# Patient Record
Sex: FEMALE | Race: WHITE | Employment: OTHER | URBAN - NONMETROPOLITAN AREA
[De-identification: names, ages, dates, MRNs, and addresses within clinical notes are randomized per-mention and may not be internally consistent; named-entity substitution may affect disease eponyms.]

---

## 2017-06-20 ENCOUNTER — TELEPHONE (OUTPATIENT)
Dept: INTERNAL MEDICINE | Age: 67
End: 2017-06-20

## 2017-06-20 RX ORDER — HYDROCHLOROTHIAZIDE 25 MG/1
TABLET ORAL
Qty: 30 TABLET | Refills: 3 | Status: SHIPPED | OUTPATIENT
Start: 2017-06-20 | End: 2017-06-22 | Stop reason: SDUPTHER

## 2017-06-22 RX ORDER — HYDROCHLOROTHIAZIDE 25 MG/1
TABLET ORAL
Qty: 30 TABLET | Refills: 3 | Status: SHIPPED | OUTPATIENT
Start: 2017-06-22 | End: 2017-06-22 | Stop reason: SDUPTHER

## 2017-06-22 RX ORDER — HYDROCHLOROTHIAZIDE 25 MG/1
TABLET ORAL
Qty: 30 TABLET | Refills: 3 | Status: SHIPPED | OUTPATIENT
Start: 2017-06-22 | End: 2017-06-26 | Stop reason: SDUPTHER

## 2017-06-26 RX ORDER — ALCOHOL ANTISEPTIC PADS
1 PADS, MEDICATED (EA) TOPICAL 3 TIMES DAILY
Qty: 100 EACH | Refills: 3 | Status: SHIPPED | OUTPATIENT
Start: 2017-06-26 | End: 2017-07-11 | Stop reason: SDUPTHER

## 2017-06-26 RX ORDER — HYDROCHLOROTHIAZIDE 25 MG/1
TABLET ORAL
Qty: 30 TABLET | Refills: 3 | Status: SHIPPED | OUTPATIENT
Start: 2017-06-26 | End: 2017-06-29 | Stop reason: SDUPTHER

## 2017-06-29 RX ORDER — LANCETS
100 EACH MISCELLANEOUS 4 TIMES DAILY
Qty: 100 EACH | Refills: 3 | Status: SHIPPED | OUTPATIENT
Start: 2017-06-29 | End: 2018-06-14 | Stop reason: SDUPTHER

## 2017-06-29 RX ORDER — HYDROCHLOROTHIAZIDE 25 MG/1
TABLET ORAL
Qty: 30 TABLET | Refills: 3 | Status: SHIPPED | OUTPATIENT
Start: 2017-06-29 | End: 2017-07-11 | Stop reason: SDUPTHER

## 2017-06-29 RX ORDER — LISINOPRIL 40 MG/1
TABLET ORAL
Qty: 90 TABLET | Refills: 3 | Status: SHIPPED | OUTPATIENT
Start: 2017-06-29 | End: 2017-07-06 | Stop reason: SDUPTHER

## 2017-06-29 RX ORDER — SIMVASTATIN 40 MG
40 TABLET ORAL EVERY EVENING
Qty: 90 TABLET | Refills: 3 | Status: SHIPPED | OUTPATIENT
Start: 2017-06-29 | End: 2018-05-17 | Stop reason: SDUPTHER

## 2017-06-29 RX ORDER — GLIPIZIDE 2.5 MG/1
2.5 TABLET, EXTENDED RELEASE ORAL DAILY
Qty: 180 TABLET | Refills: 3 | Status: SHIPPED | OUTPATIENT
Start: 2017-06-29 | End: 2017-07-11 | Stop reason: SDUPTHER

## 2017-07-06 RX ORDER — LISINOPRIL 40 MG/1
TABLET ORAL
Qty: 90 TABLET | Refills: 3 | Status: SHIPPED | OUTPATIENT
Start: 2017-07-06 | End: 2017-07-28 | Stop reason: SDUPTHER

## 2017-07-11 RX ORDER — GLIPIZIDE 2.5 MG/1
2.5 TABLET, EXTENDED RELEASE ORAL DAILY
Qty: 180 TABLET | Refills: 3 | Status: SHIPPED | OUTPATIENT
Start: 2017-07-11 | End: 2019-02-22 | Stop reason: SDUPTHER

## 2017-07-11 RX ORDER — ALCOHOL ANTISEPTIC PADS
1 PADS, MEDICATED (EA) TOPICAL 3 TIMES DAILY
Qty: 100 EACH | Refills: 3 | Status: SHIPPED | OUTPATIENT
Start: 2017-07-11 | End: 2018-06-27 | Stop reason: SDUPTHER

## 2017-07-11 RX ORDER — HYDROCHLOROTHIAZIDE 25 MG/1
TABLET ORAL
Qty: 90 TABLET | Refills: 3 | Status: SHIPPED | OUTPATIENT
Start: 2017-07-11 | End: 2017-07-28 | Stop reason: SDUPTHER

## 2017-07-28 RX ORDER — HYDROCHLOROTHIAZIDE 25 MG/1
TABLET ORAL
Qty: 90 TABLET | Refills: 3 | Status: ON HOLD | OUTPATIENT
Start: 2017-07-28 | End: 2018-05-20 | Stop reason: HOSPADM

## 2017-07-28 RX ORDER — LISINOPRIL 40 MG/1
TABLET ORAL
Qty: 90 TABLET | Refills: 3 | Status: SHIPPED | OUTPATIENT
Start: 2017-07-28 | End: 2018-08-15 | Stop reason: SDUPTHER

## 2017-08-28 ENCOUNTER — TELEPHONE (OUTPATIENT)
Dept: INTERNAL MEDICINE | Age: 67
End: 2017-08-28

## 2017-08-28 RX ORDER — CEFDINIR 300 MG/1
300 CAPSULE ORAL 2 TIMES DAILY
Qty: 14 CAPSULE | Refills: 0 | Status: SHIPPED | OUTPATIENT
Start: 2017-08-28 | End: 2017-09-18

## 2017-08-28 RX ORDER — CEFDINIR 300 MG/1
300 CAPSULE ORAL 2 TIMES DAILY
COMMUNITY
End: 2017-09-18

## 2017-09-08 DIAGNOSIS — Z79.4 TYPE 2 DIABETES MELLITUS WITHOUT COMPLICATION, WITH LONG-TERM CURRENT USE OF INSULIN (HCC): ICD-10-CM

## 2017-09-08 DIAGNOSIS — E11.9 TYPE 2 DIABETES MELLITUS WITHOUT COMPLICATION, WITH LONG-TERM CURRENT USE OF INSULIN (HCC): ICD-10-CM

## 2017-09-08 DIAGNOSIS — Z00.00 HEALTHCARE MAINTENANCE: ICD-10-CM

## 2017-09-08 DIAGNOSIS — E55.9 UNSPECIFIED VITAMIN D DEFICIENCY: Primary | ICD-10-CM

## 2017-09-11 DIAGNOSIS — E55.9 UNSPECIFIED VITAMIN D DEFICIENCY: ICD-10-CM

## 2017-09-11 DIAGNOSIS — Z00.00 HEALTHCARE MAINTENANCE: ICD-10-CM

## 2017-09-11 DIAGNOSIS — E11.9 TYPE 2 DIABETES MELLITUS WITHOUT COMPLICATION, WITH LONG-TERM CURRENT USE OF INSULIN (HCC): ICD-10-CM

## 2017-09-11 DIAGNOSIS — Z79.4 TYPE 2 DIABETES MELLITUS WITHOUT COMPLICATION, WITH LONG-TERM CURRENT USE OF INSULIN (HCC): ICD-10-CM

## 2017-09-11 LAB
ALBUMIN SERPL-MCNC: 3.9 G/DL (ref 3.5–5.2)
ALP BLD-CCNC: 43 U/L (ref 35–104)
ALT SERPL-CCNC: 20 U/L (ref 5–33)
ANION GAP SERPL CALCULATED.3IONS-SCNC: 14 MMOL/L (ref 7–19)
AST SERPL-CCNC: 20 U/L (ref 5–32)
BILIRUB SERPL-MCNC: 0.3 MG/DL (ref 0.2–1.2)
BUN BLDV-MCNC: 23 MG/DL (ref 8–23)
CALCIUM SERPL-MCNC: 9.1 MG/DL (ref 8.8–10.2)
CHLORIDE BLD-SCNC: 101 MMOL/L (ref 98–111)
CHOLESTEROL, TOTAL: 117 MG/DL (ref 160–199)
CO2: 24 MMOL/L (ref 22–29)
CREAT SERPL-MCNC: 1.1 MG/DL (ref 0.5–0.9)
CREATININE URINE: 44.7 MG/DL (ref 4.2–622)
GFR NON-AFRICAN AMERICAN: 49
GLUCOSE BLD-MCNC: 89 MG/DL (ref 74–109)
HBA1C MFR BLD: 6.3 %
HCT VFR BLD CALC: 34.9 % (ref 37–47)
HDLC SERPL-MCNC: 37 MG/DL (ref 65–121)
HEMOGLOBIN: 11.4 G/DL (ref 12–16)
LDL CHOLESTEROL CALCULATED: 49 MG/DL
MCH RBC QN AUTO: 32.9 PG (ref 27–31)
MCHC RBC AUTO-ENTMCNC: 32.7 G/DL (ref 33–37)
MCV RBC AUTO: 100.9 FL (ref 81–99)
MICROALBUMIN UR-MCNC: 6.6 MG/DL (ref 0–19)
MICROALBUMIN/CREAT UR-RTO: 147.7 MG/G
PDW BLD-RTO: 12.6 % (ref 11.5–14.5)
PLATELET # BLD: 244 K/UL (ref 130–400)
PMV BLD AUTO: 10.9 FL (ref 9.4–12.3)
POTASSIUM SERPL-SCNC: 4.9 MMOL/L (ref 3.5–5)
RBC # BLD: 3.46 M/UL (ref 4.2–5.4)
SODIUM BLD-SCNC: 139 MMOL/L (ref 136–145)
TOTAL PROTEIN: 7.2 G/DL (ref 6.6–8.7)
TRIGL SERPL-MCNC: 155 MG/DL (ref 150–199)
TSH SERPL DL<=0.05 MIU/L-ACNC: 1.66 UIU/ML (ref 0.27–4.2)
VITAMIN D 25-HYDROXY: 30.7 NG/ML
WBC # BLD: 8.1 K/UL (ref 4.8–10.8)

## 2017-09-12 RX ORDER — BLOOD-GLUCOSE METER
EACH MISCELLANEOUS
Qty: 1 KIT | Refills: 0 | Status: SHIPPED | OUTPATIENT
Start: 2017-09-12 | End: 2019-04-09

## 2017-09-12 RX ORDER — BLOOD SUGAR DIAGNOSTIC
STRIP MISCELLANEOUS
Qty: 400 STRIP | Refills: 3 | Status: SHIPPED | OUTPATIENT
Start: 2017-09-12 | End: 2018-06-27 | Stop reason: SDUPTHER

## 2017-09-18 ENCOUNTER — OFFICE VISIT (OUTPATIENT)
Dept: INTERNAL MEDICINE | Age: 67
End: 2017-09-18
Payer: MEDICARE

## 2017-09-18 VITALS
DIASTOLIC BLOOD PRESSURE: 62 MMHG | WEIGHT: 224 LBS | SYSTOLIC BLOOD PRESSURE: 118 MMHG | HEART RATE: 70 BPM | HEIGHT: 65 IN | OXYGEN SATURATION: 97 % | RESPIRATION RATE: 18 BRPM | TEMPERATURE: 97.4 F | BODY MASS INDEX: 37.32 KG/M2

## 2017-09-18 DIAGNOSIS — E55.9 VITAMIN D DEFICIENCY: ICD-10-CM

## 2017-09-18 DIAGNOSIS — E11.9 TYPE 2 DIABETES MELLITUS WITHOUT COMPLICATION, WITHOUT LONG-TERM CURRENT USE OF INSULIN (HCC): ICD-10-CM

## 2017-09-18 DIAGNOSIS — E78.2 MIXED HYPERLIPIDEMIA: ICD-10-CM

## 2017-09-18 DIAGNOSIS — E03.9 HYPOTHYROIDISM, UNSPECIFIED TYPE: ICD-10-CM

## 2017-09-18 DIAGNOSIS — J01.11 ACUTE RECURRENT FRONTAL SINUSITIS: ICD-10-CM

## 2017-09-18 DIAGNOSIS — J01.01 ACUTE RECURRENT MAXILLARY SINUSITIS: Primary | ICD-10-CM

## 2017-09-18 DIAGNOSIS — I10 HYPERTENSION, ESSENTIAL: ICD-10-CM

## 2017-09-18 PROCEDURE — 99214 OFFICE O/P EST MOD 30 MIN: CPT | Performed by: NURSE PRACTITIONER

## 2017-09-18 RX ORDER — TIMOLOL MALEATE 5 MG/ML
1 SOLUTION/ DROPS OPHTHALMIC DAILY
COMMUNITY
Start: 2017-09-08 | End: 2022-02-17 | Stop reason: DRUGHIGH

## 2017-09-18 ASSESSMENT — ENCOUNTER SYMPTOMS
VOMITING: 0
CHOKING: 0
COLOR CHANGE: 0
STRIDOR: 0
WHEEZING: 0
SHORTNESS OF BREATH: 0
EYE DISCHARGE: 0
ABDOMINAL DISTENTION: 0
BLOOD IN STOOL: 0
DIARRHEA: 0
ABDOMINAL PAIN: 0
NAUSEA: 0
TROUBLE SWALLOWING: 0
COUGH: 0
EYE ITCHING: 0
SINUS PRESSURE: 1
SORE THROAT: 0
CONSTIPATION: 0

## 2017-09-18 ASSESSMENT — PATIENT HEALTH QUESTIONNAIRE - PHQ9
1. LITTLE INTEREST OR PLEASURE IN DOING THINGS: 0
2. FEELING DOWN, DEPRESSED OR HOPELESS: 0
SUM OF ALL RESPONSES TO PHQ9 QUESTIONS 1 & 2: 0
SUM OF ALL RESPONSES TO PHQ QUESTIONS 1-9: 0

## 2017-11-08 ENCOUNTER — TELEPHONE (OUTPATIENT)
Dept: INTERNAL MEDICINE | Age: 67
End: 2017-11-08

## 2017-11-09 ENCOUNTER — TELEPHONE (OUTPATIENT)
Dept: INTERNAL MEDICINE | Age: 67
End: 2017-11-09

## 2017-11-09 RX ORDER — AZITHROMYCIN 250 MG/1
TABLET, FILM COATED ORAL
Qty: 1 PACKET | Refills: 0 | Status: CANCELLED | OUTPATIENT
Start: 2017-11-09 | End: 2017-11-19

## 2017-11-09 RX ORDER — CEFDINIR 300 MG/1
300 CAPSULE ORAL 2 TIMES DAILY
Qty: 14 CAPSULE | Refills: 0 | Status: SHIPPED | OUTPATIENT
Start: 2017-11-09 | End: 2018-05-14 | Stop reason: SDUPTHER

## 2017-11-09 RX ORDER — BENZONATATE 200 MG/1
200 CAPSULE ORAL 3 TIMES DAILY PRN
Qty: 30 CAPSULE | Refills: 0 | Status: SHIPPED | OUTPATIENT
Start: 2017-11-09 | End: 2018-05-18

## 2017-11-14 ENCOUNTER — TELEPHONE (OUTPATIENT)
Dept: INTERNAL MEDICINE | Age: 67
End: 2017-11-14

## 2017-12-18 ENCOUNTER — TELEPHONE (OUTPATIENT)
Dept: INTERNAL MEDICINE | Age: 67
End: 2017-12-18

## 2018-01-11 DIAGNOSIS — E03.9 HYPOTHYROIDISM, UNSPECIFIED TYPE: ICD-10-CM

## 2018-01-11 DIAGNOSIS — E55.9 VITAMIN D DEFICIENCY: ICD-10-CM

## 2018-01-11 DIAGNOSIS — E11.9 TYPE 2 DIABETES MELLITUS WITHOUT COMPLICATION, WITHOUT LONG-TERM CURRENT USE OF INSULIN (HCC): ICD-10-CM

## 2018-01-11 LAB
ALBUMIN SERPL-MCNC: 4.1 G/DL (ref 3.5–5.2)
ALP BLD-CCNC: 39 U/L (ref 35–104)
ALT SERPL-CCNC: 29 U/L (ref 5–33)
ANION GAP SERPL CALCULATED.3IONS-SCNC: 13 MMOL/L (ref 7–19)
AST SERPL-CCNC: 29 U/L (ref 5–32)
BASOPHILS ABSOLUTE: 0.1 K/UL (ref 0–0.2)
BASOPHILS RELATIVE PERCENT: 0.9 % (ref 0–1)
BILIRUB SERPL-MCNC: 0.3 MG/DL (ref 0.2–1.2)
BUN BLDV-MCNC: 18 MG/DL (ref 8–23)
CALCIUM SERPL-MCNC: 8.5 MG/DL (ref 8.8–10.2)
CHLORIDE BLD-SCNC: 101 MMOL/L (ref 98–111)
CO2: 25 MMOL/L (ref 22–29)
CREAT SERPL-MCNC: 1.3 MG/DL (ref 0.5–0.9)
EOSINOPHILS ABSOLUTE: 0.3 K/UL (ref 0–0.6)
EOSINOPHILS RELATIVE PERCENT: 3.7 % (ref 0–5)
GFR NON-AFRICAN AMERICAN: 41
GLUCOSE BLD-MCNC: 114 MG/DL (ref 74–109)
HBA1C MFR BLD: 6.4 %
HCT VFR BLD CALC: 34.9 % (ref 37–47)
HEMOGLOBIN: 11.6 G/DL (ref 12–16)
LYMPHOCYTES ABSOLUTE: 3.2 K/UL (ref 1.1–4.5)
LYMPHOCYTES RELATIVE PERCENT: 40.4 % (ref 20–40)
MCH RBC QN AUTO: 32.3 PG (ref 27–31)
MCHC RBC AUTO-ENTMCNC: 33.2 G/DL (ref 33–37)
MCV RBC AUTO: 97.2 FL (ref 81–99)
MONOCYTES ABSOLUTE: 0.5 K/UL (ref 0–0.9)
MONOCYTES RELATIVE PERCENT: 6.2 % (ref 0–10)
NEUTROPHILS ABSOLUTE: 3.8 K/UL (ref 1.5–7.5)
NEUTROPHILS RELATIVE PERCENT: 48.7 % (ref 50–65)
PDW BLD-RTO: 12.4 % (ref 11.5–14.5)
PLATELET # BLD: 292 K/UL (ref 130–400)
PMV BLD AUTO: 10.7 FL (ref 9.4–12.3)
POTASSIUM SERPL-SCNC: 4.7 MMOL/L (ref 3.5–5)
RBC # BLD: 3.59 M/UL (ref 4.2–5.4)
SODIUM BLD-SCNC: 139 MMOL/L (ref 136–145)
TOTAL PROTEIN: 7.2 G/DL (ref 6.6–8.7)
TSH SERPL DL<=0.05 MIU/L-ACNC: 1.39 UIU/ML (ref 0.27–4.2)
VITAMIN D 25-HYDROXY: 36.4 NG/ML
WBC # BLD: 7.9 K/UL (ref 4.8–10.8)

## 2018-01-23 ENCOUNTER — OFFICE VISIT (OUTPATIENT)
Dept: INTERNAL MEDICINE | Age: 68
End: 2018-01-23
Payer: MEDICARE

## 2018-01-23 VITALS
HEART RATE: 79 BPM | SYSTOLIC BLOOD PRESSURE: 130 MMHG | DIASTOLIC BLOOD PRESSURE: 62 MMHG | BODY MASS INDEX: 36.82 KG/M2 | WEIGHT: 221 LBS | OXYGEN SATURATION: 94 % | HEIGHT: 65 IN

## 2018-01-23 DIAGNOSIS — E78.5 HYPERLIPIDEMIA, UNSPECIFIED HYPERLIPIDEMIA TYPE: ICD-10-CM

## 2018-01-23 DIAGNOSIS — E11.21 TYPE II DIABETES MELLITUS WITH NEPHROPATHY (HCC): Primary | ICD-10-CM

## 2018-01-23 DIAGNOSIS — I10 ESSENTIAL HYPERTENSION: ICD-10-CM

## 2018-01-23 PROCEDURE — 3017F COLORECTAL CA SCREEN DOC REV: CPT | Performed by: INTERNAL MEDICINE

## 2018-01-23 PROCEDURE — G8400 PT W/DXA NO RESULTS DOC: HCPCS | Performed by: INTERNAL MEDICINE

## 2018-01-23 PROCEDURE — 3014F SCREEN MAMMO DOC REV: CPT | Performed by: INTERNAL MEDICINE

## 2018-01-23 PROCEDURE — 1090F PRES/ABSN URINE INCON ASSESS: CPT | Performed by: INTERNAL MEDICINE

## 2018-01-23 PROCEDURE — G8484 FLU IMMUNIZE NO ADMIN: HCPCS | Performed by: INTERNAL MEDICINE

## 2018-01-23 PROCEDURE — 1036F TOBACCO NON-USER: CPT | Performed by: INTERNAL MEDICINE

## 2018-01-23 PROCEDURE — 1123F ACP DISCUSS/DSCN MKR DOCD: CPT | Performed by: INTERNAL MEDICINE

## 2018-01-23 PROCEDURE — G8427 DOCREV CUR MEDS BY ELIG CLIN: HCPCS | Performed by: INTERNAL MEDICINE

## 2018-01-23 PROCEDURE — 4040F PNEUMOC VAC/ADMIN/RCVD: CPT | Performed by: INTERNAL MEDICINE

## 2018-01-23 PROCEDURE — G8417 CALC BMI ABV UP PARAM F/U: HCPCS | Performed by: INTERNAL MEDICINE

## 2018-01-23 PROCEDURE — 3044F HG A1C LEVEL LT 7.0%: CPT | Performed by: INTERNAL MEDICINE

## 2018-01-23 PROCEDURE — 99214 OFFICE O/P EST MOD 30 MIN: CPT | Performed by: INTERNAL MEDICINE

## 2018-01-23 ASSESSMENT — ENCOUNTER SYMPTOMS
COUGH: 0
WHEEZING: 0
TROUBLE SWALLOWING: 0
EYE ITCHING: 0
VOMITING: 0
COLOR CHANGE: 0
EYE DISCHARGE: 0
ABDOMINAL DISTENTION: 0
ABDOMINAL PAIN: 0
DIARRHEA: 0
SHORTNESS OF BREATH: 0
CONSTIPATION: 0
BLOOD IN STOOL: 0
CHOKING: 0
STRIDOR: 0
SINUS PRESSURE: 0
SORE THROAT: 0
NAUSEA: 0

## 2018-01-23 ASSESSMENT — PATIENT HEALTH QUESTIONNAIRE - PHQ9
1. LITTLE INTEREST OR PLEASURE IN DOING THINGS: 0
SUM OF ALL RESPONSES TO PHQ9 QUESTIONS 1 & 2: 0
SUM OF ALL RESPONSES TO PHQ QUESTIONS 1-9: 0
2. FEELING DOWN, DEPRESSED OR HOPELESS: 0

## 2018-01-23 NOTE — PROGRESS NOTES
and time. She appears well-developed and well-nourished. No distress. HENT:   Head: Normocephalic and atraumatic. Eyes: Pupils are equal, round, and reactive to light. Right eye exhibits no discharge. Left eye exhibits no discharge. No scleral icterus. Neck: Normal range of motion. Neck supple. No JVD present. No thyromegaly present. Cardiovascular: Normal rate, regular rhythm and normal heart sounds. No murmur heard. Pulmonary/Chest: Effort normal and breath sounds normal. No respiratory distress. She has no wheezes. She has no rales. Abdominal: Soft. Bowel sounds are normal. She exhibits no distension and no mass. There is no tenderness. There is no rebound and no guarding. Musculoskeletal: Normal range of motion. She exhibits no edema or tenderness. Neurological: She is alert and oriented to person, place, and time. She has normal reflexes. No cranial nerve deficit. Coordination normal.   Skin: Skin is warm and dry. No rash noted. No erythema. Psychiatric: Her behavior is normal. Judgment and thought content normal. She does not exhibit a depressed mood. Nursing note and vitals reviewed. 1. Type II diabetes mellitus with nephropathy [facility])        ASSESSMENT/PLAN:    77-year-old woman here for follow-up    1. Type II diabetes: Stable    2. Hyperlipidemia: Continue current medication regimen    3. Hypertension: Blood pressure well controlled    Street Rony was seen today for 3 month follow-up, diabetes and hypertension. Diagnoses and all orders for this visit:    Type II diabetes mellitus with nephropathy ([facility] [address]  -     CBC Auto Differential; Future  -     Comprehensive Metabolic Panel; Future  -     Hemoglobin A1C; Future  -     Lipid Panel; Future  -     TSH without Reflex; Future  -     Microalbumin / Creatinine Urine Ratio;  Future          Return in about 4 months (around 5/23/2018) for physical.     Orders Placed This Encounter   Procedures    CBC Auto Differential     Fast 12 hours

## 2018-03-21 ENCOUNTER — TELEPHONE (OUTPATIENT)
Dept: PRIMARY CARE CLINIC | Age: 68
End: 2018-03-21

## 2018-04-26 RX ORDER — METHYLPREDNISOLONE 4 MG/1
TABLET ORAL
Qty: 1 KIT | Refills: 0 | Status: SHIPPED | OUTPATIENT
Start: 2018-04-26 | End: 2018-05-02

## 2018-04-26 RX ORDER — AMOXICILLIN AND CLAVULANATE POTASSIUM 875; 125 MG/1; MG/1
1 TABLET, FILM COATED ORAL 2 TIMES DAILY
Qty: 20 TABLET | Refills: 0 | Status: SHIPPED | OUTPATIENT
Start: 2018-04-26 | End: 2018-05-06

## 2018-05-10 ENCOUNTER — TELEPHONE (OUTPATIENT)
Dept: INTERNAL MEDICINE | Age: 68
End: 2018-05-10

## 2018-05-11 DIAGNOSIS — R30.9 URINATION PAIN: Primary | ICD-10-CM

## 2018-05-11 DIAGNOSIS — R30.9 URINATION PAIN: ICD-10-CM

## 2018-05-11 LAB
BACTERIA: NEGATIVE /HPF
BILIRUBIN URINE: NEGATIVE
BLOOD, URINE: ABNORMAL
CLARITY: ABNORMAL
COLOR: YELLOW
EPITHELIAL CELLS, UA: 3 /HPF (ref 0–5)
GLUCOSE URINE: NEGATIVE MG/DL
HYALINE CASTS: 2 /HPF (ref 0–8)
KETONES, URINE: NEGATIVE MG/DL
LEUKOCYTE ESTERASE, URINE: ABNORMAL
NITRITE, URINE: NEGATIVE
PH UA: 5.5
PROTEIN UA: 30 MG/DL
RBC UA: 14 /HPF (ref 0–4)
SPECIFIC GRAVITY UA: 1.01
URINE REFLEX TO CULTURE: YES
UROBILINOGEN, URINE: 0.2 E.U./DL
WBC UA: 28 /HPF (ref 0–5)

## 2018-05-13 LAB
ORGANISM: ABNORMAL
URINE CULTURE, ROUTINE: ABNORMAL
URINE CULTURE, ROUTINE: ABNORMAL

## 2018-05-14 ENCOUNTER — TELEPHONE (OUTPATIENT)
Dept: INTERNAL MEDICINE | Age: 68
End: 2018-05-14

## 2018-05-14 RX ORDER — CEFDINIR 300 MG/1
300 CAPSULE ORAL 2 TIMES DAILY
Qty: 14 CAPSULE | Refills: 0 | Status: SHIPPED | OUTPATIENT
Start: 2018-05-14 | End: 2018-05-18

## 2018-05-17 RX ORDER — SIMVASTATIN 40 MG
TABLET ORAL
Qty: 90 TABLET | Refills: 3 | Status: SHIPPED | OUTPATIENT
Start: 2018-05-17 | End: 2019-03-04 | Stop reason: SDUPTHER

## 2018-05-18 ENCOUNTER — APPOINTMENT (OUTPATIENT)
Dept: CT IMAGING | Age: 68
DRG: 683 | End: 2018-05-18
Payer: MEDICARE

## 2018-05-18 ENCOUNTER — HOSPITAL ENCOUNTER (INPATIENT)
Age: 68
LOS: 2 days | Discharge: HOME | DRG: 683 | End: 2018-05-20
Attending: EMERGENCY MEDICINE | Admitting: HOSPITALIST
Payer: MEDICARE

## 2018-05-18 DIAGNOSIS — N17.9 AKI (ACUTE KIDNEY INJURY) (HCC): Primary | ICD-10-CM

## 2018-05-18 PROBLEM — E11.9 DIABETES MELLITUS (HCC): Status: ACTIVE | Noted: 2018-05-18

## 2018-05-18 PROBLEM — I10 HYPERTENSION: Status: ACTIVE | Noted: 2018-05-18

## 2018-05-18 PROBLEM — N30.00 ACUTE CYSTITIS WITHOUT HEMATURIA: Status: ACTIVE | Noted: 2018-05-18

## 2018-05-18 PROBLEM — E78.5 HYPERLIPIDEMIA: Status: ACTIVE | Noted: 2018-05-18

## 2018-05-18 PROBLEM — E11.9 TYPE 2 DIABETES MELLITUS (HCC): Status: ACTIVE | Noted: 2018-05-18

## 2018-05-18 PROBLEM — H40.9 GLAUCOMA: Status: ACTIVE | Noted: 2018-05-18

## 2018-05-18 PROBLEM — I25.10 CAD (CORONARY ARTERY DISEASE): Status: ACTIVE | Noted: 2018-05-18

## 2018-05-18 LAB
ALBUMIN SERPL-MCNC: 3.6 G/DL (ref 3.5–5.2)
ALP BLD-CCNC: 56 U/L (ref 35–104)
ALT SERPL-CCNC: 33 U/L (ref 5–33)
ANION GAP SERPL CALCULATED.3IONS-SCNC: 16 MMOL/L (ref 7–19)
AST SERPL-CCNC: 23 U/L (ref 5–32)
BASOPHILS ABSOLUTE: 0 K/UL (ref 0–0.2)
BASOPHILS RELATIVE PERCENT: 0.6 % (ref 0–1)
BILIRUB SERPL-MCNC: <0.2 MG/DL (ref 0.2–1.2)
BILIRUBIN URINE: ABNORMAL
BLOOD, URINE: NEGATIVE
BUN BLDV-MCNC: 56 MG/DL (ref 8–23)
CALCIUM SERPL-MCNC: 9.3 MG/DL (ref 8.8–10.2)
CHLORIDE BLD-SCNC: 97 MMOL/L (ref 98–111)
CLARITY: CLEAR
CO2: 23 MMOL/L (ref 22–29)
COLOR: YELLOW
CREAT SERPL-MCNC: 2.2 MG/DL (ref 0.5–0.9)
EOSINOPHILS ABSOLUTE: 0.1 K/UL (ref 0–0.6)
EOSINOPHILS RELATIVE PERCENT: 1.5 % (ref 0–5)
GFR NON-AFRICAN AMERICAN: 22
GLUCOSE BLD-MCNC: 142 MG/DL (ref 74–109)
GLUCOSE BLD-MCNC: 160 MG/DL (ref 70–99)
GLUCOSE BLD-MCNC: 186 MG/DL (ref 70–99)
GLUCOSE URINE: NEGATIVE MG/DL
HCT VFR BLD CALC: 37.3 % (ref 37–47)
HEMOGLOBIN: 12.3 G/DL (ref 12–16)
KETONES, URINE: ABNORMAL MG/DL
LEUKOCYTE ESTERASE, URINE: ABNORMAL
LYMPHOCYTES ABSOLUTE: 2.3 K/UL (ref 1.1–4.5)
LYMPHOCYTES RELATIVE PERCENT: 33.9 % (ref 20–40)
MCH RBC QN AUTO: 32.2 PG (ref 27–31)
MCHC RBC AUTO-ENTMCNC: 33 G/DL (ref 33–37)
MCV RBC AUTO: 97.6 FL (ref 81–99)
MONOCYTES ABSOLUTE: 0.8 K/UL (ref 0–0.9)
MONOCYTES RELATIVE PERCENT: 11.5 % (ref 0–10)
NEUTROPHILS ABSOLUTE: 3.5 K/UL (ref 1.5–7.5)
NEUTROPHILS RELATIVE PERCENT: 52.2 % (ref 50–65)
NITRITE, URINE: NEGATIVE
PDW BLD-RTO: 12.4 % (ref 11.5–14.5)
PERFORMED ON: ABNORMAL
PERFORMED ON: ABNORMAL
PERFORMED ON: NORMAL
PH UA: 5
PLATELET # BLD: 337 K/UL (ref 130–400)
PMV BLD AUTO: 10.1 FL (ref 9.4–12.3)
POC TROPONIN I: 0.01 NG/ML (ref 0–0.08)
POTASSIUM SERPL-SCNC: 4.5 MMOL/L (ref 3.5–5)
PROTEIN UA: 30 MG/DL
RBC # BLD: 3.82 M/UL (ref 4.2–5.4)
SODIUM BLD-SCNC: 136 MMOL/L (ref 136–145)
SPECIFIC GRAVITY UA: 1.02
TOTAL PROTEIN: 7.6 G/DL (ref 6.6–8.7)
URINE REFLEX TO CULTURE: YES
UROBILINOGEN, URINE: 0.2 E.U./DL
WBC # BLD: 6.7 K/UL (ref 4.8–10.8)

## 2018-05-18 PROCEDURE — 96374 THER/PROPH/DIAG INJ IV PUSH: CPT

## 2018-05-18 PROCEDURE — 84484 ASSAY OF TROPONIN QUANT: CPT

## 2018-05-18 PROCEDURE — 99285 EMERGENCY DEPT VISIT HI MDM: CPT | Performed by: EMERGENCY MEDICINE

## 2018-05-18 PROCEDURE — 6370000000 HC RX 637 (ALT 250 FOR IP): Performed by: HOSPITALIST

## 2018-05-18 PROCEDURE — 82948 REAGENT STRIP/BLOOD GLUCOSE: CPT

## 2018-05-18 PROCEDURE — 2580000003 HC RX 258: Performed by: NURSE PRACTITIONER

## 2018-05-18 PROCEDURE — 93005 ELECTROCARDIOGRAM TRACING: CPT

## 2018-05-18 PROCEDURE — 2580000003 HC RX 258: Performed by: EMERGENCY MEDICINE

## 2018-05-18 PROCEDURE — 1210000000 HC MED SURG R&B

## 2018-05-18 PROCEDURE — 99223 1ST HOSP IP/OBS HIGH 75: CPT | Performed by: HOSPITALIST

## 2018-05-18 PROCEDURE — 87086 URINE CULTURE/COLONY COUNT: CPT

## 2018-05-18 PROCEDURE — 70450 CT HEAD/BRAIN W/O DYE: CPT

## 2018-05-18 PROCEDURE — 81003 URINALYSIS AUTO W/O SCOPE: CPT

## 2018-05-18 PROCEDURE — 36415 COLL VENOUS BLD VENIPUNCTURE: CPT

## 2018-05-18 PROCEDURE — 6360000002 HC RX W HCPCS: Performed by: EMERGENCY MEDICINE

## 2018-05-18 PROCEDURE — 99285 EMERGENCY DEPT VISIT HI MDM: CPT

## 2018-05-18 PROCEDURE — 2580000003 HC RX 258: Performed by: HOSPITALIST

## 2018-05-18 PROCEDURE — 6370000000 HC RX 637 (ALT 250 FOR IP): Performed by: NURSE PRACTITIONER

## 2018-05-18 PROCEDURE — 80053 COMPREHEN METABOLIC PANEL: CPT

## 2018-05-18 PROCEDURE — 85025 COMPLETE CBC W/AUTO DIFF WBC: CPT

## 2018-05-18 RX ORDER — MECLIZINE HCL 12.5 MG/1
25 TABLET ORAL ONCE
Status: COMPLETED | OUTPATIENT
Start: 2018-05-18 | End: 2018-05-18

## 2018-05-18 RX ORDER — LANOLIN ALCOHOL/MO/W.PET/CERES
1000 CREAM (GRAM) TOPICAL DAILY
COMMUNITY

## 2018-05-18 RX ORDER — NICOTINE POLACRILEX 4 MG
15 LOZENGE BUCCAL PRN
Status: DISCONTINUED | OUTPATIENT
Start: 2018-05-18 | End: 2018-05-20 | Stop reason: HOSPADM

## 2018-05-18 RX ORDER — SODIUM CHLORIDE 9 MG/ML
INJECTION, SOLUTION INTRAVENOUS CONTINUOUS
Status: DISCONTINUED | OUTPATIENT
Start: 2018-05-18 | End: 2018-05-20 | Stop reason: HOSPADM

## 2018-05-18 RX ORDER — DEXTROSE MONOHYDRATE 50 MG/ML
100 INJECTION, SOLUTION INTRAVENOUS PRN
Status: DISCONTINUED | OUTPATIENT
Start: 2018-05-18 | End: 2018-05-20 | Stop reason: HOSPADM

## 2018-05-18 RX ORDER — DEXTROSE MONOHYDRATE 25 G/50ML
12.5 INJECTION, SOLUTION INTRAVENOUS PRN
Status: DISCONTINUED | OUTPATIENT
Start: 2018-05-18 | End: 2018-05-20 | Stop reason: HOSPADM

## 2018-05-18 RX ORDER — CHLORAL HYDRATE 500 MG
1000 CAPSULE ORAL 2 TIMES DAILY
COMMUNITY

## 2018-05-18 RX ORDER — SIMVASTATIN 40 MG
40 TABLET ORAL NIGHTLY
Status: DISCONTINUED | OUTPATIENT
Start: 2018-05-18 | End: 2018-05-20 | Stop reason: HOSPADM

## 2018-05-18 RX ORDER — 0.9 % SODIUM CHLORIDE 0.9 %
500 INTRAVENOUS SOLUTION INTRAVENOUS ONCE
Status: COMPLETED | OUTPATIENT
Start: 2018-05-18 | End: 2018-05-18

## 2018-05-18 RX ORDER — ASPIRIN 81 MG/1
81 TABLET, CHEWABLE ORAL DAILY
Status: DISCONTINUED | OUTPATIENT
Start: 2018-05-18 | End: 2018-05-20 | Stop reason: HOSPADM

## 2018-05-18 RX ORDER — CHOLECALCIFEROL (VITAMIN D3) 125 MCG
1000 CAPSULE ORAL DAILY
Status: DISCONTINUED | OUTPATIENT
Start: 2018-05-18 | End: 2018-05-20 | Stop reason: HOSPADM

## 2018-05-18 RX ORDER — 0.9 % SODIUM CHLORIDE 0.9 %
1000 INTRAVENOUS SOLUTION INTRAVENOUS ONCE
Status: COMPLETED | OUTPATIENT
Start: 2018-05-18 | End: 2018-05-18

## 2018-05-18 RX ORDER — TIMOLOL MALEATE 5 MG/ML
1 SOLUTION/ DROPS OPHTHALMIC NIGHTLY
Status: DISCONTINUED | OUTPATIENT
Start: 2018-05-18 | End: 2018-05-20 | Stop reason: HOSPADM

## 2018-05-18 RX ADMIN — INSULIN LISPRO 3 UNITS: 100 INJECTION, SOLUTION INTRAVENOUS; SUBCUTANEOUS at 21:59

## 2018-05-18 RX ADMIN — SODIUM CHLORIDE 500 ML: 9 INJECTION, SOLUTION INTRAVENOUS at 17:36

## 2018-05-18 RX ADMIN — TIMOLOL MALEATE 1 DROP: 5 SOLUTION OPHTHALMIC at 22:01

## 2018-05-18 RX ADMIN — MECLIZINE 25 MG: 12.5 TABLET ORAL at 08:41

## 2018-05-18 RX ADMIN — SIMVASTATIN 40 MG: 40 TABLET, FILM COATED ORAL at 22:01

## 2018-05-18 RX ADMIN — SODIUM CHLORIDE 1000 ML: 9 INJECTION, SOLUTION INTRAVENOUS at 08:41

## 2018-05-18 RX ADMIN — CYANOCOBALAMIN TAB 500 MCG 1000 MCG: 500 TAB at 17:36

## 2018-05-18 RX ADMIN — WATER 1 G: 1 INJECTION INTRAMUSCULAR; INTRAVENOUS; SUBCUTANEOUS at 14:06

## 2018-05-18 RX ADMIN — INSULIN LISPRO 4 UNITS: 100 INJECTION, SOLUTION INTRAVENOUS; SUBCUTANEOUS at 18:37

## 2018-05-18 RX ADMIN — ASPIRIN 81 MG 81 MG: 81 TABLET ORAL at 17:36

## 2018-05-18 RX ADMIN — SODIUM CHLORIDE: 9 INJECTION, SOLUTION INTRAVENOUS at 18:38

## 2018-05-18 RX ADMIN — SODIUM CHLORIDE: 9 INJECTION, SOLUTION INTRAVENOUS at 22:07

## 2018-05-18 ASSESSMENT — PAIN SCALES - GENERAL: PAINLEVEL_OUTOF10: 0

## 2018-05-18 ASSESSMENT — ENCOUNTER SYMPTOMS
NAUSEA: 1
RESPIRATORY NEGATIVE: 1

## 2018-05-19 LAB
ANION GAP SERPL CALCULATED.3IONS-SCNC: 11 MMOL/L (ref 7–19)
BUN BLDV-MCNC: 44 MG/DL (ref 8–23)
CALCIUM SERPL-MCNC: 8.2 MG/DL (ref 8.8–10.2)
CHLORIDE BLD-SCNC: 108 MMOL/L (ref 98–111)
CO2: 24 MMOL/L (ref 22–29)
CREAT SERPL-MCNC: 1.6 MG/DL (ref 0.5–0.9)
GFR NON-AFRICAN AMERICAN: 32
GLUCOSE BLD-MCNC: 107 MG/DL (ref 70–99)
GLUCOSE BLD-MCNC: 130 MG/DL (ref 70–99)
GLUCOSE BLD-MCNC: 147 MG/DL (ref 74–109)
GLUCOSE BLD-MCNC: 177 MG/DL (ref 70–99)
GLUCOSE BLD-MCNC: 187 MG/DL (ref 70–99)
PERFORMED ON: ABNORMAL
POTASSIUM SERPL-SCNC: 5.1 MMOL/L (ref 3.5–5)
SODIUM BLD-SCNC: 143 MMOL/L (ref 136–145)

## 2018-05-19 PROCEDURE — 2580000003 HC RX 258: Performed by: HOSPITALIST

## 2018-05-19 PROCEDURE — 96376 TX/PRO/DX INJ SAME DRUG ADON: CPT

## 2018-05-19 PROCEDURE — 6370000000 HC RX 637 (ALT 250 FOR IP): Performed by: HOSPITALIST

## 2018-05-19 PROCEDURE — 1210000000 HC MED SURG R&B

## 2018-05-19 PROCEDURE — 99232 SBSQ HOSP IP/OBS MODERATE 35: CPT | Performed by: HOSPITALIST

## 2018-05-19 PROCEDURE — 96372 THER/PROPH/DIAG INJ SC/IM: CPT

## 2018-05-19 PROCEDURE — 82948 REAGENT STRIP/BLOOD GLUCOSE: CPT

## 2018-05-19 PROCEDURE — 36415 COLL VENOUS BLD VENIPUNCTURE: CPT

## 2018-05-19 PROCEDURE — 6360000002 HC RX W HCPCS: Performed by: HOSPITALIST

## 2018-05-19 PROCEDURE — 80048 BASIC METABOLIC PNL TOTAL CA: CPT

## 2018-05-19 RX ADMIN — ASPIRIN 81 MG 81 MG: 81 TABLET ORAL at 09:44

## 2018-05-19 RX ADMIN — TIMOLOL MALEATE 1 DROP: 5 SOLUTION OPHTHALMIC at 21:10

## 2018-05-19 RX ADMIN — SIMVASTATIN 40 MG: 40 TABLET, FILM COATED ORAL at 21:10

## 2018-05-19 RX ADMIN — SODIUM CHLORIDE: 9 INJECTION, SOLUTION INTRAVENOUS at 21:10

## 2018-05-19 RX ADMIN — ENOXAPARIN SODIUM 40 MG: 40 INJECTION SUBCUTANEOUS at 09:43

## 2018-05-19 RX ADMIN — INSULIN LISPRO 4 UNITS: 100 INJECTION, SOLUTION INTRAVENOUS; SUBCUTANEOUS at 21:05

## 2018-05-19 RX ADMIN — CYANOCOBALAMIN TAB 500 MCG 1000 MCG: 500 TAB at 09:44

## 2018-05-19 RX ADMIN — SODIUM CHLORIDE 1 G: 9 INJECTION INTRAMUSCULAR; INTRAVENOUS; SUBCUTANEOUS at 14:12

## 2018-05-19 RX ADMIN — INSULIN LISPRO 3 UNITS: 100 INJECTION, SOLUTION INTRAVENOUS; SUBCUTANEOUS at 13:06

## 2018-05-19 ASSESSMENT — PAIN SCALES - GENERAL
PAINLEVEL_OUTOF10: 0

## 2018-05-20 VITALS
WEIGHT: 221 LBS | SYSTOLIC BLOOD PRESSURE: 135 MMHG | RESPIRATION RATE: 18 BRPM | HEIGHT: 66 IN | HEART RATE: 50 BPM | OXYGEN SATURATION: 97 % | TEMPERATURE: 96.7 F | DIASTOLIC BLOOD PRESSURE: 54 MMHG | BODY MASS INDEX: 35.52 KG/M2

## 2018-05-20 LAB
ANION GAP SERPL CALCULATED.3IONS-SCNC: 10 MMOL/L (ref 7–19)
BUN BLDV-MCNC: 31 MG/DL (ref 8–23)
CALCIUM SERPL-MCNC: 7.9 MG/DL (ref 8.8–10.2)
CHLORIDE BLD-SCNC: 113 MMOL/L (ref 98–111)
CO2: 23 MMOL/L (ref 22–29)
CREAT SERPL-MCNC: 1.5 MG/DL (ref 0.5–0.9)
GFR NON-AFRICAN AMERICAN: 34
GLUCOSE BLD-MCNC: 115 MG/DL (ref 70–99)
GLUCOSE BLD-MCNC: 115 MG/DL (ref 74–109)
PERFORMED ON: ABNORMAL
POTASSIUM SERPL-SCNC: 5.2 MMOL/L (ref 3.5–5)
SODIUM BLD-SCNC: 146 MMOL/L (ref 136–145)
URINE CULTURE, ROUTINE: NORMAL

## 2018-05-20 PROCEDURE — 36415 COLL VENOUS BLD VENIPUNCTURE: CPT

## 2018-05-20 PROCEDURE — 80048 BASIC METABOLIC PNL TOTAL CA: CPT

## 2018-05-20 PROCEDURE — 6370000000 HC RX 637 (ALT 250 FOR IP): Performed by: HOSPITALIST

## 2018-05-20 PROCEDURE — 99239 HOSP IP/OBS DSCHRG MGMT >30: CPT | Performed by: HOSPITALIST

## 2018-05-20 PROCEDURE — 82948 REAGENT STRIP/BLOOD GLUCOSE: CPT

## 2018-05-20 RX ORDER — CEFDINIR 300 MG/1
300 CAPSULE ORAL 2 TIMES DAILY
Qty: 14 CAPSULE | Refills: 0
Start: 2018-05-20 | End: 2018-05-27

## 2018-05-20 RX ADMIN — CYANOCOBALAMIN TAB 500 MCG 1000 MCG: 500 TAB at 09:28

## 2018-05-20 RX ADMIN — ASPIRIN 81 MG 81 MG: 81 TABLET ORAL at 09:28

## 2018-05-20 ASSESSMENT — PAIN SCALES - GENERAL
PAINLEVEL_OUTOF10: 0
PAINLEVEL_OUTOF10: 0

## 2018-05-21 LAB
EKG P AXIS: 48 DEGREES
EKG P-R INTERVAL: 190 MS
EKG Q-T INTERVAL: 380 MS
EKG QRS DURATION: 96 MS
EKG QTC CALCULATION (BAZETT): 387 MS
EKG T AXIS: 37 DEGREES

## 2018-05-22 ENCOUNTER — TELEPHONE (OUTPATIENT)
Dept: INTERNAL MEDICINE | Age: 68
End: 2018-05-22

## 2018-05-22 DIAGNOSIS — N17.9 AKI (ACUTE KIDNEY INJURY) (HCC): Primary | ICD-10-CM

## 2018-05-22 DIAGNOSIS — E11.21 TYPE II DIABETES MELLITUS WITH NEPHROPATHY (HCC): ICD-10-CM

## 2018-05-22 LAB
ALBUMIN SERPL-MCNC: 3.4 G/DL (ref 3.5–5.2)
ALP BLD-CCNC: 48 U/L (ref 35–104)
ALT SERPL-CCNC: 20 U/L (ref 5–33)
ANION GAP SERPL CALCULATED.3IONS-SCNC: 15 MMOL/L (ref 7–19)
AST SERPL-CCNC: 18 U/L (ref 5–32)
BASOPHILS ABSOLUTE: 0.1 K/UL (ref 0–0.2)
BASOPHILS RELATIVE PERCENT: 0.6 % (ref 0–1)
BILIRUB SERPL-MCNC: 0.3 MG/DL (ref 0.2–1.2)
BUN BLDV-MCNC: 19 MG/DL (ref 8–23)
CALCIUM SERPL-MCNC: 9.1 MG/DL (ref 8.8–10.2)
CHLORIDE BLD-SCNC: 104 MMOL/L (ref 98–111)
CHOLESTEROL, TOTAL: 132 MG/DL (ref 160–199)
CO2: 21 MMOL/L (ref 22–29)
CREAT SERPL-MCNC: 1.2 MG/DL (ref 0.5–0.9)
CREATININE URINE: 107.1 MG/DL (ref 4.2–622)
EOSINOPHILS ABSOLUTE: 0.2 K/UL (ref 0–0.6)
EOSINOPHILS RELATIVE PERCENT: 2.9 % (ref 0–5)
GFR NON-AFRICAN AMERICAN: 44
GLUCOSE BLD-MCNC: 103 MG/DL (ref 74–109)
HBA1C MFR BLD: 6.9 %
HCT VFR BLD CALC: 30.9 % (ref 37–47)
HDLC SERPL-MCNC: 31 MG/DL (ref 65–121)
HEMOGLOBIN: 10.1 G/DL (ref 12–16)
LDL CHOLESTEROL CALCULATED: 72 MG/DL
LYMPHOCYTES ABSOLUTE: 2.8 K/UL (ref 1.1–4.5)
LYMPHOCYTES RELATIVE PERCENT: 35.8 % (ref 20–40)
MCH RBC QN AUTO: 32.7 PG (ref 27–31)
MCHC RBC AUTO-ENTMCNC: 32.7 G/DL (ref 33–37)
MCV RBC AUTO: 100 FL (ref 81–99)
MICROALBUMIN UR-MCNC: 69.8 MG/DL (ref 0–19)
MICROALBUMIN/CREAT UR-RTO: 651.7 MG/G
MONOCYTES ABSOLUTE: 0.4 K/UL (ref 0–0.9)
MONOCYTES RELATIVE PERCENT: 5.5 % (ref 0–10)
NEUTROPHILS ABSOLUTE: 4.3 K/UL (ref 1.5–7.5)
NEUTROPHILS RELATIVE PERCENT: 54.6 % (ref 50–65)
PDW BLD-RTO: 13 % (ref 11.5–14.5)
PLATELET # BLD: 364 K/UL (ref 130–400)
PMV BLD AUTO: 11.1 FL (ref 9.4–12.3)
POTASSIUM SERPL-SCNC: 4.6 MMOL/L (ref 3.5–5)
RBC # BLD: 3.09 M/UL (ref 4.2–5.4)
SODIUM BLD-SCNC: 140 MMOL/L (ref 136–145)
TOTAL PROTEIN: 6.9 G/DL (ref 6.6–8.7)
TRIGL SERPL-MCNC: 145 MG/DL (ref 0–149)
TSH SERPL DL<=0.05 MIU/L-ACNC: 1.7 UIU/ML (ref 0.27–4.2)
WBC # BLD: 7.8 K/UL (ref 4.8–10.8)

## 2018-05-23 ENCOUNTER — OFFICE VISIT (OUTPATIENT)
Dept: INTERNAL MEDICINE | Age: 68
End: 2018-05-23
Payer: MEDICARE

## 2018-05-23 VITALS
BODY MASS INDEX: 35.84 KG/M2 | HEIGHT: 66 IN | WEIGHT: 223 LBS | DIASTOLIC BLOOD PRESSURE: 68 MMHG | HEART RATE: 62 BPM | OXYGEN SATURATION: 99 % | SYSTOLIC BLOOD PRESSURE: 130 MMHG

## 2018-05-23 DIAGNOSIS — N39.0 URINARY TRACT INFECTION WITHOUT HEMATURIA, SITE UNSPECIFIED: ICD-10-CM

## 2018-05-23 DIAGNOSIS — N39.0 URINARY TRACT INFECTION WITHOUT HEMATURIA, SITE UNSPECIFIED: Primary | ICD-10-CM

## 2018-05-23 DIAGNOSIS — N17.9 ACUTE KIDNEY INJURY (HCC): ICD-10-CM

## 2018-05-23 LAB
BACTERIA: NEGATIVE /HPF
BILIRUBIN URINE: NEGATIVE
BLOOD, URINE: NEGATIVE
CLARITY: CLEAR
COLOR: YELLOW
EPITHELIAL CELLS, UA: 3 /HPF (ref 0–5)
GLUCOSE URINE: NEGATIVE MG/DL
HYALINE CASTS: 6 /HPF (ref 0–8)
KETONES, URINE: NEGATIVE MG/DL
LEUKOCYTE ESTERASE, URINE: NEGATIVE
NITRITE, URINE: NEGATIVE
PH UA: 5.5
PROTEIN UA: 100 MG/DL
RBC UA: 1 /HPF (ref 0–4)
SPECIFIC GRAVITY UA: 1.01
UROBILINOGEN, URINE: 0.2 E.U./DL
WBC UA: 3 /HPF (ref 0–5)

## 2018-05-23 PROCEDURE — 1111F DSCHRG MED/CURRENT MED MERGE: CPT | Performed by: INTERNAL MEDICINE

## 2018-05-23 PROCEDURE — 99213 OFFICE O/P EST LOW 20 MIN: CPT | Performed by: INTERNAL MEDICINE

## 2018-05-26 ASSESSMENT — ENCOUNTER SYMPTOMS
WHEEZING: 0
ABDOMINAL PAIN: 0
SINUS PRESSURE: 0
ABDOMINAL DISTENTION: 0
SHORTNESS OF BREATH: 0
COLOR CHANGE: 0
EYE DISCHARGE: 0
CHOKING: 0
VOMITING: 0
BLOOD IN STOOL: 0
SORE THROAT: 0
EYE ITCHING: 0
DIARRHEA: 0
TROUBLE SWALLOWING: 0
COUGH: 0
STRIDOR: 0
NAUSEA: 0
CONSTIPATION: 0

## 2018-05-29 ENCOUNTER — OFFICE VISIT (OUTPATIENT)
Dept: INTERNAL MEDICINE | Age: 68
End: 2018-05-29
Payer: MEDICARE

## 2018-05-29 VITALS
HEIGHT: 66 IN | OXYGEN SATURATION: 96 % | HEART RATE: 74 BPM | DIASTOLIC BLOOD PRESSURE: 80 MMHG | WEIGHT: 221.5 LBS | BODY MASS INDEX: 35.6 KG/M2 | SYSTOLIC BLOOD PRESSURE: 118 MMHG

## 2018-05-29 DIAGNOSIS — E78.5 HYPERLIPIDEMIA, UNSPECIFIED HYPERLIPIDEMIA TYPE: ICD-10-CM

## 2018-05-29 DIAGNOSIS — R39.9 UTI SYMPTOMS: ICD-10-CM

## 2018-05-29 DIAGNOSIS — Z53.20 COLONOSCOPY REFUSED: ICD-10-CM

## 2018-05-29 DIAGNOSIS — R79.89 LOW VITAMIN D LEVEL: ICD-10-CM

## 2018-05-29 DIAGNOSIS — E11.21 TYPE II DIABETES MELLITUS WITH NEPHROPATHY (HCC): ICD-10-CM

## 2018-05-29 DIAGNOSIS — I10 ESSENTIAL HYPERTENSION: ICD-10-CM

## 2018-05-29 DIAGNOSIS — Z12.11 SCREENING FOR COLON CANCER: ICD-10-CM

## 2018-05-29 DIAGNOSIS — Z28.21 REFUSED STREPTOCOCCUS PNEUMONIAE VACCINATION: ICD-10-CM

## 2018-05-29 DIAGNOSIS — Z53.20 MAMMOGRAM DECLINED: ICD-10-CM

## 2018-05-29 DIAGNOSIS — Z00.00 ROUTINE ADULT HEALTH MAINTENANCE: Primary | ICD-10-CM

## 2018-05-29 PROCEDURE — G8427 DOCREV CUR MEDS BY ELIG CLIN: HCPCS | Performed by: INTERNAL MEDICINE

## 2018-05-29 PROCEDURE — 3017F COLORECTAL CA SCREEN DOC REV: CPT | Performed by: INTERNAL MEDICINE

## 2018-05-29 PROCEDURE — 4040F PNEUMOC VAC/ADMIN/RCVD: CPT | Performed by: INTERNAL MEDICINE

## 2018-05-29 PROCEDURE — 99214 OFFICE O/P EST MOD 30 MIN: CPT | Performed by: INTERNAL MEDICINE

## 2018-05-29 PROCEDURE — G8598 ASA/ANTIPLAT THER USED: HCPCS | Performed by: INTERNAL MEDICINE

## 2018-05-29 PROCEDURE — 1036F TOBACCO NON-USER: CPT | Performed by: INTERNAL MEDICINE

## 2018-05-29 PROCEDURE — 1123F ACP DISCUSS/DSCN MKR DOCD: CPT | Performed by: INTERNAL MEDICINE

## 2018-05-29 PROCEDURE — 3044F HG A1C LEVEL LT 7.0%: CPT | Performed by: INTERNAL MEDICINE

## 2018-05-29 PROCEDURE — 1090F PRES/ABSN URINE INCON ASSESS: CPT | Performed by: INTERNAL MEDICINE

## 2018-05-29 PROCEDURE — G8417 CALC BMI ABV UP PARAM F/U: HCPCS | Performed by: INTERNAL MEDICINE

## 2018-05-29 PROCEDURE — 2022F DILAT RTA XM EVC RTNOPTHY: CPT | Performed by: INTERNAL MEDICINE

## 2018-05-29 PROCEDURE — 1111F DSCHRG MED/CURRENT MED MERGE: CPT | Performed by: INTERNAL MEDICINE

## 2018-05-29 PROCEDURE — G0439 PPPS, SUBSEQ VISIT: HCPCS | Performed by: INTERNAL MEDICINE

## 2018-05-29 PROCEDURE — G8400 PT W/DXA NO RESULTS DOC: HCPCS | Performed by: INTERNAL MEDICINE

## 2018-05-29 RX ORDER — PHENAZOPYRIDINE HYDROCHLORIDE 200 MG/1
200 TABLET, FILM COATED ORAL 3 TIMES DAILY PRN
Qty: 15 TABLET | Refills: 0 | Status: SHIPPED | OUTPATIENT
Start: 2018-05-29 | End: 2018-06-01

## 2018-05-29 RX ORDER — CEFDINIR 300 MG/1
300 CAPSULE ORAL 2 TIMES DAILY
Qty: 20 CAPSULE | Refills: 0 | Status: SHIPPED | OUTPATIENT
Start: 2018-05-29 | End: 2019-04-09

## 2018-05-29 RX ORDER — TETANUS AND DIPHTHERIA TOXOIDS ADSORBED 2; 2 [LF]/.5ML; [LF]/.5ML
0.5 INJECTION INTRAMUSCULAR ONCE
Qty: 0.5 ML | Refills: 0 | Status: CANCELLED | OUTPATIENT
Start: 2018-05-29 | End: 2018-05-29

## 2018-05-30 ASSESSMENT — ENCOUNTER SYMPTOMS
NAUSEA: 0
TROUBLE SWALLOWING: 0
COUGH: 0
WHEEZING: 0
CHOKING: 0
SORE THROAT: 0
BLOOD IN STOOL: 0
SHORTNESS OF BREATH: 0
ABDOMINAL PAIN: 0
EYE DISCHARGE: 0
DIARRHEA: 0
STRIDOR: 0
CONSTIPATION: 0
VOMITING: 0
ABDOMINAL DISTENTION: 0
EYE ITCHING: 0
COLOR CHANGE: 0
SINUS PRESSURE: 0

## 2018-06-14 RX ORDER — LANCETS
EACH MISCELLANEOUS
Qty: 400 EACH | Refills: 3 | Status: SHIPPED | OUTPATIENT
Start: 2018-06-14 | End: 2021-07-26 | Stop reason: SDUPTHER

## 2018-06-27 RX ORDER — BLOOD SUGAR DIAGNOSTIC
STRIP MISCELLANEOUS
Qty: 400 STRIP | Refills: 3 | Status: SHIPPED | OUTPATIENT
Start: 2018-06-27 | End: 2021-07-26 | Stop reason: SDUPTHER

## 2018-06-27 RX ORDER — ISOPROPYL ALCOHOL 0.75 G/1
SWAB TOPICAL
Qty: 100 EACH | Refills: 11 | Status: SHIPPED | OUTPATIENT
Start: 2018-06-27

## 2018-06-29 ENCOUNTER — TELEPHONE (OUTPATIENT)
Dept: INTERNAL MEDICINE | Age: 68
End: 2018-06-29

## 2018-06-29 RX ORDER — AMOXICILLIN AND CLAVULANATE POTASSIUM 875; 125 MG/1; MG/1
1 TABLET, FILM COATED ORAL 2 TIMES DAILY
Qty: 20 TABLET | Refills: 0 | Status: SHIPPED | OUTPATIENT
Start: 2018-06-29 | End: 2018-07-09

## 2018-08-03 ENCOUNTER — TELEPHONE (OUTPATIENT)
Dept: INTERNAL MEDICINE | Age: 68
End: 2018-08-03

## 2018-08-03 RX ORDER — AMOXICILLIN AND CLAVULANATE POTASSIUM 875; 125 MG/1; MG/1
1 TABLET, FILM COATED ORAL 2 TIMES DAILY
Qty: 20 TABLET | Refills: 0 | Status: SHIPPED | OUTPATIENT
Start: 2018-08-03 | End: 2018-08-13

## 2018-08-03 NOTE — TELEPHONE ENCOUNTER
Called to patient to let her know we are sending in something, but her sister answered and said she was sleeping. I let her know we are sending this in and if she wants to go ahead and be seen to gives us a call back.

## 2018-08-03 NOTE — TELEPHONE ENCOUNTER
Patient called stated she has a sinus infection and a sore throat I have tried to call her back to see if she had any fever and discharge but her phone keeps giving me a busy signal.

## 2018-08-15 RX ORDER — HYDROCHLOROTHIAZIDE 25 MG/1
TABLET ORAL
Qty: 45 TABLET | Refills: 3 | Status: SHIPPED | OUTPATIENT
Start: 2018-08-15 | End: 2019-04-09

## 2018-08-15 RX ORDER — LISINOPRIL 40 MG/1
TABLET ORAL
Qty: 45 TABLET | Refills: 3 | Status: SHIPPED | OUTPATIENT
Start: 2018-08-15 | End: 2019-04-09 | Stop reason: SINTOL

## 2018-09-05 DIAGNOSIS — E11.21 TYPE II DIABETES MELLITUS WITH NEPHROPATHY (HCC): ICD-10-CM

## 2018-09-05 DIAGNOSIS — R79.89 LOW VITAMIN D LEVEL: ICD-10-CM

## 2018-09-05 LAB
ALBUMIN SERPL-MCNC: 3.9 G/DL (ref 3.5–5.2)
ALP BLD-CCNC: 43 U/L (ref 35–104)
ALT SERPL-CCNC: 23 U/L (ref 5–33)
ANION GAP SERPL CALCULATED.3IONS-SCNC: 16 MMOL/L (ref 7–19)
AST SERPL-CCNC: 24 U/L (ref 5–32)
BILIRUB SERPL-MCNC: <0.2 MG/DL (ref 0.2–1.2)
BUN BLDV-MCNC: 21 MG/DL (ref 8–23)
CALCIUM SERPL-MCNC: 9.6 MG/DL (ref 8.8–10.2)
CHLORIDE BLD-SCNC: 99 MMOL/L (ref 98–111)
CHOLESTEROL, TOTAL: 154 MG/DL (ref 160–199)
CO2: 23 MMOL/L (ref 22–29)
CREAT SERPL-MCNC: 1.2 MG/DL (ref 0.5–0.9)
CREATININE URINE: 142.7 MG/DL (ref 4.2–622)
GFR NON-AFRICAN AMERICAN: 44
GLUCOSE BLD-MCNC: 126 MG/DL (ref 74–109)
HBA1C MFR BLD: 6.8 % (ref 4–6)
HDLC SERPL-MCNC: 46 MG/DL (ref 65–121)
LDL CHOLESTEROL CALCULATED: 70 MG/DL
MICROALBUMIN UR-MCNC: 85.1 MG/DL (ref 0–19)
MICROALBUMIN/CREAT UR-RTO: 596.4 MG/G
POTASSIUM SERPL-SCNC: 5.3 MMOL/L (ref 3.5–5)
SODIUM BLD-SCNC: 138 MMOL/L (ref 136–145)
TOTAL PROTEIN: 7.2 G/DL (ref 6.6–8.7)
TRIGL SERPL-MCNC: 190 MG/DL (ref 0–149)
TSH SERPL DL<=0.05 MIU/L-ACNC: 1.63 UIU/ML (ref 0.27–4.2)

## 2018-09-07 ENCOUNTER — OFFICE VISIT (OUTPATIENT)
Dept: INTERNAL MEDICINE | Age: 68
End: 2018-09-07
Payer: MEDICARE

## 2018-09-07 VITALS
BODY MASS INDEX: 35.52 KG/M2 | DIASTOLIC BLOOD PRESSURE: 78 MMHG | OXYGEN SATURATION: 97 % | SYSTOLIC BLOOD PRESSURE: 120 MMHG | WEIGHT: 221 LBS | HEART RATE: 77 BPM | HEIGHT: 66 IN

## 2018-09-07 DIAGNOSIS — E11.9 TYPE 2 DIABETES MELLITUS WITHOUT COMPLICATION, UNSPECIFIED WHETHER LONG TERM INSULIN USE (HCC): Primary | ICD-10-CM

## 2018-09-07 DIAGNOSIS — E78.5 HYPERLIPIDEMIA, UNSPECIFIED HYPERLIPIDEMIA TYPE: ICD-10-CM

## 2018-09-07 DIAGNOSIS — I10 ESSENTIAL HYPERTENSION: ICD-10-CM

## 2018-09-07 PROCEDURE — 1101F PT FALLS ASSESS-DOCD LE1/YR: CPT | Performed by: INTERNAL MEDICINE

## 2018-09-07 PROCEDURE — G8400 PT W/DXA NO RESULTS DOC: HCPCS | Performed by: INTERNAL MEDICINE

## 2018-09-07 PROCEDURE — 4040F PNEUMOC VAC/ADMIN/RCVD: CPT | Performed by: INTERNAL MEDICINE

## 2018-09-07 PROCEDURE — 3017F COLORECTAL CA SCREEN DOC REV: CPT | Performed by: INTERNAL MEDICINE

## 2018-09-07 PROCEDURE — 99214 OFFICE O/P EST MOD 30 MIN: CPT | Performed by: INTERNAL MEDICINE

## 2018-09-07 PROCEDURE — G8417 CALC BMI ABV UP PARAM F/U: HCPCS | Performed by: INTERNAL MEDICINE

## 2018-09-07 PROCEDURE — G8427 DOCREV CUR MEDS BY ELIG CLIN: HCPCS | Performed by: INTERNAL MEDICINE

## 2018-09-07 PROCEDURE — 3044F HG A1C LEVEL LT 7.0%: CPT | Performed by: INTERNAL MEDICINE

## 2018-09-07 PROCEDURE — 2022F DILAT RTA XM EVC RTNOPTHY: CPT | Performed by: INTERNAL MEDICINE

## 2018-09-07 PROCEDURE — 1090F PRES/ABSN URINE INCON ASSESS: CPT | Performed by: INTERNAL MEDICINE

## 2018-09-07 PROCEDURE — 1036F TOBACCO NON-USER: CPT | Performed by: INTERNAL MEDICINE

## 2018-09-07 PROCEDURE — G8598 ASA/ANTIPLAT THER USED: HCPCS | Performed by: INTERNAL MEDICINE

## 2018-09-07 PROCEDURE — 1123F ACP DISCUSS/DSCN MKR DOCD: CPT | Performed by: INTERNAL MEDICINE

## 2018-09-07 RX ORDER — TETANUS AND DIPHTHERIA TOXOIDS ADSORBED 2; 2 [LF]/.5ML; [LF]/.5ML
0.5 INJECTION INTRAMUSCULAR ONCE
Qty: 0.5 ML | Refills: 0 | Status: CANCELLED | OUTPATIENT
Start: 2018-09-07 | End: 2018-09-07

## 2018-09-07 NOTE — PROGRESS NOTES
Patient refused to do this today she stated that she watches her feet and takes very good care of them

## 2018-09-07 NOTE — PATIENT INSTRUCTIONS
instruction, always ask your healthcare professional. [place] [address] any warranty or liability for your use of this information.

## 2018-09-09 LAB
VITAMIN D2 AND D3, TOTAL: 44.1 NG/ML (ref 30–80)
VITAMIN D2, 25 HYDROXY: <1 NG/ML
VITAMIN D3,25 HYDROXY: 44.1 NG/ML

## 2018-09-09 ASSESSMENT — ENCOUNTER SYMPTOMS
TROUBLE SWALLOWING: 0
EYE DISCHARGE: 0
SINUS PRESSURE: 0
BLOOD IN STOOL: 0
DIARRHEA: 0
STRIDOR: 0
SORE THROAT: 0
ABDOMINAL DISTENTION: 0
NAUSEA: 0
CHOKING: 0
SHORTNESS OF BREATH: 0
COLOR CHANGE: 0
CONSTIPATION: 0
COUGH: 0
WHEEZING: 0
ABDOMINAL PAIN: 0
EYE ITCHING: 0
VOMITING: 0

## 2018-09-09 NOTE — PROGRESS NOTES
Active Problem List   Diagnosis    CHAPARRO (acute kidney injury) ([facility]    Hypertension    Hyperlipidemia    Glaucoma    CAD (coronary artery disease)    Type 2 diabetes mellitus ([facility]    Diabetes mellitus ([facility]    Acute cystitis without hematuria        Review of Systems   Constitutional: Negative for fatigue, fever and unexpected weight change. HENT: Negative for ear discharge, ear pain, mouth sores, sinus pressure, sore throat and trouble swallowing. Eyes: Negative for discharge, itching and visual disturbance. Respiratory: Negative for cough, choking, shortness of breath, wheezing and stridor. Cardiovascular: Negative for chest pain, palpitations and leg swelling. Gastrointestinal: Negative for abdominal distention, abdominal pain, blood in stool, constipation, diarrhea, nausea and vomiting. Endocrine: Negative for cold intolerance, polydipsia and polyuria. Genitourinary: Negative for difficulty urinating, dysuria, frequency and urgency. Musculoskeletal: Positive for joint swelling. Negative for arthralgias and gait problem. Occasional right knee pain   Skin: Negative for color change and rash. Allergic/Immunologic: Negative for food allergies and immunocompromised state. Neurological: Negative for dizziness, tremors, syncope, speech difficulty, weakness and headaches. Hematological: Negative for adenopathy. Does not bruise/bleed easily. Psychiatric/Behavioral: Negative for confusion and hallucinations. /78 (Site: Right Upper Arm, Position: Sitting, Cuff Size: Large Adult)   Pulse 77   Ht 5' 6\" (1.676 m)   Wt 221 lb (100.2 kg)   SpO2 97%   BMI 35.67 kg/m²   Physical Exam   Constitutional: She is oriented to person, place, and time. She appears well-developed and well-nourished. No distress. HENT:   Head: Normocephalic and atraumatic.    Right Ear: External ear normal.   Left Ear: External ear normal.   Nose: Nose normal.   Mouth/Throat: Oropharynx is clear and moist. No oropharyngeal exudate. Eyes: Pupils are equal, round, and reactive to light. Conjunctivae and EOM are normal. Right eye exhibits no discharge. Left eye exhibits no discharge. No scleral icterus. Neck: Normal range of motion. Neck supple. No JVD present. No tracheal deviation present. No thyromegaly present. Cardiovascular: Normal rate, regular rhythm and normal heart sounds. No murmur heard. Pulmonary/Chest: Effort normal and breath sounds normal. No stridor. No respiratory distress. She has no wheezes. She has no rales. Abdominal: Soft. Bowel sounds are normal. She exhibits no distension and no mass. There is no tenderness. There is no rebound and no guarding. Musculoskeletal: Normal range of motion. She exhibits no edema or tenderness. Lymphadenopathy:     She has no cervical adenopathy. Neurological: She is alert and oriented to person, place, and time. She has normal reflexes. No cranial nerve deficit. Coordination normal.   Skin: Skin is warm and dry. No rash noted. She is not diaphoretic. No erythema. Psychiatric: Her behavior is normal. Judgment and thought content normal. She does not exhibit a depressed mood. Nursing note and vitals reviewed. 1. Type II diabetes mellitus with nephropathy ([address]    2. Post-menopausal    3. Encounter for screening mammogram for breast cancer    4. Screening for colon cancer    5. Need for prophylactic vaccination against Streptococcus pneumoniae (pneumococcus)    6. Need for prophylactic vaccination with tetanus-diphtheria (Td)    7. Need for prophylactic vaccination and inoculation against varicella        ASSESSMENT/PLAN:    64-year-old woman here for follow-up    1. Type II diabetes: Well controlled on current medication regimen    2. Hypertension: Blood pressure well controlled on current medication    3. Hyperlipidemia: Lipids at goal. Continue current treatment    Betty Scott was seen today for diabetes.     Diagnoses and all orders for

## 2018-12-04 ENCOUNTER — HOSPITAL ENCOUNTER (OUTPATIENT)
Dept: GENERAL RADIOLOGY | Age: 68
Discharge: HOME | End: 2018-12-04
Payer: MEDICARE

## 2018-12-04 ENCOUNTER — TELEPHONE (OUTPATIENT)
Dept: INTERNAL MEDICINE | Age: 68
End: 2018-12-04

## 2018-12-04 ENCOUNTER — OFFICE VISIT (OUTPATIENT)
Dept: INTERNAL MEDICINE | Age: 68
End: 2018-12-04
Payer: MEDICARE

## 2018-12-04 VITALS
OXYGEN SATURATION: 97 % | WEIGHT: 225 LBS | HEART RATE: 70 BPM | SYSTOLIC BLOOD PRESSURE: 118 MMHG | DIASTOLIC BLOOD PRESSURE: 78 MMHG | HEIGHT: 66 IN | BODY MASS INDEX: 36.16 KG/M2

## 2018-12-04 DIAGNOSIS — R22.41 MASS OF ANKLE, RIGHT: Primary | ICD-10-CM

## 2018-12-04 DIAGNOSIS — M79.671 RIGHT FOOT PAIN: ICD-10-CM

## 2018-12-04 DIAGNOSIS — M79.671 RIGHT FOOT PAIN: Primary | ICD-10-CM

## 2018-12-04 PROCEDURE — 1123F ACP DISCUSS/DSCN MKR DOCD: CPT | Performed by: INTERNAL MEDICINE

## 2018-12-04 PROCEDURE — G8400 PT W/DXA NO RESULTS DOC: HCPCS | Performed by: INTERNAL MEDICINE

## 2018-12-04 PROCEDURE — 1101F PT FALLS ASSESS-DOCD LE1/YR: CPT | Performed by: INTERNAL MEDICINE

## 2018-12-04 PROCEDURE — G8598 ASA/ANTIPLAT THER USED: HCPCS | Performed by: INTERNAL MEDICINE

## 2018-12-04 PROCEDURE — 1090F PRES/ABSN URINE INCON ASSESS: CPT | Performed by: INTERNAL MEDICINE

## 2018-12-04 PROCEDURE — 73630 X-RAY EXAM OF FOOT: CPT

## 2018-12-04 PROCEDURE — 1036F TOBACCO NON-USER: CPT | Performed by: INTERNAL MEDICINE

## 2018-12-04 PROCEDURE — G8417 CALC BMI ABV UP PARAM F/U: HCPCS | Performed by: INTERNAL MEDICINE

## 2018-12-04 PROCEDURE — G8484 FLU IMMUNIZE NO ADMIN: HCPCS | Performed by: INTERNAL MEDICINE

## 2018-12-04 PROCEDURE — 3017F COLORECTAL CA SCREEN DOC REV: CPT | Performed by: INTERNAL MEDICINE

## 2018-12-04 PROCEDURE — 99213 OFFICE O/P EST LOW 20 MIN: CPT | Performed by: INTERNAL MEDICINE

## 2018-12-04 PROCEDURE — G8427 DOCREV CUR MEDS BY ELIG CLIN: HCPCS | Performed by: INTERNAL MEDICINE

## 2018-12-04 PROCEDURE — 4040F PNEUMOC VAC/ADMIN/RCVD: CPT | Performed by: INTERNAL MEDICINE

## 2018-12-04 RX ORDER — TETANUS AND DIPHTHERIA TOXOIDS ADSORBED 2; 2 [LF]/.5ML; [LF]/.5ML
0.5 INJECTION INTRAMUSCULAR ONCE
Qty: 0.5 ML | Refills: 0 | Status: CANCELLED | OUTPATIENT
Start: 2018-12-04 | End: 2018-12-04

## 2018-12-04 ASSESSMENT — ENCOUNTER SYMPTOMS
VOMITING: 0
STRIDOR: 0
NAUSEA: 0
SHORTNESS OF BREATH: 0
CHOKING: 0
BLOOD IN STOOL: 0
COUGH: 0
CONSTIPATION: 0
COLOR CHANGE: 0
EYE ITCHING: 0
SORE THROAT: 0
ABDOMINAL PAIN: 0
ABDOMINAL DISTENTION: 0
SINUS PRESSURE: 0
DIARRHEA: 0
WHEEZING: 0
EYE DISCHARGE: 0
TROUBLE SWALLOWING: 0

## 2018-12-04 NOTE — PROGRESS NOTES
Chief Complaint   Patient presents with    Other     lump on right foot that is painful, no flu shot       HPI: Darren Nagy is a 77 y.o. female is here for Evaluation of a 2-4 week history of a nodule to the right foot. It is located in the back of her heel, near the area where the Achilles tendon inserts into the bone. She denies a history of injury. It does hurt to walk on somewhat, but is not excessively painful. She is a diabetic and her caregiver wants to know what is wrong with her foot.     Past Medical History:   Diagnosis Date    Acute sinusitis     CHAPARRO (acute kidney injury) ([facility] [address] 5/18/2018    Anemia     CAD (coronary artery disease)     Callous ulcer ([facility] [address]     Cataract     Dermatitis     Diabetes mellitus ([facility] [facility]     Diabetic nephropathy ([facility] [address] [facility]     Diabetic retinopathy ([facility] [address] [facility]     Glaucoma     Gout     Hemorrhoids     Hx of TB skin testing     positive    Hyperlipidemia     Hypertension     Type 2 diabetes mellitus ([facility] [address] 5/18/2018    Vitamin D deficiency       Past Surgical History:   Procedure Laterality Date    CARDIAC SURGERY      CATARACT REMOVAL      CHOLECYSTECTOMY      CORONARY ARTERY BYPASS GRAFT      ENDOSCOPY, COLON, DIAGNOSTIC      MOUTH SURGERY      OTHER SURGICAL HISTORY      sternal resection    SKIN BIOPSY      VASCULAR SURGERY        Social History     Social History    Marital status: Single     Spouse name: N/A    Number of children: N/A    Years of education: N/A     Social History Main Topics    Smoking status: Never Smoker    Smokeless tobacco: Never Used    Alcohol use No    Drug use: No    Sexual activity: No     Other Topics Concern    None     Social History Narrative    None      Family History   Problem Relation Age of Onset    Cancer Mother         lung    Diabetes Father     COPD Father     Diabetes Sister     Heart Disease Sister     Coronary Art Dis Other         grandfather    Diabetes Other         aunt        Current Outpatient

## 2018-12-04 NOTE — TELEPHONE ENCOUNTER
----- Message from Raul Vick MD sent at 12/4/2018 11:17 AM CST -----  Xray of foot negative.  Obtain ultrasound of the extremity

## 2019-01-02 DIAGNOSIS — E11.9 TYPE 2 DIABETES MELLITUS WITHOUT COMPLICATION, UNSPECIFIED WHETHER LONG TERM INSULIN USE (HCC): ICD-10-CM

## 2019-01-02 LAB
ALBUMIN SERPL-MCNC: 4.1 G/DL (ref 3.5–5.2)
ALP BLD-CCNC: 52 U/L (ref 35–104)
ALT SERPL-CCNC: 39 U/L (ref 5–33)
ANION GAP SERPL CALCULATED.3IONS-SCNC: 16 MMOL/L (ref 7–19)
AST SERPL-CCNC: 35 U/L (ref 5–32)
BILIRUB SERPL-MCNC: <0.2 MG/DL (ref 0.2–1.2)
BUN BLDV-MCNC: 27 MG/DL (ref 8–23)
CALCIUM SERPL-MCNC: 9.4 MG/DL (ref 8.8–10.2)
CHLORIDE BLD-SCNC: 97 MMOL/L (ref 98–111)
CHOLESTEROL, TOTAL: 137 MG/DL (ref 160–199)
CO2: 23 MMOL/L (ref 22–29)
CREAT SERPL-MCNC: 1.6 MG/DL (ref 0.5–0.9)
CREATININE URINE: 153.6 MG/DL (ref 4.2–622)
GFR NON-AFRICAN AMERICAN: 32
GLUCOSE BLD-MCNC: 113 MG/DL (ref 74–109)
HBA1C MFR BLD: 7.1 % (ref 4–6)
HDLC SERPL-MCNC: 45 MG/DL (ref 65–121)
LDL CHOLESTEROL CALCULATED: 65 MG/DL
MICROALBUMIN UR-MCNC: 59.7 MG/DL (ref 0–19)
MICROALBUMIN/CREAT UR-RTO: 388.7 MG/G
POTASSIUM SERPL-SCNC: 5.9 MMOL/L (ref 3.5–5)
SODIUM BLD-SCNC: 136 MMOL/L (ref 136–145)
TOTAL PROTEIN: 7.6 G/DL (ref 6.6–8.7)
TRIGL SERPL-MCNC: 133 MG/DL (ref 0–149)

## 2019-01-07 ENCOUNTER — TELEPHONE (OUTPATIENT)
Dept: INTERNAL MEDICINE | Age: 69
End: 2019-01-07

## 2019-01-07 ENCOUNTER — OFFICE VISIT (OUTPATIENT)
Dept: INTERNAL MEDICINE | Age: 69
End: 2019-01-07
Payer: MEDICARE

## 2019-01-07 VITALS
SYSTOLIC BLOOD PRESSURE: 146 MMHG | DIASTOLIC BLOOD PRESSURE: 72 MMHG | BODY MASS INDEX: 35.68 KG/M2 | HEIGHT: 66 IN | OXYGEN SATURATION: 98 % | HEART RATE: 66 BPM | WEIGHT: 222 LBS

## 2019-01-07 DIAGNOSIS — E87.5 HYPERKALEMIA: ICD-10-CM

## 2019-01-07 DIAGNOSIS — Z91.89 AT RISK FOR FOOT PROBLEM: ICD-10-CM

## 2019-01-07 DIAGNOSIS — I10 ESSENTIAL HYPERTENSION: Primary | ICD-10-CM

## 2019-01-07 DIAGNOSIS — Z78.0 POST-MENOPAUSAL: ICD-10-CM

## 2019-01-07 DIAGNOSIS — Z12.11 SCREENING FOR COLON CANCER: ICD-10-CM

## 2019-01-07 DIAGNOSIS — E11.21 TYPE II DIABETES MELLITUS WITH NEPHROPATHY (HCC): ICD-10-CM

## 2019-01-07 DIAGNOSIS — E78.5 HYPERLIPIDEMIA, UNSPECIFIED HYPERLIPIDEMIA TYPE: ICD-10-CM

## 2019-01-07 LAB
ANION GAP SERPL CALCULATED.3IONS-SCNC: 17 MMOL/L (ref 7–19)
BUN BLDV-MCNC: 19 MG/DL (ref 8–23)
CALCIUM SERPL-MCNC: 9.5 MG/DL (ref 8.8–10.2)
CHLORIDE BLD-SCNC: 96 MMOL/L (ref 98–111)
CO2: 24 MMOL/L (ref 22–29)
CREAT SERPL-MCNC: 1.3 MG/DL (ref 0.5–0.9)
GFR NON-AFRICAN AMERICAN: 40
GLUCOSE BLD-MCNC: 270 MG/DL (ref 74–109)
POTASSIUM SERPL-SCNC: 6.1 MMOL/L (ref 3.5–5)
SODIUM BLD-SCNC: 137 MMOL/L (ref 136–145)

## 2019-01-07 PROCEDURE — 1036F TOBACCO NON-USER: CPT | Performed by: INTERNAL MEDICINE

## 2019-01-07 PROCEDURE — G8417 CALC BMI ABV UP PARAM F/U: HCPCS | Performed by: INTERNAL MEDICINE

## 2019-01-07 PROCEDURE — 2022F DILAT RTA XM EVC RTNOPTHY: CPT | Performed by: INTERNAL MEDICINE

## 2019-01-07 PROCEDURE — 99214 OFFICE O/P EST MOD 30 MIN: CPT | Performed by: INTERNAL MEDICINE

## 2019-01-07 PROCEDURE — 3017F COLORECTAL CA SCREEN DOC REV: CPT | Performed by: INTERNAL MEDICINE

## 2019-01-07 PROCEDURE — G8484 FLU IMMUNIZE NO ADMIN: HCPCS | Performed by: INTERNAL MEDICINE

## 2019-01-07 PROCEDURE — 1090F PRES/ABSN URINE INCON ASSESS: CPT | Performed by: INTERNAL MEDICINE

## 2019-01-07 PROCEDURE — G8400 PT W/DXA NO RESULTS DOC: HCPCS | Performed by: INTERNAL MEDICINE

## 2019-01-07 PROCEDURE — G8598 ASA/ANTIPLAT THER USED: HCPCS | Performed by: INTERNAL MEDICINE

## 2019-01-07 PROCEDURE — 1123F ACP DISCUSS/DSCN MKR DOCD: CPT | Performed by: INTERNAL MEDICINE

## 2019-01-07 PROCEDURE — 1101F PT FALLS ASSESS-DOCD LE1/YR: CPT | Performed by: INTERNAL MEDICINE

## 2019-01-07 PROCEDURE — G8427 DOCREV CUR MEDS BY ELIG CLIN: HCPCS | Performed by: INTERNAL MEDICINE

## 2019-01-07 PROCEDURE — 4040F PNEUMOC VAC/ADMIN/RCVD: CPT | Performed by: INTERNAL MEDICINE

## 2019-01-07 PROCEDURE — 3045F PR MOST RECENT HEMOGLOBIN A1C LEVEL 7.0-9.0%: CPT | Performed by: INTERNAL MEDICINE

## 2019-01-07 RX ORDER — TETANUS AND DIPHTHERIA TOXOIDS ADSORBED 2; 2 [LF]/.5ML; [LF]/.5ML
0.5 INJECTION INTRAMUSCULAR ONCE
Qty: 0.5 ML | Refills: 0 | Status: CANCELLED | OUTPATIENT
Start: 2019-01-07 | End: 2019-01-07

## 2019-01-07 RX ORDER — AMLODIPINE BESYLATE 5 MG/1
5 TABLET ORAL DAILY
Qty: 30 TABLET | Refills: 3 | Status: SHIPPED | OUTPATIENT
Start: 2019-01-07 | End: 2019-02-22 | Stop reason: SDUPTHER

## 2019-01-07 RX ORDER — SODIUM POLYSTYRENE SULFONATE 15 G/60ML
30 SUSPENSION ORAL; RECTAL ONCE
Qty: 1 BOTTLE | Refills: 0 | Status: SHIPPED | OUTPATIENT
Start: 2019-01-07 | End: 2019-04-09

## 2019-01-07 ASSESSMENT — ENCOUNTER SYMPTOMS
VOMITING: 0
NAUSEA: 0
COUGH: 0
EYE ITCHING: 0
DIARRHEA: 0
CHOKING: 0
CONSTIPATION: 0
ABDOMINAL PAIN: 0
COLOR CHANGE: 0
BLOOD IN STOOL: 0
TROUBLE SWALLOWING: 0
WHEEZING: 0
STRIDOR: 0
SHORTNESS OF BREATH: 0
SORE THROAT: 0
SINUS PRESSURE: 0
ABDOMINAL DISTENTION: 0
EYE DISCHARGE: 0

## 2019-01-08 ENCOUNTER — TELEPHONE (OUTPATIENT)
Dept: INTERNAL MEDICINE | Age: 69
End: 2019-01-08

## 2019-01-08 DIAGNOSIS — E87.5 SERUM POTASSIUM ELEVATED: Primary | ICD-10-CM

## 2019-01-10 ENCOUNTER — TELEPHONE (OUTPATIENT)
Dept: INTERNAL MEDICINE | Age: 69
End: 2019-01-10

## 2019-01-10 ENCOUNTER — HOSPITAL ENCOUNTER (OUTPATIENT)
Dept: ULTRASOUND IMAGING | Age: 69
Discharge: HOME | End: 2019-01-10
Payer: MEDICARE

## 2019-01-10 DIAGNOSIS — R22.41 MASS OF ANKLE, RIGHT: ICD-10-CM

## 2019-01-10 DIAGNOSIS — E87.5 SERUM POTASSIUM ELEVATED: ICD-10-CM

## 2019-01-10 LAB
ANION GAP SERPL CALCULATED.3IONS-SCNC: 13 MMOL/L (ref 7–19)
BUN BLDV-MCNC: 15 MG/DL (ref 8–23)
CALCIUM SERPL-MCNC: 9.6 MG/DL (ref 8.8–10.2)
CHLORIDE BLD-SCNC: 98 MMOL/L (ref 98–111)
CO2: 27 MMOL/L (ref 22–29)
CREAT SERPL-MCNC: 1.5 MG/DL (ref 0.5–0.9)
GFR NON-AFRICAN AMERICAN: 34
GLUCOSE BLD-MCNC: 154 MG/DL (ref 74–109)
POTASSIUM SERPL-SCNC: 5.2 MMOL/L (ref 3.5–5)
SODIUM BLD-SCNC: 138 MMOL/L (ref 136–145)

## 2019-01-10 PROCEDURE — 76882 US LMTD JT/FCL EVL NVASC XTR: CPT

## 2019-01-11 ENCOUNTER — TELEPHONE (OUTPATIENT)
Dept: INTERNAL MEDICINE | Age: 69
End: 2019-01-11

## 2019-01-11 DIAGNOSIS — M25.571 RIGHT ANKLE PAIN, UNSPECIFIED CHRONICITY: Primary | ICD-10-CM

## 2019-01-22 DIAGNOSIS — M25.571 RIGHT ANKLE PAIN, UNSPECIFIED CHRONICITY: Primary | ICD-10-CM

## 2019-01-24 ENCOUNTER — HOSPITAL ENCOUNTER (OUTPATIENT)
Dept: MRI IMAGING | Age: 69
Discharge: HOME | End: 2019-01-24
Payer: MEDICARE

## 2019-01-24 DIAGNOSIS — M25.571 RIGHT ANKLE PAIN, UNSPECIFIED CHRONICITY: ICD-10-CM

## 2019-01-24 DIAGNOSIS — M76.61 ACHILLES BURSITIS OF RIGHT LOWER EXTREMITY: Primary | ICD-10-CM

## 2019-01-24 PROCEDURE — 73721 MRI JNT OF LWR EXTRE W/O DYE: CPT

## 2019-01-31 ENCOUNTER — TELEPHONE (OUTPATIENT)
Dept: INTERNAL MEDICINE | Age: 69
End: 2019-01-31

## 2019-02-08 ENCOUNTER — TELEPHONE (OUTPATIENT)
Dept: INTERNAL MEDICINE | Age: 69
End: 2019-02-08

## 2019-02-08 RX ORDER — METHYLPREDNISOLONE 4 MG/1
TABLET ORAL
Qty: 1 KIT | Refills: 0 | Status: SHIPPED | OUTPATIENT
Start: 2019-02-08 | End: 2019-02-14

## 2019-02-08 RX ORDER — AMOXICILLIN AND CLAVULANATE POTASSIUM 875; 125 MG/1; MG/1
1 TABLET, FILM COATED ORAL 2 TIMES DAILY
Qty: 14 TABLET | Refills: 0 | Status: SHIPPED | OUTPATIENT
Start: 2019-02-08 | End: 2019-02-15

## 2019-02-22 ENCOUNTER — TELEPHONE (OUTPATIENT)
Dept: INTERNAL MEDICINE | Age: 69
End: 2019-02-22

## 2019-02-22 RX ORDER — GLIPIZIDE 2.5 MG/1
2.5 TABLET, EXTENDED RELEASE ORAL DAILY
Qty: 90 TABLET | Refills: 3 | Status: SHIPPED | OUTPATIENT
Start: 2019-02-22 | End: 2019-12-12 | Stop reason: SDUPTHER

## 2019-02-22 RX ORDER — AMLODIPINE BESYLATE 5 MG/1
5 TABLET ORAL DAILY
Qty: 30 TABLET | Refills: 3 | Status: SHIPPED | OUTPATIENT
Start: 2019-02-22 | End: 2019-06-05 | Stop reason: SDUPTHER

## 2019-02-22 NOTE — TELEPHONE ENCOUNTER
Pt called stating she needs a refill on the following medications which have been pended for you:    Requested Prescriptions     Pending Prescriptions Disp Refills    glipiZIDE (GLUCOTROL XL) 2.5 MG extended release tablet 90 tablet 3     Sig: Take 1 tablet by mouth daily    amLODIPine (NORVASC) 5 MG tablet 30 tablet 3     Sig: Take 1 tablet by mouth daily     Next appt: 4/9/19  Last appt: 1/7/19

## 2019-03-04 RX ORDER — SIMVASTATIN 40 MG
TABLET ORAL
Qty: 90 TABLET | Refills: 3 | Status: SHIPPED | OUTPATIENT
Start: 2019-03-04 | End: 2019-12-12 | Stop reason: SDUPTHER

## 2019-04-04 DIAGNOSIS — E11.21 TYPE II DIABETES MELLITUS WITH NEPHROPATHY (HCC): ICD-10-CM

## 2019-04-04 LAB
ALBUMIN SERPL-MCNC: 4.1 G/DL (ref 3.5–5.2)
ALP BLD-CCNC: 56 U/L (ref 35–104)
ALT SERPL-CCNC: 40 U/L (ref 5–33)
ANION GAP SERPL CALCULATED.3IONS-SCNC: 14 MMOL/L (ref 7–19)
AST SERPL-CCNC: 35 U/L (ref 5–32)
BASOPHILS ABSOLUTE: 0.1 K/UL (ref 0–0.2)
BASOPHILS RELATIVE PERCENT: 0.9 % (ref 0–1)
BILIRUB SERPL-MCNC: <0.2 MG/DL (ref 0.2–1.2)
BUN BLDV-MCNC: 25 MG/DL (ref 8–23)
CALCIUM SERPL-MCNC: 9.7 MG/DL (ref 8.8–10.2)
CHLORIDE BLD-SCNC: 101 MMOL/L (ref 98–111)
CHOLESTEROL, TOTAL: 119 MG/DL (ref 160–199)
CO2: 26 MMOL/L (ref 22–29)
CREAT SERPL-MCNC: 1.5 MG/DL (ref 0.5–0.9)
CREATININE URINE: 267.6 MG/DL (ref 4.2–622)
EOSINOPHILS ABSOLUTE: 0.2 K/UL (ref 0–0.6)
EOSINOPHILS RELATIVE PERCENT: 1.8 % (ref 0–5)
GFR NON-AFRICAN AMERICAN: 34
GLUCOSE BLD-MCNC: 143 MG/DL (ref 74–109)
HBA1C MFR BLD: 8.3 % (ref 4–6)
HCT VFR BLD CALC: 36.1 % (ref 37–47)
HDLC SERPL-MCNC: 41 MG/DL (ref 65–121)
HEMOGLOBIN: 11.6 G/DL (ref 12–16)
LDL CHOLESTEROL CALCULATED: 48 MG/DL
LYMPHOCYTES ABSOLUTE: 3.3 K/UL (ref 1.1–4.5)
LYMPHOCYTES RELATIVE PERCENT: 37.6 % (ref 20–40)
MCH RBC QN AUTO: 32 PG (ref 27–31)
MCHC RBC AUTO-ENTMCNC: 32.1 G/DL (ref 33–37)
MCV RBC AUTO: 99.4 FL (ref 81–99)
MICROALBUMIN UR-MCNC: 237.8 MG/DL (ref 0–19)
MICROALBUMIN/CREAT UR-RTO: 888.6 MG/G
MONOCYTES ABSOLUTE: 0.7 K/UL (ref 0–0.9)
MONOCYTES RELATIVE PERCENT: 7.7 % (ref 0–10)
NEUTROPHILS ABSOLUTE: 4.5 K/UL (ref 1.5–7.5)
NEUTROPHILS RELATIVE PERCENT: 51.8 % (ref 50–65)
PDW BLD-RTO: 12.3 % (ref 11.5–14.5)
PLATELET # BLD: 333 K/UL (ref 130–400)
PMV BLD AUTO: 10.4 FL (ref 9.4–12.3)
POTASSIUM SERPL-SCNC: 5 MMOL/L (ref 3.5–5)
RBC # BLD: 3.63 M/UL (ref 4.2–5.4)
SODIUM BLD-SCNC: 141 MMOL/L (ref 136–145)
TOTAL PROTEIN: 7.6 G/DL (ref 6.6–8.7)
TRIGL SERPL-MCNC: 150 MG/DL (ref 0–149)
TSH SERPL DL<=0.05 MIU/L-ACNC: 1.26 UIU/ML (ref 0.27–4.2)
WBC # BLD: 8.8 K/UL (ref 4.8–10.8)

## 2019-04-09 ENCOUNTER — OFFICE VISIT (OUTPATIENT)
Dept: INTERNAL MEDICINE | Age: 69
End: 2019-04-09
Payer: MEDICARE

## 2019-04-09 ENCOUNTER — TELEPHONE (OUTPATIENT)
Dept: INTERNAL MEDICINE | Age: 69
End: 2019-04-09

## 2019-04-09 VITALS
HEART RATE: 71 BPM | BODY MASS INDEX: 36.08 KG/M2 | HEIGHT: 66 IN | SYSTOLIC BLOOD PRESSURE: 130 MMHG | WEIGHT: 224.5 LBS | DIASTOLIC BLOOD PRESSURE: 70 MMHG | OXYGEN SATURATION: 98 %

## 2019-04-09 DIAGNOSIS — I10 ESSENTIAL HYPERTENSION: Primary | ICD-10-CM

## 2019-04-09 DIAGNOSIS — E55.9 VITAMIN D DEFICIENCY: ICD-10-CM

## 2019-04-09 DIAGNOSIS — M76.61 ACHILLES TENDINITIS OF RIGHT LOWER EXTREMITY: ICD-10-CM

## 2019-04-09 DIAGNOSIS — E11.65 UNCONTROLLED TYPE 2 DIABETES MELLITUS WITH HYPERGLYCEMIA (HCC): ICD-10-CM

## 2019-04-09 DIAGNOSIS — E78.5 HYPERLIPIDEMIA, UNSPECIFIED HYPERLIPIDEMIA TYPE: ICD-10-CM

## 2019-04-09 DIAGNOSIS — H10.9 CONJUNCTIVITIS OF RIGHT EYE, UNSPECIFIED CONJUNCTIVITIS TYPE: ICD-10-CM

## 2019-04-09 PROCEDURE — 99214 OFFICE O/P EST MOD 30 MIN: CPT | Performed by: INTERNAL MEDICINE

## 2019-04-09 PROCEDURE — G8400 PT W/DXA NO RESULTS DOC: HCPCS | Performed by: INTERNAL MEDICINE

## 2019-04-09 PROCEDURE — G8598 ASA/ANTIPLAT THER USED: HCPCS | Performed by: INTERNAL MEDICINE

## 2019-04-09 PROCEDURE — 2022F DILAT RTA XM EVC RTNOPTHY: CPT | Performed by: INTERNAL MEDICINE

## 2019-04-09 PROCEDURE — 3017F COLORECTAL CA SCREEN DOC REV: CPT | Performed by: INTERNAL MEDICINE

## 2019-04-09 PROCEDURE — 1090F PRES/ABSN URINE INCON ASSESS: CPT | Performed by: INTERNAL MEDICINE

## 2019-04-09 PROCEDURE — 1036F TOBACCO NON-USER: CPT | Performed by: INTERNAL MEDICINE

## 2019-04-09 PROCEDURE — 1123F ACP DISCUSS/DSCN MKR DOCD: CPT | Performed by: INTERNAL MEDICINE

## 2019-04-09 PROCEDURE — G8427 DOCREV CUR MEDS BY ELIG CLIN: HCPCS | Performed by: INTERNAL MEDICINE

## 2019-04-09 PROCEDURE — 4040F PNEUMOC VAC/ADMIN/RCVD: CPT | Performed by: INTERNAL MEDICINE

## 2019-04-09 PROCEDURE — G8417 CALC BMI ABV UP PARAM F/U: HCPCS | Performed by: INTERNAL MEDICINE

## 2019-04-09 PROCEDURE — 3045F PR MOST RECENT HEMOGLOBIN A1C LEVEL 7.0-9.0%: CPT | Performed by: INTERNAL MEDICINE

## 2019-04-09 ASSESSMENT — ENCOUNTER SYMPTOMS
SINUS PRESSURE: 0
SORE THROAT: 0
VOMITING: 0
ABDOMINAL PAIN: 0
TROUBLE SWALLOWING: 0
COUGH: 0
ABDOMINAL DISTENTION: 0
WHEEZING: 0
BLOOD IN STOOL: 0
CONSTIPATION: 0
EYE DISCHARGE: 1
EYE ITCHING: 0
STRIDOR: 0
SHORTNESS OF BREATH: 0
NAUSEA: 0
DIARRHEA: 0
CHOKING: 0
COLOR CHANGE: 0

## 2019-04-09 ASSESSMENT — PATIENT HEALTH QUESTIONNAIRE - PHQ9
SUM OF ALL RESPONSES TO PHQ9 QUESTIONS 1 & 2: 0
SUM OF ALL RESPONSES TO PHQ QUESTIONS 1-9: 0
2. FEELING DOWN, DEPRESSED OR HOPELESS: 0
1. LITTLE INTEREST OR PLEASURE IN DOING THINGS: 0
SUM OF ALL RESPONSES TO PHQ QUESTIONS 1-9: 0

## 2019-04-09 NOTE — PATIENT INSTRUCTIONS
Patient Education        Learning About Diabetes Food Guidelines  Your Care Instructions    Meal planning is important to manage diabetes. It helps keep your blood sugar at a target level (which you set with your doctor). You don't have to eat special foods. You can eat what your family eats, including sweets once in a while. But you do have to pay attention to how often you eat and how much you eat of certain foods. You may want to work with a dietitian or a certified diabetes educator (CDE) to help you plan meals and snacks. A dietitian or CDE can also help you lose weight if that is one of your goals. What should you know about eating carbs? Managing the amount of carbohydrate (carbs) you eat is an important part of healthy meals when you have diabetes. Carbohydrate is found in many foods. · Learn which foods have carbs. And learn the amounts of carbs in different foods. ? Bread, cereal, pasta, and rice have about 15 grams of carbs in a serving. A serving is 1 slice of bread (1 ounce), ½ cup of cooked cereal, or 1/3 cup of cooked pasta or rice. ? Fruits have 15 grams of carbs in a serving. A serving is 1 small fresh fruit, such as an apple or orange; ½ of a banana; ½ cup of cooked or canned fruit; ½ cup of fruit juice; 1 cup of melon or raspberries; or 2 tablespoons of dried fruit. ? Milk and no-sugar-added yogurt have 15 grams of carbs in a serving. A serving is 1 cup of milk or 2/3 cup of no-sugar-added yogurt. ? Starchy vegetables have 15 grams of carbs in a serving. A serving is ½ cup of mashed potatoes or sweet potato; 1 cup winter squash; ½ of a small baked potato; ½ cup of cooked beans; or ½ cup cooked corn or green peas. · Learn how much carbs to eat each day and at each meal. A dietitian or CDE can teach you how to keep track of the amount of carbs you eat. This is called carbohydrate counting. · If you are not sure how to count carbohydrate grams, use the Plate Method to plan meals.  It is a good, quick way to make sure that you have a balanced meal. It also helps you spread carbs throughout the day. ? Divide your plate by types of foods. Put non-starchy vegetables on half the plate, meat or other protein food on one-quarter of the plate, and a grain or starchy vegetable in the final quarter of the plate. To this you can add a small piece of fruit and 1 cup of milk or yogurt, depending on how many carbs you are supposed to eat at a meal.  · Try to eat about the same amount of carbs at each meal. Do not \"save up\" your daily allowance of carbs to eat at one meal.  · Proteins have very little or no carbs per serving. Examples of proteins are beef, chicken, turkey, fish, eggs, tofu, cheese, cottage cheese, and peanut butter. A serving size of meat is 3 ounces, which is about the size of a deck of cards. Examples of meat substitute serving sizes (equal to 1 ounce of meat) are 1/4 cup of cottage cheese, 1 egg, 1 tablespoon of peanut butter, and ½ cup of tofu. How can you eat out and still eat healthy? · Learn to estimate the serving sizes of foods that have carbohydrate. If you measure food at home, it will be easier to estimate the amount in a serving of restaurant food. · If the meal you order has too much carbohydrate (such as potatoes, corn, or baked beans), ask to have a low-carbohydrate food instead. Ask for a salad or green vegetables. · If you use insulin, check your blood sugar before and after eating out to help you plan how much to eat in the future. · If you eat more carbohydrate at a meal than you had planned, take a walk or do other exercise. This will help lower your blood sugar. What else should you know? · Limit saturated fat, such as the fat from meat and dairy products. This is a healthy choice because people who have diabetes are at higher risk of heart disease. So choose lean cuts of meat and nonfat or low-fat dairy products.  Use olive or canola oil instead of butter or shortening when cooking. · Don't skip meals. Your blood sugar may drop too low if you skip meals and take insulin or certain medicines for diabetes. · Check with your doctor before you drink alcohol. Alcohol can cause your blood sugar to drop too low. Alcohol can also cause a bad reaction if you take certain diabetes medicines. Follow-up care is a key part of your treatment and safety. Be sure to make and go to all appointments, and call your doctor if you are having problems. It's also a good idea to know your test results and keep a list of the medicines you take. Where can you learn more? Go to https://HowGoodpepiceweb.Omegawave. org and sign in to your [employer] account. Enter U573 in the imedo box to learn more about \"Learning About Diabetes Food Guidelines. \"     If you do not have an account, please click on the \"Sign Up Now\" link. Current as of: July 25, 2018  Content Version: 11.9  © 5131-5555 [employer]. Care instructions adapted under license by [facility] [place] ([facility]). If you have questions about a medical condition or this instruction, always ask your healthcare professional. [place] [address] any warranty or liability for your use of this information.

## 2019-04-09 NOTE — PROGRESS NOTES
Chief Complaint   Patient presents with    3 Month Follow-Up    Diabetes    Foot Injury     right heel swollen.  Health Maintenance     Pt refuses a mammogram, bone density, or colonoscopy. HPI: Becky Sim is a 77 y.o. female is here for follow-up of hypertension, type II diabetes, hyperlipidemia. She is still having right heel pain. X-ray recently showed Achilles tendinitis. We had referred her to orthopedics, but for some reason she has not heard back from the referral. The swelling and pain in leg is getting worse. Her blood sugars run between 160 and 170. She was off her glipizide for a little while. Now she's back on her blood sugars did decrease to 110-120. Her A1c did increase from 7.2-8.3. She states that she is off her glipizide for approximately 1-1/2 months. However, she is back on it. She complains of some right eye drainage. She also complains of burning in the right eye. Her cholesterol is 119.     Past Medical History:   Diagnosis Date    Acute sinusitis     CHAPARRO (acute kidney injury) ([address] 5/18/2018    Anemia     CAD (coronary artery disease)     Callous ulcer ([address]     Cataract     Dermatitis     Diabetes mellitus ([address]     Diabetic nephropathy ([address]     Diabetic retinopathy ([address]     Glaucoma     Gout     Hemorrhoids     Hx of TB skin testing     positive    Hyperlipidemia     Hypertension     Type 2 diabetes mellitus ([address] 5/18/2018    Vitamin D deficiency       Past Surgical History:   Procedure Laterality Date    CARDIAC SURGERY      CATARACT REMOVAL      CHOLECYSTECTOMY      CORONARY ARTERY BYPASS GRAFT      ENDOSCOPY, COLON, DIAGNOSTIC      MOUTH SURGERY      OTHER SURGICAL HISTORY      sternal resection    SKIN BIOPSY      VASCULAR SURGERY        Social History     Socioeconomic History    Marital status: Single     Spouse name: Not on file    Number of children: Not on file    Years of education: Not on file    Highest education level: Not on file   Occupational History    Not on file   Social Needs    Financial resource strain: Not on file    Food insecurity:     Worry: Not on file     Inability: Not on file    Transportation needs:     Medical: Not on file     Non-medical: Not on file   Tobacco Use    Smoking status: Never Smoker    Smokeless tobacco: Never Used   Substance and Sexual Activity    Alcohol use: No    Drug use: No    Sexual activity: Never   Lifestyle    Physical activity:     Days per week: Not on file     Minutes per session: Not on file    Stress: Not on file   Relationships    Social connections:     Talks on phone: Not on file     Gets together: Not on file     Attends Jewish service: Not on file     Active member of club or organization: Not on file     Attends meetings of clubs or organizations: Not on file     Relationship status: Not on file    Intimate partner violence:     Fear of current or ex partner: Not on file     Emotionally abused: Not on file     Physically abused: Not on file     Forced sexual activity: Not on file   Other Topics Concern    Not on file   Social History Narrative    Not on file      Family History   Problem Relation Age of Onset    Cancer Mother         lung    Diabetes Father     COPD Father     Diabetes Sister     Heart Disease Sister     Coronary Art Dis Other         grandfather    Diabetes Other         aunt        Current Outpatient Medications   Medication Sig Dispense Refill    tobramycin (TOBREX) 0.3 % ophthalmic ointment Place into the right eye 2 times daily for 5 days 1 Tube 1    simvastatin (ZOCOR) 40 MG tablet TAKE 1 TABLET EVERY EVENING 90 tablet 3    glipiZIDE (GLUCOTROL XL) 2.5 MG extended release tablet Take 1 tablet by mouth daily 90 tablet 3    amLODIPine (NORVASC) 5 MG tablet Take 1 tablet by mouth daily 30 tablet 3    metFORMIN (GLUCOPHAGE) 500 MG tablet TAKE 2 TABLETS TWICE DAILY 360 tablet 3    ACCU-CHEK ROSA PLUS strip TEST BLOOD SUGAR FOUR Negative for color change and rash. Allergic/Immunologic: Negative for food allergies and immunocompromised state. Neurological: Negative for dizziness, tremors, syncope, speech difficulty, weakness and headaches. Hematological: Negative for adenopathy. Does not bruise/bleed easily. Psychiatric/Behavioral: Negative for confusion and hallucinations. /70   Pulse 71   Ht 5' 6\" (1.676 m)   Wt 224 lb 8 oz (101.8 kg)   SpO2 98%   BMI 36.24 kg/m²   Physical Exam   Constitutional: She is oriented to person, place, and time. She appears well-developed and well-nourished. No distress. HENT:   Head: Normocephalic and atraumatic. Right Ear: External ear normal.   Left Ear: External ear normal.   Nose: Nose normal.   Mouth/Throat: Oropharynx is clear and moist. No oropharyngeal exudate. she has right eye drainage. Right sclera is erythematous   Eyes: Pupils are equal, round, and reactive to light. Conjunctivae and EOM are normal. Right eye exhibits no discharge. Left eye exhibits no discharge. No scleral icterus. Neck: Normal range of motion. Neck supple. No JVD present. No tracheal deviation present. No thyromegaly present. Cardiovascular: Normal rate, regular rhythm and normal heart sounds. No murmur heard. Pulmonary/Chest: Effort normal and breath sounds normal. No stridor. No respiratory distress. She has no wheezes. She has no rales. Abdominal: Soft. Bowel sounds are normal. She exhibits no distension and no mass. There is no tenderness. There is no rebound and no guarding. Musculoskeletal: Normal range of motion. She exhibits tenderness. She exhibits no edema. She has tenderness and swelling on palpation of the right heel   Lymphadenopathy:     She has no cervical adenopathy. Neurological: She is alert and oriented to person, place, and time. She has normal reflexes. No cranial nerve deficit. Coordination normal.   Skin: Skin is warm and dry. No rash noted.  She is not diaphoretic. No erythema. Psychiatric: Her behavior is normal. Judgment and thought content normal. She does not exhibit a depressed mood. Nursing note and vitals reviewed. 1. Uncontrolled type 2 diabetes mellitus with hyperglycemia ([address]    2. Vitamin D deficiency         ASSESSMENT/PLAN:    70-year-old woman here for follow-up    1. Hypertension: Blood pressure well controlled on current medication regimen    2. Hyperglycemia/diabetes: Patient was without her glipizide for several weeks. She is back on the medication now. Reevaluate her labs in about 3 months. 3. Hyperlipidemia: Continue simvastatin at current dose    4. Achilles tendinitis: Refer to podiatry. Patient should've been referred several months ago, I don't know why we did not get the referral done. 5. Conjunctivitis right eye: Tobrex drops prescribed    6. Vitamin D deficiency: Check vitamin D level with next lab draw        Eric Mclaughlin was seen today for 3 month follow-up, diabetes, foot injury and health maintenance. Diagnoses and all orders for this visit:    Uncontrolled type 2 diabetes mellitus with hyperglycemia ([address]  -     CBC Auto Differential; Future  -     Comprehensive Metabolic Panel; Future  -     Hemoglobin A1C; Future  -     Lipid Panel; Future  -     TSH without Reflex; Future  -     Microalbumin / Creatinine Urine Ratio; Future  -     Vitamin D 25 Hydroxy; Future    Vitamin D deficiency   -     Vitamin D 25 Hydroxy;  Future    Other orders  -     tobramycin (TOBREX) 0.3 % ophthalmic ointment; Place into the right eye 2 times daily for 5 days          Return in about 3 months (around 7/9/2019), or medicare wellness, for physical.     Orders Placed This Encounter   Procedures    CBC Auto Differential     Fast 12 hours     Standing Status:   Future     Standing Expiration Date:   12/9/2019    Comprehensive Metabolic Panel     Fasting 12 hours     Standing Status:   Future     Standing Expiration Date:   12/9/2019   [facility] Hemoglobin A1C     Fast 12 hours     Standing Status:   Future     Standing Expiration Date:   12/9/2019    Lipid Panel     Standing Status:   Future     Standing Expiration Date:   12/9/2019     Order Specific Question:   Is Patient Fasting?/# of Hours     Answer:   12    TSH without Reflex     Fast 12 hours     Standing Status:   Future     Standing Expiration Date:   12/9/2019    Microalbumin / Creatinine Urine Ratio     Standing Status:   Future     Standing Expiration Date:   12/9/2019    Vitamin D 25 Hydroxy     Standing Status:   Future     Standing Expiration Date:   12/9/2019       Shital Quesada MD

## 2019-04-09 NOTE — TELEPHONE ENCOUNTER
She has two referrals to podiatry, she has not heard from either one of them.  Where are we at on the referral?

## 2019-06-06 RX ORDER — AMLODIPINE BESYLATE 5 MG/1
5 TABLET ORAL DAILY
Qty: 90 TABLET | Refills: 3 | Status: SHIPPED | OUTPATIENT
Start: 2019-06-06 | End: 2019-10-30 | Stop reason: SDUPTHER

## 2019-06-10 ENCOUNTER — OFFICE VISIT (OUTPATIENT)
Dept: INTERNAL MEDICINE | Age: 69
End: 2019-06-10
Payer: MEDICARE

## 2019-06-10 VITALS
HEART RATE: 104 BPM | BODY MASS INDEX: 36.49 KG/M2 | DIASTOLIC BLOOD PRESSURE: 76 MMHG | SYSTOLIC BLOOD PRESSURE: 122 MMHG | WEIGHT: 219 LBS | OXYGEN SATURATION: 97 % | HEIGHT: 65 IN

## 2019-06-10 DIAGNOSIS — T14.8XXA BLISTER: Primary | ICD-10-CM

## 2019-06-10 PROCEDURE — 1090F PRES/ABSN URINE INCON ASSESS: CPT | Performed by: INTERNAL MEDICINE

## 2019-06-10 PROCEDURE — 99213 OFFICE O/P EST LOW 20 MIN: CPT | Performed by: INTERNAL MEDICINE

## 2019-06-10 PROCEDURE — G8427 DOCREV CUR MEDS BY ELIG CLIN: HCPCS | Performed by: INTERNAL MEDICINE

## 2019-06-10 PROCEDURE — G8400 PT W/DXA NO RESULTS DOC: HCPCS | Performed by: INTERNAL MEDICINE

## 2019-06-10 PROCEDURE — 1036F TOBACCO NON-USER: CPT | Performed by: INTERNAL MEDICINE

## 2019-06-10 PROCEDURE — 4040F PNEUMOC VAC/ADMIN/RCVD: CPT | Performed by: INTERNAL MEDICINE

## 2019-06-10 PROCEDURE — G8417 CALC BMI ABV UP PARAM F/U: HCPCS | Performed by: INTERNAL MEDICINE

## 2019-06-10 PROCEDURE — 3017F COLORECTAL CA SCREEN DOC REV: CPT | Performed by: INTERNAL MEDICINE

## 2019-06-10 PROCEDURE — G8598 ASA/ANTIPLAT THER USED: HCPCS | Performed by: INTERNAL MEDICINE

## 2019-06-10 PROCEDURE — 1123F ACP DISCUSS/DSCN MKR DOCD: CPT | Performed by: INTERNAL MEDICINE

## 2019-06-10 NOTE — PATIENT INSTRUCTIONS

## 2019-06-11 ASSESSMENT — ENCOUNTER SYMPTOMS
ABDOMINAL PAIN: 0
STRIDOR: 0
VOMITING: 0
EYE DISCHARGE: 0
ABDOMINAL DISTENTION: 0
SHORTNESS OF BREATH: 0
CONSTIPATION: 0
EYE ITCHING: 0
BLOOD IN STOOL: 0
DIARRHEA: 0
COLOR CHANGE: 0
CHOKING: 0
NAUSEA: 0
SINUS PRESSURE: 0
WHEEZING: 0
TROUBLE SWALLOWING: 0
SORE THROAT: 0
COUGH: 0

## 2019-06-11 NOTE — PROGRESS NOTES
Chief Complaint   Patient presents with    Blister     blister on top of right foot, popped over week ago       HPI: Subha Chan is a 77 y.o. female is here for evaluation of a blister on the right foot. She had an achilles tendon rupture and was put in a boot. The boot caused a blister to the top of her right foot, near the ankle. The blister has since ruptured and dried up. She denies any fevers or chills. The blister is not painful. She first noted the blister two weeks ago.      Past Medical History:   Diagnosis Date    Acute sinusitis     CHAPARRO (acute kidney injury) ([facility] 5/18/2018    Anemia     CAD (coronary artery disease)     Callous ulcer ([facility]     Cataract     Dermatitis     Diabetes mellitus ([facility]     Diabetic nephropathy ([facility]     Diabetic retinopathy ([facility]     Glaucoma     Gout     Hemorrhoids     Hx of TB skin testing     positive    Hyperlipidemia     Hypertension     Type 2 diabetes mellitus ([facility] 5/18/2018    Vitamin D deficiency       Past Surgical History:   Procedure Laterality Date    CARDIAC SURGERY      CATARACT REMOVAL      CHOLECYSTECTOMY      CORONARY ARTERY BYPASS GRAFT      ENDOSCOPY, COLON, DIAGNOSTIC      MOUTH SURGERY      OTHER SURGICAL HISTORY      sternal resection    SKIN BIOPSY      VASCULAR SURGERY        Social History     Socioeconomic History    Marital status: Single     Spouse name: None    Number of children: None    Years of education: None    Highest education level: None   Occupational History    None   Social Needs    Financial resource strain: None    Food insecurity:     Worry: None     Inability: None    Transportation needs:     Medical: None     Non-medical: None   Tobacco Use    Smoking status: Never Smoker    Smokeless tobacco: Never Used   Substance and Sexual Activity    Alcohol use: No    Drug use: No    Sexual activity: Never   Lifestyle    Physical activity:     Days per week: None     Minutes per session: None  Stress: None   Relationships    Social connections:     Talks on phone: None     Gets together: None     Attends Mosque service: None     Active member of club or organization: None     Attends meetings of clubs or organizations: None     Relationship status: None    Intimate partner violence:     Fear of current or ex partner: None     Emotionally abused: None     Physically abused: None     Forced sexual activity: None   Other Topics Concern    None   Social History Narrative    None      Family History   Problem Relation Age of Onset    Cancer Mother         lung    Diabetes Father     COPD Father     Diabetes Sister     Heart Disease Sister     Coronary Art Dis Other         grandfather    Diabetes Other         aunt        Current Outpatient Medications   Medication Sig Dispense Refill    amLODIPine (NORVASC) 5 MG tablet Take 1 tablet by mouth daily 90 tablet 3    simvastatin (ZOCOR) 40 MG tablet TAKE 1 TABLET EVERY EVENING 90 tablet 3    glipiZIDE (GLUCOTROL XL) 2.5 MG extended release tablet Take 1 tablet by mouth daily 90 tablet 3    metFORMIN (GLUCOPHAGE) 500 MG tablet TAKE 2 TABLETS TWICE DAILY 360 tablet 3    ACCU-CHEK ROSA PLUS strip TEST BLOOD SUGAR FOUR TIMES DAILY 400 strip 3    Alcohol Swabs (B-D SINGLE USE SWABS REGULAR) PADS USE THREE TIMES DAILY 100 each 11    ACCU-CHEK SOFTCLIX LANCETS MISC TEST FOUR TIMES DAILY 400 each 3    aspirin 81 MG tablet Take 81 mg by mouth daily      Omega-3 Fatty Acids (FISH OIL) 1000 MG CAPS Take 1,000 mg by mouth 2 times daily      MULTIPLE VITAMIN PO Take 1 tablet by mouth daily      Calcium Carbonate-Vitamin D (CALCIUM 500/D PO) Take 1 tablet by mouth 2 times daily      vitamin B-12 (CYANOCOBALAMIN) 1000 MCG tablet Take 1,000 mcg by mouth daily      timolol (TIMOPTIC) 0.5 % ophthalmic solution Place 1 drop into the right eye nightly        No current facility-administered medications for this visit.          Patient Active Problem List   Diagnosis    [facility] ([facility]    Hypertension    Hyperlipidemia    Glaucoma    CAD (coronary artery disease)    Type 2 diabetes mellitus ([facility]    Diabetes mellitus ([facility]    Acute cystitis without hematuria        Review of Systems   Constitutional: Negative for fatigue, fever and unexpected weight change. HENT: Negative for congestion, ear discharge, ear pain, mouth sores, sinus pressure, sore throat and trouble swallowing. Eyes: Negative for discharge, itching and visual disturbance. Itching and burning to right eye. Respiratory: Negative for cough, choking, shortness of breath, wheezing and stridor. Cardiovascular: Negative for chest pain, palpitations and leg swelling. Gastrointestinal: Negative for abdominal distention, abdominal pain, blood in stool, constipation, diarrhea, nausea and vomiting. Endocrine: Negative for cold intolerance, polydipsia and polyuria. Genitourinary: Negative for difficulty urinating, dysuria, frequency and urgency. Musculoskeletal: Positive for joint swelling. Negative for arthralgias and gait problem. Occasional right knee pain and also has pain in palpation of the right heel   Skin: Negative for color change and rash. Blister to top of the right foot   Allergic/Immunologic: Negative for food allergies and immunocompromised state. Neurological: Negative for dizziness, tremors, syncope, speech difficulty, weakness and headaches. Hematological: Negative for adenopathy. Does not bruise/bleed easily. Psychiatric/Behavioral: Negative for confusion and hallucinations. /76   Pulse 104   Ht 5' 5\" (1.651 m)   Wt 219 lb (99.3 kg)   SpO2 97%   BMI 36.44 kg/m²   Physical Exam   Constitutional: She is oriented to person, place, and time. She appears well-developed and well-nourished. No distress. HENT:   Head: Normocephalic and atraumatic.    Right Ear: External ear normal.   Left Ear: External ear normal. Nose: Nose normal.   Mouth/Throat: Oropharynx is clear and moist. No oropharyngeal exudate. she has right eye drainage. Right sclera is erythematous   Eyes: Pupils are equal, round, and reactive to light. Conjunctivae and EOM are normal. Right eye exhibits no discharge. Left eye exhibits no discharge. No scleral icterus. Neck: Normal range of motion. Neck supple. No JVD present. No tracheal deviation present. No thyromegaly present. Cardiovascular: Normal rate, regular rhythm and normal heart sounds. No murmur heard. Pulmonary/Chest: Effort normal and breath sounds normal. No stridor. No respiratory distress. She has no wheezes. She has no rales. Abdominal: Soft. Bowel sounds are normal. She exhibits no distension and no mass. There is no tenderness. There is no rebound and no guarding. Musculoskeletal: Normal range of motion. She exhibits tenderness. She exhibits no edema. She has tenderness and swelling on palpation of the right heel   Lymphadenopathy:     She has no cervical adenopathy. Neurological: She is alert and oriented to person, place, and time. She has normal reflexes. No cranial nerve deficit. Coordination normal.   Skin: Skin is warm and dry. No rash noted. She is not diaphoretic. No erythema. Healing scabbed blister with granulation tissue, about the size of a half dollar noted to the right foot. No erythema noted   Psychiatric: Her behavior is normal. Judgment and thought content normal. She does not exhibit a depressed mood. Nursing note and vitals reviewed. No diagnosis found. ASSESSMENT/PLAN:    77year old woman here for followup    1. Blister to right foot: Healing well. No intervention needed    There are no diagnoses linked to this encounter. Return as scheduled. No orders of the defined types were placed in this encounter.       Dom Hartley MD

## 2019-07-26 DIAGNOSIS — E11.65 UNCONTROLLED TYPE 2 DIABETES MELLITUS WITH HYPERGLYCEMIA (HCC): ICD-10-CM

## 2019-07-26 DIAGNOSIS — E55.9 VITAMIN D DEFICIENCY: ICD-10-CM

## 2019-07-26 LAB
ALBUMIN SERPL-MCNC: 4.2 G/DL (ref 3.5–5.2)
ALP BLD-CCNC: 49 U/L (ref 35–104)
ALT SERPL-CCNC: 45 U/L (ref 5–33)
ANION GAP SERPL CALCULATED.3IONS-SCNC: 13 MMOL/L (ref 7–19)
AST SERPL-CCNC: 37 U/L (ref 5–32)
BASOPHILS ABSOLUTE: 0.1 K/UL (ref 0–0.2)
BASOPHILS RELATIVE PERCENT: 1 % (ref 0–1)
BILIRUB SERPL-MCNC: 0.3 MG/DL (ref 0.2–1.2)
BUN BLDV-MCNC: 22 MG/DL (ref 8–23)
CALCIUM SERPL-MCNC: 9.4 MG/DL (ref 8.8–10.2)
CHLORIDE BLD-SCNC: 101 MMOL/L (ref 98–111)
CHOLESTEROL, TOTAL: 132 MG/DL (ref 160–199)
CO2: 27 MMOL/L (ref 22–29)
CREAT SERPL-MCNC: 1.4 MG/DL (ref 0.5–0.9)
CREATININE URINE: 165.3 MG/DL (ref 4.2–622)
EOSINOPHILS ABSOLUTE: 0.2 K/UL (ref 0–0.6)
EOSINOPHILS RELATIVE PERCENT: 2.7 % (ref 0–5)
GFR NON-AFRICAN AMERICAN: 37
GLUCOSE BLD-MCNC: 126 MG/DL (ref 74–109)
HBA1C MFR BLD: 7.2 % (ref 4–6)
HCT VFR BLD CALC: 36.1 % (ref 37–47)
HDLC SERPL-MCNC: 49 MG/DL (ref 65–121)
HEMOGLOBIN: 11.9 G/DL (ref 12–16)
LDL CHOLESTEROL CALCULATED: 61 MG/DL
LYMPHOCYTES ABSOLUTE: 3.5 K/UL (ref 1.1–4.5)
LYMPHOCYTES RELATIVE PERCENT: 41.7 % (ref 20–40)
MCH RBC QN AUTO: 33 PG (ref 27–31)
MCHC RBC AUTO-ENTMCNC: 33 G/DL (ref 33–37)
MCV RBC AUTO: 100 FL (ref 81–99)
MICROALBUMIN UR-MCNC: 113.2 MG/DL (ref 0–19)
MICROALBUMIN/CREAT UR-RTO: 684.8 MG/G
MONOCYTES ABSOLUTE: 0.6 K/UL (ref 0–0.9)
MONOCYTES RELATIVE PERCENT: 6.8 % (ref 0–10)
NEUTROPHILS ABSOLUTE: 4 K/UL (ref 1.5–7.5)
NEUTROPHILS RELATIVE PERCENT: 47.7 % (ref 50–65)
PDW BLD-RTO: 12.3 % (ref 11.5–14.5)
PLATELET # BLD: 297 K/UL (ref 130–400)
PMV BLD AUTO: 10.7 FL (ref 9.4–12.3)
POTASSIUM SERPL-SCNC: 5.4 MMOL/L (ref 3.5–5)
RBC # BLD: 3.61 M/UL (ref 4.2–5.4)
SODIUM BLD-SCNC: 141 MMOL/L (ref 136–145)
TOTAL PROTEIN: 7.5 G/DL (ref 6.6–8.7)
TRIGL SERPL-MCNC: 110 MG/DL (ref 0–149)
TSH SERPL DL<=0.05 MIU/L-ACNC: 1.43 UIU/ML (ref 0.27–4.2)
VITAMIN D 25-HYDROXY: 47.7 NG/ML
WBC # BLD: 8.3 K/UL (ref 4.8–10.8)

## 2019-08-02 ENCOUNTER — OFFICE VISIT (OUTPATIENT)
Dept: INTERNAL MEDICINE | Age: 69
End: 2019-08-02
Payer: MEDICARE

## 2019-08-02 VITALS
OXYGEN SATURATION: 97 % | DIASTOLIC BLOOD PRESSURE: 86 MMHG | BODY MASS INDEX: 36.65 KG/M2 | HEART RATE: 89 BPM | WEIGHT: 220 LBS | HEIGHT: 65 IN | SYSTOLIC BLOOD PRESSURE: 158 MMHG

## 2019-08-02 DIAGNOSIS — E78.5 HYPERLIPIDEMIA, UNSPECIFIED HYPERLIPIDEMIA TYPE: ICD-10-CM

## 2019-08-02 DIAGNOSIS — Z23 NEED FOR PROPHYLACTIC VACCINATION AND INOCULATION AGAINST VARICELLA: ICD-10-CM

## 2019-08-02 DIAGNOSIS — I10 ESSENTIAL HYPERTENSION: ICD-10-CM

## 2019-08-02 DIAGNOSIS — Z12.11 COLON CANCER SCREENING: ICD-10-CM

## 2019-08-02 DIAGNOSIS — Z00.00 ROUTINE ADULT HEALTH MAINTENANCE: Primary | ICD-10-CM

## 2019-08-02 DIAGNOSIS — E11.21 TYPE II DIABETES MELLITUS WITH NEPHROPATHY (HCC): ICD-10-CM

## 2019-08-02 DIAGNOSIS — Z23 NEED FOR PROPHYLACTIC VACCINATION AGAINST DIPHTHERIA-TETANUS-PERTUSSIS (DTP): ICD-10-CM

## 2019-08-02 DIAGNOSIS — E55.9 VITAMIN D DEFICIENCY: ICD-10-CM

## 2019-08-02 PROCEDURE — G8598 ASA/ANTIPLAT THER USED: HCPCS | Performed by: INTERNAL MEDICINE

## 2019-08-02 PROCEDURE — 4040F PNEUMOC VAC/ADMIN/RCVD: CPT | Performed by: INTERNAL MEDICINE

## 2019-08-02 PROCEDURE — 3045F PR MOST RECENT HEMOGLOBIN A1C LEVEL 7.0-9.0%: CPT | Performed by: INTERNAL MEDICINE

## 2019-08-02 PROCEDURE — 1123F ACP DISCUSS/DSCN MKR DOCD: CPT | Performed by: INTERNAL MEDICINE

## 2019-08-02 PROCEDURE — G0439 PPPS, SUBSEQ VISIT: HCPCS | Performed by: INTERNAL MEDICINE

## 2019-08-02 PROCEDURE — 3017F COLORECTAL CA SCREEN DOC REV: CPT | Performed by: INTERNAL MEDICINE

## 2019-08-02 ASSESSMENT — ENCOUNTER SYMPTOMS
SINUS PRESSURE: 0
ABDOMINAL PAIN: 0
STRIDOR: 0
COLOR CHANGE: 0
SHORTNESS OF BREATH: 0
BLOOD IN STOOL: 0
EYE DISCHARGE: 0
NAUSEA: 0
TROUBLE SWALLOWING: 0
WHEEZING: 0
COUGH: 0
SORE THROAT: 0
EYE ITCHING: 0
CONSTIPATION: 0
DIARRHEA: 0
ABDOMINAL DISTENTION: 0
VOMITING: 0
CHOKING: 0

## 2019-08-02 ASSESSMENT — PATIENT HEALTH QUESTIONNAIRE - PHQ9
SUM OF ALL RESPONSES TO PHQ QUESTIONS 1-9: 0
SUM OF ALL RESPONSES TO PHQ QUESTIONS 1-9: 0

## 2019-08-02 ASSESSMENT — LIFESTYLE VARIABLES: HOW OFTEN DO YOU HAVE A DRINK CONTAINING ALCOHOL: 0

## 2019-08-02 NOTE — PROGRESS NOTES
Encounter. No Known Allergies    Prior to Visit Medications    Medication Sig Taking? Authorizing Provider   Tdap (ADACEL) 5-2-15.5 LF-MCG/0.5 injection Inject 0.5 mLs into the muscle once for 1 dose Yes Edward Kumar MD   zoster recombinant adjuvanted vaccine [facility]) 50 MCG/0.5ML SUSR injection Inject 0.5 mLs into the muscle once for 1 dose 50 MCG IM then repeat 2-6 months.  Yes Edward Kumar MD   metFORMIN (GLUCOPHAGE) 500 MG tablet TAKE 2 TABLETS TWICE DAILY Yes Edward Kumar MD   amLODIPine (NORVASC) 5 MG tablet Take 1 tablet by mouth daily Yes Edward Kumar MD   simvastatin (ZOCOR) 40 MG tablet TAKE 1 TABLET EVERY EVENING Yes Edward Kumar MD   glipiZIDE (GLUCOTROL XL) 2.5 MG extended release tablet Take 1 tablet by mouth daily Yes Edward Kumar MD   ACCU-CHEALICE ROSA PLUS strip TEST BLOOD SUGAR FOUR TIMES DAILY Yes Edward Kumar MD   Alcohol Swabs (B-D SINGLE USE SWABS REGULAR) PADS USE THREE TIMES DAILY Yes Edward Kumar MD   ACCU-CHEK SOFTCLIX LANCETS MISC TEST FOUR TIMES DAILY Yes Edward Kumar MD   aspirin 81 MG tablet Take 81 mg by mouth daily Yes Historical Provider, MD   Omega-3 Fatty Acids (FISH OIL) 1000 MG CAPS Take 1,000 mg by mouth 2 times daily Yes Historical Provider, MD   MULTIPLE VITAMIN PO Take 1 tablet by mouth daily Yes Historical Provider, MD   Calcium Carbonate-Vitamin D (CALCIUM 500/D PO) Take 1 tablet by mouth 2 times daily Yes Historical Provider, MD   vitamin B-12 (CYANOCOBALAMIN) 1000 MCG tablet Take 1,000 mcg by mouth daily Yes Historical Provider, MD   timolol (TIMOPTIC) 0.5 % ophthalmic solution Place 1 drop into the right eye nightly  Yes Historical Provider, MD       Past Medical History:   Diagnosis Date    Acute sinusitis     [facility]ARRO (acute kidney injury) ([facility] [address] 5/18/2018    Anemia     CAD (coronary artery disease)     Callous ulcer ([facility]     Cataract     Dermatitis     Diabetes mellitus ([facility]     Diabetic nephropathy ([facility]     of 2) 06/25/1998    Colon cancer screen colonoscopy  06/25/1998    Annual Wellness Visit (AWV)  05/29/2019    Breast cancer screen  04/09/2020 (Originally 6/25/1998)    Pneumococcal 65+ years Vaccine (1 of 2 - PCV13) 01/01/2030 (Originally 6/25/2013)    DEXA (modify frequency per FRAX score)  08/02/2030 (Originally 6/25/2013)    Flu vaccine (1) 09/01/2019    Diabetic foot exam  09/07/2019    Diabetic retinal exam  06/17/2020    A1C test (Diabetic or Prediabetic)  07/26/2020    Diabetic microalbuminuria test  07/26/2020    Lipid screen  07/26/2020       Recommendations for Preventive Services Due: see orders.   Recommended screening schedule for the next 5-10 years is provided to the patient in written form: see Patient Instructions/AVS.

## 2019-08-22 ENCOUNTER — TELEPHONE (OUTPATIENT)
Dept: INTERNAL MEDICINE | Age: 69
End: 2019-08-22

## 2019-08-22 DIAGNOSIS — Z12.11 COLON CANCER SCREENING: Primary | ICD-10-CM

## 2019-09-25 ENCOUNTER — OFFICE VISIT (OUTPATIENT)
Dept: GASTROENTEROLOGY | Age: 69
End: 2019-09-25
Payer: MEDICARE

## 2019-09-25 VITALS
HEART RATE: 78 BPM | BODY MASS INDEX: 35.36 KG/M2 | HEIGHT: 66 IN | WEIGHT: 220 LBS | DIASTOLIC BLOOD PRESSURE: 60 MMHG | SYSTOLIC BLOOD PRESSURE: 137 MMHG | OXYGEN SATURATION: 97 %

## 2019-09-25 DIAGNOSIS — R10.13 EPIGASTRIC PAIN: Primary | ICD-10-CM

## 2019-09-25 DIAGNOSIS — R19.5 POSITIVE COLORECTAL CANCER SCREENING USING COLOGUARD TEST: ICD-10-CM

## 2019-09-25 DIAGNOSIS — Z87.11 HISTORY OF BLEEDING PEPTIC ULCER: ICD-10-CM

## 2019-09-25 PROCEDURE — 99205 OFFICE O/P NEW HI 60 MIN: CPT | Performed by: NURSE PRACTITIONER

## 2019-09-25 ASSESSMENT — ENCOUNTER SYMPTOMS
ANAL BLEEDING: 0
NAUSEA: 0
TROUBLE SWALLOWING: 0
CHOKING: 0
RECTAL PAIN: 0
ABDOMINAL PAIN: 1
ABDOMINAL DISTENTION: 0
DIARRHEA: 0
VOMITING: 0
SHORTNESS OF BREATH: 0
COUGH: 0
BLOOD IN STOOL: 0
CONSTIPATION: 0

## 2019-09-25 NOTE — PROGRESS NOTES
Subjective:     Patient ID: Charly Easton is a 68 y.o. female  PCP: Sandor Alonso MD  Referring Provider: Ariel Benitez MD    HPI  Patient presents to the office today with the following complaints: New Patient and Colonoscopy    Pt here for + cologuard needing colonoscopy     Patient denies any lower GI symptoms such as constipation, diarrhea, change in bowel habits, rectal bleeding, and rectal pain. Pt denies upper GI symptoms such as nausea, vomiting, dysphagia, reflux and melena. C/o abd pain epigastic pain. This began a couple of months ago. Eating makes it worse. This only occurs occasionally and it's \"when I eat something that I shouldn't. \"  Denies dysphagia and melena. Denies nausea. Takes ibuprofen maybe once a month. Pt reports hx of bleeding ulcer. Pt has never had a colonoscopy. Positive family history of colon polyps - sister    This was my first time assessing Ms. Go    Assessment:     1. Epigastric pain    2. Positive colorectal cancer screening using Cologuard test    3. History of bleeding peptic ulcer      Plan:   PPI recommended, pt refuses at this time. Will consider post Egd if necessary. Schedule colonoscopy and endoscopy  Instruct on bowel prep. Nothing to eat or drink after midnight the day of the exam.  Unable to drive for 24 hours after the procedure. No aspirin or nonsteroidal anti-inflammatories for 5 days before procedure. I have discussed the benefits, alternatives, and risks (including bleeding, perforation and death)  for pursuing Endoscopy (EGD/Colonscopy/EUS/ERCP) with the patient and they are willing to continue. We also discussed the need for anesthesia, IV access, proper dietary changes, medication changes if necessary, and need for bowel prep (if ordered) prior to their Endoscopic procedure.   They are aware they must have someone accompany them to their scheduled procedure to drive them home - they agree to the above and are willing to

## 2019-10-11 ENCOUNTER — ANESTHESIA (OUTPATIENT)
Dept: ENDOSCOPY | Age: 69
End: 2019-10-11
Payer: MEDICARE

## 2019-10-11 ENCOUNTER — ANESTHESIA EVENT (OUTPATIENT)
Dept: ENDOSCOPY | Age: 69
End: 2019-10-11
Payer: MEDICARE

## 2019-10-11 ENCOUNTER — HOSPITAL ENCOUNTER (OUTPATIENT)
Age: 69
Setting detail: OUTPATIENT SURGERY
Discharge: HOME | End: 2019-10-11
Attending: INTERNAL MEDICINE | Admitting: INTERNAL MEDICINE
Payer: MEDICARE

## 2019-10-11 VITALS
OXYGEN SATURATION: 100 % | DIASTOLIC BLOOD PRESSURE: 89 MMHG | WEIGHT: 222 LBS | RESPIRATION RATE: 16 BRPM | HEIGHT: 65 IN | SYSTOLIC BLOOD PRESSURE: 142 MMHG | TEMPERATURE: 97.8 F | HEART RATE: 60 BPM | BODY MASS INDEX: 36.99 KG/M2

## 2019-10-11 VITALS
DIASTOLIC BLOOD PRESSURE: 65 MMHG | RESPIRATION RATE: 24 BRPM | SYSTOLIC BLOOD PRESSURE: 139 MMHG | OXYGEN SATURATION: 94 %

## 2019-10-11 LAB
GLUCOSE BLD-MCNC: 121 MG/DL (ref 70–99)
PERFORMED ON: ABNORMAL

## 2019-10-11 PROCEDURE — 2580000003 HC RX 258: Performed by: INTERNAL MEDICINE

## 2019-10-11 PROCEDURE — 7100000010 HC PHASE II RECOVERY - FIRST 15 MIN: Performed by: INTERNAL MEDICINE

## 2019-10-11 PROCEDURE — 3700000001 HC ADD 15 MINUTES (ANESTHESIA): Performed by: INTERNAL MEDICINE

## 2019-10-11 PROCEDURE — 2780000010 HC IMPLANT OTHER: Performed by: INTERNAL MEDICINE

## 2019-10-11 PROCEDURE — 3609010600 HC COLONOSCOPY POLYPECTOMY SNARE/COLD BIOPSY: Performed by: INTERNAL MEDICINE

## 2019-10-11 PROCEDURE — 3609012400 HC EGD TRANSORAL BIOPSY SINGLE/MULTIPLE: Performed by: INTERNAL MEDICINE

## 2019-10-11 PROCEDURE — 88305 TISSUE EXAM BY PATHOLOGIST: CPT

## 2019-10-11 PROCEDURE — 3700000000 HC ANESTHESIA ATTENDED CARE: Performed by: INTERNAL MEDICINE

## 2019-10-11 PROCEDURE — 43239 EGD BIOPSY SINGLE/MULTIPLE: CPT | Performed by: INTERNAL MEDICINE

## 2019-10-11 PROCEDURE — 6360000002 HC RX W HCPCS

## 2019-10-11 PROCEDURE — 2500000003 HC RX 250 WO HCPCS

## 2019-10-11 PROCEDURE — 88342 IMHCHEM/IMCYTCHM 1ST ANTB: CPT

## 2019-10-11 PROCEDURE — 82948 REAGENT STRIP/BLOOD GLUCOSE: CPT

## 2019-10-11 PROCEDURE — 2709999900 HC NON-CHARGEABLE SUPPLY: Performed by: INTERNAL MEDICINE

## 2019-10-11 PROCEDURE — 45385 COLONOSCOPY W/LESION REMOVAL: CPT | Performed by: INTERNAL MEDICINE

## 2019-10-11 PROCEDURE — 7100000011 HC PHASE II RECOVERY - ADDTL 15 MIN: Performed by: INTERNAL MEDICINE

## 2019-10-11 DEVICE — CLIPPING DEVICE
Type: IMPLANTABLE DEVICE | Site: TRANSVERSE COLON | Status: FUNCTIONAL
Brand: RESOLUTION CLIP

## 2019-10-11 RX ORDER — LIDOCAINE HYDROCHLORIDE 10 MG/ML
INJECTION, SOLUTION EPIDURAL; INFILTRATION; INTRACAUDAL; PERINEURAL PRN
Status: DISCONTINUED | OUTPATIENT
Start: 2019-10-11 | End: 2019-10-11 | Stop reason: SDUPTHER

## 2019-10-11 RX ORDER — SODIUM CHLORIDE, SODIUM LACTATE, POTASSIUM CHLORIDE, CALCIUM CHLORIDE 600; 310; 30; 20 MG/100ML; MG/100ML; MG/100ML; MG/100ML
INJECTION, SOLUTION INTRAVENOUS CONTINUOUS
Status: DISCONTINUED | OUTPATIENT
Start: 2019-10-11 | End: 2019-10-11 | Stop reason: HOSPADM

## 2019-10-11 RX ORDER — PROPOFOL 10 MG/ML
INJECTION, EMULSION INTRAVENOUS PRN
Status: DISCONTINUED | OUTPATIENT
Start: 2019-10-11 | End: 2019-10-11 | Stop reason: SDUPTHER

## 2019-10-11 RX ADMIN — PROPOFOL 50 MG: 10 INJECTION, EMULSION INTRAVENOUS at 13:09

## 2019-10-11 RX ADMIN — PROPOFOL 50 MG: 10 INJECTION, EMULSION INTRAVENOUS at 13:07

## 2019-10-11 RX ADMIN — SODIUM CHLORIDE, POTASSIUM CHLORIDE, SODIUM LACTATE AND CALCIUM CHLORIDE: 600; 310; 30; 20 INJECTION, SOLUTION INTRAVENOUS at 11:58

## 2019-10-11 RX ADMIN — LIDOCAINE HYDROCHLORIDE 50 MG: 10 INJECTION, SOLUTION EPIDURAL; INFILTRATION; INTRACAUDAL; PERINEURAL at 13:04

## 2019-10-11 RX ADMIN — PROPOFOL 50 MG: 10 INJECTION, EMULSION INTRAVENOUS at 13:05

## 2019-10-11 RX ADMIN — PROPOFOL 50 MG: 10 INJECTION, EMULSION INTRAVENOUS at 13:26

## 2019-10-11 RX ADMIN — PROPOFOL 50 MG: 10 INJECTION, EMULSION INTRAVENOUS at 13:17

## 2019-10-11 RX ADMIN — PROPOFOL 50 MG: 10 INJECTION, EMULSION INTRAVENOUS at 13:22

## 2019-10-11 ASSESSMENT — PAIN SCALES - GENERAL
PAINLEVEL_OUTOF10: 0
PAINLEVEL_OUTOF10: 0

## 2019-10-11 ASSESSMENT — PAIN - FUNCTIONAL ASSESSMENT: PAIN_FUNCTIONAL_ASSESSMENT: 0-10

## 2019-10-15 DIAGNOSIS — A04.8 H. PYLORI INFECTION: Primary | ICD-10-CM

## 2019-10-15 RX ORDER — CLARITHROMYCIN 500 MG/1
500 TABLET, COATED ORAL 2 TIMES DAILY
Qty: 28 TABLET | Refills: 0 | Status: SHIPPED | OUTPATIENT
Start: 2019-10-15 | End: 2019-10-29

## 2019-10-15 RX ORDER — AMOXICILLIN 500 MG/1
1000 CAPSULE ORAL 2 TIMES DAILY
Qty: 56 CAPSULE | Refills: 0 | Status: SHIPPED | OUTPATIENT
Start: 2019-10-15 | End: 2019-10-29

## 2019-10-15 RX ORDER — OMEPRAZOLE 40 MG/1
40 CAPSULE, DELAYED RELEASE ORAL 2 TIMES DAILY
Qty: 28 CAPSULE | Refills: 0 | Status: SHIPPED | OUTPATIENT
Start: 2019-10-15 | End: 2019-12-31

## 2019-10-16 ENCOUNTER — TELEPHONE (OUTPATIENT)
Dept: GASTROENTEROLOGY | Age: 69
End: 2019-10-16

## 2019-10-30 ENCOUNTER — HOSPITAL ENCOUNTER (OUTPATIENT)
Dept: NON INVASIVE DIAGNOSTICS | Age: 69
Discharge: HOME | End: 2019-10-30
Payer: MEDICARE

## 2019-10-30 ENCOUNTER — OFFICE VISIT (OUTPATIENT)
Dept: INTERNAL MEDICINE | Age: 69
End: 2019-10-30
Payer: MEDICARE

## 2019-10-30 VITALS
HEART RATE: 74 BPM | BODY MASS INDEX: 36.65 KG/M2 | SYSTOLIC BLOOD PRESSURE: 134 MMHG | DIASTOLIC BLOOD PRESSURE: 80 MMHG | OXYGEN SATURATION: 98 % | RESPIRATION RATE: 20 BRPM | HEIGHT: 65 IN | WEIGHT: 220 LBS

## 2019-10-30 DIAGNOSIS — R60.9 EDEMA, UNSPECIFIED TYPE: Primary | ICD-10-CM

## 2019-10-30 DIAGNOSIS — R23.8 BLISTERS OF MULTIPLE SITES: ICD-10-CM

## 2019-10-30 DIAGNOSIS — E55.9 VITAMIN D DEFICIENCY: ICD-10-CM

## 2019-10-30 DIAGNOSIS — E11.21 TYPE II DIABETES MELLITUS WITH NEPHROPATHY (HCC): ICD-10-CM

## 2019-10-30 LAB
ALBUMIN SERPL-MCNC: 3.8 G/DL (ref 3.5–5.2)
ALP BLD-CCNC: 58 U/L (ref 35–104)
ALT SERPL-CCNC: 16 U/L (ref 5–33)
ANION GAP SERPL CALCULATED.3IONS-SCNC: 14 MMOL/L (ref 7–19)
AST SERPL-CCNC: 17 U/L (ref 5–32)
BASOPHILS ABSOLUTE: 0.1 K/UL (ref 0–0.2)
BASOPHILS RELATIVE PERCENT: 0.7 % (ref 0–1)
BILIRUB SERPL-MCNC: 0.3 MG/DL (ref 0.2–1.2)
BUN BLDV-MCNC: 20 MG/DL (ref 8–23)
CALCIUM SERPL-MCNC: 9.3 MG/DL (ref 8.8–10.2)
CHLORIDE BLD-SCNC: 107 MMOL/L (ref 98–111)
CHOLESTEROL, TOTAL: 113 MG/DL (ref 160–199)
CO2: 24 MMOL/L (ref 22–29)
CREAT SERPL-MCNC: 1.4 MG/DL (ref 0.5–0.9)
CREATININE URINE: 70.9 MG/DL (ref 4.2–622)
EOSINOPHILS ABSOLUTE: 0.2 K/UL (ref 0–0.6)
EOSINOPHILS RELATIVE PERCENT: 1.8 % (ref 0–5)
GFR NON-AFRICAN AMERICAN: 37
GLUCOSE BLD-MCNC: 210 MG/DL (ref 74–109)
HBA1C MFR BLD: 6.9 % (ref 4–6)
HCT VFR BLD CALC: 34 % (ref 37–47)
HDLC SERPL-MCNC: 50 MG/DL (ref 65–121)
HEMOGLOBIN: 11.1 G/DL (ref 12–16)
IMMATURE GRANULOCYTES #: 0 K/UL
LDL CHOLESTEROL CALCULATED: 46 MG/DL
LYMPHOCYTES ABSOLUTE: 2.8 K/UL (ref 1.1–4.5)
LYMPHOCYTES RELATIVE PERCENT: 33.5 % (ref 20–40)
MCH RBC QN AUTO: 32.6 PG (ref 27–31)
MCHC RBC AUTO-ENTMCNC: 32.6 G/DL (ref 33–37)
MCV RBC AUTO: 100 FL (ref 81–99)
MICROALBUMIN UR-MCNC: 117.3 MG/DL (ref 0–19)
MICROALBUMIN/CREAT UR-RTO: 1654.4 MG/G
MONOCYTES ABSOLUTE: 0.5 K/UL (ref 0–0.9)
MONOCYTES RELATIVE PERCENT: 6.6 % (ref 0–10)
NEUTROPHILS ABSOLUTE: 4.7 K/UL (ref 1.5–7.5)
NEUTROPHILS RELATIVE PERCENT: 57.2 % (ref 50–65)
PDW BLD-RTO: 12.7 % (ref 11.5–14.5)
PLATELET # BLD: 309 K/UL (ref 130–400)
PMV BLD AUTO: 11 FL (ref 9.4–12.3)
POTASSIUM SERPL-SCNC: 5.5 MMOL/L (ref 3.5–5)
RBC # BLD: 3.4 M/UL (ref 4.2–5.4)
SODIUM BLD-SCNC: 145 MMOL/L (ref 136–145)
TOTAL PROTEIN: 7.1 G/DL (ref 6.6–8.7)
TRIGL SERPL-MCNC: 83 MG/DL (ref 0–149)
TSH SERPL DL<=0.05 MIU/L-ACNC: 1.35 UIU/ML (ref 0.27–4.2)
VITAMIN D 25-HYDROXY: 40.4 NG/ML
WBC # BLD: 8.2 K/UL (ref 4.8–10.8)

## 2019-10-30 PROCEDURE — 3017F COLORECTAL CA SCREEN DOC REV: CPT | Performed by: INTERNAL MEDICINE

## 2019-10-30 PROCEDURE — G8417 CALC BMI ABV UP PARAM F/U: HCPCS | Performed by: INTERNAL MEDICINE

## 2019-10-30 PROCEDURE — 99213 OFFICE O/P EST LOW 20 MIN: CPT | Performed by: INTERNAL MEDICINE

## 2019-10-30 PROCEDURE — 4040F PNEUMOC VAC/ADMIN/RCVD: CPT | Performed by: INTERNAL MEDICINE

## 2019-10-30 PROCEDURE — G8427 DOCREV CUR MEDS BY ELIG CLIN: HCPCS | Performed by: INTERNAL MEDICINE

## 2019-10-30 PROCEDURE — G8484 FLU IMMUNIZE NO ADMIN: HCPCS | Performed by: INTERNAL MEDICINE

## 2019-10-30 PROCEDURE — G8400 PT W/DXA NO RESULTS DOC: HCPCS | Performed by: INTERNAL MEDICINE

## 2019-10-30 PROCEDURE — 1123F ACP DISCUSS/DSCN MKR DOCD: CPT | Performed by: INTERNAL MEDICINE

## 2019-10-30 PROCEDURE — G8598 ASA/ANTIPLAT THER USED: HCPCS | Performed by: INTERNAL MEDICINE

## 2019-10-30 PROCEDURE — 1090F PRES/ABSN URINE INCON ASSESS: CPT | Performed by: INTERNAL MEDICINE

## 2019-10-30 PROCEDURE — 1036F TOBACCO NON-USER: CPT | Performed by: INTERNAL MEDICINE

## 2019-10-30 PROCEDURE — 93971 EXTREMITY STUDY: CPT

## 2019-10-30 RX ORDER — AMOXICILLIN 500 MG/1
500 CAPSULE ORAL 3 TIMES DAILY
COMMUNITY
End: 2019-11-14 | Stop reason: ALTCHOICE

## 2019-10-30 RX ORDER — AMLODIPINE BESYLATE 5 MG/1
5 TABLET ORAL DAILY
Qty: 90 TABLET | Refills: 3 | Status: SHIPPED | OUTPATIENT
Start: 2019-10-30 | End: 2020-08-17

## 2019-10-30 RX ORDER — DOXYCYCLINE HYCLATE 100 MG
100 TABLET ORAL 2 TIMES DAILY
Qty: 20 TABLET | Refills: 0 | Status: SHIPPED | OUTPATIENT
Start: 2019-10-30 | End: 2019-11-09

## 2019-10-30 ASSESSMENT — ENCOUNTER SYMPTOMS
ABDOMINAL PAIN: 0
CHOKING: 0
VOMITING: 0
SHORTNESS OF BREATH: 0
DIARRHEA: 0
BLOOD IN STOOL: 0
WHEEZING: 0
EYE DISCHARGE: 0
SINUS PRESSURE: 0
SORE THROAT: 0
NAUSEA: 0
TROUBLE SWALLOWING: 0
COUGH: 0
ABDOMINAL DISTENTION: 0
CONSTIPATION: 0
COLOR CHANGE: 0
EYE ITCHING: 0
STRIDOR: 0

## 2019-11-04 ENCOUNTER — TELEPHONE (OUTPATIENT)
Dept: INTERNAL MEDICINE | Age: 69
End: 2019-11-04

## 2019-11-08 ENCOUNTER — OFFICE VISIT (OUTPATIENT)
Dept: INTERNAL MEDICINE | Age: 69
End: 2019-11-08
Payer: MEDICARE

## 2019-11-08 VITALS
HEART RATE: 74 BPM | SYSTOLIC BLOOD PRESSURE: 138 MMHG | HEIGHT: 65 IN | DIASTOLIC BLOOD PRESSURE: 70 MMHG | WEIGHT: 214 LBS | OXYGEN SATURATION: 98 % | BODY MASS INDEX: 35.65 KG/M2

## 2019-11-08 DIAGNOSIS — Z28.21 REFUSED INFLUENZA VACCINE: ICD-10-CM

## 2019-11-08 DIAGNOSIS — E78.5 HYPERLIPIDEMIA, UNSPECIFIED HYPERLIPIDEMIA TYPE: ICD-10-CM

## 2019-11-08 DIAGNOSIS — T14.8XXA BLISTER: ICD-10-CM

## 2019-11-08 DIAGNOSIS — Z79.4 TYPE 2 DIABETES MELLITUS WITHOUT COMPLICATION, WITH LONG-TERM CURRENT USE OF INSULIN (HCC): Primary | ICD-10-CM

## 2019-11-08 DIAGNOSIS — I10 ESSENTIAL HYPERTENSION: ICD-10-CM

## 2019-11-08 DIAGNOSIS — Z28.21 REFUSED STREPTOCOCCUS PNEUMONIAE VACCINATION: ICD-10-CM

## 2019-11-08 DIAGNOSIS — E11.9 TYPE 2 DIABETES MELLITUS WITHOUT COMPLICATION, WITH LONG-TERM CURRENT USE OF INSULIN (HCC): Primary | ICD-10-CM

## 2019-11-08 DIAGNOSIS — Z53.20 MAMMOGRAM DECLINED: ICD-10-CM

## 2019-11-08 PROCEDURE — 4040F PNEUMOC VAC/ADMIN/RCVD: CPT | Performed by: INTERNAL MEDICINE

## 2019-11-08 PROCEDURE — 1090F PRES/ABSN URINE INCON ASSESS: CPT | Performed by: INTERNAL MEDICINE

## 2019-11-08 PROCEDURE — G8598 ASA/ANTIPLAT THER USED: HCPCS | Performed by: INTERNAL MEDICINE

## 2019-11-08 PROCEDURE — 2022F DILAT RTA XM EVC RTNOPTHY: CPT | Performed by: INTERNAL MEDICINE

## 2019-11-08 PROCEDURE — 1123F ACP DISCUSS/DSCN MKR DOCD: CPT | Performed by: INTERNAL MEDICINE

## 2019-11-08 PROCEDURE — 3017F COLORECTAL CA SCREEN DOC REV: CPT | Performed by: INTERNAL MEDICINE

## 2019-11-08 PROCEDURE — G8484 FLU IMMUNIZE NO ADMIN: HCPCS | Performed by: INTERNAL MEDICINE

## 2019-11-08 PROCEDURE — 99214 OFFICE O/P EST MOD 30 MIN: CPT | Performed by: INTERNAL MEDICINE

## 2019-11-08 PROCEDURE — 3044F HG A1C LEVEL LT 7.0%: CPT | Performed by: INTERNAL MEDICINE

## 2019-11-08 PROCEDURE — 1036F TOBACCO NON-USER: CPT | Performed by: INTERNAL MEDICINE

## 2019-11-08 PROCEDURE — G8400 PT W/DXA NO RESULTS DOC: HCPCS | Performed by: INTERNAL MEDICINE

## 2019-11-08 PROCEDURE — G8427 DOCREV CUR MEDS BY ELIG CLIN: HCPCS | Performed by: INTERNAL MEDICINE

## 2019-11-08 PROCEDURE — G8417 CALC BMI ABV UP PARAM F/U: HCPCS | Performed by: INTERNAL MEDICINE

## 2019-11-08 RX ORDER — DOXYCYCLINE HYCLATE 100 MG
100 TABLET ORAL 2 TIMES DAILY
Qty: 10 TABLET | Refills: 0 | Status: SHIPPED | OUTPATIENT
Start: 2019-11-08 | End: 2019-11-13

## 2019-11-10 ASSESSMENT — ENCOUNTER SYMPTOMS
NAUSEA: 0
DIARRHEA: 0
BLOOD IN STOOL: 0
EYE ITCHING: 0
SINUS PRESSURE: 0
EYE DISCHARGE: 0
STRIDOR: 0
SHORTNESS OF BREATH: 0
CONSTIPATION: 0
ABDOMINAL PAIN: 0
ABDOMINAL DISTENTION: 0
VOMITING: 0
WHEEZING: 0
SORE THROAT: 0
TROUBLE SWALLOWING: 0
COUGH: 0
CHOKING: 0
COLOR CHANGE: 0

## 2019-11-11 ENCOUNTER — TELEPHONE (OUTPATIENT)
Dept: INTERNAL MEDICINE | Age: 69
End: 2019-11-11

## 2019-11-14 ENCOUNTER — OFFICE VISIT (OUTPATIENT)
Dept: INTERNAL MEDICINE | Age: 69
End: 2019-11-14
Payer: MEDICARE

## 2019-11-14 VITALS
WEIGHT: 214 LBS | DIASTOLIC BLOOD PRESSURE: 70 MMHG | BODY MASS INDEX: 35.65 KG/M2 | HEART RATE: 61 BPM | SYSTOLIC BLOOD PRESSURE: 155 MMHG | HEIGHT: 65 IN

## 2019-11-14 DIAGNOSIS — S91.109D OPEN WOUND OF TOE, SUBSEQUENT ENCOUNTER: ICD-10-CM

## 2019-11-14 DIAGNOSIS — N18.3 TYPE 2 DIABETES MELLITUS WITH STAGE 3 CHRONIC KIDNEY DISEASE, UNSPECIFIED WHETHER LONG TERM INSULIN USE: Primary | ICD-10-CM

## 2019-11-14 DIAGNOSIS — E11.22 TYPE 2 DIABETES MELLITUS WITH STAGE 3 CHRONIC KIDNEY DISEASE, UNSPECIFIED WHETHER LONG TERM INSULIN USE: Primary | ICD-10-CM

## 2019-11-14 PROCEDURE — 1090F PRES/ABSN URINE INCON ASSESS: CPT | Performed by: NURSE PRACTITIONER

## 2019-11-14 PROCEDURE — 1036F TOBACCO NON-USER: CPT | Performed by: NURSE PRACTITIONER

## 2019-11-14 PROCEDURE — G8417 CALC BMI ABV UP PARAM F/U: HCPCS | Performed by: NURSE PRACTITIONER

## 2019-11-14 PROCEDURE — G8598 ASA/ANTIPLAT THER USED: HCPCS | Performed by: NURSE PRACTITIONER

## 2019-11-14 PROCEDURE — G8400 PT W/DXA NO RESULTS DOC: HCPCS | Performed by: NURSE PRACTITIONER

## 2019-11-14 PROCEDURE — 4040F PNEUMOC VAC/ADMIN/RCVD: CPT | Performed by: NURSE PRACTITIONER

## 2019-11-14 PROCEDURE — 3044F HG A1C LEVEL LT 7.0%: CPT | Performed by: NURSE PRACTITIONER

## 2019-11-14 PROCEDURE — G8427 DOCREV CUR MEDS BY ELIG CLIN: HCPCS | Performed by: NURSE PRACTITIONER

## 2019-11-14 PROCEDURE — 99213 OFFICE O/P EST LOW 20 MIN: CPT | Performed by: NURSE PRACTITIONER

## 2019-11-14 PROCEDURE — G8484 FLU IMMUNIZE NO ADMIN: HCPCS | Performed by: NURSE PRACTITIONER

## 2019-11-14 PROCEDURE — 2022F DILAT RTA XM EVC RTNOPTHY: CPT | Performed by: NURSE PRACTITIONER

## 2019-11-14 PROCEDURE — 3017F COLORECTAL CA SCREEN DOC REV: CPT | Performed by: NURSE PRACTITIONER

## 2019-11-14 PROCEDURE — 1123F ACP DISCUSS/DSCN MKR DOCD: CPT | Performed by: NURSE PRACTITIONER

## 2019-11-14 ASSESSMENT — ENCOUNTER SYMPTOMS
EYE ITCHING: 0
EYE DISCHARGE: 0
DIARRHEA: 0
STRIDOR: 0
WHEEZING: 0
NAUSEA: 0
BLOOD IN STOOL: 0
ABDOMINAL PAIN: 0
CONSTIPATION: 0
TROUBLE SWALLOWING: 0
SORE THROAT: 0
COLOR CHANGE: 0
SHORTNESS OF BREATH: 0
ABDOMINAL DISTENTION: 0
COUGH: 0
CHOKING: 0
VOMITING: 0

## 2019-11-21 ENCOUNTER — OFFICE VISIT (OUTPATIENT)
Dept: INTERNAL MEDICINE | Age: 69
End: 2019-11-21
Payer: MEDICARE

## 2019-11-21 VITALS — OXYGEN SATURATION: 97 % | DIASTOLIC BLOOD PRESSURE: 67 MMHG | HEART RATE: 72 BPM | SYSTOLIC BLOOD PRESSURE: 158 MMHG

## 2019-11-21 DIAGNOSIS — E11.621: Primary | ICD-10-CM

## 2019-11-21 DIAGNOSIS — L89.899: Primary | ICD-10-CM

## 2019-11-21 PROCEDURE — 99212 OFFICE O/P EST SF 10 MIN: CPT | Performed by: NURSE PRACTITIONER

## 2019-11-21 PROCEDURE — 3017F COLORECTAL CA SCREEN DOC REV: CPT | Performed by: NURSE PRACTITIONER

## 2019-11-21 PROCEDURE — G8484 FLU IMMUNIZE NO ADMIN: HCPCS | Performed by: NURSE PRACTITIONER

## 2019-11-21 PROCEDURE — G8400 PT W/DXA NO RESULTS DOC: HCPCS | Performed by: NURSE PRACTITIONER

## 2019-11-21 PROCEDURE — 3044F HG A1C LEVEL LT 7.0%: CPT | Performed by: NURSE PRACTITIONER

## 2019-11-21 PROCEDURE — G8598 ASA/ANTIPLAT THER USED: HCPCS | Performed by: NURSE PRACTITIONER

## 2019-11-21 PROCEDURE — 4040F PNEUMOC VAC/ADMIN/RCVD: CPT | Performed by: NURSE PRACTITIONER

## 2019-11-21 PROCEDURE — 1090F PRES/ABSN URINE INCON ASSESS: CPT | Performed by: NURSE PRACTITIONER

## 2019-11-21 PROCEDURE — 1036F TOBACCO NON-USER: CPT | Performed by: NURSE PRACTITIONER

## 2019-11-21 PROCEDURE — G8427 DOCREV CUR MEDS BY ELIG CLIN: HCPCS | Performed by: NURSE PRACTITIONER

## 2019-11-21 PROCEDURE — 1123F ACP DISCUSS/DSCN MKR DOCD: CPT | Performed by: NURSE PRACTITIONER

## 2019-11-21 PROCEDURE — 2022F DILAT RTA XM EVC RTNOPTHY: CPT | Performed by: NURSE PRACTITIONER

## 2019-11-21 PROCEDURE — G8417 CALC BMI ABV UP PARAM F/U: HCPCS | Performed by: NURSE PRACTITIONER

## 2019-11-21 ASSESSMENT — ENCOUNTER SYMPTOMS
STRIDOR: 0
SORE THROAT: 0
WHEEZING: 0
DIARRHEA: 0
COUGH: 0
CHOKING: 0
BLOOD IN STOOL: 0
EYE DISCHARGE: 0
TROUBLE SWALLOWING: 0
ABDOMINAL DISTENTION: 0
COLOR CHANGE: 0
CONSTIPATION: 0
ABDOMINAL PAIN: 0
EYE ITCHING: 0
VOMITING: 0
SHORTNESS OF BREATH: 0
NAUSEA: 0

## 2019-11-27 ENCOUNTER — OFFICE VISIT (OUTPATIENT)
Dept: INTERNAL MEDICINE | Age: 69
End: 2019-11-27
Payer: MEDICARE

## 2019-11-27 VITALS
HEART RATE: 78 BPM | DIASTOLIC BLOOD PRESSURE: 72 MMHG | OXYGEN SATURATION: 98 % | RESPIRATION RATE: 18 BRPM | SYSTOLIC BLOOD PRESSURE: 167 MMHG

## 2019-11-27 DIAGNOSIS — L89.891 PRESSURE INJURY OF TOE OF LEFT FOOT, STAGE 1: Primary | ICD-10-CM

## 2019-11-27 DIAGNOSIS — N18.3 TYPE 2 DIABETES MELLITUS WITH STAGE 3 CHRONIC KIDNEY DISEASE, UNSPECIFIED WHETHER LONG TERM INSULIN USE: ICD-10-CM

## 2019-11-27 DIAGNOSIS — E11.22 TYPE 2 DIABETES MELLITUS WITH STAGE 3 CHRONIC KIDNEY DISEASE, UNSPECIFIED WHETHER LONG TERM INSULIN USE: ICD-10-CM

## 2019-11-27 PROCEDURE — 4040F PNEUMOC VAC/ADMIN/RCVD: CPT | Performed by: NURSE PRACTITIONER

## 2019-11-27 PROCEDURE — G8484 FLU IMMUNIZE NO ADMIN: HCPCS | Performed by: NURSE PRACTITIONER

## 2019-11-27 PROCEDURE — G8400 PT W/DXA NO RESULTS DOC: HCPCS | Performed by: NURSE PRACTITIONER

## 2019-11-27 PROCEDURE — G8598 ASA/ANTIPLAT THER USED: HCPCS | Performed by: NURSE PRACTITIONER

## 2019-11-27 PROCEDURE — 1036F TOBACCO NON-USER: CPT | Performed by: NURSE PRACTITIONER

## 2019-11-27 PROCEDURE — G8427 DOCREV CUR MEDS BY ELIG CLIN: HCPCS | Performed by: NURSE PRACTITIONER

## 2019-11-27 PROCEDURE — 1123F ACP DISCUSS/DSCN MKR DOCD: CPT | Performed by: NURSE PRACTITIONER

## 2019-11-27 PROCEDURE — 3044F HG A1C LEVEL LT 7.0%: CPT | Performed by: NURSE PRACTITIONER

## 2019-11-27 PROCEDURE — 2022F DILAT RTA XM EVC RTNOPTHY: CPT | Performed by: NURSE PRACTITIONER

## 2019-11-27 PROCEDURE — 99212 OFFICE O/P EST SF 10 MIN: CPT | Performed by: NURSE PRACTITIONER

## 2019-11-27 PROCEDURE — G8417 CALC BMI ABV UP PARAM F/U: HCPCS | Performed by: NURSE PRACTITIONER

## 2019-11-27 PROCEDURE — 3017F COLORECTAL CA SCREEN DOC REV: CPT | Performed by: NURSE PRACTITIONER

## 2019-11-27 PROCEDURE — 1090F PRES/ABSN URINE INCON ASSESS: CPT | Performed by: NURSE PRACTITIONER

## 2019-12-04 ENCOUNTER — OFFICE VISIT (OUTPATIENT)
Dept: INTERNAL MEDICINE | Age: 69
End: 2019-12-04
Payer: MEDICARE

## 2019-12-04 VITALS — SYSTOLIC BLOOD PRESSURE: 136 MMHG | OXYGEN SATURATION: 97 % | DIASTOLIC BLOOD PRESSURE: 84 MMHG | HEART RATE: 68 BPM

## 2019-12-04 DIAGNOSIS — N18.3 TYPE 2 DIABETES MELLITUS WITH STAGE 3 CHRONIC KIDNEY DISEASE, UNSPECIFIED WHETHER LONG TERM INSULIN USE: Primary | ICD-10-CM
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
## 2019-12-04 DIAGNOSIS — E11.22 TYPE 2 DIABETES MELLITUS WITH STAGE 3 CHRONIC KIDNEY DISEASE, UNSPECIFIED WHETHER LONG TERM INSULIN USE: Primary | ICD-10-CM

## 2019-12-04 DIAGNOSIS — I10 ESSENTIAL HYPERTENSION: ICD-10-CM

## 2019-12-04 DIAGNOSIS — L60.2 THICKENED NAILS: ICD-10-CM

## 2019-12-04 PROCEDURE — 1090F PRES/ABSN URINE INCON ASSESS: CPT | Performed by: NURSE PRACTITIONER

## 2019-12-04 PROCEDURE — G8484 FLU IMMUNIZE NO ADMIN: HCPCS | Performed by: NURSE PRACTITIONER

## 2019-12-04 PROCEDURE — 4040F PNEUMOC VAC/ADMIN/RCVD: CPT | Performed by: NURSE PRACTITIONER

## 2019-12-04 PROCEDURE — G8427 DOCREV CUR MEDS BY ELIG CLIN: HCPCS | Performed by: NURSE PRACTITIONER

## 2019-12-04 PROCEDURE — 99214 OFFICE O/P EST MOD 30 MIN: CPT | Performed by: NURSE PRACTITIONER

## 2019-12-04 PROCEDURE — 1123F ACP DISCUSS/DSCN MKR DOCD: CPT | Performed by: NURSE PRACTITIONER

## 2019-12-04 PROCEDURE — 3017F COLORECTAL CA SCREEN DOC REV: CPT | Performed by: NURSE PRACTITIONER

## 2019-12-04 PROCEDURE — G8598 ASA/ANTIPLAT THER USED: HCPCS | Performed by: NURSE PRACTITIONER

## 2019-12-04 PROCEDURE — 3044F HG A1C LEVEL LT 7.0%: CPT | Performed by: NURSE PRACTITIONER

## 2019-12-04 PROCEDURE — G8400 PT W/DXA NO RESULTS DOC: HCPCS | Performed by: NURSE PRACTITIONER

## 2019-12-04 PROCEDURE — G8417 CALC BMI ABV UP PARAM F/U: HCPCS | Performed by: NURSE PRACTITIONER

## 2019-12-04 PROCEDURE — 2022F DILAT RTA XM EVC RTNOPTHY: CPT | Performed by: NURSE PRACTITIONER

## 2019-12-04 PROCEDURE — 1036F TOBACCO NON-USER: CPT | Performed by: NURSE PRACTITIONER

## 2019-12-04 ASSESSMENT — ENCOUNTER SYMPTOMS
ABDOMINAL DISTENTION: 0
VOMITING: 0
STRIDOR: 0
NAUSEA: 0
WHEEZING: 0
CONSTIPATION: 0
EYE ITCHING: 0
DIARRHEA: 0
ABDOMINAL PAIN: 0
EYE DISCHARGE: 0
COLOR CHANGE: 0
SORE THROAT: 0
TROUBLE SWALLOWING: 0
SHORTNESS OF BREATH: 0
CHOKING: 0
BLOOD IN STOOL: 0
COUGH: 0

## 2019-12-12 RX ORDER — GLIPIZIDE 2.5 MG/1
TABLET, EXTENDED RELEASE ORAL
Qty: 90 TABLET | Refills: 0 | Status: SHIPPED | OUTPATIENT
Start: 2019-12-12 | End: 2020-06-09

## 2019-12-12 RX ORDER — SIMVASTATIN 40 MG
TABLET ORAL
Qty: 90 TABLET | Refills: 0 | Status: SHIPPED | OUTPATIENT
Start: 2019-12-12 | End: 2020-05-18

## 2019-12-13 ENCOUNTER — OFFICE VISIT (OUTPATIENT)
Dept: INTERNAL MEDICINE | Age: 69
End: 2019-12-13
Payer: MEDICARE

## 2019-12-13 VITALS
RESPIRATION RATE: 20 BRPM | SYSTOLIC BLOOD PRESSURE: 136 MMHG | HEIGHT: 66 IN | BODY MASS INDEX: 35.2 KG/M2 | WEIGHT: 219 LBS | OXYGEN SATURATION: 96 % | HEART RATE: 73 BPM | DIASTOLIC BLOOD PRESSURE: 72 MMHG

## 2019-12-13 DIAGNOSIS — S90.822S BLISTER OF LEFT FOOT, SEQUELA: ICD-10-CM

## 2019-12-13 DIAGNOSIS — I10 ESSENTIAL HYPERTENSION: ICD-10-CM

## 2019-12-13 DIAGNOSIS — K21.9 GASTROESOPHAGEAL REFLUX DISEASE WITHOUT ESOPHAGITIS: ICD-10-CM

## 2019-12-13 DIAGNOSIS — E78.5 HYPERLIPIDEMIA, UNSPECIFIED HYPERLIPIDEMIA TYPE: ICD-10-CM

## 2019-12-13 DIAGNOSIS — E11.9 TYPE 2 DIABETES MELLITUS WITHOUT COMPLICATION, UNSPECIFIED WHETHER LONG TERM INSULIN USE (HCC): Primary | ICD-10-CM

## 2019-12-13 DIAGNOSIS — E55.9 VITAMIN D DEFICIENCY: ICD-10-CM

## 2019-12-13 PROCEDURE — 99214 OFFICE O/P EST MOD 30 MIN: CPT | Performed by: INTERNAL MEDICINE

## 2019-12-13 PROCEDURE — 1090F PRES/ABSN URINE INCON ASSESS: CPT | Performed by: INTERNAL MEDICINE

## 2019-12-13 PROCEDURE — 1036F TOBACCO NON-USER: CPT | Performed by: INTERNAL MEDICINE

## 2019-12-13 PROCEDURE — G8598 ASA/ANTIPLAT THER USED: HCPCS | Performed by: INTERNAL MEDICINE

## 2019-12-13 PROCEDURE — G8417 CALC BMI ABV UP PARAM F/U: HCPCS | Performed by: INTERNAL MEDICINE

## 2019-12-13 PROCEDURE — 3044F HG A1C LEVEL LT 7.0%: CPT | Performed by: INTERNAL MEDICINE

## 2019-12-13 PROCEDURE — 4040F PNEUMOC VAC/ADMIN/RCVD: CPT | Performed by: INTERNAL MEDICINE

## 2019-12-13 PROCEDURE — 2022F DILAT RTA XM EVC RTNOPTHY: CPT | Performed by: INTERNAL MEDICINE

## 2019-12-13 PROCEDURE — 1123F ACP DISCUSS/DSCN MKR DOCD: CPT | Performed by: INTERNAL MEDICINE

## 2019-12-13 PROCEDURE — G8400 PT W/DXA NO RESULTS DOC: HCPCS | Performed by: INTERNAL MEDICINE

## 2019-12-13 PROCEDURE — G8484 FLU IMMUNIZE NO ADMIN: HCPCS | Performed by: INTERNAL MEDICINE

## 2019-12-13 PROCEDURE — G8427 DOCREV CUR MEDS BY ELIG CLIN: HCPCS | Performed by: INTERNAL MEDICINE

## 2019-12-13 PROCEDURE — 3017F COLORECTAL CA SCREEN DOC REV: CPT | Performed by: INTERNAL MEDICINE

## 2019-12-15 ASSESSMENT — ENCOUNTER SYMPTOMS
SORE THROAT: 0
WHEEZING: 0
STRIDOR: 0
CHEST TIGHTNESS: 0
SHORTNESS OF BREATH: 0
RHINORRHEA: 0
CONSTIPATION: 0
ABDOMINAL DISTENTION: 0
SINUS PRESSURE: 0
SINUS PAIN: 0
EYE DISCHARGE: 0
BLOOD IN STOOL: 0
DIARRHEA: 0
NAUSEA: 0
COUGH: 0
EYE ITCHING: 0
ABDOMINAL PAIN: 0
VOICE CHANGE: 0
COLOR CHANGE: 0
TROUBLE SWALLOWING: 0
CHOKING: 0
VOMITING: 0

## 2019-12-30 ENCOUNTER — TELEPHONE (OUTPATIENT)
Dept: INTERNAL MEDICINE | Age: 69
End: 2019-12-30

## 2019-12-31 ENCOUNTER — OFFICE VISIT (OUTPATIENT)
Dept: INTERNAL MEDICINE | Age: 69
End: 2019-12-31
Payer: MEDICARE

## 2019-12-31 ENCOUNTER — TELEPHONE (OUTPATIENT)
Dept: INTERNAL MEDICINE | Age: 69
End: 2019-12-31

## 2019-12-31 ENCOUNTER — HOSPITAL ENCOUNTER (OUTPATIENT)
Dept: GENERAL RADIOLOGY | Age: 69
Discharge: HOME | End: 2019-12-31
Payer: MEDICARE

## 2019-12-31 VITALS
RESPIRATION RATE: 22 BRPM | HEART RATE: 81 BPM | HEIGHT: 66 IN | DIASTOLIC BLOOD PRESSURE: 64 MMHG | OXYGEN SATURATION: 94 % | BODY MASS INDEX: 33.83 KG/M2 | SYSTOLIC BLOOD PRESSURE: 122 MMHG | WEIGHT: 210.5 LBS

## 2019-12-31 DIAGNOSIS — R09.89 CHEST CONGESTION: ICD-10-CM

## 2019-12-31 DIAGNOSIS — R50.9 FEVER, UNSPECIFIED FEVER CAUSE: Primary | ICD-10-CM

## 2019-12-31 DIAGNOSIS — R50.9 FEVER, UNSPECIFIED FEVER CAUSE: ICD-10-CM

## 2019-12-31 DIAGNOSIS — R05.9 COUGH: ICD-10-CM

## 2019-12-31 LAB
ALBUMIN SERPL-MCNC: 3.1 G/DL (ref 3.5–5.2)
ALP BLD-CCNC: 65 U/L (ref 35–104)
ALT SERPL-CCNC: 22 U/L (ref 5–33)
ANION GAP SERPL CALCULATED.3IONS-SCNC: 13 MMOL/L (ref 7–19)
AST SERPL-CCNC: 21 U/L (ref 5–32)
BASOPHILS ABSOLUTE: 0.1 K/UL (ref 0–0.2)
BASOPHILS RELATIVE PERCENT: 0.6 % (ref 0–1)
BILIRUB SERPL-MCNC: 0.4 MG/DL (ref 0.2–1.2)
BUN BLDV-MCNC: 23 MG/DL (ref 8–23)
CALCIUM SERPL-MCNC: 9.2 MG/DL (ref 8.8–10.2)
CHLORIDE BLD-SCNC: 102 MMOL/L (ref 98–111)
CO2: 27 MMOL/L (ref 22–29)
CREAT SERPL-MCNC: 1.4 MG/DL (ref 0.5–0.9)
EOSINOPHILS ABSOLUTE: 0.6 K/UL (ref 0–0.6)
EOSINOPHILS RELATIVE PERCENT: 5.4 % (ref 0–5)
GFR NON-AFRICAN AMERICAN: 37
GLUCOSE BLD-MCNC: 189 MG/DL (ref 74–109)
HCT VFR BLD CALC: 35 % (ref 37–47)
HEMOGLOBIN: 11.2 G/DL (ref 12–16)
IMMATURE GRANULOCYTES #: 0.3 K/UL
INFLUENZA A ANTIBODY: NORMAL
INFLUENZA B ANTIBODY: NORMAL
LYMPHOCYTES ABSOLUTE: 2.8 K/UL (ref 1.1–4.5)
LYMPHOCYTES RELATIVE PERCENT: 27.2 % (ref 20–40)
MCH RBC QN AUTO: 31.7 PG (ref 27–31)
MCHC RBC AUTO-ENTMCNC: 32 G/DL (ref 33–37)
MCV RBC AUTO: 99.2 FL (ref 81–99)
MONOCYTES ABSOLUTE: 0.5 K/UL (ref 0–0.9)
MONOCYTES RELATIVE PERCENT: 5 % (ref 0–10)
NEUTROPHILS ABSOLUTE: 6.1 K/UL (ref 1.5–7.5)
NEUTROPHILS RELATIVE PERCENT: 59.4 % (ref 50–65)
PDW BLD-RTO: 13.5 % (ref 11.5–14.5)
PLATELET # BLD: 443 K/UL (ref 130–400)
PMV BLD AUTO: 10.3 FL (ref 9.4–12.3)
POTASSIUM SERPL-SCNC: 4.5 MMOL/L (ref 3.5–5)
RBC # BLD: 3.53 M/UL (ref 4.2–5.4)
SODIUM BLD-SCNC: 142 MMOL/L (ref 136–145)
TOTAL PROTEIN: 7.3 G/DL (ref 6.6–8.7)
WBC # BLD: 10.2 K/UL (ref 4.8–10.8)

## 2019-12-31 PROCEDURE — G8400 PT W/DXA NO RESULTS DOC: HCPCS | Performed by: INTERNAL MEDICINE

## 2019-12-31 PROCEDURE — 1036F TOBACCO NON-USER: CPT | Performed by: INTERNAL MEDICINE

## 2019-12-31 PROCEDURE — 4040F PNEUMOC VAC/ADMIN/RCVD: CPT | Performed by: INTERNAL MEDICINE

## 2019-12-31 PROCEDURE — 3017F COLORECTAL CA SCREEN DOC REV: CPT | Performed by: INTERNAL MEDICINE

## 2019-12-31 PROCEDURE — G8417 CALC BMI ABV UP PARAM F/U: HCPCS | Performed by: INTERNAL MEDICINE

## 2019-12-31 PROCEDURE — G8427 DOCREV CUR MEDS BY ELIG CLIN: HCPCS | Performed by: INTERNAL MEDICINE

## 2019-12-31 PROCEDURE — 99213 OFFICE O/P EST LOW 20 MIN: CPT | Performed by: INTERNAL MEDICINE

## 2019-12-31 PROCEDURE — 87804 INFLUENZA ASSAY W/OPTIC: CPT | Performed by: INTERNAL MEDICINE

## 2019-12-31 PROCEDURE — 71046 X-RAY EXAM CHEST 2 VIEWS: CPT

## 2019-12-31 PROCEDURE — G8598 ASA/ANTIPLAT THER USED: HCPCS | Performed by: INTERNAL MEDICINE

## 2019-12-31 PROCEDURE — G8484 FLU IMMUNIZE NO ADMIN: HCPCS | Performed by: INTERNAL MEDICINE

## 2019-12-31 PROCEDURE — 1123F ACP DISCUSS/DSCN MKR DOCD: CPT | Performed by: INTERNAL MEDICINE

## 2019-12-31 PROCEDURE — 1090F PRES/ABSN URINE INCON ASSESS: CPT | Performed by: INTERNAL MEDICINE

## 2019-12-31 RX ORDER — LEVOFLOXACIN 250 MG/1
250 TABLET ORAL DAILY
Qty: 7 TABLET | Refills: 0 | Status: SHIPPED | OUTPATIENT
Start: 2019-12-31 | End: 2020-01-07

## 2019-12-31 RX ORDER — PROMETHAZINE HYDROCHLORIDE AND CODEINE PHOSPHATE 6.25; 1 MG/5ML; MG/5ML
5 SYRUP ORAL EVERY 4 HOURS PRN
Qty: 120 ML | Refills: 0 | Status: SHIPPED | OUTPATIENT
Start: 2019-12-31 | End: 2020-01-03

## 2020-03-11 DIAGNOSIS — E11.9 TYPE 2 DIABETES MELLITUS WITHOUT COMPLICATION, UNSPECIFIED WHETHER LONG TERM INSULIN USE (HCC): ICD-10-CM

## 2020-03-11 DIAGNOSIS — E55.9 VITAMIN D DEFICIENCY: ICD-10-CM

## 2020-03-11 LAB
ALBUMIN SERPL-MCNC: 4.2 G/DL (ref 3.5–5.2)
ALP BLD-CCNC: 44 U/L (ref 35–104)
ALT SERPL-CCNC: 36 U/L (ref 5–33)
ANION GAP SERPL CALCULATED.3IONS-SCNC: 17 MMOL/L (ref 7–19)
AST SERPL-CCNC: 34 U/L (ref 5–32)
BASOPHILS ABSOLUTE: 0.1 K/UL (ref 0–0.2)
BASOPHILS RELATIVE PERCENT: 0.9 % (ref 0–1)
BILIRUB SERPL-MCNC: 0.3 MG/DL (ref 0.2–1.2)
BUN BLDV-MCNC: 19 MG/DL (ref 8–23)
CALCIUM SERPL-MCNC: 9.4 MG/DL (ref 8.8–10.2)
CHLORIDE BLD-SCNC: 102 MMOL/L (ref 98–111)
CHOLESTEROL, TOTAL: 117 MG/DL (ref 160–199)
CO2: 24 MMOL/L (ref 22–29)
CREAT SERPL-MCNC: 1.5 MG/DL (ref 0.5–0.9)
CREATININE URINE: 207.4 MG/DL (ref 4.2–622)
EOSINOPHILS ABSOLUTE: 0.2 K/UL (ref 0–0.6)
EOSINOPHILS RELATIVE PERCENT: 2.3 % (ref 0–5)
GFR NON-AFRICAN AMERICAN: 34
GLUCOSE BLD-MCNC: 117 MG/DL (ref 74–109)
HBA1C MFR BLD: 7.2 % (ref 4–6)
HCT VFR BLD CALC: 33.8 % (ref 37–47)
HDLC SERPL-MCNC: 46 MG/DL (ref 65–121)
HEMOGLOBIN: 10.9 G/DL (ref 12–16)
IMMATURE GRANULOCYTES #: 0 K/UL
LDL CHOLESTEROL CALCULATED: 47 MG/DL
LYMPHOCYTES ABSOLUTE: 3.5 K/UL (ref 1.1–4.5)
LYMPHOCYTES RELATIVE PERCENT: 45.2 % (ref 20–40)
MCH RBC QN AUTO: 31.8 PG (ref 27–31)
MCHC RBC AUTO-ENTMCNC: 32.2 G/DL (ref 33–37)
MCV RBC AUTO: 98.5 FL (ref 81–99)
MICROALBUMIN UR-MCNC: 323 MG/DL (ref 0–19)
MICROALBUMIN/CREAT UR-RTO: 1557.4 MG/G
MONOCYTES ABSOLUTE: 0.5 K/UL (ref 0–0.9)
MONOCYTES RELATIVE PERCENT: 6 % (ref 0–10)
NEUTROPHILS ABSOLUTE: 3.5 K/UL (ref 1.5–7.5)
NEUTROPHILS RELATIVE PERCENT: 45.3 % (ref 50–65)
PDW BLD-RTO: 13.1 % (ref 11.5–14.5)
PLATELET # BLD: 312 K/UL (ref 130–400)
PMV BLD AUTO: 10.8 FL (ref 9.4–12.3)
POTASSIUM SERPL-SCNC: 5.1 MMOL/L (ref 3.5–5)
RBC # BLD: 3.43 M/UL (ref 4.2–5.4)
SODIUM BLD-SCNC: 143 MMOL/L (ref 136–145)
TOTAL PROTEIN: 7.3 G/DL (ref 6.6–8.7)
TRIGL SERPL-MCNC: 121 MG/DL (ref 0–149)
TSH SERPL DL<=0.05 MIU/L-ACNC: 1.84 UIU/ML (ref 0.27–4.2)
VITAMIN D 25-HYDROXY: 43 NG/ML
WBC # BLD: 7.8 K/UL (ref 4.8–10.8)

## 2020-03-18 ENCOUNTER — OFFICE VISIT (OUTPATIENT)
Dept: INTERNAL MEDICINE | Age: 70
End: 2020-03-18
Payer: MEDICARE

## 2020-03-18 VITALS
DIASTOLIC BLOOD PRESSURE: 80 MMHG | WEIGHT: 218 LBS | SYSTOLIC BLOOD PRESSURE: 138 MMHG | BODY MASS INDEX: 35.03 KG/M2 | HEIGHT: 66 IN | HEART RATE: 110 BPM | OXYGEN SATURATION: 94 %

## 2020-03-18 PROCEDURE — 1090F PRES/ABSN URINE INCON ASSESS: CPT | Performed by: INTERNAL MEDICINE

## 2020-03-18 PROCEDURE — 99214 OFFICE O/P EST MOD 30 MIN: CPT | Performed by: INTERNAL MEDICINE

## 2020-03-18 PROCEDURE — G8484 FLU IMMUNIZE NO ADMIN: HCPCS | Performed by: INTERNAL MEDICINE

## 2020-03-18 PROCEDURE — 3017F COLORECTAL CA SCREEN DOC REV: CPT | Performed by: INTERNAL MEDICINE

## 2020-03-18 PROCEDURE — 1123F ACP DISCUSS/DSCN MKR DOCD: CPT | Performed by: INTERNAL MEDICINE

## 2020-03-18 PROCEDURE — G8400 PT W/DXA NO RESULTS DOC: HCPCS | Performed by: INTERNAL MEDICINE

## 2020-03-18 PROCEDURE — G8417 CALC BMI ABV UP PARAM F/U: HCPCS | Performed by: INTERNAL MEDICINE

## 2020-03-18 PROCEDURE — 3051F HG A1C>EQUAL 7.0%<8.0%: CPT | Performed by: INTERNAL MEDICINE

## 2020-03-18 PROCEDURE — 1036F TOBACCO NON-USER: CPT | Performed by: INTERNAL MEDICINE

## 2020-03-18 PROCEDURE — G8427 DOCREV CUR MEDS BY ELIG CLIN: HCPCS | Performed by: INTERNAL MEDICINE

## 2020-03-18 PROCEDURE — 2022F DILAT RTA XM EVC RTNOPTHY: CPT | Performed by: INTERNAL MEDICINE

## 2020-03-18 PROCEDURE — 4040F PNEUMOC VAC/ADMIN/RCVD: CPT | Performed by: INTERNAL MEDICINE

## 2020-03-18 ASSESSMENT — ENCOUNTER SYMPTOMS
DIARRHEA: 0
BLOOD IN STOOL: 0
RHINORRHEA: 0
EYE ITCHING: 0
COLOR CHANGE: 0
TROUBLE SWALLOWING: 0
STRIDOR: 0
VOMITING: 0
CHOKING: 0
SHORTNESS OF BREATH: 0
EYE DISCHARGE: 0
CONSTIPATION: 0
COUGH: 0
VOICE CHANGE: 0
SORE THROAT: 0
CHEST TIGHTNESS: 0
ABDOMINAL DISTENTION: 0
NAUSEA: 0
SINUS PAIN: 0
ABDOMINAL PAIN: 0
WHEEZING: 0
SINUS PRESSURE: 0

## 2020-03-18 ASSESSMENT — PATIENT HEALTH QUESTIONNAIRE - PHQ9
SUM OF ALL RESPONSES TO PHQ QUESTIONS 1-9: 0
1. LITTLE INTEREST OR PLEASURE IN DOING THINGS: 0
SUM OF ALL RESPONSES TO PHQ9 QUESTIONS 1 & 2: 0
2. FEELING DOWN, DEPRESSED OR HOPELESS: 0
SUM OF ALL RESPONSES TO PHQ QUESTIONS 1-9: 0

## 2020-03-18 NOTE — PATIENT INSTRUCTIONS
Patient Education      Preventing the Spread of Coronavirus Disease 2019 in Homes and Residential Communities   For the most recent information go to [email or web address]    Prevention steps for People with confirmed or suspected COVID-19 (including persons under investigation) who do not need to be hospitalized  and   People with confirmed COVID-19 who were hospitalized and determined to be medically stable to go home    Your healthcare provider and public health staff will evaluate whether you can be cared for at home. If it is determined that you do not need to be hospitalized and can be isolated at home, you will be monitored by staff from your local or state health department. You should follow the prevention steps below until a healthcare provider or local or state health department says you can return to your normal activities. Stay home except to get medical care  People who are mildly ill with COVID-19 are able to isolate at home during their illness. You should restrict activities outside your home, except for getting medical care. Do not go to work, school, or public areas. Avoid using public transportation, ride-sharing, or taxis. Separate yourself from other people and animals in your home  People: As much as possible, you should stay in a specific room and away from other people in your home. Also, you should use a separate bathroom, if available. Animals: You should restrict contact with pets and other animals while you are sick with COVID-19, just like you would around other people. Although there have not been reports of pets or other animals becoming sick with COVID-19, it is still recommended that people sick with COVID-19 limit contact with animals until more information is known about the virus. When possible, have another member of your household care for your animals while you are sick.  If you are sick with COVID-19, avoid contact with your pet, including petting, snuggling, being kissed or licked, and sharing food. If you must care for your pet or be around animals while you are sick, wash your hands before and after you interact with pets and wear a facemask. Call ahead before visiting your doctor  If you have a medical appointment, call the healthcare provider and tell them that you have or may have COVID-19. This will help the healthcare providers office take steps to keep other people from getting infected or exposed. Wear a facemask  You should wear a facemask when you are around other people (e.g., sharing a room or vehicle) or pets and before you enter a healthcare providers office. If you are not able to wear a facemask (for example, because it causes trouble breathing), then people who live with you should not stay in the same room with you, or they should wear a facemask if they enter your room. Cover your coughs and sneezes  Cover your mouth and nose with a tissue when you cough or sneeze. Throw used tissues in a lined trash can. Immediately wash your hands with soap and water for at least 20 seconds or, if soap and water are not available, clean your hands with an alcohol-based hand  that contains at least 60% alcohol. Clean your hands often  Wash your hands often with soap and water for at least 20 seconds, especially after blowing your nose, coughing, or sneezing; going to the bathroom; and before eating or preparing food. If soap and water are not readily available, use an alcohol-based hand  with at least 60% alcohol, covering all surfaces of your hands and rubbing them together until they feel dry. Soap and water are the best option if hands are visibly dirty. Avoid touching your eyes, nose, and mouth with unwashed hands. Avoid sharing personal household items  You should not share dishes, drinking glasses, cups, eating utensils, towels, or bedding with other people or pets in your home.  After using these items, they should be washed thoroughly with soap and water. Clean all high-touch surfaces everyday  High touch surfaces include counters, tabletops, doorknobs, bathroom fixtures, toilets, phones, keyboards, tablets, and bedside tables. Also, clean any surfaces that may have blood, stool, or body fluids on them. Use a household cleaning spray or wipe, according to the label instructions. Labels contain instructions for safe and effective use of the cleaning product including precautions you should take when applying the product, such as wearing gloves and making sure you have good ventilation during use of the product. Monitor your symptoms  Seek prompt medical attention if your illness is worsening (e.g., difficulty breathing). Before seeking care, call your healthcare provider and tell them that you have, or are being evaluated for, COVID-19. Put on a facemask before you enter the facility. These steps will help the healthcare providers office to keep other people in the office or waiting room from getting infected or exposed. Ask your healthcare provider to call the local or state health department. Persons who are placed under active monitoring or facilitated self-monitoring should follow instructions provided by their local health department or occupational health professionals, as appropriate. When working with your local health department check their available hours. If you have a medical emergency and need to call 911, notify the dispatch personnel that you have, or are being evaluated for COVID-19. If possible, put on a facemask before emergency medical services arrive. Discontinuing home isolation  Patients with confirmed COVID-19 should remain under home isolation precautions until the risk of secondary transmission to others is thought to be low.  The decision to discontinue home isolation precautions should be made on a case-by-case basis, in consultation with healthcare providers and state meals. It is a good, quick way to make sure that you have a balanced meal. It also helps you spread carbs throughout the day. ? Divide your plate by types of foods. Put non-starchy vegetables on half the plate, meat or other protein food on one-quarter of the plate, and a grain or starchy vegetable in the final quarter of the plate. To this you can add a small piece of fruit and 1 cup of milk or yogurt, depending on how many carbs you are supposed to eat at a meal.  · Try to eat about the same amount of carbs at each meal. Do not \"save up\" your daily allowance of carbs to eat at one meal.  · Proteins have very little or no carbs per serving. Examples of proteins are beef, chicken, turkey, fish, eggs, tofu, cheese, cottage cheese, and peanut butter. A serving size of meat is 3 ounces, which is about the size of a deck of cards. Examples of meat substitute serving sizes (equal to 1 ounce of meat) are 1/4 cup of cottage cheese, 1 egg, 1 tablespoon of peanut butter, and ½ cup of tofu. How can you eat out and still eat healthy? · Learn to estimate the serving sizes of foods that have carbohydrate. If you measure food at home, it will be easier to estimate the amount in a serving of restaurant food. · If the meal you order has too much carbohydrate (such as potatoes, corn, or baked beans), ask to have a low-carbohydrate food instead. Ask for a salad or green vegetables. · If you use insulin, check your blood sugar before and after eating out to help you plan how much to eat in the future. · If you eat more carbohydrate at a meal than you had planned, take a walk or do other exercise. This will help lower your blood sugar. What else should you know? · Limit saturated fat, such as the fat from meat and dairy products. This is a healthy choice because people who have diabetes are at higher risk of heart disease. So choose lean cuts of meat and nonfat or low-fat dairy products.  Use olive or canola oil instead of butter or shortening when cooking. · Don't skip meals. Your blood sugar may drop too low if you skip meals and take insulin or certain medicines for diabetes. · Check with your doctor before you drink alcohol. Alcohol can cause your blood sugar to drop too low. Alcohol can also cause a bad reaction if you take certain diabetes medicines. Follow-up care is a key part of your treatment and safety. Be sure to make and go to all appointments, and call your doctor if you are having problems. It's also a good idea to know your test results and keep a list of the medicines you take. Where can you learn more? Go to https://[employer]web[employer] org and sign in to your [employer] account. Enter W865 in the [employer] box to learn more about \"Learning About Diabetes Food Guidelines. \"     If you do not have an account, please click on the \"Sign Up Now\" link. Current as of: December 19, 2019Content Version: 12.4  © 1339-4622 [employer]wise, Incorporated. Care instructions adapted under license by [facility] ([facility]). If you have questions about a medical condition or this instruction, always ask your healthcare professional. [place] [address] any warranty or liability for your use of this information.

## 2020-03-18 NOTE — PROGRESS NOTES
follow-ups on file.      Orders Placed This Encounter   Procedures    CBC Auto Differential     Fast 12 hours     Standing Status:   Future     Standing Expiration Date:   3/18/2021    Comprehensive Metabolic Panel     Fasting 12 hours     Standing Status:   Future     Standing Expiration Date:   3/18/2021    Hemoglobin A1C     Fast 12 hours     Standing Status:   Future     Standing Expiration Date:   3/18/2021    Lipid Panel     Standing Status:   Future     Standing Expiration Date:   3/18/2021     Order Specific Question:   Is Patient Fasting?/# of Hours     Answer:   12    TSH without Reflex     Fast 12 hours     Standing Status:   Future     Standing Expiration Date:   3/18/2021    Microalbumin / Creatinine Urine Ratio     Standing Status:   Future     Standing Expiration Date:   3/18/2021    Vitamin B12     Standing Status:   Future     Standing Expiration Date:   3/18/2021       Eve Rivera MD

## 2020-05-18 RX ORDER — SIMVASTATIN 40 MG
TABLET ORAL
Qty: 90 TABLET | Refills: 0 | Status: SHIPPED | OUTPATIENT
Start: 2020-05-18 | End: 2020-07-13

## 2020-06-09 RX ORDER — GLIPIZIDE 2.5 MG/1
TABLET, EXTENDED RELEASE ORAL
Qty: 90 TABLET | Refills: 0 | Status: SHIPPED | OUTPATIENT
Start: 2020-06-09 | End: 2020-08-03

## 2020-06-17 DIAGNOSIS — E11.9 TYPE 2 DIABETES MELLITUS WITHOUT COMPLICATION, UNSPECIFIED WHETHER LONG TERM INSULIN USE (HCC): ICD-10-CM

## 2020-06-17 LAB
ALBUMIN SERPL-MCNC: 4.2 G/DL (ref 3.5–5.2)
ALP BLD-CCNC: 46 U/L (ref 35–104)
ALT SERPL-CCNC: 47 U/L (ref 5–33)
ANION GAP SERPL CALCULATED.3IONS-SCNC: 14 MMOL/L (ref 7–19)
AST SERPL-CCNC: 41 U/L (ref 5–32)
BASOPHILS ABSOLUTE: 0.1 K/UL (ref 0–0.2)
BASOPHILS RELATIVE PERCENT: 1.1 % (ref 0–1)
BILIRUB SERPL-MCNC: <0.2 MG/DL (ref 0.2–1.2)
BUN BLDV-MCNC: 26 MG/DL (ref 8–23)
CALCIUM SERPL-MCNC: 9.3 MG/DL (ref 8.8–10.2)
CHLORIDE BLD-SCNC: 102 MMOL/L (ref 98–111)
CHOLESTEROL, TOTAL: 110 MG/DL (ref 160–199)
CO2: 25 MMOL/L (ref 22–29)
CREAT SERPL-MCNC: 1.5 MG/DL (ref 0.5–0.9)
CREATININE URINE: 121.2 MG/DL (ref 4.2–622)
EOSINOPHILS ABSOLUTE: 0.2 K/UL (ref 0–0.6)
EOSINOPHILS RELATIVE PERCENT: 2.7 % (ref 0–5)
GFR NON-AFRICAN AMERICAN: 34
GLUCOSE BLD-MCNC: 132 MG/DL (ref 74–109)
HBA1C MFR BLD: 7.1 % (ref 4–6)
HCT VFR BLD CALC: 33.5 % (ref 37–47)
HDLC SERPL-MCNC: 48 MG/DL (ref 65–121)
HEMOGLOBIN: 11.3 G/DL (ref 12–16)
IMMATURE GRANULOCYTES #: 0 K/UL
LDL CHOLESTEROL CALCULATED: 38 MG/DL
LYMPHOCYTES ABSOLUTE: 3.1 K/UL (ref 1.1–4.5)
LYMPHOCYTES RELATIVE PERCENT: 42 % (ref 20–40)
MCH RBC QN AUTO: 32.9 PG (ref 27–31)
MCHC RBC AUTO-ENTMCNC: 33.7 G/DL (ref 33–37)
MCV RBC AUTO: 97.7 FL (ref 81–99)
MICROALBUMIN UR-MCNC: 204.2 MG/DL (ref 0–19)
MICROALBUMIN/CREAT UR-RTO: 1684.8 MG/G
MONOCYTES ABSOLUTE: 0.4 K/UL (ref 0–0.9)
MONOCYTES RELATIVE PERCENT: 5.6 % (ref 0–10)
NEUTROPHILS ABSOLUTE: 3.6 K/UL (ref 1.5–7.5)
NEUTROPHILS RELATIVE PERCENT: 48.3 % (ref 50–65)
PDW BLD-RTO: 12.8 % (ref 11.5–14.5)
PLATELET # BLD: 270 K/UL (ref 130–400)
PMV BLD AUTO: 10.8 FL (ref 9.4–12.3)
POTASSIUM SERPL-SCNC: 5 MMOL/L (ref 3.5–5)
RBC # BLD: 3.43 M/UL (ref 4.2–5.4)
SODIUM BLD-SCNC: 141 MMOL/L (ref 136–145)
TOTAL PROTEIN: 6.9 G/DL (ref 6.6–8.7)
TRIGL SERPL-MCNC: 118 MG/DL (ref 0–149)
TSH SERPL DL<=0.05 MIU/L-ACNC: 1.75 UIU/ML (ref 0.27–4.2)
VITAMIN B-12: 1122 PG/ML (ref 211–946)
WBC # BLD: 7.5 K/UL (ref 4.8–10.8)

## 2020-06-25 ENCOUNTER — OFFICE VISIT (OUTPATIENT)
Dept: INTERNAL MEDICINE | Age: 70
End: 2020-06-25
Payer: MEDICARE

## 2020-06-25 VITALS
DIASTOLIC BLOOD PRESSURE: 86 MMHG | BODY MASS INDEX: 35.84 KG/M2 | OXYGEN SATURATION: 95 % | HEIGHT: 66 IN | HEART RATE: 74 BPM | WEIGHT: 223 LBS | SYSTOLIC BLOOD PRESSURE: 134 MMHG

## 2020-06-25 PROCEDURE — G8400 PT W/DXA NO RESULTS DOC: HCPCS | Performed by: INTERNAL MEDICINE

## 2020-06-25 PROCEDURE — 4040F PNEUMOC VAC/ADMIN/RCVD: CPT | Performed by: INTERNAL MEDICINE

## 2020-06-25 PROCEDURE — 99214 OFFICE O/P EST MOD 30 MIN: CPT | Performed by: INTERNAL MEDICINE

## 2020-06-25 PROCEDURE — G8427 DOCREV CUR MEDS BY ELIG CLIN: HCPCS | Performed by: INTERNAL MEDICINE

## 2020-06-25 PROCEDURE — G8417 CALC BMI ABV UP PARAM F/U: HCPCS | Performed by: INTERNAL MEDICINE

## 2020-06-25 PROCEDURE — 2022F DILAT RTA XM EVC RTNOPTHY: CPT | Performed by: INTERNAL MEDICINE

## 2020-06-25 PROCEDURE — 1123F ACP DISCUSS/DSCN MKR DOCD: CPT | Performed by: INTERNAL MEDICINE

## 2020-06-25 PROCEDURE — 3051F HG A1C>EQUAL 7.0%<8.0%: CPT | Performed by: INTERNAL MEDICINE

## 2020-06-25 PROCEDURE — 3017F COLORECTAL CA SCREEN DOC REV: CPT | Performed by: INTERNAL MEDICINE

## 2020-06-25 PROCEDURE — 1036F TOBACCO NON-USER: CPT | Performed by: INTERNAL MEDICINE

## 2020-06-25 PROCEDURE — 1090F PRES/ABSN URINE INCON ASSESS: CPT | Performed by: INTERNAL MEDICINE

## 2020-06-25 NOTE — PATIENT INSTRUCTIONS
tests done? You lie on a table or bed or sit in a reclining chair so your muscles are relaxed. For an EMG:   · Your doctor will insert a needle electrode into a muscle. This will record the electrical activity while the muscle is at rest.  · Your doctor will ask you to tighten the same muscle slowly and steadily while the electrical activity is recorded. · Your doctor may move the electrode to a different area of the muscle or a different muscle. For nerve conduction studies:   · Your doctor will attach two types of electrodes to your skin. ? One type of electrode is placed over a nerve and will give the nerve an electrical pulse. ? The other type of electrode is placed over the muscle that the nerve controls. It will record how long it takes the muscle to react to the electrical pulse. How does having electromyogram (EMG) and nerve conduction studies feel? During an EMG test, you may feel a quick, sharp pain when the needle electrode is put into a muscle. With nerve conduction studies, you will be able to feel the electrical pulses. The tests make some people anxious. Keep in mind that only a very low-voltage electrical current is used. And each electrical pulse is very quick. It lasts less than a second. How long do they take? · An EMG may take 30 to 60 minutes. · Nerve conduction tests may take from 15 minutes to 1 hour or more. It depends on how many nerves and muscles your doctor tests. What happens after these tests? · After the test, you may be sore and feel a tingling in your muscles. This may last for up to 2 days. · If any of the test areas are sore:  ? Put ice or a cold pack on the area for 10 to 20 minutes at a time. Put a thin cloth between the ice and your skin. ? Take an over-the-counter pain medicine, such as acetaminophen (Tylenol), ibuprofen (Advil, Motrin), or naproxen (Aleve). Be safe with medicines. Read and follow all instructions on the label.   · You will probably be able to go home right away. It depends on the reason for the test.  · You can go back to your usual activities right away. When should you call for help? Watch closely for changes in your health, and be sure to contact your doctor if:  · Muscle pain from an EMG test gets worse or you have swelling, tenderness, or pus at any of the needle sites. · You have any problems that you think may be from the test.  · You have any questions about the test or have not received your results. Follow-up care is a key part of your treatment and safety. Be sure to make and go to all appointments, and call your doctor if you are having problems. It's also a good idea to keep a list of the medicines you take. Ask your doctor when you can expect to have your test results. Where can you learn more? Go to https://[email or web address]. org and sign in to your [employer] account. Enter N902 in the Hark box to learn more about \"Electromyogram (EMG) and Nerve Conduction Studies: About These Tests. \"     If you do not have an account, please click on the \"Sign Up Now\" link. Current as of: November 20, 2019               Content Version: 12.5  © 3757-1342 [employer]wise, Incorporated. Care instructions adapted under license by [facility] ([facility]). If you have questions about a medical condition or this instruction, always ask your healthcare professional. [address] any warranty or liability for your use of this information.

## 2020-06-25 NOTE — PROGRESS NOTES
Chief Complaint   Patient presents with    Follow-up    Diabetes    Other     discuss carpal tunal in right hand- pt says feels numb        HPI: Dejuan Gonzalez is a 65 y.o. female is here for follow-up of type 2 diabetes, hypertension, hyperlipidemia and to discuss possible right hand numbness and tingling. She thinks that she may have carpal tunnel syndrome. Her 4 fingers on her hand become numb at times. It is worse with typing and other activities. She agrees to get her wrist splint, but she also is interested in getting EMG and nerve conduction studies. Her blood pressures well controlled. She denies any complaints of chest pain, chest pressure or shortness of breath. Her blood sugars are running between 95 and 125. Her A1c did drop from 7.2-7.1. Her cholesterol is 110.   She feels well and has no other complaints or concerns    Past Medical History:   Diagnosis Date    Acute sinusitis     CHAPARRO (acute kidney injury) ([address] 5/18/2018    Anemia     CAD (coronary artery disease)     Callous ulcer (HCC)     Cataract     Dermatitis     Diabetes mellitus ([address]     Diabetic nephropathy ([address]     Diabetic retinopathy ([address]     Glaucoma     Gout     Hemorrhoids     Hx of TB skin testing     positive    Hyperlipidemia     Hypertension     Type 2 diabetes mellitus ([address] 5/18/2018    Vitamin D deficiency       Past Surgical History:   Procedure Laterality Date    CARDIAC SURGERY      CATARACT REMOVAL      CHOLECYSTECTOMY      COLONOSCOPY N/A 10/11/2019    Dr Kandace Dominguez of a hemostatic clip-Suboptimal prep, diverticulosis, internal hemorrhoids-Grade 2, AP x 3, 1 yr recall     CORONARY ARTERY BYPASS GRAFT      ENDOSCOPY, COLON, DIAGNOSTIC      MOUTH SURGERY      OTHER SURGICAL HISTORY      sternal resection    SKIN BIOPSY      UPPER GASTROINTESTINAL ENDOSCOPY N/A 10/11/2019    Dr Anamaria Garcia (+) H Pylori    VASCULAR SURGERY        Social History     Socioeconomic History    Marital status: Single     Spouse name: Not on file    Number of children: Not on file    Years of education: Not on file    Highest education level: Not on file   Occupational History     Employer: [employer]   Social Needs    Financial resource strain: Not on file    Food insecurity     Worry: Not on file     Inability: Not on file    Transportation needs     Medical: Not on file     Non-medical: Not on file   Tobacco Use    Smoking status: Never Smoker    Smokeless tobacco: Never Used   Substance and Sexual Activity    Alcohol use: No    Drug use: No    Sexual activity: Never   Lifestyle    Physical activity     Days per week: Not on file     Minutes per session: Not on file    Stress: Not on file   Relationships    Social connections     Talks on phone: Not on file     Gets together: Not on file     Attends Restorationism service: Not on file     Active member of club or organization: Not on file     Attends meetings of clubs or organizations: Not on file     Relationship status: Not on file    Intimate partner violence     Fear of current or ex partner: Not on file     Emotionally abused: Not on file     Physically abused: Not on file     Forced sexual activity: Not on file   Other Topics Concern    Not on file   Social History Narrative    Not on file      Family History   Problem Relation Age of Onset    Cancer Mother     Diabetes Father     COPD Father     Diabetes Sister     Heart Disease Sister     Colon Polyps Sister     Coronary Art Dis Other         grandfather    Diabetes Other         aunt    Colon Cancer Neg Hx     Liver Cancer Neg Hx     Rectal Cancer Neg Hx         Current Outpatient Medications   Medication Sig Dispense Refill    glipiZIDE (GLUCOTROL XL) 2.5 MG extended release tablet TAKE 1 TABLET EVERY DAY 90 tablet 0    simvastatin (ZOCOR) 40 MG tablet TAKE 1 TABLET EVERY EVENING 90 tablet 0    metFORMIN (GLUCOPHAGE) 500 MG tablet TAKE 2 TABLETS TWICE and urgency. Musculoskeletal: Negative for arthralgias, gait problem, joint swelling and myalgias. Numbness and tingling in right hand. Sometimes, the 4 fingers of her right hand will get numb. Skin: Negative for color change and rash. Allergic/Immunologic: Negative for food allergies and immunocompromised state. Neurological: Positive for numbness. Negative for dizziness, tremors, syncope, speech difficulty, weakness and headaches. Hematological: Negative for adenopathy. Does not bruise/bleed easily. Psychiatric/Behavioral: Negative for confusion and hallucinations. The patient is not nervous/anxious. /86   Pulse 74   Ht 5' 6\" (1.676 m)   Wt 223 lb (101.2 kg)   SpO2 95%   BMI 35.99 kg/m²   Physical Exam  Vitals signs and nursing note reviewed. Constitutional:       General: She is not in acute distress. Appearance: Normal appearance. She is well-developed and normal weight. She is not ill-appearing, toxic-appearing or diaphoretic. HENT:      Head: Normocephalic and atraumatic. Right Ear: Tympanic membrane, ear canal and external ear normal. There is no impacted cerumen. Left Ear: Tympanic membrane, ear canal and external ear normal. There is no impacted cerumen. Nose: Nose normal. No congestion or rhinorrhea. Mouth/Throat:      Mouth: Mucous membranes are moist.      Pharynx: Oropharynx is clear. No oropharyngeal exudate or posterior oropharyngeal erythema. Eyes:      General: No scleral icterus. Right eye: No discharge. Left eye: No discharge. Extraocular Movements: Extraocular movements intact. Conjunctiva/sclera: Conjunctivae normal.      Pupils: Pupils are equal, round, and reactive to light. Neck:      Musculoskeletal: Normal range of motion and neck supple. No neck rigidity or muscular tenderness. Thyroid: No thyromegaly. Vascular: No carotid bruit or JVD. Trachea: No tracheal deviation. Cardiovascular:      Rate and Rhythm: Normal rate and regular rhythm. Pulses: Normal pulses. Heart sounds: Normal heart sounds. No murmur. No friction rub. No gallop. Pulmonary:      Effort: Pulmonary effort is normal. No respiratory distress. Breath sounds: Normal breath sounds. No stridor. No wheezing, rhonchi or rales. Chest:      Chest wall: No tenderness. Abdominal:      General: Bowel sounds are normal. There is no distension. Palpations: Abdomen is soft. There is no mass. Tenderness: There is no abdominal tenderness. There is no right CVA tenderness, left CVA tenderness, guarding or rebound. Hernia: No hernia is present. Musculoskeletal: Normal range of motion. General: No swelling, tenderness, deformity or signs of injury. Right lower leg: No edema. Left lower leg: No edema. Lymphadenopathy:      Cervical: No cervical adenopathy. Skin:     General: Skin is warm and dry. Capillary Refill: Capillary refill takes less than 2 seconds. Coloration: Skin is not jaundiced or pale. Findings: No bruising, erythema, lesion or rash. Neurological:      General: No focal deficit present. Mental Status: She is alert and oriented to person, place, and time. Mental status is at baseline. Cranial Nerves: No cranial nerve deficit. Sensory: No sensory deficit. Motor: No weakness or abnormal muscle tone. Coordination: Coordination normal.      Gait: Gait normal.      Deep Tendon Reflexes: Reflexes are normal and symmetric. Reflexes normal.   Psychiatric:         Mood and Affect: Mood normal.         Behavior: Behavior normal.         Thought Content: Thought content normal.         Judgment: Judgment normal.         No diagnosis found. ASSESSMENT/PLAN:    66-year-old woman here for follow-up    1. Type 2 diabetes: A1c at goal.    2.  Numbness and tingling to right hand: Most likely carpal tunnel syndrome.   EMG and nerve

## 2020-06-27 ASSESSMENT — ENCOUNTER SYMPTOMS
TROUBLE SWALLOWING: 0
BLOOD IN STOOL: 0
RHINORRHEA: 0
SHORTNESS OF BREATH: 0
CHEST TIGHTNESS: 0
SINUS PRESSURE: 0
DIARRHEA: 0
SINUS PAIN: 0
WHEEZING: 0
SORE THROAT: 0
VOMITING: 0
NAUSEA: 0
CHOKING: 0
EYE DISCHARGE: 0
STRIDOR: 0
VOICE CHANGE: 0
EYE ITCHING: 0
COUGH: 0
COLOR CHANGE: 0
CONSTIPATION: 0
ABDOMINAL PAIN: 0
ABDOMINAL DISTENTION: 0

## 2020-07-13 RX ORDER — SIMVASTATIN 40 MG
TABLET ORAL
Qty: 90 TABLET | Refills: 1 | Status: SHIPPED | OUTPATIENT
Start: 2020-07-13 | End: 2020-12-03

## 2020-07-13 NOTE — TELEPHONE ENCOUNTER
Darren Goodman called requesting a refill of the below medication which has been pended for you:     Requested Prescriptions     Pending Prescriptions Disp Refills    simvastatin (ZOCOR) 40 MG tablet [Pharmacy Med Name: SIMVASTATIN 40 MG Tablet] 90 tablet 0     Sig: TAKE 1 TABLET EVERY EVENING       Last Appointment Date: 6/25/2020  Next Appointment Date: 10/2/2020    No Known Allergies

## 2020-07-22 ENCOUNTER — HOSPITAL ENCOUNTER (OUTPATIENT)
Dept: NEUROLOGY | Age: 70
Discharge: HOME | End: 2020-07-22
Payer: MEDICARE

## 2020-07-22 PROCEDURE — 95886 MUSC TEST DONE W/N TEST COMP: CPT | Performed by: PSYCHIATRY & NEUROLOGY

## 2020-07-22 PROCEDURE — 95909 NRV CNDJ TST 5-6 STUDIES: CPT

## 2020-07-22 PROCEDURE — 95910 NRV CNDJ TEST 7-8 STUDIES: CPT | Performed by: PSYCHIATRY & NEUROLOGY

## 2020-07-22 PROCEDURE — 95886 MUSC TEST DONE W/N TEST COMP: CPT

## 2020-08-03 RX ORDER — GLIPIZIDE 2.5 MG/1
TABLET, EXTENDED RELEASE ORAL
Qty: 90 TABLET | Refills: 0 | Status: SHIPPED | OUTPATIENT
Start: 2020-08-03 | End: 2020-10-15

## 2020-08-17 RX ORDER — AMLODIPINE BESYLATE 5 MG/1
TABLET ORAL
Qty: 90 TABLET | Refills: 3 | Status: SHIPPED | OUTPATIENT
Start: 2020-08-17 | End: 2021-05-27

## 2020-08-19 ENCOUNTER — OFFICE VISIT (OUTPATIENT)
Dept: GASTROENTEROLOGY | Age: 70
End: 2020-08-19
Payer: MEDICARE

## 2020-08-19 VITALS
WEIGHT: 225 LBS | SYSTOLIC BLOOD PRESSURE: 120 MMHG | DIASTOLIC BLOOD PRESSURE: 80 MMHG | BODY MASS INDEX: 36.16 KG/M2 | OXYGEN SATURATION: 97 % | HEIGHT: 66 IN | HEART RATE: 72 BPM

## 2020-08-19 PROCEDURE — 1036F TOBACCO NON-USER: CPT | Performed by: NURSE PRACTITIONER

## 2020-08-19 PROCEDURE — 3017F COLORECTAL CA SCREEN DOC REV: CPT | Performed by: NURSE PRACTITIONER

## 2020-08-19 PROCEDURE — 1090F PRES/ABSN URINE INCON ASSESS: CPT | Performed by: NURSE PRACTITIONER

## 2020-08-19 PROCEDURE — G8417 CALC BMI ABV UP PARAM F/U: HCPCS | Performed by: NURSE PRACTITIONER

## 2020-08-19 PROCEDURE — 1123F ACP DISCUSS/DSCN MKR DOCD: CPT | Performed by: NURSE PRACTITIONER

## 2020-08-19 PROCEDURE — G8427 DOCREV CUR MEDS BY ELIG CLIN: HCPCS | Performed by: NURSE PRACTITIONER

## 2020-08-19 PROCEDURE — G8400 PT W/DXA NO RESULTS DOC: HCPCS | Performed by: NURSE PRACTITIONER

## 2020-08-19 PROCEDURE — 99213 OFFICE O/P EST LOW 20 MIN: CPT | Performed by: NURSE PRACTITIONER

## 2020-08-19 PROCEDURE — 4040F PNEUMOC VAC/ADMIN/RCVD: CPT | Performed by: NURSE PRACTITIONER

## 2020-08-19 RX ORDER — MELOXICAM 15 MG/1
15 TABLET ORAL DAILY
COMMUNITY
Start: 2020-07-27 | End: 2021-01-20 | Stop reason: ALTCHOICE

## 2020-08-19 ASSESSMENT — ENCOUNTER SYMPTOMS
CONSTIPATION: 0
SHORTNESS OF BREATH: 0
ANAL BLEEDING: 0
DIARRHEA: 0
RECTAL PAIN: 0
BLOOD IN STOOL: 0
ABDOMINAL DISTENTION: 0
NAUSEA: 0
COUGH: 0
TROUBLE SWALLOWING: 0
ABDOMINAL PAIN: 0
CHOKING: 0
VOMITING: 0

## 2020-08-19 NOTE — PATIENT INSTRUCTIONS
Schedule colonoscopy. No aspirin, ibuprofen, naproxen, fish oil or vitamin E for 5 days before procedure. Do not eat or drink after midnight the day of the procedure. Allowed medications can be taken with a small sip of water. Please review your prep instructions for allowed medications. You will not be able to drive for 24 hours after the procedure due to sedation. You must have a responsible adult, 25 year or older, to accompany you and drive you home the day of your procedure. If you are on blood thinners, clearance from the prescribing physician will be obtained before your procedure is scheduled. If it is determined it is not safe to hold these medications for a short time an alternative procedure for evaluation may be recommended. Risks of colonoscopy include, but are not limited to, perforation, bleeding, and infection, Risk of perforation and bleeding are increased if there is a polyp removed. Anesthesia risks will be reviewed with you before the procedure by a member of the anesthesia department. Your physician may also schedule a follow up appointment with the nurse practitioner to discuss pathology, symptoms or to check if you have had any problems related to your procedure. If you prefer not to return to the office after your procedure please discuss this with your physician on the day of your colonoscopy. The physician will talk with you and/or your family after the procedure is completed. Final recommendations are based on the pathologist report if biopsies or specimens are taken. If polyps are removed during the procedure they will be sent to a pathologist for analysis. Unless you have a follow up appointment scheduled, you will be notified by mail of the pathology results within 4 weeks. If you have not received results after 4 weeks you may call the office to obtain this information. For Colonoscopy:   You will be given specific directions regarding restrictions to diet and bowel prep instructions including laxatives. Please read these instructions one week prior to your scheduled procedure to ensure that you are prepared. If you have any questions regarding these instructions please call our office Mon through Fri from 8:00 am to 4:00 pm.     Follow prep instructions provided for bowel prep. Take all of the bowel prep as directed. If you are having problems with nausea, stop your prep for 30-45 min to allow the nausea to subside before resuming your prep. It is important to drink plenty of fluids throughout the day before taking your laxatives. This will help to protect your kidneys, prevent dehydration and maximize the effect of the bowel prep. Your diet before a colonoscopy bowel preparation is very important to ensure a successful colon exam. It is recommended to consider certain changes to your diet three to four days prior to the procedure. Remember that your bowels need to be completely empty for the exam.    What foods are good to eat? Cut down on heavy solid foods three to four days before the procedure and start introducing lighter meals to your diet. The following food suggestions are a good part of your diet before a colonoscopy bowel preparation.  Light meat that is easily digestible such as chicken (without the skin)    Potatoes without skin    Cheese    Eggs    A light meal of steamed white fish    Light clear soups    Foods and drinks to avoid  Avoid foods that contain too much fiber. Stay clear of dark colored beverages. They can stick to the walls of the digestive tract and make it difficult to differentiate from blood.  Some of these foods are:   Red meat, rice, nuts and vegetables    Milk, other milk based fluids and cream    Most fruit and puddings    Whole grain pasta    Cereals, bran and seeds    Colored beverages, especially those that are red or purple in color    Red colored Jell-O     On the day before the colonoscopy, continue to drink plenty of clear fluids. It is important   to keep yourself hydrated before the exam.     Please follow all instructions as provided for cleansing the bowel. Failure to have an adequately prepped colon may cause you to have incomplete exam with further testing required.      [email or web address]

## 2020-08-19 NOTE — PROGRESS NOTES
Subjective:     Patient ID: Mike Larsen is a 65 y.o. female  PCP: Lyndsay Hicks MD  Referring Provider: No ref. provider found    HPI  Patient presents to the office today with the following complaints: Colonoscopy    Pt here for one year colonoscopy recall  Patient denies any lower GI symptoms such as constipation, diarrhea, change in bowel habits, rectal bleeding, and rectal pain. Pt denies any upper GI symptoms such as abdominal pain, nausea, vomiting, dysphagia, reflux and melena. Last colonoscopy 10/2019 - multiple polyps, AP  Last EGD 10/2019 - (+) H pylori. Positive family history of colon polyps - sister    Pt was treated for H pylori , however, eradication was not confirmed after treatment    Assessment:     1. Hx of adenomatous colonic polyps    2. Family history of colonic polyps    3. History of Helicobacter pylori infection       Plan:   - Diatherix for H pylori to confirm eradication  - Schedule colonoscopy  Instruct on bowel prep. Nothing to eat or drink after midnight the day of the exam.  Unable to drive for 24 hours after the procedure. No aspirin or nonsteroidal anti-inflammatories for 5 days before procedure. I have discussed the benefits, alternatives, and risks (including bleeding, perforation and death)  for pursuing Endoscopy (EGD/Colonscopy/EUS/ERCP) with the patient and they are willing to continue. We also discussed the need for anesthesia, IV access, proper dietary changes, medication changes if necessary, and need for bowel prep (if ordered) prior to their Endoscopic procedure. They are aware they must have someone accompany them to their scheduled procedure to drive them home - they agree to the above and are willing to continue. Orders  No orders of the defined types were placed in this encounter. Medications  No orders of the defined types were placed in this encounter.         Patient History:     Past Medical History:   Diagnosis Date    Acute sinusitis     [facility] 5/18/2018    Anemia     CAD (coronary artery disease)     [facility]     Cataract     Dermatitis     Diabetes mellitus [facility]     Diabetic nephropathy [facility]     Diabetic retinopathy [facility]     Glaucoma     Gout     Hemorrhoids     Hx of TB skin testing     positive    Hyperlipidemia     Hypertension     Type 2 diabetes mellitus ([facility] 5/18/2018    Vitamin D deficiency        Past Surgical History:   Procedure Laterality Date    CARDIAC SURGERY      CATARACT REMOVAL      CHOLECYSTECTOMY      COLONOSCOPY N/A 10/11/2019    Dr Rui Thornton of a hemostatic clip-Suboptimal prep, diverticulosis, internal hemorrhoids-Grade 2, AP x 3, 1 yr recall     CORONARY ARTERY BYPASS GRAFT      ENDOSCOPY, COLON, DIAGNOSTIC      MOUTH SURGERY      OTHER SURGICAL HISTORY      sternal resection    SKIN BIOPSY      UPPER GASTROINTESTINAL ENDOSCOPY N/A 10/11/2019    Dr Santiago Hendricks (+) H Pylori    VASCULAR SURGERY         Family History   Problem Relation Age of Onset    Cancer Mother     Diabetes Father    Lenetta Ni COPD Father     Diabetes Sister     Heart Disease Sister     Colon Polyps Sister     Coronary Art Dis Other         grandfather    Diabetes Other         aunt    Colon Cancer Neg Hx     Liver Cancer Neg Hx     Rectal Cancer Neg Hx        Social History     Socioeconomic History    Marital status: Single     Spouse name: None    Number of children: None    Years of education: None    Highest education level: None   Occupational History     Employer: [employer]   Social Needs    Financial resource strain: None    Food insecurity     Worry: None     Inability: None    Transportation needs     Medical: None     Non-medical: None   Tobacco Use    Smoking status: Never Smoker    Smokeless tobacco: Never Used   Substance and Sexual Activity    Alcohol use: No    Drug use: No    Sexual activity: Never   Lifestyle    Physical activity     Days per week: None     Minutes per session: None    Stress: None   Relationships    Social connections     Talks on phone: None     Gets together: None     Attends Moravian service: None     Active member of club or organization: None     Attends meetings of clubs or organizations: None     Relationship status: None    Intimate partner violence     Fear of current or ex partner: None     Emotionally abused: None     Physically abused: None     Forced sexual activity: None   Other Topics Concern    None   Social History Narrative    None       Current Outpatient Medications   Medication Sig Dispense Refill    meloxicam (MOBIC) 15 MG tablet TAKE 1 TABLET BY MOUTH ONCE DAILY      amLODIPine (NORVASC) 5 MG tablet TAKE 1 TABLET EVERY DAY 90 tablet 3    glipiZIDE (GLUCOTROL XL) 2.5 MG extended release tablet TAKE 1 TABLET EVERY DAY 90 tablet 0    simvastatin (ZOCOR) 40 MG tablet TAKE 1 TABLET EVERY EVENING 90 tablet 1    metFORMIN (GLUCOPHAGE) 500 MG tablet TAKE 2 TABLETS TWICE DAILY 360 tablet 3    ACCU-CHEK ROSA PLUS strip TEST BLOOD SUGAR FOUR TIMES DAILY 400 strip 3    Alcohol Swabs (B-D SINGLE USE SWABS REGULAR) PADS USE THREE TIMES DAILY 100 each 11    ACCU-CHEK SOFTCLIX LANCETS MISC TEST FOUR TIMES DAILY 400 each 3    aspirin 81 MG tablet Take 81 mg by mouth daily      Omega-3 Fatty Acids (FISH OIL) 1000 MG CAPS Take 1,000 mg by mouth 2 times daily      MULTIPLE VITAMIN PO Take 1 tablet by mouth daily      Calcium Carbonate-Vitamin D (CALCIUM 500/D PO) Take 1 tablet by mouth 2 times daily      vitamin B-12 (CYANOCOBALAMIN) 1000 MCG tablet Take 1,000 mcg by mouth daily      timolol (TIMOPTIC) 0.5 % ophthalmic solution Place 1 drop into the right eye nightly        No current facility-administered medications for this visit. No Known Allergies    Review of Systems   Constitutional: Negative for activity change, appetite change, fatigue, fever and unexpected weight change. HENT: Negative for trouble swallowing. Respiratory: Negative for cough, choking and shortness of breath. Cardiovascular: Negative for chest pain. Gastrointestinal: Negative for abdominal distention, abdominal pain, anal bleeding, blood in stool, constipation, diarrhea, nausea, rectal pain and vomiting. Allergic/Immunologic: Negative for food allergies. All other systems reviewed and are negative. Objective:     /80   Pulse 72   Ht 5' 6\" (1.676 m)   Wt 225 lb (102.1 kg)   SpO2 97%   BMI 36.32 kg/m²     Physical Exam  Vitals signs reviewed. Constitutional:       General: She is not in acute distress. Appearance: She is well-developed. HENT:      Nose:      Comments: Mask on     Mouth/Throat:      Comments: Mask on  Eyes:      General:         Right eye: No discharge. Left eye: No discharge. Neck:      Musculoskeletal: Normal range of motion. Cardiovascular:      Rate and Rhythm: Normal rate and regular rhythm. Heart sounds: No murmur. Pulmonary:      Effort: Pulmonary effort is normal. No respiratory distress. Breath sounds: Normal breath sounds. Abdominal:      General: Bowel sounds are normal. There is no distension. Palpations: Abdomen is soft. There is no mass. Tenderness: There is no abdominal tenderness. There is no guarding or rebound. Musculoskeletal: Normal range of motion. Skin:     General: Skin is warm and dry. Neurological:      Mental Status: She is alert and oriented to person, place, and time.    Psychiatric:         Behavior: Behavior normal.

## 2020-08-31 ENCOUNTER — HOSPITAL ENCOUNTER (OUTPATIENT)
Age: 70
Setting detail: SPECIMEN
Discharge: HOME | End: 2020-08-31
Payer: MEDICARE

## 2020-08-31 ENCOUNTER — HOSPITAL ENCOUNTER (OUTPATIENT)
Dept: PREADMISSION TESTING | Age: 70
Discharge: HOME | End: 2020-09-04
Payer: MEDICARE

## 2020-08-31 VITALS — WEIGHT: 230 LBS | BODY MASS INDEX: 36.96 KG/M2 | HEIGHT: 66 IN

## 2020-08-31 PROCEDURE — 93005 ELECTROCARDIOGRAM TRACING: CPT | Performed by: ORTHOPAEDIC SURGERY

## 2020-08-31 PROCEDURE — U0003 INFECTIOUS AGENT DETECTION BY NUCLEIC ACID (DNA OR RNA); SEVERE ACUTE RESPIRATORY SYNDROME CORONAVIRUS 2 (SARS-COV-2) (CORONAVIRUS DISEASE [COVID-19]), AMPLIFIED PROBE TECHNIQUE, MAKING USE OF HIGH THROUGHPUT TECHNOLOGIES AS DESCRIBED BY CMS-2020-01-R: HCPCS

## 2020-09-01 LAB
EKG P AXIS: 45 DEGREES
EKG P-R INTERVAL: 192 MS
EKG Q-T INTERVAL: 396 MS
EKG QRS DURATION: 98 MS
EKG QTC CALCULATION (BAZETT): 399 MS
EKG T AXIS: 154 DEGREES
REPORT: NORMAL
SARS-COV-2: NOT DETECTED
THIS TEST SENT TO: NORMAL

## 2020-09-01 PROCEDURE — 93010 ELECTROCARDIOGRAM REPORT: CPT | Performed by: INTERNAL MEDICINE

## 2020-09-01 NOTE — PROGRESS NOTES
Dr. Jolynn Duarte reviewed patient's EKG done in [facility] and stated patient is ok to proceed with anesthesia for surgery.

## 2020-09-02 PROBLEM — G56.01 RIGHT CARPAL TUNNEL SYNDROME: Status: ACTIVE | Noted: 2020-09-02

## 2020-09-02 NOTE — OP NOTE
Patient Name: Stephenie Found: 1950  MRN: [id number]      [address] of SURGERY: 9/3/2020    SURGEON: Soraya Hylton. MD Vasquez    ASSISTANT: NONE    PREOPERATIVE DIAGNOSIS    Right carpal tunnel syndrome. POSTOPERATIVE DIAGNOSIS    Right carpal tunnel syndrome. PROCEDURE PERFORMED    Right carpal tunnel release. IMPLANTS  None. ANESTHESIA    General endotracheal.       OPERATIVE INDICATIONS    The patient is a 65 y.o. female who presented to my clinic with complaints of numbness and tingling in the hand with clinical exam and neurodiagnostic evidence of carpal tunnel syndrome. The patient wished to proceed with surgery, understanding the risks, benefits, and alternatives. Conservative treatment failed to improve symptoms. The risks include, but are not limited to, that of anesthesia, bleeding, infection, pain, damage to the motor and sensory branch of the median nerve, chronic pillar pain, incomplete resolution of symptoms. ESTIMATED BLOOD LOSS    Less than 5 mL. SPECIMENS    None. DRAINS  None. COMPLICATIONS    None. PROCEDURE IN DETAIL    The patient was seen in the preoperative holding room; once again, the informed consent form was reviewed with the patient and signed. The site of surgery was marked with the patients agreement. After being transferred to the operating room, a timeout was performed identifying the correct patient as well as the operative site. Perioperative antibiotics were administered. The tourniquet was then placed on the brachium of the operative extremity. A sterile prep and drape was performed. A longitudinal incision was made at the base of the palm in line with the radial border of the ring finger intersecting [place] cardinal line. Soft tissue was dissected in line with the incision through the palmar fascia.  The transverse carpal ligament was identified and beginning distally, while protecting the superficial radial arch of vessels, it was incised. A [place] elevator was inserted into the carpal tunnel protecting the underlying median nerve and transection of the ligament was performed proximally. The motor branch of the median nerve was identified and protected. Finally, a pair of blunt-tipped scissors was inserted with the points directed toward the ulnar aspect of the wrist to protect the palmar cutaneous branch of the median nerve and the distal forearm fascia was incised as well. A complete release of the transverse carpal ligament was performed, verified visually as well as with palpation. The floor of the carpal tunnel was swept with a Lissett Fuentes identifying no masses. The incision was irrigated and the skin closed with simple interrupted 3-0 nylon sutures. Total tourniquet time was less than 10 minutes. A soft dressing was placed; the patient was awakened from anesthesia and transported to the recovery room in stable condition.        POSTOPERATIVE PLAN  Discharge home, return to clinic in 2 weeks for suture removal and activity as tolerated.     Electronically signed by Jose Eduardo De La Cruz MD on 9/3/2020 at 7:42 AM

## 2020-09-03 ENCOUNTER — HOSPITAL ENCOUNTER (OUTPATIENT)
Age: 70
Setting detail: OUTPATIENT SURGERY
Discharge: HOME | End: 2020-09-03
Attending: ORTHOPAEDIC SURGERY | Admitting: ORTHOPAEDIC SURGERY
Payer: MEDICARE

## 2020-09-03 ENCOUNTER — ANESTHESIA (OUTPATIENT)
Dept: OPERATING ROOM | Age: 70
End: 2020-09-03
Payer: MEDICARE

## 2020-09-03 ENCOUNTER — ANESTHESIA EVENT (OUTPATIENT)
Dept: OPERATING ROOM | Age: 70
End: 2020-09-03
Payer: MEDICARE

## 2020-09-03 VITALS
SYSTOLIC BLOOD PRESSURE: 90 MMHG | OXYGEN SATURATION: 99 % | RESPIRATION RATE: 32 BRPM | DIASTOLIC BLOOD PRESSURE: 49 MMHG | TEMPERATURE: 97 F

## 2020-09-03 VITALS
WEIGHT: 230 LBS | OXYGEN SATURATION: 95 % | RESPIRATION RATE: 16 BRPM | SYSTOLIC BLOOD PRESSURE: 183 MMHG | TEMPERATURE: 96.8 F | BODY MASS INDEX: 36.96 KG/M2 | DIASTOLIC BLOOD PRESSURE: 82 MMHG | HEIGHT: 66 IN | HEART RATE: 60 BPM

## 2020-09-03 LAB
GLUCOSE BLD-MCNC: 157 MG/DL (ref 70–99)
PERFORMED ON: ABNORMAL

## 2020-09-03 PROCEDURE — 2580000003 HC RX 258: Performed by: ORTHOPAEDIC SURGERY

## 2020-09-03 PROCEDURE — 6370000000 HC RX 637 (ALT 250 FOR IP): Performed by: ORTHOPAEDIC SURGERY

## 2020-09-03 PROCEDURE — 3600000013 HC SURGERY LEVEL 3 ADDTL 15MIN: Performed by: ORTHOPAEDIC SURGERY

## 2020-09-03 PROCEDURE — 3700000000 HC ANESTHESIA ATTENDED CARE: Performed by: ORTHOPAEDIC SURGERY

## 2020-09-03 PROCEDURE — 6360000002 HC RX W HCPCS: Performed by: ORTHOPAEDIC SURGERY

## 2020-09-03 PROCEDURE — 7100000000 HC PACU RECOVERY - FIRST 15 MIN: Performed by: ORTHOPAEDIC SURGERY

## 2020-09-03 PROCEDURE — 3600000003 HC SURGERY LEVEL 3 BASE: Performed by: ORTHOPAEDIC SURGERY

## 2020-09-03 PROCEDURE — 2500000003 HC RX 250 WO HCPCS

## 2020-09-03 PROCEDURE — 7100000011 HC PHASE II RECOVERY - ADDTL 15 MIN: Performed by: ORTHOPAEDIC SURGERY

## 2020-09-03 PROCEDURE — 7100000010 HC PHASE II RECOVERY - FIRST 15 MIN: Performed by: ORTHOPAEDIC SURGERY

## 2020-09-03 PROCEDURE — 2709999900 HC NON-CHARGEABLE SUPPLY: Performed by: ORTHOPAEDIC SURGERY

## 2020-09-03 PROCEDURE — 7100000001 HC PACU RECOVERY - ADDTL 15 MIN: Performed by: ORTHOPAEDIC SURGERY

## 2020-09-03 PROCEDURE — 3700000001 HC ADD 15 MINUTES (ANESTHESIA): Performed by: ORTHOPAEDIC SURGERY

## 2020-09-03 PROCEDURE — 82947 ASSAY GLUCOSE BLOOD QUANT: CPT

## 2020-09-03 PROCEDURE — 6360000002 HC RX W HCPCS

## 2020-09-03 RX ORDER — HYDROMORPHONE HYDROCHLORIDE 1 MG/ML
0.5 INJECTION, SOLUTION INTRAMUSCULAR; INTRAVENOUS; SUBCUTANEOUS EVERY 5 MIN PRN
Status: DISCONTINUED | OUTPATIENT
Start: 2020-09-03 | End: 2020-09-03 | Stop reason: HOSPADM

## 2020-09-03 RX ORDER — MIDAZOLAM HYDROCHLORIDE 1 MG/ML
2 INJECTION INTRAMUSCULAR; INTRAVENOUS
Status: DISCONTINUED | OUTPATIENT
Start: 2020-09-03 | End: 2020-09-03 | Stop reason: HOSPADM

## 2020-09-03 RX ORDER — FENTANYL CITRATE 50 UG/ML
25 INJECTION, SOLUTION INTRAMUSCULAR; INTRAVENOUS
Status: DISCONTINUED | OUTPATIENT
Start: 2020-09-03 | End: 2020-09-03 | Stop reason: HOSPADM

## 2020-09-03 RX ORDER — LIDOCAINE HYDROCHLORIDE 10 MG/ML
INJECTION, SOLUTION EPIDURAL; INFILTRATION; INTRACAUDAL; PERINEURAL PRN
Status: DISCONTINUED | OUTPATIENT
Start: 2020-09-03 | End: 2020-09-03 | Stop reason: SDUPTHER

## 2020-09-03 RX ORDER — MORPHINE SULFATE 4 MG/ML
4 INJECTION, SOLUTION INTRAMUSCULAR; INTRAVENOUS EVERY 5 MIN PRN
Status: DISCONTINUED | OUTPATIENT
Start: 2020-09-03 | End: 2020-09-03 | Stop reason: HOSPADM

## 2020-09-03 RX ORDER — FENTANYL CITRATE 50 UG/ML
INJECTION, SOLUTION INTRAMUSCULAR; INTRAVENOUS PRN
Status: DISCONTINUED | OUTPATIENT
Start: 2020-09-03 | End: 2020-09-03 | Stop reason: SDUPTHER

## 2020-09-03 RX ORDER — METOCLOPRAMIDE HYDROCHLORIDE 5 MG/ML
10 INJECTION INTRAMUSCULAR; INTRAVENOUS
Status: DISCONTINUED | OUTPATIENT
Start: 2020-09-03 | End: 2020-09-03 | Stop reason: HOSPADM

## 2020-09-03 RX ORDER — ONDANSETRON 2 MG/ML
INJECTION INTRAMUSCULAR; INTRAVENOUS PRN
Status: DISCONTINUED | OUTPATIENT
Start: 2020-09-03 | End: 2020-09-03 | Stop reason: SDUPTHER

## 2020-09-03 RX ORDER — SODIUM CHLORIDE 0.9 % (FLUSH) 0.9 %
10 SYRINGE (ML) INJECTION EVERY 12 HOURS SCHEDULED
Status: DISCONTINUED | OUTPATIENT
Start: 2020-09-03 | End: 2020-09-03 | Stop reason: HOSPADM

## 2020-09-03 RX ORDER — PROMETHAZINE HYDROCHLORIDE 25 MG/ML
6.25 INJECTION, SOLUTION INTRAMUSCULAR; INTRAVENOUS
Status: DISCONTINUED | OUTPATIENT
Start: 2020-09-03 | End: 2020-09-03 | Stop reason: HOSPADM

## 2020-09-03 RX ORDER — SODIUM CHLORIDE 0.9 % (FLUSH) 0.9 %
10 SYRINGE (ML) INJECTION PRN
Status: DISCONTINUED | OUTPATIENT
Start: 2020-09-03 | End: 2020-09-03 | Stop reason: HOSPADM

## 2020-09-03 RX ORDER — SODIUM CHLORIDE, SODIUM LACTATE, POTASSIUM CHLORIDE, CALCIUM CHLORIDE 600; 310; 30; 20 MG/100ML; MG/100ML; MG/100ML; MG/100ML
INJECTION, SOLUTION INTRAVENOUS CONTINUOUS
Status: DISCONTINUED | OUTPATIENT
Start: 2020-09-03 | End: 2020-09-03 | Stop reason: HOSPADM

## 2020-09-03 RX ORDER — OXYCODONE HYDROCHLORIDE AND ACETAMINOPHEN 5; 325 MG/1; MG/1
1 TABLET ORAL PRN
Status: COMPLETED | OUTPATIENT
Start: 2020-09-03 | End: 2020-09-03

## 2020-09-03 RX ORDER — DIPHENHYDRAMINE HYDROCHLORIDE 50 MG/ML
12.5 INJECTION INTRAMUSCULAR; INTRAVENOUS
Status: DISCONTINUED | OUTPATIENT
Start: 2020-09-03 | End: 2020-09-03 | Stop reason: HOSPADM

## 2020-09-03 RX ORDER — FENTANYL CITRATE 50 UG/ML
50 INJECTION, SOLUTION INTRAMUSCULAR; INTRAVENOUS
Status: DISCONTINUED | OUTPATIENT
Start: 2020-09-03 | End: 2020-09-03 | Stop reason: HOSPADM

## 2020-09-03 RX ORDER — OXYCODONE HYDROCHLORIDE AND ACETAMINOPHEN 5; 325 MG/1; MG/1
2 TABLET ORAL PRN
Status: COMPLETED | OUTPATIENT
Start: 2020-09-03 | End: 2020-09-03

## 2020-09-03 RX ORDER — DEXAMETHASONE SODIUM PHOSPHATE 10 MG/ML
INJECTION, SOLUTION INTRAMUSCULAR; INTRAVENOUS PRN
Status: DISCONTINUED | OUTPATIENT
Start: 2020-09-03 | End: 2020-09-03 | Stop reason: SDUPTHER

## 2020-09-03 RX ORDER — HYDROCODONE BITARTRATE AND ACETAMINOPHEN 5; 325 MG/1; MG/1
1 TABLET ORAL EVERY 4 HOURS PRN
Qty: 15 TABLET | Refills: 0 | Status: SHIPPED | OUTPATIENT
Start: 2020-09-03 | End: 2020-09-10

## 2020-09-03 RX ORDER — HYDROMORPHONE HYDROCHLORIDE 1 MG/ML
0.25 INJECTION, SOLUTION INTRAMUSCULAR; INTRAVENOUS; SUBCUTANEOUS EVERY 5 MIN PRN
Status: DISCONTINUED | OUTPATIENT
Start: 2020-09-03 | End: 2020-09-03 | Stop reason: HOSPADM

## 2020-09-03 RX ORDER — LABETALOL HYDROCHLORIDE 5 MG/ML
5 INJECTION, SOLUTION INTRAVENOUS EVERY 10 MIN PRN
Status: DISCONTINUED | OUTPATIENT
Start: 2020-09-03 | End: 2020-09-03 | Stop reason: HOSPADM

## 2020-09-03 RX ORDER — ENALAPRILAT 2.5 MG/2ML
1.25 INJECTION INTRAVENOUS
Status: DISCONTINUED | OUTPATIENT
Start: 2020-09-03 | End: 2020-09-03 | Stop reason: HOSPADM

## 2020-09-03 RX ORDER — SODIUM CHLORIDE 9 MG/ML
INJECTION, SOLUTION INTRAVENOUS CONTINUOUS
Status: DISCONTINUED | OUTPATIENT
Start: 2020-09-03 | End: 2020-09-03 | Stop reason: HOSPADM

## 2020-09-03 RX ORDER — HYDRALAZINE HYDROCHLORIDE 20 MG/ML
5 INJECTION INTRAMUSCULAR; INTRAVENOUS EVERY 10 MIN PRN
Status: DISCONTINUED | OUTPATIENT
Start: 2020-09-03 | End: 2020-09-03 | Stop reason: HOSPADM

## 2020-09-03 RX ORDER — MEPERIDINE HYDROCHLORIDE 50 MG/ML
12.5 INJECTION INTRAMUSCULAR; INTRAVENOUS; SUBCUTANEOUS EVERY 5 MIN PRN
Status: DISCONTINUED | OUTPATIENT
Start: 2020-09-03 | End: 2020-09-03 | Stop reason: HOSPADM

## 2020-09-03 RX ORDER — LIDOCAINE HYDROCHLORIDE 10 MG/ML
1 INJECTION, SOLUTION EPIDURAL; INFILTRATION; INTRACAUDAL; PERINEURAL
Status: DISCONTINUED | OUTPATIENT
Start: 2020-09-03 | End: 2020-09-03 | Stop reason: HOSPADM

## 2020-09-03 RX ORDER — PROPOFOL 10 MG/ML
INJECTION, EMULSION INTRAVENOUS PRN
Status: DISCONTINUED | OUTPATIENT
Start: 2020-09-03 | End: 2020-09-03 | Stop reason: SDUPTHER

## 2020-09-03 RX ORDER — MORPHINE SULFATE 4 MG/ML
2 INJECTION, SOLUTION INTRAMUSCULAR; INTRAVENOUS EVERY 5 MIN PRN
Status: DISCONTINUED | OUTPATIENT
Start: 2020-09-03 | End: 2020-09-03 | Stop reason: HOSPADM

## 2020-09-03 RX ORDER — HYDROCODONE BITARTRATE AND ACETAMINOPHEN 5; 325 MG/1; MG/1
1 TABLET ORAL ONCE
Status: CANCELLED | OUTPATIENT
Start: 2020-09-03

## 2020-09-03 RX ADMIN — PROPOFOL 150 MG: 10 INJECTION, EMULSION INTRAVENOUS at 07:11

## 2020-09-03 RX ADMIN — Medication 2 G: at 07:14

## 2020-09-03 RX ADMIN — LIDOCAINE HYDROCHLORIDE 50 MG: 10 INJECTION, SOLUTION EPIDURAL; INFILTRATION; INTRACAUDAL; PERINEURAL at 07:11

## 2020-09-03 RX ADMIN — ONDANSETRON HYDROCHLORIDE 4 MG: 2 INJECTION, SOLUTION INTRAMUSCULAR; INTRAVENOUS at 07:16

## 2020-09-03 RX ADMIN — FENTANYL CITRATE 50 MCG: 50 INJECTION INTRAMUSCULAR; INTRAVENOUS at 07:21

## 2020-09-03 RX ADMIN — SODIUM CHLORIDE, POTASSIUM CHLORIDE, SODIUM LACTATE AND CALCIUM CHLORIDE: 600; 310; 30; 20 INJECTION, SOLUTION INTRAVENOUS at 06:16

## 2020-09-03 RX ADMIN — OXYCODONE HYDROCHLORIDE AND ACETAMINOPHEN 1 TABLET: 5; 325 TABLET ORAL at 08:25

## 2020-09-03 RX ADMIN — FENTANYL CITRATE 25 MCG: 50 INJECTION INTRAMUSCULAR; INTRAVENOUS at 07:31

## 2020-09-03 RX ADMIN — DEXAMETHASONE SODIUM PHOSPHATE 10 MG: 10 INJECTION, SOLUTION INTRAMUSCULAR; INTRAVENOUS at 07:16

## 2020-09-03 RX ADMIN — PROPOFOL 50 MG: 10 INJECTION, EMULSION INTRAVENOUS at 07:27

## 2020-09-03 ASSESSMENT — PAIN SCALES - GENERAL
PAINLEVEL_OUTOF10: 6
PAINLEVEL_OUTOF10: 0
PAINLEVEL_OUTOF10: 6

## 2020-09-03 ASSESSMENT — ENCOUNTER SYMPTOMS: SHORTNESS OF BREATH: 0

## 2020-09-03 ASSESSMENT — LIFESTYLE VARIABLES: SMOKING_STATUS: 0

## 2020-09-03 NOTE — ANESTHESIA POSTPROCEDURE EVALUATION
Department of Anesthesiology  Postprocedure Note    Patient: Marcela Rosa  MRN: [id number]  YOB: 1950  Date of evaluation: 9/3/2020  Time:  7:45 AM     Procedure Summary     Date:  09/03/20 Room / Location:  [address]    Anesthesia Start:  2738 Anesthesia Stop:  5097    Procedure:  RIGHT CARPAL TUNNEL RELEASE (Right Hand) Diagnosis:  (G56.01)    Surgeon:  Joel Flores MD Responsible Provider:  SARA Mitchell CRNA    Anesthesia Type:  general ASA Status:  3          Anesthesia Type: general    Palmer Phase I: Palmer Score: 10    Palmer Phase II:      Last vitals: Reviewed and per EMR flowsheets.        Anesthesia Post Evaluation    Patient location during evaluation: PACU  Patient participation: waiting for patient participation  Level of consciousness: sleepy but conscious  Pain score: 0  Airway patency: patent  Nausea & Vomiting: no nausea and no vomiting  Complications: no  Cardiovascular status: hemodynamically stable and blood pressure returned to baseline  Respiratory status: acceptable  Hydration status: stable

## 2020-09-03 NOTE — H&P
Pt Name: Mar Nagy  MRN: [id number]  YOB: 1950  Date of evaluation: 9/3/2020    H&P including current review of systems was updated in the paper chart and/or the document previously scanned into the record. There have been no significant changes or new problems since the original evaluation. The patient's problems continue and indications for contemplated procedure have not changed.     Electronically signed by Feliciano De La Rosa MD on 9/3/2020 at 6:49 AM

## 2020-09-03 NOTE — DISCHARGE INSTR - ACTIVITY
Learning About COVID-19 and Social Distancing  What is it? Social distancing means putting space between yourself and other people. The recommended distance is 6 feet, or about 2 meters. This also means staying away from any place where people may gather, such as jarrett or other public gathering places. Why is it important? Social distancing is the best way to reduce the spread of COVID-19. This virus seems to spread from person to person through droplets from coughing and sneezing. So if you keep your distance from others, you're less likely to get it or spread it. And social distancing is important for everyone, not just those who are at high risk of infection, like older people. You might have the virus but not have symptoms. You could then give the infection to someone you come into contact with. How is it done? Putting 6 feet, or about 2 meters, between you and other people is the recommended distance. Also stay away from any place where people may gather, such as jarrett or other public gathering places. So if possible:  · Work from home, and keep your kids at home. · Don't travel if you don't have to. And avoid public transportation, ride-shares, and taxis unless you have no choice. · Limit shopping to essentials, like food and medicines. · Wear a cloth face cover if you have to go to a public place like the grocery store or pharmacy. · Don't eat in restaurants. (You can still get takeout or food deliveries.)  · Avoid crowds and busy places. Follow stay-at-home orders or other directions for your area. Current as of: May 8, 2020               Content Version: 12.5  © 2006-2020 [employer]. Care instructions adapted under license by [address] If you have questions about a medical condition or this instruction, always ask your healthcare professional. [address] any warranty or liability for your use of this information.

## 2020-09-03 NOTE — ANESTHESIA PRE PROCEDURE
Department of Anesthesiology  Preprocedure Note       Name:  Katie Hinojosa   Age:  65 y.o.  :  1950                                          MRN:  [id number]         Date:  9/3/2020      Surgeon: Emily Awad):  Nissa Silveira MD    Procedure: Procedure(s):  RIGHT CARPAL TUNNEL RELEASE    Medications prior to admission:   Prior to Admission medications    Medication Sig Start Date End Date Taking?  Authorizing Provider   amLODIPine (NORVASC) 5 MG tablet TAKE 1 TABLET EVERY DAY  Patient taking differently: Take 5 mg by mouth daily  20  Yes Deja Bueno MD   glipiZIDE (GLUCOTROL XL) 2.5 MG extended release tablet TAKE 1 TABLET EVERY DAY  Patient taking differently: Take 2.5 mg by mouth daily  8/3/20  Yes Deja Bueno MD   simvastatin (ZOCOR) 40 MG tablet TAKE 1 TABLET EVERY EVENING  Patient taking differently: Take 40 mg by mouth daily  20  Yes Deja uBeno MD   metFORMIN (GLUCOPHAGE) 500 MG tablet TAKE 2 TABLETS TWICE DAILY  Patient taking differently: Take 1,000 mg by mouth 2 times daily (with meals) TAKE 2 TABLETS TWICE DAILY 19  Yes Deja Bueno MD   ACCU-CHEALICE ROSA PLUS strip TEST BLOOD SUGAR FOUR TIMES DAILY 18  Yes Deja Bueno MD   Alcohol Swabs (B-D SINGLE USE SWABS REGULAR) PADS USE THREE TIMES DAILY 18  Yes Deja Bueno MD   ACCU-CHEK SOFTCLIX LANCETS MISC TEST FOUR TIMES DAILY 18  Yes Deja Bueno MD   aspirin 81 MG tablet Take 81 mg by mouth daily   Yes Historical Provider, MD   Omega-3 Fatty Acids (FISH OIL) 1000 MG CAPS Take 1,000 mg by mouth 2 times daily   Yes Historical Provider, MD   MULTIPLE VITAMIN PO Take 1 tablet by mouth daily   Yes Historical Provider, MD   Calcium Carbonate-Vitamin D (CALCIUM 500/D PO) Take 1 tablet by mouth 2 times daily   Yes Historical Provider, MD   vitamin B-12 (CYANOCOBALAMIN) 1000 MCG tablet Take 1,000 mcg by mouth daily   Yes Historical Provider, MD   timolol (TIMOPTIC) 0.5 % ophthalmic solution Place 1 drop into the right eye daily  9/8/17  Yes Historical Provider, MD   meloxicam (MOBIC) 15 MG tablet Take 15 mg by mouth daily  7/27/20   Historical Provider, MD       Current medications:    Current Facility-Administered Medications   Medication Dose Route Frequency Provider Last Rate Last Dose    ceFAZolin (ANCEF) 2 g in 0.9% sodium chloride 50 mL IVPB  2 g Intravenous Once Ava Toribio MD        lactated ringers infusion   Intravenous Continuous Ava MD Catarino 100 mL/hr at 09/03/20 1459         Allergies:  No Known Allergies    Problem List:    Patient Active Problem List   Diagnosis Code    CHAPARRO (acute kidney injury) ([facility] N17.9    Hypertension I10    Hyperlipidemia E78.5    Glaucoma H40.9    CAD (coronary artery disease) I25.10    Type 2 diabetes mellitus ([address] E11.9    Diabetes mellitus ([facility] E11.9    Acute cystitis without hematuria N30.00    Right carpal tunnel syndrome G56.01       Past Medical History:        Diagnosis Date    Acute sinusitis     CHAPARRO (acute kidney injury) ([facility] 5/18/2018    Anemia     Callous ulcer ([address]     Cataract     Dermatitis     Diabetes mellitus ([address]     Diabetic nephropathy ([address]     Diabetic retinopathy ([address]     Glaucoma     Gout     Hemorrhoids     Hx of TB skin testing     positive    Hyperlipidemia     Hypertension     Type 2 diabetes mellitus ([facility] 5/18/2018    Vitamin D deficiency        Past Surgical History:        Procedure Laterality Date    CARDIAC SURGERY      CATARACT REMOVAL      CHOLECYSTECTOMY      COLONOSCOPY N/A 10/11/2019    Dr Misty King of a hemostatic clip-Suboptimal prep, diverticulosis, internal hemorrhoids-Grade 2, AP x 3, 1 yr recall     CORONARY ARTERY BYPASS GRAFT      ENDOSCOPY, COLON, DIAGNOSTIC      MOUTH SURGERY      OTHER SURGICAL HISTORY      sternal resection    SKIN BIOPSY      UPPER GASTROINTESTINAL ENDOSCOPY N/A 10/11/2019    Dr Starr Galeas (+) H Pylori    VASCULAR LABABO, [address]    Drug/Infectious Status (If Applicable):  No results found for: HIV, HEPCAB    COVID-19 Screening (If Applicable):   Lab Results   Component Value Date    COVID19 NOT DETECTED 2020         Anesthesia Evaluation  Patient summary reviewed and Nursing notes reviewed no history of anesthetic complications:   Airway: Mallampati: II  TM distance: >3 FB   Neck ROM: full  Mouth opening: > = 3 FB Dental:    (+) upper dentures      Pulmonary:       (-) shortness of breath, sleep apnea and not a current smoker                           Cardiovascular:  Exercise tolerance: good (>4 METS),   (+) hypertension:, CAD:, CABG/stent (CABG X 3 ~ 20 years ago, no sternum 2/2 to infection):, hyperlipidemia    (-) pacemaker, valvular problems/murmurs, dysrhythmias and  angina    ECG reviewed               Beta Blocker:  Not on Beta Blocker      ROS comment: EK BPM  Sinus rhythm  Possible anterior infarct - age undetermined  Inferior/lateral ST-T abnormality  Sequential changes compared to a previous ECG  Comparison Summary: Significant changes  Summary: Abnormal ECG     Neuro/Psych:   (+) neuromuscular disease:,    (-) seizures and CVA           GI/Hepatic/Renal:   (+) renal disease: CRI, morbid obesity     (-) GERD and liver disease       Endo/Other:    (+) DiabetesType II DM, , blood dyscrasia (anemia)::., no malignancy/cancer. (-) no malignancy/cancer               Abdominal:           Vascular:     - DVT and PE. Anesthesia Plan      general     ASA 3     (Dental consent 2/2 loose tooth.)  Induction: intravenous. MIPS: Postoperative opioids intended and Prophylactic antiemetics administered. Anesthetic plan and risks discussed with patient.                       José Luis Sharp DO   9/3/2020

## 2020-10-02 ENCOUNTER — OFFICE VISIT (OUTPATIENT)
Dept: INTERNAL MEDICINE | Age: 70
End: 2020-10-02
Payer: MEDICARE

## 2020-10-02 VITALS
HEART RATE: 75 BPM | DIASTOLIC BLOOD PRESSURE: 70 MMHG | BODY MASS INDEX: 38.15 KG/M2 | SYSTOLIC BLOOD PRESSURE: 134 MMHG | OXYGEN SATURATION: 98 % | WEIGHT: 229 LBS | HEIGHT: 65 IN

## 2020-10-02 DIAGNOSIS — E11.9 TYPE 2 DIABETES MELLITUS WITHOUT COMPLICATION, UNSPECIFIED WHETHER LONG TERM INSULIN USE (HCC): ICD-10-CM

## 2020-10-02 DIAGNOSIS — E55.9 VITAMIN D DEFICIENCY: ICD-10-CM

## 2020-10-02 LAB
ALBUMIN SERPL-MCNC: 3.6 G/DL (ref 3.5–5.2)
ALP BLD-CCNC: 50 U/L (ref 35–104)
ALT SERPL-CCNC: 27 U/L (ref 5–33)
ANION GAP SERPL CALCULATED.3IONS-SCNC: 10 MMOL/L (ref 7–19)
AST SERPL-CCNC: 29 U/L (ref 5–32)
BASOPHILS ABSOLUTE: 0.1 K/UL (ref 0–0.2)
BASOPHILS RELATIVE PERCENT: 0.8 % (ref 0–1)
BILIRUB SERPL-MCNC: 0.3 MG/DL (ref 0.2–1.2)
BUN BLDV-MCNC: 13 MG/DL (ref 8–23)
CALCIUM SERPL-MCNC: 8.9 MG/DL (ref 8.8–10.2)
CHLORIDE BLD-SCNC: 103 MMOL/L (ref 98–111)
CHOLESTEROL, TOTAL: 144 MG/DL (ref 160–199)
CO2: 25 MMOL/L (ref 22–29)
CREAT SERPL-MCNC: 1.2 MG/DL (ref 0.5–0.9)
CREATININE URINE: 41.7 MG/DL (ref 4.2–622)
EOSINOPHILS ABSOLUTE: 0.3 K/UL (ref 0–0.6)
EOSINOPHILS RELATIVE PERCENT: 4.7 % (ref 0–5)
GFR AFRICAN AMERICAN: 53
GFR NON-AFRICAN AMERICAN: 44
GLUCOSE BLD-MCNC: 154 MG/DL (ref 74–109)
HBA1C MFR BLD: 7.6 % (ref 4–6)
HCT VFR BLD CALC: 35.1 % (ref 37–47)
HDLC SERPL-MCNC: 50 MG/DL (ref 65–121)
HEMOGLOBIN: 11.7 G/DL (ref 12–16)
IMMATURE GRANULOCYTES #: 0 K/UL
LDL CHOLESTEROL CALCULATED: 61 MG/DL
LYMPHOCYTES ABSOLUTE: 2.5 K/UL (ref 1.1–4.5)
LYMPHOCYTES RELATIVE PERCENT: 38.6 % (ref 20–40)
MCH RBC QN AUTO: 32.7 PG (ref 27–31)
MCHC RBC AUTO-ENTMCNC: 33.3 G/DL (ref 33–37)
MCV RBC AUTO: 98 FL (ref 81–99)
MICROALBUMIN UR-MCNC: 329.5 MG/DL (ref 0–19)
MICROALBUMIN/CREAT UR-RTO: 7901.7 MG/G
MONOCYTES ABSOLUTE: 0.4 K/UL (ref 0–0.9)
MONOCYTES RELATIVE PERCENT: 6.9 % (ref 0–10)
NEUTROPHILS ABSOLUTE: 3.1 K/UL (ref 1.5–7.5)
NEUTROPHILS RELATIVE PERCENT: 48.8 % (ref 50–65)
PDW BLD-RTO: 12.8 % (ref 11.5–14.5)
PLATELET # BLD: 319 K/UL (ref 130–400)
PMV BLD AUTO: 11 FL (ref 9.4–12.3)
POTASSIUM SERPL-SCNC: 4.5 MMOL/L (ref 3.5–5)
RBC # BLD: 3.58 M/UL (ref 4.2–5.4)
SODIUM BLD-SCNC: 138 MMOL/L (ref 136–145)
TOTAL PROTEIN: 7 G/DL (ref 6.6–8.7)
TRIGL SERPL-MCNC: 163 MG/DL (ref 0–149)
TSH SERPL DL<=0.05 MIU/L-ACNC: 2.12 UIU/ML (ref 0.27–4.2)
VITAMIN D 25-HYDROXY: 35.9 NG/ML
WBC # BLD: 6.4 K/UL (ref 4.8–10.8)

## 2020-10-02 PROCEDURE — 3051F HG A1C>EQUAL 7.0%<8.0%: CPT | Performed by: INTERNAL MEDICINE

## 2020-10-02 PROCEDURE — G8484 FLU IMMUNIZE NO ADMIN: HCPCS | Performed by: INTERNAL MEDICINE

## 2020-10-02 PROCEDURE — 4040F PNEUMOC VAC/ADMIN/RCVD: CPT | Performed by: INTERNAL MEDICINE

## 2020-10-02 PROCEDURE — G0439 PPPS, SUBSEQ VISIT: HCPCS | Performed by: INTERNAL MEDICINE

## 2020-10-02 PROCEDURE — 1123F ACP DISCUSS/DSCN MKR DOCD: CPT | Performed by: INTERNAL MEDICINE

## 2020-10-02 PROCEDURE — 3017F COLORECTAL CA SCREEN DOC REV: CPT | Performed by: INTERNAL MEDICINE

## 2020-10-02 RX ORDER — AMOXICILLIN 500 MG/1
500 CAPSULE ORAL 3 TIMES DAILY
Qty: 30 CAPSULE | Refills: 0 | Status: SHIPPED | OUTPATIENT
Start: 2020-10-02 | End: 2020-10-12

## 2020-10-02 ASSESSMENT — LIFESTYLE VARIABLES: HOW OFTEN DO YOU HAVE A DRINK CONTAINING ALCOHOL: 0

## 2020-10-02 ASSESSMENT — PATIENT HEALTH QUESTIONNAIRE - PHQ9
1. LITTLE INTEREST OR PLEASURE IN DOING THINGS: 0
SUM OF ALL RESPONSES TO PHQ QUESTIONS 1-9: 0
2. FEELING DOWN, DEPRESSED OR HOPELESS: 0
SUM OF ALL RESPONSES TO PHQ9 QUESTIONS 1 & 2: 0
SUM OF ALL RESPONSES TO PHQ QUESTIONS 1-9: 0

## 2020-10-02 NOTE — PATIENT INSTRUCTIONS
Patient Education        Learning About Diabetes Food Guidelines  Your Care Instructions     Meal planning is important to manage diabetes. It helps keep your blood sugar at a target level (which you set with your doctor). You don't have to eat special foods. You can eat what your family eats, including sweets once in a while. But you do have to pay attention to how often you eat and how much you eat of certain foods. You may want to work with a dietitian or a certified diabetes educator (CDE) to help you plan meals and snacks. A dietitian or CDE can also help you lose weight if that is one of your goals. What should you know about eating carbs? Managing the amount of carbohydrate (carbs) you eat is an important part of healthy meals when you have diabetes. Carbohydrate is found in many foods. · Learn which foods have carbs. And learn the amounts of carbs in different foods. ? Bread, cereal, pasta, and rice have about 15 grams of carbs in a serving. A serving is 1 slice of bread (1 ounce), ½ cup of cooked cereal, or 1/3 cup of cooked pasta or rice. ? Fruits have 15 grams of carbs in a serving. A serving is 1 small fresh fruit, such as an apple or orange; ½ of a banana; ½ cup of cooked or canned fruit; ½ cup of fruit juice; 1 cup of melon or raspberries; or 2 tablespoons of dried fruit. ? Milk and no-sugar-added yogurt have 15 grams of carbs in a serving. A serving is 1 cup of milk or 2/3 cup of no-sugar-added yogurt. ? Starchy vegetables have 15 grams of carbs in a serving. A serving is ½ cup of mashed potatoes or sweet potato; 1 cup winter squash; ½ of a small baked potato; ½ cup of cooked beans; or ½ cup cooked corn or green peas. · Learn how much carbs to eat each day and at each meal. A dietitian or CDE can teach you how to keep track of the amount of carbs you eat. This is called carbohydrate counting. · If you are not sure how to count carbohydrate grams, use the Plate Method to plan meals.  It is a good, quick way to make sure that you have a balanced meal. It also helps you spread carbs throughout the day. ? Divide your plate by types of foods. Put non-starchy vegetables on half the plate, meat or other protein food on one-quarter of the plate, and a grain or starchy vegetable in the final quarter of the plate. To this you can add a small piece of fruit and 1 cup of milk or yogurt, depending on how many carbs you are supposed to eat at a meal.  · Try to eat about the same amount of carbs at each meal. Do not \"save up\" your daily allowance of carbs to eat at one meal.  · Proteins have very little or no carbs per serving. Examples of proteins are beef, chicken, turkey, fish, eggs, tofu, cheese, cottage cheese, and peanut butter. A serving size of meat is 3 ounces, which is about the size of a deck of cards. Examples of meat substitute serving sizes (equal to 1 ounce of meat) are 1/4 cup of cottage cheese, 1 egg, 1 tablespoon of peanut butter, and ½ cup of tofu. How can you eat out and still eat healthy? · Learn to estimate the serving sizes of foods that have carbohydrate. If you measure food at home, it will be easier to estimate the amount in a serving of restaurant food. · If the meal you order has too much carbohydrate (such as potatoes, corn, or baked beans), ask to have a low-carbohydrate food instead. Ask for a salad or green vegetables. · If you use insulin, check your blood sugar before and after eating out to help you plan how much to eat in the future. · If you eat more carbohydrate at a meal than you had planned, take a walk or do other exercise. This will help lower your blood sugar. What else should you know? · Limit saturated fat, such as the fat from meat and dairy products. This is a healthy choice because people who have diabetes are at higher risk of heart disease. So choose lean cuts of meat and nonfat or low-fat dairy products.  Use olive or canola oil instead of butter or shortening when cooking. · Don't skip meals. Your blood sugar may drop too low if you skip meals and take insulin or certain medicines for diabetes. · Check with your doctor before you drink alcohol. Alcohol can cause your blood sugar to drop too low. Alcohol can also cause a bad reaction if you take certain diabetes medicines. Follow-up care is a key part of your treatment and safety. Be sure to make and go to all appointments, and call your doctor if you are having problems. It's also a good idea to know your test results and keep a list of the medicines you take. Where can you learn more? Go to https://[email or web address].[email or web address]. org and sign in to your [employer] account. Enter D965 in the [employer] box to learn more about \"Learning About Diabetes Food Guidelines. \"     If you do not have an account, please click on the \"Sign Up Now\" link. Current as of: December 20, 2019               Content Version: 12.5  © 2006-2020 [employer]. Care instructions adapted under license by [facility] ([facility]). If you have questions about a medical condition or this instruction, always ask your healthcare professional. [address] any warranty or liability for your use of this information. Patient Education        Impetigo: Care Instructions  Your Care Instructions  Impetigo (say \"lb-hsr-QK-go\") is a skin infection caused by bacteria. It causes blisters that break and become oozing, yellow, crusty sores. Impetigo can be anywhere on the body. Scratching the sores may spread the infection to other parts of the body. You can also spread it to others through close contact or when you share towels, clothing, and other items. Prescription antibiotic ointment or pills can usually cure impetigo. (After a day of antibiotics, the infection should not spread.)  Follow-up care is a key part of your treatment and safety.  Be sure to make and go to all appointments, and call your doctor if you are

## 2020-10-02 NOTE — PROGRESS NOTES
Chief Complaint   Patient presents with    Medicare AWV       HPI: Dat Massey is a 65 y.o. female is here for annual physical exam.  Her functional status is good. She denies any history of falls. She has no concerns in regards with safety, hearing, or cognition. She does have a spot on the back of her head. She thinks that she was bitten by mosquito. I did look at the area. She does have impetigo to the back of her scalp. We will treat her for this. She sees Dr. Heidy Owens for right carpal tunnel syndrome. She recently had surgery for this, and is healing well. She sees Dr. Gavin Quiroz for podiatry care issues. Her blood sugars are averaging between 110 and 130. Her A1c did go up from 7.1-7.6. Her cholesterol is 144. She admits to not always eating right. She plans to start working on diet and exercise. She could not take the full dose of glipizide that we had prescribed recently. She is only taken the 2.5 mg tablet. Her blood pressure is well controlled. She denies any complaints of chest pain, chest pressure or shortness of breath. Mobic does help with the joint pain. She is tolerating her statin side effects.   She really has no other major concerns or complaints today    Routine screening is as follows:  Eye exam yearly per Dr. Crawford Foot  Flu vaccine declined  Mammogram declined  Colonoscopy 2019  DEXA declined  Pneumovax declined    Past Medical History:   Diagnosis Date    Acute sinusitis     CHAPARRO (acute kidney injury) ([address] 5/18/2018    Anemia     Callous ulcer ([address]     Cataract     Dermatitis     Diabetes mellitus ([address]     Diabetic nephropathy ([address]     Diabetic retinopathy ([address]     Glaucoma     Gout     Hemorrhoids     Hx of TB skin testing     positive    Hyperlipidemia     Hypertension     Type 2 diabetes mellitus ([address] 5/18/2018    Vitamin D deficiency       Past Surgical History:   Procedure Laterality Date    CARDIAC SURGERY      CARPAL TUNNEL RELEASE Right 9/3/2020    RIGHT CARPAL TUNNEL [facility]LEASE [facility]      COLONOSCOPY N/A 10/11/2019    Dr Rhoades Hiss of a hemostatic clip-Suboptimal prep, diverticulosis, internal hemorrhoids-Grade 2, AP x 3, 1 yr recall     CORONARY ARTERY BYPASS GRAFT      ENDOSCOPY, COLON, DIAGNOSTIC      MOUTH SURGERY      OTHER SURGICAL HISTORY      sternal resection    SKIN BIOPSY      UPPER GASTROINTESTINAL ENDOSCOPY N/A 10/11/2019    Dr Aurelio Meza (+) H Pylori    VASCULAR SURGERY        Social History     Socioeconomic History    Marital status: Single     Spouse name: None    Number of children: None    Years of education: None    Highest education level: None   Occupational History     Employer: [employer]    Financial resource strain: None    Food insecurity     Worry: None     Inability: None    Transportation needs     Medical: None     Non-medical: None   Tobacco Use    Smoking status: Never Smoker    Smokeless tobacco: Never Used   Substance and Sexual Activity    Alcohol use: No    Drug use: No    Sexual activity: Never   Lifestyle    Physical activity     Days per week: None     Minutes per session: None    Stress: None   Relationships    Social connections     Talks on phone: None     Gets together: None     Attends Congregational service: None     Active member of club or organization: None     Attends meetings of clubs or organizations: None     Relationship status: None    Intimate partner violence     Fear of current or ex partner: None     Emotionally abused: None     Physically abused: None     Forced sexual activity: None   Other Topics Concern    None   Social History Narrative    None      Family History   Problem Relation Age of Onset    Cancer Mother     Diabetes Father     COPD Father     Diabetes Sister     Heart Disease Sister     Colon Polyps Sister     Coronary Art Dis Other mellitus ([facility]    Acute cystitis without hematuria    Right carpal tunnel syndrome        Review of Systems   Constitutional: Negative for fatigue, fever and unexpected weight change. HENT: Negative for congestion, ear discharge, ear pain, mouth sores, nosebleeds, postnasal drip, rhinorrhea, sinus pressure, sinus pain, sneezing, sore throat, tinnitus, trouble swallowing and voice change. Eyes: Negative for discharge, itching and visual disturbance. Respiratory: Negative for cough, choking, chest tightness, shortness of breath, wheezing and stridor. Cardiovascular: Negative for chest pain, palpitations and leg swelling. Gastrointestinal: Negative for abdominal distention, abdominal pain, blood in stool, constipation, diarrhea, nausea and vomiting. Endocrine: Negative for cold intolerance, polydipsia and polyuria. Genitourinary: Negative for difficulty urinating, dysuria, frequency and urgency. Musculoskeletal: Negative for arthralgias, gait problem, joint swelling and myalgias. Recent carpal tunnel surgery. Numbness improved   Skin: Positive for rash. Negative for color change. Rash to back of scalp. Allergic/Immunologic: Negative for food allergies and immunocompromised state. Neurological: Negative for dizziness, tremors, syncope, speech difficulty, weakness, numbness and headaches. Hematological: Negative for adenopathy. Does not bruise/bleed easily. Psychiatric/Behavioral: Negative for confusion and hallucinations. The patient is not nervous/anxious. /70   Pulse 75   Ht 5' 5\" (1.651 m)   Wt 229 lb (103.9 kg)   SpO2 98%   BMI 38.11 kg/m²   Physical Exam  Vitals signs and nursing note reviewed. Constitutional:       General: She is not in acute distress. Appearance: Normal appearance. She is well-developed and normal weight. She is not ill-appearing, toxic-appearing or diaphoretic. HENT:      Head: Normocephalic and atraumatic.       Right Ear: Tympanic membrane, ear canal and external ear normal. There is no impacted cerumen. Left Ear: Tympanic membrane, ear canal and external ear normal. There is no impacted cerumen. Nose: Nose normal. No congestion or rhinorrhea. Mouth/Throat:      Mouth: Mucous membranes are moist.      Pharynx: Oropharynx is clear. No oropharyngeal exudate or posterior oropharyngeal erythema. Eyes:      General: No scleral icterus. Right eye: No discharge. Left eye: No discharge. Extraocular Movements: Extraocular movements intact. Conjunctiva/sclera: Conjunctivae normal.      Pupils: Pupils are equal, round, and reactive to light. Neck:      Musculoskeletal: Normal range of motion and neck supple. No neck rigidity or muscular tenderness. Thyroid: No thyromegaly. Vascular: No carotid bruit or JVD. Trachea: No tracheal deviation. Cardiovascular:      Rate and Rhythm: Normal rate and regular rhythm. Pulses: Normal pulses. Heart sounds: Normal heart sounds. No murmur. No friction rub. No gallop. Pulmonary:      Effort: Pulmonary effort is normal. No respiratory distress. Breath sounds: Normal breath sounds. No stridor. No wheezing, rhonchi or rales. Chest:      Chest wall: No tenderness. Abdominal:      General: Bowel sounds are normal. There is no distension. Palpations: Abdomen is soft. There is no mass. Tenderness: There is no abdominal tenderness. There is no right CVA tenderness, left CVA tenderness, guarding or rebound. Hernia: No hernia is present. Musculoskeletal: Normal range of motion. General: No swelling, tenderness, deformity or signs of injury. Right lower leg: No edema. Left lower leg: No edema. Lymphadenopathy:      Cervical: No cervical adenopathy. Skin:     General: Skin is warm and dry. Capillary Refill: Capillary refill takes less than 2 seconds. Coloration: Skin is not jaundiced or pale. Findings: No bruising, erythema, lesion or rash. Comments: Rash with yellow crust to back of scalp. Neurological:      General: No focal deficit present. Mental Status: She is alert and oriented to person, place, and time. Mental status is at baseline. Cranial Nerves: No cranial nerve deficit. Sensory: No sensory deficit. Motor: No weakness or abnormal muscle tone. Coordination: Coordination normal.      Gait: Gait normal.      Deep Tendon Reflexes: Reflexes are normal and symmetric. Reflexes normal.   Psychiatric:         Mood and Affect: Mood normal.         Behavior: Behavior normal.         Thought Content: Thought content normal.         Judgment: Judgment normal.         1. Routine adult health maintenance    2. Type II diabetes mellitus with nephropathy ([facility]    3. Vitamin D deficiency     4. Impetigo    5. Arthralgia, unspecified joint    6. Essential hypertension    7. Hyperlipidemia, unspecified hyperlipidemia type        ASSESSMENT/PLAN:    66-year-old woman here for her annual physical exam    1. Health maintenance: Routine screenings as per HPI. Labs discussed with patient today    2. Impetigo: Amoxicillin mupirocin prescribed    3. Type 2 diabetes with nephropathy: Continue metformin and glipizide. Work on diet and exercise. Renal parameters stable    4. Arthralgias: Mobic as needed    5. Hypertension: Blood pressure well controlled on Norvasc. She cannot take ACE inhibitor secondary to hyper kalemia. 6.  Hyperlipidemia: Continue simvastatin as prescribed continue omega-3.    7.  Vitamin D deficiency: Continue vitamin D supplementation. Grady Cross was seen today for medicare aw. Diagnoses and all orders for this visit:    Routine adult health maintenance    Type II diabetes mellitus with nephropathy ([facility]  -     CBC Auto Differential; Future  -     Comprehensive Metabolic Panel; Future  -     Hemoglobin A1C; Future  -     Lipid Panel;  Future  -     TSH Health Risk Assessment form, are documented in Flowsheets linked to this Encounter. No Known Allergies    Prior to Visit Medications    Medication Sig Taking? Authorizing Provider   mupirocin (BACTROBAN) 2 % ointment Apply 3 times daily.  Yes Juanita Finney MD   amoxicillin (AMOXIL) 500 MG capsule Take 1 capsule by mouth 3 times daily for 10 days Yes Juanita Finney MD   metFORMIN (GLUCOPHAGE) 500 MG tablet Take 2 tablets by mouth 2 times daily (with meals) TAKE 2 TABLETS TWICE DAILY Yes Juanita Finney MD   meloxicam (MOBIC) 15 MG tablet Take 15 mg by mouth daily  Yes Historical Provider, MD   amLODIPine (NORVASC) 5 MG tablet TAKE 1 TABLET EVERY DAY  Patient taking differently: Take 5 mg by mouth daily  Yes Juanita Finney MD   glipiZIDE (GLUCOTROL XL) 2.5 MG extended release tablet TAKE 1 TABLET EVERY DAY  Patient taking differently: Take 2.5 mg by mouth daily  Yes Juanita Finney MD   simvastatin (ZOCOR) 40 MG tablet TAKE 1 TABLET EVERY EVENING  Patient taking differently: Take 40 mg by mouth daily  Yes MD YORDAN Mendoza ROSA PLUS strip TEST BLOOD SUGAR FOUR TIMES DAILY Yes Juanita Finney MD   Alcohol Swabs (B-D SINGLE USE SWABS REGULAR) PADS USE THREE TIMES DAILY Yes MD YORDAN Mendoza SOFTCLIX LANCETS MISC TEST FOUR TIMES DAILY Yes Juanita Finney MD   aspirin 81 MG tablet Take 81 mg by mouth daily Yes Historical Provider, MD   Omega-3 Fatty Acids (FISH OIL) 1000 MG CAPS Take 1,000 mg by mouth 2 times daily Yes Historical Provider, MD   MULTIPLE VITAMIN PO Take 1 tablet by mouth daily Yes Historical Provider, MD   Calcium Carbonate-Vitamin D (CALCIUM 500/D PO) Take 1 tablet by mouth 2 times daily Yes Historical Provider, MD   vitamin B-12 (CYANOCOBALAMIN) 1000 MCG tablet Take 1,000 mcg by mouth daily Yes Historical Provider, MD   timolol (TIMOPTIC) 0.5 % ophthalmic solution Place 1 drop into the right eye daily  Yes Historical Provider, MD       Past Medical History:   Diagnosis Date    Acute sinusitis     CHAPARRO (acute kidney injury) ([address] 5/18/2018    Anemia     Callous ulcer ([address]     Cataract     Dermatitis     Diabetes mellitus ([address]     Diabetic nephropathy ([address]     Diabetic retinopathy ([address]     Glaucoma     Gout     Hemorrhoids     Hx of TB skin testing     positive    Hyperlipidemia     Hypertension     Type 2 diabetes mellitus ([address] 5/18/2018    Vitamin D deficiency        Past Surgical History:   Procedure Laterality Date    CARDIAC SURGERY      CARPAL TUNNEL RELEASE Right 9/3/2020    RIGHT CARPAL TUNNEL RELEASE performed by [facility]rak[facility] at [facility]      COLONOSCOPY N/A 10/11/2019    Dr Matilde Holloway of a hemostatic clip-Suboptimal prep, diverticulosis, internal hemorrhoids-Grade 2, AP x 3, 1 yr recall     CORONARY ARTERY BYPASS GRAFT      ENDOSCOPY, COLON, DIAGNOSTIC      MOUTH SURGERY      OTHER SURGICAL HISTORY      sternal resection    SKIN BIOPSY      UPPER GASTROINTESTINAL ENDOSCOPY N/A 10/11/2019    Dr Fred Olmstead (+) H Pylori    VASCULAR SURGERY         Family History   Problem Relation Age of Onset    Cancer Mother     Diabetes Father    Zannie Cowden COPD Father     Diabetes Sister     Heart Disease Sister     Colon Polyps Sister     Coronary Art Dis Other         grandfather    Diabetes Other         aunt    Colon Cancer Neg Hx     Liver Cancer Neg Hx     Rectal Cancer Neg Hx        CareTeam (Including outside providers/suppliers regularly involved in providing care):   Patient Care Team:  Nicholas Perry MD as PCP - General (Family Medicine)  Nicholas Perry MD as PCP - [facility] Empaneled Provider  SARA Parker NP as Advanced Practice Nurse (Gastroenterology)    Wt Readings from Last 3 Encounters:   10/02/20 229 lb (103.9 kg)   08/31/20 230 lb (104.3 kg)   09/03/20 230 lb (104.3 kg)     Vitals:    10/02/20 1007 10/02/20 1030   BP: (!) 150/80 134/70 Pulse: 75    SpO2: 98%    Weight: 229 lb (103.9 kg)    Height: 5' 5\" (1.651 m)            The following problems were reviewed today and where indicated follow up appointments were made and/or referrals ordered.     Risk Factor Screenings with Interventions     Fall Risk:  2 or more falls in past year?: no  Fall with injury in past year?: no  Fall Risk Interventions:    · Home safety tips provided    Depression:  PHQ-2 Score: 0  Depression Interventions:  · Relaxation techniques discussed    Anxiety:     Anxiety Interventions:  · Relaxation techniques discussed    Cognitive:  Clock Drawing Test (CDT) Score: Normal  Cognitive Impairment Interventions:  · Patient declines any further evaluation/treatment for cognitive impairment    Substance Abuse:  Social History     Socioeconomic History    Marital status: Single     Spouse name: Not on file    Number of children: Not on file    Years of education: Not on file    Highest education level: Not on file   Occupational History     Employer: [employer]   Social Needs    Financial resource strain: Not on file    Food insecurity     Worry: Not on file     Inability: Not on file    Transportation needs     Medical: Not on file     Non-medical: Not on file   Tobacco Use    Smoking status: Never Smoker    Smokeless tobacco: Never Used   Substance and Sexual Activity    Alcohol use: No    Drug use: No    Sexual activity: Never   Lifestyle    Physical activity     Days per week: Not on file     Minutes per session: Not on file    Stress: Not on file   Relationships    Social connections     Talks on phone: Not on file     Gets together: Not on file     Attends Alevism service: Not on file     Active member of club or organization: Not on file     Attends meetings of clubs or organizations: Not on file     Relationship status: Not on file    Intimate partner violence     Fear of current or ex partner: Not on file     Emotionally abused: Not on file Physically abused: Not on file     Forced sexual activity: Not on file   Other Topics Concern    Not on file   Social History Narrative    Not on file     Audit Questionnaire: Screen for Alcohol Misuse  How often do you have a drink containing alcohol?: Never  Substance Abuse Interventions:  · none needed    Health Risk Assessment:     General  In general, how would you say your health is?: Very Good  In the past 7 days, have you experienced any of the following? New or Increased Pain, New or Increased Fatigue, Loneliness, Social Isolation, Stress or Anger?: None of These  Do you get the social and emotional support that you need?: Yes  Do you have a Living Will?: (!) No  General Health Risk Interventions:  · none needed    Health Habits/Nutrition  Do you exercise for at least 20 minutes 2-3 times per week?: Yes  Have you lost any weight without trying in the past 3 months?: No  Do you eat fewer than 2 meals per day?: No  Have you seen a dentist within the past year?: (!) No  Body mass index is 38.11 kg/m². Health Habits/Nutrition Interventions:  · none needed    Hearing/Vision  Do you or your family notice any trouble with your hearing?: No  Do you have difficulty driving, watching TV, or doing any of your daily activities because of your eyesight?: No  Have you had an eye exam within the past year?: Yes  Hearing/Vision Interventions:  · none needed    Safety  Do you have working smoke detectors?: Yes  Have all throw rugs been removed or fastened?: Yes  Do you have non-slip mats or surfaces in all bathtubs/showers?: Yes  Do all of your stairways have a railing or banister?: (!) No  Are your doorways, halls and stairs free of clutter?: Yes  Do you always fasten your seatbelt when you are in a car?: Yes  Safety Interventions:  · Patient declines any further evaluation/treatment for this issue    ADLs  In the past 7 days, did you need help from others to perform any of the following everyday activities?  Eating,

## 2020-10-03 ASSESSMENT — ENCOUNTER SYMPTOMS
SINUS PRESSURE: 0
ABDOMINAL DISTENTION: 0
DIARRHEA: 0
SHORTNESS OF BREATH: 0
CONSTIPATION: 0
VOICE CHANGE: 0
COUGH: 0
SORE THROAT: 0
EYE DISCHARGE: 0
CHOKING: 0
TROUBLE SWALLOWING: 0
WHEEZING: 0
SINUS PAIN: 0
VOMITING: 0
ABDOMINAL PAIN: 0
COLOR CHANGE: 0
EYE ITCHING: 0
CHEST TIGHTNESS: 0
STRIDOR: 0
BLOOD IN STOOL: 0
RHINORRHEA: 0
NAUSEA: 0

## 2020-10-08 ENCOUNTER — OFFICE VISIT (OUTPATIENT)
Age: 70
End: 2020-10-08

## 2020-10-08 VITALS — OXYGEN SATURATION: 97 % | TEMPERATURE: 99 F | HEART RATE: 64 BPM

## 2020-10-08 PROCEDURE — 99999 PR OFFICE/OUTPT VISIT,PROCEDURE ONLY: CPT | Performed by: NURSE PRACTITIONER

## 2020-10-09 LAB — SARS-COV-2, NAA: NOT DETECTED

## 2020-10-12 ENCOUNTER — HOSPITAL ENCOUNTER (OUTPATIENT)
Age: 70
Setting detail: OUTPATIENT SURGERY
Discharge: HOME | End: 2020-10-12
Attending: INTERNAL MEDICINE | Admitting: INTERNAL MEDICINE
Payer: MEDICARE

## 2020-10-12 ENCOUNTER — ANESTHESIA (OUTPATIENT)
Dept: ENDOSCOPY | Age: 70
End: 2020-10-12
Payer: MEDICARE

## 2020-10-12 ENCOUNTER — ANESTHESIA EVENT (OUTPATIENT)
Dept: ENDOSCOPY | Age: 70
End: 2020-10-12
Payer: MEDICARE

## 2020-10-12 VITALS
HEART RATE: 58 BPM | HEIGHT: 66 IN | WEIGHT: 230 LBS | RESPIRATION RATE: 20 BRPM | TEMPERATURE: 97.3 F | OXYGEN SATURATION: 98 % | BODY MASS INDEX: 36.96 KG/M2 | DIASTOLIC BLOOD PRESSURE: 63 MMHG | SYSTOLIC BLOOD PRESSURE: 161 MMHG

## 2020-10-12 VITALS — OXYGEN SATURATION: 97 % | DIASTOLIC BLOOD PRESSURE: 58 MMHG | SYSTOLIC BLOOD PRESSURE: 115 MMHG

## 2020-10-12 LAB
GLUCOSE BLD-MCNC: 140 MG/DL (ref 70–99)
PERFORMED ON: ABNORMAL

## 2020-10-12 PROCEDURE — 7100000010 HC PHASE II RECOVERY - FIRST 15 MIN: Performed by: INTERNAL MEDICINE

## 2020-10-12 PROCEDURE — 6360000002 HC RX W HCPCS

## 2020-10-12 PROCEDURE — 3700000000 HC ANESTHESIA ATTENDED CARE: Performed by: INTERNAL MEDICINE

## 2020-10-12 PROCEDURE — 3609027000 HC COLONOSCOPY: Performed by: INTERNAL MEDICINE

## 2020-10-12 PROCEDURE — 2709999900 HC NON-CHARGEABLE SUPPLY: Performed by: INTERNAL MEDICINE

## 2020-10-12 PROCEDURE — 2580000003 HC RX 258: Performed by: INTERNAL MEDICINE

## 2020-10-12 PROCEDURE — 3700000001 HC ADD 15 MINUTES (ANESTHESIA): Performed by: INTERNAL MEDICINE

## 2020-10-12 PROCEDURE — 7100000011 HC PHASE II RECOVERY - ADDTL 15 MIN: Performed by: INTERNAL MEDICINE

## 2020-10-12 PROCEDURE — 45385 COLONOSCOPY W/LESION REMOVAL: CPT | Performed by: INTERNAL MEDICINE

## 2020-10-12 PROCEDURE — 2500000003 HC RX 250 WO HCPCS

## 2020-10-12 PROCEDURE — 82947 ASSAY GLUCOSE BLOOD QUANT: CPT

## 2020-10-12 RX ORDER — LIDOCAINE HYDROCHLORIDE 10 MG/ML
INJECTION, SOLUTION EPIDURAL; INFILTRATION; INTRACAUDAL; PERINEURAL PRN
Status: DISCONTINUED | OUTPATIENT
Start: 2020-10-12 | End: 2020-10-12 | Stop reason: SDUPTHER

## 2020-10-12 RX ORDER — SODIUM CHLORIDE, SODIUM LACTATE, POTASSIUM CHLORIDE, CALCIUM CHLORIDE 600; 310; 30; 20 MG/100ML; MG/100ML; MG/100ML; MG/100ML
INJECTION, SOLUTION INTRAVENOUS CONTINUOUS
Status: DISCONTINUED | OUTPATIENT
Start: 2020-10-12 | End: 2020-10-12 | Stop reason: HOSPADM

## 2020-10-12 RX ORDER — PROPOFOL 10 MG/ML
INJECTION, EMULSION INTRAVENOUS PRN
Status: DISCONTINUED | OUTPATIENT
Start: 2020-10-12 | End: 2020-10-12 | Stop reason: SDUPTHER

## 2020-10-12 RX ADMIN — LIDOCAINE HYDROCHLORIDE 5 ML: 10 INJECTION, SOLUTION EPIDURAL; INFILTRATION; INTRACAUDAL; PERINEURAL at 08:23

## 2020-10-12 RX ADMIN — SODIUM CHLORIDE, POTASSIUM CHLORIDE, SODIUM LACTATE AND CALCIUM CHLORIDE: 600; 310; 30; 20 INJECTION, SOLUTION INTRAVENOUS at 07:38

## 2020-10-12 RX ADMIN — PROPOFOL 340 MG: 10 INJECTION, EMULSION INTRAVENOUS at 08:23

## 2020-10-12 ASSESSMENT — PAIN - FUNCTIONAL ASSESSMENT: PAIN_FUNCTIONAL_ASSESSMENT: 0-10

## 2020-10-12 ASSESSMENT — LIFESTYLE VARIABLES: SMOKING_STATUS: 0

## 2020-10-12 NOTE — OP NOTE
Patient: Pollo Iyer : 1950  Med Rec#: [id number] Acc#: [id number]   Primary Care Provider Tiff Paniagua MD    Date of Procedure:  10/12/2020    Endoscopist: Landry Proctor MD    Referring Provider: Tiff Paniagua MD,     Operation Performed: Colonoscopy up to the Cecum with hot snare resection of polyps    Indications:   1. Hx of adenomatous colonic polyps-multiple colon polyps on last exam last year    2. Family history of colonic polyps    3. History of Helicobacter pylori infection      Anesthesia:  Sedation was administered by anesthesia who monitored the patient during the procedure. I met with Pollo Iyer prior to procedure. We discussed the procedure itself, and I have discussed the risks of endoscopy (including-- but not limited to-- pain, discomfort, bleeding potentially requiring second endoscopic procedure and/or blood transfusion, organ perforation requiring operative repair, damage to organs near the colon, infection, aspiration, cardiopulmonary/allergic reaction), benefits, indications to endoscopy. Additionally, we discussed options other than colonoscopy. The patient expressed understanding. All questions answered. The patient decided to proceed with the procedure. Signed informed consent was placed on the chart. Blood Loss: minimal    Withdrawal time: >6 mins  Bowel Prep: adequate     Complications: no immediate complications    DESCRIPTION OF PROCEDURE:     A time out was performed. After written informed consent was obtained, the patient was placed in the left lateral position. The perianal area was inspected, and a digital rectal exam was performed. A rectal exam was performed: normal tone, no palpable lesions. At this point, a forward viewing Olympus colonoscope was inserted into the anus and carefully advanced to the Cecum with some difficulty requiring external abdominal pressure during parts of this procedure.   The cecum was identified by the ileocecal valve and the appendiceal orifice. The colonoscope was then slowly withdrawn with careful inspection of the mucosa in a linear and circumferential fashion. The scope was retroflexed in the rectum. Suction was utilized during the procedure to remove as much air as possible from the bowel. The colonoscope was removed from the patient, and the procedure was terminated. Findings are listed below. Findings:   Two sessile polyps-6-7 mm in size were removed from the transverse colon. NO larger polyps or masses or strictures or colitis. Internal hemorrhoids-Grade 1-2 without bleeding stigmata. Where it was clearly visible, the mucosa appeared normal throughout the entire examined colon  Retroflexion in the rectum was otherwise normal and revealed no further abnormalities. Recommendations:  1. Repeat colonoscopy: pending pathology - for CRC surveillance due to her personal hx of multiple polyps and family hx-in 3 years. 2. Await biopsy results-you will receive a letter with your results within 7-10 days    - Resume previous meds and diet  - GI clinic f/u PRN   - Keep scheduled f/u appts with other MDs     - NO ASA/NSAIDs x 2 weeks    Findings and recommendations were discussed w/ the patient. A copy of the images was provided.     Minerva Maguire MD  10/12/2020  8:26 AM

## 2020-10-12 NOTE — ANESTHESIA PRE PROCEDURE
Department of Anesthesiology  Preprocedure Note       Name:  Sidney House   Age:  65 y.o.  :  1950                                          MRN:  [id number]         Date:  10/12/2020      Surgeon: Alexia Moss):  Joseluis Houser MD    Procedure: Procedure(s):  COLORECTAL CANCER SCREENING, NOT HIGH RISK    Medications prior to admission:   Prior to Admission medications    Medication Sig Start Date End Date Taking?  Authorizing Provider   amoxicillin (AMOXIL) 500 MG capsule Take 1 capsule by mouth 3 times daily for 10 days 10/2/20 10/12/20 Yes Melly Robins MD   metFORMIN (GLUCOPHAGE) 500 MG tablet Take 2 tablets by mouth 2 times daily (with meals) TAKE 2 TABLETS TWICE DAILY 10/1/20  Yes Melly Robins MD   amLODIPine (NORVASC) 5 MG tablet TAKE 1 TABLET EVERY DAY  Patient taking differently: Take 5 mg by mouth daily  20  Yes Melly Robins MD   simvastatin (ZOCOR) 40 MG tablet TAKE 1 TABLET EVERY EVENING  Patient taking differently: Take 40 mg by mouth daily  20  Yes Melly Robins MD   MULTIPLE VITAMIN PO Take 1 tablet by mouth daily   Yes Historical Provider, MD   Calcium Carbonate-Vitamin D (CALCIUM 500/D PO) Take 1 tablet by mouth 2 times daily   Yes Historical Provider, MD   vitamin B-12 (CYANOCOBALAMIN) 1000 MCG tablet Take 1,000 mcg by mouth daily   Yes Historical Provider, MD   timolol (TIMOPTIC) 0.5 % ophthalmic solution Place 1 drop into the right eye daily  17  Yes Historical Provider, MD   meloxicam (MOBIC) 15 MG tablet Take 15 mg by mouth daily  20   Historical Provider, MD   glipiZIDE (GLUCOTROL XL) 2.5 MG extended release tablet TAKE 1 TABLET EVERY DAY  Patient taking differently: Take 2.5 mg by mouth daily  8/3/20   Melly Robins MD   ACCU-CHEK ROSA PLUS strip TEST BLOOD SUGAR FOUR TIMES DAILY 18   Melly Robins MD   Alcohol Swabs (B-D SINGLE USE SWABS REGULAR) PADS USE THREE TIMES DAILY 18   Melly Robins MD   [address] [facility] LANCETS MISC TEST FOUR TIMES DAILY 6/14/18   Leandro Wolfe MD   aspirin 81 MG tablet Take 81 mg by mouth daily    Historical Provider, MD   Omega-3 Fatty Acids (FISH OIL) 1000 MG CAPS Take 1,000 mg by mouth 2 times daily    Historical Provider, MD       Current medications:    Current Facility-Administered Medications   Medication Dose Route Frequency Provider Last Rate Last Dose    lactated ringers infusion   Intravenous Continuous Neeta Cody  mL/hr at 10/12/20 0738         Allergies:  No Known Allergies    Problem List:    Patient Active Problem List   Diagnosis Code    CHAPARRO (acute kidney injury) ([address] N17.9    Hypertension I10    Hyperlipidemia E78.5    Glaucoma H40.9    CAD (coronary artery disease) I25.10    Type 2 diabetes mellitus ([address] E11.9    Diabetes mellitus ([address] E11.9    Acute cystitis without hematuria N30.00    Right carpal tunnel syndrome G56.01       Past Medical History:        Diagnosis Date    Acute sinusitis     CHAPARRO (acute kidney injury) ([address] 5/18/2018    Anemia     Callous ulcer ([address]     Cataract     Dermatitis     Diabetes mellitus ([address]     Diabetic nephropathy ([address]     Diabetic retinopathy ([address]     Glaucoma     Gout     Hemorrhoids     Hx of TB skin testing     positive    Hyperlipidemia     Hypertension     Type 2 diabetes mellitus ([address] 5/18/2018    Vitamin D deficiency        Past Surgical History:        Procedure Laterality Date    CARDIAC SURGERY      CARPAL TUNNEL RELEASE Right 9/3/2020    RIGHT CARPAL TUNNEL RELEASE performed by [facility]      COLONOSCOPY N/A 10/11/2019    Dr Farooq Brito of a hemostatic clip-Suboptimal prep, diverticulosis, internal hemorrhoids-Grade 2, AP x 3, 1 yr recall     CORONARY ARTERY BYPASS GRAFT      ENDOSCOPY, COLON, DIAGNOSTIC      MOUTH SURGERY      OTHER SURGICAL HISTORY      sternal resection    SKIN BIOPSY      UPPER GASTROINTESTINAL ENDOSCOPY N/A 10/11/2019    Dr Sebastien Chand (+) H Pylori    VASCULAR SURGERY         Social History:    Social History     Tobacco Use    Smoking status: Never Smoker    Smokeless tobacco: Never Used   Substance Use Topics    Alcohol use: No                                Counseling given: Not Answered      Vital Signs (Current):   Vitals:    10/12/20 0722   BP: (!) 176/61   Pulse: 70   Resp: 22   Temp: 96.5 °F (35.8 °C)   TempSrc: Temporal   SpO2: 99%   Weight: 230 lb (104.3 kg)   Height: 5' 6\" (1.676 m)                                              BP Readings from Last 3 Encounters:   10/12/20 (!) 176/61   10/02/20 134/70   09/03/20 (!) 183/82       NPO Status: Time of last liquid consumption: 0357                        Time of last solid consumption: 0300                        Date of last liquid consumption: 10/12/20                        Date of last solid food consumption: 10/10/20    BMI:   Wt Readings from Last 3 Encounters:   10/12/20 230 lb (104.3 kg)   10/02/20 229 lb (103.9 kg)   08/31/20 230 lb (104.3 kg)     Body mass index is 37.12 kg/m². CBC:   Lab Results   Component Value Date    WBC 6.4 10/02/2020    RBC 3.58 10/02/2020    HGB 11.7 10/02/2020    HCT 35.1 10/02/2020    MCV 98.0 10/02/2020    RDW 12.8 10/02/2020     10/02/2020       CMP:   Lab Results   Component Value Date     10/02/2020    K 4.5 10/02/2020     10/02/2020    CO2 25 10/02/2020    BUN 13 10/02/2020    CREATININE 1.2 10/02/2020    GFRAA 53 10/02/2020    LABGLOM 44 10/02/2020    GLUCOSE 154 10/02/2020    PROT 7.0 10/02/2020    PROT 6.7 02/04/2013    CALCIUM 8.9 10/02/2020    BILITOT 0.3 10/02/2020    ALKPHOS 50 10/02/2020    AST 29 10/02/2020    ALT 27 10/02/2020       POC Tests: No results for input(s): POCGLU, POCNA, POCK, POCCL, POCBUN, POCHEMO, POCHCT in the last 72 hours.     Coags: No results found for: PROTIME, INR, APTT    HCG (If Applicable): No results found for: PREGTESTUR, PREGSERUM, HCG, HCGQUANT     ABGs: No results found for: PHART, PO2ART, DLL8GYQ, BJU4JOJ, BEART, B4ANTLYD     Type & Screen (If Applicable):  No results found for: LABABO, LABRH    Drug/Infectious Status (If Applicable):  No results found for: HIV, HEPCAB    COVID-19 Screening (If Applicable):   Lab Results   Component Value Date    COVID19 NOT DETECTED 10/08/2020         Anesthesia Evaluation  Patient summary reviewed and Nursing notes reviewed no history of anesthetic complications:   Airway: Mallampati: II       Mouth opening: > = 3 FB Dental:      Comment: Pt signed dental consent    Pulmonary:       (-) not a current smoker                           Cardiovascular:    (+) hypertension: moderate, CAD:, hyperlipidemia        Rhythm: regular  Rate: normal           Beta Blocker:  Not on Beta Blocker         Neuro/Psych:   (+) neuromuscular disease:,             GI/Hepatic/Renal:   (+) renal disease: ARF, morbid obesity          Endo/Other:    (+) DiabetesType II DM, , blood dyscrasia::., .                 Abdominal:           Vascular:                                      Anesthesia Plan      general and TIVA     ASA 3       Induction: intravenous. Anesthetic plan and risks discussed with patient. Plan discussed with CRNA.                   SARA Bustamante Do - MORGAN   10/12/2020

## 2020-10-12 NOTE — ANESTHESIA POSTPROCEDURE EVALUATION
Department of Anesthesiology  Postprocedure Note    Patient: Chris Latif  MRN: [id number]  YOB: 1950  Date of evaluation: 10/12/2020  Time:  8:48 AM     Procedure Summary     Date:  10/12/20 Room / Location:  [address]    Anesthesia Start:  0815 Anesthesia Stop:  0848    Procedure:  COLORECTAL CANCER SCREENING, NOT HIGH RISK (N/A ) Diagnosis:  (HX AP)    Surgeon:  Shabbir Almanza MD Responsible Provider:  SARA Rojas CRNA    Anesthesia Type:  general, TIVA ASA Status:  3          Anesthesia Type: general, TIVA    Palmer Phase I: Palmer Score: 10    Palmer Phase II:      Last vitals: Reviewed and per EMR flowsheets.        Anesthesia Post Evaluation    Patient location during evaluation: bedside  Patient participation: waiting for patient participation  Level of consciousness: sleepy but conscious  Pain score: 0  Airway patency: patent  Nausea & Vomiting: no nausea and no vomiting  Complications: no  Cardiovascular status: hemodynamically stable and blood pressure returned to baseline  Respiratory status: acceptable and room air  Hydration status: stable

## 2020-10-12 NOTE — H&P
Patient Name: Rebecca Sanford  : 1950  MRN: [id number]  DATE: 10/12/20    Allergies: No Known Allergies     ENDOSCOPY  History and Physical    Procedure:    [] Diagnostic Colonoscopy       [x] Screening Colonoscopy  [] EGD      [] ERCP      [] EUS       [] Other    [x] Previous office notes/History and Physical reviewed from the patients chart. Please see EMR for further details of HPI. I have examined the patient's status immediately prior to the procedure and:      Indications/HPI:    1. Hx of adenomatous colonic polyps-multiple colon polyps on last exam last year    2. Family history of colonic polyps    3. History of Helicobacter pylori infection        []Abdominal Pain   []Cancer- GI/Lung     []Fhx of colon CA/polyps  []History of Polyps  []Barretts            []Melena  []Abnormal Imaging              []Dysphagia              []Persistent Pneumonia   []Anemia                            []Food Impaction        []History of Polyps  [] GI Bleed             []Pulmonary nodule/Mass   []Change in bowel habits []Heartburn/Reflux  []Rectal Bleed (BRBPR)  []Chest Pain - Non Cardiac []Heme (+) Stool []Ulcers  []Constipation  []Hemoptysis  []Varices  []Diarrhea  []Hypoxemia    []Nausea/Vomiting   []Screening   []Crohns/Colitis  []Other:     Anesthesia:   [x] MAC [] Moderate Sedation   [] General   [] None     ROS: 12 pt Review of Symptoms was negative unless mentioned above    Medications:   Prior to Admission medications    Medication Sig Start Date End Date Taking?  Authorizing Provider   amoxicillin (AMOXIL) 500 MG capsule Take 1 capsule by mouth 3 times daily for 10 days 10/2/20 10/12/20 Yes Marbin Worthy MD   metFORMIN (GLUCOPHAGE) 500 MG tablet Take 2 tablets by mouth 2 times daily (with meals) TAKE 2 TABLETS TWICE DAILY 10/1/20  Yes Marbin Worthy MD   amLODIPine (NORVASC) 5 MG tablet TAKE 1 TABLET EVERY DAY  Patient taking differently: Take 5 mg by mouth daily  20  Yes Marbin Worthy MD simvastatin (ZOCOR) 40 MG tablet TAKE 1 TABLET EVERY EVENING  Patient taking differently: Take 40 mg by mouth daily  7/13/20  Yes Leona Juares MD   MULTIPLE VITAMIN PO Take 1 tablet by mouth daily   Yes Historical Provider, MD   Calcium Carbonate-Vitamin D (CALCIUM 500/D PO) Take 1 tablet by mouth 2 times daily   Yes Historical Provider, MD   vitamin B-12 (CYANOCOBALAMIN) 1000 MCG tablet Take 1,000 mcg by mouth daily   Yes Historical Provider, MD   timolol (TIMOPTIC) 0.5 % ophthalmic solution Place 1 drop into the right eye daily  9/8/17  Yes Historical Provider, MD   meloxicam (MOBIC) 15 MG tablet Take 15 mg by mouth daily  7/27/20   Historical Provider, MD   glipiZIDE (GLUCOTROL XL) 2.5 MG extended release tablet TAKE 1 TABLET EVERY DAY  Patient taking differently: Take 2.5 mg by mouth daily  8/3/20   Leona Juares MD   ACCU-CHEALICE ROSA PLUS strip TEST BLOOD SUGAR FOUR TIMES DAILY 6/27/18   Leona Juares MD   Alcohol Swabs (B-D SINGLE USE SWABS REGULAR) PADS USE THREE TIMES DAILY 6/27/18   Leona Juares MD   ACCU-CHEK SOFTCLIX LANCETS MISC TEST FOUR TIMES DAILY 6/14/18   Leona Juares MD   aspirin 81 MG tablet Take 81 mg by mouth daily    Historical Provider, MD   Omega-3 Fatty Acids (FISH OIL) 1000 MG CAPS Take 1,000 mg by mouth 2 times daily    Historical Provider, MD       Past Medical History:  Past Medical History:   Diagnosis Date    Acute sinusitis     CHAPARRO (acute kidney injury) ([address] 5/18/2018    Anemia     Callous ulcer ([address]     Cataract     Dermatitis     Diabetes mellitus ([address]     Diabetic nephropathy ([address]     Diabetic retinopathy ([address]     Glaucoma     Gout     Hemorrhoids     Hx of TB skin testing     positive    Hyperlipidemia     Hypertension     Type 2 diabetes mellitus ([address] 5/18/2018    Vitamin D deficiency        Past Surgical History:  Past Surgical History:   Procedure Laterality Date    CARDIAC SURGERY      CARPAL TUNNEL RELEASE Right 9/3/2020 RIGHT CARPAL TUNNEL RELEASE performed by Kaur [facility]iz [facility]      COLONOSCOPY N/A 10/11/2019    Dr Mario Apley of a hemostatic clip-Suboptimal prep, diverticulosis, internal hemorrhoids-Grade 2, AP x 3, 1 yr recall     CORONARY ARTERY BYPASS GRAFT      ENDOSCOPY, COLON, DIAGNOSTIC      MOUTH SURGERY      OTHER SURGICAL HISTORY      sternal resection    SKIN BIOPSY      UPPER GASTROINTESTINAL ENDOSCOPY N/A 10/11/2019    Dr Sneha Horn (+) H Pylori    VASCULAR SURGERY         Social History:  Social History     Tobacco Use    Smoking status: Never Smoker    Smokeless tobacco: Never Used   Substance Use Topics    Alcohol use: No    Drug use: No       Vital Signs:   Vitals:    10/12/20 0722   BP: (!) 176/61   Pulse: 70   Resp: 22   Temp: 96.5 °F (35.8 °C)   SpO2: 99%        Physical Exam:  Cardiac:  [x]WNL  []Comments:  Pulmonary:  [x]WNL   []Comments:  Neuro/Mental Status:  [x]WNL  []Comments:  Abdominal:  [x]WNL    []Comments:  Other:   []WNL  []Comments:    Informed Consent:  The risks and benefits of the procedure have been discussed with either the patient or if they cannot consent, their representative. Assessment:  Patient examined and appropriate for planned sedation and procedure. Plan:  Proceed with planned sedation and procedure as above.          Valencia Fong MD

## 2020-10-15 RX ORDER — GLIPIZIDE 2.5 MG/1
2.5 TABLET, EXTENDED RELEASE ORAL DAILY
Qty: 30 TABLET | Refills: 3 | Status: SHIPPED | OUTPATIENT
Start: 2020-10-15 | End: 2020-12-18

## 2020-12-03 RX ORDER — SIMVASTATIN 40 MG
TABLET ORAL
Qty: 90 TABLET | Refills: 3 | Status: SHIPPED | OUTPATIENT
Start: 2020-12-03 | End: 2021-09-08

## 2020-12-18 RX ORDER — GLIPIZIDE 2.5 MG/1
TABLET, EXTENDED RELEASE ORAL
Qty: 90 TABLET | Refills: 3 | Status: SHIPPED | OUTPATIENT
Start: 2020-12-18 | End: 2021-10-15

## 2020-12-31 ENCOUNTER — TELEPHONE (OUTPATIENT)
Dept: INTERNAL MEDICINE | Age: 70
End: 2020-12-31

## 2020-12-31 ENCOUNTER — OFFICE VISIT (OUTPATIENT)
Age: 70
End: 2020-12-31

## 2020-12-31 VITALS — HEART RATE: 92 BPM | TEMPERATURE: 99.2 F | OXYGEN SATURATION: 92 %

## 2020-12-31 RX ORDER — DOXYCYCLINE HYCLATE 100 MG
100 TABLET ORAL 2 TIMES DAILY
Qty: 20 TABLET | Refills: 0 | Status: SHIPPED | OUTPATIENT
Start: 2020-12-31 | End: 2021-01-10

## 2020-12-31 NOTE — TELEPHONE ENCOUNTER
Pt was advised to be tested. She said she can't take Medrol Dose pack \"because of her eyes\". Rx pending for Doxycycline.

## 2020-12-31 NOTE — TELEPHONE ENCOUNTER
Pt c/o of head congestion with clear mucous from her nose. She said she was cold yesterday and stayed in bed all day. She started with head congestion this morning.  WM/KO

## 2021-01-01 LAB — SARS-COV-2, PCR: DETECTED

## 2021-01-19 DIAGNOSIS — E11.21 TYPE II DIABETES MELLITUS WITH NEPHROPATHY (HCC): ICD-10-CM

## 2021-01-19 DIAGNOSIS — E55.9 VITAMIN D DEFICIENCY: ICD-10-CM

## 2021-01-19 LAB
ALBUMIN SERPL-MCNC: 3.7 G/DL (ref 3.5–5.2)
ALP BLD-CCNC: 41 U/L (ref 35–104)
ALT SERPL-CCNC: 41 U/L (ref 5–33)
ANION GAP SERPL CALCULATED.3IONS-SCNC: 12 MMOL/L (ref 7–19)
AST SERPL-CCNC: 47 U/L (ref 5–32)
BASOPHILS ABSOLUTE: 0.1 K/UL (ref 0–0.2)
BASOPHILS RELATIVE PERCENT: 0.8 % (ref 0–1)
BILIRUB SERPL-MCNC: <0.2 MG/DL (ref 0.2–1.2)
BUN BLDV-MCNC: 22 MG/DL (ref 8–23)
CALCIUM SERPL-MCNC: 9.7 MG/DL (ref 8.8–10.2)
CHLORIDE BLD-SCNC: 100 MMOL/L (ref 98–111)
CHOLESTEROL, TOTAL: 104 MG/DL (ref 160–199)
CO2: 23 MMOL/L (ref 22–29)
CREAT SERPL-MCNC: 1.7 MG/DL (ref 0.5–0.9)
CREATININE URINE: 129.7 MG/DL (ref 4.2–622)
EOSINOPHILS ABSOLUTE: 0.2 K/UL (ref 0–0.6)
EOSINOPHILS RELATIVE PERCENT: 2.1 % (ref 0–5)
GFR AFRICAN AMERICAN: 36
GFR NON-AFRICAN AMERICAN: 29
GLUCOSE BLD-MCNC: 116 MG/DL (ref 74–109)
HBA1C MFR BLD: 7.6 % (ref 4–6)
HCT VFR BLD CALC: 33.7 % (ref 37–47)
HDLC SERPL-MCNC: 49 MG/DL (ref 65–121)
HEMOGLOBIN: 11.1 G/DL (ref 12–16)
IMMATURE GRANULOCYTES #: 0 K/UL
LDL CHOLESTEROL CALCULATED: 28 MG/DL
LYMPHOCYTES ABSOLUTE: 3.8 K/UL (ref 1.1–4.5)
LYMPHOCYTES RELATIVE PERCENT: 42.6 % (ref 20–40)
MCH RBC QN AUTO: 31.7 PG (ref 27–31)
MCHC RBC AUTO-ENTMCNC: 32.9 G/DL (ref 33–37)
MCV RBC AUTO: 96.3 FL (ref 81–99)
MICROALBUMIN UR-MCNC: 189.7 MG/DL (ref 0–19)
MICROALBUMIN/CREAT UR-RTO: 1462.6 MG/G
MONOCYTES ABSOLUTE: 0.6 K/UL (ref 0–0.9)
MONOCYTES RELATIVE PERCENT: 6.2 % (ref 0–10)
NEUTROPHILS ABSOLUTE: 4.3 K/UL (ref 1.5–7.5)
NEUTROPHILS RELATIVE PERCENT: 48.1 % (ref 50–65)
PDW BLD-RTO: 12.7 % (ref 11.5–14.5)
PLATELET # BLD: 336 K/UL (ref 130–400)
PMV BLD AUTO: 10.9 FL (ref 9.4–12.3)
POTASSIUM SERPL-SCNC: 4.9 MMOL/L (ref 3.5–5)
RBC # BLD: 3.5 M/UL (ref 4.2–5.4)
SODIUM BLD-SCNC: 135 MMOL/L (ref 136–145)
TOTAL PROTEIN: 7 G/DL (ref 6.6–8.7)
TRIGL SERPL-MCNC: 135 MG/DL (ref 0–149)
TSH SERPL DL<=0.05 MIU/L-ACNC: 1.98 UIU/ML (ref 0.27–4.2)
VITAMIN D 25-HYDROXY: 39.3 NG/ML
WBC # BLD: 8.9 K/UL (ref 4.8–10.8)

## 2021-01-20 ENCOUNTER — OFFICE VISIT (OUTPATIENT)
Dept: INTERNAL MEDICINE | Age: 71
End: 2021-01-20
Payer: MEDICARE

## 2021-01-20 VITALS
DIASTOLIC BLOOD PRESSURE: 72 MMHG | HEART RATE: 120 BPM | BODY MASS INDEX: 38.1 KG/M2 | WEIGHT: 223.2 LBS | SYSTOLIC BLOOD PRESSURE: 122 MMHG | OXYGEN SATURATION: 97 % | HEIGHT: 64 IN

## 2021-01-20 DIAGNOSIS — E11.21 TYPE II DIABETES MELLITUS WITH NEPHROPATHY (HCC): Primary | ICD-10-CM

## 2021-01-20 DIAGNOSIS — E78.5 HYPERLIPIDEMIA, UNSPECIFIED HYPERLIPIDEMIA TYPE: ICD-10-CM

## 2021-01-20 DIAGNOSIS — E55.9 VITAMIN D DEFICIENCY: ICD-10-CM

## 2021-01-20 DIAGNOSIS — Z86.16 HISTORY OF 2019 NOVEL CORONAVIRUS DISEASE (COVID-19): ICD-10-CM

## 2021-01-20 DIAGNOSIS — I10 ESSENTIAL HYPERTENSION: ICD-10-CM

## 2021-01-20 DIAGNOSIS — Z23 NEED FOR PROPHYLACTIC VACCINATION AND INOCULATION AGAINST VARICELLA: ICD-10-CM

## 2021-01-20 DIAGNOSIS — Z23 NEED FOR PROPHYLACTIC VACCINATION AGAINST DIPHTHERIA-TETANUS-PERTUSSIS (DTP): ICD-10-CM

## 2021-01-20 PROCEDURE — 3017F COLORECTAL CA SCREEN DOC REV: CPT | Performed by: INTERNAL MEDICINE

## 2021-01-20 PROCEDURE — G8427 DOCREV CUR MEDS BY ELIG CLIN: HCPCS | Performed by: INTERNAL MEDICINE

## 2021-01-20 PROCEDURE — 3051F HG A1C>EQUAL 7.0%<8.0%: CPT | Performed by: INTERNAL MEDICINE

## 2021-01-20 PROCEDURE — 1090F PRES/ABSN URINE INCON ASSESS: CPT | Performed by: INTERNAL MEDICINE

## 2021-01-20 PROCEDURE — G8484 FLU IMMUNIZE NO ADMIN: HCPCS | Performed by: INTERNAL MEDICINE

## 2021-01-20 PROCEDURE — G8400 PT W/DXA NO RESULTS DOC: HCPCS | Performed by: INTERNAL MEDICINE

## 2021-01-20 PROCEDURE — 1123F ACP DISCUSS/DSCN MKR DOCD: CPT | Performed by: INTERNAL MEDICINE

## 2021-01-20 PROCEDURE — 1036F TOBACCO NON-USER: CPT | Performed by: INTERNAL MEDICINE

## 2021-01-20 PROCEDURE — 99214 OFFICE O/P EST MOD 30 MIN: CPT | Performed by: INTERNAL MEDICINE

## 2021-01-20 PROCEDURE — G8417 CALC BMI ABV UP PARAM F/U: HCPCS | Performed by: INTERNAL MEDICINE

## 2021-01-20 PROCEDURE — 4040F PNEUMOC VAC/ADMIN/RCVD: CPT | Performed by: INTERNAL MEDICINE

## 2021-01-20 PROCEDURE — 2022F DILAT RTA XM EVC RTNOPTHY: CPT | Performed by: INTERNAL MEDICINE

## 2021-01-20 SDOH — ECONOMIC STABILITY: FOOD INSECURITY: WITHIN THE PAST 12 MONTHS, THE FOOD YOU BOUGHT JUST DIDN'T LAST AND YOU DIDN'T HAVE MONEY TO GET MORE.: NEVER TRUE

## 2021-01-20 SDOH — ECONOMIC STABILITY: TRANSPORTATION INSECURITY
IN THE PAST 12 MONTHS, HAS THE LACK OF TRANSPORTATION KEPT YOU FROM MEDICAL APPOINTMENTS OR FROM GETTING MEDICATIONS?: NO

## 2021-01-20 SDOH — ECONOMIC STABILITY: INCOME INSECURITY: HOW HARD IS IT FOR YOU TO PAY FOR THE VERY BASICS LIKE FOOD, HOUSING, MEDICAL CARE, AND HEATING?: NOT HARD AT ALL

## 2021-01-20 SDOH — ECONOMIC STABILITY: TRANSPORTATION INSECURITY
IN THE PAST 12 MONTHS, HAS LACK OF TRANSPORTATION KEPT YOU FROM MEETINGS, WORK, OR FROM GETTING THINGS NEEDED FOR DAILY LIVING?: NO

## 2021-01-20 ASSESSMENT — ENCOUNTER SYMPTOMS
SINUS PAIN: 0
CONSTIPATION: 0
EYE DISCHARGE: 0
CHOKING: 0
SINUS PRESSURE: 0
STRIDOR: 0
BLOOD IN STOOL: 0
EYE ITCHING: 0
ABDOMINAL PAIN: 0
RHINORRHEA: 0
WHEEZING: 0
VOICE CHANGE: 0
ABDOMINAL DISTENTION: 0
NAUSEA: 0
TROUBLE SWALLOWING: 0
VOMITING: 0
CHEST TIGHTNESS: 0
SORE THROAT: 0
COUGH: 0
SHORTNESS OF BREATH: 0
COLOR CHANGE: 0
DIARRHEA: 0

## 2021-01-20 ASSESSMENT — PATIENT HEALTH QUESTIONNAIRE - PHQ9
2. FEELING DOWN, DEPRESSED OR HOPELESS: 0
SUM OF ALL RESPONSES TO PHQ9 QUESTIONS 1 & 2: 0
1. LITTLE INTEREST OR PLEASURE IN DOING THINGS: 0
SUM OF ALL RESPONSES TO PHQ QUESTIONS 1-9: 0

## 2021-01-20 NOTE — PROGRESS NOTES
Chief Complaint   Patient presents with    3 Month Follow-Up    Diabetes       HPI: Princess Garcia is a 65 y.o. female is here for follow-up of type 2 diabetes. Her A1c remains at 7.6. She states that her blood sugar runs about 120. She did have COVID-19 about 2 ago. She feels that she is fully recovered. She was given prescriptions for shingles vaccine and Tdap today. She states that she probably will not get the vaccines. She declines the COVID-19 vaccine, or mammogram today. Her cholesterol is written for. Her renal function slightly impaired. Her creatinine went from 1.2-1.7. She will continue to force fluids. We will evaluate her renal function closely. We may need to make adjustments to her medications in the near future. Her her blood pressure is well controlled. She declines to start any medication for renal protection.         Past Medical History:   Diagnosis Date    Acute sinusitis     CHAPARRO (acute kidney injury) ([address] 5/18/2018    Anemia     Callous ulcer ([address]     Cataract     Dermatitis     Diabetes mellitus ([address]     Diabetic nephropathy ([address]     Diabetic retinopathy ([address]     Glaucoma     Gout     Hemorrhoids     Hx of TB skin testing     positive    Hyperlipidemia     Hypertension     Type 2 diabetes mellitus ([address] 5/18/2018    Vitamin D deficiency       Past Surgical History:   Procedure Laterality Date    CARDIAC SURGERY      CARPAL TUNNEL RELEASE Right 9/3/2020    RIGHT CARPAL TUNNEL RELEASE performed by Maria T Coelho MD at [facility]      COLONOSCOPY N/A 10/11/2019    Dr Light Seek of a hemostatic clip-Suboptimal prep, diverticulosis, internal hemorrhoids-Grade 2, AP x 3, 1 yr recall     COLONOSCOPY N/A 10/12/2020    Dr Ruby Chu hemorrhoids-Grade 1-2, AP, 3 yr recall    CORONARY ARTERY BYPASS GRAFT      ENDOSCOPY, COLON, DIAGNOSTIC      MOUTH SURGERY      OTHER SURGICAL HISTORY sternal resection    SKIN BIOPSY      UPPER GASTROINTESTINAL ENDOSCOPY N/A 10/11/2019    Dr Kenton Molina (+) H Pylori    VASCULAR SURGERY        Social History     Socioeconomic History    Marital status: Single     Spouse name: None    Number of children: None    Years of education: None    Highest education level: None   Occupational History     Employer: [employer]   Social Needs    Financial resource strain: Not hard at all   [place] insecurity     Worry: Never true     Inability: Never true    Transportation needs     Medical: No     Non-medical: No   Tobacco Use    Smoking status: Never Smoker    Smokeless tobacco: Never Used   Substance and Sexual Activity    Alcohol use: No    Drug use: No    Sexual activity: Never   Lifestyle    Physical activity     Days per week: None     Minutes per session: None    Stress: None   Relationships    Social connections     Talks on phone: None     Gets together: None     Attends Restorationism service: None     Active member of club or organization: None     Attends meetings of clubs or organizations: None     Relationship status: None    Intimate partner violence     Fear of current or ex partner: None     Emotionally abused: None     Physically abused: None     Forced sexual activity: None   Other Topics Concern    None   Social History Narrative    None      Family History   Problem Relation Age of Onset    Cancer Mother     Diabetes Father     COPD Father     Diabetes Sister     Heart Disease Sister     Colon Polyps Sister     Coronary Art Dis Other         grandfather    Diabetes Other         aunt    Colon Cancer Neg Hx     Liver Cancer Neg Hx     Rectal Cancer Neg Hx         Current Outpatient Medications   Medication Sig Dispense Refill    zoster recombinant adjuvanted vaccine (SHINGRIX) 50 MCG/0.5ML SUSR injection 50 MCG IM then repeat 2-6 months.  0.5 mL 1    Tdap (ADACEL) 5-2-15.5 LF-MCG/0.5 injection Inject 0.5 mLs into the muscle once for 1 dose 0.5 mL 0    glipiZIDE (GLUCOTROL XL) 2.5 MG extended release tablet TAKE 1 TABLET EVERY DAY 90 tablet 3    simvastatin (ZOCOR) 40 MG tablet TAKE 1 TABLET EVERY EVENING 90 tablet 3    metFORMIN (GLUCOPHAGE) 500 MG tablet Take 2 tablets by mouth 2 times daily (with meals) TAKE 2 TABLETS TWICE DAILY 360 tablet 3    amLODIPine (NORVASC) 5 MG tablet TAKE 1 TABLET EVERY DAY (Patient taking differently: Take 5 mg by mouth daily ) 90 tablet 3    ACCU-CHEK ROSA PLUS strip TEST BLOOD SUGAR FOUR TIMES DAILY 400 strip 3    Alcohol Swabs (B-D SINGLE USE SWABS REGULAR) PADS USE THREE TIMES DAILY 100 each 11    ACCU-CHEK SOFTCLIX LANCETS MISC TEST FOUR TIMES DAILY 400 each 3    aspirin 81 MG tablet Take 81 mg by mouth daily      Omega-3 Fatty Acids (FISH OIL) 1000 MG CAPS Take 1,000 mg by mouth 2 times daily      MULTIPLE VITAMIN PO Take 1 tablet by mouth daily      Calcium Carbonate-Vitamin D (CALCIUM 500/D PO) Take 1 tablet by mouth 2 times daily      vitamin B-12 (CYANOCOBALAMIN) 1000 MCG tablet Take 1,000 mcg by mouth daily      timolol (TIMOPTIC) 0.5 % ophthalmic solution Place 1 drop into the right eye daily        No current facility-administered medications for this visit. Patient Active Problem List   Diagnosis    [facility]    Hypertension    Hyperlipidemia    Glaucoma    CAD (coronary artery disease)    Type 2 diabetes mellitus ([facility]    Diabetes mellitus ([facility]    Acute cystitis without hematuria    Right carpal tunnel syndrome        Review of Systems   Constitutional: Negative for fatigue, fever and unexpected weight change. HENT: Negative for congestion, ear discharge, ear pain, mouth sores, nosebleeds, postnasal drip, rhinorrhea, sinus pressure, sinus pain, sneezing, sore throat, tinnitus, trouble swallowing and voice change. Eyes: Negative for discharge, itching and visual disturbance.    Respiratory: Negative for cough, choking, chest tightness, shortness of breath, wheezing and stridor. Cardiovascular: Negative for chest pain, palpitations and leg swelling. Gastrointestinal: Negative for abdominal distention, abdominal pain, blood in stool, constipation, diarrhea, nausea and vomiting. Endocrine: Negative for cold intolerance, polydipsia and polyuria. Genitourinary: Negative for difficulty urinating, dysuria, frequency and urgency. Musculoskeletal: Negative for arthralgias, gait problem, joint swelling and myalgias. Skin: Negative for color change and rash. Allergic/Immunologic: Negative for food allergies and immunocompromised state. Neurological: Negative for dizziness, tremors, syncope, speech difficulty, weakness, numbness and headaches. Hematological: Negative for adenopathy. Does not bruise/bleed easily. Psychiatric/Behavioral: Negative for confusion and hallucinations. The patient is not nervous/anxious. /72   Pulse 120   Ht 5' 4\" (1.626 m)   Wt 223 lb 3.2 oz (101.2 kg)   SpO2 97%   BMI 38.31 kg/m²   Physical Exam  Vitals signs and nursing note reviewed. Constitutional:       General: She is not in acute distress. Appearance: Normal appearance. She is well-developed and normal weight. She is not ill-appearing, toxic-appearing or diaphoretic. HENT:      Head: Normocephalic and atraumatic. Right Ear: Tympanic membrane, ear canal and external ear normal. There is no impacted cerumen. Left Ear: Tympanic membrane, ear canal and external ear normal. There is no impacted cerumen. Nose: Nose normal. No congestion or rhinorrhea. Mouth/Throat:      Mouth: Mucous membranes are moist.      Pharynx: Oropharynx is clear. No oropharyngeal exudate or posterior oropharyngeal erythema. Eyes:      General: No scleral icterus. Right eye: No discharge. Left eye: No discharge. Extraocular Movements: Extraocular movements intact.       Conjunctiva/sclera: Conjunctivae normal. Pupils: Pupils are equal, round, and reactive to light. Neck:      Musculoskeletal: Normal range of motion and neck supple. No neck rigidity or muscular tenderness. Thyroid: No thyromegaly. Vascular: No carotid bruit or JVD. Trachea: No tracheal deviation. Cardiovascular:      Rate and Rhythm: Normal rate and regular rhythm. Pulses: Normal pulses. Heart sounds: Normal heart sounds. No murmur. No friction rub. No gallop. Pulmonary:      Effort: Pulmonary effort is normal. No respiratory distress. Breath sounds: Normal breath sounds. No stridor. No wheezing, rhonchi or rales. Chest:      Chest wall: No tenderness. Abdominal:      General: Bowel sounds are normal. There is no distension. Palpations: Abdomen is soft. There is no mass. Tenderness: There is no abdominal tenderness. There is no right CVA tenderness, left CVA tenderness, guarding or rebound. Hernia: No hernia is present. Musculoskeletal: Normal range of motion. General: No swelling, tenderness, deformity or signs of injury. Right lower leg: No edema. Left lower leg: No edema. Lymphadenopathy:      Cervical: No cervical adenopathy. Skin:     General: Skin is warm and dry. Capillary Refill: Capillary refill takes less than 2 seconds. Coloration: Skin is not jaundiced or pale. Findings: No bruising, erythema, lesion or rash. Neurological:      General: No focal deficit present. Mental Status: She is alert and oriented to person, place, and time. Mental status is at baseline. Cranial Nerves: No cranial nerve deficit. Sensory: No sensory deficit. Motor: No weakness or abnormal muscle tone. Coordination: Coordination normal.      Gait: Gait normal.      Deep Tendon Reflexes: Reflexes are normal and symmetric.  Reflexes normal.   Psychiatric:         Mood and Affect: Mood normal.         Behavior: Behavior normal.         Thought Content: Thought content normal.         Judgment: Judgment normal.         1. Type II diabetes mellitus with nephropathy ([facility] [address]    2. Need for prophylactic vaccination against diphtheria-tetanus-pertussis (DTP)    3. Need for prophylactic vaccination and inoculation against varicella    4. Vitamin D deficiency    5. Hyperlipidemia, unspecified hyperlipidemia type    6. Essential hypertension    7. History of 2019 novel coronavirus disease (COVID-19)        ASSESSMENT/PLAN:    39-year-old woman here for follow-up    1. Type 2 diabetes: A1c remains stable at 7.6. 2.  Hyperlipidemia: Continue simvastatin at current dose    3. Hypertension: Blood pressure stable    4. Renal insufficiency: Her renal parameters are worsening. She declines to take a lisinopril for renal prophylaxis at this time. 5.  Vitamin D deficiency: Check vitamin D level with next lab draw    6. Prescriptions for shingles and Tdap vaccine given to patient. 7.  Recent Covid infection: Patient fully recovered      [facility] was seen today for 3 month follow-up and diabetes. Diagnoses and all orders for this visit:    Type II diabetes mellitus with nephropathy ([facility] [address]  -     CBC Auto Differential; Future  -     Comprehensive Metabolic Panel; Future  -     Lipid Panel; Future  -     Hemoglobin A1C; Future  -     TSH without Reflex; Future  -     Microalbumin / Creatinine Urine Ratio; Future    Need for prophylactic vaccination against diphtheria-tetanus-pertussis (DTP)  -     Tdap (ADACEL) 5-2-15.5 LF-MCG/0.5 injection; Inject 0.5 mLs into the muscle once for 1 dose    Need for prophylactic vaccination and inoculation against varicella  -     zoster recombinant adjuvanted vaccine (SHINGRIX) 50 MCG/0.5ML SUSR injection; 50 MCG IM then repeat 2-6 months. Vitamin D deficiency  -     Vitamin D 25 Hydroxy;  Future    Hyperlipidemia, unspecified hyperlipidemia type    Essential hypertension    History of 2019 novel coronavirus disease (COVID-19) Return in about 3 months (around 4/20/2021).      Orders Placed This Encounter   Procedures    CBC Auto Differential     Fast 12 hours     Standing Status:   Future     Standing Expiration Date:   1/20/2022    Comprehensive Metabolic Panel     Fasting 12 hours     Standing Status:   Future     Standing Expiration Date:   1/20/2022    Lipid Panel     Standing Status:   Future     Standing Expiration Date:   1/20/2022     Order Specific Question:   Is Patient Fasting?/# of Hours     Answer:   12    Hemoglobin A1C     Fast 12 hours     Standing Status:   Future     Standing Expiration Date:   1/20/2022    TSH without Reflex     Fast 12 hours     Standing Status:   Future     Standing Expiration Date:   1/20/2022    Microalbumin / Creatinine Urine Ratio     Standing Status:   Future     Standing Expiration Date:   1/20/2022    Vitamin D 25 Hydroxy     Standing Status:   Future     Standing Expiration Date:   1/20/2022       Pablo Mahmood MD

## 2021-01-20 NOTE — PATIENT INSTRUCTIONS
Patient Education        Learning About COVID-19 and Social Distancing  What is it? Social distancing means putting space between yourself and other people. The recommended distance is 6 feet, or about 2 meters. This also means staying away from any place where people may gather, such as jarrett or other public gathering places. Why is it important? Social distancing is the best way to reduce the spread of COVID-19. This virus seems to spread from person to person through droplets from coughing and sneezing. So if you keep your distance from others, you're less likely to get it or spread it. And social distancing is important for everyone, not just those who are at high risk of infection, like older people. You might have the virus but not have symptoms. You could then give the infection to someone you come into contact with. How is it done? Putting 6 feet, or about 2 meters, between you and other people is the recommended distance. Also stay away from any place where people may gather, such as jarrett or other public gathering places. So if possible:  · Work from home, and keep your kids at home. · Don't travel if you don't have to. And avoid public transportation, ride-shares, and taxis unless you have no choice. · Limit shopping to essentials, like food and medicines. · Wear a cloth face cover if you have to go to a public place like the grocery store or pharmacy. · Don't eat in restaurants. (You can still get takeout or food deliveries.)  · Avoid crowds and busy places. Follow stay-at-home orders or other directions for your area. Where can you learn more? Go to https://[employer]healthSocialExpress. org and sign in to your [employer] account. Enter A133 in the [facility] box to learn more about \"Learning About COVID-19 and Social Distancing. \"     If you do not have an account, please click on the \"Sign Up Now\" link.   Current as of: December 18, 2020               Content Version: 12.7  © 4727-5900 [employer]wise, Incorporated. Care instructions adapted under license by [facility] ([facility]). If you have questions about a medical condition or this instruction, always ask your healthcare professional. [address] any warranty or liability for your use of this information. DISPLAY PLAN FREE TEXT

## 2021-02-15 ENCOUNTER — TELEPHONE (OUTPATIENT)
Dept: INTERNAL MEDICINE | Age: 71
End: 2021-02-15

## 2021-02-15 RX ORDER — AMOXICILLIN 500 MG/1
500 CAPSULE ORAL 3 TIMES DAILY
Qty: 30 CAPSULE | Refills: 0 | Status: SHIPPED | OUTPATIENT
Start: 2021-02-15 | End: 2021-02-25

## 2021-02-15 NOTE — TELEPHONE ENCOUNTER
Vadim Salmeron called requesting a refill of the below medication which has been pended for you:     Requested Prescriptions     Pending Prescriptions Disp Refills    amoxicillin (AMOXIL) 500 MG capsule 30 capsule 0     Sig: Take 1 capsule by mouth 3 times daily for 10 days       Last Appointment Date: Visit date not found  Next Appointment Date: 4/29/2021    Allergies   Allergen Reactions    Medrol [Methylprednisolone]      Affects her eyes

## 2021-02-15 NOTE — TELEPHONE ENCOUNTER
Pt called stating she has a really bad sore throat, she can hardly swallow. Pt has not fever or any other symptoms. Please advise.

## 2021-05-10 DIAGNOSIS — E11.21 TYPE II DIABETES MELLITUS WITH NEPHROPATHY (HCC): ICD-10-CM

## 2021-05-10 DIAGNOSIS — E55.9 VITAMIN D DEFICIENCY: ICD-10-CM

## 2021-05-10 LAB
ALBUMIN SERPL-MCNC: 3.8 G/DL (ref 3.5–5.2)
ALP BLD-CCNC: 47 U/L (ref 35–104)
ALT SERPL-CCNC: 25 U/L (ref 5–33)
ANION GAP SERPL CALCULATED.3IONS-SCNC: 13 MMOL/L (ref 7–19)
AST SERPL-CCNC: 32 U/L (ref 5–32)
BASOPHILS ABSOLUTE: 0.1 K/UL (ref 0–0.2)
BASOPHILS RELATIVE PERCENT: 1 % (ref 0–1)
BILIRUB SERPL-MCNC: <0.2 MG/DL (ref 0.2–1.2)
BUN BLDV-MCNC: 18 MG/DL (ref 8–23)
CALCIUM SERPL-MCNC: 9.2 MG/DL (ref 8.8–10.2)
CHLORIDE BLD-SCNC: 101 MMOL/L (ref 98–111)
CHOLESTEROL, TOTAL: 109 MG/DL (ref 160–199)
CO2: 24 MMOL/L (ref 22–29)
CREAT SERPL-MCNC: 1.5 MG/DL (ref 0.5–0.9)
CREATININE URINE: 39.8 MG/DL (ref 4.2–622)
EOSINOPHILS ABSOLUTE: 0.2 K/UL (ref 0–0.6)
EOSINOPHILS RELATIVE PERCENT: 2.4 % (ref 0–5)
GFR AFRICAN AMERICAN: 41
GFR NON-AFRICAN AMERICAN: 34
GLUCOSE BLD-MCNC: 141 MG/DL (ref 74–109)
HBA1C MFR BLD: 7.2 % (ref 4–6)
HCT VFR BLD CALC: 34.6 % (ref 37–47)
HDLC SERPL-MCNC: 44 MG/DL (ref 65–121)
HEMOGLOBIN: 11.3 G/DL (ref 12–16)
IMMATURE GRANULOCYTES #: 0 K/UL
LDL CHOLESTEROL CALCULATED: 30 MG/DL
LYMPHOCYTES ABSOLUTE: 2.4 K/UL (ref 1.1–4.5)
LYMPHOCYTES RELATIVE PERCENT: 34.2 % (ref 20–40)
MCH RBC QN AUTO: 32 PG (ref 27–31)
MCHC RBC AUTO-ENTMCNC: 32.7 G/DL (ref 33–37)
MCV RBC AUTO: 98 FL (ref 81–99)
MICROALBUMIN UR-MCNC: 189.3 MG/DL (ref 0–19)
MICROALBUMIN/CREAT UR-RTO: 4756.3 MG/G
MONOCYTES ABSOLUTE: 0.5 K/UL (ref 0–0.9)
MONOCYTES RELATIVE PERCENT: 6.4 % (ref 0–10)
NEUTROPHILS ABSOLUTE: 3.9 K/UL (ref 1.5–7.5)
NEUTROPHILS RELATIVE PERCENT: 55.7 % (ref 50–65)
PDW BLD-RTO: 13.3 % (ref 11.5–14.5)
PLATELET # BLD: 349 K/UL (ref 130–400)
PMV BLD AUTO: 10.9 FL (ref 9.4–12.3)
POTASSIUM SERPL-SCNC: 4.5 MMOL/L (ref 3.5–5)
RBC # BLD: 3.53 M/UL (ref 4.2–5.4)
SODIUM BLD-SCNC: 138 MMOL/L (ref 136–145)
TOTAL PROTEIN: 7.4 G/DL (ref 6.6–8.7)
TRIGL SERPL-MCNC: 173 MG/DL (ref 0–149)
TSH SERPL DL<=0.05 MIU/L-ACNC: 2.29 UIU/ML (ref 0.27–4.2)
VITAMIN D 25-HYDROXY: 35.2 NG/ML
WBC # BLD: 7.1 K/UL (ref 4.8–10.8)

## 2021-05-13 ENCOUNTER — OFFICE VISIT (OUTPATIENT)
Dept: INTERNAL MEDICINE | Age: 71
End: 2021-05-13
Payer: MEDICARE

## 2021-05-13 VITALS
WEIGHT: 229 LBS | SYSTOLIC BLOOD PRESSURE: 138 MMHG | OXYGEN SATURATION: 96 % | HEIGHT: 64 IN | BODY MASS INDEX: 39.09 KG/M2 | DIASTOLIC BLOOD PRESSURE: 80 MMHG | HEART RATE: 111 BPM

## 2021-05-13 DIAGNOSIS — I10 ESSENTIAL HYPERTENSION: ICD-10-CM

## 2021-05-13 DIAGNOSIS — E55.9 VITAMIN D DEFICIENCY: ICD-10-CM

## 2021-05-13 DIAGNOSIS — E78.5 HYPERLIPIDEMIA, UNSPECIFIED HYPERLIPIDEMIA TYPE: ICD-10-CM

## 2021-05-13 DIAGNOSIS — Z53.20 MAMMOGRAM DECLINED: ICD-10-CM

## 2021-05-13 DIAGNOSIS — E11.21 TYPE II DIABETES MELLITUS WITH NEPHROPATHY (HCC): Primary | ICD-10-CM

## 2021-05-13 PROCEDURE — 3051F HG A1C>EQUAL 7.0%<8.0%: CPT | Performed by: INTERNAL MEDICINE

## 2021-05-13 PROCEDURE — G8417 CALC BMI ABV UP PARAM F/U: HCPCS | Performed by: INTERNAL MEDICINE

## 2021-05-13 PROCEDURE — G8427 DOCREV CUR MEDS BY ELIG CLIN: HCPCS | Performed by: INTERNAL MEDICINE

## 2021-05-13 PROCEDURE — 2022F DILAT RTA XM EVC RTNOPTHY: CPT | Performed by: INTERNAL MEDICINE

## 2021-05-13 PROCEDURE — G8400 PT W/DXA NO RESULTS DOC: HCPCS | Performed by: INTERNAL MEDICINE

## 2021-05-13 PROCEDURE — 4040F PNEUMOC VAC/ADMIN/RCVD: CPT | Performed by: INTERNAL MEDICINE

## 2021-05-13 PROCEDURE — 1090F PRES/ABSN URINE INCON ASSESS: CPT | Performed by: INTERNAL MEDICINE

## 2021-05-13 PROCEDURE — 1036F TOBACCO NON-USER: CPT | Performed by: INTERNAL MEDICINE

## 2021-05-13 PROCEDURE — 3017F COLORECTAL CA SCREEN DOC REV: CPT | Performed by: INTERNAL MEDICINE

## 2021-05-13 PROCEDURE — 99214 OFFICE O/P EST MOD 30 MIN: CPT | Performed by: INTERNAL MEDICINE

## 2021-05-13 PROCEDURE — 1123F ACP DISCUSS/DSCN MKR DOCD: CPT | Performed by: INTERNAL MEDICINE

## 2021-05-13 NOTE — PROGRESS NOTES
Chief Complaint   Patient presents with    Diabetes     3 month follow up       HPI: Marlena Davey is a 65 y.o. female is here for follow-up of type 2 diabetes. Her blood sugars are well controlled. She states they run about 130. Her A1c did drop from 7.6-7.2. I did offer her mammogram today. However, she still declines this. She does have a history of mild renal insufficiency. Her renal parameters have improved. She is tolerating her statin without side effects. Overall, she feels well and has no major concerns or complaints today.     Past Medical History:   Diagnosis Date    Acute sinusitis     CHAPARRO (acute kidney injury) ([address] 5/18/2018    Anemia     Callous ulcer ([address]     Cataract     Dermatitis     Diabetes mellitus ([address]     Diabetic nephropathy ([address]     Diabetic retinopathy ([address]     Glaucoma     Gout     Hemorrhoids     Hx of TB skin testing     positive    Hyperlipidemia     Hypertension     Type 2 diabetes mellitus ([facility] 5/18/2018    Vitamin D deficiency       Past Surgical History:   Procedure Laterality Date    CARDIAC SURGERY      CARPAL TUNNEL RELEASE Right 9/3/2020    RIGHT CARPAL TUNNEL RELEASE performed by Brandon [facility]      COLONOSCOPY N/A 10/11/2019    Dr Zuleyma Westbrook of a hemostatic clip-Suboptimal prep, diverticulosis, internal hemorrhoids-Grade 2, AP x 3, 1 yr recall     COLONOSCOPY N/A 10/12/2020    Dr Rachel Joseph hemorrhoids-Grade 1-2, AP, 3 yr recall    CORONARY ARTERY BYPASS GRAFT      ENDOSCOPY, COLON, DIAGNOSTIC      MOUTH SURGERY      OTHER SURGICAL HISTORY      sternal resection    SKIN BIOPSY      UPPER GASTROINTESTINAL ENDOSCOPY N/A 10/11/2019    Dr Capri Hernandez (+) H Pylori    VASCULAR SURGERY        Social History     Socioeconomic History    Marital status: Single     Spouse name: Not on file    Number of children: Not on file    Years of education: Not on file    Highest education level: Not on file   Occupational History     Employer: [employer]   Social Needs    Financial resource strain: Not hard at all   [place] insecurity     Worry: Never true     Inability: Never true   [employer] needs     Medical: No     Non-medical: No   Tobacco Use    Smoking status: Never Smoker    Smokeless tobacco: Never Used   Substance and Sexual Activity    Alcohol use: No    Drug use: No    Sexual activity: Never   Lifestyle    Physical activity     Days per week: Not on file     Minutes per session: Not on file    Stress: Not on file   Relationships    Social connections     Talks on phone: Not on file     Gets together: Not on file     Attends Rastafari service: Not on file     Active member of club or organization: Not on file     Attends meetings of clubs or organizations: Not on file     Relationship status: Not on file    Intimate partner violence     Fear of current or ex partner: Not on file     Emotionally abused: Not on file     Physically abused: Not on file     Forced sexual activity: Not on file   Other Topics Concern    Not on file   Social History Narrative    Not on file      Family History   Problem Relation Age of Onset    Cancer Mother     Diabetes Father     COPD Father     Diabetes Sister     Heart Disease Sister     Colon Polyps Sister     Coronary Art Dis Other         grandfather    Diabetes Other         aunt    Colon Cancer Neg Hx     Liver Cancer Neg Hx     Rectal Cancer Neg Hx         Current Outpatient Medications   Medication Sig Dispense Refill    glipiZIDE (GLUCOTROL XL) 2.5 MG extended release tablet TAKE 1 TABLET EVERY DAY 90 tablet 3    simvastatin (ZOCOR) 40 MG tablet TAKE 1 TABLET EVERY EVENING 90 tablet 3    metFORMIN (GLUCOPHAGE) 500 MG tablet Take 2 tablets by mouth 2 times daily (with meals) TAKE 2 TABLETS TWICE DAILY 360 tablet 3    amLODIPine (NORVASC) 5 MG tablet TAKE 1 TABLET EVERY DAY (Patient taking differently: Take 5 mg by mouth daily ) 90 tablet 3    ACCU-CHEK ROSA PLUS strip TEST BLOOD SUGAR FOUR TIMES DAILY 400 strip 3    Alcohol Swabs (B-D SINGLE USE SWABS REGULAR) PADS USE THREE TIMES DAILY 100 each 11    ACCU-CHEK SOFTCLIX LANCETS MISC TEST FOUR TIMES DAILY 400 each 3    aspirin 81 MG tablet Take 81 mg by mouth daily      Omega-3 Fatty Acids (FISH OIL) 1000 MG CAPS Take 1,000 mg by mouth 2 times daily      MULTIPLE VITAMIN PO Take 1 tablet by mouth daily      Calcium Carbonate-Vitamin D (CALCIUM 500/D PO) Take 1 tablet by mouth 2 times daily      vitamin B-12 (CYANOCOBALAMIN) 1000 MCG tablet Take 1,000 mcg by mouth daily      timolol (TIMOPTIC) 0.5 % ophthalmic solution Place 1 drop into the right eye daily        No current facility-administered medications for this visit. Patient Active Problem List   Diagnosis    [facility]    Hypertension    Hyperlipidemia    Glaucoma    CAD (coronary artery disease)    [facility]    Diabetes mellitus ([facility]    Acute cystitis without hematuria    Right carpal tunnel syndrome        Review of Systems   Constitutional: Negative for fatigue, fever and unexpected weight change. HENT: Negative for congestion, ear discharge, ear pain, mouth sores, nosebleeds, postnasal drip, rhinorrhea, sinus pressure, sinus pain, sneezing, sore throat, tinnitus, trouble swallowing and voice change. Eyes: Negative for discharge, itching and visual disturbance. Respiratory: Negative for cough, choking, chest tightness, shortness of breath, wheezing and stridor. Cardiovascular: Negative for chest pain, palpitations and leg swelling. Gastrointestinal: Negative for abdominal distention, abdominal pain, blood in stool, constipation, diarrhea, nausea and vomiting. Endocrine: Negative for cold intolerance, polydipsia and polyuria. Genitourinary: Negative for difficulty urinating, dysuria, frequency and urgency. Musculoskeletal: Negative for arthralgias, gait problem, joint swelling and myalgias. Skin: Negative for color change and rash. Allergic/Immunologic: Negative for food allergies and immunocompromised state. Neurological: Negative for dizziness, tremors, syncope, speech difficulty, weakness, numbness and headaches. Hematological: Negative for adenopathy. Does not bruise/bleed easily. Psychiatric/Behavioral: Negative for confusion and hallucinations. The patient is not nervous/anxious. /80 (Site: Left Upper Arm, Position: Sitting, Cuff Size: Large Adult)   Pulse 111   Ht 5' 4\" (1.626 m)   Wt 229 lb (103.9 kg)   SpO2 96%   BMI 39.31 kg/m²   Physical Exam  Vitals signs and nursing note reviewed. Constitutional:       General: She is not in acute distress. Appearance: Normal appearance. She is well-developed and normal weight. She is not ill-appearing, toxic-appearing or diaphoretic. HENT:      Head: Normocephalic and atraumatic. Right Ear: Tympanic membrane, ear canal and external ear normal. There is no impacted cerumen. Left Ear: Tympanic membrane, ear canal and external ear normal. There is no impacted cerumen. Nose: Nose normal. No congestion or rhinorrhea. Mouth/Throat:      Mouth: Mucous membranes are moist.      Pharynx: Oropharynx is clear. No oropharyngeal exudate or posterior oropharyngeal erythema. Eyes:      General: No scleral icterus. Right eye: No discharge. Left eye: No discharge. Extraocular Movements: Extraocular movements intact. Conjunctiva/sclera: Conjunctivae normal.      Pupils: Pupils are equal, round, and reactive to light. Neck:      Musculoskeletal: Normal range of motion and neck supple. No neck rigidity or muscular tenderness. Thyroid: No thyromegaly. Vascular: No carotid bruit or JVD. Trachea: No tracheal deviation. Cardiovascular:      Rate and Rhythm: Normal rate and regular rhythm. Pulses: Normal pulses. Heart sounds: Normal heart sounds. No murmur. No friction rub. No gallop. Pulmonary:      Effort: Pulmonary effort is normal. No respiratory distress. Breath sounds: Normal breath sounds. No stridor. No wheezing, rhonchi or rales. Chest:      Chest wall: No tenderness. Abdominal:      General: Bowel sounds are normal. There is no distension. Palpations: Abdomen is soft. There is no mass. Tenderness: There is no abdominal tenderness. There is no right CVA tenderness, left CVA tenderness, guarding or rebound. Hernia: No hernia is present. Musculoskeletal: Normal range of motion. General: No swelling, tenderness, deformity or signs of injury. Right lower leg: No edema. Left lower leg: No edema. Lymphadenopathy:      Cervical: No cervical adenopathy. Skin:     General: Skin is warm and dry. Capillary Refill: Capillary refill takes less than 2 seconds. Coloration: Skin is not jaundiced or pale. Findings: No bruising, erythema, lesion or rash. Neurological:      General: No focal deficit present. Mental Status: She is alert and oriented to person, place, and time. Mental status is at baseline. Cranial Nerves: No cranial nerve deficit. Sensory: No sensory deficit. Motor: No weakness or abnormal muscle tone. Coordination: Coordination normal.      Gait: Gait normal.      Deep Tendon Reflexes: Reflexes are normal and symmetric. Reflexes normal.   Psychiatric:         Mood and Affect: Mood normal.         Behavior: Behavior normal.         Thought Content: Thought content normal.         Judgment: Judgment normal.         1. Type II diabetes mellitus with nephropathy ([address]    2. Mammogram declined    3. Vitamin D deficiency     4. Hyperlipidemia, unspecified hyperlipidemia type    5. Essential hypertension        ASSESSMENT/PLAN:    75-year-old woman here for follow-up.     1.  Type 2 diabetes: A1c point trending downward. 2.  Mammogram declined    3. Hyperlipidemia: Continue simvastatin as prescribed    4. Hypertension: Blood pressure stable    5. Renal insufficiency: Renal parameters are improving. Theresa Cruz was seen today for diabetes. Diagnoses and all orders for this visit:    Type II diabetes mellitus with nephropathy ([facility]  -     CBC Auto Differential; Future  -     Comprehensive Metabolic Panel; Future  -     Hemoglobin A1C; Future  -     Lipid Panel; Future  -     TSH without Reflex; Future  -     Microalbumin / Creatinine Urine Ratio; Future  -     Vitamin D 25 Hydroxy; Future    Mammogram declined    Vitamin D deficiency   -     Vitamin D 25 Hydroxy; Future    Hyperlipidemia, unspecified hyperlipidemia type    Essential hypertension          No follow-ups on file.      Orders Placed This Encounter   Procedures    CBC Auto Differential     Fast 12 hours     Standing Status:   Future     Standing Expiration Date:   5/13/2022    Comprehensive Metabolic Panel     Fasting 12 hours     Standing Status:   Future     Standing Expiration Date:   5/13/2022    Hemoglobin A1C     Fast 12 hours     Standing Status:   Future     Standing Expiration Date:   5/13/2022    Lipid Panel     Standing Status:   Future     Standing Expiration Date:   5/13/2022     Order Specific Question:   Is Patient Fasting?/# of Hours     Answer:   12    TSH without Reflex     Fast 12 hours     Standing Status:   Future     Standing Expiration Date:   5/13/2022    Microalbumin / Creatinine Urine Ratio     Standing Status:   Future     Standing Expiration Date:   5/13/2022    Vitamin D 25 Hydroxy     Standing Status:   Future     Standing Expiration Date:   5/13/2022       Letitia Haji MD

## 2021-05-14 ASSESSMENT — ENCOUNTER SYMPTOMS
COUGH: 0
SHORTNESS OF BREATH: 0
EYE DISCHARGE: 0
CONSTIPATION: 0
ABDOMINAL DISTENTION: 0
WHEEZING: 0
VOICE CHANGE: 0
BLOOD IN STOOL: 0
NAUSEA: 0
TROUBLE SWALLOWING: 0
VOMITING: 0
DIARRHEA: 0
SINUS PRESSURE: 0
SINUS PAIN: 0
STRIDOR: 0
RHINORRHEA: 0
CHOKING: 0
COLOR CHANGE: 0
EYE ITCHING: 0
CHEST TIGHTNESS: 0
ABDOMINAL PAIN: 0
SORE THROAT: 0

## 2021-05-27 RX ORDER — AMLODIPINE BESYLATE 5 MG/1
5 TABLET ORAL DAILY
Qty: 90 TABLET | Refills: 1 | Status: SHIPPED | OUTPATIENT
Start: 2021-05-27 | End: 2021-06-15 | Stop reason: SDUPTHER

## 2021-06-15 ENCOUNTER — TELEPHONE (OUTPATIENT)
Dept: INTERNAL MEDICINE | Age: 71
End: 2021-06-15

## 2021-06-15 RX ORDER — AMLODIPINE BESYLATE 5 MG/1
5 TABLET ORAL DAILY
Qty: 90 TABLET | Refills: 1 | Status: SHIPPED | OUTPATIENT
Start: 2021-06-15 | End: 2021-12-20

## 2021-06-15 NOTE — TELEPHONE ENCOUNTER
Valerie Schaumann called to request a refill on her medication.       Last office visit : 5/13/2021   Next office visit : 8/13/2021     Requested Prescriptions     Pending Prescriptions Disp Refills    amLODIPine (NORVASC) 5 MG tablet 90 tablet 1     Sig: Take 1 tablet by mouth daily            Jody Lau MA

## 2021-06-21 ENCOUNTER — HOSPITAL ENCOUNTER (OUTPATIENT)
Age: 71
Setting detail: OBSERVATION
Discharge: HOME | End: 2021-06-24
Attending: EMERGENCY MEDICINE | Admitting: INTERNAL MEDICINE
Payer: MEDICARE

## 2021-06-21 ENCOUNTER — APPOINTMENT (OUTPATIENT)
Dept: GENERAL RADIOLOGY | Age: 71
End: 2021-06-21
Payer: MEDICARE

## 2021-06-21 DIAGNOSIS — I10 ESSENTIAL HYPERTENSION: ICD-10-CM

## 2021-06-21 DIAGNOSIS — Z95.1 HX OF CABG: ICD-10-CM

## 2021-06-21 DIAGNOSIS — R79.89 ELEVATED BRAIN NATRIURETIC PEPTIDE (BNP) LEVEL: ICD-10-CM

## 2021-06-21 DIAGNOSIS — R07.89 CHEST TIGHTNESS: Primary | ICD-10-CM

## 2021-06-21 PROBLEM — R07.9 CHEST PAIN: Status: ACTIVE | Noted: 2021-06-21

## 2021-06-21 PROBLEM — N18.32 CHRONIC KIDNEY DISEASE, STAGE 3B (HCC): Status: ACTIVE | Noted: 2021-06-21

## 2021-06-21 LAB
ALBUMIN SERPL-MCNC: 4 G/DL (ref 3.5–5.2)
ALP BLD-CCNC: 50 U/L (ref 35–104)
ALT SERPL-CCNC: 19 U/L (ref 5–33)
ANION GAP SERPL CALCULATED.3IONS-SCNC: 11 MMOL/L (ref 7–19)
AST SERPL-CCNC: 35 U/L (ref 5–32)
BACTERIA: ABNORMAL /HPF
BASOPHILS ABSOLUTE: 0.1 K/UL (ref 0–0.2)
BASOPHILS RELATIVE PERCENT: 0.6 % (ref 0–1)
BILIRUB SERPL-MCNC: 0.4 MG/DL (ref 0.2–1.2)
BILIRUBIN URINE: NEGATIVE
BLOOD, URINE: NEGATIVE
BUN BLDV-MCNC: 21 MG/DL (ref 8–23)
CALCIUM SERPL-MCNC: 9.9 MG/DL (ref 8.8–10.2)
CHLORIDE BLD-SCNC: 103 MMOL/L (ref 98–111)
CLARITY: CLEAR
CO2: 25 MMOL/L (ref 22–29)
COLOR: YELLOW
CREAT SERPL-MCNC: 1.6 MG/DL (ref 0.5–0.9)
CRYSTALS, UA: ABNORMAL /HPF
EKG P AXIS: 72 DEGREES
EKG P-R INTERVAL: 136 MS
EKG Q-T INTERVAL: 350 MS
EKG QRS DURATION: 140 MS
EKG QTC CALCULATION (BAZETT): 421 MS
EKG T AXIS: 129 DEGREES
EOSINOPHILS ABSOLUTE: 0.1 K/UL (ref 0–0.6)
EOSINOPHILS RELATIVE PERCENT: 1.5 % (ref 0–5)
EPITHELIAL CELLS, UA: 10 /HPF (ref 0–5)
GFR AFRICAN AMERICAN: 38
GFR NON-AFRICAN AMERICAN: 32
GLUCOSE BLD-MCNC: 113 MG/DL (ref 70–99)
GLUCOSE BLD-MCNC: 149 MG/DL (ref 74–109)
GLUCOSE BLD-MCNC: 204 MG/DL (ref 70–99)
GLUCOSE URINE: NEGATIVE MG/DL
HCT VFR BLD CALC: 35.7 % (ref 37–47)
HEMOGLOBIN: 11.9 G/DL (ref 12–16)
HYALINE CASTS: 1 /HPF (ref 0–8)
IMMATURE GRANULOCYTES #: 0 K/UL
INR BLD: 0.97 (ref 0.88–1.18)
KETONES, URINE: NEGATIVE MG/DL
LEUKOCYTE ESTERASE, URINE: ABNORMAL
LIPASE: 55 U/L (ref 13–60)
LYMPHOCYTES ABSOLUTE: 2.1 K/UL (ref 1.1–4.5)
LYMPHOCYTES RELATIVE PERCENT: 23.1 % (ref 20–40)
MCH RBC QN AUTO: 32.2 PG (ref 27–31)
MCHC RBC AUTO-ENTMCNC: 33.3 G/DL (ref 33–37)
MCV RBC AUTO: 96.5 FL (ref 81–99)
MONOCYTES ABSOLUTE: 0.5 K/UL (ref 0–0.9)
MONOCYTES RELATIVE PERCENT: 5 % (ref 0–10)
NEUTROPHILS ABSOLUTE: 6.4 K/UL (ref 1.5–7.5)
NEUTROPHILS RELATIVE PERCENT: 69.5 % (ref 50–65)
NITRITE, URINE: NEGATIVE
PDW BLD-RTO: 13.3 % (ref 11.5–14.5)
PERFORMED ON: ABNORMAL
PERFORMED ON: ABNORMAL
PH UA: 7 (ref 5–8)
PLATELET # BLD: 291 K/UL (ref 130–400)
PMV BLD AUTO: 10.3 FL (ref 9.4–12.3)
POTASSIUM SERPL-SCNC: 4.7 MMOL/L (ref 3.5–5)
PRO-BNP: 3828 PG/ML (ref 0–900)
PROTEIN UA: 300 MG/DL
PROTHROMBIN TIME: 12.8 SEC (ref 12–14.6)
RBC # BLD: 3.7 M/UL (ref 4.2–5.4)
RBC UA: 1 /HPF (ref 0–4)
SARS-COV-2, NAAT: NOT DETECTED
SODIUM BLD-SCNC: 139 MMOL/L (ref 136–145)
SPECIFIC GRAVITY UA: 1.01 (ref 1–1.03)
TOTAL PROTEIN: 7.7 G/DL (ref 6.6–8.7)
TROPONIN: <0.01 NG/ML (ref 0–0.03)
UROBILINOGEN, URINE: 0.2 E.U./DL
WBC # BLD: 9.3 K/UL (ref 4.8–10.8)
WBC UA: 14 /HPF (ref 0–5)

## 2021-06-21 PROCEDURE — 87635 SARS-COV-2 COVID-19 AMP PRB: CPT

## 2021-06-21 PROCEDURE — 6360000002 HC RX W HCPCS: Performed by: INTERNAL MEDICINE

## 2021-06-21 PROCEDURE — 84484 ASSAY OF TROPONIN QUANT: CPT

## 2021-06-21 PROCEDURE — 80053 COMPREHEN METABOLIC PANEL: CPT

## 2021-06-21 PROCEDURE — 2580000003 HC RX 258: Performed by: INTERNAL MEDICINE

## 2021-06-21 PROCEDURE — 99284 EMERGENCY DEPT VISIT MOD MDM: CPT

## 2021-06-21 PROCEDURE — 87086 URINE CULTURE/COLONY COUNT: CPT

## 2021-06-21 PROCEDURE — 6360000002 HC RX W HCPCS: Performed by: EMERGENCY MEDICINE

## 2021-06-21 PROCEDURE — 93010 ELECTROCARDIOGRAM REPORT: CPT | Performed by: INTERNAL MEDICINE

## 2021-06-21 PROCEDURE — 71045 X-RAY EXAM CHEST 1 VIEW: CPT

## 2021-06-21 PROCEDURE — 85610 PROTHROMBIN TIME: CPT

## 2021-06-21 PROCEDURE — 83690 ASSAY OF LIPASE: CPT

## 2021-06-21 PROCEDURE — 96374 THER/PROPH/DIAG INJ IV PUSH: CPT

## 2021-06-21 PROCEDURE — 81001 URINALYSIS AUTO W/SCOPE: CPT

## 2021-06-21 PROCEDURE — 82947 ASSAY GLUCOSE BLOOD QUANT: CPT

## 2021-06-21 PROCEDURE — G0378 HOSPITAL OBSERVATION PER HR: HCPCS

## 2021-06-21 PROCEDURE — 6370000000 HC RX 637 (ALT 250 FOR IP): Performed by: EMERGENCY MEDICINE

## 2021-06-21 PROCEDURE — 83880 ASSAY OF NATRIURETIC PEPTIDE: CPT

## 2021-06-21 PROCEDURE — 36415 COLL VENOUS BLD VENIPUNCTURE: CPT

## 2021-06-21 PROCEDURE — 85025 COMPLETE CBC W/AUTO DIFF WBC: CPT

## 2021-06-21 PROCEDURE — 93005 ELECTROCARDIOGRAM TRACING: CPT | Performed by: EMERGENCY MEDICINE

## 2021-06-21 PROCEDURE — 96372 THER/PROPH/DIAG INJ SC/IM: CPT

## 2021-06-21 RX ORDER — SODIUM CHLORIDE 9 MG/ML
25 INJECTION, SOLUTION INTRAVENOUS PRN
Status: DISCONTINUED | OUTPATIENT
Start: 2021-06-21 | End: 2021-06-24 | Stop reason: HOSPADM

## 2021-06-21 RX ORDER — TIMOLOL MALEATE 5 MG/ML
1 SOLUTION/ DROPS OPHTHALMIC DAILY
Status: DISCONTINUED | OUTPATIENT
Start: 2021-06-22 | End: 2021-06-24 | Stop reason: HOSPADM

## 2021-06-21 RX ORDER — ACETAMINOPHEN 650 MG/1
650 SUPPOSITORY RECTAL EVERY 6 HOURS PRN
Status: DISCONTINUED | OUTPATIENT
Start: 2021-06-21 | End: 2021-06-24 | Stop reason: HOSPADM

## 2021-06-21 RX ORDER — POTASSIUM CHLORIDE 20 MEQ/1
40 TABLET, EXTENDED RELEASE ORAL PRN
Status: DISCONTINUED | OUTPATIENT
Start: 2021-06-21 | End: 2021-06-24 | Stop reason: HOSPADM

## 2021-06-21 RX ORDER — NICOTINE POLACRILEX 4 MG
15 LOZENGE BUCCAL PRN
Status: DISCONTINUED | OUTPATIENT
Start: 2021-06-21 | End: 2021-06-24 | Stop reason: HOSPADM

## 2021-06-21 RX ORDER — ASPIRIN 81 MG/1
162 TABLET, CHEWABLE ORAL ONCE
Status: COMPLETED | OUTPATIENT
Start: 2021-06-21 | End: 2021-06-21

## 2021-06-21 RX ORDER — NITROGLYCERIN 0.4 MG/1
0.4 TABLET SUBLINGUAL EVERY 5 MIN PRN
Status: DISCONTINUED | OUTPATIENT
Start: 2021-06-21 | End: 2021-06-24 | Stop reason: HOSPADM

## 2021-06-21 RX ORDER — FUROSEMIDE 10 MG/ML
20 INJECTION INTRAMUSCULAR; INTRAVENOUS ONCE
Status: COMPLETED | OUTPATIENT
Start: 2021-06-21 | End: 2021-06-21

## 2021-06-21 RX ORDER — AMLODIPINE BESYLATE 5 MG/1
5 TABLET ORAL DAILY
Status: DISCONTINUED | OUTPATIENT
Start: 2021-06-22 | End: 2021-06-24 | Stop reason: HOSPADM

## 2021-06-21 RX ORDER — ATORVASTATIN CALCIUM 40 MG/1
40 TABLET, FILM COATED ORAL NIGHTLY
Status: DISCONTINUED | OUTPATIENT
Start: 2021-06-21 | End: 2021-06-21

## 2021-06-21 RX ORDER — POTASSIUM CHLORIDE 7.45 MG/ML
10 INJECTION INTRAVENOUS PRN
Status: DISCONTINUED | OUTPATIENT
Start: 2021-06-21 | End: 2021-06-24 | Stop reason: HOSPADM

## 2021-06-21 RX ORDER — ONDANSETRON 2 MG/ML
4 INJECTION INTRAMUSCULAR; INTRAVENOUS EVERY 6 HOURS PRN
Status: DISCONTINUED | OUTPATIENT
Start: 2021-06-21 | End: 2021-06-24 | Stop reason: HOSPADM

## 2021-06-21 RX ORDER — ACETAMINOPHEN 325 MG/1
650 TABLET ORAL EVERY 6 HOURS PRN
Status: DISCONTINUED | OUTPATIENT
Start: 2021-06-21 | End: 2021-06-24 | Stop reason: HOSPADM

## 2021-06-21 RX ORDER — ATORVASTATIN CALCIUM 20 MG/1
20 TABLET, FILM COATED ORAL DAILY
Refills: 3 | Status: DISCONTINUED | OUTPATIENT
Start: 2021-06-22 | End: 2021-06-24 | Stop reason: HOSPADM

## 2021-06-21 RX ORDER — CHOLECALCIFEROL (VITAMIN D3) 125 MCG
1000 CAPSULE ORAL DAILY
Status: DISCONTINUED | OUTPATIENT
Start: 2021-06-22 | End: 2021-06-24 | Stop reason: HOSPADM

## 2021-06-21 RX ORDER — ONDANSETRON 4 MG/1
4 TABLET, ORALLY DISINTEGRATING ORAL EVERY 8 HOURS PRN
Status: DISCONTINUED | OUTPATIENT
Start: 2021-06-21 | End: 2021-06-24 | Stop reason: HOSPADM

## 2021-06-21 RX ORDER — ASPIRIN 81 MG/1
81 TABLET, CHEWABLE ORAL DAILY
Status: DISCONTINUED | OUTPATIENT
Start: 2021-06-22 | End: 2021-06-24 | Stop reason: HOSPADM

## 2021-06-21 RX ORDER — SODIUM CHLORIDE 0.9 % (FLUSH) 0.9 %
5-40 SYRINGE (ML) INJECTION EVERY 12 HOURS SCHEDULED
Status: DISCONTINUED | OUTPATIENT
Start: 2021-06-21 | End: 2021-06-24 | Stop reason: HOSPADM

## 2021-06-21 RX ORDER — HEPARIN SODIUM 5000 [USP'U]/ML
5000 INJECTION, SOLUTION INTRAVENOUS; SUBCUTANEOUS EVERY 8 HOURS SCHEDULED
Status: DISCONTINUED | OUTPATIENT
Start: 2021-06-21 | End: 2021-06-24 | Stop reason: HOSPADM

## 2021-06-21 RX ORDER — DEXTROSE MONOHYDRATE 25 G/50ML
12.5 INJECTION, SOLUTION INTRAVENOUS PRN
Status: DISCONTINUED | OUTPATIENT
Start: 2021-06-21 | End: 2021-06-24 | Stop reason: HOSPADM

## 2021-06-21 RX ORDER — POLYETHYLENE GLYCOL 3350 17 G/17G
17 POWDER, FOR SOLUTION ORAL DAILY PRN
Status: DISCONTINUED | OUTPATIENT
Start: 2021-06-21 | End: 2021-06-24 | Stop reason: HOSPADM

## 2021-06-21 RX ORDER — DEXTROSE MONOHYDRATE 50 MG/ML
100 INJECTION, SOLUTION INTRAVENOUS PRN
Status: DISCONTINUED | OUTPATIENT
Start: 2021-06-21 | End: 2021-06-24 | Stop reason: HOSPADM

## 2021-06-21 RX ORDER — SODIUM CHLORIDE 0.9 % (FLUSH) 0.9 %
5-40 SYRINGE (ML) INJECTION PRN
Status: DISCONTINUED | OUTPATIENT
Start: 2021-06-21 | End: 2021-06-24 | Stop reason: HOSPADM

## 2021-06-21 RX ORDER — MAGNESIUM SULFATE IN WATER 40 MG/ML
2000 INJECTION, SOLUTION INTRAVENOUS PRN
Status: DISCONTINUED | OUTPATIENT
Start: 2021-06-21 | End: 2021-06-24 | Stop reason: HOSPADM

## 2021-06-21 RX ADMIN — ASPIRIN 162 MG: 81 TABLET, CHEWABLE ORAL at 14:58

## 2021-06-21 RX ADMIN — HEPARIN SODIUM 5000 UNITS: 5000 INJECTION INTRAVENOUS; SUBCUTANEOUS at 21:26

## 2021-06-21 RX ADMIN — NITROGLYCERIN 0.4 MG: 0.4 TABLET, ORALLY DISINTEGRATING SUBLINGUAL at 15:21

## 2021-06-21 RX ADMIN — NITROGLYCERIN 0.4 MG: 0.4 TABLET, ORALLY DISINTEGRATING SUBLINGUAL at 14:58

## 2021-06-21 RX ADMIN — FUROSEMIDE 20 MG: 10 INJECTION, SOLUTION INTRAMUSCULAR; INTRAVENOUS at 15:21

## 2021-06-21 RX ADMIN — SODIUM CHLORIDE, PRESERVATIVE FREE 10 ML: 5 INJECTION INTRAVENOUS at 21:27

## 2021-06-21 ASSESSMENT — ENCOUNTER SYMPTOMS
SHORTNESS OF BREATH: 1
ABDOMINAL PAIN: 0

## 2021-06-21 ASSESSMENT — HEART SCORE: ECG: 1

## 2021-06-21 NOTE — H&P
[facility]      Hospitalist - History & Physical    [id number]  PCP: Guilherme Kennedy MD  Date of Admission: 6/21/2021   Date of Service: Pt seen/examined on6/21/2021 and Placed in Observation. Chief Complaint:  Chest tightness    History Of Present Illness: The patient is a 65 y.o. obese female with a PMH of CAD s/p CABG, HTN, HLD, DM not on insulin, CKD IIIb who presented to the ED complaining of mid-sternal non-radiating intermittent pressure-like chest tightness that is worsened with exertion and relieved by nitroglycerin that has been worsening today; pain is associated with dyspnea. Patient had a similar episode approximately 1 week ago but that resolved on its own. Patient otherwise denies nausea, vomiting, diarrhea, constipation, fevers, chills, sweats, cough, sick contacts, recent travel. Patient ambulates unassisted at baseline.     Past Medical History:        Diagnosis Date    Acute sinusitis     CHAPARRO (acute kidney injury) ([address] 5/18/2018    Anemia     Callous ulcer ([address]     Cataract     Dermatitis     Diabetes mellitus ([address]     Diabetic nephropathy ([address]     Diabetic retinopathy ([address]     Glaucoma     Gout     Hemorrhoids     Hx of TB skin testing     positive    Hyperlipidemia     Hypertension     Type 2 diabetes mellitus ([address] 5/18/2018    Vitamin D deficiency        Past Surgical History:        Procedure Laterality Date    CARDIAC SURGERY      CARPAL TUNNEL RELEASE Right 9/3/2020    RIGHT CARPAL TUNNEL RELEASE [facility]      COLONOSCOPY N/A 10/11/2019    Dr Gabriella Peter of a hemostatic clip-Suboptimal prep, diverticulosis, internal hemorrhoids-Grade 2, AP x 3, 1 yr recall     COLONOSCOPY N/A 10/12/2020    Dr Fernandez Saliva hemorrhoids-Grade 1-2, AP, 3 yr recall    CORONARY ARTERY BYPASS GRAFT      ENDOSCOPY, COLON, DIAGNOSTIC      MOUTH SURGERY      OTHER SURGICAL HISTORY      sternal resection    SKIN BIOPSY      UPPER GASTROINTESTINAL ENDOSCOPY N/A 10/11/2019    Dr Danna Jeffrey (+) H Pylori    VASCULAR SURGERY         Home Medications:  Prior to Admission medications    Medication Sig Start Date End Date Taking? Authorizing Provider   amLODIPine (NORVASC) 5 MG tablet Take 1 tablet by mouth daily 6/15/21  Yes Joshua Augustin MD   glipiZIDE (GLUCOTROL XL) 2.5 MG extended release tablet TAKE 1 TABLET EVERY DAY 12/18/20  Yes Joshua Augustin MD   simvastatin (ZOCOR) 40 MG tablet TAKE 1 TABLET EVERY EVENING 12/3/20  Yes Joshua Augustin MD   metFORMIN (GLUCOPHAGE) 500 MG tablet Take 2 tablets by mouth 2 times daily (with meals) TAKE 2 TABLETS TWICE DAILY 10/1/20  Yes Joshua Augustin MD   ACCU-CHEALICE ROSA PLUS strip TEST BLOOD SUGAR FOUR TIMES DAILY 6/27/18  Yes Joshua Augustin MD   Alcohol Swabs (B-D SINGLE USE SWABS REGULAR) PADS USE THREE TIMES DAILY 6/27/18  Yes Joshua Augustin MD   ACCU-CHEALICE SOFTCLIX LANCETS MISC TEST FOUR TIMES DAILY 6/14/18  Yes Joshua Augustin MD   aspirin 81 MG tablet Take 81 mg by mouth daily   Yes Historical Provider, MD   Omega-3 Fatty Acids (FISH OIL) 1000 MG CAPS Take 1,000 mg by mouth 2 times daily   Yes Historical Provider, MD   MULTIPLE VITAMIN PO Take 1 tablet by mouth daily   Yes Historical Provider, MD   Calcium Carbonate-Vitamin D (CALCIUM 500/D PO) Take 1 tablet by mouth 2 times daily   Yes Historical Provider, MD   vitamin B-12 (CYANOCOBALAMIN) 1000 MCG tablet Take 1,000 mcg by mouth daily   Yes Historical Provider, MD   timolol (TIMOPTIC) 0.5 % ophthalmic solution Place 1 drop into the right eye daily  9/8/17  Yes Historical Provider, MD       Allergies:    Medrol [methylprednisolone]    Social History:    Tobacco:   reports that she has never smoked. She has never used smokeless tobacco.  Alcohol:   reports no history of alcohol use.   Illicit Drugs: denies    Family History:      Problem Relation Age of Onset    Cancer Mother     Diabetes Father    Pavan Reaper COPD Father     Diabetes Sister     Heart Disease Sister     Colon Polyps Sister     Coronary Art Dis Other         grandfather    Diabetes Other         aunt    Colon Cancer Neg Hx     Liver Cancer Neg Hx     Rectal Cancer Neg Hx        Review of Systems:   Negative except as noted above.         Physical Examination:  /83   Pulse 82   Temp 98.7 °F (37.1 °C) (Temporal)   Resp 20   Ht 5' 6\" (1.676 m)   Wt 222 lb 6 oz (100.9 kg)   SpO2 90%   BMI 35.89 kg/m²      General appearance: Alert  Head: NC/AT  Eyes: conjunctivae/corneas clear  Ears: normal external ears  Neck: supple  Lungs: BLAE, no wheezing appreciated, mild bibasilar crackles   Heart: RRR, no murmurs appreciated   Abdomen: BS+, soft, NT, ND, obese  Extremities: no edema, pulses+  Skin: warm  Neurologic: Alert, gross motor function intact  Psychiatric:  Calm, mood appropriate        Diagnostic Data:    CBC:  Recent Labs     06/21/21  1429   WBC 9.3   HGB 11.9*   HCT 35.7*        BMP:  Recent Labs     06/21/21  1429      K 4.7      CO2 25   BUN 21   CREATININE 1.6*   CALCIUM 9.9     Recent Labs     06/21/21  1429   AST 35*   ALT 19   BILITOT 0.4   ALKPHOS 50     Coag Panel:   Recent Labs     06/21/21  1429   INR 0.97   PROTIME 12.8     Cardiac Enzymes:   Recent Labs     06/21/21  1429 06/21/21  1632   TROPONINI <0.01 <0.01     ABGs:No results found for: PHART, PO2ART, BYP1WJM  Urinalysis:  Lab Results   Component Value Date    NITRU Negative 06/21/2021    WBCUA 14 06/21/2021    BACTERIA TRACE 06/21/2021    RBCUA 1 06/21/2021    BLOODU Negative 06/21/2021    SPECGRAV 1.008 06/21/2021    GLUCOSEU Negative 06/21/2021     RAD:     XR CHEST PORTABLE [[id number]] Resulted: 06/21/21 1455      Order Status: Completed Updated: 06/21/21 1457     Narrative:       EXAMINATION: XR CHEST PORTABLE 6/21/2021 3:54 PM   HISTORY: XR CHEST PORTABLE 6/21/2021 2:48 PM   HISTORY: Short of breath

## 2021-06-21 NOTE — PROGRESS NOTES
While completing patient's admission she stated that she would like her code status to be DNR-CC. RN notified Dr. Lisa Tijerina.

## 2021-06-21 NOTE — ED PROVIDER NOTES
[facility] EMERGENCY DEPT  eMERGENCY dEPARTMENT eNCOUnter      Pt Name: Darling Deras  MRN: [id number]  [place] 1950  Date of evaluation: 6/21/2021  Provider: Mago Levy MD    CHIEF COMPLAINT       Chief Complaint   Patient presents with    Chest Pain    Shortness of Breath         HISTORY OF PRESENT ILLNESS   (Location/Symptom, Timing/Onset,Context/Setting, Quality, Duration, Modifying Factors, Severity)  Note limiting factors. Darling Deras is a 65 y.o. female who presents to the emergency department with chest pressure and shortness of breath. The patient states that last Monday it was lasting all day she could not get air down. The patient has no history of DVT or PE. She states that she felt weak on Tuesday. This morning she woke up and she had the same trouble breathing but now she has tightness in her chest.  She has not had nitro. She did take a baby aspirin this morning. The patient states that she has not had the Covid vaccine she did have symptomatic Covid back in February. Patient has a history of a CABG in 2001. She has no other cardiac issues since. The patient complains of chest pressure along with shortness of breath at this time. She is awake alert she states she may have a dry cough as well. The history is provided by the patient and medical records. NursingNotes were reviewed. REVIEW OF SYSTEMS    (2-9 systems for level 4, 10 or more for level 5)     Review of Systems   Constitutional: Negative for fever. Respiratory: Positive for shortness of breath. Cardiovascular: Positive for chest pain. Gastrointestinal: Negative for abdominal pain. Genitourinary: Negative for dysuria. Neurological: Negative for syncope. Psychiatric/Behavioral: Negative for confusion. A complete review of systems was performed and is negative except as noted above in the HPI.        PAST MEDICAL HISTORY     Past Medical History:   Diagnosis Date    Acute sinusitis     Attends [employer] Services:     Active Member of Clubs or Organizations:     Attends Club or Organization Meetings:     Marital Status:    Intimate Partner Violence:     Fear of Current or Ex-Partner:     Emotionally Abused:     Physically Abused:     Sexually Abused:        SCREENINGS      Heart Score for chest pain patients  History: Highly Suspicious  ECG: Non-Specifc repolarization disturbance/LBTB/PM  Patient Age: > 65 years  *Risk factors for Atherosclerotic disease: Hypertension, Obesity, Coronary Artery Disease  Risk Factors: > 3 Risk factors or history of atherosclerotic disease*  Troponin: < 1X normal limit  Heart Score Total: 7      PHYSICAL EXAM    (up to 7 for level 4, 8 or more for level 5)     ED Triage Vitals [06/21/21 1428]   BP Temp Temp Source Pulse Resp SpO2 Height Weight   (!) 191/100 98.7 °F (37.1 °C) Temporal 78 20 (!) 89 % -- 200 lb (90.7 kg)       Physical Exam  Vitals and nursing note reviewed. Constitutional:       General: She is not in acute distress. Appearance: She is obese. She is not ill-appearing or toxic-appearing. HENT:      Head: Normocephalic and atraumatic. Eyes:      Extraocular Movements: Extraocular movements intact. Pupils: Pupils are equal, round, and reactive to light. Cardiovascular:      Rate and Rhythm: Normal rate and regular rhythm. Pulses: Normal pulses. Pulmonary:      Effort: Pulmonary effort is normal.      Comments: Decreased breath sounds on the left compared to the right    Large well-healed midline scar CABG  Abdominal:      General: Abdomen is flat. Palpations: Abdomen is soft. Tenderness: There is no abdominal tenderness. Musculoskeletal:         General: No tenderness. Normal range of motion. Cervical back: Normal range of motion and neck supple. Comments: Trace edema bilateral shins   Skin:     General: Skin is warm and dry. Capillary Refill: Capillary refill takes less than 2 seconds. Neurological:      General: No focal deficit present. Mental Status: She is alert and oriented to person, place, and time. Psychiatric:         Mood and Affect: Mood normal.         Behavior: Behavior normal.         Judgment: Judgment normal.         DIAGNOSTIC RESULTS     EKG: All EKG's are interpreted by the Emergency Department Physician who either signs or Co-signs this chart in the absence of a cardiologist.    EKG sinus left bundle branch block. QTc 479 ms. There is a lot of artifact on this EKG is very hard to determine anything. We will try and do a repeat. RADIOLOGY:   Non-plain film images such as CT, Ultrasound and MRI are read by the radiologist. [place] images are visualized and preliminarily interpreted by the emergency physician with the below findings:        Interpretation per the Radiologist below, if available at the time of this note:    XR CHEST PORTABLE   Final Result   1. Cardiomegaly. This may be due to either dilated cardiac chambers   are possibly a paracardial effusion. Signed by Dr Merlin Ensign            ED BEDSIDE ULTRASOUND:   Performed by ED Physician -bedside ultrasound no large pericardial effusion.     LABS:  Labs Reviewed   COMPREHENSIVE METABOLIC PANEL - Abnormal; Notable for the following components:       Result Value    Glucose 149 (*)     CREATININE 1.6 (*)     GFR Non- 32 (*)     GFR  38 (*)     AST 35 (*)     All other components within normal limits   CBC WITH AUTO DIFFERENTIAL - Abnormal; Notable for the following components:    RBC 3.70 (*)     Hemoglobin 11.9 (*)     Hematocrit 35.7 (*)     MCH 32.2 (*)     Neutrophils % 69.5 (*)     All other components within normal limits   URINE RT REFLEX TO CULTURE - Abnormal; Notable for the following components:    Leukocyte Esterase, Urine MODERATE (*)     All other components within normal limits   BRAIN NATRIURETIC PEPTIDE - Abnormal; Notable for the following components:    Pro-BNP 3,828 (*)     All other components within normal limits   MICROSCOPIC URINALYSIS - Abnormal; Notable for the following components:    Bacteria, UA TRACE (*)     Crystals, UA NEG (*)     WBC, UA 14 (*)     All other components within normal limits   COVID-19, RAPID   CULTURE, URINE   LIPASE   PROTIME-INR   TROPONIN   TROPONIN       All other labs were within normal range or not returned as of this dictation. EMERGENCY DEPARTMENT COURSE and DIFFERENTIALDIAGNOSIS/MDM:   Vitals:    Vitals:    06/21/21 1439 06/21/21 1520 06/21/21 1531 06/21/21 1546   BP:  (!) 141/89 130/75    Pulse: 85 91 78 95   Resp: 21 19 18 (!) 31   Temp:       TempSrc:       SpO2: 92% 92% (!) 87% (!) 88%   Weight:           MDM  Number of Diagnoses or Management Options  Chest tightness  Elevated brain natriuretic peptide (BNP) level  Essential hypertension  Hx of CABG  Diagnosis management comments: Patient history of CABG initially hypertensive and with chest tightness relieved with nitro. Patient given aspirin. She is now pain-free post nitro. She states she can breathe better as well. Patient was given Lasix due to her elevated BNP. She had no cardiac work-up since her remote CABG. She really needs an echo and objective testing. Currently patient pain-free. I did do a bedside ultrasound there is no large pericardial effusion. I do not think this is a PE clinically I think this is more ACS and cardiac issues. Admit patient for objective testing. On my reassessment the patient's blood pressure is 130/75 her heart rate is 80 her O2 saturation was 93% on room air she was in no respiratory distress speaking normally with a respiratory rate around 18.        Amount and/or Complexity of Data Reviewed  Clinical lab tests: ordered and reviewed  Tests in the radiology section of CPT®: ordered and reviewed          CONSULTS:  None    PROCEDURES:  Unless otherwise notedbelow, none     Procedures    FINAL IMPRESSION     1. Chest tightness    2. Essential hypertension    3. Hx of CABG    4. Elevated brain natriuretic peptide (BNP) level          DISPOSITION/PLAN   DISPOSITION Decision To Admit 06/21/2021 04:18:47 PM      PATIENT REFERRED TO:  No follow-up provider specified.     DISCHARGE MEDICATIONS:  New Prescriptions    No medications on file          (Please note that portions of this note were completed with a voice recognition program.  Efforts were made to edit the dictations butoccasionally words are mis-transcribed.)    Katelin Woods MD (electronically signed)  AttendingEmergency Physician         Katelin Woods MD  06/21/21 2411

## 2021-06-21 NOTE — ED NOTES
Pt ambulatory to restroom with steady gate. Denies SOA or Chest pressure at this time. Urine specimen provided and sent to lab.       Omar Gonzalez RN  06/21/21 6153

## 2021-06-22 LAB
ALBUMIN SERPL-MCNC: 3.6 G/DL (ref 3.5–5.2)
ALP BLD-CCNC: 44 U/L (ref 35–104)
ALT SERPL-CCNC: 21 U/L (ref 5–33)
ANION GAP SERPL CALCULATED.3IONS-SCNC: 11 MMOL/L (ref 7–19)
AST SERPL-CCNC: 24 U/L (ref 5–32)
BASOPHILS ABSOLUTE: 0.1 K/UL (ref 0–0.2)
BASOPHILS RELATIVE PERCENT: 0.8 % (ref 0–1)
BILIRUB SERPL-MCNC: 0.3 MG/DL (ref 0.2–1.2)
BUN BLDV-MCNC: 25 MG/DL (ref 8–23)
CALCIUM SERPL-MCNC: 9.6 MG/DL (ref 8.8–10.2)
CHLORIDE BLD-SCNC: 104 MMOL/L (ref 98–111)
CHOLESTEROL, TOTAL: 107 MG/DL (ref 160–199)
CO2: 28 MMOL/L (ref 22–29)
CREAT SERPL-MCNC: 1.9 MG/DL (ref 0.5–0.9)
EKG P AXIS: 57 DEGREES
EKG P AXIS: 72 DEGREES
EKG P-R INTERVAL: 188 MS
EKG P-R INTERVAL: 198 MS
EKG Q-T INTERVAL: 390 MS
EKG Q-T INTERVAL: 412 MS
EKG QRS DURATION: 136 MS
EKG QRS DURATION: 142 MS
EKG QTC CALCULATION (BAZETT): 426 MS
EKG QTC CALCULATION (BAZETT): 445 MS
EKG T AXIS: 129 DEGREES
EKG T AXIS: 154 DEGREES
EOSINOPHILS ABSOLUTE: 0.1 K/UL (ref 0–0.6)
EOSINOPHILS RELATIVE PERCENT: 1.6 % (ref 0–5)
GFR AFRICAN AMERICAN: 31
GFR NON-AFRICAN AMERICAN: 26
GLUCOSE BLD-MCNC: 132 MG/DL (ref 74–109)
GLUCOSE BLD-MCNC: 143 MG/DL (ref 70–99)
GLUCOSE BLD-MCNC: 148 MG/DL (ref 70–99)
GLUCOSE BLD-MCNC: 163 MG/DL (ref 70–99)
GLUCOSE BLD-MCNC: 201 MG/DL (ref 70–99)
HBA1C MFR BLD: 7 % (ref 4–6)
HCT VFR BLD CALC: 31.4 % (ref 37–47)
HDLC SERPL-MCNC: 40 MG/DL (ref 65–121)
HEMOGLOBIN: 10.4 G/DL (ref 12–16)
IMMATURE GRANULOCYTES #: 0 K/UL
LDL CHOLESTEROL CALCULATED: 35 MG/DL
LV EF: 30 %
LVEF MODALITY: NORMAL
LYMPHOCYTES ABSOLUTE: 2.2 K/UL (ref 1.1–4.5)
LYMPHOCYTES RELATIVE PERCENT: 30.4 % (ref 20–40)
MAGNESIUM: 1.5 MG/DL (ref 1.6–2.4)
MCH RBC QN AUTO: 32.4 PG (ref 27–31)
MCHC RBC AUTO-ENTMCNC: 33.1 G/DL (ref 33–37)
MCV RBC AUTO: 97.8 FL (ref 81–99)
MONOCYTES ABSOLUTE: 0.5 K/UL (ref 0–0.9)
MONOCYTES RELATIVE PERCENT: 6.1 % (ref 0–10)
NEUTROPHILS ABSOLUTE: 4.5 K/UL (ref 1.5–7.5)
NEUTROPHILS RELATIVE PERCENT: 60.8 % (ref 50–65)
PDW BLD-RTO: 13.2 % (ref 11.5–14.5)
PERFORMED ON: ABNORMAL
PLATELET # BLD: 259 K/UL (ref 130–400)
PMV BLD AUTO: 11 FL (ref 9.4–12.3)
POTASSIUM SERPL-SCNC: 4.8 MMOL/L (ref 3.5–5)
RBC # BLD: 3.21 M/UL (ref 4.2–5.4)
SODIUM BLD-SCNC: 143 MMOL/L (ref 136–145)
TOTAL PROTEIN: 6.3 G/DL (ref 6.6–8.7)
TRIGL SERPL-MCNC: 161 MG/DL (ref 0–149)
TROPONIN: <0.01 NG/ML (ref 0–0.03)
WBC # BLD: 7.4 K/UL (ref 4.8–10.8)

## 2021-06-22 PROCEDURE — G0378 HOSPITAL OBSERVATION PER HR: HCPCS

## 2021-06-22 PROCEDURE — 82947 ASSAY GLUCOSE BLOOD QUANT: CPT

## 2021-06-22 PROCEDURE — 36415 COLL VENOUS BLD VENIPUNCTURE: CPT

## 2021-06-22 PROCEDURE — 85025 COMPLETE CBC W/AUTO DIFF WBC: CPT

## 2021-06-22 PROCEDURE — 99205 OFFICE O/P NEW HI 60 MIN: CPT | Performed by: INTERNAL MEDICINE

## 2021-06-22 PROCEDURE — 2580000003 HC RX 258: Performed by: INTERNAL MEDICINE

## 2021-06-22 PROCEDURE — 6360000004 HC RX CONTRAST MEDICATION: Performed by: INTERNAL MEDICINE

## 2021-06-22 PROCEDURE — 84484 ASSAY OF TROPONIN QUANT: CPT

## 2021-06-22 PROCEDURE — 80061 LIPID PANEL: CPT

## 2021-06-22 PROCEDURE — 93010 ELECTROCARDIOGRAM REPORT: CPT | Performed by: INTERNAL MEDICINE

## 2021-06-22 PROCEDURE — 6370000000 HC RX 637 (ALT 250 FOR IP): Performed by: INTERNAL MEDICINE

## 2021-06-22 PROCEDURE — 96375 TX/PRO/DX INJ NEW DRUG ADDON: CPT

## 2021-06-22 PROCEDURE — 80053 COMPREHEN METABOLIC PANEL: CPT

## 2021-06-22 PROCEDURE — 83036 HEMOGLOBIN GLYCOSYLATED A1C: CPT

## 2021-06-22 PROCEDURE — 93005 ELECTROCARDIOGRAM TRACING: CPT | Performed by: INTERNAL MEDICINE

## 2021-06-22 PROCEDURE — 96372 THER/PROPH/DIAG INJ SC/IM: CPT

## 2021-06-22 PROCEDURE — 6360000002 HC RX W HCPCS: Performed by: INTERNAL MEDICINE

## 2021-06-22 PROCEDURE — 83735 ASSAY OF MAGNESIUM: CPT

## 2021-06-22 PROCEDURE — C8929 TTE W OR WO FOL WCON,DOPPLER: HCPCS

## 2021-06-22 RX ADMIN — HEPARIN SODIUM 5000 UNITS: 5000 INJECTION INTRAVENOUS; SUBCUTANEOUS at 06:22

## 2021-06-22 RX ADMIN — HEPARIN SODIUM 5000 UNITS: 5000 INJECTION INTRAVENOUS; SUBCUTANEOUS at 22:00

## 2021-06-22 RX ADMIN — ATORVASTATIN CALCIUM 20 MG: 20 TABLET, FILM COATED ORAL at 10:11

## 2021-06-22 RX ADMIN — CYANOCOBALAMIN TAB 500 MCG 1000 MCG: 500 TAB at 10:11

## 2021-06-22 RX ADMIN — HEPARIN SODIUM 5000 UNITS: 5000 INJECTION INTRAVENOUS; SUBCUTANEOUS at 16:14

## 2021-06-22 RX ADMIN — ASPIRIN 81 MG: 81 TABLET, CHEWABLE ORAL at 10:11

## 2021-06-22 RX ADMIN — PERFLUTREN 1.65 MG: 6.52 INJECTION, SUSPENSION INTRAVENOUS at 08:20

## 2021-06-22 RX ADMIN — SODIUM CHLORIDE, PRESERVATIVE FREE 10 ML: 5 INJECTION INTRAVENOUS at 10:11

## 2021-06-22 RX ADMIN — CEFTRIAXONE 1000 MG: 1 INJECTION, POWDER, FOR SOLUTION INTRAMUSCULAR; INTRAVENOUS at 16:14

## 2021-06-22 RX ADMIN — TIMOLOL MALEATE 1 DROP: 5 SOLUTION OPHTHALMIC at 10:11

## 2021-06-22 ASSESSMENT — ENCOUNTER SYMPTOMS
EYES NEGATIVE: 1
SHORTNESS OF BREATH: 0
DIARRHEA: 0
RESPIRATORY NEGATIVE: 1
NAUSEA: 0
VOMITING: 0
GASTROINTESTINAL NEGATIVE: 1

## 2021-06-22 NOTE — PROGRESS NOTES
[facility]        H[facility]ist Progress Note  6/22/2021 1:23 PM  Subjective:   Admit Date: 6/21/2021  PCP: Norbert Marquez MD    Chief Complaint: Chest pain    Subjective: Patient seen and examined at bedside. Denies chest pain or dyspnea. Comfortable. [facility] Hospital History: 65 y.o. obese female with a PMH of CAD s/p CABG, HTN, HLD, DM not on insulin, CKD IIIb who presented to the ED complaining of chest pain. Cardiology consulted with plans for stress test. TTE showing systolic and diastolic CHF and moderate MR.    ROS: 14 point review of systems is negative except as specifically addressed above. ADULT DIET; Regular; 3 carb choices (45 gm/meal); Low Fat/Low Chol/High Fiber/2 gm Na;  No Caffeine  Diet NPO Exceptions are: Sips of Clear Liquids    Intake/Output Summary (Last 24 hours) at 6/22/2021 1323  Last data filed at 6/22/2021 1220  Gross per 24 hour   Intake 240 ml   Output 2200 ml   Net -1960 ml     Medications:   sodium chloride      dextrose       Current Facility-Administered Medications   Medication Dose Route Frequency Provider Last Rate Last Admin    nitroGLYCERIN (NITROSTAT) SL tablet 0.4 mg  0.4 mg Sublingual Q5 Min PRN Aldo Castro MD   0.4 mg at 06/21/21 1521    sodium chloride flush 0.9 % injection 5-40 mL  5-40 mL Intravenous 2 times per day Anuja Townsend MD   10 mL at 06/22/21 1011    sodium chloride flush 0.9 % injection 5-40 mL  5-40 mL Intravenous PRN Anuja Townsend MD        0.9 % sodium chloride infusion  25 mL Intravenous PRN Anuja Townsend MD        ondansetron (ZOFRAN-ODT) disintegrating tablet 4 mg  4 mg Oral Q8H PRN Anuja Townsend MD        Or    ondansetron [facility]) injection 4 mg  4 mg Intravenous Q6H PRN Anuja Townsend MD        acetaminophen (TYLENOL) tablet 650 mg  650 mg Oral Q6H PRN Anuja Townsend MD        Or    acetaminophen (TYLENOL) suppository 650 mg  650 mg Rectal Q6H PRN Anuja Townsend MD        polyethylene glycol [facility]) packet 17 g  17 g Oral Daily PRN Jerry Tsai MD        aspirin chewable tablet 81 mg  81 mg Oral Daily Jerry Tsai MD   81 mg at 06/22/21 1011    potassium chloride (KLOR-CON M) extended release tablet 40 mEq  40 mEq Oral PRN Jerry Tsai MD        Or    potassium bicarb-citric acid (EFFER-K) effervescent tablet 40 mEq  40 mEq Oral PRN Jerry Tsai MD        Or    potassium chloride 10 mEq/100 mL IVPB (Peripheral Line)  10 mEq Intravenous PRN Jerry Tsai MD        magnesium sulfate 2000 mg in 50 mL IVPB premix  2,000 mg Intravenous PRN Jerry Tsai MD        heparin (porcine) injection 5,000 Units  5,000 Units Subcutaneous 3 times per day Jerry Tsai MD   5,000 Units at 06/22/21 0622    insulin lispro (HUMALOG) injection vial 0-6 Units  0-6 Units Subcutaneous TID WC Jerry Tsai MD        insulin lispro (HUMALOG) injection vial 0-3 Units  0-3 Units Subcutaneous Nightly Jerry Tsai MD   1 Units at 06/21/21 2126    glucose (GLUTOSE) 40 % oral gel 15 g  15 g Oral PRN Jerry Tsai MD        dextrose 50 % IV solution  12.5 g Intravenous PRN Jerry Tsai MD        glucagon (rDNA) injection 1 mg  1 mg Intramuscular PRN Jerry Tsai MD        dextrose 5 % solution  100 mL/hr Intravenous PRN Jerry Tsai MD        [Held by provider] amLODIPine (NORVASC) tablet 5 mg  5 mg Oral Daily Jerry Tsai MD        atorvastatin (LIPITOR) tablet 20 mg  20 mg Oral Daily Jerry Tsai MD   20 mg at 06/22/21 1011    timolol (TIMOPTIC) 0.5 % ophthalmic solution 1 drop  1 drop Right Eye Daily Jerry Tsai MD   1 drop at 06/22/21 1011    vitamin B-12 (CYANOCOBALAMIN) tablet 1,000 mcg  1,000 mcg Oral Daily Jerry Tsai MD   1,000 mcg at 06/22/21 1011    cefTRIAXone (ROCEPHIN) 1,000 mg in sterile water 10 mL IV syringe  1,000 mg Intravenous Q24H Jerry Tsai MD            Labs:     Recent Labs     06/21/21  1429 06/22/21  0142   WBC 9.3 7.4   RBC 3.70* 3.21*   HGB 11.9* 10.4*   HCT 35.7* 31.4*   MCV 96.5 97.8   MCH 32.2* 32.4*   MCHC 33.3 33.1    259     Recent Labs     06/21/21  1429 06/22/21  0142    143   K 4.7 4.8   ANIONGAP 11 11    104   CO2 25 28   BUN 21 25*   CREATININE 1.6* 1.9*   GLUCOSE 149* 132*   CALCIUM 9.9 9.6     Recent Labs     06/22/21  0142   MG 1.5*     Recent Labs     06/21/21  1429 06/22/21  0142   AST 35* 24   ALT 19 21   BILITOT 0.4 0.3   ALKPHOS 50 44     ABGs:No results for input(s): PH, PO2, PCO2, HCO3, BE, O2SAT in the last 72 hours. Troponin T:   Recent Labs     06/21/21  1632 06/21/21  1949 06/22/21  0142   TROPONINI <0.01 <0.01 <0.01     INR:   Recent Labs     06/21/21  1429   INR 0.97     Lactic Acid: No results for input(s): LACTA in the last 72 hours. Objective:   Vitals: BP (!) 152/73   Pulse 81   Temp 96.5 °F (35.8 °C) (Temporal)   Resp 16   Ht 5' 6\" (1.676 m)   Wt 219 lb 2 oz (99.4 kg)   SpO2 93%   BMI 35.37 kg/m²   24HR INTAKE/OUTPUT:      Intake/Output Summary (Last 24 hours) at 6/22/2021 1323  Last data filed at 6/22/2021 1220  Gross per 24 hour   Intake 240 ml   Output 2200 ml   Net -1960 ml     General appearance: Alert  Head: NC/AT  Eyes: conjunctivae/corneas clear  Ears: normal external ears  Neck: supple  Lungs: BLAE, no wheezing appreciated, mild bibasilar crackles   Heart: RRR   Abdomen: BS+, soft, NT, ND, obese  Extremities: no edema, pulses+  Skin: warm  Neurologic: Alert, gross motor function intact  Psychiatric:  Calm, mood appropriate     Assessment and Plan:   Principal Problem:    Chest pain  Active Problems:    Hypertension    Hyperlipidemia    Glaucoma    CAD (coronary artery disease)    Type 2 diabetes mellitus (HCC)    Diabetes mellitus (HCC)    Chronic kidney disease, stage 3b ([facility] [address]    History of coronary artery bypass graft  Resolved Problems:    * No resolved hospital problems. *    Chest pain/CAD s/p CABG: Telemetry. TTE showing systolic and diastolic CHF, moderate MR. Aspirin/Statin. Cardiology on board. Plans for stress test tomorrow.    DM: ISS. HbA1c 7.0. Hypoglycemia protocol in place. Monitor BG and adjust medications PRN.     HTN: Norvasc. Monitor BP and adjust medications PRN.    CKD IIIb: Mild worsening today. Monitor BMP.     Supportive management.     Advance Directive: DNR-CC    DVT prophylaxis: Heparin    Discharge planning: TBD      Signed:  Maximus Groves MD 6/22/2021 1:23 PM  Rounding Hospitalist

## 2021-06-22 NOTE — CONSULTS
[facility]  Cardiology Consult      Requesting MD:  Wyatt Wilson MD   Admit Status:  Observation [104]       History obtained from:   [] Patient  [] Other (specify):     Patient: Theresia Schaumann  [id number]     Chief Complaint:   Chief Complaint   Patient presents with    Chest Pain    Shortness of Breath         HPI: Ms. Senthil Haro is a 65 y.o. female with a history of coronary artery disease previous CABG 2001 admitted 621 with increasing shortness of breath for several days also some vague tightness in her chest given 2 nitroglycerin that seemed to help presently pain-free today all troponins are negative. X4.  Echocardiogram ordered. Chest x-ray cardiomegaly. Does not describe typical exertional angina. proBNP 3828. Review of Systems:  Review of Systems   Constitutional: Negative. Negative for chills, fever and unexpected weight change. HENT: Negative. Eyes: Negative. Respiratory: Negative. Negative for shortness of breath. Cardiovascular: Negative. Negative for chest pain. Gastrointestinal: Negative. Negative for diarrhea, nausea and vomiting. Endocrine: Negative. Genitourinary: Negative. Musculoskeletal: Negative. Skin: Negative. Neurological: Negative. All other systems reviewed and are negative.       Cardiac Specific Data:  Specialty Problems        Cardiology Problems    CAD (coronary artery disease)        Hyperlipidemia        Hypertension        * (Principal) Chest pain              Past Medical History:  Past Medical History:   Diagnosis Date    Acute sinusitis     CHAPARRO (acute kidney injury) ([address] 5/18/2018    Anemia     Callous ulcer ([address]     Cataract     Dermatitis     Diabetes mellitus ([address]     Diabetic nephropathy ([address]     Diabetic retinopathy ([address]     Glaucoma     Gout     Hemorrhoids     Hx of TB skin testing     positive    Hyperlipidemia     Hypertension     Type 2 diabetes mellitus ([address] 5/18/2018    Vitamin D deficiency         Past Surgical History:  Past Surgical History:   Procedure Laterality Date    CARDIAC SURGERY      CARPAL TUNNEL RELEASE Right 9/3/2020    RIGHT CARPAL TUNNEL RELEASE performed by [facility]ail [facility]edo [facility]      COLONOSCOPY N/A 10/11/2019    Dr Tate Rom of a hemostatic clip-Suboptimal prep, diverticulosis, internal hemorrhoids-Grade 2, AP x 3, 1 yr recall     COLONOSCOPY N/A 10/12/2020    Dr Bethany Cuenca hemorrhoids-Grade 1-2, AP, 3 yr recall    CORONARY ARTERY BYPASS GRAFT      ENDOSCOPY, COLON, DIAGNOSTIC      MOUTH SURGERY      OTHER SURGICAL HISTORY      sternal resection    SKIN BIOPSY      UPPER GASTROINTESTINAL ENDOSCOPY N/A 10/11/2019    Dr Andreia Higginbotham (+) H Pylori    VASCULAR SURGERY         Past Family History:  Family History   Problem Relation Age of Onset    Cancer Mother     Diabetes Father     COPD Father     Diabetes Sister     Heart Disease Sister     Colon Polyps Sister     Coronary Art Dis Other         grandfather    Diabetes Other         aunt    Colon Cancer Neg Hx     Liver Cancer Neg Hx     Rectal Cancer Neg Hx        Past Social History:  Social History     Socioeconomic History    Marital status: Single     Spouse name: Not on file    Number of children: Not on file    Years of education: Not on file    Highest education level: Not on file   Occupational History     Employer: [employer]   Tobacco Use    Smoking status: Never Smoker    Smokeless tobacco: Never Used   Vaping Use    Vaping Use: Never used   Substance and Sexual Activity    Alcohol use: No    Drug use: No    Sexual activity: Never   Other Topics Concern    Not on file   Social History Narrative    Not on file     Social Determinants of Health     Financial Resource Strain: Low Risk     Difficulty of Paying Living Expenses: Not hard at all   Food Insecurity: No Food Insecurity    Worried About Running Out of Food in the Last Year: Never true    Ran Out of Food in the Last Year: Never true   Transportation Needs: No Transportation Needs    Lack of Transportation (Medical): No    Lack of Transportation (Non-Medical): No   Physical Activity:     Days of Exercise per Week:     Minutes of Exercise per Session:    Stress:     Feeling of Stress :    Social Connections:     Frequency of Communication with Friends and Family:     Frequency of Social Gatherings with Friends and Family:     Attends Gnosticism Services:     Active Member of Clubs or Organizations:     Attends Club or Organization Meetings:     Marital Status:    Intimate Partner Violence:     Fear of Current or Ex-Partner:     Emotionally Abused:     Physically Abused:     Sexually Abused: Allergies: Allergies   Allergen Reactions    Medrol [Methylprednisolone]      Affects her eyes       Home Meds:  Prior to Admission medications    Medication Sig Start Date End Date Taking?  Authorizing Provider   amLODIPine (NORVASC) 5 MG tablet Take 1 tablet by mouth daily 6/15/21  Yes Bienvenido Matamoros MD   glipiZIDE (GLUCOTROL XL) 2.5 MG extended release tablet TAKE 1 TABLET EVERY DAY 12/18/20  Yes Bienvenido Matamoros MD   simvastatin (ZOCOR) 40 MG tablet TAKE 1 TABLET EVERY EVENING 12/3/20  Yes Bienvenido Matamoros MD   metFORMIN (GLUCOPHAGE) 500 MG tablet Take 2 tablets by mouth 2 times daily (with meals) TAKE 2 TABLETS TWICE DAILY 10/1/20  Yes MD CHADWICK CoulterU-PERRY ROSA PLUS strip TEST BLOOD SUGAR FOUR TIMES DAILY 6/27/18  Yes Bienvenido Matamoros MD   Alcohol Swabs (B-D SINGLE USE SWABS REGULAR) PADS USE THREE TIMES DAILY 6/27/18  Yes MD CHADWICK CoulterUVARGHESE SOFTCLIX LANCETS MISC TEST FOUR TIMES DAILY 6/14/18  Yes Bienvenido Matamoros MD   aspirin 81 MG tablet Take 81 mg by mouth daily   Yes Historical Provider, MD   Omega-3 Fatty Acids (FISH OIL) 1000 MG CAPS Take 1,000 mg by mouth 2 times daily   Yes Historical Provider, MD   MULTIPLE VITAMIN PO Take 1 tablet by mouth daily   Yes Historical Provider, MD   Calcium Carbonate-Vitamin D (CALCIUM 500/D PO) Take 1 tablet by mouth 2 times daily   Yes Historical Provider, MD   vitamin B-12 (CYANOCOBALAMIN) 1000 MCG tablet Take 1,000 mcg by mouth daily   Yes Historical Provider, MD   timolol (TIMOPTIC) 0.5 % ophthalmic solution Place 1 drop into the right eye daily  9/8/17  Yes Historical Provider, MD       Current Meds:   sodium chloride flush  5-40 mL Intravenous 2 times per day    aspirin  81 mg Oral Daily    heparin (porcine)  5,000 Units Subcutaneous 3 times per day    insulin lispro  0-6 Units Subcutaneous TID WC    insulin lispro  0-3 Units Subcutaneous Nightly    [Held by provider] amLODIPine  5 mg Oral Daily    atorvastatin  20 mg Oral Daily    timolol  1 drop Right Eye Daily    vitamin B-12  1,000 mcg Oral Daily    cefTRIAXone (ROCEPHIN) IV  1,000 mg Intravenous Q24H       Current Infused Meds:   sodium chloride      dextrose         Physical Exam:  Vitals:    06/22/21 0622   BP: (!) 152/73   Pulse: 81   Resp: 16   Temp: 96.5 °F (35.8 °C)   SpO2: 93%       Intake/Output Summary (Last 24 hours) at 6/22/2021 0957  Last data filed at 6/22/2021 0900  Gross per 24 hour   Intake 240 ml   Output 1800 ml   Net -1560 ml     Estimated body mass index is 35.89 kg/m² as calculated from the following:    Height as of this encounter: 5' 6\" (1.676 m). Weight as of this encounter: 222 lb 6 oz (100.9 kg). Physical Exam  Vitals reviewed. Constitutional:       General: She is not in acute distress. Appearance: Normal appearance. She is well-developed. She is obese. She is not ill-appearing, toxic-appearing or diaphoretic. HENT:      Head: Normocephalic and atraumatic. Nose: Nose normal.      Mouth/Throat:      Mouth: Mucous membranes are moist.      Pharynx: Oropharynx is clear. Eyes:      General: No scleral icterus.      Extraocular Movements: Extraocular movements intact. Pupils: Pupils are equal, round, and reactive to light. Neck:      Vascular: No carotid bruit or JVD. Cardiovascular:      Rate and Rhythm: Normal rate and regular rhythm. Heart sounds: Normal heart sounds. No murmur heard. No friction rub. No gallop. Pulmonary:      Effort: Pulmonary effort is normal. No respiratory distress. Breath sounds: Normal breath sounds. No stridor. No wheezing, rhonchi or rales. Chest:      Chest wall: No tenderness. Abdominal:      General: Abdomen is flat. Bowel sounds are normal. There is no distension. Palpations: Abdomen is soft. There is no mass. Tenderness: There is no abdominal tenderness. There is no right CVA tenderness, left CVA tenderness, guarding or rebound. Hernia: No hernia is present. Musculoskeletal:         General: No swelling, tenderness, deformity or signs of injury. Cervical back: Normal range of motion and neck supple. No rigidity or tenderness. Right lower leg: No edema. Left lower leg: No edema. Lymphadenopathy:      Cervical: No cervical adenopathy. Skin:     General: Skin is warm and dry. Neurological:      General: No focal deficit present. Mental Status: She is alert and oriented to person, place, and time. Mental status is at baseline. Cranial Nerves: No cranial nerve deficit. Sensory: No sensory deficit. Motor: No weakness. Coordination: Coordination normal.   Psychiatric:         Mood and Affect: Mood normal.         Behavior: Behavior normal.         Thought Content:  Thought content normal.         Judgment: Judgment normal.         Labs:  Recent Labs     06/21/21  1429 06/22/21  0142   WBC 9.3 7.4   HGB 11.9* 10.4*    259       Recent Labs     06/21/21  1429 06/22/21  0142    143   K 4.7 4.8    104   CO2 25 28   BUN 21 25*   CREATININE 1.6* 1.9*   LABGLOM 32* 26*   MG  --  1.5*   CALCIUM 9.9 9.6       CK, CKMB, Troponin: @LABRCNT (CKTOTAL:3, CKMB:3, TROPONINI:3)@    Last 3 BNP:  No results for input(s): BNP in the last 72 hours. IMAGING:  XR CHEST PORTABLE    Result Date: 6/21/2021  EXAMINATION: XR CHEST PORTABLE 6/21/2021 3:54 PM HISTORY: XR CHEST PORTABLE 6/21/2021 2:48 PM HISTORY: Short of breath COMPARISON: December 31, 2019. FINDINGS: The lungs are clear. Cardiac silhouettes enlarged. This may be either dilated cardiac chambers are possibly a paracardial effusion. The osseous structures and surrounding soft tissues demonstrate no acute abnormality. 1. Cardiomegaly. This may be due to either dilated cardiac chambers are possibly a paracardial effusion. Signed by Dr Lian Cordon      Assessment:  1. Complaints of shortness of breath  2. Coronary artery disease  3. Status post CABG 1/1/2000 1x3  4. Diabetes mellitus  5. Obesity  6. No typical exertional angina  7. Cardiomegaly  8. Hypertension  9. Hyperlipidemia  10. Glaucoma  11. Chronic kidney disease stage IIIb  12. Carpal tunnel syndrome  13. CT of the head 5/18/2018 no acute findings  14. Acute congestive heart failure        Recommendations:  1. Echocardiogram  2. Lexiscan stress test planned tomorrow  3. Hold amlodipine pending stress test  4.  Further comments to follow

## 2021-06-23 ENCOUNTER — APPOINTMENT (OUTPATIENT)
Dept: NUCLEAR MEDICINE | Age: 71
End: 2021-06-23
Payer: MEDICARE

## 2021-06-23 LAB
ALBUMIN SERPL-MCNC: 3.5 G/DL (ref 3.5–5.2)
ALP BLD-CCNC: 40 U/L (ref 35–104)
ALT SERPL-CCNC: 19 U/L (ref 5–33)
ANION GAP SERPL CALCULATED.3IONS-SCNC: 10 MMOL/L (ref 7–19)
AST SERPL-CCNC: 20 U/L (ref 5–32)
BASOPHILS ABSOLUTE: 0 K/UL (ref 0–0.2)
BASOPHILS RELATIVE PERCENT: 0.6 % (ref 0–1)
BILIRUB SERPL-MCNC: 0.3 MG/DL (ref 0.2–1.2)
BUN BLDV-MCNC: 27 MG/DL (ref 8–23)
CALCIUM SERPL-MCNC: 9.1 MG/DL (ref 8.8–10.2)
CHLORIDE BLD-SCNC: 102 MMOL/L (ref 98–111)
CO2: 27 MMOL/L (ref 22–29)
CREAT SERPL-MCNC: 1.6 MG/DL (ref 0.5–0.9)
EOSINOPHILS ABSOLUTE: 0.1 K/UL (ref 0–0.6)
EOSINOPHILS RELATIVE PERCENT: 1.6 % (ref 0–5)
GFR AFRICAN AMERICAN: 38
GFR NON-AFRICAN AMERICAN: 32
GLUCOSE BLD-MCNC: 137 MG/DL (ref 70–99)
GLUCOSE BLD-MCNC: 146 MG/DL (ref 74–109)
GLUCOSE BLD-MCNC: 154 MG/DL (ref 70–99)
GLUCOSE BLD-MCNC: 155 MG/DL (ref 70–99)
GLUCOSE BLD-MCNC: 173 MG/DL (ref 70–99)
HCT VFR BLD CALC: 30.2 % (ref 37–47)
HEMOGLOBIN: 10.1 G/DL (ref 12–16)
IMMATURE GRANULOCYTES #: 0 K/UL
LV EF: 35 %
LVEF MODALITY: NORMAL
LYMPHOCYTES ABSOLUTE: 2.3 K/UL (ref 1.1–4.5)
LYMPHOCYTES RELATIVE PERCENT: 32.9 % (ref 20–40)
MAGNESIUM: 1.6 MG/DL (ref 1.6–2.4)
MCH RBC QN AUTO: 32.7 PG (ref 27–31)
MCHC RBC AUTO-ENTMCNC: 33.4 G/DL (ref 33–37)
MCV RBC AUTO: 97.7 FL (ref 81–99)
MONOCYTES ABSOLUTE: 0.4 K/UL (ref 0–0.9)
MONOCYTES RELATIVE PERCENT: 6.3 % (ref 0–10)
NEUTROPHILS ABSOLUTE: 4 K/UL (ref 1.5–7.5)
NEUTROPHILS RELATIVE PERCENT: 58.3 % (ref 50–65)
PDW BLD-RTO: 13.2 % (ref 11.5–14.5)
PERFORMED ON: ABNORMAL
PLATELET # BLD: 253 K/UL (ref 130–400)
PMV BLD AUTO: 10.8 FL (ref 9.4–12.3)
POTASSIUM SERPL-SCNC: 4.6 MMOL/L (ref 3.5–5)
RBC # BLD: 3.09 M/UL (ref 4.2–5.4)
SODIUM BLD-SCNC: 139 MMOL/L (ref 136–145)
TOTAL PROTEIN: 6.1 G/DL (ref 6.6–8.7)
URINE CULTURE, ROUTINE: NORMAL
WBC # BLD: 6.8 K/UL (ref 4.8–10.8)

## 2021-06-23 PROCEDURE — 96372 THER/PROPH/DIAG INJ SC/IM: CPT

## 2021-06-23 PROCEDURE — 83735 ASSAY OF MAGNESIUM: CPT

## 2021-06-23 PROCEDURE — 82947 ASSAY GLUCOSE BLOOD QUANT: CPT

## 2021-06-23 PROCEDURE — 6370000000 HC RX 637 (ALT 250 FOR IP): Performed by: INTERNAL MEDICINE

## 2021-06-23 PROCEDURE — 93017 CV STRESS TEST TRACING ONLY: CPT

## 2021-06-23 PROCEDURE — 2580000003 HC RX 258: Performed by: INTERNAL MEDICINE

## 2021-06-23 PROCEDURE — 6360000002 HC RX W HCPCS: Performed by: INTERNAL MEDICINE

## 2021-06-23 PROCEDURE — 36415 COLL VENOUS BLD VENIPUNCTURE: CPT

## 2021-06-23 PROCEDURE — G0378 HOSPITAL OBSERVATION PER HR: HCPCS

## 2021-06-23 PROCEDURE — 3430000000 HC RX DIAGNOSTIC RADIOPHARMACEUTICAL: Performed by: INTERNAL MEDICINE

## 2021-06-23 PROCEDURE — 85025 COMPLETE CBC W/AUTO DIFF WBC: CPT

## 2021-06-23 PROCEDURE — A9500 TC99M SESTAMIBI: HCPCS | Performed by: INTERNAL MEDICINE

## 2021-06-23 PROCEDURE — 80053 COMPREHEN METABOLIC PANEL: CPT

## 2021-06-23 RX ORDER — ISOSORBIDE MONONITRATE 30 MG/1
30 TABLET, EXTENDED RELEASE ORAL 2 TIMES DAILY
Status: DISCONTINUED | OUTPATIENT
Start: 2021-06-23 | End: 2021-06-24 | Stop reason: HOSPADM

## 2021-06-23 RX ADMIN — ASPIRIN 81 MG: 81 TABLET, CHEWABLE ORAL at 09:13

## 2021-06-23 RX ADMIN — ATORVASTATIN CALCIUM 20 MG: 20 TABLET, FILM COATED ORAL at 09:13

## 2021-06-23 RX ADMIN — TIMOLOL MALEATE 1 DROP: 5 SOLUTION OPHTHALMIC at 09:13

## 2021-06-23 RX ADMIN — HEPARIN SODIUM 5000 UNITS: 5000 INJECTION INTRAVENOUS; SUBCUTANEOUS at 13:26

## 2021-06-23 RX ADMIN — ISOSORBIDE MONONITRATE 30 MG: 30 TABLET, EXTENDED RELEASE ORAL at 21:00

## 2021-06-23 RX ADMIN — TETRAKIS(2-METHOXYISOBUTYLISOCYANIDE)COPPER(I) TETRAFLUOROBORATE 10 MILLICURIE: 1 INJECTION, POWDER, LYOPHILIZED, FOR SOLUTION INTRAVENOUS at 14:02

## 2021-06-23 RX ADMIN — HEPARIN SODIUM 5000 UNITS: 5000 INJECTION INTRAVENOUS; SUBCUTANEOUS at 21:00

## 2021-06-23 RX ADMIN — CYANOCOBALAMIN TAB 500 MCG 1000 MCG: 500 TAB at 09:12

## 2021-06-23 RX ADMIN — TETRAKIS(2-METHOXYISOBUTYLISOCYANIDE)COPPER(I) TETRAFLUOROBORATE 30 MILLICURIE: 1 INJECTION, POWDER, LYOPHILIZED, FOR SOLUTION INTRAVENOUS at 14:01

## 2021-06-23 RX ADMIN — SODIUM CHLORIDE, PRESERVATIVE FREE 10 ML: 5 INJECTION INTRAVENOUS at 21:00

## 2021-06-23 RX ADMIN — SODIUM CHLORIDE, PRESERVATIVE FREE 10 ML: 5 INJECTION INTRAVENOUS at 09:13

## 2021-06-23 RX ADMIN — METOPROLOL TARTRATE 25 MG: 25 TABLET, FILM COATED ORAL at 21:00

## 2021-06-23 RX ADMIN — HEPARIN SODIUM 5000 UNITS: 5000 INJECTION INTRAVENOUS; SUBCUTANEOUS at 05:16

## 2021-06-23 NOTE — ACP (ADVANCE CARE PLANNING)
Advance Care Planning     Advance Care Planning Activator (Inpatient)  Conversation Note      Date of ACP Conversation: 6/23/2021     Conversation Conducted with: Pt: Uche Lou and sister Gulshan Julien Activator: Mary Ellen Larsen RN        Health Care Decision Maker:     Current Designated Health Care Decision Maker:     Primary Decision Maker: Meena Florez - Brother/Sister - [phone]    Supplemental (Other) Decision Maker: Ziola Blood - Brother/Sister - [phone]      Care Preferences    Ventilation: \"If you were in your present state of health and suddenly became very ill and were unable to breathe on your own, what would your preference be about the use of a ventilator (breathing machine) if it were available to you? \"      Would the patient desire the use of ventilator (breathing machine)?:   NO        Resuscitation  \"CPR works best to restart the heart when there is a sudden event, like a heart attack, in someone who is otherwise healthy. Unfortunately, CPR does not typically restart the heart for people who have serious health conditions or who are very sick. \"    \"In the event your heart stopped as a result of an underlying serious health condition, would you want attempts to be made to restart your heart (answer \"yes\" for attempt to resuscitate) or would you prefer a natural death (answer \"no\" for do not attempt to resuscitate)? \"   NO       [x] Yes   [] No   Educated Patient / Jessica Eusebio regarding differences between Advance Directives and portable DNR orders. Conversation Outcomes:  [x] ACP discussion completed  [x] Existing advance directive reviewed with patient; no changes to patient's previously recorded wishes.

## 2021-06-23 NOTE — CONSULTS
Initiated for support,  Goals of care and ACP. 66 y/o female who presents to ED with chest pressure. Cardiology consulted and pt is currently out of the room for testing. I met with her sister David Baig, who provided some history of home. Pt lives with another sister, Marylene Fate. 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 Pt has established both sisters as decision makers for her. She is DNR status. She has good spiritual support from her [place], [facility].   We will meet with pt when she returns and follow POC.     Electronically signed by Kamar Bird RN on 6/23/2021 at 11:47 AM

## 2021-06-23 NOTE — PROGRESS NOTES
[facility]        Hospitalist Progress Note  6/23/2021 6:57 PM  Subjective:   Admit Date: 6/21/2021  PCP: Blaise Burkett MD    Chief Complaint: Chest pain    Subjective: Patient seen and examined at bedside. Denies chest pain or dyspnea. Comfortable. No acute events overnight. [facility] History: 65 y.o. obese female with a PMH of CAD s/p CABG, HTN, HLD, DM not on insulin, CKD IIIb who presented to the ED complaining of chest pain. Cardiology consulted with plans for stress test. TTE showing systolic and diastolic CHF and moderate MR. Underwent Lexiscan stress test 6/23 which returned abnormal with mild ischemia suggested with inferolateral defect. ROS: 14 point review of systems is negative except as specifically addressed above. ADULT DIET;  Regular; 4 carb choices (60 gm/meal)    Intake/Output Summary (Last 24 hours) at 6/23/2021 1857  Last data filed at 6/23/2021 1645  Gross per 24 hour   Intake 240 ml   Output 1900 ml   Net -1660 ml     Medications:   sodium chloride      dextrose       Current Facility-Administered Medications   Medication Dose Route Frequency Provider Last Rate Last Admin    regadenoson (LEXISCAN) injection 0.4 mg  0.4 mg Intravenous ONCE PRN Miriam Alexandra MD        metoprolol tartrate (LOPRESSOR) tablet 25 mg  25 mg Oral BID Miriam Alexandra MD        isosorbide mononitrate (IMDUR) extended release tablet 30 mg  30 mg Oral BID Miriam Alexandra MD        nitroGLYCERIN (NITROSTAT) SL tablet 0.4 mg  0.4 mg Sublingual Q5 Min PRN Lady Weston MD   0.4 mg at 06/21/21 1521    sodium chloride flush 0.9 % injection 5-40 mL  5-40 mL Intravenous 2 times per day Malika Nicole MD   10 mL at 06/23/21 0913    sodium chloride flush 0.9 % injection 5-40 mL  5-40 mL Intravenous PRN Malika Nicole MD        0.9 % sodium chloride infusion  25 mL Intravenous PRN Malika Nicole MD        ondansetron (ZOFRAN-ODT) disintegrating tablet 4 mg  4 mg Oral Q8H PRN Mary Umaña MD        Or    ondansetron [facility]) injection 4 mg  4 mg Intravenous Q6H PRN Mary Umaña MD        acetaminophen (TYLENOL) tablet 650 mg  650 mg Oral Q6H PRN Mary Umaña MD        Or    acetaminophen (TYLENOL) suppository 650 mg  650 mg Rectal Q6H PRN Mary Umaña MD        polyethylene glycol [facility]) packet 17 g  17 g Oral Daily PRN Mary Umaña MD        aspirin chewable tablet 81 mg  81 mg Oral Daily Mary Umaña MD   81 mg at 06/23/21 0913    potassium chloride (KLOR-CON M) extended release tablet 40 mEq  40 mEq Oral PRN Mary Umaña MD        Or    potassium bicarb-citric acid (EFFER-K) effervescent tablet 40 mEq  40 mEq Oral PRNAMRATA Umaña MD        Or    potassium chloride 10 mEq/100 mL IVPB (Peripheral Line)  10 mEq Intravenous PRN Mary Umaña MD        magnesium sulfate 2000 mg in 50 mL IVPB premix  2,000 mg Intravenous PRN Mary Umaña MD        heparin (porcine) injection 5,000 Units  5,000 Units Subcutaneous 3 times per day Mary Umaña MD   5,000 Units at 06/23/21 1326    insulin lispro (HUMALOG) injection vial 0-6 Units  0-6 Units Subcutaneous TID  Mary Umaña MD        insulin lispro (HUMALOG) injection vial 0-3 Units  0-3 Units Subcutaneous Nightly aMry Umaña MD   1 Units at 06/21/21 2126    glucose (GLUTOSE) 40 % oral gel 15 g  15 g Oral PRN Mary Umaña MD        dextrose 50 % IV solution  12.5 g Intravenous PRNAMRATA Umaña MD        glucagon (rDNA) injection 1 mg  1 mg Intramuscular PRN Mary Umaña MD        dextrose 5 % solution  100 mL/hr Intravenous PRNAMRATA Umaña MD        [Held by provider] amLODIPine (NORVASC) tablet 5 mg  5 mg Oral Daily Mary Umaña MD        atorvastatin (LIPITOR) tablet 20 mg  20 mg Oral Daily Mary Umaña MD   20 mg at 06/23/21 0913    timolol (TIMOPTIC) 0.5 % ophthalmic solution 1 drop  1 drop Right Eye Daily Mary Umaña MD   1 drop at 06/23/21 0991    vitamin B-12 (CYANOCOBALAMIN) tablet 1,000 mcg  1,000 mcg Oral Daily Jane Booth MD   1,000 mcg at 06/23/21 0912        Labs:     Recent Labs     06/21/21  1429 06/22/21  0142 06/23/21  0320   WBC 9.3 7.4 6.8   RBC 3.70* 3.21* 3.09*   HGB 11.9* 10.4* 10.1*   HCT 35.7* 31.4* 30.2*   MCV 96.5 97.8 97.7   MCH 32.2* 32.4* 32.7*   MCHC 33.3 33.1 33.4    259 253     Recent Labs     06/21/21  1429 06/22/21  0142 06/23/21  0320    143 139   K 4.7 4.8 4.6   ANIONGAP 11 11 10    104 102   CO2 25 28 27   BUN 21 25* 27*   CREATININE 1.6* 1.9* 1.6*   GLUCOSE 149* 132* 146*   CALCIUM 9.9 9.6 9.1     Recent Labs     06/22/21  0142 06/23/21  0320   MG 1.5* 1.6     Recent Labs     06/21/21  1429 06/22/21  0142 06/23/21  0320   AST 35* 24 20   ALT 19 21 19   BILITOT 0.4 0.3 0.3   ALKPHOS 50 44 40     ABGs:No results for input(s): PH, PO2, PCO2, HCO3, BE, O2SAT in the last 72 hours. Troponin T:   Recent Labs     06/21/21  1632 06/21/21  1949 06/22/21  0142   TROPONINI <0.01 <0.01 <0.01     INR:   Recent Labs     06/21/21  1429   INR 0.97     Lactic Acid: No results for input(s): LACTA in the last 72 hours.     Objective:   Vitals: BP (!) 158/74   Pulse 69   Temp 96.8 °F (36 °C) (Temporal)   Resp 16   Ht 5' 6\" (1.676 m)   Wt 219 lb 3.2 oz (99.4 kg)   SpO2 95%   BMI 35.38 kg/m²   24HR INTAKE/OUTPUT:      Intake/Output Summary (Last 24 hours) at 6/23/2021 1857  Last data filed at 6/23/2021 1645  Gross per 24 hour   Intake 240 ml   Output 1900 ml   Net -1660 ml     General appearance: Alert and cooperative  Head: NC/AT  Eyes: conjunctivae/corneas clear  Ears: normal external ears  Neck: supple  Lungs: BLAE, no wheezing appreciated, mild bibasilar crackles   Heart: RRR , pulses equal  Abdomen: BS+, soft, NT, ND, obese  Extremities: no edema, pulses+  Skin: warm  Neurologic: Alert, gross motor function intact  Psychiatric:  Calm, mood appropriate     Assessment and Plan:   Principal Problem:    Chest pain  Active Problems:    Hypertension Hyperlipidemia    Glaucoma    CAD (coronary artery disease)    Type 2 diabetes mellitus (HCC)    Diabetes mellitus (HCC)    Chronic kidney disease, stage 3b ([facility] [address]    History of coronary artery bypass graft  Resolved Problems:    * No resolved hospital problems. *    Chest pain/CAD s/p CABG:   Telemetry. TTE showing systolic and diastolic CHF, moderate MR. Aspirin/Statin. Cardiology on board. Status post Lexiscan stress test 6/23-abnormal with perfusion defect, suggesting inferolateral ischemia  -Further management and evaluation per cardiology  --Imdur and metoprolol added to regimen     DM:   ISS. HbA1c 7.0. Hypoglycemia protocol in place. Monitor BG and adjust medications PRN.     HTN:   Monitor BP and adjust medications PRN.    CKD IIIb: Monitor     Supportive management.     Advance Directive: DNR-CC    DVT prophylaxis: Heparin    Discharge planning: TBD      Signed:  Philip Pitts MD 6/23/2021 6:57 PM

## 2021-06-24 VITALS
DIASTOLIC BLOOD PRESSURE: 60 MMHG | HEIGHT: 66 IN | BODY MASS INDEX: 35.36 KG/M2 | HEART RATE: 68 BPM | RESPIRATION RATE: 18 BRPM | WEIGHT: 220 LBS | SYSTOLIC BLOOD PRESSURE: 118 MMHG | OXYGEN SATURATION: 94 % | TEMPERATURE: 96.8 F

## 2021-06-24 LAB
ALBUMIN SERPL-MCNC: 3.3 G/DL (ref 3.5–5.2)
ALP BLD-CCNC: 38 U/L (ref 35–104)
ALT SERPL-CCNC: 17 U/L (ref 5–33)
ANION GAP SERPL CALCULATED.3IONS-SCNC: 11 MMOL/L (ref 7–19)
AST SERPL-CCNC: 22 U/L (ref 5–32)
BASOPHILS ABSOLUTE: 0 K/UL (ref 0–0.2)
BASOPHILS RELATIVE PERCENT: 0.6 % (ref 0–1)
BILIRUB SERPL-MCNC: 0.3 MG/DL (ref 0.2–1.2)
BUN BLDV-MCNC: 32 MG/DL (ref 8–23)
CALCIUM SERPL-MCNC: 8.7 MG/DL (ref 8.8–10.2)
CHLORIDE BLD-SCNC: 103 MMOL/L (ref 98–111)
CO2: 24 MMOL/L (ref 22–29)
CREAT SERPL-MCNC: 1.7 MG/DL (ref 0.5–0.9)
EOSINOPHILS ABSOLUTE: 0.2 K/UL (ref 0–0.6)
EOSINOPHILS RELATIVE PERCENT: 2.4 % (ref 0–5)
GFR AFRICAN AMERICAN: 36
GFR NON-AFRICAN AMERICAN: 29
GLUCOSE BLD-MCNC: 143 MG/DL (ref 74–109)
GLUCOSE BLD-MCNC: 149 MG/DL (ref 70–99)
GLUCOSE BLD-MCNC: 277 MG/DL (ref 70–99)
HCT VFR BLD CALC: 29.6 % (ref 37–47)
HEMOGLOBIN: 9.7 G/DL (ref 12–16)
IMMATURE GRANULOCYTES #: 0 K/UL
LYMPHOCYTES ABSOLUTE: 2.5 K/UL (ref 1.1–4.5)
LYMPHOCYTES RELATIVE PERCENT: 37.4 % (ref 20–40)
MAGNESIUM: 1.6 MG/DL (ref 1.6–2.4)
MCH RBC QN AUTO: 31.9 PG (ref 27–31)
MCHC RBC AUTO-ENTMCNC: 32.8 G/DL (ref 33–37)
MCV RBC AUTO: 97.4 FL (ref 81–99)
MONOCYTES ABSOLUTE: 0.4 K/UL (ref 0–0.9)
MONOCYTES RELATIVE PERCENT: 6.7 % (ref 0–10)
NEUTROPHILS ABSOLUTE: 3.5 K/UL (ref 1.5–7.5)
NEUTROPHILS RELATIVE PERCENT: 52.6 % (ref 50–65)
PDW BLD-RTO: 13.4 % (ref 11.5–14.5)
PERFORMED ON: ABNORMAL
PERFORMED ON: ABNORMAL
PLATELET # BLD: 261 K/UL (ref 130–400)
PMV BLD AUTO: 10.9 FL (ref 9.4–12.3)
POTASSIUM SERPL-SCNC: 4.5 MMOL/L (ref 3.5–5)
PRO-BNP: 3216 PG/ML (ref 0–900)
RBC # BLD: 3.04 M/UL (ref 4.2–5.4)
SODIUM BLD-SCNC: 138 MMOL/L (ref 136–145)
TOTAL PROTEIN: 5.7 G/DL (ref 6.6–8.7)
WBC # BLD: 6.6 K/UL (ref 4.8–10.8)

## 2021-06-24 PROCEDURE — 99214 OFFICE O/P EST MOD 30 MIN: CPT | Performed by: INTERNAL MEDICINE

## 2021-06-24 PROCEDURE — G0378 HOSPITAL OBSERVATION PER HR: HCPCS

## 2021-06-24 PROCEDURE — 82947 ASSAY GLUCOSE BLOOD QUANT: CPT

## 2021-06-24 PROCEDURE — 83735 ASSAY OF MAGNESIUM: CPT

## 2021-06-24 PROCEDURE — 6370000000 HC RX 637 (ALT 250 FOR IP): Performed by: INTERNAL MEDICINE

## 2021-06-24 PROCEDURE — 2580000003 HC RX 258: Performed by: INTERNAL MEDICINE

## 2021-06-24 PROCEDURE — 80053 COMPREHEN METABOLIC PANEL: CPT

## 2021-06-24 PROCEDURE — 83880 ASSAY OF NATRIURETIC PEPTIDE: CPT

## 2021-06-24 PROCEDURE — 36415 COLL VENOUS BLD VENIPUNCTURE: CPT

## 2021-06-24 PROCEDURE — 85025 COMPLETE CBC W/AUTO DIFF WBC: CPT

## 2021-06-24 RX ORDER — FUROSEMIDE 40 MG/1
40 TABLET ORAL DAILY PRN
Status: DISCONTINUED | OUTPATIENT
Start: 2021-06-24 | End: 2021-06-24 | Stop reason: HOSPADM

## 2021-06-24 RX ORDER — ISOSORBIDE MONONITRATE 30 MG/1
30 TABLET, EXTENDED RELEASE ORAL 2 TIMES DAILY
Qty: 60 TABLET | Refills: 1 | Status: SHIPPED | OUTPATIENT
Start: 2021-06-24 | End: 2021-08-13 | Stop reason: SDUPTHER

## 2021-06-24 RX ORDER — FUROSEMIDE 40 MG/1
40 TABLET ORAL DAILY PRN
Qty: 60 TABLET | Refills: 1 | Status: SHIPPED | OUTPATIENT
Start: 2021-06-24 | End: 2022-04-07 | Stop reason: SDUPTHER

## 2021-06-24 RX ADMIN — METOPROLOL TARTRATE 25 MG: 25 TABLET, FILM COATED ORAL at 09:50

## 2021-06-24 RX ADMIN — AMLODIPINE BESYLATE 5 MG: 5 TABLET ORAL at 09:51

## 2021-06-24 RX ADMIN — ATORVASTATIN CALCIUM 20 MG: 20 TABLET, FILM COATED ORAL at 09:51

## 2021-06-24 RX ADMIN — ASPIRIN 81 MG: 81 TABLET, CHEWABLE ORAL at 09:50

## 2021-06-24 RX ADMIN — SODIUM CHLORIDE, PRESERVATIVE FREE 10 ML: 5 INJECTION INTRAVENOUS at 09:57

## 2021-06-24 RX ADMIN — TIMOLOL MALEATE 1 DROP: 5 SOLUTION OPHTHALMIC at 09:51

## 2021-06-24 RX ADMIN — CYANOCOBALAMIN TAB 500 MCG 1000 MCG: 500 TAB at 09:50

## 2021-06-24 RX ADMIN — ISOSORBIDE MONONITRATE 30 MG: 30 TABLET, EXTENDED RELEASE ORAL at 09:51

## 2021-06-24 NOTE — PROGRESS NOTES
Cardiology Daily Note Gilbert Hernandez MD      Patient: Broderick Goodman  [id number]    Patient Active Problem List    Diagnosis Date Noted    Chest pain 06/21/2021    Chronic kidney disease, stage 3b ([address] 06/21/2021    History of coronary artery bypass graft 06/21/2021    Right carpal tunnel syndrome 09/02/2020    CHAPARRO (acute kidney injury) ([address] 05/18/2018    Hypertension 05/18/2018    Hyperlipidemia 05/18/2018    Glaucoma 05/18/2018    CAD (coronary artery disease) 05/18/2018    Type 2 diabetes mellitus ([address] 05/18/2018    Diabetes mellitus ([address] 05/18/2018    Acute cystitis without hematuria 05/18/2018       Admit Date:  6/21/2021    Admission Problem List: Present on Admission:   Chest pain   CAD (coronary artery disease)   Diabetes mellitus ([address]   Glaucoma   Hyperlipidemia   Hypertension   Type 2 diabetes mellitus (HCC)   Chronic kidney disease, stage 3b ([address]   History of coronary artery bypass graft      Cardiac Specific Data:  Specialty Problems        Cardiology Problems    CAD (coronary artery disease)        Hyperlipidemia        Hypertension        * (Principal) Chest pain              Subjective:  Ms. Dominique Harris seen today feeling well no chest tightness since admission dyspnea improved I think back to baseline. Blood pressure 118/60 heart 66. No other issues reported all troponins are negative. Haze Seen yesterday showed anteroseptal and inferolateral scar and/or mild ischemia particularly inferolateral wall ejection fraction 35%. Objective:   /60   Pulse 66   Temp 96.8 °F (36 °C) (Temporal)   Resp 18   Ht 5' 6\" (1.676 m)   Wt 220 lb (99.8 kg)   SpO2 94%   BMI 35.51 kg/m²       Intake/Output Summary (Last 24 hours) at 6/24/2021 1674  Last data filed at 6/24/2021 0205  Gross per 24 hour   Intake 240 ml   Output 1400 ml   Net -1160 ml       Prior to Admission medications    Medication Sig Start Date End Date Taking?  Authorizing Provider   isosorbide mononitrate (IMDUR) 30 MG extended release tablet Take 1 tablet by mouth 2 times daily 6/24/21  Yes Ortiz Arellano MD   metoprolol tartrate (LOPRESSOR) 25 MG tablet Take 1 tablet by mouth 2 times daily 6/24/21  Yes Ortiz Arellano MD   furosemide (LASIX) 40 MG tablet Take 1 tablet by mouth daily as needed (excess fluid) 6/24/21  Yes Ortiz Arellano MD   amLODIPine (NORVASC) 5 MG tablet Take 1 tablet by mouth daily 6/15/21  Yes Brianna Ndiaye MD   glipiZIDE (GLUCOTROL XL) 2.5 MG extended release tablet TAKE 1 TABLET EVERY DAY 12/18/20  Yes Brianna Ndiaye MD   simvastatin (ZOCOR) 40 MG tablet TAKE 1 TABLET EVERY EVENING 12/3/20  Yes Brianna Ndiaye MD   metFORMIN (GLUCOPHAGE) 500 MG tablet Take 2 tablets by mouth 2 times daily (with meals) TAKE 2 TABLETS TWICE DAILY 10/1/20  Yes Brianna Ndiaye MD   ACCU-CHEK ROSA PLUS strip TEST BLOOD SUGAR FOUR TIMES DAILY 6/27/18  Yes Brianna Ndiaye MD   Alcohol Swabs (B-D SINGLE USE SWABS REGULAR) PADS USE THREE TIMES DAILY 6/27/18  Yes Brianna Ndiaye MD   ACCU-CHEK SOFTCLIX LANCETS MISC TEST FOUR TIMES DAILY 6/14/18  Yes rBianna Ndiaye MD   aspirin 81 MG tablet Take 81 mg by mouth daily   Yes Historical Provider, MD   Omega-3 Fatty Acids (FISH OIL) 1000 MG CAPS Take 1,000 mg by mouth 2 times daily   Yes Historical Provider, MD   MULTIPLE VITAMIN PO Take 1 tablet by mouth daily   Yes Historical Provider, MD   Calcium Carbonate-Vitamin D (CALCIUM 500/D PO) Take 1 tablet by mouth 2 times daily   Yes Historical Provider, MD   vitamin B-12 (CYANOCOBALAMIN) 1000 MCG tablet Take 1,000 mcg by mouth daily   Yes Historical Provider, MD   timolol (TIMOPTIC) 0.5 % ophthalmic solution Place 1 drop into the right eye daily  9/8/17  Yes Historical Provider, MD        metoprolol tartrate  25 mg Oral BID    isosorbide mononitrate  30 mg Oral BID    sodium chloride flush  5-40 mL Intravenous 2 times per day    aspirin  81 mg Oral Daily    heparin (porcine)  5,000 Units Subcutaneous 3 times per day    insulin lispro  0-6 Units Subcutaneous TID WC    insulin lispro  0-3 Units Subcutaneous Nightly    amLODIPine  5 mg Oral Daily    atorvastatin  20 mg Oral Daily    timolol  1 drop Right Eye Daily    vitamin B-12  1,000 mcg Oral Daily       TELEMETRY: Sinus     Physical Exam:      Physical Exam      General:  Awake, alert, NAD  Skin:  Warm and dry  Neck:  no jvd , no carotid bruits  Chest:  Clear to auscultation, no wheezing or rales  Cardiovascular:  RRR I3L0 no murmurs, clicks, gallups, or rubs  Abdomen:  Soft nontender, nondistended, bowel sounds present  Extremities:  Edema: none       Lab Data:  CBC:   Recent Labs     06/22/21  0142 06/23/21  0320 06/24/21  0203   WBC 7.4 6.8 6.6   HGB 10.4* 10.1* 9.7*   HCT 31.4* 30.2* 29.6*   MCV 97.8 97.7 97.4    253 261     BMP:   Recent Labs     06/22/21  0142 06/23/21  0320 06/24/21  0203    139 138   K 4.8 4.6 4.5    102 103   CO2 28 27 24   BUN 25* 27* 32*   CREATININE 1.9* 1.6* 1.7*     LIVER PROFILE:   Recent Labs     06/21/21  1429 06/22/21  0142 06/23/21  0320 06/24/21  0203   AST 35* 24 20 22   ALT 19 21 19 17   LIPASE 55  --   --   --    BILITOT 0.4 0.3 0.3 0.3   ALKPHOS 50 44 40 38     PT/INR:   Recent Labs     06/21/21  1429   PROTIME 12.8   INR 0.97     APTT: No results for input(s): APTT in the last 72 hours. BNP:  No results for input(s): BNP in the last 72 hours.   CK, CKMB, Troponin: @LABRCNT (CKTOTAL:3, CKMB:3, TROPONINI:3)@    IMAGING:  Echo Complete    Result Date: 6/22/2021  Transthoracic Echocardiography Report (TTE)  Demographics   Patient Name   Juan F Henry  Date of Study           06/22/2021   MRN            [id number]            Gender                  Female   Date of Birth  1950        Room Number             Carisa Elliott   Age            65 year(s)   Height:        66 inches         Referring Physician     francis li   Weight:        222 pounds        Mertha Opitz Luca   BSA:           2.09 m^2          Interpreting Physician  Janusz Sigala MD   BMI:           35.83 kg/m^2  Procedure Type of Study   TTE procedure:ECHO NO CONTRAST WITH DOP/COLR. Study Location: Echo Lab Technical Quality: Poor visualization due to poor acoustical window. Patient Status: Inpatient Rhythm: Within normal limits Indications:Chest pain. Conclusions   Summary  Mitral valve leaflets are mildly thickened with preserved leaflet  mobility. Moderately severe mitral regurgitation is present. Mildly thickened aortic valve leaflets with preserved leaflet mobility. Mild aortic regurgitation is noted. Tricuspid valve is structurally normal.  Moderate tricuspid regurgitation with estimated RVSP of 51 mm Hg. Mildly dilated left atrium. Left ventricular ejection fraction is visually estimated at 30%. Global hypokinesis  Grade II diastolic dysfunction  Left ventricular size is mildly increased . No regional wall motion abnormalities. Signature   ----------------------------------------------------------------  Electronically signed by Janusz Sigala MD(Interpreting  physician) on 06/22/2021 11:38 AM  ----------------------------------------------------------------   Findings   Mitral Valve  Mitral valve leaflets are mildly thickened with preserved leaflet  mobility. Moderately severe mitral regurgitation is present. Aortic Valve  Mildly thickened aortic valve leaflets with preserved leaflet mobility. Mild aortic regurgitation is noted. Tricuspid Valve  Tricuspid valve is structurally normal.  Moderate tricuspid regurgitation with estimated RVSP of 51 mm Hg. Pulmonic Valve  The pulmonic valve was not well visualized . Left Atrium  Mildly dilated left atrium. Left Ventricle  Left ventricular ejection fraction is visually estimated at 30%. Global hypokinesis  Grade II diastolic dysfunction  Left ventricular size is mildly increased . No regional wall motion abnormalities.    Right Atrium Normal right atrial dimension with no evidence of thrombus or mass noted. Right Ventricle  Normal right ventricular size with preserved RV function. Pericardial Effusion  No evidence of significant pericardial effusion is noted. Pleural Effusion  No evidence of pleural effusion. M-Mode Measurements (cm)   LVIDd: 5.31 cm                         LVIDs: 4.54 cm  IVSd: 1.31 cm  LVPWd: 1.24 cm                         AO Root Dimension: 2.3 cm  % Ejection Fraction: 30.6 %            LA: 4.3 cm                                         LVOT: 2.01 cm  Doppler Measurements:   AV Peak Velocity:111 cm/s            MV Peak E-Wave: 103 cm/s  AV Peak Gradient: 4.93 mmHg          MV Peak A-Wave: 80.5 cm/s  AV Mean Gradient: 3 mmHg             MV E/A Ratio: 1.28 %  AV Area (Continuity):1.99 cm^2       MV Peak Gradient: 4.24 mmHg  TR Velocity:308 cm/s                 MV P1/2t: 69 msec  TR Gradient:37.95 mmHg               MVA by PHT3.19 cm^2  Estimated RAP:10 mmHg  RVSP:51 mmHg      XR CHEST PORTABLE    Result Date: 6/21/2021  EXAMINATION: XR CHEST PORTABLE 6/21/2021 3:54 PM HISTORY: XR CHEST PORTABLE 6/21/2021 2:48 PM HISTORY: Short of breath COMPARISON: December 31, 2019. FINDINGS: The lungs are clear. Cardiac silhouettes enlarged. This may be either dilated cardiac chambers are possibly a paracardial effusion. The osseous structures and surrounding soft tissues demonstrate no acute abnormality. 1. Cardiomegaly. This may be due to either dilated cardiac chambers are possibly a paracardial effusion. Signed by Dr Martin Donis    Result Date: 6/23/2021  [facility] Nuclear Stress Test Report Procedure date: 06/23/21 Indications: chest pain Procedure: Stress was performed with injection of 0.4 mg Lexiscan. Vital signs and EKG were monitored. Technetium-99 sestamibi was injected in divided doses, 10.69 mCi and 31.85 mCi respectively for rest and stress imaging.  The patient was discharged in stable condition. Results: Patient had symptoms of dyspnea during infusion that resolved in recovery. Baseline EKG showed LBBB, normal sinus rhythm without ST/T changes. During stress there were no significant EKG changes or rhythm changes. Baseline and peak blood pressures were 151/82, and 169/78 respectively. Baseline and peak heart rates were 73 and  86 respectively. Review of rest and stress images obtained utilizing a gated SPECT acquisition protocol along with review of the polar plot revealed: 1. Ejection fraction 35% 2. Wall motion study global hypokinesis 3. Myocardial perfusion imaging study demonstrates defects anteroseptal and inferolateral region with some partial reversibility in the inferolateral wall on some of the slices the sum stress score was 18 the sum difference score was 7. Summary impressions: Moderately reduced ejection fraction 35% definitely abnormal perfusion study with anteroseptal and inferolateral defects consistent with scar and/or mild ischemia particularly in the inferolateral wall correlate clinically and/or angiographically if appropriate Signed by Dr Allan Live        Assessment:  1. Complaints of shortness of breath  2. Acute on chronic systolic and diastolic congestive heart failure with proBNP 3828 now down to 3216  3. Coronary artery disease  4. Status post CABG 1/1/2000 1x3  5. Diabetes mellitus  6. Obesity  7. No typical exertional angina  8. Cardiomegaly  9. Hypertension  10. Hyperlipidemia  11. Glaucoma  12. Chronic kidney disease stage IIIb  13. Carpal tunnel syndrome  14. CT of the head 5/18/2018 no acute findings  15. Acute congestive heart failure  16. Lexiscan 6/23/2021 ejection fraction 35% anteroseptal inferolateral defect consistent with scar and/or mild ischemia particularly inferior lateral wall      Plan:  1. Discussed with patient we gave her the option of proceeding with diagnostic catheterization.   She is at increased risk for post contrast nephropathy with creatinines averaging 1.6-1.7. Another option is enhanced medical therapy and follow her closely. Note she has had no chest discomfort or shortness of breath since admission and all of her troponins are negative. Some of her shortness of breath upon presentation was likely due to congestive heart failure. Metoprolol and Imdur started. 2. We will also consider ACE inhibitor or Entresto at a later date  3.  After discussion she prefers enhanced medical therapy will have her ambulate this morning if stable can be discharged follow-up in the office in several weeks    Ron Khoury MD, MD 6/24/2021 9:11 AM

## 2021-06-24 NOTE — DISCHARGE SUMMARY
diastolic dysfunction  Left ventricular size is mildly increased . No regional wall motion abnormalities. Signature   ----------------------------------------------------------------  Electronically signed by Justice Orozco MD(Interpreting  physician) on 06/22/2021 11:38 AM  ----------------------------------------------------------------   Findings   Mitral Valve  Mitral valve leaflets are mildly thickened with preserved leaflet  mobility. Moderately severe mitral regurgitation is present. Aortic Valve  Mildly thickened aortic valve leaflets with preserved leaflet mobility. Mild aortic regurgitation is noted. Tricuspid Valve  Tricuspid valve is structurally normal.  Moderate tricuspid regurgitation with estimated RVSP of 51 mm Hg. Pulmonic Valve  The pulmonic valve was not well visualized . Left Atrium  Mildly dilated left atrium. Left Ventricle  Left ventricular ejection fraction is visually estimated at 30%. Global hypokinesis  Grade II diastolic dysfunction  Left ventricular size is mildly increased . No regional wall motion abnormalities. Right Atrium  Normal right atrial dimension with no evidence of thrombus or mass noted. Right Ventricle  Normal right ventricular size with preserved RV function. Pericardial Effusion  No evidence of significant pericardial effusion is noted. Pleural Effusion  No evidence of pleural effusion.   M-Mode Measurements (cm)   LVIDd: 5.31 cm                         LVIDs: 4.54 cm  IVSd: 1.31 cm  LVPWd: 1.24 cm                         AO Root Dimension: 2.3 cm  % Ejection Fraction: 30.6 %            LA: 4.3 cm                                         LVOT: 2.01 cm  Doppler Measurements:   AV Peak Velocity:111 cm/s            MV Peak E-Wave: 103 cm/s  AV Peak Gradient: 4.93 mmHg          MV Peak A-Wave: 80.5 cm/s  AV Mean Gradient: 3 mmHg             MV E/A Ratio: 1.28 %  AV Area (Continuity):1.99 cm^2       MV Peak Gradient: 4.24 mmHg  TR Velocity:308 cm/s MV P1/2t: 69 msec  TR Gradient:37.95 mmHg               MVA by PHT3.19 cm^2  Estimated RAP:10 mmHg  RVSP:51 mmHg      XR CHEST PORTABLE    Result Date: 6/21/2021  EXAMINATION: XR CHEST PORTABLE 6/21/2021 3:54 PM HISTORY: XR CHEST PORTABLE 6/21/2021 2:48 PM HISTORY: Short of breath COMPARISON: December 31, 2019. FINDINGS: The lungs are clear. Cardiac silhouettes enlarged. This may be either dilated cardiac chambers are possibly a paracardial effusion. The osseous structures and surrounding soft tissues demonstrate no acute abnormality. 1. Cardiomegaly. This may be due to either dilated cardiac chambers are possibly a paracardial effusion. Signed by Dr Francia Lopez      Pertinent Labs:   CBC:   Recent Labs     06/22/21  0142 06/23/21  0320 06/24/21  0203   WBC 7.4 6.8 6.6   HGB 10.4* 10.1* 9.7*    253 261     BMP:    Recent Labs     06/22/21  0142 06/23/21  0320 06/24/21  0203    139 138   K 4.8 4.6 4.5    102 103   CO2 28 27 24   BUN 25* 27* 32*   CREATININE 1.9* 1.6* 1.7*   GLUCOSE 132* 146* 143*     INR:   Recent Labs     06/21/21  1429   INR 0.97     Lipids:   Recent Labs     06/22/21  0142   CHOL 107*   HDL 40*     ABGs:No results for input(s): PHART, DMO7OVT, PO2ART, WPA6YLZ, BEART, HGBAE, J1QWRWBA, CARBOXHGBART, 02THERAPY in the last 72 hours. HgBA1c:    Recent Labs     06/22/21  0142   LABA1C 7.0[facility] Course: 65 y. o. obese female with a PMH of CAD s/p CABG, HTN, HLD, DM not on insulin, CKD IIIb who presented to the ED complaining of chest pain, which resolved following admission. Cardiology consulted with plans for stress test. TTE showing systolic and diastolic CHF and moderate MR, estimated EF 30%. Underwent Lexiscan stress test 6/23 which returned abnormal with mild ischemia suggested with inferolateral defect.   Cardiology discussed options including diagnostic cardiac catheterization versus medical management, noting she has high risk for development of contrast nephropathy, and opted for medical management, metoprolol and Imdur added to medical regimen and tolerated well. Ambulated without development of significant symptoms. Remained without anginal symptoms. Can consider addition of ACE inhibitor or Entresto at later date and outpatient follow-up. Stable for discharge home with scheduled outpatient follow-up with cardiology, instructed to follow-up with PCP within the next couple of days. Physical Exam:  Vital Signs: /60   Pulse 66   Temp 96.8 °F (36 °C) (Temporal)   Resp 18   Ht 5' 6\" (1.676 m)   Wt 220 lb (99.8 kg)   SpO2 94%   BMI 35.51 kg/m²   General appearance:. Alert and Cooperative   HEENT: Normocephalic. Atraumatic  Chest: clear to auscultation bilaterally without wheezes or rhonchi. Cardiac: Normal rate, regular rhythm, pulses equal  Abdomen:soft, non-tender; normal bowel sounds  Extremities: No clubbing or cyanosis. Peripheral pulses palpable. Neurologic: Grossly intact. Speech fluent. Discharge Medications:        Francesco Willis   Home Medication Instructions MEO:[id number]    Printed on:06/24/21 0974   Medication Information                      ACCU-CHEK ROSA PLUS strip  TEST BLOOD SUGAR FOUR TIMES DAILY             ACCU-CHEK SOFTCLIX LANCETS MISC  TEST FOUR TIMES DAILY             Alcohol Swabs (B-D SINGLE USE SWABS REGULAR) PADS  USE THREE TIMES DAILY             amLODIPine (NORVASC) 5 MG tablet  Take 1 tablet by mouth daily             aspirin 81 MG tablet  Take 81 mg by mouth daily             Calcium Carbonate-Vitamin D (CALCIUM 500/D PO)  Take 1 tablet by mouth 2 times daily             furosemide (LASIX) 40 MG tablet  Take 1 tablet by mouth daily as needed (excess fluid)             glipiZIDE (GLUCOTROL XL) 2.5 MG extended release tablet  TAKE 1 TABLET EVERY DAY             isosorbide mononitrate (IMDUR) 30 MG extended release tablet  Take 1 tablet by mouth 2 times daily             metFORMIN (GLUCOPHAGE) 500 MG tablet  Take 2 tablets by mouth 2 times daily (with meals) TAKE 2 TABLETS TWICE DAILY             metoprolol tartrate (LOPRESSOR) 25 MG tablet  Take 1 tablet by mouth 2 times daily             MULTIPLE VITAMIN PO  Take 1 tablet by mouth daily             Omega-3 Fatty Acids (FISH OIL) 1000 MG CAPS  Take 1,000 mg by mouth 2 times daily             simvastatin (ZOCOR) 40 MG tablet  TAKE 1 TABLET EVERY EVENING             timolol (TIMOPTIC) 0.5 % ophthalmic solution  Place 1 drop into the right eye daily              vitamin B-12 (CYANOCOBALAMIN) 1000 MCG tablet  Take 1,000 mcg by mouth daily                 Discharge Instructions: Follow up with Claudean Fey, MD in 2-3 days. Take medications as directed. Resume activity as tolerated. Disposition: Patient is medically stable and will be discharged home. Time spent on discharge 37 minutes.     Signed:  Imelda Vazquez MD  6/24/2021 8:18 AM

## 2021-06-25 ENCOUNTER — TELEPHONE (OUTPATIENT)
Dept: INTERNAL MEDICINE | Age: 71
End: 2021-06-25

## 2021-06-25 NOTE — TELEPHONE ENCOUNTER
[facility] Transitions Initial Follow Up Call    Outreach made within 2 business days of discharge: Yes    Patient: Mik Angulo   Patient : 1950  MRN: [id number]    Reason for Admission: Admitted 21 for chest pain   Discharge Date: 21    Discharge Diagnoses:      Chest pain with CAD  Heart failure with reduced EF, EF 30% on echo    Hypertension    Hyperlipidemia    Glaucoma    CAD (coronary artery disease)    Type 2 diabetes mellitus ([facility]    Diabetes mellitus ([facility]    Chronic kidney disease, stage 3b ([facility]  coronary artery bypass graft     Spoke with: attempted to reach patient, no answer. Message left with intent of my call. We need to get her appointment rescheduled to see Dr. Rasheed Ray instead of Taylor Chu. I asked patient to call the office. I will reach out again later.      Discharge department/facility: [facility]     Scheduled appointment with PCP within 7-14 days    Follow Up  Future Appointments   Date Time Provider Ina Chowdary   2021  4:30 PM SARA Carver [facility]   2021  2:30 PM Cass Duff MD [facility]   2021  8:30 AM [facility]       Idalia Sacks, [address] [place] [address] Neisha Richter

## 2021-06-28 ENCOUNTER — TELEPHONE (OUTPATIENT)
Dept: INTERNAL MEDICINE | Age: 71
End: 2021-06-28

## 2021-06-28 NOTE — TELEPHONE ENCOUNTER
[facility] Transitions Initial Follow Up Call    Outreach made within 2 business days of discharge: Yes    Patient: Mik Dempsey      Patient : 1950        MRN: [id number]    Reason for Admission: Admitted 21 for chest pain   Discharge Date: 21          Discharge Diagnoses:      Chest pain with CAD  Heart failure with reduced EF, EF 30% on echo    Hypertension    Hyperlipidemia    Glaucoma    CAD (coronary artery disease)    Type 2 diabetes mellitus (HCC)    Diabetes mellitus ([facility]    Chronic kidney disease, stage 3b ([facility]  coronary artery bypass graft                Spoke with: patient     Discharge department/facility: [facility] Patient Contact:  Was patient able to fill all prescriptions: Yes  Was patient instructed to bring all medications to the follow-up visit: Yes  Is patient taking all medications as directed in the discharge summary? Yes  Does patient understand their discharge instructions: Yes  Does patient have questions or concerns that need addressed prior to 7-14 day follow up office visit: no    I spoke with Mrs. Luis A Medeiros. She states she is home and doing much better. She states she has not had any new episodes of chest pain. She states her breathing is good. She was able to pick her prescriptions up from the pharmacy and is taking them as directed. She states overall she feels good. Her appetite is ok. Bowels and bladder are moving as usual. She was scheduled to see Shell Chappell. I have moved her to see Dr. Samy Joya earlier in the day, she preferred to see Dr. Samy Joya anyway. She denies any needs at this time and will follow up as scheduled.      Scheduled appointment with PCP within 7-14 days    Follow Up  Future Appointments   Date Time Provider Ina Chowdary   2021  4:30 PM SARA Terry [facility]   2021  2:30 PM Felipe Evans MD [facility]   2021  8:30 AM MD [facility] Leni Garcia MA

## 2021-06-30 ENCOUNTER — OFFICE VISIT (OUTPATIENT)
Dept: INTERNAL MEDICINE | Age: 71
End: 2021-06-30
Payer: MEDICARE

## 2021-06-30 VITALS
OXYGEN SATURATION: 98 % | HEART RATE: 84 BPM | BODY MASS INDEX: 36.13 KG/M2 | SYSTOLIC BLOOD PRESSURE: 122 MMHG | WEIGHT: 224.8 LBS | HEIGHT: 66 IN | DIASTOLIC BLOOD PRESSURE: 62 MMHG

## 2021-06-30 DIAGNOSIS — R07.9 CHEST PAIN, UNSPECIFIED TYPE: Primary | ICD-10-CM

## 2021-06-30 DIAGNOSIS — I25.83 CORONARY ARTERY DISEASE DUE TO LIPID RICH PLAQUE: ICD-10-CM

## 2021-06-30 DIAGNOSIS — I10 ESSENTIAL HYPERTENSION: ICD-10-CM

## 2021-06-30 DIAGNOSIS — E78.5 HYPERLIPIDEMIA, UNSPECIFIED HYPERLIPIDEMIA TYPE: ICD-10-CM

## 2021-06-30 DIAGNOSIS — I25.10 CORONARY ARTERY DISEASE DUE TO LIPID RICH PLAQUE: ICD-10-CM

## 2021-06-30 PROBLEM — Z09 SURGICAL FOLLOW-UP CARE: Status: ACTIVE | Noted: 2021-06-30

## 2021-06-30 PROBLEM — G56.00 CARPAL TUNNEL SYNDROME: Status: ACTIVE | Noted: 2020-09-02

## 2021-06-30 PROBLEM — H40.059 OCULAR HYPERTENSION: Status: ACTIVE | Noted: 2018-05-18

## 2021-06-30 PROBLEM — E11.3299 NONPROLIFERATIVE DIABETIC RETINOPATHY (HCC): Status: ACTIVE | Noted: 2019-04-15

## 2021-06-30 PROBLEM — E11.311 DIABETIC MACULAR EDEMA (HCC): Status: ACTIVE | Noted: 2019-04-15

## 2021-06-30 PROBLEM — H35.059 RETINAL NEOVASCULARIZATION: Status: ACTIVE | Noted: 2021-06-25

## 2021-06-30 PROBLEM — M79.673 FOOT PAIN: Status: ACTIVE | Noted: 2019-04-29

## 2021-06-30 PROBLEM — M77.30 POSTERIOR CALCANEAL EXOSTOSIS: Status: ACTIVE | Noted: 2019-04-29

## 2021-06-30 PROBLEM — M77.51 BURSITIS OF RIGHT FOOT: Status: ACTIVE | Noted: 2019-04-29

## 2021-06-30 PROBLEM — M67.00 CONTRACTURE OF ACHILLES TENDON: Status: ACTIVE | Noted: 2019-04-29

## 2021-06-30 PROBLEM — M76.60 ACHILLES TENDINITIS: Status: ACTIVE | Noted: 2019-04-29

## 2021-06-30 PROCEDURE — 99496 TRANSJ CARE MGMT HIGH F2F 7D: CPT | Performed by: INTERNAL MEDICINE

## 2021-06-30 NOTE — PROGRESS NOTES
Post-Discharge Transitional Care Management Services or Hospital Follow Up      Harrison Shea   YOB: 1950    Date of Office Visit:  6/30/2021  Date of Hospital Admission: 6/21/21  Date of Hospital Discharge: 6/24/21  Readmission Risk Score(high >=14%.  Medium >=10%):No data recorded    Care management risk score Rising risk (score 2-5) and Complex Care (Scores >=6): 3     Non face to face  following discharge, date last encounter closed (first attempt may have been earlier): 6/28/2021 10:10 AM 6/28/2021 10:10 AM    Call initiated 2 business days of discharge: Yes     Patient Active Problem List   Diagnosis    [address]AP[address]O (acute kidney injury) ([address]    Hypertension    Hyperlipidemia    Ocular hypertension    CAD (coronary artery disease)    Type 2 diabetes mellitus ([address]    Diabetes mellitus ([address]    Acute cystitis without hematuria    Carpal tunnel syndrome    Chest pain    Chronic kidney disease, stage 3b ([address]    History of coronary artery bypass graft    Surgical follow-up care    Achilles tendinitis    Bursitis of right foot    Contracture of Achilles tendon    Diabetic macular edema (HCC)    Foot pain    Nonproliferative diabetic retinopathy ([address]    Posterior calcaneal exostosis    Retinal neovascularization       Allergies   Allergen Reactions    Medrol [Methylprednisolone]      Affects her eyes       [facility] the [facility] Medication Instructions SHAMAR:    Printed on:06/30/21 1017   Medication Information                      ACCU-CHEK ROSA PLUS strip  TEST BLOOD SUGAR FOUR TIMES DAILY             ACCU-CHEK SOFTCLIX LANCETS MISC  TEST FOUR TIMES DAILY             Alcohol Swabs (B-D SINGLE USE SWABS REGULAR) PADS  USE THREE TIMES DAILY             amLODIPine (NORVASC) 5 MG tablet  Take 1 tablet by mouth daily             aspirin 81 MG tablet  Take 81 mg by mouth daily             Calcium Carbonate-Vitamin D (CALCIUM 500/D PO)  Take 1 tablet by mouth 2 times daily             furosemide (LASIX) 40 MG tablet  Take 1 tablet by mouth daily as needed (excess fluid)             glipiZIDE (GLUCOTROL XL) 2.5 MG extended release tablet  TAKE 1 TABLET EVERY DAY             isosorbide mononitrate (IMDUR) 30 MG extended release tablet  Take 1 tablet by mouth 2 times daily             metFORMIN (GLUCOPHAGE) 500 MG tablet  Take 2 tablets by mouth 2 times daily (with meals) TAKE 2 TABLETS TWICE DAILY             metoprolol tartrate (LOPRESSOR) 25 MG tablet  Take 1 tablet by mouth 2 times daily             MULTIPLE VITAMIN PO  Take 1 tablet by mouth daily             Omega-3 Fatty Acids (FISH OIL) 1000 MG CAPS  Take 1,000 mg by mouth 2 times daily             simvastatin (ZOCOR) 40 MG tablet  TAKE 1 TABLET EVERY EVENING             timolol (TIMOPTIC) 0.5 % ophthalmic solution  Place 1 drop into the right eye daily              vitamin B-12 (CYANOCOBALAMIN) 1000 MCG tablet  Take 1,000 mcg by mouth daily                   Medications marked \"taking\" at this time  Outpatient Medications Marked as Taking for the 6/30/21 encounter (Office Visit) with Yvonne Nassar MD   Medication Sig Dispense Refill    isosorbide mononitrate (IMDUR) 30 MG extended release tablet Take 1 tablet by mouth 2 times daily 60 tablet 1    metoprolol tartrate (LOPRESSOR) 25 MG tablet Take 1 tablet by mouth 2 times daily 60 tablet 1    furosemide (LASIX) 40 MG tablet Take 1 tablet by mouth daily as needed (excess fluid) 60 tablet 1    amLODIPine (NORVASC) 5 MG tablet Take 1 tablet by mouth daily 90 tablet 1    glipiZIDE (GLUCOTROL XL) 2.5 MG extended release tablet TAKE 1 TABLET EVERY DAY 90 tablet 3    simvastatin (ZOCOR) 40 MG tablet TAKE 1 TABLET EVERY EVENING 90 tablet 3    metFORMIN (GLUCOPHAGE) 500 MG tablet Take 2 tablets by mouth 2 times daily (with meals) TAKE 2 TABLETS TWICE DAILY 360 tablet 3    ACCU-CHEK ROSA PLUS strip TEST BLOOD SUGAR FOUR TIMES DAILY 400 strip 3    Alcohol Swabs (B-D SINGLE USE SWABS REGULAR) PADS USE THREE TIMES DAILY 100 each 11    ACCU-CHEK SOFTCLIX LANCETS MISC TEST FOUR TIMES DAILY 400 each 3    aspirin 81 MG tablet Take 81 mg by mouth daily      Omega-3 Fatty Acids (FISH OIL) 1000 MG CAPS Take 1,000 mg by mouth 2 times daily      MULTIPLE VITAMIN PO Take 1 tablet by mouth daily      Calcium Carbonate-Vitamin D (CALCIUM 500/D PO) Take 1 tablet by mouth 2 times daily      vitamin B-12 (CYANOCOBALAMIN) 1000 MCG tablet Take 1,000 mcg by mouth daily      timolol (TIMOPTIC) 0.5 % ophthalmic solution Place 1 drop into the right eye daily           Medications patient taking as of now reconciled against medications ordered at time of hospital discharge: Yes    Chief Complaint   Patient presents with    Follow-Up from Hospital       HPI    Inpatient course: Discharge summary reviewed- see chart. Interval history/Current status: Ms. Latricia Saini is a 75-year-old woman who presents to the office today for hospital follow-up. She presented to the ER on the day of admission complaining of chest pain, which resolved following admission. Cardiology was consulted. They plan for stress test TTE that showed diastolic and systolic congestive heart failure, moderate MR with an ejection fraction of 30%. She underwent a Lexiscan stress test on June 23 which was abnormal with mild ischemia consistent with a possible anterolateral defect. Cardiology discussed options including diagnostic cardiac catheterization versus medical management. She does have a high risk for development of contrast nephropathy secondary to chronic kidney disease. She opted for medical management. She was placed on metoprolol and Imdur. She does have an upcoming follow-up with cardiology. She denies any complaints of chest pain, chest pressure or heart palpitations. She still declines a mammogram.  Her blood sugars are in the 120 range.   She feels well distress. Appearance: Normal appearance. She is well-developed and normal weight. She is not ill-appearing, toxic-appearing or diaphoretic. HENT:      Head: Normocephalic and atraumatic. Right Ear: Tympanic membrane, ear canal and external ear normal. There is no impacted cerumen. Left Ear: Tympanic membrane, ear canal and external ear normal. There is no impacted cerumen. Nose: Nose normal. No congestion or rhinorrhea. Mouth/Throat:      Mouth: Mucous membranes are moist.      Pharynx: Oropharynx is clear. No oropharyngeal exudate or posterior oropharyngeal erythema. Eyes:      General: No scleral icterus. Right eye: No discharge. Left eye: No discharge. Extraocular Movements: Extraocular movements intact. Conjunctiva/sclera: Conjunctivae normal.      Pupils: Pupils are equal, round, and reactive to light. Neck:      Thyroid: No thyromegaly. Vascular: No carotid bruit or JVD. Trachea: No tracheal deviation. Cardiovascular:      Rate and Rhythm: Normal rate and regular rhythm. Pulses: Normal pulses. Heart sounds: Normal heart sounds. No murmur heard. No friction rub. No gallop. Pulmonary:      Effort: Pulmonary effort is normal. No respiratory distress. Breath sounds: Normal breath sounds. No stridor. No wheezing, rhonchi or rales. Chest:      Chest wall: No tenderness. Abdominal:      General: Bowel sounds are normal. There is no distension. Palpations: Abdomen is soft. There is no mass. Tenderness: There is no abdominal tenderness. There is no right CVA tenderness, left CVA tenderness, guarding or rebound. Hernia: No hernia is present. Musculoskeletal:         General: No swelling, tenderness, deformity or signs of injury. Normal range of motion. Cervical back: Normal range of motion and neck supple. No rigidity. No muscular tenderness. Right lower leg: No edema. Left lower leg: No edema. Lymphadenopathy:      Cervical: No cervical adenopathy. Skin:     General: Skin is warm and dry. Capillary Refill: Capillary refill takes less than 2 seconds. Coloration: Skin is not jaundiced or pale. Findings: No bruising, erythema, lesion or rash. Neurological:      General: No focal deficit present. Mental Status: She is alert and oriented to person, place, and time. Mental status is at baseline. Cranial Nerves: No cranial nerve deficit. Sensory: No sensory deficit. Motor: No weakness or abnormal muscle tone. Coordination: Coordination normal.      Gait: Gait normal.      Deep Tendon Reflexes: Reflexes are normal and symmetric. Reflexes normal.   Psychiatric:         Mood and Affect: Mood normal.         Behavior: Behavior normal.         Thought Content: Thought content normal.         Judgment: Judgment normal.             Assessment/Plan:    64-year-old woman here for follow-up after being admitted to the hospital for evaluation of chest pain    1. Chest pain/coronary artery disease: Symptoms have resolved. Continue medications as prescribed. Follow-up with cardiology as scheduled    2. Hyperlipidemia: Continue simvastatin as prescribed    3. Type 2 diabetes: Continue medications as prescribed. Her last A1c was 7.0    4. Hypertension: Blood pressure stable    There are no diagnoses linked to this encounter.       Medical Decision Making: high complexity

## 2021-07-01 ASSESSMENT — ENCOUNTER SYMPTOMS
VOMITING: 0
CONSTIPATION: 0
SHORTNESS OF BREATH: 0
WHEEZING: 0
VOICE CHANGE: 0
SORE THROAT: 0
STRIDOR: 0
NAUSEA: 0
SINUS PRESSURE: 0
COLOR CHANGE: 0
ABDOMINAL PAIN: 0
EYE ITCHING: 0
EYE DISCHARGE: 0
SINUS PAIN: 0
ABDOMINAL DISTENTION: 0
BLOOD IN STOOL: 0
CHOKING: 0
RHINORRHEA: 0
CHEST TIGHTNESS: 0
COUGH: 0
TROUBLE SWALLOWING: 0
DIARRHEA: 0

## 2021-07-22 ENCOUNTER — OFFICE VISIT (OUTPATIENT)
Dept: CARDIOLOGY CLINIC | Age: 71
End: 2021-07-22
Payer: MEDICARE

## 2021-07-22 VITALS
WEIGHT: 223 LBS | HEART RATE: 72 BPM | OXYGEN SATURATION: 99 % | HEIGHT: 66 IN | SYSTOLIC BLOOD PRESSURE: 130 MMHG | DIASTOLIC BLOOD PRESSURE: 80 MMHG | BODY MASS INDEX: 35.84 KG/M2

## 2021-07-22 DIAGNOSIS — E78.2 MIXED HYPERLIPIDEMIA: ICD-10-CM

## 2021-07-22 DIAGNOSIS — I25.10 CORONARY ARTERY DISEASE INVOLVING NATIVE CORONARY ARTERY OF NATIVE HEART WITHOUT ANGINA PECTORIS: Primary | ICD-10-CM

## 2021-07-22 DIAGNOSIS — R94.39 ABNORMAL STRESS TEST: ICD-10-CM

## 2021-07-22 DIAGNOSIS — I25.5 ISCHEMIC CARDIOMYOPATHY: ICD-10-CM

## 2021-07-22 DIAGNOSIS — Z95.1 STATUS POST AORTO-CORONARY ARTERY BYPASS GRAFT: ICD-10-CM

## 2021-07-22 DIAGNOSIS — Z95.1 HISTORY OF CORONARY ARTERY BYPASS GRAFT: ICD-10-CM

## 2021-07-22 DIAGNOSIS — I10 ESSENTIAL HYPERTENSION: ICD-10-CM

## 2021-07-22 PROCEDURE — G8427 DOCREV CUR MEDS BY ELIG CLIN: HCPCS | Performed by: INTERNAL MEDICINE

## 2021-07-22 PROCEDURE — 1090F PRES/ABSN URINE INCON ASSESS: CPT | Performed by: INTERNAL MEDICINE

## 2021-07-22 PROCEDURE — G8400 PT W/DXA NO RESULTS DOC: HCPCS | Performed by: INTERNAL MEDICINE

## 2021-07-22 PROCEDURE — 4040F PNEUMOC VAC/ADMIN/RCVD: CPT | Performed by: INTERNAL MEDICINE

## 2021-07-22 PROCEDURE — 99214 OFFICE O/P EST MOD 30 MIN: CPT | Performed by: INTERNAL MEDICINE

## 2021-07-22 PROCEDURE — G8417 CALC BMI ABV UP PARAM F/U: HCPCS | Performed by: INTERNAL MEDICINE

## 2021-07-22 PROCEDURE — 1036F TOBACCO NON-USER: CPT | Performed by: INTERNAL MEDICINE

## 2021-07-22 PROCEDURE — 1123F ACP DISCUSS/DSCN MKR DOCD: CPT | Performed by: INTERNAL MEDICINE

## 2021-07-22 PROCEDURE — 3017F COLORECTAL CA SCREEN DOC REV: CPT | Performed by: INTERNAL MEDICINE

## 2021-07-22 RX ORDER — LISINOPRIL 5 MG/1
5 TABLET ORAL DAILY
Qty: 90 TABLET | Refills: 2 | Status: SHIPPED | OUTPATIENT
Start: 2021-07-22 | End: 2022-01-27 | Stop reason: SDUPTHER

## 2021-07-22 ASSESSMENT — ENCOUNTER SYMPTOMS
VOMITING: 0
SHORTNESS OF BREATH: 0
EYES NEGATIVE: 1
NAUSEA: 0
GASTROINTESTINAL NEGATIVE: 1
RESPIRATORY NEGATIVE: 1
DIARRHEA: 0

## 2021-07-22 NOTE — PROGRESS NOTES
[facility] Progress Note                            Date:  7/22/2021  Patient: Ally Jeff  Age:  66 y.o., 1950      Reason for evaluation:         SUBJECTIVE:    Returns today follow-up assessment recently hospitalized in June. [place] showed ejection fraction 35% anteroseptal inferolateral defect consistent with scar and/or mild ischemia. Echocardiogram showed ejection fraction 30% moderate TR global hypokinesis grade 2 diastolic dysfunction. History of coronary artery disease previous CABG around 2001. Today doing well blood pressure 130/80 heart 72. Denies chest pain denies dyspnea no new complaints or issues reported. Creatinine 1.7 at that time. Review of Systems   Constitutional: Negative. Negative for chills, fever and unexpected weight change. HENT: Negative. Eyes: Negative. Respiratory: Negative. Negative for shortness of breath. Cardiovascular: Negative. Negative for chest pain. Gastrointestinal: Negative. Negative for diarrhea, nausea and vomiting. Endocrine: Negative. Genitourinary: Negative. Musculoskeletal: Negative. Skin: Negative. Neurological: Negative. All other systems reviewed and are negative. OBJECTIVE:     /80   Pulse 72   Ht 5' 6\" (1.676 m)   Wt 223 lb (101.2 kg)   SpO2 99%   BMI 35.99 kg/m²     Labs:   CBC: No results for input(s): WBC, HGB, HCT, PLT in the last 72 hours. BMP:No results for input(s): NA, K, CO2, BUN, CREATININE, LABGLOM, GLUCOSE in the last 72 hours. BNP: No results for input(s): BNP in the last 72 hours. PT/INR: No results for input(s): PROTIME, INR in the last 72 hours. APTT:No results for input(s): APTT in the last 72 hours. CARDIAC ENZYMES:No results for input(s): CKTOTAL, CKMB, CKMBINDEX, TROPONINI in the last 72 hours.   FASTING LIPID PANEL:  Lab Results   Component Value Date    HDL 40 06/22/2021    LDLCALC 35 06/22/2021    TRIG 161 06/22/2021     LIVER PROFILE:No results for input(s): AST, ALT, LABALBU in the last 72 hours.         Past Medical History:   Diagnosis Date    Acute sinusitis     [facility]acute kidney injury) ([facility] 05/18/2018    Anemia     Callous ulcer [facility]     Cataract     Dermatitis     Diabetes mellitus ([facility]     Diabetic nephropathy ([facility]     [facility]     Glaucoma     Gout     Hemorrhoids     Hx of TB skin testing     positive    Hyperlipidemia     Hypertension     Palliative care patient 06/24/2021    Type 2 diabetes mellitus ([facility] 05/18/2018    Vitamin D deficiency      Past Surgical History:   Procedure Laterality Date    CARDIAC SURGERY      CARPAL TUNNEL RELEASE Right 9/3/2020    RIGHT CARPAL TUNNEL RELEASE [facility]brecht [facility]      COLONOSCOPY N/A 10/11/2019    Dr Aubrie Esparza of a hemostatic clip-Suboptimal prep, diverticulosis, internal hemorrhoids-Grade 2, AP x 3, 1 yr recall     COLONOSCOPY N/A 10/12/2020    Dr Belita Carrel hemorrhoids-Grade 1-2, AP, 3 yr recall    CORONARY ARTERY BYPASS GRAFT      ENDOSCOPY, COLON, DIAGNOSTIC      MOUTH SURGERY      OTHER SURGICAL HISTORY      sternal resection    SKIN BIOPSY      UPPER GASTROINTESTINAL ENDOSCOPY N/A 10/11/2019    Dr Lory Duane (+) H Pylori    VASCULAR SURGERY       Family History   Problem Relation Age of Onset    Cancer Mother     Diabetes Father     COPD Father     Diabetes Sister     Heart Disease Sister     Colon Polyps Sister     Coronary Art Dis Other         grandfather    Diabetes Other         aunt    Colon Cancer Neg Hx     Liver Cancer Neg Hx     Rectal Cancer Neg Hx      Allergies   Allergen Reactions    Medrol [Methylprednisolone]      Affects her eyes     Current Outpatient Medications   Medication Sig Dispense Refill    metFORMIN (GLUCOPHAGE) 500 MG tablet TAKE 2 TABLETS TWICE DAILY WITH MEALS 360 tablet 3    isosorbide mononitrate (IMDUR) 30 MG extended release tablet Take 1 tablet by mouth 2 times daily 60 tablet 1    metoprolol tartrate (LOPRESSOR) 25 MG tablet Take 1 tablet by mouth 2 times daily 60 tablet 1    furosemide (LASIX) 40 MG tablet Take 1 tablet by mouth daily as needed (excess fluid) 60 tablet 1    amLODIPine (NORVASC) 5 MG tablet Take 1 tablet by mouth daily 90 tablet 1    glipiZIDE (GLUCOTROL XL) 2.5 MG extended release tablet TAKE 1 TABLET EVERY DAY 90 tablet 3    simvastatin (ZOCOR) 40 MG tablet TAKE 1 TABLET EVERY EVENING 90 tablet 3    ACCU-CHEK ROSA PLUS strip TEST BLOOD SUGAR FOUR TIMES DAILY 400 strip 3    Alcohol Swabs (B-D SINGLE USE SWABS REGULAR) PADS USE THREE TIMES DAILY 100 each 11    ACCU-CHEK SOFTCLIX LANCETS MISC TEST FOUR TIMES DAILY 400 each 3    aspirin 81 MG tablet Take 81 mg by mouth daily      Omega-3 Fatty Acids (FISH OIL) 1000 MG CAPS Take 1,000 mg by mouth 2 times daily      MULTIPLE VITAMIN PO Take 1 tablet by mouth daily      Calcium Carbonate-Vitamin D (CALCIUM 500/D PO) Take 1 tablet by mouth 2 times daily      vitamin B-12 (CYANOCOBALAMIN) 1000 MCG tablet Take 1,000 mcg by mouth daily      timolol (TIMOPTIC) 0.5 % ophthalmic solution Place 1 drop into the right eye daily        No current facility-administered medications for this visit.      Social History     Socioeconomic History    Marital status: Single     Spouse name: Not on file    Number of children: Not on file    Years of education: Not on file    Highest education level: Not on file   Occupational History     Employer: [employer]   Tobacco Use    Smoking status: Never Smoker    Smokeless tobacco: Never Used   Vaping Use    Vaping Use: Never used   Substance and Sexual Activity    Alcohol use: No    Drug use: No    Sexual activity: Never   Other Topics Concern    Not on file   Social History Narrative    Not on file     Social Determinants of Health     Financial Resource Strain: Low Risk     Difficulty of Paying Living Expenses: Not hard at all   Food Insecurity: No Food Insecurity    Worried About [address] in the Last Year: Never true    Matthew of Food in the Last Year: Never true   Transportation Needs: No Transportation Needs    Lack of Transportation (Medical): No    Lack of Transportation (Non-Medical): No   Physical Activity:     Days of Exercise per Week:     Minutes of Exercise per Session:    Stress:     Feeling of Stress :    Social Connections:     Frequency of Communication with Friends and Family:     Frequency of Social Gatherings with Friends and Family:     Attends Baptism Services:     Active Member of Clubs or Organizations:     Attends Club or Organization Meetings:     Marital Status:    Intimate Partner Violence:     Fear of Current or Ex-Partner:     Emotionally Abused:     Physically Abused:     Sexually Abused:        Physical Examination:  /80   Pulse 72   Ht 5' 6\" (1.676 m)   Wt 223 lb (101.2 kg)   SpO2 99%   BMI 35.99 kg/m²   Physical Exam  Vitals reviewed. Constitutional:       Appearance: She is well-developed. Neck:      Vascular: No carotid bruit or JVD. Cardiovascular:      Rate and Rhythm: Normal rate and regular rhythm. Heart sounds: Normal heart sounds. No murmur heard. No friction rub. No gallop. Pulmonary:      Effort: Pulmonary effort is normal. No respiratory distress. Breath sounds: Normal breath sounds. No wheezing or rales. Abdominal:      General: There is no distension. Tenderness: There is no abdominal tenderness. Lymphadenopathy:      Cervical: No cervical adenopathy. Skin:     General: Skin is warm and dry. ASSESSMENT:     Diagnosis Orders   1. Coronary artery disease involving native coronary artery of native heart without angina pectoris     2. Essential hypertension     3. Mixed hyperlipidemia     4. History of coronary artery bypass graft     5. Ischemic cardiomyopathy     6.  Status post aorto-coronary artery bypass graft         PLAN:  No orders of the defined types were placed in this encounter. No orders of the defined types were placed in this encounter. 1. Continue present medications  2. Recommend follow-up assessment in 2 months with echocardiogram  3. Suggest lisinopril 5 mg a day  4. Chem-7 1 week after starting lisinopril  5. If left ventricular function persistently poor after 90 days consider ICD implantation further comments to follow    Return in about 2 months (around 9/22/2021) for return to Dr. Michaeline Kawasaki only. Lila Encinas MD 7/22/2021 2:17 PM CDT    [facility]      Thisdictation was generated by voice recognition computer software. Although all attempts are made to edit the dictation for accuracy, there may be errors in the transcription that are not intended.

## 2021-07-26 RX ORDER — BLOOD SUGAR DIAGNOSTIC
STRIP MISCELLANEOUS
Qty: 400 STRIP | Refills: 3 | Status: SHIPPED | OUTPATIENT
Start: 2021-07-26 | End: 2022-07-13

## 2021-07-26 RX ORDER — LANCETS
EACH MISCELLANEOUS
Qty: 400 EACH | Refills: 3 | Status: SHIPPED | OUTPATIENT
Start: 2021-07-26 | End: 2022-07-13

## 2021-07-27 ENCOUNTER — HOSPITAL ENCOUNTER (OUTPATIENT)
Dept: NON INVASIVE DIAGNOSTICS | Age: 71
Discharge: HOME | End: 2021-07-27
Payer: MEDICARE

## 2021-07-27 DIAGNOSIS — I25.5 ISCHEMIC CARDIOMYOPATHY: ICD-10-CM

## 2021-07-27 LAB
LV EF: 28 %
LVEF MODALITY: NORMAL

## 2021-07-27 PROCEDURE — 6360000004 HC RX CONTRAST MEDICATION: Performed by: INTERNAL MEDICINE

## 2021-07-27 PROCEDURE — C8929 TTE W OR WO FOL WCON,DOPPLER: HCPCS

## 2021-07-27 RX ADMIN — PERFLUTREN 2.2 MG: 6.52 INJECTION, SUSPENSION INTRAVENOUS at 11:18

## 2021-07-30 ENCOUNTER — TELEPHONE (OUTPATIENT)
Dept: INTERNAL MEDICINE | Age: 71
End: 2021-07-30

## 2021-07-30 DIAGNOSIS — I25.10 CORONARY ARTERY DISEASE INVOLVING NATIVE CORONARY ARTERY OF NATIVE HEART WITHOUT ANGINA PECTORIS: ICD-10-CM

## 2021-07-30 DIAGNOSIS — E55.9 VITAMIN D DEFICIENCY: ICD-10-CM

## 2021-07-30 DIAGNOSIS — Z95.1 HISTORY OF CORONARY ARTERY BYPASS GRAFT: ICD-10-CM

## 2021-07-30 DIAGNOSIS — E11.21 TYPE II DIABETES MELLITUS WITH NEPHROPATHY (HCC): Primary | ICD-10-CM

## 2021-07-30 DIAGNOSIS — E11.21 TYPE II DIABETES MELLITUS WITH NEPHROPATHY (HCC): ICD-10-CM

## 2021-07-30 DIAGNOSIS — R94.39 ABNORMAL STRESS TEST: ICD-10-CM

## 2021-07-30 DIAGNOSIS — I10 ESSENTIAL HYPERTENSION: ICD-10-CM

## 2021-07-30 DIAGNOSIS — Z95.1 STATUS POST AORTO-CORONARY ARTERY BYPASS GRAFT: ICD-10-CM

## 2021-07-30 DIAGNOSIS — E78.2 MIXED HYPERLIPIDEMIA: ICD-10-CM

## 2021-07-30 DIAGNOSIS — I25.5 ISCHEMIC CARDIOMYOPATHY: ICD-10-CM

## 2021-07-30 PROBLEM — Z09 SURGICAL FOLLOW-UP CARE: Status: RESOLVED | Noted: 2021-06-30 | Resolved: 2021-07-30

## 2021-07-30 LAB
ALBUMIN SERPL-MCNC: 3.8 G/DL (ref 3.5–5.2)
ALP BLD-CCNC: 50 U/L (ref 35–104)
ALT SERPL-CCNC: 18 U/L (ref 5–33)
ANION GAP SERPL CALCULATED.3IONS-SCNC: 13 MMOL/L (ref 7–19)
AST SERPL-CCNC: 25 U/L (ref 5–32)
BASOPHILS ABSOLUTE: 0.1 K/UL (ref 0–0.2)
BASOPHILS RELATIVE PERCENT: 0.9 % (ref 0–1)
BILIRUB SERPL-MCNC: <0.2 MG/DL (ref 0.2–1.2)
BUN BLDV-MCNC: 31 MG/DL (ref 8–23)
CALCIUM SERPL-MCNC: 9.7 MG/DL (ref 8.8–10.2)
CHLORIDE BLD-SCNC: 102 MMOL/L (ref 98–111)
CHOLESTEROL, TOTAL: 102 MG/DL (ref 160–199)
CO2: 25 MMOL/L (ref 22–29)
CREAT SERPL-MCNC: 1.9 MG/DL (ref 0.5–0.9)
CREATININE URINE: 166.7 MG/DL (ref 4.2–622)
EOSINOPHILS ABSOLUTE: 0.2 K/UL (ref 0–0.6)
EOSINOPHILS RELATIVE PERCENT: 3.6 % (ref 0–5)
GFR AFRICAN AMERICAN: 31
GFR NON-AFRICAN AMERICAN: 26
GLUCOSE BLD-MCNC: 112 MG/DL (ref 74–109)
HBA1C MFR BLD: 6.5 % (ref 4–6)
HCT VFR BLD CALC: 33.3 % (ref 37–47)
HDLC SERPL-MCNC: 43 MG/DL (ref 65–121)
HEMOGLOBIN: 10.5 G/DL (ref 12–16)
IMMATURE GRANULOCYTES #: 0 K/UL
LDL CHOLESTEROL CALCULATED: 23 MG/DL
LYMPHOCYTES ABSOLUTE: 1.7 K/UL (ref 1.1–4.5)
LYMPHOCYTES RELATIVE PERCENT: 27.3 % (ref 20–40)
MCH RBC QN AUTO: 32.4 PG (ref 27–31)
MCHC RBC AUTO-ENTMCNC: 31.5 G/DL (ref 33–37)
MCV RBC AUTO: 102.8 FL (ref 81–99)
MICROALBUMIN UR-MCNC: 427.3 MG/DL (ref 0–19)
MICROALBUMIN/CREAT UR-RTO: 2563.3 MG/G
MONOCYTES ABSOLUTE: 0.4 K/UL (ref 0–0.9)
MONOCYTES RELATIVE PERCENT: 6.8 % (ref 0–10)
NEUTROPHILS ABSOLUTE: 3.9 K/UL (ref 1.5–7.5)
NEUTROPHILS RELATIVE PERCENT: 61.2 % (ref 50–65)
PDW BLD-RTO: 13.7 % (ref 11.5–14.5)
PLATELET # BLD: 226 K/UL (ref 130–400)
PMV BLD AUTO: 11.2 FL (ref 9.4–12.3)
POTASSIUM SERPL-SCNC: 4.8 MMOL/L (ref 3.5–5)
RBC # BLD: 3.24 M/UL (ref 4.2–5.4)
SODIUM BLD-SCNC: 140 MMOL/L (ref 136–145)
TOTAL PROTEIN: 7.2 G/DL (ref 6.6–8.7)
TRIGL SERPL-MCNC: 181 MG/DL (ref 0–149)
TSH SERPL DL<=0.05 MIU/L-ACNC: 2.47 UIU/ML (ref 0.27–4.2)
VITAMIN D 25-HYDROXY: 40.1 NG/ML
WBC # BLD: 6.3 K/UL (ref 4.8–10.8)

## 2021-07-30 RX ORDER — BLOOD GLUCOSE CONTROL HIGH,LOW
EACH MISCELLANEOUS
Qty: 1 EACH | Refills: 1 | Status: SHIPPED | OUTPATIENT
Start: 2021-07-30

## 2021-07-30 NOTE — TELEPHONE ENCOUNTER
Kristal Gordon called to request a refill on her medication. Last office visit : 6/30/2021   Next office visit : 8/13/2021     Requested Prescriptions     Pending Prescriptions Disp Refills    Blood Glucose Calibration (ACCU-CHEK ROSA) SOLN 1 each 1     Sig: Calibration done daily.  Blood Glucose Monitoring Suppl (ACCU-CHEK ROSA) ARMANDO 1 Device 0     Sig: Use 4 times a day to check BS.             Marlon Cash MA

## 2021-08-13 ENCOUNTER — OFFICE VISIT (OUTPATIENT)
Dept: INTERNAL MEDICINE | Age: 71
End: 2021-08-13
Payer: MEDICARE

## 2021-08-13 VITALS
WEIGHT: 226.4 LBS | SYSTOLIC BLOOD PRESSURE: 132 MMHG | BODY MASS INDEX: 36.38 KG/M2 | HEIGHT: 66 IN | RESPIRATION RATE: 22 BRPM | DIASTOLIC BLOOD PRESSURE: 70 MMHG | HEART RATE: 74 BPM | OXYGEN SATURATION: 96 %

## 2021-08-13 DIAGNOSIS — N63.10 PAINFUL LUMPY RIGHT BREAST: ICD-10-CM

## 2021-08-13 DIAGNOSIS — E11.21 TYPE II DIABETES MELLITUS WITH NEPHROPATHY (HCC): Primary | ICD-10-CM

## 2021-08-13 DIAGNOSIS — I10 ESSENTIAL HYPERTENSION: ICD-10-CM

## 2021-08-13 DIAGNOSIS — N28.9 RENAL INSUFFICIENCY: ICD-10-CM

## 2021-08-13 DIAGNOSIS — E78.5 HYPERLIPIDEMIA, UNSPECIFIED HYPERLIPIDEMIA TYPE: ICD-10-CM

## 2021-08-13 DIAGNOSIS — N63.0 BREAST LUMP: ICD-10-CM

## 2021-08-13 DIAGNOSIS — N64.4 PAINFUL LUMPY RIGHT BREAST: ICD-10-CM

## 2021-08-13 DIAGNOSIS — E55.9 VITAMIN D DEFICIENCY: ICD-10-CM

## 2021-08-13 DIAGNOSIS — D50.9 IRON DEFICIENCY ANEMIA, UNSPECIFIED IRON DEFICIENCY ANEMIA TYPE: ICD-10-CM

## 2021-08-13 PROCEDURE — 1036F TOBACCO NON-USER: CPT | Performed by: INTERNAL MEDICINE

## 2021-08-13 PROCEDURE — G8400 PT W/DXA NO RESULTS DOC: HCPCS | Performed by: INTERNAL MEDICINE

## 2021-08-13 PROCEDURE — 1123F ACP DISCUSS/DSCN MKR DOCD: CPT | Performed by: INTERNAL MEDICINE

## 2021-08-13 PROCEDURE — G8417 CALC BMI ABV UP PARAM F/U: HCPCS | Performed by: INTERNAL MEDICINE

## 2021-08-13 PROCEDURE — 2022F DILAT RTA XM EVC RTNOPTHY: CPT | Performed by: INTERNAL MEDICINE

## 2021-08-13 PROCEDURE — 4040F PNEUMOC VAC/ADMIN/RCVD: CPT | Performed by: INTERNAL MEDICINE

## 2021-08-13 PROCEDURE — 99214 OFFICE O/P EST MOD 30 MIN: CPT | Performed by: INTERNAL MEDICINE

## 2021-08-13 PROCEDURE — 1090F PRES/ABSN URINE INCON ASSESS: CPT | Performed by: INTERNAL MEDICINE

## 2021-08-13 PROCEDURE — 3044F HG A1C LEVEL LT 7.0%: CPT | Performed by: INTERNAL MEDICINE

## 2021-08-13 PROCEDURE — 3017F COLORECTAL CA SCREEN DOC REV: CPT | Performed by: INTERNAL MEDICINE

## 2021-08-13 PROCEDURE — G8427 DOCREV CUR MEDS BY ELIG CLIN: HCPCS | Performed by: INTERNAL MEDICINE

## 2021-08-13 RX ORDER — ISOSORBIDE MONONITRATE 30 MG/1
30 TABLET, EXTENDED RELEASE ORAL 2 TIMES DAILY
Qty: 180 TABLET | Refills: 3 | Status: SHIPPED | OUTPATIENT
Start: 2021-08-13 | End: 2022-09-21

## 2021-08-13 NOTE — PATIENT INSTRUCTIONS
Patient Education      Patient Education        8 Common Questions About the COVID-19 Vaccine  Now that the COVID-19 vaccine is available, you may have questions or concerns about it. Here are the answers to some questions that many people ask. Why should I get the COVID-19 vaccine? It will help protect you, protect others, and end the pandemic. Getting the vaccine will help you avoid catching COVID-19. (If you do catch it, your symptoms will most likely be less severe than if you hadn't gotten the vaccine.) Getting the vaccine also helps you protect the people around you--people who could have serious problems if they catch the virus. Is the COVID-19 vaccine safe? The COVID-19 vaccine is safe and effective. It's been given to millions of people. The risk of serious problems is very low. Could I get COVID-19 from the vaccine? No, you can't get COVID-19 from the vaccine. The vaccine doesn't contain the COVID-19 virus, so it can't cause the disease. What are the side effects of the COVID-19 vaccine? You might not have any side effects. If you do, they'll probably be a lot like the common side effects of other vaccines--a slight fever, fatigue, and soreness. (These are signs that your immune system is learning how to fight the virus.) The side effects don't last long, and they can be treated if they bother you. How many doses of the vaccine will I need? You may need 1 or 2 doses of the vaccine. And you might need \"booster\" doses later to help you stay protected. Do I need the vaccine if I've already had COVID-19? Yes. If you've had COVID-19, you may still be able to catch it again. Getting the vaccine will probably give you extra protection. Will I still need to wear a face mask after I get the vaccine? Yes and no. The vaccine prevents most cases of COVID-19, but it's not 100% effective. There's still a small chance that you could catch the virus.  Until the pandemic is over, keep wearing a mask in public, social distancing, and washing your hands well. When you're fully vaccinated, you don't need to wear a mask when you are around other fully vaccinated people. And you don't need one around unvaccinated people who are all from one other household, as long as no one in that household is at high risk of severe illness from COVID-19. Why not just get COVID-19 and skip the vaccine? The risk of serious problems from the virus is much higher than the risk of serious problems from the vaccine. COVID-19 is unpredictable. Even if you're young and healthy, you could get very sick, have long-term health problems, or die. So it's much safer to get the vaccine than it is to catch COVID-19. The COVID-19 vaccine is one of the best ways to help stop the pandemic. So get vaccinated as soon as you can. Current as of: March 26, 2021               Content Version: 12.9  © 2006-2021 [employer]. Care instructions adapted under license by [facility] ([facility]). If you have questions about a medical condition or this instruction, always ask your healthcare professional. [place] [address] any warranty or liability for your use of this information. Breast Lumps (Noncancerous): Care Instructions  Your Care Instructions  Breast lumps or changes are a common health worry for most women. Women may have many kinds of breast lumps and other breast changes throughout their lives, including ones that occur with menstrual periods, pregnancy, and aging. Most breast lumps are harmless and are not cancer. Many women's breasts feel lumpy and tender before their menstrual periods. Women also may have lumps when they are breastfeeding. Breast lumps may go away after menopause. Common noncancerous breast lumps include:  · Cysts, or sacs filled with fluid. · Fatty lumps, which may be firm. These may or may not cause pain.   · Fibroadenomas, growths that are round and firm with smooth edges.  · Abscesses, which are pockets of infection within the breast.  Follow-up care is a key part of your treatment and safety. Be sure to make and go to all appointments, and call your doctor if you are having problems. It's also a good idea to know your test results and keep a list of the medicines you take. How can you care for yourself at home? · Take an over-the-counter pain medicine, such as acetaminophen (Tylenol), ibuprofen (Advil, Motrin), or naproxen (Aleve). Read and follow all instructions on the label. · Do not take two or more pain medicines at the same time unless the doctor told you to. Many pain medicines have acetaminophen, which is Tylenol. Too much acetaminophen (Tylenol) can be harmful. · If you are pregnant, do not take any medicine other than acetaminophen unless your doctor has told you to. · Wear a supportive bra, such as a sports bra or jog bra. · You may want to limit caffeine. Some women say that cutting back on caffeine reduces their breast tenderness. · A diet very low in fat (about 15% of daily diet) may reduce breast tenderness. Talk to your doctor about whether you should try a very low-fat diet. · A diet low in salt (sodium) also may reduce breast tenderness. When should you call for help? Watch closely for changes in your health, and be sure to contact your doctor if you:    · You do not get better as expected.     · Your breast has changed.     · You have pain in your breast.     · You have a discharge from your nipple.     · A breast lump changes or does not go away. Where can you learn more? Go to [email or web address]. org and sign in to your [employer] account. Enter [phone] [id number] in the [facility] box to learn more about \"Breast Lumps (Noncancerous): Care Instructions. \"     If you do not have an account, please click on the \"Sign Up Now\" link.   Current as of: February 11, 2021               Content Version: 12.9  © 9286-9036 [employer]. Care instructions adapted under license by [facility] ([facility]). If you have questions about a medical condition or this instruction, always ask your healthcare professional. [address] any warranty or liability for your use of this information.

## 2021-08-13 NOTE — PROGRESS NOTES
Chief Complaint   Patient presents with    3 Month Follow-Up    Cyst     Patient noticed a knot in her (R) breast about 3 weeks ago. HPI: Ally Jeff is a 66 y.o. female is here for follow-up of type 2 diabetes. About a month ago, she noted a nodule in her right breast.  She has historically refused any mammography. She declines a mammogram at this time. She states that no one is going to \"cut on her. \".  I did eventually talk her into agreeing to an ultrasound of her breast today. Her blood sugars are running about 128. Her A1c did drop from 7.0-6.5. Her renal parameters remained stable. She denies any complaints of chest pain, chest pressure or shortness of breath. She recently had a Lisa scan that showed a 35% ejection fraction. She states that she still tires easily. She does have an upcoming appointment with Dr. Chacho Danielson in the near future. Her blood pressure is well controlled. She is tolerating her statin without side effects. Her anemia remained stable.     Past Medical History:   Diagnosis Date    Acute sinusitis     CHAPARRO (acute kidney injury) ([address] 05/18/2018    Anemia     Callous ulcer ([address]     Cataract     Dermatitis     Diabetes mellitus ([address]     Diabetic nephropathy ([address]     Diabetic retinopathy ([address]     Glaucoma     Gout     Hemorrhoids     Hx of TB skin testing     positive    Hyperlipidemia     Hypertension     Palliative care patient 06/24/2021    Type 2 diabetes mellitus ([address] 05/18/2018    Vitamin D deficiency       Past Surgical History:   Procedure Laterality Date    CARDIAC SURGERY      CARPAL TUNNEL RELEASE Right 9/3/2020    RIGHT CARPAL TUNNEL RELEASE performed by Vamshi Alva [facility] at [facility]      COLONOSCOPY N/A 10/11/2019    Dr Jarrell Fiore of a hemostatic clip-Suboptimal prep, diverticulosis, internal hemorrhoids-Grade 2, AP x 3, 1 yr recall     COLONOSCOPY N/A 10/12/2020     Felisha-Internal hemorrhoids-Grade 1-2, AP, 3 yr recall    CORONARY ARTERY BYPASS GRAFT      ENDOSCOPY, COLON, DIAGNOSTIC      MOUTH SURGERY      OTHER SURGICAL HISTORY      sternal resection    SKIN BIOPSY      UPPER GASTROINTESTINAL ENDOSCOPY N/A 10/11/2019    Dr Van Eubanks (+) H Pylori    VASCULAR SURGERY        Social History     Socioeconomic History    Marital status: Single     Spouse name: None    Number of children: None    Years of education: None    Highest education level: None   Occupational History     Employer: [employer]   Tobacco Use    Smoking status: Never Smoker    Smokeless tobacco: Never Used   Vaping Use    Vaping Use: Never used   Substance and Sexual Activity    Alcohol use: No    Drug use: No    Sexual activity: Never   Other Topics Concern    None   Social History Narrative    None     Social Determinants of Health     Financial Resource Strain: Low Risk     Difficulty of Paying Living Expenses: Not hard at all   Food Insecurity: No Food Insecurity    Worried About Running Out of Food in the Last Year: Never true    Matthew of Food in the Last Year: Never true   Transportation Needs: No Transportation Needs    Lack of Transportation (Medical): No    Lack of Transportation (Non-Medical):  No   Physical Activity:     Days of Exercise per Week:     Minutes of Exercise per Session:    Stress:     Feeling of Stress :    Social Connections:     Frequency of Communication with Friends and Family:     Frequency of Social Gatherings with Friends and Family:     Attends Scientologist Services:     Active Member of Clubs or Organizations:     Attends Club or Organization Meetings:     Marital Status:    Intimate Partner Violence:     Fear of Current or Ex-Partner:     Emotionally Abused:     Physically Abused:     Sexually Abused:       Family History   Problem Relation Age of Onset    Cancer Mother     Diabetes Father     COPD Father     Diabetes Sister  Heart Disease Sister     Colon Polyps Sister     Coronary Art Dis Other         grandfather    Diabetes Other         aunt    Colon Cancer Neg Hx     Liver Cancer Neg Hx     Rectal Cancer Neg Hx         Current Outpatient Medications   Medication Sig Dispense Refill    metoprolol tartrate (LOPRESSOR) 25 MG tablet Take 1 tablet by mouth 2 times daily 180 tablet 3    isosorbide mononitrate (IMDUR) 30 MG extended release tablet Take 1 tablet by mouth 2 times daily 180 tablet 3    Blood Glucose Calibration (ACCU-CHEK ROSA) SOLN Calibration done daily.  1 each 1    Blood Glucose Monitoring Suppl (ACCU-CHEK ROSA) ARMANDO Use 4 times a day to check BS. 1 Device 0    blood glucose test strips (ACCU-CHEK ROSA PLUS) strip TEST BLOOD SUGAR FOUR TIMES DAILY E11.9 400 strip 3    Accu-Chek Softclix Lancets MISC TEST FOUR TIMES DAILY E11.9 400 each 3    lisinopril (PRINIVIL;ZESTRIL) 5 MG tablet Take 1 tablet by mouth daily 90 tablet 2    metFORMIN (GLUCOPHAGE) 500 MG tablet TAKE 2 TABLETS TWICE DAILY WITH MEALS 360 tablet 3    furosemide (LASIX) 40 MG tablet Take 1 tablet by mouth daily as needed (excess fluid) 60 tablet 1    amLODIPine (NORVASC) 5 MG tablet Take 1 tablet by mouth daily 90 tablet 1    glipiZIDE (GLUCOTROL XL) 2.5 MG extended release tablet TAKE 1 TABLET EVERY DAY 90 tablet 3    simvastatin (ZOCOR) 40 MG tablet TAKE 1 TABLET EVERY EVENING 90 tablet 3    Alcohol Swabs (B-D SINGLE USE SWABS REGULAR) PADS USE THREE TIMES DAILY 100 each 11    aspirin 81 MG tablet Take 81 mg by mouth daily      Omega-3 Fatty Acids (FISH OIL) 1000 MG CAPS Take 1,000 mg by mouth 2 times daily      MULTIPLE VITAMIN PO Take 1 tablet by mouth daily      Calcium Carbonate-Vitamin D (CALCIUM 500/D PO) Take 1 tablet by mouth 2 times daily      vitamin B-12 (CYANOCOBALAMIN) 1000 MCG tablet Take 1,000 mcg by mouth daily      timolol (TIMOPTIC) 0.5 % ophthalmic solution Place 1 drop into the right eye daily        No current facility-administered medications for this visit. Patient Active Problem List   Diagnosis    [facility]    Hypertension    Hyperlipidemia    Ocular hypertension    CAD (coronary artery disease)    Type 2 diabetes mellitus [facility]    Diabetes mellitus [facility]    Acute cystitis without hematuria    Carpal tunnel syndrome    Chest pain    Chronic kidney disease, stage 3b ([facility]    History of coronary artery bypass graft    Achilles tendinitis    Bursitis of right foot    Contracture of Achilles tendon    Diabetic macular edema (HCC)    Foot pain    Nonproliferative diabetic retinopathy ([facility]    Posterior calcaneal exostosis    Retinal neovascularization        Review of Systems   Constitutional: Positive for fatigue. Negative for activity change, appetite change, chills, diaphoresis, fever and unexpected weight change. HENT: Negative for congestion, ear discharge, ear pain, mouth sores, nosebleeds, postnasal drip, rhinorrhea, sinus pressure, sinus pain, sneezing, sore throat, tinnitus, trouble swallowing and voice change. Eyes: Negative for discharge, itching and visual disturbance. Respiratory: Negative for cough, choking, chest tightness, shortness of breath, wheezing and stridor. Cardiovascular: Negative for chest pain, palpitations and leg swelling. Gastrointestinal: Negative for abdominal distention, abdominal pain, blood in stool, constipation, diarrhea, nausea and vomiting. Endocrine: Negative for cold intolerance, polydipsia and polyuria. Genitourinary: Negative for difficulty urinating, dysuria, frequency and urgency. Right breast nodule. Musculoskeletal: Negative for arthralgias, gait problem, joint swelling and myalgias. Skin: Negative for color change and rash. Allergic/Immunologic: Negative for food allergies and immunocompromised state.    Neurological: Negative for dizziness, tremors, syncope, speech difficulty, weakness, numbness and headaches. Hematological: Negative for adenopathy. Does not bruise/bleed easily. Psychiatric/Behavioral: Negative for confusion and hallucinations. The patient is not nervous/anxious. /70 (Site: Left Upper Arm, Position: Sitting)   Pulse 74   Resp 22   Ht 5' 6\" (1.676 m)   Wt 226 lb 6.4 oz (102.7 kg)   SpO2 96%   BMI 36.54 kg/m²       1. Type II diabetes mellitus with nephropathy ([address]    2. Breast lump    3. Painful lumpy right breast     4. Vitamin D deficiency     5. Essential hypertension    6. Hyperlipidemia, unspecified hyperlipidemia type    7. Iron deficiency anemia, unspecified iron deficiency anemia type    8. Renal insufficiency        ASSESSMENT/PLAN:    57-year-old woman here for follow-up    1. Type 2 diabetes: A1c at goal.  Diabetic shoes have been ordered. Continue medications as prescribed    2. Right breast nodule: Patient absolutely refuses a mammogram.  He did take me some time to convince her to at least get an ultrasound. She did eventually agree to the ultrasound. She states that she is \"too claustrophobic\" to get a mammogram.  We will start with the ultrasound and go from there. 3.  Hypertension: Blood pressure stable    4. Hyperlipidemia: Continue simvastatin as prescribed    5. Anemia: Stable    6. Renal insufficiency: Stable    7. Vitamin D deficiency: Continue vitamin D supplementation          Lorena Darden was seen today for 3 month follow-up and cyst.    Diagnoses and all orders for this visit:    Type II diabetes mellitus with nephropathy ([address]  -      DIABETES FOOT EXAM  -     Diabetic Shoe  -     CBC Auto Differential; Future  -     Comprehensive Metabolic Panel; Future  -     Hemoglobin A1C; Future  -     Lipid Panel; Future  -     TSH without Reflex; Future  -     Microalbumin / Creatinine Urine Ratio; Future  -     Vitamin D 25 Hydroxy; Future    Breast lump  -     US BREAST COMPLETE RIGHT;  Future    Painful lumpy right breast   - US BREAST COMPLETE RIGHT; Future    Vitamin D deficiency   -     Vitamin D 25 Hydroxy; Future    Essential hypertension    Hyperlipidemia, unspecified hyperlipidemia type    Iron deficiency anemia, unspecified iron deficiency anemia type    Renal insufficiency    Other orders  -     metoprolol tartrate (LOPRESSOR) 25 MG tablet; Take 1 tablet by mouth 2 times daily  -     isosorbide mononitrate (IMDUR) 30 MG extended release tablet; Take 1 tablet by mouth 2 times daily          Return in about 3 months (around 11/13/2021), or MEDICARE WELLNESS.      Orders Placed This Encounter   Procedures    Diabetic Shoe    US BREAST COMPLETE RIGHT     Standing Status:   Future     Standing Expiration Date:   8/13/2022     Scheduling Instructions:      REFUSES MAMMOGRAM. BREAST LUMP NOTED A MONTH AGO ON RIGHT BREAST     Order Specific Question:   Reason for exam:     Answer:   breast lump    CBC Auto Differential     Fast 12 hours     Standing Status:   Future     Standing Expiration Date:   8/13/2022    Comprehensive Metabolic Panel     Fasting 12 hours     Standing Status:   Future     Standing Expiration Date:   8/13/2022    Hemoglobin A1C     Fast 12 hours     Standing Status:   Future     Standing Expiration Date:   8/13/2022    Lipid Panel     Standing Status:   Future     Standing Expiration Date:   8/13/2022     Order Specific Question:   Is Patient Fasting?/# of Hours     Answer:   12    TSH without Reflex     Fast 12 hours     Standing Status:   Future     Standing Expiration Date:   8/13/2022    Microalbumin / Creatinine Urine Ratio     Standing Status:   Future     Standing Expiration Date:   8/13/2022    Vitamin D 25 Hydroxy     Standing Status:   Future     Standing Expiration Date:   8/13/2022    HM DIABETES FOOT Lian Duran MD

## 2021-08-14 ASSESSMENT — ENCOUNTER SYMPTOMS
ABDOMINAL DISTENTION: 0
VOICE CHANGE: 0
TROUBLE SWALLOWING: 0
SINUS PRESSURE: 0
WHEEZING: 0
CHOKING: 0
RHINORRHEA: 0
SORE THROAT: 0
BLOOD IN STOOL: 0
SHORTNESS OF BREATH: 0
EYE ITCHING: 0
NAUSEA: 0
STRIDOR: 0
CHEST TIGHTNESS: 0
COLOR CHANGE: 0
COUGH: 0
VOMITING: 0
ABDOMINAL PAIN: 0
DIARRHEA: 0
EYE DISCHARGE: 0
CONSTIPATION: 0
SINUS PAIN: 0

## 2021-08-14 NOTE — PROGRESS NOTES
Chief Complaint   Patient presents with    3 Month Follow-Up    Cyst     Patient noticed a knot in her (R) breast about 3 weeks ago. HPI: Angelica Jean-Baptiste is a 66 y.o. female is here for is here for follow-up of type 2 diabetes. About a month ago, she noted a nodule in her right breast.  She has historically refused any mammography. She declines a mammogram at this time. She states that no one is going to \"cut on her. \".  I did eventually talk her into agreeing to an ultrasound of her breast today. Her blood sugars are running about 128. Her A1c did drop from 7.0-6.5. Her renal parameters remained stable. She denies any complaints of chest pain, chest pressure or shortness of breath. She recently had a Lisa scan that showed a 35% ejection fraction. She states that she still tires easily. She does have an upcoming appointment with Dr. Zo Murillo in the near future. Her blood pressure is well controlled. She is tolerating her statin without side effects. Her anemia remained stable.     Past Medical History:   Diagnosis Date    Acute sinusitis     CHAPARRO (acute kidney injury) ([address] 05/18/2018    Anemia     Callous ulcer ([address]     Cataract     Dermatitis     Diabetes mellitus ([address]     Diabetic nephropathy ([address]     Diabetic retinopathy ([address]     Glaucoma     Gout     Hemorrhoids     Hx of TB skin testing     positive    Hyperlipidemia     Hypertension     Palliative care patient 06/24/2021    Type 2 diabetes mellitus ([address] 05/18/2018    Vitamin D deficiency       Past Surgical History:   Procedure Laterality Date    CARDIAC SURGERY      CARPAL TUNNEL RELEASE Right 9/3/2020    RIGHT CARPAL TUNNEL RELEASE performed by [facility] at [facility]      COLONOSCOPY N/A 10/11/2019    Dr Trixie Lo of a hemostatic clip-Suboptimal prep, diverticulosis, internal hemorrhoids-Grade 2, AP x 3, 1 yr recall     COLONOSCOPY N/A 10/12/2020    Dr Macy Fuller hemorrhoids-Grade 1-2, AP, 3 yr recall    CORONARY ARTERY BYPASS GRAFT      ENDOSCOPY, COLON, DIAGNOSTIC      MOUTH SURGERY      OTHER SURGICAL HISTORY      sternal resection    SKIN BIOPSY      UPPER GASTROINTESTINAL ENDOSCOPY N/A 10/11/2019    Dr Keny Garrison (+) H Pylori    VASCULAR SURGERY        Social History     Socioeconomic History    Marital status: Single     Spouse name: None    Number of children: None    Years of education: None    Highest education level: None   Occupational History     Employer: [employer]   Tobacco Use    Smoking status: Never Smoker    Smokeless tobacco: Never Used   Vaping Use    Vaping Use: Never used   Substance and Sexual Activity    Alcohol use: No    Drug use: No    Sexual activity: Never   Other Topics Concern    None   Social History Narrative    None     Social Determinants of Health     Financial Resource Strain: Low Risk     Difficulty of Paying Living Expenses: Not hard at all   Food Insecurity: No Food Insecurity    Worried About Running Out of Food in the Last Year: Never true    Matthew of Food in the Last Year: Never true   Transportation Needs: No Transportation Needs    Lack of Transportation (Medical): No    Lack of Transportation (Non-Medical):  No   Physical Activity:     Days of Exercise per Week:     Minutes of Exercise per Session:    Stress:     Feeling of Stress :    Social Connections:     Frequency of Communication with Friends and Family:     Frequency of Social Gatherings with Friends and Family:     Attends Voodoo Services:     Active Member of Clubs or Organizations:     Attends Club or Organization Meetings:     Marital Status:    Intimate Partner Violence:     Fear of Current or Ex-Partner:     Emotionally Abused:     Physically Abused:     Sexually Abused:       Family History   Problem Relation Age of Onset    Cancer Mother     Diabetes Father     COPD Father    [facility] Diabetes Sister     Heart Disease Sister     Colon Polyps Sister     Coronary Art Dis Other         grandfather    Diabetes Other         aunt    Colon Cancer Neg Hx     Liver Cancer Neg Hx     Rectal Cancer Neg Hx         Current Outpatient Medications   Medication Sig Dispense Refill    metoprolol tartrate (LOPRESSOR) 25 MG tablet Take 1 tablet by mouth 2 times daily 180 tablet 3    isosorbide mononitrate (IMDUR) 30 MG extended release tablet Take 1 tablet by mouth 2 times daily 180 tablet 3    Blood Glucose Calibration (ACCU-CHEK ROSA) SOLN Calibration done daily.  1 each 1    Blood Glucose Monitoring Suppl (ACCU-CHEK ROSA) ARMANDO Use 4 times a day to check BS. 1 Device 0    blood glucose test strips (ACCU-CHEK ROSA PLUS) strip TEST BLOOD SUGAR FOUR TIMES DAILY E11.9 400 strip 3    Accu-Chek Softclix Lancets MISC TEST FOUR TIMES DAILY E11.9 400 each 3    lisinopril (PRINIVIL;ZESTRIL) 5 MG tablet Take 1 tablet by mouth daily 90 tablet 2    metFORMIN (GLUCOPHAGE) 500 MG tablet TAKE 2 TABLETS TWICE DAILY WITH MEALS 360 tablet 3    furosemide (LASIX) 40 MG tablet Take 1 tablet by mouth daily as needed (excess fluid) 60 tablet 1    amLODIPine (NORVASC) 5 MG tablet Take 1 tablet by mouth daily 90 tablet 1    glipiZIDE (GLUCOTROL XL) 2.5 MG extended release tablet TAKE 1 TABLET EVERY DAY 90 tablet 3    simvastatin (ZOCOR) 40 MG tablet TAKE 1 TABLET EVERY EVENING 90 tablet 3    Alcohol Swabs (B-D SINGLE USE SWABS REGULAR) PADS USE THREE TIMES DAILY 100 each 11    aspirin 81 MG tablet Take 81 mg by mouth daily      Omega-3 Fatty Acids (FISH OIL) 1000 MG CAPS Take 1,000 mg by mouth 2 times daily      MULTIPLE VITAMIN PO Take 1 tablet by mouth daily      Calcium Carbonate-Vitamin D (CALCIUM 500/D PO) Take 1 tablet by mouth 2 times daily      vitamin B-12 (CYANOCOBALAMIN) 1000 MCG tablet Take 1,000 mcg by mouth daily      timolol (TIMOPTIC) 0.5 % ophthalmic solution Place 1 drop into the right eye daily        No current facility-administered medications for this visit. Patient Active Problem List   Diagnosis    [facility]    Hypertension    Hyperlipidemia    Ocular hypertension    CAD (coronary artery disease)    Type 2 diabetes mellitus [facility]    Diabetes mellitus ([facility]    Acute cystitis without hematuria    Carpal tunnel syndrome    Chest pain    Chronic kidney disease, stage 3b ([facility]    History of coronary artery bypass graft    Achilles tendinitis    Bursitis of right foot    Contracture of Achilles tendon    Diabetic macular edema (HCC)    Foot pain    Nonproliferative diabetic retinopathy ([facility]    Posterior calcaneal exostosis    Retinal neovascularization        Review of Systems   Constitutional: Negative for fatigue, fever and unexpected weight change. HENT: Negative for congestion, ear discharge, ear pain, mouth sores, nosebleeds, postnasal drip, rhinorrhea, sinus pressure, sinus pain, sneezing, sore throat, tinnitus, trouble swallowing and voice change. Eyes: Negative for discharge, itching and visual disturbance. Respiratory: Negative for cough, choking, chest tightness, shortness of breath, wheezing and stridor. Cardiovascular: Negative for chest pain, palpitations and leg swelling. Gastrointestinal: Negative for abdominal distention, abdominal pain, blood in stool, constipation, diarrhea, nausea and vomiting. Endocrine: Negative for cold intolerance, polydipsia and polyuria. Genitourinary: Negative for difficulty urinating, dysuria, frequency and urgency. Nodule to the right breast.   Musculoskeletal: Negative for arthralgias, gait problem, joint swelling and myalgias. Skin: Negative for color change and rash. Allergic/Immunologic: Negative for food allergies and immunocompromised state. Neurological: Negative for dizziness, tremors, syncope, speech difficulty, weakness, numbness and headaches.    Hematological: Negative for adenopathy. Does not bruise/bleed easily. Psychiatric/Behavioral: Negative for confusion and hallucinations. The patient is not nervous/anxious. /70 (Site: Left Upper Arm, Position: Sitting)   Pulse 74   Resp 22   Ht 5' 6\" (1.676 m)   Wt 226 lb 6.4 oz (102.7 kg)   SpO2 96%   BMI 36.54 kg/m²   Physical Exam  Vitals and nursing note reviewed. Constitutional:       General: She is not in acute distress. Appearance: Normal appearance. She is well-developed and normal weight. She is not ill-appearing, toxic-appearing or diaphoretic. HENT:      Head: Normocephalic and atraumatic. Right Ear: Tympanic membrane, ear canal and external ear normal. There is no impacted cerumen. Left Ear: Tympanic membrane, ear canal and external ear normal. There is no impacted cerumen. Nose: Nose normal. No congestion or rhinorrhea. Mouth/Throat:      Mouth: Mucous membranes are moist.      Pharynx: Oropharynx is clear. No oropharyngeal exudate or posterior oropharyngeal erythema. Eyes:      General: No scleral icterus. Right eye: No discharge. Left eye: No discharge. Extraocular Movements: Extraocular movements intact. Conjunctiva/sclera: Conjunctivae normal.      Pupils: Pupils are equal, round, and reactive to light. Neck:      Thyroid: No thyromegaly. Vascular: No carotid bruit or JVD. Trachea: No tracheal deviation. Cardiovascular:      Rate and Rhythm: Normal rate and regular rhythm. Pulses: Normal pulses. Heart sounds: Normal heart sounds. No murmur heard. No friction rub. No gallop. Pulmonary:      Effort: Pulmonary effort is normal. No respiratory distress. Breath sounds: Normal breath sounds. No stridor. No wheezing, rhonchi or rales. Chest:      Chest wall: No tenderness. Abdominal:      General: Bowel sounds are normal. There is no distension. Palpations: Abdomen is soft. There is no mass.       Tenderness: There is no abdominal tenderness. There is no right CVA tenderness, left CVA tenderness, guarding or rebound. Hernia: No hernia is present. Genitourinary:     Comments: Breasts: breasts appear normal on left, no suspicious masses, no skin or nipple changes or axillary nodes. There is an abnormal mass palpable right breast 6 o'clock position. Musculoskeletal:         General: No swelling, tenderness, deformity or signs of injury. Normal range of motion. Cervical back: Normal range of motion and neck supple. No rigidity. No muscular tenderness. Right lower leg: No edema. Left lower leg: No edema. Lymphadenopathy:      Cervical: No cervical adenopathy. Skin:     General: Skin is warm and dry. Capillary Refill: Capillary refill takes less than 2 seconds. Coloration: Skin is not jaundiced or pale. Findings: No bruising, erythema, lesion or rash. Comments: Visual inspection:  Deformity/amputation: absent  Skin lesions/pre-ulcerative calluses: present - bilateral great toe  Edema: right- negative, left- negative    Sensory exam:  Monofilament sensation: normal  (minimum of 5 random plantar locations tested, avoiding callused areas - > 1 area with absence of sensation is + for neuropathy)    Plus at least one of the following:  Pulses: normal,   Pinprick: Intact  Proprioception: N/A  Vibration (128 Hz): N/A   Neurological:      General: No focal deficit present. Mental Status: She is alert and oriented to person, place, and time. Mental status is at baseline. Cranial Nerves: No cranial nerve deficit. Sensory: No sensory deficit. Motor: No weakness or abnormal muscle tone. Coordination: Coordination normal.      Gait: Gait normal.      Deep Tendon Reflexes: Reflexes are normal and symmetric. Reflexes normal.   Psychiatric:         Mood and Affect: Mood normal.         Behavior: Behavior normal.         Thought Content:  Thought content normal. Judgment: Judgment normal.         1. Type II diabetes mellitus with nephropathy ([address]    2. Breast lump    3. Painful lumpy right breast     4. Vitamin D deficiency     5. Essential hypertension    6. Hyperlipidemia, unspecified hyperlipidemia type    7. Iron deficiency anemia, unspecified iron deficiency anemia type    8. Renal insufficiency        ASSESSMENT/PLAN:    39-year-old woman here for follow-up    1. Type 2 diabetes: A1c at goal.  Diabetic shoes have been ordered. Continue medications as prescribed    2. Right breast nodule: Patient absolutely refuses a mammogram.  I did eventually talk her into getting an ultrasound. Hopefully, the ultrasound will be negative. However, if the ultrasound does show something on, I am hoping that the patient will agree to getting a diagnostic mammogram and further evaluation. I did spend quite a bit of time talking her into trying to get an ultrasound. She states that she is too claustrophobic to get a mammogram done. 3.  Hypertension: Blood pressure stable. 4.  Hyperlipidemia: Continue medications as prescribed. Her cholesterol is only 102    5. Anemia stable. 6.  Renal insufficiency: Stable    7. Vitamin D insufficiency: Continue supplementation                  Alisa Perez was seen today for 3 month follow-up and cyst.    Diagnoses and all orders for this visit:    Type II diabetes mellitus with nephropathy ([facility]  -      DIABETES FOOT EXAM  -     Diabetic Shoe  -     CBC Auto Differential; Future  -     Comprehensive Metabolic Panel; Future  -     Hemoglobin A1C; Future  -     Lipid Panel; Future  -     TSH without Reflex; Future  -     Microalbumin / Creatinine Urine Ratio; Future  -     Vitamin D 25 Hydroxy; Future    Breast lump  -     US BREAST COMPLETE RIGHT; Future    Painful lumpy right breast   -     US BREAST COMPLETE RIGHT; Future    Vitamin D deficiency   -     Vitamin D 25 Hydroxy;  Future    Essential hypertension    Hyperlipidemia, unspecified hyperlipidemia type    Iron deficiency anemia, unspecified iron deficiency anemia type    Renal insufficiency    Other orders  -     metoprolol tartrate (LOPRESSOR) 25 MG tablet; Take 1 tablet by mouth 2 times daily  -     isosorbide mononitrate (IMDUR) 30 MG extended release tablet; Take 1 tablet by mouth 2 times daily          Return in about 3 months (around 11/13/2021), or MEDICARE WELLNESS.      Orders Placed This Encounter   Procedures    Diabetic Shoe    US BREAST COMPLETE RIGHT     Standing Status:   Future     Standing Expiration Date:   8/13/2022     Scheduling Instructions:      REFUSES MAMMOGRAM. BREAST LUMP NOTED A MONTH AGO ON RIGHT BREAST     Order Specific Question:   Reason for exam:     Answer:   breast lump    CBC Auto Differential     Fast 12 hours     Standing Status:   Future     Standing Expiration Date:   8/13/2022    Comprehensive Metabolic Panel     Fasting 12 hours     Standing Status:   Future     Standing Expiration Date:   8/13/2022    Hemoglobin A1C     Fast 12 hours     Standing Status:   Future     Standing Expiration Date:   8/13/2022    Lipid Panel     Standing Status:   Future     Standing Expiration Date:   8/13/2022     Order Specific Question:   Is Patient Fasting?/# of Hours     Answer:   12    TSH without Reflex     Fast 12 hours     Standing Status:   Future     Standing Expiration Date:   8/13/2022    Microalbumin / Creatinine Urine Ratio     Standing Status:   Future     Standing Expiration Date:   8/13/2022    Vitamin D 25 Hydroxy     Standing Status:   Future     Standing Expiration Date:   8/13/2022    HM DIABETES FOOT Ely Muñiz MD

## 2021-08-17 ENCOUNTER — HOSPITAL ENCOUNTER (OUTPATIENT)
Dept: WOMENS IMAGING | Age: 71
Discharge: HOME | End: 2021-08-17
Payer: MEDICARE

## 2021-08-17 DIAGNOSIS — N63.0 BREAST LUMP: ICD-10-CM

## 2021-08-17 DIAGNOSIS — N64.4 PAINFUL LUMPY RIGHT BREAST: ICD-10-CM

## 2021-08-17 DIAGNOSIS — R92.8 ABNORMAL MAMMOGRAM: ICD-10-CM

## 2021-08-17 DIAGNOSIS — N63.10 PAINFUL LUMPY RIGHT BREAST: ICD-10-CM

## 2021-08-17 DIAGNOSIS — N63.10 UNSPECIFIED LUMP IN THE RIGHT BREAST, UNSPECIFIED QUADRANT: ICD-10-CM

## 2021-08-17 PROCEDURE — 76642 ULTRASOUND BREAST LIMITED: CPT

## 2021-08-17 PROCEDURE — G0279 TOMOSYNTHESIS, MAMMO: HCPCS

## 2021-08-24 ENCOUNTER — TELEPHONE (OUTPATIENT)
Dept: INTERNAL MEDICINE | Age: 71
End: 2021-08-24

## 2021-08-24 DIAGNOSIS — R92.8 ABNORMAL ULTRASOUND OF BREAST: ICD-10-CM

## 2021-08-24 DIAGNOSIS — N63.0 BREAST LUMP: Primary | ICD-10-CM

## 2021-08-26 ENCOUNTER — OFFICE VISIT (OUTPATIENT)
Dept: SURGERY | Age: 71
End: 2021-08-26
Payer: MEDICARE

## 2021-08-26 VITALS
SYSTOLIC BLOOD PRESSURE: 130 MMHG | BODY MASS INDEX: 36 KG/M2 | TEMPERATURE: 96.8 F | DIASTOLIC BLOOD PRESSURE: 85 MMHG | HEIGHT: 66 IN | HEART RATE: 67 BPM | WEIGHT: 224 LBS

## 2021-08-26 DIAGNOSIS — N63.13 MASS OF LOWER OUTER QUADRANT OF RIGHT BREAST: ICD-10-CM

## 2021-08-26 DIAGNOSIS — R92.8 ABNORMAL MAMMOGRAM: Primary | ICD-10-CM

## 2021-08-26 PROCEDURE — G8400 PT W/DXA NO RESULTS DOC: HCPCS | Performed by: PHYSICIAN ASSISTANT

## 2021-08-26 PROCEDURE — 3017F COLORECTAL CA SCREEN DOC REV: CPT | Performed by: PHYSICIAN ASSISTANT

## 2021-08-26 PROCEDURE — 1090F PRES/ABSN URINE INCON ASSESS: CPT | Performed by: PHYSICIAN ASSISTANT

## 2021-08-26 PROCEDURE — 4040F PNEUMOC VAC/ADMIN/RCVD: CPT | Performed by: PHYSICIAN ASSISTANT

## 2021-08-26 PROCEDURE — 1036F TOBACCO NON-USER: CPT | Performed by: PHYSICIAN ASSISTANT

## 2021-08-26 PROCEDURE — G8427 DOCREV CUR MEDS BY ELIG CLIN: HCPCS | Performed by: PHYSICIAN ASSISTANT

## 2021-08-26 PROCEDURE — 99203 OFFICE O/P NEW LOW 30 MIN: CPT | Performed by: PHYSICIAN ASSISTANT

## 2021-08-26 PROCEDURE — 1123F ACP DISCUSS/DSCN MKR DOCD: CPT | Performed by: PHYSICIAN ASSISTANT

## 2021-08-26 PROCEDURE — G8417 CALC BMI ABV UP PARAM F/U: HCPCS | Performed by: PHYSICIAN ASSISTANT

## 2021-08-26 NOTE — PROGRESS NOTES
Subjective:      Patient ID: Larry Espino is a 66 y.o. female. HPI  MsDonald Hunter presents to establish care for a right breast mass she noticed several months ago. She has not had regular screening mammography. She did have a bilateral diagnostic mammogram and US prior to her visit and it demonstrates a solid irregular 2.5 cm mass on the right and a 6 mm smooth solid mass on the left breast.  Biopsy is recommended for the right and 6 month follow up or biopsy is recommended on the left. Larry Espino is a 66 y.o. female with the following history as recorded in [facility]:  Patient Active Problem List    Diagnosis Date Noted    Retinal neovascularization 06/25/2021    Chest pain 06/21/2021    Chronic kidney disease, stage 3b ([address] 06/21/2021    History of coronary artery bypass graft 06/21/2021    Carpal tunnel syndrome 09/02/2020    Achilles tendinitis 04/29/2019    Bursitis of right foot 04/29/2019    Contracture of Achilles tendon 04/29/2019    Foot pain 04/29/2019    Posterior calcaneal exostosis 04/29/2019    Diabetic macular edema ([address] 04/15/2019    Nonproliferative diabetic retinopathy ([address] 04/15/2019    CHAPARRO (acute kidney injury) ([address] 05/18/2018    Hypertension 05/18/2018    Hyperlipidemia 05/18/2018    Ocular hypertension 05/18/2018    CAD (coronary artery disease) 05/18/2018    Type 2 diabetes mellitus ([address] 05/18/2018    Diabetes mellitus ([address] 05/18/2018    Acute cystitis without hematuria 05/18/2018     Current Outpatient Medications   Medication Sig Dispense Refill    Metoprolol Succinate 25 MG CS24 Take by mouth      metoprolol tartrate (LOPRESSOR) 25 MG tablet Take 1 tablet by mouth 2 times daily 180 tablet 3    isosorbide mononitrate (IMDUR) 30 MG extended release tablet Take 1 tablet by mouth 2 times daily 180 tablet 3    Blood Glucose Calibration (ACCU-CHEK ROSA) SOLN Calibration done daily.  1 each 1    Blood Glucose Monitoring Suppl (ACCU-CHEK ROSA) Procedure Laterality Date    CARDIAC SURGERY      CARPAL TUNNEL RELEASE Right 9/3/2020    RIGHT CARPAL TUNNEL RELEASE performed [facility] Kaur Ortiz [facility]      COLONOSCOPY N/A 10/11/2019    Dr Mario Apley of a hemostatic clip-Suboptimal prep, diverticulosis, internal hemorrhoids-Grade 2, AP x 3, 1 yr recall     COLONOSCOPY N/A 10/12/2020    Dr Melinda Zuniga hemorrhoids-Grade 1-2, AP, 3 yr recall    CORONARY ARTERY BYPASS GRAFT      ENDOSCOPY, COLON, DIAGNOSTIC      MOUTH SURGERY      OTHER SURGICAL HISTORY      sternal resection    SKIN BIOPSY      UPPER GASTROINTESTINAL ENDOSCOPY N/A 10/11/2019    Dr Sneha Horn (+) H Pylori    VASCULAR SURGERY       Family History   Problem Relation Age of Onset    Cancer Mother     Diabetes Father     COPD Father     Diabetes Sister     Heart Disease Sister     Colon Polyps Sister     Coronary Art Dis Other         grandfather    Diabetes Other         aunt    Colon Cancer Neg Hx     Liver Cancer Neg Hx     Rectal Cancer Neg Hx      Social History     Tobacco Use    Smoking status: Never Smoker    Smokeless tobacco: Never Used   Substance Use Topics    Alcohol use: No       Review of Systems   All other systems reviewed and are negative. Objective:   Physical Exam  Constitutional:       Appearance: Normal appearance. Chest:      Breasts:         Right: Mass and skin change present. No inverted nipple. Left: No inverted nipple, mass or skin change. Lymphadenopathy:      Upper Body:      Right upper body: No axillary adenopathy. Left upper body: No axillary adenopathy. Skin:     General: Skin is warm and dry. Neurological:      General: No focal deficit present. Mental Status: She is alert and oriented to person, place, and time.    Psychiatric:         Mood and Affect: Mood normal.         Behavior: Behavior normal.         Thought Content: Thought content normal.         Judgment: Judgment normal.         Assessment:      Right breast mass  Bilateral abnormal mammogram and US      Plan:      We discussed doing a bilateral biopsy to know the status of both lesions and she would like to proceed with that. We discussed that the right breast mass is likely cancer. She will not be willing to have an MRI due to claustrophobia. She will consider all of her treatment options, but is currently opposed to doing chemotherapy and radiation. PLAN:  This will require US guided mammotome biopsy, which will be done in the hospital under local anesthesia. Further procedures will be done as indicated. CONSENT:  The risks, benefits and options of biopsy/US were discussed with her including but not limited to bleeding, infection, hematoma, missing the lesion, and scarring. She expresses good understanding and is agreeable to proceed.             Moriah Tomlinson PA-C

## 2021-09-01 ENCOUNTER — PROCEDURE VISIT (OUTPATIENT)
Dept: SURGERY | Age: 71
End: 2021-09-01

## 2021-09-01 ENCOUNTER — HOSPITAL ENCOUNTER (OUTPATIENT)
Dept: WOMENS IMAGING | Age: 71
Discharge: HOME | End: 2021-09-01
Payer: MEDICARE

## 2021-09-01 ENCOUNTER — PROCEDURE VISIT (OUTPATIENT)
Dept: SURGERY | Age: 71
End: 2021-09-01
Payer: MEDICARE

## 2021-09-01 DIAGNOSIS — R92.8 ABNORMAL MAMMOGRAM: ICD-10-CM

## 2021-09-01 DIAGNOSIS — N63.20 LEFT BREAST MASS: ICD-10-CM

## 2021-09-01 DIAGNOSIS — N63.10 BREAST MASS, RIGHT: Primary | ICD-10-CM

## 2021-09-01 DIAGNOSIS — N63.13 MASS OF LOWER OUTER QUADRANT OF RIGHT BREAST: ICD-10-CM

## 2021-09-01 PROCEDURE — 77065 DX MAMMO INCL CAD UNI: CPT

## 2021-09-01 PROCEDURE — 19084 BX BREAST ADD LESION US IMAG: CPT | Performed by: SURGERY

## 2021-09-01 PROCEDURE — 19083 BX BREAST 1ST LESION US IMAG: CPT | Performed by: SURGERY

## 2021-09-08 ENCOUNTER — TELEPHONE (OUTPATIENT)
Dept: SURGERY | Age: 71
End: 2021-09-08

## 2021-09-08 DIAGNOSIS — C50.811 MALIGNANT NEOPLASM OF OVERLAPPING SITES OF RIGHT BREAST IN FEMALE, ESTROGEN RECEPTOR POSITIVE (HCC): Primary | ICD-10-CM

## 2021-09-08 DIAGNOSIS — Z17.0 MALIGNANT NEOPLASM OF OVERLAPPING SITES OF RIGHT BREAST IN FEMALE, ESTROGEN RECEPTOR POSITIVE (HCC): Primary | ICD-10-CM

## 2021-09-08 RX ORDER — SIMVASTATIN 40 MG
TABLET ORAL
Qty: 90 TABLET | Refills: 0 | Status: SHIPPED | OUTPATIENT
Start: 2021-09-08 | End: 2021-11-15

## 2021-09-08 NOTE — TELEPHONE ENCOUNTER
Maryan Veliz called requesting a refill of the below medication which has been pended for you:     Requested Prescriptions     Pending Prescriptions Disp Refills    simvastatin (ZOCOR) 40 MG tablet [Pharmacy Med Name: SIMVASTATIN 40 MG Tablet] 90 tablet 0     Sig: TAKE 1 TABLET EVERY EVENING       Last Appointment Date: 8/13/2021  Next Appointment Date: 11/17/2021    Allergies   Allergen Reactions    Medrol [Methylprednisolone]      Affects her eyes

## 2021-09-08 NOTE — TELEPHONE ENCOUNTER
Patient was informed:    No MRI. Breast US Scheduled here in our office on 9/20/2021 @ 11:30 Breast talk with Dr. Harris Body to follow.

## 2021-09-09 ENCOUNTER — TELEPHONE (OUTPATIENT)
Dept: OTHER | Age: 71
End: 2021-09-09

## 2021-09-09 NOTE — TELEPHONE ENCOUNTER
Reached out to patient via telephone call to offer Nurse Navigator services. We spoke at length about navigator services and I offered to mail a Breast Cancer Treatment Handbook to her and she agreed that would be good. I encouraged her to reach out at anytime with questions or concerns. Pt asked what her next steps were and I went over her appointments with her. She wanted to make sure she was not scheduled for an MRI and verified that she is only scheduled for an ultrasound prior to seeing Dr. Juan Francisco Torres and that is to check the area and marker. Pt verbalized understanding. Pt has family support.

## 2021-09-17 NOTE — PROGRESS NOTES
HISTORY OF PRESENT ILLNESS:    Ms. Quyen Chavez  is recently status post ultrasound guided breast biopsy on the right which revealed a 2.6 cm high grade infiltrating ductal carcinoma. ER is positive at 96%. TX is positive at 94%. Her2 is Equivocal. Ki67 is low at 6%. Fish is negative. Mammaprint is low risk luminal A type. MRI was not performed     DISCUSSION:  I had a lengthy discussion with Ms. Quyen Chavez  and her family about the ramifications of the diagnosis of breast cancer. We discussed the pathophysiology of cancer in general and also the ways in which surgery, radiation therapy, and chemotherapy are utilized in the treatment of different types of cancers. We also explained how these modalities related to her situation in particular. We discussed the pathophysiology of breast cancer and some length, including what is known about the causes of breast cancer, its relationship to fibrocystic disease, its relationship to hormone replacement therapy, and some of the genetic aspects involved in familial breast cancers. We discussed the BrCa genetic analysis and why it is appropriate for her. We discussed breast MRI and how it assists in evaluation of breast cancers and the results of her MRI if done. We discussed the surgical options including simple mastectomy and lumpectomy with  sentinel lymph node biopsy as well as the possibility of axillary lymph node dissection. We explained in depth why breast conservation therapy requires radiation treatments for the majority of women. These treatments may be external beam for 6 weeks, partial breast for 5 days,  or intraoperative. I explained that most women treated for invasive malignancy do receive systemic therapy, hormonal therapy or  chemotherapy postoperatively depending upon the final pathology, the lymph node status, and the hormone receptor status.   I discussed Oncotype Dx, Mammoprint, and Adjuvant Online as tools which aid in the decision for chemotherapy or hormonal therapy. We also discussed the possibility of breast reconstruction if a mastectomy was required. .  I explained to her the different techniques including placement of a subpectoral implant with a Alloderm sling  versus TRAM flap reconstruction as welll as other methods of reconstruction. She does not wish to pursue reconstruction at this time. After a prolonged discussion lasting 60 minutes  we felt it was most appropriate that she undergo right lumpectomy, and sentinel node biopsy, with preop ultrasound and and intraop ultrasound guided needle localization with IORT  We started Singulair and letrozole      We discussed the risks and benefits of the surgery including but not limited to bleeding, infection, pain, lymphedema, numbness, and also the risks of general anesthesia including pneumonia, blood clots, heart attack, stroke, and death. She expresses good understanding and is agreeable to proceed with surgery.       We will schedule this to be done when convenient  at [facility].

## 2021-09-20 ENCOUNTER — INITIAL CONSULT (OUTPATIENT)
Dept: SURGERY | Age: 71
End: 2021-09-20
Payer: MEDICARE

## 2021-09-20 DIAGNOSIS — C50.811 MALIGNANT NEOPLASM OF OVERLAPPING SITES OF RIGHT BREAST IN FEMALE, ESTROGEN RECEPTOR POSITIVE (HCC): Primary | ICD-10-CM

## 2021-09-20 DIAGNOSIS — Z17.0 MALIGNANT NEOPLASM OF OVERLAPPING SITES OF RIGHT BREAST IN FEMALE, ESTROGEN RECEPTOR POSITIVE (HCC): Primary | ICD-10-CM

## 2021-09-20 PROCEDURE — 99215 OFFICE O/P EST HI 40 MIN: CPT | Performed by: SURGERY

## 2021-09-20 PROCEDURE — 4040F PNEUMOC VAC/ADMIN/RCVD: CPT | Performed by: SURGERY

## 2021-09-20 PROCEDURE — 3017F COLORECTAL CA SCREEN DOC REV: CPT | Performed by: SURGERY

## 2021-09-20 PROCEDURE — 1123F ACP DISCUSS/DSCN MKR DOCD: CPT | Performed by: SURGERY

## 2021-09-20 PROCEDURE — G8400 PT W/DXA NO RESULTS DOC: HCPCS | Performed by: SURGERY

## 2021-09-20 PROCEDURE — 1090F PRES/ABSN URINE INCON ASSESS: CPT | Performed by: SURGERY

## 2021-09-20 PROCEDURE — 1036F TOBACCO NON-USER: CPT | Performed by: SURGERY

## 2021-09-20 PROCEDURE — G8417 CALC BMI ABV UP PARAM F/U: HCPCS | Performed by: SURGERY

## 2021-09-20 PROCEDURE — G8428 CUR MEDS NOT DOCUMENT: HCPCS | Performed by: SURGERY

## 2021-09-20 RX ORDER — MONTELUKAST SODIUM 10 MG/1
10 TABLET ORAL DAILY
Qty: 30 TABLET | Refills: 5 | Status: SHIPPED | OUTPATIENT
Start: 2021-09-20 | End: 2021-11-24 | Stop reason: SDUPTHER

## 2021-09-20 RX ORDER — LETROZOLE 2.5 MG/1
2.5 TABLET, FILM COATED ORAL DAILY
Qty: 30 TABLET | Refills: 5 | Status: SHIPPED | OUTPATIENT
Start: 2021-09-20 | End: 2021-11-24 | Stop reason: SDUPTHER

## 2021-09-20 NOTE — Clinical Note
Right lumpectomy, sentinel node and IORT-October 28/29  Preop ultrasound intraoperative ultrasound-guided needle localization Day before to office for marking, PT, etc.  BioSorb, pec block, margin probe, flaps,

## 2021-09-21 DIAGNOSIS — Z17.0 MALIGNANT NEOPLASM OF OVERLAPPING SITES OF RIGHT BREAST IN FEMALE, ESTROGEN RECEPTOR POSITIVE (HCC): Primary | ICD-10-CM

## 2021-09-21 DIAGNOSIS — C50.811 MALIGNANT NEOPLASM OF OVERLAPPING SITES OF RIGHT BREAST IN FEMALE, ESTROGEN RECEPTOR POSITIVE (HCC): Primary | ICD-10-CM

## 2021-10-04 ENCOUNTER — OFFICE VISIT (OUTPATIENT)
Dept: CARDIOLOGY CLINIC | Age: 71
End: 2021-10-04
Payer: MEDICARE

## 2021-10-04 VITALS
HEART RATE: 76 BPM | HEIGHT: 66 IN | DIASTOLIC BLOOD PRESSURE: 68 MMHG | SYSTOLIC BLOOD PRESSURE: 124 MMHG | WEIGHT: 222 LBS | BODY MASS INDEX: 35.68 KG/M2

## 2021-10-04 DIAGNOSIS — I25.10 CORONARY ARTERY DISEASE INVOLVING NATIVE CORONARY ARTERY OF NATIVE HEART WITHOUT ANGINA PECTORIS: Primary | ICD-10-CM

## 2021-10-04 DIAGNOSIS — Z95.1 HISTORY OF CORONARY ARTERY BYPASS GRAFT: ICD-10-CM

## 2021-10-04 DIAGNOSIS — E78.2 MIXED HYPERLIPIDEMIA: ICD-10-CM

## 2021-10-04 DIAGNOSIS — I10 HYPERTENSION, UNSPECIFIED TYPE: ICD-10-CM

## 2021-10-04 PROCEDURE — 1123F ACP DISCUSS/DSCN MKR DOCD: CPT | Performed by: INTERNAL MEDICINE

## 2021-10-04 PROCEDURE — G8417 CALC BMI ABV UP PARAM F/U: HCPCS | Performed by: INTERNAL MEDICINE

## 2021-10-04 PROCEDURE — G8427 DOCREV CUR MEDS BY ELIG CLIN: HCPCS | Performed by: INTERNAL MEDICINE

## 2021-10-04 PROCEDURE — 1090F PRES/ABSN URINE INCON ASSESS: CPT | Performed by: INTERNAL MEDICINE

## 2021-10-04 PROCEDURE — 1036F TOBACCO NON-USER: CPT | Performed by: INTERNAL MEDICINE

## 2021-10-04 PROCEDURE — 4040F PNEUMOC VAC/ADMIN/RCVD: CPT | Performed by: INTERNAL MEDICINE

## 2021-10-04 PROCEDURE — 99214 OFFICE O/P EST MOD 30 MIN: CPT | Performed by: INTERNAL MEDICINE

## 2021-10-04 PROCEDURE — G8400 PT W/DXA NO RESULTS DOC: HCPCS | Performed by: INTERNAL MEDICINE

## 2021-10-04 PROCEDURE — 3017F COLORECTAL CA SCREEN DOC REV: CPT | Performed by: INTERNAL MEDICINE

## 2021-10-04 PROCEDURE — G8484 FLU IMMUNIZE NO ADMIN: HCPCS | Performed by: INTERNAL MEDICINE

## 2021-10-04 ASSESSMENT — ENCOUNTER SYMPTOMS
EYES NEGATIVE: 1
RESPIRATORY NEGATIVE: 1
GASTROINTESTINAL NEGATIVE: 1
NAUSEA: 0
SHORTNESS OF BREATH: 0
DIARRHEA: 0
VOMITING: 0

## 2021-10-04 NOTE — PROGRESS NOTES
[facility]                            Date:  10/4/2021  Patient: Alex Cassidy  Age:  66 y.o., 1950      Reason for evaluation:         SUBJECTIVE:    Returns today follow-up assessment follow-up for cardiomyopathy coronary artery disease previous CABG hypertension hyperlipidemia fatigue and shortness of breath. She also has breast carcinoma. Blood pressure today 124/68 heart 76. Overall doing reasonably well. She does get tired easily and out of breath easily indicates she can walk about 100 yards and has to stop and rest.  I encouraged her to be more active as tolerated. Most recent ejection fraction was 35% on a stress test 6/23/2021. We discussed possible ICD implantation discussed the pros and cons and advantages and disadvantages she will think about it speak to her sisters and let us know in the near future. Review of Systems   Constitutional: Negative. Negative for chills, fever and unexpected weight change. HENT: Negative. Eyes: Negative. Respiratory: Negative. Negative for shortness of breath. Cardiovascular: Negative. Negative for chest pain. Gastrointestinal: Negative. Negative for diarrhea, nausea and vomiting. Endocrine: Negative. Genitourinary: Negative. Musculoskeletal: Negative. Skin: Negative. Neurological: Negative. All other systems reviewed and are negative. OBJECTIVE:     /68   Pulse 76   Ht 5' 6\" (1.676 m)   Wt 222 lb (100.7 kg)   BMI 35.83 kg/m²     Labs:   CBC: No results for input(s): WBC, HGB, HCT, PLT in the last 72 hours. BMP:No results for input(s): NA, K, CO2, BUN, CREATININE, LABGLOM, GLUCOSE in the last 72 hours. BNP: No results for input(s): BNP in the last 72 hours. PT/INR: No results for input(s): PROTIME, INR in the last 72 hours. APTT:No results for input(s): APTT in the last 72 hours.   CARDIAC ENZYMES:No results for input(s): CKTOTAL, CKMB, CKMBINDEX, TROPONINI in the last 72 hours.  FASTING LIPID PANEL:  Lab Results   Component Value Date    HDL 43 07/30/2021    LDLCALC 23 07/30/2021    TRIG 181 07/30/2021     LIVER PROFILE:No results for input(s): AST, ALT, LABALBU in the last 72 hours.         Past Medical History:   Diagnosis Date    Acute sinusitis     CHAPARRO [facility]acute kidney injury) [facility] 05/18/2018    Anemia     Callous ulcer ([facility]     Cataract     Dermatitis     Diabetes mellitus ([facility]     Diabetic nephropathy ([facility]     Diabetic retinopathy ([facility]     Glaucoma     Gout     Hemorrhoids     Hx of TB skin testing     positive    Hyperlipidemia     Hypertension     Palliative care patient 06/24/2021    Type 2 diabetes mellitus ([facility] 05/18/2018    Vitamin D deficiency      Past Surgical History:   Procedure Laterality Date    CARDIAC SURGERY      CARPAL TUNNEL RELEASE Right 9/3/2020    RIGHT CARPAL TUNNEL RELEASE performed by Maine Causey MD at [facility]      COLONOSCOPY N/A 10/11/2019    Dr Lety Dubon of a hemostatic clip-Suboptimal prep, diverticulosis, internal hemorrhoids-Grade 2, AP x 3, 1 yr recall     COLONOSCOPY N/A 10/12/2020    Dr Medeiros Flick hemorrhoids-Grade 1-2, AP, 3 yr recall    CORONARY ARTERY BYPASS GRAFT      ENDOSCOPY, COLON, DIAGNOSTIC      MOUTH SURGERY      OTHER SURGICAL HISTORY      sternal resection    SKIN BIOPSY      UPPER GASTROINTESTINAL ENDOSCOPY N/A 10/11/2019    Dr Balta Bernard (+) H Pylori    US BREAST NEEDLE BIOPSY LEFT Left 9/1/2021    US BREAST NEEDLE BIOPSY LEFT 9/1/2021 LPS GENERAL SURGERY    VASCULAR SURGERY       Family History   Problem Relation Age of Onset    Cancer Mother     Diabetes Father     COPD Father     Diabetes Sister     Heart Disease Sister     Colon Polyps Sister     Coronary Art Dis Other         grandfather    Diabetes Other         aunt    Colon Cancer Neg Hx     Liver Cancer Neg Hx     Rectal Cancer Neg Hx      Allergies Allergen Reactions    Medrol [Methylprednisolone]      Affects her eyes     Current Outpatient Medications   Medication Sig Dispense Refill    mupirocin (BACTROBAN) 2 % ointment Apply to each nostril BID 5 days prior to surgery 22 g 0    letrozole (FEMARA) 2.5 MG tablet Take 1 tablet by mouth daily 30 tablet 5    montelukast (SINGULAIR) 10 MG tablet Take 1 tablet by mouth daily 30 tablet 5    simvastatin (ZOCOR) 40 MG tablet TAKE 1 TABLET EVERY EVENING 90 tablet 0    metoprolol tartrate (LOPRESSOR) 25 MG tablet Take 1 tablet by mouth 2 times daily 180 tablet 3    isosorbide mononitrate (IMDUR) 30 MG extended release tablet Take 1 tablet by mouth 2 times daily 180 tablet 3    Blood Glucose Calibration (ACCU-CHEK ROSA) SOLN Calibration done daily.  1 each 1    Blood Glucose Monitoring Suppl (ACCU-CHEK ROSA) ARMANDO Use 4 times a day to check BS. 1 Device 0    blood glucose test strips (ACCU-CHEK ROSA PLUS) strip TEST BLOOD SUGAR FOUR TIMES DAILY E11.9 400 strip 3    Accu-Chek Softclix Lancets MISC TEST FOUR TIMES DAILY E11.9 400 each 3    lisinopril (PRINIVIL;ZESTRIL) 5 MG tablet Take 1 tablet by mouth daily 90 tablet 2    metFORMIN (GLUCOPHAGE) 500 MG tablet TAKE 2 TABLETS TWICE DAILY WITH MEALS 360 tablet 3    furosemide (LASIX) 40 MG tablet Take 1 tablet by mouth daily as needed (excess fluid) 60 tablet 1    amLODIPine (NORVASC) 5 MG tablet Take 1 tablet by mouth daily 90 tablet 1    glipiZIDE (GLUCOTROL XL) 2.5 MG extended release tablet TAKE 1 TABLET EVERY DAY 90 tablet 3    Alcohol Swabs (B-D SINGLE USE SWABS REGULAR) PADS USE THREE TIMES DAILY 100 each 11    aspirin 81 MG tablet Take 81 mg by mouth daily      Omega-3 Fatty Acids (FISH OIL) 1000 MG CAPS Take 1,000 mg by mouth 2 times daily      MULTIPLE VITAMIN PO Take 1 tablet by mouth daily      Calcium Carbonate-Vitamin D (CALCIUM 500/D PO) Take 1 tablet by mouth 2 times daily      vitamin B-12 (CYANOCOBALAMIN) 1000 MCG tablet Take 1,000 mcg by mouth daily      timolol (TIMOPTIC) 0.5 % ophthalmic solution Place 1 drop into the right eye daily       Metoprolol Succinate 25 MG CS24 Take by mouth       No current facility-administered medications for this visit. Social History     Socioeconomic History    Marital status: Single     Spouse name: Not on file    Number of children: Not on file    Years of education: Not on file    Highest education level: Not on file   Occupational History     Employer: [employer]   Tobacco Use    Smoking status: Never Smoker    Smokeless tobacco: Never Used   Vaping Use    Vaping Use: Never used   Substance and Sexual Activity    Alcohol use: No    Drug use: No    Sexual activity: Never   Other Topics Concern    Not on file   Social History Narrative    Not on file     Social Determinants of Health     Financial Resource Strain: Low Risk     Difficulty of Paying Living Expenses: Not hard at all   Food Insecurity: No Food Insecurity    Worried About [address] [place] [address] in the Last Year: Never true    Matthew of Food in the Last Year: Never true   Transportation Needs: No Transportation Needs    Lack of Transportation (Medical): No    Lack of Transportation (Non-Medical): No   Physical Activity:     Days of Exercise per Week:     Minutes of Exercise per Session:    Stress:     Feeling of Stress :    Social Connections:     Frequency of Communication with Friends and Family:     Frequency of Social Gatherings with Friends and Family:     Attends Rastafarian Services:     Active Member of Clubs or Organizations:     Attends Club or Organization Meetings:     Marital Status:    Intimate Partner Violence:     Fear of Current or Ex-Partner:     Emotionally Abused:     Physically Abused:     Sexually Abused:        Physical Examination:  /68   Pulse 76   Ht 5' 6\" (1.676 m)   Wt 222 lb (100.7 kg)   BMI 35.83 kg/m²   Physical Exam  Vitals reviewed.    Constitutional: Appearance: She is well-developed. Neck:      Vascular: No carotid bruit or JVD. Cardiovascular:      Rate and Rhythm: Normal rate and regular rhythm. Heart sounds: Normal heart sounds. No murmur heard. No friction rub. No gallop. Pulmonary:      Effort: Pulmonary effort is normal. No respiratory distress. Breath sounds: Normal breath sounds. No wheezing or rales. Abdominal:      General: There is no distension. Tenderness: There is no abdominal tenderness. Lymphadenopathy:      Cervical: No cervical adenopathy. Skin:     General: Skin is warm and dry. ASSESSMENT:     Diagnosis Orders   1. Coronary artery disease involving native coronary artery of native heart without angina pectoris     2. Hypertension, unspecified type     3. Mixed hyperlipidemia     4. History of coronary artery bypass graft         PLAN:  No orders of the defined types were placed in this encounter. No orders of the defined types were placed in this encounter. 1. Continue present medications  2. Recommend follow-up assessment in 3 months    Return in about 3 months (around 1/4/2022) for return to Dr. Smitha Ramey only. Hi Claros MD 10/4/2021 3:08 PM CDT    [facility]      Thisdictation was generated by voice recognition computer software. Although all attempts are made to edit the dictation for accuracy, there may be errors in the transcription that are not intended.

## 2021-10-06 ENCOUNTER — TELEPHONE (OUTPATIENT)
Dept: CARDIOLOGY CLINIC | Age: 71
End: 2021-10-06

## 2021-10-06 NOTE — TELEPHONE ENCOUNTER
Pt called back per Dr. Krystal Menard about ICD Implantation. Kishore Thayer please contact patient to discuss.   Thank you

## 2021-10-07 NOTE — TELEPHONE ENCOUNTER
Patient is scheudled for 10/18 COVID swab at [facility] 8A-12P and labs. Procedure on 10/19 arrive at [facility] for 11AM procedure. Other instructions given below. [facility] and [address] [facility] located on the first floor of [place] [address] through hospital main entrance and turn immediately to the left. Patient's contact number:  [phone] (home)     Pre-operative work-up: CBC, BMP    Implantable Cardioverter-Defibrillator [overnight stay]    An implantable cardioverter-defibrillator, or ICD, is a small electronic device designed to treat dangerous tachycardias. The ICD monitors the heart rhythm at all times. If it senses a dangerous tachycardia, the ICD delivers one or more impulses or shocks to the heart and restores a normal rhythm. Prep Instructions:    1. Do not eat or drink anything after midnight the night before your procedure. May take morning medications with a sip of water unless otherwise directed not to.  2.  You should arrange to have someone take you home rather than drive yourself. 3.  Further plans will be made following your procedure. If for any reason you are unable to keep this appointment, please contact [facility], [phone], as soon as possible to reschedule.

## 2021-10-07 NOTE — TELEPHONE ENCOUNTER
Just talked with JAMIE about the upcoming breast surgery. He feels the ICD needs to wait until AFTER her surgery. Spoke with patient she voiced understanding.

## 2021-10-13 ENCOUNTER — HOSPITAL ENCOUNTER (OUTPATIENT)
Dept: PHYSICAL THERAPY | Age: 71
Setting detail: THERAPIES SERIES
Discharge: HOME | End: 2021-10-13
Payer: MEDICARE

## 2021-10-13 ENCOUNTER — HOSPITAL ENCOUNTER (OUTPATIENT)
Dept: PREADMISSION TESTING | Age: 71
Discharge: HOME | End: 2021-10-17
Payer: MEDICARE

## 2021-10-13 VITALS — BODY MASS INDEX: 34.55 KG/M2 | WEIGHT: 215 LBS | HEIGHT: 66 IN

## 2021-10-13 LAB
ANION GAP SERPL CALCULATED.3IONS-SCNC: 15 MMOL/L (ref 7–19)
BASOPHILS ABSOLUTE: 0.1 K/UL (ref 0–0.2)
BASOPHILS RELATIVE PERCENT: 1.1 % (ref 0–1)
BUN BLDV-MCNC: 21 MG/DL (ref 8–23)
CALCIUM SERPL-MCNC: 9.7 MG/DL (ref 8.8–10.2)
CHLORIDE BLD-SCNC: 104 MMOL/L (ref 98–111)
CO2: 23 MMOL/L (ref 22–29)
CREAT SERPL-MCNC: 1.8 MG/DL (ref 0.5–0.9)
EOSINOPHILS ABSOLUTE: 0.2 K/UL (ref 0–0.6)
EOSINOPHILS RELATIVE PERCENT: 3.2 % (ref 0–5)
GFR AFRICAN AMERICAN: 33
GFR NON-AFRICAN AMERICAN: 28
GLUCOSE BLD-MCNC: 146 MG/DL (ref 74–109)
HCT VFR BLD CALC: 32.3 % (ref 37–47)
HEMOGLOBIN: 9.9 G/DL (ref 12–16)
IMMATURE GRANULOCYTES #: 0 K/UL
LYMPHOCYTES ABSOLUTE: 1.5 K/UL (ref 1.1–4.5)
LYMPHOCYTES RELATIVE PERCENT: 27.2 % (ref 20–40)
MCH RBC QN AUTO: 31.3 PG (ref 27–31)
MCHC RBC AUTO-ENTMCNC: 30.7 G/DL (ref 33–37)
MCV RBC AUTO: 102.2 FL (ref 81–99)
MONOCYTES ABSOLUTE: 0.4 K/UL (ref 0–0.9)
MONOCYTES RELATIVE PERCENT: 6.4 % (ref 0–10)
NEUTROPHILS ABSOLUTE: 3.5 K/UL (ref 1.5–7.5)
NEUTROPHILS RELATIVE PERCENT: 61.9 % (ref 50–65)
PDW BLD-RTO: 14.6 % (ref 11.5–14.5)
PLATELET # BLD: 218 K/UL (ref 130–400)
PMV BLD AUTO: 10.8 FL (ref 9.4–12.3)
POTASSIUM SERPL-SCNC: 4.5 MMOL/L (ref 3.5–5)
RBC # BLD: 3.16 M/UL (ref 4.2–5.4)
SODIUM BLD-SCNC: 142 MMOL/L (ref 136–145)
WBC # BLD: 5.6 K/UL (ref 4.8–10.8)

## 2021-10-13 PROCEDURE — 97110 THERAPEUTIC EXERCISES: CPT

## 2021-10-13 PROCEDURE — 93005 ELECTROCARDIOGRAM TRACING: CPT | Performed by: SURGERY

## 2021-10-13 PROCEDURE — 80048 BASIC METABOLIC PNL TOTAL CA: CPT

## 2021-10-13 PROCEDURE — 97161 PT EVAL LOW COMPLEX 20 MIN: CPT

## 2021-10-13 PROCEDURE — 85025 COMPLETE CBC W/AUTO DIFF WBC: CPT

## 2021-10-13 RX ORDER — GABAPENTIN 300 MG/1
300 CAPSULE ORAL ONCE
Status: CANCELLED | OUTPATIENT
Start: 2021-10-29

## 2021-10-13 RX ORDER — CELECOXIB 200 MG/1
200 CAPSULE ORAL ONCE
Status: CANCELLED | OUTPATIENT
Start: 2021-10-29

## 2021-10-13 RX ORDER — ACETAMINOPHEN 500 MG
1000 TABLET ORAL ONCE
Status: CANCELLED | OUTPATIENT
Start: 2021-10-29

## 2021-10-13 NOTE — PROGRESS NOTES
Physical Therapy  Initial Assessment  Date: 10/13/2021  Patient Name: Torey Marr  MRN: [id number]  : 1950     Treatment Diagnosis: At risk for lymphedema      Subjective   General  Chart Reviewed: Yes  Patient assessed for rehabilitation services?: Yes  Additional Pertinent Hx: R breast cancer with scheduled lumpectomy on 10/29/21. HTN, DM2, history of open heart surgery with defibrillator needed. Response To Previous Treatment: Not applicable  Family / Caregiver Present: Yes (sister, Diona Denver)  Referring Practitioner: Angelica Rehman MD  Referral Date : 21  Diagnosis: Malignant neoplasm of right breast  Follows Commands: Within Functional Limits  PT Visit Information  Onset Date: 10/13/21  PT Insurance Information: [employer] medicare - precert needed  Total # of Visits Approved: 1  Total # of Visits to Date: 1  Subjective  Subjective: Ms Thuy Torres states she has family to help her with surgery.   Pain Screening  Patient Currently in Pain: Denies  Vital Signs  Patient Currently in Pain: Denies    Vision/Hearing  Vision  Vision: Within Functional Limits  Hearing  Hearing: Within functional limits    Orientation  Orientation  Overall Orientation Status: Within Normal Limits    Social/Functional History  Social/Functional History  Lives With: Family  Receives Help From: Family  ADL Assistance: Independent  Homemaking Assistance: Independent  Ambulation Assistance: Independent  Transfer Assistance: Independent  Occupation: Retired  Type of occupation: Nurse    Objective     Observation/Palpation  Posture: Good  Edema: Swelling L ankle and foot  Scar: Scar medial L lower leg from vein access for heart bypass    AROM RLE (degrees)  RLE AROM: WFL  AROM LLE (degrees)  LLE AROM : WFL  AROM RUE (degrees)  RUE AROM : WFL  AROM LUE (degrees)  LUE AROM : WFL    Strength RLE  Strength RLE: WFL  Strength LLE  Strength LLE: WFL  Strength RUE  Strength RUE: WFL  Strength LUE  Strength LUE: WFL     Additional Measures  Girth: Circumferential measurements taken 11 cm from distal 3rd finger then every 5 cm moving proximally - RUE: 21, 20.1, 17.3, 18.5, 22.2, 25.4, 25.5, 28.6, 31.6, 33.7, 39.9    LUE: 21, 18.8, 17.7, 18, 21.3, 24.1, 24.6, 27.8, 30.3, 21.3, 37  Special Tests: LDex -0.8        Transfers  Sit to Stand: Independent  Stand to sit: Independent          Balance  Posture: Good  Exercises  Exercise 1: circumferential measurements taken  Exercise 2: LDex  Exercise 3: HEP hand out with verbal instructions                      Assessment   Conditions Requiring Skilled Therapeutic Intervention  Assessment: Ms Jelani Martel presents to therapy prior to R breast lumpectomy that is scheduled on 10/29/21 for breast cancer. Patient reports no pain or concerns at this time. Circumferential measurements taken of bilateral arms and recorded. LDex score taken and recorded at -0.8 which is within normal limits. Of note, patient reports she is expected to get a defibrillator so will discontinue LDex monitoring once this in in place. Patient given HEP handout for prior to surgery and recovery with specific verbal and written instructions to follow surgeon guidelines and restrictions. Patient encouraged to call our office with issues or swelling concerns. Recommend follow up 8 weeks after surgery. Treatment Diagnosis:  At risk for lymphedema  Decision Making: Low Complexity  REQUIRES PT FOLLOW UP: Yes  Treatment Initiated : HEP  Activity Tolerance  Activity Tolerance: Patient Tolerated treatment well  Activity Tolerance: No concerns with treatment today         Plan   Plan  Plan Comment: Follow up 8 weeks after surgery    Goals  Patient Goals   Patient goals : no goals eval only       Therapy Time   Individual Concurrent Group Co-treatment   Time In 0939         Time Out 1015         Minutes 36         Timed Code Treatment Minutes: 36 Minutes  Electronically signed by Galo Romero PT on 10/13/2021 at 10:37 AM

## 2021-10-14 LAB
EKG P AXIS: 19 DEGREES
EKG P-R INTERVAL: 194 MS
EKG Q-T INTERVAL: 430 MS
EKG QRS DURATION: 144 MS
EKG QTC CALCULATION (BAZETT): 435 MS
EKG T AXIS: 178 DEGREES

## 2021-10-14 PROCEDURE — 93010 ELECTROCARDIOGRAM REPORT: CPT | Performed by: INTERNAL MEDICINE

## 2021-10-15 RX ORDER — GLIPIZIDE 2.5 MG/1
2.5 TABLET, EXTENDED RELEASE ORAL DAILY
Qty: 90 TABLET | Refills: 3 | Status: SHIPPED | OUTPATIENT
Start: 2021-10-15

## 2021-10-18 NOTE — PROGRESS NOTES
Cardiology read ekg/old ekg/2021 echo and jp scan reviewed per dr. Zenaida Guevara. Ok to proceed with anesthesia for upcoming surgery.

## 2021-10-28 ENCOUNTER — HOSPITAL ENCOUNTER (OUTPATIENT)
Dept: PREADMISSION TESTING | Age: 71
Discharge: HOME | End: 2021-11-01
Payer: MEDICARE

## 2021-10-28 ENCOUNTER — HOSPITAL ENCOUNTER (OUTPATIENT)
Dept: WOMENS IMAGING | Age: 71
Discharge: HOME | End: 2021-10-28
Payer: MEDICARE

## 2021-10-28 DIAGNOSIS — C50.811 MALIGNANT NEOPLASM OF OVERLAPPING SITES OF RIGHT BREAST IN FEMALE, ESTROGEN RECEPTOR POSITIVE (HCC): ICD-10-CM

## 2021-10-28 DIAGNOSIS — Z17.0 MALIGNANT NEOPLASM OF OVERLAPPING SITES OF RIGHT BREAST IN FEMALE, ESTROGEN RECEPTOR POSITIVE (HCC): ICD-10-CM

## 2021-10-28 LAB — SARS-COV-2, NAAT: NOT DETECTED

## 2021-10-28 PROCEDURE — 87635 SARS-COV-2 COVID-19 AMP PRB: CPT

## 2021-10-28 PROCEDURE — 76642 ULTRASOUND BREAST LIMITED: CPT

## 2021-10-29 ENCOUNTER — APPOINTMENT (OUTPATIENT)
Dept: GENERAL RADIOLOGY | Age: 71
End: 2021-10-29
Attending: SURGERY
Payer: MEDICARE

## 2021-10-29 ENCOUNTER — ANESTHESIA EVENT (OUTPATIENT)
Dept: OPERATING ROOM | Age: 71
End: 2021-10-29
Payer: MEDICARE

## 2021-10-29 ENCOUNTER — HOSPITAL ENCOUNTER (OUTPATIENT)
Dept: NUCLEAR MEDICINE | Age: 71
Discharge: HOME | End: 2021-10-31
Payer: MEDICARE

## 2021-10-29 ENCOUNTER — ANESTHESIA (OUTPATIENT)
Dept: OPERATING ROOM | Age: 71
End: 2021-10-29
Payer: MEDICARE

## 2021-10-29 ENCOUNTER — HOSPITAL ENCOUNTER (OUTPATIENT)
Age: 71
Setting detail: OUTPATIENT SURGERY
Discharge: HOME | End: 2021-10-29
Attending: SURGERY | Admitting: SURGERY
Payer: MEDICARE

## 2021-10-29 ENCOUNTER — HOSPITAL ENCOUNTER (OUTPATIENT)
Dept: ULTRASOUND IMAGING | Age: 71
Discharge: HOME | End: 2021-10-29
Payer: MEDICARE

## 2021-10-29 VITALS — SYSTOLIC BLOOD PRESSURE: 86 MMHG | OXYGEN SATURATION: 98 % | TEMPERATURE: 97 F | DIASTOLIC BLOOD PRESSURE: 39 MMHG

## 2021-10-29 VITALS
DIASTOLIC BLOOD PRESSURE: 58 MMHG | RESPIRATION RATE: 20 BRPM | BODY MASS INDEX: 34.4 KG/M2 | HEIGHT: 66 IN | WEIGHT: 214.04 LBS | HEART RATE: 74 BPM | SYSTOLIC BLOOD PRESSURE: 159 MMHG | OXYGEN SATURATION: 96 % | TEMPERATURE: 98.1 F

## 2021-10-29 DIAGNOSIS — C50.811 MALIGNANT NEOPLASM OF OVERLAPPING SITES OF RIGHT BREAST IN FEMALE, ESTROGEN RECEPTOR POSITIVE (HCC): ICD-10-CM

## 2021-10-29 DIAGNOSIS — Z17.0 MALIGNANT NEOPLASM OF OVERLAPPING SITES OF RIGHT BREAST IN FEMALE, ESTROGEN RECEPTOR POSITIVE (HCC): Primary | ICD-10-CM

## 2021-10-29 DIAGNOSIS — Z17.0 MALIGNANT NEOPLASM OF OVERLAPPING SITES OF RIGHT BREAST IN FEMALE, ESTROGEN RECEPTOR POSITIVE (HCC): ICD-10-CM

## 2021-10-29 DIAGNOSIS — C50.811 MALIGNANT NEOPLASM OF OVERLAPPING SITES OF RIGHT BREAST IN FEMALE, ESTROGEN RECEPTOR POSITIVE (HCC): Primary | ICD-10-CM

## 2021-10-29 LAB
GLUCOSE BLD-MCNC: 113 MG/DL (ref 70–99)
PERFORMED ON: ABNORMAL

## 2021-10-29 PROCEDURE — 94640 AIRWAY INHALATION TREATMENT: CPT

## 2021-10-29 PROCEDURE — 19285 PERQ DEV BREAST 1ST US IMAG: CPT | Performed by: SURGERY

## 2021-10-29 PROCEDURE — 38525 BIOPSY/REMOVAL LYMPH NODES: CPT | Performed by: SURGERY

## 2021-10-29 PROCEDURE — 71045 X-RAY EXAM CHEST 1 VIEW: CPT

## 2021-10-29 PROCEDURE — 77424 IO RAD TX DELIVERY BY X-RAY: CPT | Performed by: SURGERY

## 2021-10-29 PROCEDURE — 88329 PATH CONSLTJ DRG SURG: CPT

## 2021-10-29 PROCEDURE — 6360000002 HC RX W HCPCS: Performed by: SURGERY

## 2021-10-29 PROCEDURE — 6360000002 HC RX W HCPCS: Performed by: ANESTHESIOLOGY

## 2021-10-29 PROCEDURE — A4648 IMPLANTABLE TISSUE MARKER: HCPCS | Performed by: SURGERY

## 2021-10-29 PROCEDURE — 2709999900 HC NON-CHARGEABLE SUPPLY: Performed by: SURGERY

## 2021-10-29 PROCEDURE — 3430000000 HC RX DIAGNOSTIC RADIOPHARMACEUTICAL: Performed by: SURGERY

## 2021-10-29 PROCEDURE — 2580000003 HC RX 258: Performed by: ANESTHESIOLOGY

## 2021-10-29 PROCEDURE — 19301 PARTIAL MASTECTOMY: CPT | Performed by: PHYSICIAN ASSISTANT

## 2021-10-29 PROCEDURE — A9520 TC99 TILMANOCEPT DIAG 0.5MCI: HCPCS | Performed by: SURGERY

## 2021-10-29 PROCEDURE — 7100000000 HC PACU RECOVERY - FIRST 15 MIN: Performed by: SURGERY

## 2021-10-29 PROCEDURE — C1713 ANCHOR/SCREW BN/BN,TIS/BN: HCPCS | Performed by: SURGERY

## 2021-10-29 PROCEDURE — C1819 TISSUE LOCALIZATION-EXCISION: HCPCS

## 2021-10-29 PROCEDURE — 82947 ASSAY GLUCOSE BLOOD QUANT: CPT

## 2021-10-29 PROCEDURE — 6370000000 HC RX 637 (ALT 250 FOR IP): Performed by: SURGERY

## 2021-10-29 PROCEDURE — 7100000010 HC PHASE II RECOVERY - FIRST 15 MIN: Performed by: SURGERY

## 2021-10-29 PROCEDURE — 76998 US GUIDE INTRAOP: CPT | Performed by: SURGERY

## 2021-10-29 PROCEDURE — 3600000005 HC SURGERY LEVEL 5 BASE: Performed by: SURGERY

## 2021-10-29 PROCEDURE — 6370000000 HC RX 637 (ALT 250 FOR IP): Performed by: ANESTHESIOLOGY

## 2021-10-29 PROCEDURE — 38900 IO MAP OF SENT LYMPH NODE: CPT | Performed by: SURGERY

## 2021-10-29 PROCEDURE — 19297 PLACE BREAST CATH FOR RAD: CPT | Performed by: SURGERY

## 2021-10-29 PROCEDURE — 88342 IMHCHEM/IMCYTCHM 1ST ANTB: CPT

## 2021-10-29 PROCEDURE — 6360000002 HC RX W HCPCS

## 2021-10-29 PROCEDURE — 19294 PREPJ TUM CAV IORT PRTL MAST: CPT | Performed by: PHYSICIAN ASSISTANT

## 2021-10-29 PROCEDURE — 7100000011 HC PHASE II RECOVERY - ADDTL 15 MIN: Performed by: SURGERY

## 2021-10-29 PROCEDURE — 38792 RA TRACER ID OF SENTINL NODE: CPT

## 2021-10-29 PROCEDURE — 3700000000 HC ANESTHESIA ATTENDED CARE: Performed by: SURGERY

## 2021-10-29 PROCEDURE — 19301 PARTIAL MASTECTOMY: CPT | Performed by: SURGERY

## 2021-10-29 PROCEDURE — C1728 CATH, BRACHYTX SEED ADM: HCPCS | Performed by: SURGERY

## 2021-10-29 PROCEDURE — 2500000003 HC RX 250 WO HCPCS: Performed by: NURSE ANESTHETIST, CERTIFIED REGISTERED

## 2021-10-29 PROCEDURE — 88307 TISSUE EXAM BY PATHOLOGIST: CPT

## 2021-10-29 PROCEDURE — 3700000001 HC ADD 15 MINUTES (ANESTHESIA): Performed by: SURGERY

## 2021-10-29 PROCEDURE — 19294 PREPJ TUM CAV IORT PRTL MAST: CPT | Performed by: SURGERY

## 2021-10-29 PROCEDURE — 2580000003 HC RX 258: Performed by: SURGERY

## 2021-10-29 PROCEDURE — 6360000002 HC RX W HCPCS: Performed by: NURSE ANESTHETIST, CERTIFIED REGISTERED

## 2021-10-29 PROCEDURE — 7100000001 HC PACU RECOVERY - ADDTL 15 MIN: Performed by: SURGERY

## 2021-10-29 PROCEDURE — 3600000015 HC SURGERY LEVEL 5 ADDTL 15MIN: Performed by: SURGERY

## 2021-10-29 DEVICE — THE MARKER IS A RADIOGRAPHIC IMPLANTABLE MARKER USED TO MARK SOFT TISSUE.IT IS COMPRISED OF A BIOABSORBABLE SPACER THAT HOLDS RADIOPAQUE MARKER CLIPS.
Type: IMPLANTABLE DEVICE | Site: BREAST | Status: FUNCTIONAL
Brand: BIOZORB MARKER

## 2021-10-29 RX ORDER — DIPHENHYDRAMINE HYDROCHLORIDE 50 MG/ML
INJECTION INTRAMUSCULAR; INTRAVENOUS PRN
Status: DISCONTINUED | OUTPATIENT
Start: 2021-10-29 | End: 2021-10-29 | Stop reason: SDUPTHER

## 2021-10-29 RX ORDER — SODIUM CHLORIDE, SODIUM LACTATE, POTASSIUM CHLORIDE, CALCIUM CHLORIDE 600; 310; 30; 20 MG/100ML; MG/100ML; MG/100ML; MG/100ML
INJECTION, SOLUTION INTRAVENOUS CONTINUOUS
Status: DISCONTINUED | OUTPATIENT
Start: 2021-10-29 | End: 2021-10-29 | Stop reason: HOSPADM

## 2021-10-29 RX ORDER — SODIUM CHLORIDE 0.9 % (FLUSH) 0.9 %
5-40 SYRINGE (ML) INJECTION EVERY 12 HOURS SCHEDULED
Status: DISCONTINUED | OUTPATIENT
Start: 2021-10-29 | End: 2021-10-29 | Stop reason: HOSPADM

## 2021-10-29 RX ORDER — IPRATROPIUM BROMIDE AND ALBUTEROL SULFATE 2.5; .5 MG/3ML; MG/3ML
SOLUTION RESPIRATORY (INHALATION)
Status: DISCONTINUED
Start: 2021-10-29 | End: 2021-10-29 | Stop reason: HOSPADM

## 2021-10-29 RX ORDER — SODIUM CHLORIDE 9 MG/ML
25 INJECTION, SOLUTION INTRAVENOUS PRN
Status: DISCONTINUED | OUTPATIENT
Start: 2021-10-29 | End: 2021-10-29 | Stop reason: HOSPADM

## 2021-10-29 RX ORDER — IPRATROPIUM BROMIDE AND ALBUTEROL SULFATE 2.5; .5 MG/3ML; MG/3ML
1 SOLUTION RESPIRATORY (INHALATION) ONCE
Status: COMPLETED | OUTPATIENT
Start: 2021-10-29 | End: 2021-10-29

## 2021-10-29 RX ORDER — ACETAMINOPHEN 500 MG
1000 TABLET ORAL ONCE
Status: DISCONTINUED | OUTPATIENT
Start: 2021-10-29 | End: 2021-10-29

## 2021-10-29 RX ORDER — METOCLOPRAMIDE HYDROCHLORIDE 5 MG/ML
10 INJECTION INTRAMUSCULAR; INTRAVENOUS
Status: DISCONTINUED | OUTPATIENT
Start: 2021-10-29 | End: 2021-10-29 | Stop reason: HOSPADM

## 2021-10-29 RX ORDER — MORPHINE SULFATE 4 MG/ML
4 INJECTION, SOLUTION INTRAMUSCULAR; INTRAVENOUS
Status: DISCONTINUED | OUTPATIENT
Start: 2021-10-29 | End: 2021-10-29 | Stop reason: HOSPADM

## 2021-10-29 RX ORDER — GABAPENTIN 300 MG/1
300 CAPSULE ORAL ONCE
Status: COMPLETED | OUTPATIENT
Start: 2021-10-29 | End: 2021-10-29

## 2021-10-29 RX ORDER — ACETAMINOPHEN 325 MG/1
975 TABLET ORAL ONCE
Status: COMPLETED | OUTPATIENT
Start: 2021-10-29 | End: 2021-10-29

## 2021-10-29 RX ORDER — HYDROMORPHONE HYDROCHLORIDE 1 MG/ML
0.25 INJECTION, SOLUTION INTRAMUSCULAR; INTRAVENOUS; SUBCUTANEOUS EVERY 5 MIN PRN
Status: DISCONTINUED | OUTPATIENT
Start: 2021-10-29 | End: 2021-10-29 | Stop reason: HOSPADM

## 2021-10-29 RX ORDER — CELECOXIB 200 MG/1
200 CAPSULE ORAL ONCE
Status: COMPLETED | OUTPATIENT
Start: 2021-10-29 | End: 2021-10-29

## 2021-10-29 RX ORDER — ONDANSETRON 2 MG/ML
INJECTION INTRAMUSCULAR; INTRAVENOUS PRN
Status: DISCONTINUED | OUTPATIENT
Start: 2021-10-29 | End: 2021-10-29 | Stop reason: SDUPTHER

## 2021-10-29 RX ORDER — FUROSEMIDE 10 MG/ML
20 INJECTION INTRAMUSCULAR; INTRAVENOUS ONCE
Status: COMPLETED | OUTPATIENT
Start: 2021-10-29 | End: 2021-10-29

## 2021-10-29 RX ORDER — PROPOFOL 10 MG/ML
INJECTION, EMULSION INTRAVENOUS CONTINUOUS PRN
Status: DISCONTINUED | OUTPATIENT
Start: 2021-10-29 | End: 2021-10-29 | Stop reason: SDUPTHER

## 2021-10-29 RX ORDER — LABETALOL HYDROCHLORIDE 5 MG/ML
5 INJECTION, SOLUTION INTRAVENOUS EVERY 10 MIN PRN
Status: DISCONTINUED | OUTPATIENT
Start: 2021-10-29 | End: 2021-10-29 | Stop reason: HOSPADM

## 2021-10-29 RX ORDER — SCOLOPAMINE TRANSDERMAL SYSTEM 1 MG/1
1 PATCH, EXTENDED RELEASE TRANSDERMAL ONCE
Status: DISCONTINUED | OUTPATIENT
Start: 2021-10-29 | End: 2021-10-29 | Stop reason: HOSPADM

## 2021-10-29 RX ORDER — ALBUTEROL SULFATE 2.5 MG/3ML
2.5 SOLUTION RESPIRATORY (INHALATION) EVERY 6 HOURS PRN
Status: DISCONTINUED | OUTPATIENT
Start: 2021-10-29 | End: 2021-10-29 | Stop reason: HOSPADM

## 2021-10-29 RX ORDER — MORPHINE SULFATE 4 MG/ML
4 INJECTION, SOLUTION INTRAMUSCULAR; INTRAVENOUS EVERY 5 MIN PRN
Status: DISCONTINUED | OUTPATIENT
Start: 2021-10-29 | End: 2021-10-29 | Stop reason: HOSPADM

## 2021-10-29 RX ORDER — PROMETHAZINE HYDROCHLORIDE 25 MG/ML
6.25 INJECTION, SOLUTION INTRAMUSCULAR; INTRAVENOUS
Status: DISCONTINUED | OUTPATIENT
Start: 2021-10-29 | End: 2021-10-29 | Stop reason: HOSPADM

## 2021-10-29 RX ORDER — FENTANYL CITRATE 50 UG/ML
INJECTION, SOLUTION INTRAMUSCULAR; INTRAVENOUS PRN
Status: DISCONTINUED | OUTPATIENT
Start: 2021-10-29 | End: 2021-10-29 | Stop reason: SDUPTHER

## 2021-10-29 RX ORDER — LIDOCAINE HYDROCHLORIDE 10 MG/ML
INJECTION, SOLUTION INFILTRATION; PERINEURAL PRN
Status: DISCONTINUED | OUTPATIENT
Start: 2021-10-29 | End: 2021-10-29 | Stop reason: SDUPTHER

## 2021-10-29 RX ORDER — HYDRALAZINE HYDROCHLORIDE 20 MG/ML
5 INJECTION INTRAMUSCULAR; INTRAVENOUS EVERY 10 MIN PRN
Status: DISCONTINUED | OUTPATIENT
Start: 2021-10-29 | End: 2021-10-29 | Stop reason: HOSPADM

## 2021-10-29 RX ORDER — HYDROCODONE BITARTRATE AND ACETAMINOPHEN 5; 325 MG/1; MG/1
1 TABLET ORAL
Status: DISCONTINUED | OUTPATIENT
Start: 2021-10-29 | End: 2021-10-29 | Stop reason: HOSPADM

## 2021-10-29 RX ORDER — MEPERIDINE HYDROCHLORIDE 25 MG/ML
12.5 INJECTION INTRAMUSCULAR; INTRAVENOUS; SUBCUTANEOUS EVERY 5 MIN PRN
Status: DISCONTINUED | OUTPATIENT
Start: 2021-10-29 | End: 2021-10-29 | Stop reason: HOSPADM

## 2021-10-29 RX ORDER — ISOSULFAN BLUE 50 MG/5ML
INJECTION, SOLUTION SUBCUTANEOUS PRN
Status: DISCONTINUED | OUTPATIENT
Start: 2021-10-29 | End: 2021-10-29 | Stop reason: ALTCHOICE

## 2021-10-29 RX ORDER — MIDAZOLAM HYDROCHLORIDE 1 MG/ML
2 INJECTION INTRAMUSCULAR; INTRAVENOUS
Status: DISCONTINUED | OUTPATIENT
Start: 2021-10-29 | End: 2021-10-29 | Stop reason: HOSPADM

## 2021-10-29 RX ORDER — HYDROCODONE BITARTRATE AND ACETAMINOPHEN 5; 325 MG/1; MG/1
1 TABLET ORAL EVERY 6 HOURS PRN
Qty: 12 TABLET | Refills: 0 | Status: SHIPPED | OUTPATIENT
Start: 2021-10-29 | End: 2022-04-07

## 2021-10-29 RX ORDER — FUROSEMIDE 10 MG/ML
INJECTION INTRAMUSCULAR; INTRAVENOUS
Status: COMPLETED
Start: 2021-10-29 | End: 2021-10-29

## 2021-10-29 RX ORDER — LIDOCAINE HYDROCHLORIDE 10 MG/ML
1 INJECTION, SOLUTION EPIDURAL; INFILTRATION; INTRACAUDAL; PERINEURAL
Status: DISCONTINUED | OUTPATIENT
Start: 2021-10-29 | End: 2021-10-29 | Stop reason: HOSPADM

## 2021-10-29 RX ORDER — HYDROMORPHONE HYDROCHLORIDE 1 MG/ML
0.5 INJECTION, SOLUTION INTRAMUSCULAR; INTRAVENOUS; SUBCUTANEOUS EVERY 5 MIN PRN
Status: DISCONTINUED | OUTPATIENT
Start: 2021-10-29 | End: 2021-10-29 | Stop reason: HOSPADM

## 2021-10-29 RX ORDER — SODIUM CHLORIDE 0.9 % (FLUSH) 0.9 %
5-40 SYRINGE (ML) INJECTION PRN
Status: DISCONTINUED | OUTPATIENT
Start: 2021-10-29 | End: 2021-10-29 | Stop reason: HOSPADM

## 2021-10-29 RX ORDER — FENTANYL CITRATE 50 UG/ML
50 INJECTION, SOLUTION INTRAMUSCULAR; INTRAVENOUS
Status: DISCONTINUED | OUTPATIENT
Start: 2021-10-29 | End: 2021-10-29 | Stop reason: HOSPADM

## 2021-10-29 RX ORDER — CEPHALEXIN 500 MG/1
500 CAPSULE ORAL 3 TIMES DAILY
Qty: 30 CAPSULE | Refills: 0 | Status: SHIPPED | OUTPATIENT
Start: 2021-10-29 | End: 2021-11-08

## 2021-10-29 RX ORDER — DIPHENHYDRAMINE HYDROCHLORIDE 50 MG/ML
12.5 INJECTION INTRAMUSCULAR; INTRAVENOUS
Status: DISCONTINUED | OUTPATIENT
Start: 2021-10-29 | End: 2021-10-29 | Stop reason: HOSPADM

## 2021-10-29 RX ORDER — MORPHINE SULFATE 2 MG/ML
2 INJECTION, SOLUTION INTRAMUSCULAR; INTRAVENOUS EVERY 5 MIN PRN
Status: DISCONTINUED | OUTPATIENT
Start: 2021-10-29 | End: 2021-10-29 | Stop reason: HOSPADM

## 2021-10-29 RX ADMIN — FENTANYL CITRATE 25 MCG: 50 INJECTION, SOLUTION INTRAMUSCULAR; INTRAVENOUS at 10:40

## 2021-10-29 RX ADMIN — DIPHENHYDRAMINE HYDROCHLORIDE 50 MG: 50 INJECTION, SOLUTION INTRAMUSCULAR; INTRAVENOUS at 08:34

## 2021-10-29 RX ADMIN — Medication 2000 MG: at 08:34

## 2021-10-29 RX ADMIN — LIDOCAINE HYDROCHLORIDE 50 MG: 10 INJECTION, SOLUTION INFILTRATION; PERINEURAL at 08:33

## 2021-10-29 RX ADMIN — HYDRALAZINE HYDROCHLORIDE 5 MG: 20 INJECTION INTRAMUSCULAR; INTRAVENOUS at 12:01

## 2021-10-29 RX ADMIN — ALBUTEROL SULFATE 2.5 MG: 2.5 SOLUTION RESPIRATORY (INHALATION) at 06:57

## 2021-10-29 RX ADMIN — FUROSEMIDE 20 MG: 10 INJECTION INTRAMUSCULAR; INTRAVENOUS at 12:24

## 2021-10-29 RX ADMIN — ONDANSETRON 4 MG: 2 INJECTION INTRAMUSCULAR; INTRAVENOUS at 10:41

## 2021-10-29 RX ADMIN — PROPOFOL 120 MCG/KG/MIN: 10 INJECTION, EMULSION INTRAVENOUS at 08:33

## 2021-10-29 RX ADMIN — TILMANOCEPT 0.5 MILLICURIE: KIT at 08:52

## 2021-10-29 RX ADMIN — CELECOXIB 200 MG: 200 CAPSULE ORAL at 07:06

## 2021-10-29 RX ADMIN — SODIUM CHLORIDE, SODIUM LACTATE, POTASSIUM CHLORIDE, AND CALCIUM CHLORIDE: 600; 310; 30; 20 INJECTION, SOLUTION INTRAVENOUS at 06:38

## 2021-10-29 RX ADMIN — FUROSEMIDE 20 MG: 10 INJECTION, SOLUTION INTRAMUSCULAR; INTRAVENOUS at 12:24

## 2021-10-29 RX ADMIN — FENTANYL CITRATE 25 MCG: 50 INJECTION, SOLUTION INTRAMUSCULAR; INTRAVENOUS at 09:06

## 2021-10-29 RX ADMIN — FENTANYL CITRATE 50 MCG: 50 INJECTION, SOLUTION INTRAMUSCULAR; INTRAVENOUS at 08:33

## 2021-10-29 RX ADMIN — IPRATROPIUM BROMIDE AND ALBUTEROL SULFATE 1 AMPULE: .5; 2.5 SOLUTION RESPIRATORY (INHALATION) at 11:48

## 2021-10-29 RX ADMIN — GABAPENTIN 300 MG: 300 CAPSULE ORAL at 07:07

## 2021-10-29 RX ADMIN — FAMOTIDINE 20 MG: 10 INJECTION, SOLUTION INTRAVENOUS at 08:33

## 2021-10-29 RX ADMIN — SODIUM CHLORIDE, SODIUM LACTATE, POTASSIUM CHLORIDE, AND CALCIUM CHLORIDE: 600; 310; 30; 20 INJECTION, SOLUTION INTRAVENOUS at 10:30

## 2021-10-29 RX ADMIN — ACETAMINOPHEN 975 MG: 325 TABLET ORAL at 07:07

## 2021-10-29 ASSESSMENT — PAIN SCALES - GENERAL
PAINLEVEL_OUTOF10: 0

## 2021-10-29 ASSESSMENT — LIFESTYLE VARIABLES: SMOKING_STATUS: 0

## 2021-10-29 ASSESSMENT — ENCOUNTER SYMPTOMS: SHORTNESS OF BREATH: 0

## 2021-10-29 NOTE — H&P
HISTORY OF PRESENT ILLNESS:     Ms. Kandi Locke  is recently status post ultrasound guided breast biopsy on the right which revealed a 2.6 cm high grade infiltrating ductal carcinoma. ER is positive at 96%. ID is positive at 94%. Her2 is Equivocal. Ki67 is low at 6%.  Fish is negative.     Mammaprint is low risk luminal A type.     MRI was not performed     Sven Bravo is a 66 y.o. female with the following history as recorded in [facility]:  Patient Active Problem List    Diagnosis Date Noted    Malignant neoplasm of overlapping sites of right breast in female, estrogen receptor positive ([address] 09/08/2021    Retinal neovascularization 06/25/2021    Chest pain 06/21/2021    Chronic kidney disease, stage 3b ([address] 06/21/2021    History of coronary artery bypass graft 06/21/2021    Carpal tunnel syndrome 09/02/2020    Achilles tendinitis 04/29/2019    Bursitis of right foot 04/29/2019    Contracture of Achilles tendon 04/29/2019    Foot pain 04/29/2019    Posterior calcaneal exostosis 04/29/2019    Diabetic macular edema ([address] 04/15/2019    Nonproliferative diabetic retinopathy ([address] 04/15/2019    CHAPARRO (acute kidney injury) ([address] 05/18/2018    Hypertension 05/18/2018    Hyperlipidemia 05/18/2018    Ocular hypertension 05/18/2018    CAD (coronary artery disease) 05/18/2018    Type 2 diabetes mellitus ([address] 05/18/2018    Diabetes mellitus ([address] 05/18/2018    Acute cystitis without hematuria 05/18/2018     Current Facility-Administered Medications   Medication Dose Route Frequency Provider Last Rate Last Admin    lactated ringers infusion   IntraVENous Continuous k MD Bertha 100 mL/hr at 10/29/21 0638 New Bag at 10/29/21 1396    albuterol (PROVENTIL) nebulizer solution 2.5 mg  2.5 mg Nebulization Q6H PRN Helen Hardy MD   2.5 mg at 10/29/21 0657    meperidine (DEMEROL) injection 12.5 mg  12.5 mg IntraVENous Q5 Min PRN Helen Hardy MD        morphine (PF) injection 2 mg  2 mg IntraVENous Q5 Min PRN Herman Nash MD        morphine injection 4 mg  4 mg IntraVENous Q5 Min PRN Herman Nash MD        HYDROmorphone HCl PF (DILAUDID) injection 0.25 mg  0.25 mg IntraVENous Q5 Min PRN Herman Nash MD        HYDROmorphone HCl PF (DILAUDID) injection 0.5 mg  0.5 mg IntraVENous Q5 Min PRN Herman Nash MD        promethazine [facility]) injection 6.25 mg  6.25 mg IntraVENous Once PRN Herman aNsh MD        metoclopramide [facility]) injection 10 mg  10 mg IntraVENous Once PRN Herman Nash MD        diphenhydrAMINE (BENADRYL) injection 12.5 mg  12.5 mg IntraVENous Once PRN Herman Nash MD        labetalol (NORMODYNE;TRANDATE) injection 5 mg  5 mg IntraVENous Q10 Min PRN Herman Nash MD        hydrALAZINE (APRESOLINE) injection 5 mg  5 mg IntraVENous Q10 Min PRN Herman Nash MD        morphine injection 4 mg  4 mg IntraVENous Once PRN Herman Nash MD        fentaNYL (SUBLIMAZE) injection 50 mcg  50 mcg IntraVENous Once PRN Herman Nash MD        lactated ringers infusion   IntraVENous Continuous Herman Nash MD        sodium chloride flush 0.9 % injection 5-40 mL  5-40 mL IntraVENous 2 times per day Herman Nash MD        sodium chloride flush 0.9 % injection 5-40 mL  5-40 mL IntraVENous PRN Herman Nash MD        0.9 % sodium chloride infusion  25 mL IntraVENous PRN Herman Nash MD        famotidine (PEPCID) injection 20 mg  20 mg IntraVENous Once Herman Nash MD        scopolamine (TRANSDERM-SCOP) transdermal patch 1 patch  1 patch TransDERmal Once Herman Nash MD        lidocaine PF 1 % injection 1 mL  1 mL IntraDERmal Once PRN Herman Nash MD        midazolam (VERSED) injection 2 mg  2 mg IntraVENous Once PRN Herman Nash MD         Facility-Administered Medications Ordered in Other Encounters   Medication Dose Route Frequency Provider Last Rate Last Admin    technetium Tc 99m tilmanocept ([address]) injection 0.5 millicurie  278 micro curie IntraDERmal ONCE PRN Miguel Sullivan MD         Allergies: Medrol [methylprednisolone]  Past Medical History:   Diagnosis Date    Acute sinusitis     [facility] (acute [facility]) [facility] 05/18/2018    Anemia     Bleeding ulcer     hx of; cauterized by dr. Ju Hobbs    Breast lump     CAD (coronary artery disease)     sees dr. Herberth Landeros ulcer ([facility]     Cataract     COVID-19 01/2021    fatigue    Dermatitis     Diabetes mellitus ([facility]     Diabetic nephropathy ([facility]     Diabetic retinopathy ([facility]     Glaucoma     Gout     Hemorrhoids     Hx of TB skin testing     positive    Hyperlipidemia     Hypertension     Palliative care patient 06/24/2021    Type 2 diabetes mellitus ([facility] 05/18/2018    Vitamin D deficiency      Past Surgical History:   Procedure Laterality Date    CARDIAC SURGERY  2001    CARPAL TUNNEL RELEASE Right 9/3/2020    RIGHT CARPAL TUNNEL RELEASE performed by Carmen Garcia MD at [address] [place] [address] COLONOSCOPY N/A 10/11/2019    Dr Moriah Maynard of a hemostatic clip-Suboptimal prep, diverticulosis, internal hemorrhoids-Grade 2, AP x 3, 1 yr recall     COLONOSCOPY N/A 10/12/2020    Dr Jennifer Rogel hemorrhoids-Grade 1-2, AP, 3 yr recall    CORONARY ARTERY BYPASS GRAFT  2001    ENDOSCOPY, COLON, DIAGNOSTIC      EYE SURGERY      MOUTH SURGERY      OTHER SURGICAL HISTORY      sternal resection    SKIN BIOPSY      UPPER GASTROINTESTINAL ENDOSCOPY N/A 10/11/2019    Dr Lady Kwon (+) H Pylori    US BREAST NEEDLE BIOPSY LEFT Left 9/1/2021    US BREAST NEEDLE BIOPSY LEFT 9/1/2021 LPS GENERAL SURGERY    VASCULAR SURGERY       Family History   Problem Relation Age of Onset    Cancer Mother     Diabetes Father     COPD Father     Diabetes Sister     Heart Disease Sister     Colon Polyps Sister     Coronary Art Dis Other         grandfather    Diabetes Other aunt    Colon Cancer Neg Hx     Liver Cancer Neg Hx     Rectal Cancer Neg Hx      Social History     Tobacco Use    Smoking status: Never Smoker    Smokeless tobacco: Never Used   Substance Use Topics    Alcohol use: Yes     Alcohol/week: 1.0 standard drinks     Types: 1 Shots of liquor per week     Comment: rare       ROS:  14 point review of systems is negative except for the above. PHYSICAL EXAM:    The patient is a 66 y.o. female  in no acute distress. She is alert oriented and cooperative. Mood and affect are appropriate. Skin is warm and dry without rashes. BP (!) 154/71   Pulse 83   Temp 97.9 °F (36.6 °C) (Temporal)   Resp 20   Ht 5' 6\" (1.676 m)   Wt 214 lb 0.7 oz (97.1 kg)   SpO2 (!) 89%   BMI 34.55 kg/m²       HEENT: Normocephalic and atraumatic. EOMs intact. Pupils equal and round and reactive to light and accommodation. External ears and nose are normal.  Sclera nonicteric. Conjunctiva normal  Oropharynx without masses or lesions. Neck: Neck is supple without masses or thyromegaly    Chest: Lungs are clear to auscultation. Respiratory effort normal    Cardiac: Regular rate and rhythm without rubs, murmurs, or gallops    Breasts:Chest:      Breasts:         Right: Mass and skin change present. No inverted nipple. Left: No inverted nipple, mass or skin change. No axillary adenopathy    Abdomen: The abdomen is soft and nontender with no hepatosplenomegaly. There are no abdominal hernias noted. Extremities: The extremities are normal. There are no signs of clubbing, cyanosis, or edema. IMPRESSION:Right breast cancer      DISCUSSION:  I had a lengthy discussion with Ms. Kimberli Lubin  and her family about the ramifications of the diagnosis of breast cancer. We discussed the pathophysiology of cancer in general and also the ways in which surgery, radiation therapy, and chemotherapy are utilized in the treatment of different types of cancers.   We also explained how these modalities related to her situation in particular. We discussed the pathophysiology of breast cancer and some length, including what is known about the causes of breast cancer, its relationship to fibrocystic disease, its relationship to hormone replacement therapy, and some of the genetic aspects involved in familial breast cancers. We discussed the BrCa genetic analysis and why it is appropriate for her. We discussed breast MRI and how it assists in evaluation of breast cancers and the results of her MRI if done.       We discussed the surgical options including simple mastectomy and lumpectomy with  sentinel lymph node biopsy as well as the possibility of axillary lymph node dissection. We explained in depth why breast conservation therapy requires radiation treatments for the majority of women. These treatments may be external beam for 6 weeks, partial breast for 5 days,  or intraoperative. I explained that most women treated for invasive malignancy do receive systemic therapy, hormonal therapy or  chemotherapy postoperatively depending upon the final pathology, the lymph node status, and the hormone receptor status. I discussed Oncotype Dx, Mammoprint, and Adjuvant Online as tools which aid in the decision for chemotherapy or hormonal therapy. We also discussed the possibility of breast reconstruction if a mastectomy was required. .  I explained to her the different techniques including placement of a subpectoral implant with a Alloderm sling  versus TRAM flap reconstruction as welll as other methods of reconstruction.   She does not wish to pursue reconstruction at this time.         After a prolonged discussion lasting 60 minutes  we felt it was most appropriate that she undergo right lumpectomy, and sentinel node biopsy, with preop ultrasound and and intraop ultrasound guided needle localization with IORT  We started Singulair and letrozole       We discussed the risks and benefits of the surgery including but not limited to bleeding, infection, pain, lymphedema, numbness, and also the risks of general anesthesia including pneumonia, blood clots, heart attack, stroke, and death.   She expresses good understanding and is agreeable to proceed with surgery.       We will schedule this to be done when convenient  at [facility].

## 2021-10-29 NOTE — ANESTHESIA PRE PROCEDURE
Department of Anesthesiology  Preprocedure Note       Name:  Owen Carney   Age:  66 y.o.  :  1950                                          MRN:  [id number]         Date:  10/29/2021      Surgeon: Liza Funez):  MD Tess Cardenas MD    Procedure: Procedure(s):  RIGHT LUMPECTOMY, SNB, PREOP ULTRASOUND, INTRAOP ULTRASOUND GUIDED NL, BIOSORB, PEC BLOCK, MARGIN PROBE, FLAPS, IORT    Medications prior to admission:   Prior to Admission medications    Medication Sig Start Date End Date Taking? Authorizing Provider   glipiZIDE (GLUCOTROL XL) 2.5 MG extended release tablet Take 1 tablet by mouth daily 10/15/21  Yes Susie Jung MD   mupirocin (BACTROBAN) 2 % ointment Apply to each nostril BID 5 days prior to surgery 21  Yes Smith Hunter MD   letrozole [facility]) 2.5 MG tablet Take 1 tablet by mouth daily 21  Yes Smith Hunter MD   montelukast (SINGULAIR) 10 MG tablet Take 1 tablet by mouth daily 21  Yes Smith Hunter MD   simvastatin (ZOCOR) 40 MG tablet TAKE 1 TABLET EVERY EVENING  Patient taking differently: Take 40 mg by mouth nightly  21  Yes Susie Jung MD   metoprolol tartrate (LOPRESSOR) 25 MG tablet Take 1 tablet by mouth 2 times daily 21  Yes Susie Jung MD   isosorbide mononitrate (IMDUR) 30 MG extended release tablet Take 1 tablet by mouth 2 times daily 21  Yes Susie Jung MD   Blood Glucose Calibration (ACCU-CHEK ROSA) SOLN Calibration done daily.  21  Yes Susie Jung MD   Blood Glucose Monitoring Suppl (ACCU-CHEK ROSA) ARMANDO Use 4 times a day to check BS. 21  Yes Susie Jung MD   blood glucose test strips (ACCU-CHEK ROSA PLUS) strip TEST BLOOD SUGAR FOUR TIMES DAILY E11.9 21  Yes Susie Jung MD   Accu-Chek Softclix Lancets MISC TEST FOUR TIMES DAILY E11.9 21  Yes Susie Jung MD   lisinopril (PRINIVIL;ZESTRIL) 5 MG tablet Take 1 tablet by mouth daily 21  Yes Sonali Fabian MD metFORMIN (GLUCOPHAGE) 500 MG tablet TAKE 2 TABLETS TWICE DAILY WITH MEALS  Patient taking differently: Take 1,000 mg by mouth 2 times daily (with meals)  7/7/21  Yes Mulu Chanel MD   amLODIPine (NORVASC) 5 MG tablet Take 1 tablet by mouth daily 6/15/21  Yes Mulu Chanel MD   Alcohol Swabs (B-D SINGLE USE SWABS REGULAR) PADS USE THREE TIMES DAILY 6/27/18  Yes Mulu Chanel MD   aspirin 81 MG tablet Take 81 mg by mouth daily   Yes Historical Provider, MD   Omega-3 Fatty Acids (FISH OIL) 1000 MG CAPS Take 1,000 mg by mouth 2 times daily   Yes Historical Provider, MD   MULTIPLE VITAMIN PO Take 1 tablet by mouth daily   Yes Historical Provider, MD   Calcium Carbonate-Vitamin D (CALCIUM 500/D PO) Take 1 tablet by mouth 2 times daily   Yes Historical Provider, MD   vitamin B-12 (CYANOCOBALAMIN) 1000 MCG tablet Take 1,000 mcg by mouth daily   Yes Historical Provider, MD   timolol (TIMOPTIC) 0.5 % ophthalmic solution Place 1 drop into the right eye daily  9/8/17  Yes Historical Provider, MD   furosemide (LASIX) 40 MG tablet Take 1 tablet by mouth daily as needed (excess fluid) 6/24/21   Gee Pastor MD       Current medications:    Current Facility-Administered Medications   Medication Dose Route Frequency Provider Last Rate Last Admin    lactated ringers infusion   IntraVENous Continuous Lily MD Lori 100 mL/hr at 10/29/21 0638 New Bag at 10/29/21 4008    acetaminophen (TYLENOL) tablet 1,000 mg  1,000 mg Oral Once Lynn Camp MD        celecoxib (CELEBREX) capsule 200 mg  200 mg Oral Once Lynn Camp MD        gabapentin (NEURONTIN) capsule 300 mg  300 mg Oral Once Lynn Camp MD           Allergies:     Allergies   Allergen Reactions    Medrol [Methylprednisolone] Other (See Comments)     Affects her eyes       Problem List:    Patient Active Problem List   Diagnosis Code    CHAPARRO (acute kidney injury) ([facility] N17.9    Hypertension I10    Hyperlipidemia E78.5    Ocular hypertension H40.059    CAD (coronary artery disease) I25.10    Type 2 diabetes mellitus (HCC) E11.9    Diabetes mellitus ([address] E11.9    Acute cystitis without hematuria N30.00    Carpal tunnel syndrome G56.00    Chest pain R07.9    Chronic kidney disease, stage 3b (HCC) N18.32    History of coronary artery bypass graft Z95.1    Achilles tendinitis M76.60    Bursitis of right foot M77.51    Contracture of Achilles tendon M67.00    Diabetic macular edema (HCC) E11.311    Foot pain M79.673    Nonproliferative diabetic retinopathy (HCC) E11.3299    Posterior calcaneal exostosis M77.30    Retinal neovascularization H35.059    Malignant neoplasm of overlapping sites of right breast in female, estrogen receptor positive ([address] C50.811, Z17.0       Past Medical History:        Diagnosis Date    Acute sinusitis     CHAPARRO (acute kidney injury) ([address] 05/18/2018    Anemia     Bleeding ulcer     hx of; cauterized by dr. resendiz    Breast lump     CAD (coronary artery disease)     sees dr. Herberth Landeros ulcer ([address]     Cataract     COVID-19 01/2021    fatigue    Dermatitis     Diabetes mellitus ([address]     Diabetic nephropathy ([address]     Diabetic retinopathy ([address]     Glaucoma     Gout     Hemorrhoids     Hx of TB skin testing     positive    Hyperlipidemia     Hypertension     Palliative care patient 06/24/2021    Type 2 diabetes mellitus ([address] 05/18/2018    Vitamin D deficiency        Past Surgical History:        Procedure Laterality Date    CARDIAC SURGERY  2001    CARPAL TUNNEL RELEASE Right 9/3/2020    RIGHT CARPAL TUNNEL [facility]LEASE [facility]      COLONOSCOPY N/A 10/11/2019    Dr Moriah Maynard of a hemostatic clip-Suboptimal prep, diverticulosis, internal hemorrhoids-Grade 2, AP x 3, 1 yr recall     COLONOSCOPY N/A 10/12/2020    Dr Jennifer Rogel hemorrhoids-Grade 1-2, AP, 3 yr recall   [facility] CORONARY ARTERY BYPASS GRAFT  2001    ENDOSCOPY, COLON, DIAGNOSTIC      EYE SURGERY      MOUTH SURGERY      OTHER SURGICAL HISTORY      sternal resection    SKIN BIOPSY      UPPER GASTROINTESTINAL ENDOSCOPY N/A 10/11/2019    Dr Harrell Dears (+) H Pylori    US BREAST NEEDLE BIOPSY LEFT Left 9/1/2021    US BREAST NEEDLE BIOPSY LEFT 9/1/2021 LPS GENERAL SURGERY    VASCULAR SURGERY         Social History:    Social History     Tobacco Use    Smoking status: Never Smoker    Smokeless tobacco: Never Used   Substance Use Topics    Alcohol use: Yes     Alcohol/week: 1.0 standard drinks     Types: 1 Shots of liquor per week     Comment: rare                                Counseling given: Not Answered      Vital Signs (Current):   Vitals:    10/29/21 0614 10/29/21 0625   BP: (!) 154/71    Pulse: 83    Resp: 20    Temp: 97.9 °F (36.6 °C)    TempSrc: Temporal    SpO2: (!) 89%    Weight: 214 lb (97.1 kg) 214 lb 0.7 oz (97.1 kg)   Height: 5' 6\" (1.676 m)                                               BP Readings from Last 3 Encounters:   10/29/21 (!) 154/71   10/04/21 124/68   08/26/21 130/85       NPO Status: Time of last liquid consumption: 2300                        Time of last solid consumption: 2300                        Date of last liquid consumption: 10/28/21                        Date of last solid food consumption: 10/28/21    BMI:   Wt Readings from Last 3 Encounters:   10/29/21 214 lb 0.7 oz (97.1 kg)   10/13/21 215 lb (97.5 kg)   10/04/21 222 lb (100.7 kg)     Body mass index is 34.55 kg/m².     CBC:   Lab Results   Component Value Date    WBC 5.6 10/13/2021    RBC 3.16 10/13/2021    HGB 9.9 10/13/2021    HCT 32.3 10/13/2021    .2 10/13/2021    RDW 14.6 10/13/2021     10/13/2021       CMP:   Lab Results   Component Value Date     10/13/2021    K 4.5 10/13/2021     10/13/2021    CO2 23 10/13/2021    BUN 21 10/13/2021    CREATININE 1.8 10/13/2021    GFRAA 33 10/13/2021 LABGLOM 28 10/13/2021    GLUCOSE 146 10/13/2021    PROT 7.2 07/30/2021    PROT 6.7 02/04/2013    CALCIUM 9.7 10/13/2021    BILITOT <0.2 07/30/2021    ALKPHOS 50 07/30/2021    AST 25 07/30/2021    ALT 18 07/30/2021       POC Tests:   Recent Labs     10/29/21  4913   POCGLU 113*       Coags:   Lab Results   Component Value Date    PROTIME 12.8 06/21/2021    INR 0.97 06/21/2021       HCG (If Applicable): No results found for: PREGTESTUR, PREGSERUM, HCG, HCGQUANT     ABGs: No results found for: PHART, PO2ART, OAM2GRN, LAN6CWV, BEART, Y5CJOUQA     Type & Screen (If Applicable):  No results found for: LABABO, LABRH    Drug/Infectious Status (If Applicable):  No results found for: HIV, HEPCAB    COVID-19 Screening (If Applicable):   Lab Results   Component Value Date    COVID19 Not Detected 10/28/2021    COVID19 DETECTED 12/31/2020           Anesthesia Evaluation  Patient summary reviewed and Nursing notes reviewed no history of anesthetic complications:   Airway: Mallampati: II  TM distance: >3 FB   Neck ROM: full  Mouth opening: > = 3 FB Dental:    (+) upper dentures  Comment: bootom gum loss    Pulmonary:Negative Pulmonary ROS and normal exam  breath sounds clear to auscultation      (-) shortness of breath and not a current smoker          Patient did not smoke on day of surgery. Cardiovascular:    (+) hypertension:, CAD:, CABG/stent:, CHF:,     (-)  angina    NYHA Classification: I  ECG reviewed  Rhythm: regular  Rate: normal           Beta Blocker:  Not on Beta Blocker      ROS comment: Summary   Left ventricular ejection fraction is visually estimated at 25-30%. Left ventricular size is moderately increased .    Severe global hypokinesis      Signature      ----------------------------------------------------------------   Electronically signed by Fredy Katz MD(Interpreting   physician) on 07/28/2021 07:52 AM   ----------------------------------------------------------------      Findings Left Ventricle   Left ventricular ejection fraction is visually estimated at 25-30%. Left ventricular size is moderately increased . Severe global hypokinesis     M-Mode Measurements (cm)        Neuro/Psych:   Negative Neuro/Psych ROS  (+) neuromuscular disease:,    (-) seizures, CVA and depression/anxiety            GI/Hepatic/Renal: Neg GI/Hepatic/Renal ROS  (+) PUD, renal disease: CRI, morbid obesity     (-) hiatal hernia and GERD       Endo/Other: Negative Endo/Other ROS   (+) DiabetesType II DM, , blood dyscrasia: anemia, arthritis: OA., malignancy/cancer. Pt had PAT visit. Abdominal:       Abdomen: soft. Vascular: Other Findings:           Anesthesia Plan      MAC and TIVA     ASA 3     (ISummary   Left ventricular ejection fraction is visually estimated at 25-30%. Left ventricular size is moderately increased . Severe global hypokinesis      Signature      ----------------------------------------------------------------   Electronically signed by Maty Banks MD(Interpreting   physician) on 07/28/2021 07:52 AM   ----------------------------------------------------------------      Findings      Left Ventricle   Left ventricular ejection fraction is visually estimated at 25-30%. Left ventricular size is moderately increased . Severe global hypokinesis     M-Mode Measurements (cm)     )  Induction: intravenous. BIS  MIPS: Postoperative opioids intended and Prophylactic antiemetics administered. Anesthetic plan and risks discussed with patient. Use of blood products discussed with patient whom. Plan discussed with CRNA.     Attending anesthesiologist reviewed and agrees with Pre Eval content            Tan Muñoz MD   10/29/2021

## 2021-10-29 NOTE — PROGRESS NOTES
Reviewed with patient and sister the s/sx of CHF, ex; weigh same time daily, call MD with weight gain of more than 3 pounds in 1 day, cough, lower extremity edema. Voiced understanding.   Electronically signed by Jose L England RN on 10/29/2021 at 2:26 PM

## 2021-10-29 NOTE — ANESTHESIA POSTPROCEDURE EVALUATION
Department of Anesthesiology  Postprocedure Note    Patient: Linda Aldana  MRN: [id number]  YOB: 1950  Date of evaluation: 10/29/2021  Time:  11:18 AM     Procedure Summary     Date: 10/29/21 Room / Location: [facility] OR [address] [place] [address]    Anesthesia Start: 6712 Anesthesia Stop:     Procedure: RIGHT LUMPECTOMY, SNB, PREOP ULTRASOUND, INTRAOP ULTRASOUND GUIDED NL, BIOSORB, PEC BLOCK, MARGIN PROBE, FLAPS, IORT (Right ) Diagnosis: (Y67.353)    Surgeons: Alexsander Rutledge MD Responsible Provider: SARA Aviles CRNA    Anesthesia Type: General ASA Status: 3          Anesthesia Type: General    Palmer Phase I: Palmer Score: 10    Palmer Phase II:      Last vitals: Reviewed and per EMR flowsheets. Anesthesia Post Evaluation    Patient location during evaluation: PACU  Patient participation: waiting for patient participation  Pain score: 0  Airway patency: patent  Nausea & Vomiting: no nausea and no vomiting  Complications: no  Cardiovascular status: blood pressure returned to baseline  Respiratory status: oral airway  Hydration status: euvolemic  Comments: Pt transported with oxygen.   Report to RN

## 2021-10-29 NOTE — BRIEF OP NOTE
Brief Postoperative Note      DATE OF PROCEDURE: 10/29/2021     SURGEON: Jenn Cisneros MD    PREOPERATIVE DIAGNOSIS:  C50.811    POSTOPERATIVE DIAGNOSIS: Same     OPERATION: Procedure(s):  RIGHT LUMPECTOMY, SNB, PREOP ULTRASOUND, INTRAOP ULTRASOUND GUIDED NL, BIOSORB, PEC BLOCK, MARGIN PROBE, FLAPS, IORT    ANESTHESIA: Monitor Anesthesia Care    ESTIMATED BLOOD LOSS: Minimal    COMPLICATIONS: None. SPECIMENS:   ID Type Source Tests Collected by Time Destination   A : sentinel node biopsy Tissue Breast SURGICAL PATHOLOGY Jenn Cisneros MD 10/29/2021 6883    B : right breast tissue Tissue Breast SURGICAL PATHOLOGY Jenn Cisneros MD 10/29/2021 0911    C : right breast tissue additonal cranial margins Tissue Breast SURGICAL PATHOLOGY Jenn Cisneros MD 10/29/2021 0911    D : right breast tissue addtional caudal margins Tissue Breast SURGICAL PATHOLOGY Jenn Cisneros MD 10/29/2021 5066    E : right breast tissue addtional deep margins Tissue Breast SURGICAL PATHOLOGY Jenn Cisenros MD 10/29/2021 4464        DRAINS: None    The patient tolerated the procedure well.     Electronically signed by Jenn Cisneros MD  on 10/29/2021 at 10:48 AM

## 2021-10-29 NOTE — PROGRESS NOTES
Dr. Frank Echeverria called to bedside to update. Patient wheezing bilateral upper lobes. Orders received and carried out.     1220 Dr. Frank Echeverria called and updated, orders carried out.

## 2021-10-29 NOTE — PROGRESS NOTES
1250 Dr. Sunny Romo updated, chest xray ordered. Ok per MD to \[facility]\"". Vitals as charted.

## 2021-10-29 NOTE — OP NOTE
Operative Note        Radiation Oncology      Patient's Name/Date of Birth: Sven Bravo / 1950 (74 y.o.)    DATE OF OPERATION:    10/29/2021    SURGEON:    Idris Castillo MD  Phone Number: [phone]    OTHER SURGEONS:      Surgeon(s):  MD Idris Santamaria MD Clem Quince, MD    PREOPERATIVE DIAGNOSIS:    X71.212    POSTOPERATIVE DIAGNOSIS:    C50.811    BRIEF HISTORY:    Ms. Kandi Locke is a 66 y.o. female with a history abnormal mammogram and targeted ultrasound 8/17/2021 showing a 2.5 cm irregularly-shaped lobulated mass in the right lower breast near the 5 o'clock position. A 0.8 cm oval circumscribed mass was seen in the upper outer quadrant of the left breast near the 2 o'clock position.     Ultrasound-guided biopsy of the right breast 6:00 lesion obtained 9/1/2021 showed infiltrating ductal carcinoma, favor grade 3 measuring 1.5 cm in greatest linear dimension. ER and IN were both strongly positive at 96% and 94%, respectively. HER-2 was equivocal by IHC and negative by FISH. Ki-67 was low at 6%.     Various treatment options have been discussed with the patient by Dr. Ursula Rosales and she has elected to proceed with right breast lumpectomy with consideration of intraoperative radiation therapy.     Intraoperative radiation therapy has been discussed with the patient and one of her sisters and the patient gives informed consent to proceed. STAGE: IA (T1c N0 M0)    OPERATION PERFORMED:    1. Right partial mastectomy and sentinel lymph node biopsy of right axilla. 2. Ultrasound examination of the right breast following insertion of the Xoft balloon applicator into the lumpectomy cavity. 3. Treatment planning. 4. Intraoperative irradiation using the Xoft electronic brachytherapy system.     DESCRIPTION OF PROCEDURE:    Under general anesthesia, following prepping and draping in the usual sterile manner, right partial mastectomy and right axillary sentinel lymph node biopsy were performed by Dr. Jocelyn Nix. This will be dictated separately. A 5-6 cm Xoft balloon applicator was inserted into the right breast lumpectomy cavity and filled with 120 cc sterile saline. The breast tissue and overlying skin were approximated using suture material. Ultrasound examination showed a skin bridge of 1.2 cm. The miniature x-ray tube (with a measured length of 25.05 cm) was inserted into the central channel of the balloon applicator (with a measured depth of 24.95 cm) and a pull back test of the source was successfully performed. A thin, flexible lead shield was placed on the breast overlying the implant site. Rolling, leaded-glass shields were brought into the operating room and the room was evacuated except for the anesthesiologist, medical physicist and radiation oncologist. Radiation precaution signs were placed on the operating room entrances. The source was activated and a dose of 2000 cGy was delivered to the balloon surface in 1611.1 seconds. Treatment started 10/29/2021 at 09:56:58 AM and was completed 10/29/2021 at 10:24:10 AM.    Following the treatment, the x-ray source was withdrawn and placed in a shielded chamber in the Xoft controller unit. The flexible lead shield was removed from the patient. Removal of the balloon applicator was performed. The volume of saline removed from the balloon applicator was verified. Closing of the lumpectomy and axillary incisions was performed by Dr. Joy Alba and also the subject of a separate dictation.     Physics support, including machine calibration and , was provided by Jake Griffin PhD.     Florinda Solano MD        Electronically signed by Denia Schwartz MD on 68/15/1434 at 10:42 AM

## 2021-10-29 NOTE — CONSULTS
Radiation Oncology Consult Note    Requesting MD:  Jorge Joseph MD Admit Status:         History obtained from:   [x] Patient  [x] Other (specify): chart  CC: Infiltrating ductal carcinoma of the right breast    HPI: Ms. Julio Angela is a 66 y.o. female with a history abnormal mammogram and targeted ultrasound 8/17/2021 showing a 2.5 cm irregularly-shaped lobulated mass in the right lower breast near the 5 o'clock position. A 0.8 cm oval circumscribed mass was seen in the upper outer quadrant of the left breast near the 2 o'clock position. Ultrasound-guided biopsy of the right breast 6:00 lesion obtained 9/1/2021 showed infiltrating ductal carcinoma, favor grade 3 measuring 1.5 cm in greatest linear dimension. ER and LA were both strongly positive at 96% and 94%, respectively. HER-2 was equivocal by IHC and negative by FISH. Ki-67 was low at 6%. Various treatment options have been discussed with the patient by Dr. Blade Braswell and she has elected to proceed with right breast lumpectomy with consideration of intraoperative radiation therapy. Intraoperative radiation therapy has been discussed with the patient and one of her sisters and the patient gives informed consent to proceed. Past Medical History:   Diagnosis Date    Acute sinusitis     CHAPARRO (acute kidney injury) ([address] 05/18/2018    Anemia     Bleeding ulcer     hx of; cauterized by dr. Sean Rivers    Breast lump     CAD (coronary artery disease)     sees dr. Kerrie Barajas ulcer ([address]     Cataract     COVID-19 01/2021    fatigue    Dermatitis     Diabetes mellitus ([address]     Diabetic nephropathy ([address]     Diabetic retinopathy ([address]     Glaucoma     Gout     Hemorrhoids     Hx of TB skin testing     positive    Hyperlipidemia     Hypertension     Palliative care patient 06/24/2021    Type 2 diabetes mellitus ([address] 05/18/2018    Vitamin D deficiency      Menstrual history: The patient is G1, P1, Ab0. Menarche was at age 13. Menopause was in her early 46s. The patient did not experience significant hot flashes following menopause. The patient has never received hormone replacement therapy. Past Surgical History:   Procedure Laterality Date    CARDIAC SURGERY  2001    CARPAL TUNNEL RELEASE Right 9/3/2020    RIGHT CARPAL TUNNEL RELEASE performed [facility] Maine [facility]      COLONOSCOPY N/A 10/11/2019    Dr Lety Dubon of a hemostatic clip-Suboptimal prep, diverticulosis, internal hemorrhoids-Grade 2, AP x 3, 1 yr recall     COLONOSCOPY N/A 10/12/2020    Dr Medeiros Flick hemorrhoids-Grade 1-2, AP, 3 yr recall    CORONARY ARTERY BYPASS GRAFT  2001    ENDOSCOPY, COLON, DIAGNOSTIC      EYE SURGERY      MOUTH SURGERY      OTHER SURGICAL HISTORY      sternal resection    SKIN BIOPSY      UPPER GASTROINTESTINAL ENDOSCOPY N/A 10/11/2019    Dr Balta Bernard (+) H Pylori    US BREAST NEEDLE BIOPSY LEFT Left 9/1/2021    US BREAST NEEDLE BIOPSY LEFT 9/1/2021 [facility] GENERAL SURGERY    VASCULAR SURGERY         Family History   Problem Relation Age of Onset    Cancer Mother     Diabetes Father     COPD Father     Diabetes Sister     Heart Disease Sister     Colon Polyps Sister     Coronary Art Dis Other         grandfather    Diabetes Other         aunt    Colon Cancer Neg Hx     Liver Cancer Neg Hx     Rectal Cancer Neg Hx        Social History     Socioeconomic History    Marital status: Single     Spouse name: Not on file    Number of children: Not on file    Years of education: Not on file    Highest education level: Not on file   Occupational History     Employer: [employer]   Tobacco Use    Smoking status: Never Smoker    Smokeless tobacco: Never Used   Vaping Use    Vaping Use: Never used   Substance and Sexual Activity    Alcohol use:  Yes     Alcohol/week: 1.0 standard drinks     Types: 1 Shots of liquor per week     Comment: rare    Drug use: No    Sexual activity: Never   Other Topics Concern    Not on file   Social History Narrative    Not on file     Social Determinants of Health     Financial Resource Strain: Low Risk     Difficulty of Paying Living Expenses: Not hard at all   Food Insecurity: No Food Insecurity    Worried About Running Out of Food in the Last Year: Never true    Matthew of Food in the Last Year: Never true   Transportation Needs: No Transportation Needs    Lack of Transportation (Medical): No    Lack of Transportation (Non-Medical): No   Physical Activity:     Days of Exercise per Week:     Minutes of Exercise per Session:    Stress:     Feeling of Stress :    Social Connections:     Frequency of Communication with Friends and Family:     Frequency of Social Gatherings with Friends and Family:     Attends Buddhist Services:     Active Member of Clubs or Organizations:     Attends Club or Organization Meetings:     Marital Status:    Intimate Partner Violence:     Fear of Current or Ex-Partner:     Emotionally Abused:     Physically Abused:     Sexually Abused: Allergies   Allergen Reactions    Medrol [Methylprednisolone] Other (See Comments)     Affects her eyes       CURRENT MEDICATIONS:   sodium chloride flush  5-40 mL IntraVENous 2 times per day    famotidine (PEPCID) injection  20 mg IntraVENous Once    scopolamine  1 patch TransDERmal Once       ROS:  Negative except some fatigue and shortness of breath attributed to congestive heart failure. She also notes some right finger tingling and numbness and is status post carpal tunnel surgery on that side. PE:  Vitals:    10/29/21 0614   BP: (!) 154/71   Pulse: 83   Resp: 20   Temp: 97.9 °F (36.6 °C)   SpO2: (!) 89%     No intake or output data in the 24 hours ending 10/29/21 0735  Estimated body mass index is 34.55 kg/m² as calculated from the following:    Height as of this encounter: 5' 6\" (1.676 m).     Weight as of this encounter: 214 lb 0.7 oz (97.1 kg). General Appearance:    Alert, oriented, cooperative, no distress, appears stated age   Head:    Normocephalic, without obvious abnormality, atraumatic   Eyes:    PERRL, conjunctiva/corneas clear, EOM's intact                    Neck:   Supple, symmetrical, trachea midline, no adenopathy; Thyroid:  no enlargement/tenderness/nodules   Back:     Symmetric, no curvature   Lungs:    End expiratory wheezes are heard. Chest wall:    No tenderness or deformity   Heart:    Breasts:    Regular rate and rhythm, S1 and S2 normal; no murmur, rub   or gallop    Deferred until examination under anesthesia prior to planned   lumpectomy and axillary sentinel lymph node biopsy. Abdomen:     Soft, non-tender, bowel sounds active all four quadrants,     no masses, no organomegaly           Extremities:   Extremities normal, atraumatic, no clubbing, cyanosis or   edema       Skin:   Skin color, texture, turgor normal, no rashes or lesions               LAB RESULTS:  Recent Results (from the past 24 hour(s))   COVID-19, Rapid    Collection Time: 10/28/21  1:00 PM    Specimen: Nasopharyngeal Swab   Result Value Ref Range    SARS-CoV-2, NAAT Not Detected Not Detected   POCT Glucose    Collection Time: 10/29/21  6:33 AM   Result Value Ref Range    POC Glucose 113 (H) 70 - 99 mg/dl    Performed on [employer]        IMAGING:  XR CHEST PORTABLE    Result Date: 10/29/2021  EXAMINATION:  XR CHEST PORTABLE  10/29/2021 7:50 AM HISTORY: Preoperative chest x-ray. Diabetes. Hypertension. COMPARISON: 6/21/2021. FINDINGS:  There is no airspace consolidation or pleural effusion. There is mild cardiomegaly. There is no CHF. No acute bony abnormality is seen. No active disease. Signed by Dr Edna Royal    Result Date: 10/28/2021  EXAMINATION: US BREAST LIMITED RIGHT 10/28/2021 2:51 PM HISTORY: Images the right breasts obtained transverse longitudinal direction usual manner.  A biopsy clip is demonstrated 1 cm from the nipple at 6:00 position adjacent to a known neoplasm. ASSESSMENT: BI-RADS Category 6 Signed by Dr Grace De Luna:  Stage: IA (T1c, N0, M0)    After full discussion of treatment options, the patient has elected to proceed with right breast lumpectomy. This will be followed by consideration of intraoperative radiation therapy using the [facility] electronic brachytherapy system.        Weston Mar MD    00/02/5314 7:35 AM

## 2021-10-30 NOTE — OP NOTE
[facility] [address] [facility]                                OPERATIVE REPORT    PATIENT NAME: Soheila Prescott                   :        1950  MED REC NO:   [id number]                              ROOM:  ACCOUNT NO:   [de-identified]                           ADMIT DATE: 10/29/2021  PROVIDER:     Blade Braswell MD    DATE OF PROCEDURE:  10/29/2021    PREOPERATIVE DIAGNOSIS:  Infiltrating ductal carcinoma of the right  breast.    POSTOPERATIVE DIAGNOSIS:  Infiltrating ductal carcinoma of the right  breast.    PROCEDURE PERFORMED:  1. Injection of radionuclide. 2.  Lymphatic mapping. 3.  Ultrasound-guided needle localization. 4.  Placement of needle. 5.  Right pectoral block. 6.  Right partial mastectomy. 7.  Right sentinel lymph node biopsy. 8.  Utilization of MarginProbe intraoperative margin assessment device. 9.  Preparation of cavity for IORT. 10.  Placement of the IORT catheter. 11.  Placement of three-dimensional implant for tissue filling, future  radiation marking as well as tumor site identification. SURGEON:  Blade Braswell MD    ASSISTANT:  Jayy Mascorro PA-C. He was present for all portions of  the case including all critical portions as well as closure. ANESTHESIA:  Pec block with MAC and LMA. INDICATIONS:  The patient is a 64-year-old lady who presented with a 2  cm high-grade invasive ductal carcinoma _____ returned low risk. ER, MS  was positive, Ki-67 was 60%. We discussed the risks and benefits of  lumpectomy and IORT. She understood and was agreeable. OPERATIVE PROCEDURE:  Today, she was brought to the operating room,  adequately sedated and she was injected in the right periareolar dermis  with 500 microcuries of Lymphose[place]. We then identified the lesion which  was right beneath the nipple and inserted a 5 cm Kopans wire directly  into this.   It appeared that she would lose the nipple with this  resection. We then prepped the skin with chlorhexidine and proceeded  with our right pectoral block. We utilized a solution of 50 mL of 0.2%  ropivacaine, 50 mL of saline, and 8 mg of Decadron. We injected the  intercostal perforators at the lateral sternal border with ultrasound  guidance. We then injected the inframammary crease and the  infraclavicular area. Once this was done, we injected laterally in the  interpectoral space with ultrasound guidance and in the lateral  intercostal perforators again with ultrasound. We then re-prepped,  re-draped, marked an ellipse around the nipple trying to get a good  margin on all sides. We excised this ellipse. We marked cranial,  caudal, medial and lateral margins on our specimen. We then did  specimen ultrasound which demonstrated good margins on the cranial side,  medial and lateral were excellent, caudal was close. We then excised an  additional caudal margin. We then utilized the MarginProbe  intraoperative margin assessment device and cranial margin was also  positive which we re-excised. Median and lateral were okay. Deep was  focally positive, so we re-excised a portion of the deep margin as well. We then went to the axilla. She really did not have much activity in  the axilla. We did inject 3 mL of isosulfan blue to facilitate axillary  reverse mapping. This was injected into the biceps crease. We made a  curvilinear incision, dissected down into the axilla, was able to  palpate some lymph nodes that did not appear grossly abnormal and  excised two or three of those. There was no significant pathologic  adenopathy. We then packed this with antibiotic-soaked gauze. We then  sized the cavity with a 24-Anguillan Hoff catheter. It appeared about 100  to 120 mL would be adequate. We then obtained the appropriate large  radiation catheter, filled it with 100 mL which appeared to be adequate. The symmetry was good, the balloon was intact.   We then placed a 2-0  Prolene pursestring around the rim of our cavity, placed the catheter in  position and then instilled an additional 20 mL. We then checked skin  to balloon distance on all sides, the closest was cranially at 1.6 cm. The other distances were all more than that. I then reviewed the  specimen with Pathology. It appeared that our caudal margin was the  closest but our additional margin should have been adequate. She  underwent an uncomplicated radiation therapy. We then removed the  catheter, took out our pursestring, irrigated copiously. Placed a  three-dimensional implant in the _____ of the balloon, sutured in place  with 3-0 Vicryl sutures to [place] future tumor site for additional  radiation if required as well as tissue filling. We then closed with  2-0 and 3-0 Vicryl bringing the tissue together over the implant and  then 4-0 Stratafix for the skin. The axilla was closed with 2-0 and 3-0  Vicryl and Stratafix for the skin as well. Sterile dressing was  applied. Estimated blood loss, minimal.  Complications, none. She  tolerated the procedure well.         Raza Arce MD    D: 10/29/2021 12:42:11      T: 10/29/2021 13:20:35     [email or web address]  Job#: [id number]     Doc#: [id number]    CC:

## 2021-11-01 ENCOUNTER — TELEPHONE (OUTPATIENT)
Dept: OTHER | Age: 71
End: 2021-11-01

## 2021-11-01 NOTE — TELEPHONE ENCOUNTER
Sister answered phone and states patient is doing well and only taken one pain pill. Pt was still sleeping. Pt is wearing her surgical bra and performing her arm exercises. Reviewed post op appt with sister. No drains. Will f/u after post op with Dr. Lex Burger.

## 2021-11-03 PROBLEM — Z98.890 STATUS POST RIGHT BREAST LUMPECTOMY: Status: ACTIVE | Noted: 2021-11-03

## 2021-11-03 NOTE — PROGRESS NOTES
HISTORY OF PRESENT ILLNESS:    Ms. Jason Becerra  is recently status post ultrasound guided breast biopsy on the right which revealed a 2.6 cm high grade infiltrating ductal carcinoma. ER is positive at 96%. NJ is positive at 94%. Her2 is Equivocal. Ki67 is low at 6%. Fish is negative. Mammaprint is low risk luminal A type. MRI was not performed     She underwent right lumpectomy with sentinel node and IORT on 10/29/2021. Pathology:  FINAL DIAGNOSIS:     A.  Breast, right simple mastectomy:   1.  Infiltrating ductal carcinoma, no special type, grade 2.   2.  Infiltrating carcinoma measures 2.5 cm in greatest dimension grossly. 3.  Infiltrating carcinoma extends to within 0.1 cm of the anterior   surgical excision margin. 4.  Infiltrating carcinoma extends to within 0.4 cm of the nearest caudal   surgical excision margin. 5.  Tumor invades the deepest aspect of the overlying dermis. 6.  Negative for evidence of dermal lymphovascular space invasion. 7.  Negative for evidence of epidermal invasion. 8.  Sections of nipple, negative for evidence of malignancy. B.  Breast, excision of additional right breast cranial margin: Benign   breast parenchyma. C.  Breast, excision of additional right breast caudal margin: Benign   breast parenchyma. D.  Breast, excision of additional right breast deep margin: Benign   breast parenchyma. E.  Lymph node, right sentinel lymph node biopsy: 2 lymph nodes, negative   for evidence of malignancy. AJCC STAGE: pT2, (sn)pN0, pMx     ADDENDUM:  At the clinician's request, an immunohistochemical stain for   Ki-67 is performed using appropriately staining controls.  The Ki-67   staining fraction is low and estimated at 2% which is decreased from the   original percentage of 6%. PHYSICAL EXAM:  The  wounds look good with no evidence of infection, fluid accumulation, or skin necrosis.      IMPRESSION:    Doing well s/p right lumpectomy and sentinel node    PLAN:  She will need an appointment with Dr. Emery Juarez in 3 weeks. I will want to see her back in one month. She will call if anything changes. I spent over 50% of this visit counseling patient. 15 minutes of face to face time with patient.

## 2021-11-04 ENCOUNTER — OFFICE VISIT (OUTPATIENT)
Dept: SURGERY | Age: 71
End: 2021-11-04

## 2021-11-04 VITALS
HEIGHT: 66 IN | BODY MASS INDEX: 35.03 KG/M2 | DIASTOLIC BLOOD PRESSURE: 89 MMHG | SYSTOLIC BLOOD PRESSURE: 138 MMHG | TEMPERATURE: 97 F | WEIGHT: 218 LBS | HEART RATE: 89 BPM

## 2021-11-04 DIAGNOSIS — Z98.890 STATUS POST RIGHT BREAST LUMPECTOMY: ICD-10-CM

## 2021-11-04 DIAGNOSIS — Z17.0 MALIGNANT NEOPLASM OF OVERLAPPING SITES OF RIGHT BREAST IN FEMALE, ESTROGEN RECEPTOR POSITIVE (HCC): Primary | ICD-10-CM

## 2021-11-04 DIAGNOSIS — C50.811 MALIGNANT NEOPLASM OF OVERLAPPING SITES OF RIGHT BREAST IN FEMALE, ESTROGEN RECEPTOR POSITIVE (HCC): Primary | ICD-10-CM

## 2021-11-04 PROCEDURE — 99024 POSTOP FOLLOW-UP VISIT: CPT | Performed by: SURGERY

## 2021-11-05 ENCOUNTER — TELEPHONE (OUTPATIENT)
Dept: OTHER | Age: 71
End: 2021-11-05

## 2021-11-05 NOTE — TELEPHONE ENCOUNTER
Pt states she is doing well and she got great news from pathology yesterday. Pt will f/u in one month with Dr. Columba Najera and have appt with Dr. Marti Whelan. Verified patient knew where his office was. Pt familiar because Dr. Elzbieta Nunez is her PCP. Pt will call if she needs anything. Survivorship book mailed today.

## 2021-11-15 RX ORDER — SIMVASTATIN 40 MG
40 TABLET ORAL NIGHTLY
Qty: 90 TABLET | Refills: 3 | Status: SHIPPED | OUTPATIENT
Start: 2021-11-15

## 2021-11-17 ENCOUNTER — OFFICE VISIT (OUTPATIENT)
Dept: INTERNAL MEDICINE | Age: 71
End: 2021-11-17
Payer: MEDICARE

## 2021-11-17 VITALS
OXYGEN SATURATION: 95 % | HEIGHT: 66 IN | WEIGHT: 214.6 LBS | SYSTOLIC BLOOD PRESSURE: 122 MMHG | DIASTOLIC BLOOD PRESSURE: 82 MMHG | HEART RATE: 85 BPM | BODY MASS INDEX: 34.49 KG/M2

## 2021-11-17 DIAGNOSIS — E11.9 TYPE 2 DIABETES MELLITUS WITHOUT COMPLICATION, UNSPECIFIED WHETHER LONG TERM INSULIN USE (HCC): ICD-10-CM

## 2021-11-17 DIAGNOSIS — D50.9 IRON DEFICIENCY ANEMIA, UNSPECIFIED IRON DEFICIENCY ANEMIA TYPE: ICD-10-CM

## 2021-11-17 DIAGNOSIS — E11.21 TYPE II DIABETES MELLITUS WITH NEPHROPATHY (HCC): ICD-10-CM

## 2021-11-17 DIAGNOSIS — Z00.00 ROUTINE ADULT HEALTH MAINTENANCE: Primary | ICD-10-CM

## 2021-11-17 DIAGNOSIS — J01.90 ACUTE SINUSITIS, RECURRENCE NOT SPECIFIED, UNSPECIFIED LOCATION: ICD-10-CM

## 2021-11-17 DIAGNOSIS — I25.83 CORONARY ARTERY DISEASE DUE TO LIPID RICH PLAQUE: ICD-10-CM

## 2021-11-17 DIAGNOSIS — I25.10 CORONARY ARTERY DISEASE DUE TO LIPID RICH PLAQUE: ICD-10-CM

## 2021-11-17 DIAGNOSIS — E55.9 VITAMIN D DEFICIENCY: ICD-10-CM

## 2021-11-17 DIAGNOSIS — C50.811 MALIGNANT NEOPLASM OF OVERLAPPING SITES OF RIGHT BREAST IN FEMALE, ESTROGEN RECEPTOR POSITIVE (HCC): ICD-10-CM

## 2021-11-17 DIAGNOSIS — N18.4 CKD (CHRONIC KIDNEY DISEASE) STAGE 4, GFR 15-29 ML/MIN (HCC): ICD-10-CM

## 2021-11-17 DIAGNOSIS — Z17.0 MALIGNANT NEOPLASM OF OVERLAPPING SITES OF RIGHT BREAST IN FEMALE, ESTROGEN RECEPTOR POSITIVE (HCC): ICD-10-CM

## 2021-11-17 DIAGNOSIS — I10 PRIMARY HYPERTENSION: ICD-10-CM

## 2021-11-17 DIAGNOSIS — Z91.09 ENVIRONMENTAL ALLERGIES: ICD-10-CM

## 2021-11-17 DIAGNOSIS — E78.5 HYPERLIPIDEMIA, UNSPECIFIED HYPERLIPIDEMIA TYPE: ICD-10-CM

## 2021-11-17 LAB
ALBUMIN SERPL-MCNC: 3.6 G/DL (ref 3.5–5.2)
ALP BLD-CCNC: 52 U/L (ref 35–104)
ALT SERPL-CCNC: 10 U/L (ref 5–33)
ANION GAP SERPL CALCULATED.3IONS-SCNC: 15 MMOL/L (ref 7–19)
AST SERPL-CCNC: 18 U/L (ref 5–32)
BASOPHILS ABSOLUTE: 0.1 K/UL (ref 0–0.2)
BASOPHILS RELATIVE PERCENT: 1.2 % (ref 0–1)
BILIRUB SERPL-MCNC: <0.2 MG/DL (ref 0.2–1.2)
BUN BLDV-MCNC: 21 MG/DL (ref 8–23)
CALCIUM SERPL-MCNC: 9.2 MG/DL (ref 8.8–10.2)
CHLORIDE BLD-SCNC: 101 MMOL/L (ref 98–111)
CHOLESTEROL, TOTAL: 116 MG/DL (ref 160–199)
CO2: 26 MMOL/L (ref 22–29)
CREAT SERPL-MCNC: 1.8 MG/DL (ref 0.5–0.9)
CREATININE URINE: 116.9 MG/DL (ref 4.2–622)
EOSINOPHILS ABSOLUTE: 0.2 K/UL (ref 0–0.6)
EOSINOPHILS RELATIVE PERCENT: 3.5 % (ref 0–5)
GFR AFRICAN AMERICAN: 33
GFR NON-AFRICAN AMERICAN: 28
GLUCOSE BLD-MCNC: 98 MG/DL (ref 74–109)
HCT VFR BLD CALC: 34.4 % (ref 37–47)
HDLC SERPL-MCNC: 41 MG/DL (ref 65–121)
HEMOGLOBIN: 10.4 G/DL (ref 12–16)
IMMATURE GRANULOCYTES #: 0 K/UL
LDL CHOLESTEROL CALCULATED: 56 MG/DL
LYMPHOCYTES ABSOLUTE: 1.7 K/UL (ref 1.1–4.5)
LYMPHOCYTES RELATIVE PERCENT: 26 % (ref 20–40)
MCH RBC QN AUTO: 30.8 PG (ref 27–31)
MCHC RBC AUTO-ENTMCNC: 30.2 G/DL (ref 33–37)
MCV RBC AUTO: 101.8 FL (ref 81–99)
MICROALBUMIN UR-MCNC: 233.5 MG/DL (ref 0–19)
MICROALBUMIN/CREAT UR-RTO: 1997.4 MG/G
MONOCYTES ABSOLUTE: 0.6 K/UL (ref 0–0.9)
MONOCYTES RELATIVE PERCENT: 8.8 % (ref 0–10)
NEUTROPHILS ABSOLUTE: 4 K/UL (ref 1.5–7.5)
NEUTROPHILS RELATIVE PERCENT: 60.2 % (ref 50–65)
PDW BLD-RTO: 14.3 % (ref 11.5–14.5)
PLATELET # BLD: 255 K/UL (ref 130–400)
PMV BLD AUTO: 10.9 FL (ref 9.4–12.3)
POTASSIUM SERPL-SCNC: 4.7 MMOL/L (ref 3.5–5)
RBC # BLD: 3.38 M/UL (ref 4.2–5.4)
SODIUM BLD-SCNC: 142 MMOL/L (ref 136–145)
TOTAL PROTEIN: 6.8 G/DL (ref 6.6–8.7)
TRIGL SERPL-MCNC: 94 MG/DL (ref 0–149)
TSH SERPL DL<=0.05 MIU/L-ACNC: 1.73 UIU/ML (ref 0.27–4.2)
VITAMIN D 25-HYDROXY: 44.4 NG/ML
WBC # BLD: 6.6 K/UL (ref 4.8–10.8)

## 2021-11-17 PROCEDURE — 1123F ACP DISCUSS/DSCN MKR DOCD: CPT | Performed by: INTERNAL MEDICINE

## 2021-11-17 PROCEDURE — G8484 FLU IMMUNIZE NO ADMIN: HCPCS | Performed by: INTERNAL MEDICINE

## 2021-11-17 PROCEDURE — 3017F COLORECTAL CA SCREEN DOC REV: CPT | Performed by: INTERNAL MEDICINE

## 2021-11-17 PROCEDURE — G0439 PPPS, SUBSEQ VISIT: HCPCS | Performed by: INTERNAL MEDICINE

## 2021-11-17 PROCEDURE — 4040F PNEUMOC VAC/ADMIN/RCVD: CPT | Performed by: INTERNAL MEDICINE

## 2021-11-17 PROCEDURE — 3044F HG A1C LEVEL LT 7.0%: CPT | Performed by: INTERNAL MEDICINE

## 2021-11-17 RX ORDER — CEFDINIR 300 MG/1
300 CAPSULE ORAL 2 TIMES DAILY
Qty: 20 CAPSULE | Refills: 0 | Status: SHIPPED | OUTPATIENT
Start: 2021-11-17 | End: 2021-11-27

## 2021-11-17 RX ORDER — GLIMEPIRIDE 2 MG/1
TABLET ORAL
COMMUNITY
Start: 2021-10-05 | End: 2021-11-17 | Stop reason: DRUGHIGH

## 2021-11-17 ASSESSMENT — ENCOUNTER SYMPTOMS
EYE ITCHING: 0
WHEEZING: 0
CHOKING: 0
EYE DISCHARGE: 0
SINUS PRESSURE: 0
BLOOD IN STOOL: 0
VOICE CHANGE: 0
SINUS PAIN: 0
SORE THROAT: 0
COUGH: 0
COLOR CHANGE: 0
CHEST TIGHTNESS: 0
TROUBLE SWALLOWING: 0
SHORTNESS OF BREATH: 0
ABDOMINAL PAIN: 0
CONSTIPATION: 0
RHINORRHEA: 0
VOMITING: 0
ABDOMINAL DISTENTION: 0
DIARRHEA: 0
STRIDOR: 0
NAUSEA: 0

## 2021-11-17 ASSESSMENT — PATIENT HEALTH QUESTIONNAIRE - PHQ9
1. LITTLE INTEREST OR PLEASURE IN DOING THINGS: 0
2. FEELING DOWN, DEPRESSED OR HOPELESS: 0
SUM OF ALL RESPONSES TO PHQ QUESTIONS 1-9: 0
SUM OF ALL RESPONSES TO PHQ9 QUESTIONS 1 & 2: 0
SUM OF ALL RESPONSES TO PHQ QUESTIONS 1-9: 0
SUM OF ALL RESPONSES TO PHQ QUESTIONS 1-9: 0

## 2021-11-17 ASSESSMENT — LIFESTYLE VARIABLES: HOW OFTEN DO YOU HAVE A DRINK CONTAINING ALCOHOL: 0

## 2021-11-17 NOTE — PROGRESS NOTES
Chief Complaint   Patient presents with    Medicare AWV       HPI: Eura Favre is a 66 y.o. female is here for her annual physical exam.  Her functional status is good. She denies any history of falls. She has no concerns in regards to safety, hearing, or cognition. She does see Dr. Joy Alba for newly diagnosed breast cancer. She is scheduled to see Dr. Kirsten Decker in the near future. Dr. Shahriar Duong is her cardiologist.  She does complain of some sinus drainage. She states that she is coughing up some greenish material.  She states that her cardiologist wants to do a defibrillator. However, she is not sure she is quite ready for this as of yet. Her blood sugars are running between 89 and 115. She does have a history of anemia, which has been stable. She is tolerating her statin without side effects. Her allergies are stable. Her blood pressure is well controlled. She has not had any chest pain. She has not had any heart palpitations. Occasionally, she will get short of breath if she exerts herself too much.     Routine screening is as follows:  Eye exam yearly  Flu Vaccine declined  Pap smear declined  Mammogram August 2021  Colonoscopy 2020  DEXA declined  Pneumovax declined      Past Medical History:   Diagnosis Date    Acute sinusitis     CHAPARRO (acute kidney injury) ([facility] [address] 05/18/2018    Anemia     Bleeding ulcer     hx of; cauterized by dr. Juana Alfonso    Breast lump     CAD (coronary artery disease)     sees dr. [place] ulcer ([facility] [address]     Cataract     COVID-19 01/2021    fatigue    Dermatitis     Diabetes mellitus ([address]     Diabetic nephropathy ([address]     Diabetic retinopathy ([address]     Glaucoma     Gout     Hemorrhoids     Hx of TB skin testing     positive    Hyperlipidemia     Hypertension     Palliative care patient 06/24/2021    Type 2 diabetes mellitus ([address] 05/18/2018    Vitamin D deficiency       Past Surgical History:   Procedure Laterality Date    BREAST LUMPECTOMY Right 10/29/2021    RIGHT LUMPECTOMY, SNB, PREOP ULTRASOUND, INTRAOP ULTRASOUND GUIDED NL, BIOSORB, PEC BLOCK, MARGIN PROBE, FLAPS, IORT performed by Renetta Oh MD at [facility] [address] Right 9/3/2020    RIGHT CARPAL TUNNEL RELEASE performed by Carlito Drummond [facility] at [facility]      COLONOSCOPY N/A 10/11/2019    Dr Ananda Arce of a hemostatic clip-Suboptimal prep, diverticulosis, internal hemorrhoids-Grade 2, AP x 3, 1 yr recall     COLONOSCOPY N/A 10/12/2020    Dr Peacock Bullion hemorrhoids-Grade 1-2, AP, 3 yr recall    CORONARY ARTERY BYPASS GRAFT  2001    ENDOSCOPY, COLON, DIAGNOSTIC      EYE SURGERY      MOUTH SURGERY      OTHER SURGICAL HISTORY      sternal resection    SKIN BIOPSY      UPPER GASTROINTESTINAL ENDOSCOPY N/A 10/11/2019    Dr Marlon Hanks (+) H Pylori    US BREAST NEEDLE BIOPSY LEFT Left 9/1/2021    US BREAST NEEDLE BIOPSY LEFT 9/1/2021 [facility]GERY    VASCULAR SURGERY        Social History     Socioeconomic History    Marital status: Single     Spouse name: None    Number of children: None    Years of education: None    Highest education level: None   Occupational History     Employer: [employer]   Tobacco Use    Smoking status: Never Smoker    Smokeless tobacco: Never Used   Vaping Use    Vaping Use: Never used   Substance and Sexual Activity    Alcohol use:  Yes     Alcohol/week: 1.0 standard drink     Types: 1 Shots of liquor per week     Comment: rare    Drug use: No    Sexual activity: Never   Other Topics Concern    None   Social History Narrative    None     Social Determinants of Health     Financial Resource Strain: Low Risk     Difficulty of Paying Living Expenses: Not hard at all   Food Insecurity: No Food Insecurity    Worried About Running Out of Food in the Last Year: Never true    Matthew of Food in the Last Year: Never true Transportation Needs: No Transportation Needs    Lack of Transportation (Medical): No    Lack of Transportation (Non-Medical): No   Physical Activity:     Days of Exercise per Week: Not on file    Minutes of Exercise per Session: Not on file   Stress:     Feeling of Stress : Not on file   Social Connections:     Frequency of Communication with Friends and Family: Not on file    Frequency of Social Gatherings with Friends and Family: Not on file    Attends Jainism Services: Not on file    Active Member of Clubs or Organizations: Not on file    Attends Club or Organization Meetings: Not on file    Marital Status: Not on file   Intimate Partner Violence:     Fear of Current or Ex-Partner: Not on file    Emotionally Abused: Not on file    Physically Abused: Not on file    Sexually Abused: Not on file   Housing Stability:     Unable to Pay for Housing in the Last Year: Not on file    Number of [place] in the Last Year: Not on file    Unstable Housing in the Last Year: Not on file      Family History   Problem Relation Age of Onset    Cancer Mother     Diabetes Father     COPD Father     Diabetes Sister     Heart Disease Sister     Colon Polyps Sister     Coronary Art Dis Other         grandfather    Diabetes Other         aunt    Colon Cancer Neg Hx     Liver Cancer Neg Hx     Rectal Cancer Neg Hx         Current Outpatient Medications   Medication Sig Dispense Refill    cefdinir (OMNICEF) 300 MG capsule Take 1 capsule by mouth 2 times daily for 10 days 20 capsule 0    simvastatin (ZOCOR) 40 MG tablet Take 1 tablet by mouth nightly 90 tablet 3    HYDROcodone-acetaminophen (NORCO) 5-325 MG per tablet Take 1 tablet by mouth every 6 hours as needed for Pain.  12 tablet 0    glipiZIDE (GLUCOTROL XL) 2.5 MG extended release tablet Take 1 tablet by mouth daily 90 tablet 3    letrozole (FEMARA) 2.5 MG tablet Take 1 tablet by mouth daily 30 tablet 5    montelukast (SINGULAIR) 10 MG tablet Take 1 tablet by mouth daily 30 tablet 5    metoprolol tartrate (LOPRESSOR) 25 MG tablet Take 1 tablet by mouth 2 times daily 180 tablet 3    isosorbide mononitrate (IMDUR) 30 MG extended release tablet Take 1 tablet by mouth 2 times daily 180 tablet 3    Blood Glucose Calibration (ACCU-CHEK ROSA) SOLN Calibration done daily. 1 each 1    Blood Glucose Monitoring Suppl (ACCU-CHEK ROSA) ARMANDO Use 4 times a day to check BS. 1 Device 0    blood glucose test strips (ACCU-CHEK ROSA PLUS) strip TEST BLOOD SUGAR FOUR TIMES DAILY E11.9 400 strip 3    Accu-Chek Softclix Lancets MISC TEST FOUR TIMES DAILY E11.9 400 each 3    lisinopril (PRINIVIL;ZESTRIL) 5 MG tablet Take 1 tablet by mouth daily 90 tablet 2    metFORMIN (GLUCOPHAGE) 500 MG tablet TAKE 2 TABLETS TWICE DAILY WITH MEALS (Patient taking differently: Take 1,000 mg by mouth 2 times daily (with meals) ) 360 tablet 3    furosemide (LASIX) 40 MG tablet Take 1 tablet by mouth daily as needed (excess fluid) 60 tablet 1    amLODIPine (NORVASC) 5 MG tablet Take 1 tablet by mouth daily 90 tablet 1    Alcohol Swabs (B-D SINGLE USE SWABS REGULAR) PADS USE THREE TIMES DAILY 100 each 11    aspirin 81 MG tablet Take 81 mg by mouth daily      Omega-3 Fatty Acids (FISH OIL) 1000 MG CAPS Take 1,000 mg by mouth 2 times daily      MULTIPLE VITAMIN PO Take 1 tablet by mouth daily      Calcium Carbonate-Vitamin D (CALCIUM 500/D PO) Take 1 tablet by mouth 2 times daily      vitamin B-12 (CYANOCOBALAMIN) 1000 MCG tablet Take 1,000 mcg by mouth daily      timolol (TIMOPTIC) 0.5 % ophthalmic solution Place 1 drop into the right eye daily        No current facility-administered medications for this visit.         Patient Active Problem List   Diagnosis    [facility] kidney injury) ([facility] [address] [facility]    Hypertension    Hyperlipidemia    Ocular hypertension    CAD (coronary artery disease)    Type 2 diabetes mellitus ([facility] [address] [facility]    Diabetes mellitus ([facility] [address] [facility]    Acute cystitis without hematuria    Carpal tunnel syndrome    Chest pain    Chronic kidney disease, stage 3b ([facility] [address]    History of coronary artery bypass graft    Achilles tendinitis    Bursitis of right foot    Contracture of Achilles tendon    Diabetic macular edema (HCC)    Foot pain    Nonproliferative diabetic retinopathy (HCC)    Posterior calcaneal exostosis    Retinal neovascularization    Malignant neoplasm of overlapping sites of right breast in female, estrogen receptor positive ([facility] [address]    Status post right breast lumpectomy    CKD (chronic kidney disease) stage 4, GFR 15-29 ml/min ([facility])        Review of Systems   Constitutional: Negative for activity change, appetite change, chills, diaphoresis, fatigue, fever and unexpected weight change. HENT: Negative for congestion, ear discharge, ear pain, mouth sores, nosebleeds, postnasal drip, rhinorrhea, sinus pressure, sinus pain, sneezing, sore throat, tinnitus, trouble swallowing and voice change. Eyes: Negative for discharge, itching and visual disturbance. Respiratory: Negative for cough, choking, chest tightness, shortness of breath, wheezing and stridor. Cardiovascular: Negative for chest pain, palpitations and leg swelling. Gastrointestinal: Negative for abdominal distention, abdominal pain, blood in stool, constipation, diarrhea, nausea and vomiting. Endocrine: Negative for cold intolerance, polydipsia and polyuria. Genitourinary: Negative for difficulty urinating, dysuria, frequency and urgency. Musculoskeletal: Negative for arthralgias, gait problem, joint swelling and myalgias. Skin: Negative for color change and rash. Allergic/Immunologic: Negative for food allergies and immunocompromised state. Neurological: Negative for dizziness, tremors, syncope, speech difficulty, weakness, numbness and headaches. Hematological: Negative for adenopathy. Does not bruise/bleed easily.    Psychiatric/Behavioral: Negative for confusion and hallucinations. The patient is not nervous/anxious. /82   Pulse 85   Ht 5' 6\" (1.676 m)   Wt 214 lb 9.6 oz (97.3 kg)   SpO2 95%   BMI 34.64 kg/m²   Physical Exam  Vitals and nursing note reviewed. Constitutional:       General: She is not in acute distress. Appearance: Normal appearance. She is well-developed and normal weight. She is not ill-appearing, toxic-appearing or diaphoretic. HENT:      Head: Normocephalic and atraumatic. Right Ear: Tympanic membrane, ear canal and external ear normal. There is no impacted cerumen. Left Ear: Tympanic membrane, ear canal and external ear normal. There is no impacted cerumen. Nose: Nose normal. No congestion or rhinorrhea. Mouth/Throat:      Mouth: Mucous membranes are moist.      Pharynx: Oropharynx is clear. No oropharyngeal exudate or posterior oropharyngeal erythema. Eyes:      General: No scleral icterus. Right eye: No discharge. Left eye: No discharge. Extraocular Movements: Extraocular movements intact. Conjunctiva/sclera: Conjunctivae normal.      Pupils: Pupils are equal, round, and reactive to light. Neck:      Thyroid: No thyromegaly. Vascular: No carotid bruit or JVD. Trachea: No tracheal deviation. Cardiovascular:      Rate and Rhythm: Normal rate and regular rhythm. Pulses: Normal pulses. Heart sounds: Normal heart sounds. No murmur heard. No friction rub. No gallop. Pulmonary:      Effort: Pulmonary effort is normal. No respiratory distress. Breath sounds: Normal breath sounds. No stridor. No wheezing, rhonchi or rales. Chest:      Chest wall: No tenderness. Abdominal:      General: Bowel sounds are normal. There is no distension. Palpations: Abdomen is soft. There is no mass. Tenderness: There is no abdominal tenderness. There is no right CVA tenderness, left CVA tenderness, guarding or rebound. Hernia: No hernia is present. Musculoskeletal:         General: No swelling, tenderness, deformity or signs of injury. Normal range of motion. Cervical back: Normal range of motion and neck supple. No rigidity. No muscular tenderness. Right lower leg: No edema. Left lower leg: No edema. Lymphadenopathy:      Cervical: No cervical adenopathy. Skin:     General: Skin is warm and dry. Capillary Refill: Capillary refill takes less than 2 seconds. Coloration: Skin is not jaundiced or pale. Findings: No bruising, erythema, lesion or rash. Comments: Visual inspection:  Deformity/amputation: absent  Skin lesions/pre-ulcerative calluses: present - bilateral great toe  Edema: right- negative, left- negative    Sensory exam:  Monofilament sensation: normal  (minimum of 5 random plantar locations tested, avoiding callused areas - > 1 area with absence of sensation is + for neuropathy)    Plus at least one of the following:  Pulses: normal,   Pinprick: Intact  Proprioception: N/A  Vibration (128 Hz): N/A   Neurological:      General: No focal deficit present. Mental Status: She is alert and oriented to person, place, and time. Mental status is at baseline. Cranial Nerves: No cranial nerve deficit. Sensory: No sensory deficit. Motor: No weakness or abnormal muscle tone. Coordination: Coordination normal.      Gait: Gait normal.      Deep Tendon Reflexes: Reflexes are normal and symmetric. Reflexes normal.   Psychiatric:         Mood and Affect: Mood normal.         Behavior: Behavior normal.         Thought Content: Thought content normal.         Judgment: Judgment normal.         1. Routine adult health maintenance    2. Vitamin D deficiency    3. Type 2 diabetes mellitus without complication, unspecified whether long term insulin use ([facility] [address]    4. Acute sinusitis, recurrence not specified, unspecified location    5. Hyperlipidemia, unspecified hyperlipidemia type    6.  CKD (chronic kidney overlapping sites of right breast in female, estrogen receptor positive ([facility] [address]    Environmental allergies    Primary hypertension    Coronary artery disease due to lipid rich plaque    Iron deficiency anemia, unspecified iron deficiency anemia type    Other orders  -     cefdinir (OMNICEF) 300 MG capsule; Take 1 capsule by mouth 2 times daily for 10 days          Return in about 3 months (around 2022). Orders Placed This Encounter   Procedures    CBC Auto Differential     Fast 12 hours     Standing Status:   Future     Standing Expiration Date:   2022    Comprehensive Metabolic Panel     Fasting 12 hours     Standing Status:   Future     Standing Expiration Date:   2022    Hemoglobin A1C     Fast 12 hours     Standing Status:   Future     Standing Expiration Date:   2022    Lipid Panel     Standing Status:   Future     Standing Expiration Date:   2022     Order Specific Question:   Is Patient Fasting?/# of Hours     Answer:   12    TSH without Reflex     Fast 12 hours     Standing Status:   Future     Standing Expiration Date:   2022    Microalbumin / Creatinine Urine Ratio     Standing Status:   Future     Standing Expiration Date:   2022    Vitamin D 25 Hydroxy     Standing Status:   Future     Standing Expiration Date:   2022       Chasidy Anthony MD     Medicare Annual Wellness Visit - Subsequent    Name: Austyn Peña Date: 2021   MRN: [id number] Sex: Female   Age: 66 y.o. Ethnicity: Non- / Non    : 1950 Race: White (non-)      Flaco Mo is here for   Chief Complaint   Patient presents with   [facility]        Screenings for behavioral, psychosocial and functional/safety risks, and cognitive dysfunction are all negative except as indicated below. These results, as well as other patient data from the [address] form, are documented in Flowsheets linked to this Encounter.     Allergies Allergen Reactions    Medrol [Methylprednisolone] Other (See Comments)     Affects her eyes       Prior to Visit Medications    Medication Sig Taking? Authorizing Provider   cefdinir (OMNICEF) 300 MG capsule Take 1 capsule by mouth 2 times daily for 10 days Yes Valdo Zaragoza MD   simvastatin (ZOCOR) 40 MG tablet Take 1 tablet by mouth nightly Yes Valdo Zaragoza MD   HYDROcodone-acetaminophen (NORCO) 5-325 MG per tablet Take 1 tablet by mouth every 6 hours as needed for Pain. Yes Kain Macedo MD   glipiZIDE (GLUCOTROL XL) 2.5 MG extended release tablet Take 1 tablet by mouth daily Yes Valdo Zaragoza MD   letrozole [facility]) 2.5 MG tablet Take 1 tablet by mouth daily Yes Kain Macedo MD   montelukast (SINGULAIR) 10 MG tablet Take 1 tablet by mouth daily Yes Kain Macedo MD   metoprolol tartrate (LOPRESSOR) 25 MG tablet Take 1 tablet by mouth 2 times daily Yes Valdo Zaragoza MD   isosorbide mononitrate (IMDUR) 30 MG extended release tablet Take 1 tablet by mouth 2 times daily Yes Valdo Zaragoza MD   Blood Glucose Calibration (ACCU-CHEK ROSA) SOLN Calibration done daily. Yes Valdo Zaragoza MD   Blood Glucose Monitoring Suppl (ACCU-CHEK ROSA) ARMANDO Use 4 times a day to check BS.  Yes Valdo Zaragoza MD   blood glucose test strips (ACCU-CHEK ROSA PLUS) strip TEST BLOOD SUGAR FOUR TIMES DAILY E11.9 Yes Valdo Zaragoza MD   Accu-Chek Softclix Lancets MISC TEST FOUR TIMES DAILY E11.9 Yes Valdo Zaragoza MD   lisinopril (PRINIVIL;ZESTRIL) 5 MG tablet Take 1 tablet by mouth daily Yes Luna Tolentino MD   metFORMIN (GLUCOPHAGE) 500 MG tablet TAKE 2 TABLETS TWICE DAILY WITH MEALS  Patient taking differently: Take 1,000 mg by mouth 2 times daily (with meals)  Yes Valdo Zaraogza MD   furosemide (LASIX) 40 MG tablet Take 1 tablet by mouth daily as needed (excess fluid) Yes Shailesh Proctor MD   amLODIPine (NORVASC) 5 MG tablet Take 1 tablet by mouth daily Yes Valdo Zaragoza MD Alcohol Swabs (B-D SINGLE USE SWABS REGULAR) PADS USE THREE TIMES DAILY Yes Sukhi Encinas MD   aspirin 81 MG tablet Take 81 mg by mouth daily Yes Historical Provider, MD   Omega-3 Fatty Acids (FISH OIL) 1000 MG CAPS Take 1,000 mg by mouth 2 times daily Yes Historical Provider, MD   MULTIPLE VITAMIN PO Take 1 tablet by mouth daily Yes Historical Provider, MD   Calcium Carbonate-Vitamin D (CALCIUM 500/D PO) Take 1 tablet by mouth 2 times daily Yes Historical Provider, MD   vitamin B-12 (CYANOCOBALAMIN) 1000 MCG tablet Take 1,000 mcg by mouth daily Yes Historical Provider, MD   timolol (TIMOPTIC) 0.5 % ophthalmic solution Place 1 drop into the right eye daily  Yes Historical Provider, MD       Past Medical History:   Diagnosis Date    Acute sinusitis     [facility]ARRO [facility]acute kidney [facility]) ([facility] [address] 05/18/2018    Anemia     Bleeding ulcer     hx of; cauterized by dr. resendiz    Breast lump     CAD (coronary artery disease)     sees dr. [place] ulcer ([facility] [address]     Cataract     COVID-19 01/2021    fatigue    Dermatitis     Diabetes mellitus ([facility] [address]     Diabetic nephropathy ([facility] [address]     Diabetic retinopathy ([facility] [address]     Glaucoma     Gout     Hemorrhoids     Hx of TB skin testing     positive    Hyperlipidemia     Hypertension     Palliative care patient 06/24/2021    Type 2 diabetes mellitus ([facility] [address] 05/18/2018    Vitamin D deficiency        Past Surgical History:   Procedure Laterality Date    BREAST LUMPECTOMY Right 10/29/2021    RIGHT LUMPECTOMY, SNB, PREOP ULTRASOUND, INTRAOP ULTRASOUND GUIDED NL, BIOSORB, PEC BLOCK, MARGIN PROBE, FLAPS, IORT performed by Burke Doll MD at [facility] [address] Right 9/3/2020    RIGHT CARPAL TUNNEL RELEASE performed by Anna Marie Davidson [facility] at [facility]      COLONOSCOPY N/A 10/11/2019    Dr Socorro Bojorquez of a hemostatic clip-Suboptimal prep, diverticulosis, internal hemorrhoids-Grade 2, AP x 3, 1 yr recall     COLONOSCOPY N/A 10/12/2020    Dr Navin Graves hemorrhoids-Grade 1-2, AP, 3 yr recall    CORONARY ARTERY BYPASS GRAFT  2001    ENDOSCOPY, COLON, DIAGNOSTIC      EYE SURGERY      MOUTH SURGERY      OTHER SURGICAL HISTORY      sternal resection    SKIN BIOPSY      UPPER GASTROINTESTINAL ENDOSCOPY N/A 10/11/2019    Dr Asad Rodgers (+) H Pylori    US BREAST NEEDLE BIOPSY LEFT Left 9/1/2021    US BREAST NEEDLE BIOPSY LEFT 9/1/2021 [facility]GERY    VASCULAR SURGERY         Family History   Problem Relation Age of Onset    Cancer Mother     Diabetes Father     COPD Father     Diabetes Sister     Heart Disease Sister     Colon Polyps Sister     Coronary Art Dis Other         grandfather    Diabetes Other         aunt    Colon Cancer Neg Hx     Liver Cancer Neg Hx     Rectal Cancer Neg Hx        CareTeam (Including outside providers/suppliers regularly involved in providing care):   Patient Care Team:  Sukhi Encinas MD as PCP - General (Family Medicine)  Sukhi Encinas MD as PCP - [facility] Empaneled Provider  SARA Tran - NP as Advanced Practice Nurse (Gastroenterology)  Meryle Alexander, RN as Registered Nurse    Wt Readings from Last 3 Encounters:   11/17/21 214 lb 9.6 oz (97.3 kg)   11/04/21 218 lb (98.9 kg)   10/29/21 214 lb 0.7 oz (97.1 kg)     Vitals:    11/17/21 0911   BP: 122/82   Pulse: 85   SpO2: 95%   Weight: 214 lb 9.6 oz (97.3 kg)   Height: 5' 6\" (1.676 m)           The following problems were reviewed today and where indicated follow up appointments were made and/or referrals ordered. Risk Factor Screenings with Interventions     Fall Risk:  Timed Up and Go Test > 12 seconds?  (Complete if either Fall Risk answers are Yes): no  2 or more falls in past year?: no  Fall with injury in past year?: no  Fall Risk Interventions:    · Home safety tips provided    Depression:  PHQ-2 Score: 0  Depression Interventions:  · Relaxation techniques discussed    Anxiety:     Anxiety Interventions:  · Relaxation techniques discussed    Cognitive:  Clock Drawing Test (CDT) Score: Normal  Cognitive Impairment Interventions:  · Patient declines any further evaluation/treatment for cognitive impairment    Substance Abuse:  Social History     Socioeconomic History    Marital status: Single     Spouse name: Not on file    Number of children: Not on file    Years of education: Not on file    Highest education level: Not on file   Occupational History     Employer: [employer]   Tobacco Use    Smoking status: Never Smoker    Smokeless tobacco: Never Used   Vaping Use    Vaping Use: Never used   Substance and Sexual Activity    Alcohol use: Yes     Alcohol/week: 1.0 standard drink     Types: 1 Shots of liquor per week     Comment: rare    Drug use: No    Sexual activity: Never   Other Topics Concern    Not on file   Social History Narrative    Not on file     Social Determinants of Health     Financial Resource Strain: Low Risk     Difficulty of Paying Living Expenses: Not hard at all   Food Insecurity: No Food Insecurity    Worried About Running Out of Food in the Last Year: Never true    Matthew of Food in the Last Year: Never true   Transportation Needs: No Transportation Needs    Lack of Transportation (Medical): No    Lack of Transportation (Non-Medical):  No   Physical Activity:     Days of Exercise per Week: Not on file    Minutes of Exercise per Session: Not on file   Stress:     Feeling of Stress : Not on file   Social Connections:     Frequency of Communication with Friends and Family: Not on file    Frequency of Social Gatherings with Friends and Family: Not on file    Attends Taoist Services: Not on file    Active Member of Clubs or Organizations: Not on file    Attends Club or Organization Meetings: Not on file    Marital Status: Not on file   Intimate Partner Violence:     Fear of Current or Ex-Partner: Not on file    Emotionally Abused: Not on file    Physically Abused: Not on file    Sexually Abused: Not on file   Housing Stability:     Unable to Pay for Housing in the Last Year: Not on file    Number of Places Lived in the Last Year: Not on file    Unstable Housing in the Last Year: Not on file     Audit Questionnaire: Screen for Alcohol Misuse  How often do you have a drink containing alcohol?: Never  Substance Abuse Interventions:  · none needed    Health Risk Assessment:     General  In general, how would you say your health is?: Very Good  In the past 7 days, have you experienced any of the following? New or Increased Pain, New or Increased Fatigue, Loneliness, Social Isolation, Stress or Anger?: None of These  Do you get the social and emotional support that you need?: Yes  Do you have a Living Will?: (!) No  General Health Risk Interventions:  · no concersn    Health Habits/Nutrition  Do you exercise for at least 20 minutes 2-3 times per week?: Yes  Have you lost any weight without trying in the past 3 months?: No  Do you eat only one meal per day?: No  Have you seen the dentist within the past year?: N/A - wear dentures  Body mass index is 34.64 kg/m².   Health Habits/Nutrition Interventions:  · no concerns    Hearing/Vision  Do you or your family notice any trouble with your hearing that hasn't been managed with hearing aids?: No  Do you have difficulty driving, watching TV, or doing any of your daily activities because of your eyesight?: No  Have you had an eye exam within the past year?: Yes  Hearing/Vision Interventions:  · no concern    Safety  Do you have working smoke detectors?: Yes  Have all throw rugs been removed or fastened?: Yes  Do you have non-slip mats or surfaces in all bathtubs/showers?: Yes  Do all of your stairways have a railing or banister?: Yes  Are your doorways, halls and stairs free of clutter?: Yes  Do you always fasten your seatbelt when you are in a car?: Yes  Safety Interventions:  · Patient declines any further evaluation/treatment for this issue    ADLs  In the past 7 days, did you need help from others to perform any of the following everyday activities? Eating, dressing, grooming, bathing, toileting, or walking/balance?: None  In the past 7 days, did you need help from others to take care of any of the following? Laundry, housekeeping, banking/finances, shopping, telephone use, food preparation, transportation, or taking medications?: None  ADL Interventions:  · Patient declines any further evaluation/treatment for this issue    Personalized Preventive Plan   Current Health Maintenance Status  Immunization History   Administered Date(s) Administered    Hepatitis A/Hepatitis B (Twinrix) 06/02/2009    Influenza Virus Vaccine 10/15/2017        Health Maintenance   Topic Date Due    Hepatitis C screen  Never done    Annual Wellness Visit (AWV)  10/03/2021    COVID-19 Vaccine (1) 05/11/2022 (Originally 6/25/1962)    DTaP/Tdap/Td vaccine (1 - Tdap) 05/13/2022 (Originally 6/25/1967)    Shingles Vaccine (1 of 2) 05/13/2022 (Originally 6/25/1998)    Flu vaccine (1) 11/17/2022 (Originally 9/1/2021)    Pneumococcal 65+ years Vaccine (1 of 1 - PPSV23) 01/01/2030 (Originally 6/25/2013)    DEXA (modify frequency per FRAX score)  08/02/2030 (Originally 6/25/2003)    Diabetic retinal exam  06/25/2022    A1C test (Diabetic or Prediabetic)  07/30/2022    Diabetic foot exam  08/13/2022    Breast cancer screen  09/01/2022    Lipid screen  11/17/2022    Potassium monitoring  11/17/2022    Creatinine monitoring  11/17/2022    Colon cancer screen colonoscopy  10/12/2023    Hepatitis A vaccine  Aged Out    Hib vaccine  Aged Out    Meningococcal (ACWY) vaccine  Aged Out       Recommendations for [employer] Due: see orders.   Recommended screening schedule for the next 5-10 years is provided to the patient in written form: see Patient Instructions/AVS.

## 2021-11-18 DIAGNOSIS — Z17.0 MALIGNANT NEOPLASM OF OVERLAPPING SITES OF RIGHT BREAST IN FEMALE, ESTROGEN RECEPTOR POSITIVE (HCC): Primary | ICD-10-CM

## 2021-11-18 DIAGNOSIS — C50.811 MALIGNANT NEOPLASM OF OVERLAPPING SITES OF RIGHT BREAST IN FEMALE, ESTROGEN RECEPTOR POSITIVE (HCC): Primary | ICD-10-CM

## 2021-11-19 NOTE — PROGRESS NOTES
MEDICAL ONCOLOGY CONSULTATION    Pt Name: Sunil Rodriguez  MRN: [id number]  YOB: 1950  Date of evaluation: 11/24/2021    REASON FOR CONSULTATION:  Breast cancer  REQUESTING PHYSICIAN: Dr Dhara Sherwood    History Obtained From:  patient and old medical records    HISTORY OF PRESENT ILLNESS:    Diagnosis  · Infiltrating ductal carcinoma, right breast, Sept 2021  · ER 96%, CO 94%, Her-2 2+/equivocal, Ki67 6%, FISH negative  · MammaPrint Luminal A/low risk  · [id number]    Treatment Summary  · 9/20/21-adjuvant endocrine hormone therapy with Letrozole 2.5mg daily. Will continue for 5 years  · 10/29/21-right lumpectomy/sentinel lymph node biopsy  · 10/29/2021-IORT 2000 cGy    Cancer History  Merideth Saint was first seen by me on 11/24/2021. She was referred by Dr. Richelle Concepcion for a diagnosis of right breast cancer. She is a retired RN. She noticed a mass in the right breast several months prior to her diagnosis. · 8/17/21 US right & left breast: 2.5 cm lower right breast mass at 6:00, highly suspicious for breast malignancy. This lesion extends up to the level of the scan although I do not see any adjacent skin thickening. Ultrasound-guided core needle biopsy is recommended for further evaluation. In the left breast there is a 0.6 cm benign-appearing circumscribed hypoechoic solid mass at the 2:00 position. Primary considerations would include intramammary lymph node versus fibroadenoma. Typical recommendation would be a 6 month follow-up ultrasound to ensure stability although biopsy could be considered given what appears to be a contralateral malignancy. No suspicious axillary adenopathy seen. · 8/17/21 Bilateral diagnostic mammogram: There is a 2.5 cm irregularly-shaped, lobulated high density mass in the lower right breast near 5:00. Overlying skin marker.  No other mass is identified in the right breast. There is also a 0.8 cm oval circumscribed equal density mass in the upper outer left breast near 2:00, mid depth. There are a few scattered bilateral benign calcifications. At the 6:00 position in the right breast, approximately 1 cm from the nipple, there is a 2.5 x 2.0 x 2.5 cm hypoechoic lobulated solid mass. This extends up to the skin although I do not see obvious overlying skin thickening. No adenopathy is identified in the right axilla. Targeted left breast ultrasound reveals a 0.6 cm oval hypoechoic solid mass at the 2:00 position, 5 cm from the nipple. This has parallel orientation and a smooth margin. · 9/1/21 Breast, right breast needle core biopsies at 6 o'clock position: Infiltrating ductal carcinoma, no special type, favor grade 3. Infiltrating carcinoma measures 1.5 cm in greatest linear dimension and is present in multiple cores. Breast, left breast needle core biopsies at 2 o'clock position: Benign spindle cell nodule, consistent with partially hyalinized schwannoma. ER 96%, WI 94%, Her-2 2+/equivocal, Ki67 6%, FISH negative, MammaPrint Luminal A/low risk. · 9/20/21-Initiated endocrine hormone therapy with Letrozole 2.5mg daily  · 10/29/21-she underwent a right simple mastectomy by Dr. Charan Santizo at [facility]: Infiltrating ductal carcinoma, no special type, grade 2. Infiltrating carcinoma measures 2.5 cm in greatest dimension grossly. Infiltrating carcinoma extends to within 0.1 cm of the anterior surgical excision margin. Infiltrating carcinoma extends to within 0.4 cm of the nearest caudal surgical excision margin. Tumor invades the deepest aspect of the overlying dermis.  Negative for evidence of dermal lymphovascular space invasion. Negative for evidence of epidermal invasion. Sections of nipple, negative for evidence of malignancy. Breast, excision of additional right breast cranial margin: Benign breast parenchyma. Breast, excision of additional right breast caudal margin: Benign breast parenchyma.  Breast, excision of additional right breast deep margin: Benign breast parenchyma. Lymph node, right sentinel lymph node biopsy: 2 lymph nodes, negative for evidence of malignancy. AJCC STAGE: pT2, (sn)pN0, pMx. ADDENDUM:  At the clinician's request, an immunohistochemical stain for Ki-67 is performed using appropriately staining controls. The Ki-67 staining fraction is low and estimated at 2% which is decreased from the original percentage of 6%. · 10/29/2021-IORT 2000 Gy by Dr Shadi Ramos. · 11/24/2021-she was first seen by me. Discussed NCCN guidelines recommendations. Recommended Oncotype DX to assess need for chemotherapy. The patient said she would decline adjuvant chemotherapy regardless of results of Oncotype DX. Therefore, Oncotype DX was not sent. No recommendation for radiation due to right mastectomy with clear margins and negative nodes. Continue letrozole x5 years.     Past Medical History:    Past Medical History:   Diagnosis Date    Acute sinusitis     CHAPARRO (acute kidney injury) ([address] 05/18/2018    Anemia     Bleeding ulcer     hx of; cauterized by dr. resendiz    Breast lump     CAD (coronary artery disease)     sees dr. Inocente Up ulcer ([address]     Cataract     COVID-19 01/2021    fatigue    Dermatitis     Diabetes mellitus ([address]     Diabetic nephropathy ([address]     Diabetic retinopathy ([address]     Glaucoma     Gout     Hemorrhoids     Hx of TB skin testing     positive    Hyperlipidemia     Hypertension     Palliative care patient 06/24/2021    Type 2 diabetes mellitus ([address] 05/18/2018    Vitamin D deficiency        Past Surgical History:    Past Surgical History:   Procedure Laterality Date    BREAST LUMPECTOMY Right 10/29/2021    RIGHT LUMPECTOMY, SNB, PREOP ULTRASOUND, INTRAOP ULTRASOUND GUIDED NL, BIOSORB, PEC BLOCK, MARGIN PROBE, FLAPS, IORT performed by Helena Estrada MD at [facility] [address] Right 9/3/2020    RIGHT CARPAL TUNNEL RELEASE performed by Amadeo Lerma MD at [address] [facility] REMOVAL      CHOLECYSTECTOMY      COLONOSCOPY N/A 10/11/2019    Dr Kenyon Castro of a hemostatic clip-Suboptimal prep, diverticulosis, internal hemorrhoids-Grade 2, AP x 3, 1 yr recall     COLONOSCOPY N/A 10/12/2020    Dr Davenport Nine hemorrhoids-Grade 1-2, AP, 3 yr recall    CORONARY ARTERY BYPASS GRAFT  2001    ENDOSCOPY, COLON, DIAGNOSTIC      EYE SURGERY      MOUTH SURGERY      OTHER SURGICAL HISTORY      sternal resection    SKIN BIOPSY      UPPER GASTROINTESTINAL ENDOSCOPY N/A 10/11/2019    Dr Yonathan Navarro (+) H Pylori    US BREAST NEEDLE BIOPSY LEFT Left 9/1/2021    US BREAST NEEDLE BIOPSY LEFT 9/1/2021 LPS GENERAL SURGERY    VASCULAR SURGERY         Social History:    Marital status: Single  Smoking status: Former; Occasional  ETOH status: No  Resides: [place]    Family History:   Family History   Problem Relation Age of Onset    Cancer Mother     Diabetes Father     COPD Father     Diabetes Sister     Heart Disease Sister     Colon Polyps Sister     Coronary Art Dis Other         grandfather    Diabetes Other         aunt    Colon Cancer Neg Hx     Liver Cancer Neg Hx     Rectal Cancer Neg Hx        Current Hospital Medications:    Current Outpatient Medications   Medication Sig Dispense Refill    cefdinir (OMNICEF) 300 MG capsule Take 1 capsule by mouth 2 times daily for 10 days 20 capsule 0    simvastatin (ZOCOR) 40 MG tablet Take 1 tablet by mouth nightly 90 tablet 3    HYDROcodone-acetaminophen (NORCO) 5-325 MG per tablet Take 1 tablet by mouth every 6 hours as needed for Pain.  12 tablet 0    glipiZIDE (GLUCOTROL XL) 2.5 MG extended release tablet Take 1 tablet by mouth daily 90 tablet 3    letrozole (FEMARA) 2.5 MG tablet Take 1 tablet by mouth daily 30 tablet 5    montelukast (SINGULAIR) 10 MG tablet Take 1 tablet by mouth daily 30 tablet 5    metoprolol tartrate (LOPRESSOR) 25 MG tablet Take 1 tablet by mouth 2 times daily 180 tablet 3  isosorbide mononitrate (IMDUR) 30 MG extended release tablet Take 1 tablet by mouth 2 times daily 180 tablet 3    Blood Glucose Calibration (ACCU-CHEK ROSA) SOLN Calibration done daily. 1 each 1    Blood Glucose Monitoring Suppl (ACCU-CHEK ROSA) ARMANDO Use 4 times a day to check BS. 1 Device 0    blood glucose test strips (ACCU-CHEK ROSA PLUS) strip TEST BLOOD SUGAR FOUR TIMES DAILY E11.9 400 strip 3    Accu-Chek Softclix Lancets MISC TEST FOUR TIMES DAILY E11.9 400 each 3    lisinopril (PRINIVIL;ZESTRIL) 5 MG tablet Take 1 tablet by mouth daily 90 tablet 2    metFORMIN (GLUCOPHAGE) 500 MG tablet TAKE 2 TABLETS TWICE DAILY WITH MEALS (Patient taking differently: Take 1,000 mg by mouth 2 times daily (with meals) ) 360 tablet 3    furosemide (LASIX) 40 MG tablet Take 1 tablet by mouth daily as needed (excess fluid) 60 tablet 1    amLODIPine (NORVASC) 5 MG tablet Take 1 tablet by mouth daily 90 tablet 1    Alcohol Swabs (B-D SINGLE USE SWABS REGULAR) PADS USE THREE TIMES DAILY 100 each 11    aspirin 81 MG tablet Take 81 mg by mouth daily      Omega-3 Fatty Acids (FISH OIL) 1000 MG CAPS Take 1,000 mg by mouth 2 times daily      MULTIPLE VITAMIN PO Take 1 tablet by mouth daily      Calcium Carbonate-Vitamin D (CALCIUM 500/D PO) Take 1 tablet by mouth 2 times daily      vitamin B-12 (CYANOCOBALAMIN) 1000 MCG tablet Take 1,000 mcg by mouth daily      timolol (TIMOPTIC) 0.5 % ophthalmic solution Place 1 drop into the right eye daily        No current facility-administered medications for this visit. Allergies:    Allergies   Allergen Reactions    Medrol [Methylprednisolone] Other (See Comments)     Affects her eyes         Subjective   REVIEW OF SYSTEMS:   CONSTITUTIONAL: no fever, no night sweats, no fatigue;  HEENT: no blurring of vision, no double vision, no hearing difficulty, no tinnitus, no ulceration, no dysplasia, no epistaxis;  LUNGS: no cough, no hemoptysis, no wheeze,  no shortness of breath;  CARDIOVASCULAR: no palpitation, no chest pain, no shortness of breath;  GI: no abdominal pain, no nausea, no vomiting, no diarrhea, no constipation;  ABDIAS: no dysuria, no hematuria, no frequency or urgency, no nephrolithiasis;  MUSCULOSKELETAL: no joint pain, no swelling, no stiffness;  ENDOCRINE: no polyuria, no polydipsia, no cold or heat intolerance;  HEMATOLOGY: no easy bruising or bleeding, no history of clotting disorder;  DERMATOLOGY: no skin rash, no eczema, no pruritus;  PSYCHIATRY: no depression, no anxiety, no panic attacks, no suicidal ideation, no homicidal ideation;  NEUROLOGY: no syncope, no seizures, no numbness or tingling of hands, no numbness or tingling of feet, no paresis;    Objective   /80   Pulse 77   Ht 5' 6\" (1.676 m)   Wt 214 lb 8 oz (97.3 kg)   SpO2 97%   BMI 34.62 kg/m²     PHYSICAL EXAM:  CONSTITUTIONAL: Alert, appropriate, no acute distress  EYES: Non icteric, EOM intact, pupils equal round   ENT: Mucus membranes moist, no oral pharyngeal lesions, external inspection of ears and nose are normal  NECK: Supple, no masses. No palpable thyroid mass  CHEST/LUNGS: CTA bilaterally, normal respiratory effort   CARDIOVASCULAR: RRR, no murmurs. No lower extremity edema  ABDOMEN: soft non-tender, active bowel sounds, no HSM. No palpable masses  EXTREMITIES: warm, full ROM in all 4 extremities, no focal weakness. SKIN: warm, dry with no rashes or lesions  LYMPH: No cervical, clavicular, axillary, or inguinal lymphadenopathy  NEUROLOGIC: follows commands, non focal   PSYCH: mood and affect appropriate. Alert and oriented to time, place, person      LABORATORY RESULTS REVIEWED/ANALYZED BY ME:  11/24/2021 CBC  WBC 6.05  HGB 10.6    Neut 3.87    RADIOLOGY STUDIES REVIEWED BY ME:  As above    ASSESSMENT:    No orders of the defined types were placed in this encounter. Herbie Pickens was seen today for new patient.     Diagnoses and all orders for this visit:    Malignant neoplasm of overlapping sites of right breast in female, estrogen receptor positive ([facility] [address]    Care plan discussed with patient      IDC right breast node-negative ER 96%, NC 94%, Her-2 2+/equivocal, Ki67 6%, FISH negative, MammaPrint Luminal A/low risk  Status post right lumpectomy/sentinel node biopsy  IORT 2000 cGy per radiation oncology  Discussed NCCN guidelines recommendations. Recommended Oncotype DX to assess need for chemotherapy. The patient said she would decline adjuvant chemotherapy regardless of results of Oncotype DX. Therefore, Oncotype DX was not sent. At this point, recommend to continue adjuvant letrozole for at least 5 years. Consider breast cancer index at year 5 to assess extended adjuvant endocrine therapy. Side effects of Letrozole discussed with patient   She has been tolerating letrozole well. Will not order Oncotype DX-patient states that she would not take chemotherapy if recommended    Plan is to continue letrozole x5 years. Obtain bone mineral density before next visit. PLAN:  · RTC in 6 months to see SARA Steward  · Continue Letrozole 2.5mg daily  · Continue follow-up with Dr. Alexander Duarte am pre-charting as a registered nurse for Radha Perkins MD. Electronically signed by Alysha Jacobs RN on 11/24/2021 at 9:04 AM CST. I have seen, examined and reviewed this patient medication list, appropriate labs and imaging studies. I reviewed relevant medical records and others physicians notes. I discussed the plans of care with the patient. I answered all the questions to the patients satisfaction. I have also reviewed the chief complaint (CC) and part of the history (History of Present Illness (HPI), Past Family Social History [facility]), or Review of Systems (ROS) and made changes when appropriated.        (Please note that portions of this note were completed with a voice recognition program. Efforts were made to edit the dictations but occasionally words are mis-transcribed.)   The total time (60min) I spent to see the patient today includes at least one or more of the following: preparing to see the patient by reviewing prior tests, prior notes or other relevant information, performing appropriate independent examination and evaluation, counseling, ordering of medications,documenting clinic information in the electronic medical record or other health records, independently interpreting results of tests, managing test results and communicating the results to the patient/family or caregiver.

## 2021-11-24 ENCOUNTER — HOSPITAL ENCOUNTER (OUTPATIENT)
Dept: INFUSION THERAPY | Age: 71
Discharge: HOME | End: 2021-11-24
Payer: MEDICARE

## 2021-11-24 ENCOUNTER — OFFICE VISIT (OUTPATIENT)
Dept: HEMATOLOGY | Age: 71
End: 2021-11-24
Payer: MEDICARE

## 2021-11-24 VITALS
DIASTOLIC BLOOD PRESSURE: 80 MMHG | HEART RATE: 77 BPM | WEIGHT: 214.5 LBS | OXYGEN SATURATION: 97 % | SYSTOLIC BLOOD PRESSURE: 124 MMHG | BODY MASS INDEX: 34.47 KG/M2 | HEIGHT: 66 IN

## 2021-11-24 DIAGNOSIS — Z17.0 MALIGNANT NEOPLASM OF OVERLAPPING SITES OF RIGHT BREAST IN FEMALE, ESTROGEN RECEPTOR POSITIVE (HCC): ICD-10-CM

## 2021-11-24 DIAGNOSIS — Z51.81 ENCOUNTER FOR MONITORING AROMATASE INHIBITOR THERAPY: ICD-10-CM

## 2021-11-24 DIAGNOSIS — Z79.811 ENCOUNTER FOR MONITORING AROMATASE INHIBITOR THERAPY: ICD-10-CM

## 2021-11-24 DIAGNOSIS — Z71.89 CARE PLAN DISCUSSED WITH PATIENT: ICD-10-CM

## 2021-11-24 DIAGNOSIS — C50.811 MALIGNANT NEOPLASM OF OVERLAPPING SITES OF RIGHT BREAST IN FEMALE, ESTROGEN RECEPTOR POSITIVE (HCC): ICD-10-CM

## 2021-11-24 DIAGNOSIS — Z78.0 POSTMENOPAUSAL STATUS: ICD-10-CM

## 2021-11-24 DIAGNOSIS — Z17.0 MALIGNANT NEOPLASM OF OVERLAPPING SITES OF RIGHT BREAST IN FEMALE, ESTROGEN RECEPTOR POSITIVE (HCC): Primary | ICD-10-CM

## 2021-11-24 DIAGNOSIS — C50.811 MALIGNANT NEOPLASM OF OVERLAPPING SITES OF RIGHT BREAST IN FEMALE, ESTROGEN RECEPTOR POSITIVE (HCC): Primary | ICD-10-CM

## 2021-11-24 LAB
BASOPHILS ABSOLUTE: 0.03 K/UL (ref 0.01–0.08)
BASOPHILS RELATIVE PERCENT: 0.5 % (ref 0.1–1.2)
EOSINOPHILS ABSOLUTE: 0.21 K/UL (ref 0.04–0.54)
EOSINOPHILS RELATIVE PERCENT: 3.5 % (ref 0.7–7)
HCT VFR BLD CALC: 34.4 % (ref 34.1–44.9)
HEMOGLOBIN: 10.6 G/DL (ref 11.2–15.7)
LYMPHOCYTES ABSOLUTE: 1.59 K/UL (ref 1.18–3.74)
LYMPHOCYTES RELATIVE PERCENT: 26.3 % (ref 19.3–53.1)
MCH RBC QN AUTO: 31.8 PG (ref 25.6–32.2)
MCHC RBC AUTO-ENTMCNC: 30.8 G/DL (ref 32.3–35.5)
MCV RBC AUTO: 103.3 FL (ref 79.4–94.8)
MONOCYTES ABSOLUTE: 0.35 K/UL (ref 0.24–0.82)
MONOCYTES RELATIVE PERCENT: 5.8 % (ref 4.7–12.5)
NEUTROPHILS ABSOLUTE: 3.87 K/UL (ref 1.56–6.13)
NEUTROPHILS RELATIVE PERCENT: 63.9 % (ref 34–71.1)
PDW BLD-RTO: 13.7 % (ref 11.7–14.4)
PLATELET # BLD: 182 K/UL (ref 182–369)
PMV BLD AUTO: 10.8 FL (ref 7.4–10.4)
RBC # BLD: 3.33 M/UL (ref 3.93–5.22)
WBC # BLD: 6.05 K/UL (ref 3.98–10.04)

## 2021-11-24 PROCEDURE — 99212 OFFICE O/P EST SF 10 MIN: CPT

## 2021-11-24 PROCEDURE — 3017F COLORECTAL CA SCREEN DOC REV: CPT | Performed by: INTERNAL MEDICINE

## 2021-11-24 PROCEDURE — 36415 COLL VENOUS BLD VENIPUNCTURE: CPT

## 2021-11-24 PROCEDURE — 85025 COMPLETE CBC W/AUTO DIFF WBC: CPT

## 2021-11-24 PROCEDURE — G8417 CALC BMI ABV UP PARAM F/U: HCPCS | Performed by: INTERNAL MEDICINE

## 2021-11-24 PROCEDURE — 1036F TOBACCO NON-USER: CPT | Performed by: INTERNAL MEDICINE

## 2021-11-24 PROCEDURE — G8428 CUR MEDS NOT DOCUMENT: HCPCS | Performed by: INTERNAL MEDICINE

## 2021-11-24 PROCEDURE — 99205 OFFICE O/P NEW HI 60 MIN: CPT | Performed by: INTERNAL MEDICINE

## 2021-11-24 PROCEDURE — 1090F PRES/ABSN URINE INCON ASSESS: CPT | Performed by: INTERNAL MEDICINE

## 2021-11-24 PROCEDURE — G8484 FLU IMMUNIZE NO ADMIN: HCPCS | Performed by: INTERNAL MEDICINE

## 2021-11-24 PROCEDURE — 1123F ACP DISCUSS/DSCN MKR DOCD: CPT | Performed by: INTERNAL MEDICINE

## 2021-11-24 PROCEDURE — 4040F PNEUMOC VAC/ADMIN/RCVD: CPT | Performed by: INTERNAL MEDICINE

## 2021-11-24 PROCEDURE — G8400 PT W/DXA NO RESULTS DOC: HCPCS | Performed by: INTERNAL MEDICINE

## 2021-11-24 RX ORDER — LETROZOLE 2.5 MG/1
2.5 TABLET, FILM COATED ORAL DAILY
Qty: 90 TABLET | Refills: 3 | Status: SHIPPED | OUTPATIENT
Start: 2021-11-24 | End: 2022-03-21 | Stop reason: SDUPTHER

## 2021-11-24 RX ORDER — MONTELUKAST SODIUM 10 MG/1
10 TABLET ORAL DAILY
Qty: 90 TABLET | Refills: 3 | Status: SHIPPED | OUTPATIENT
Start: 2021-11-24 | End: 2022-03-21 | Stop reason: SDUPTHER

## 2021-11-30 NOTE — PROGRESS NOTES
HISTORY OF PRESENT ILLNESS:    Ms. Janusz Jarquin  is status post ultrasound guided breast biopsy on the right which revealed a 2.6 cm high grade infiltrating ductal carcinoma. ER is positive at 96%. AK is positive at 94%. Her2 is Equivocal. Ki67 is low at 6%. Fish is negative. Mammaprint is low risk luminal A type. MRI was not performed     She underwent right lumpectomy with sentinel node and IORT on 10/29/2021. Pathology:  FINAL DIAGNOSIS:     A.  Breast, right simple mastectomy:   1.  Infiltrating ductal carcinoma, no special type, grade 2.   2.  Infiltrating carcinoma measures 2.5 cm in greatest dimension grossly. 3.  Infiltrating carcinoma extends to within 0.1 cm of the anterior   surgical excision margin. 4.  Infiltrating carcinoma extends to within 0.4 cm of the nearest caudal   surgical excision margin. 5.  Tumor invades the deepest aspect of the overlying dermis. 6.  Negative for evidence of dermal lymphovascular space invasion. 7.  Negative for evidence of epidermal invasion. 8.  Sections of nipple, negative for evidence of malignancy. B.  Breast, excision of additional right breast cranial margin: Benign   breast parenchyma. C.  Breast, excision of additional right breast caudal margin: Benign   breast parenchyma. D.  Breast, excision of additional right breast deep margin: Benign   breast parenchyma. E.  Lymph node, right sentinel lymph node biopsy: 2 lymph nodes, negative   for evidence of malignancy. AJCC STAGE: pT2, (sn)pN0, pMx     ADDENDUM:  At the clinician's request, an immunohistochemical stain for   Ki-67 is performed using appropriately staining controls.  The Ki-67   staining fraction is low and estimated at 2% which is decreased from the   original percentage of 6%. She is seen Dr. Mo Nixon and going to continue letrozole. He offered Oncotype DX however she said she would not take chemotherapy regardless.     PHYSICAL EXAM:  The  wounds look good with no evidence of infection, fluid accumulation, or skin necrosis. IMPRESSION:    Doing well s/p right lumpectomy and sentinel node    PLAN:  Follow-up in 5 months with unilateral right mammogram        I spent over 50% of this visit counseling patient. 15 minutes of face to face time with patient.

## 2021-12-01 ENCOUNTER — OFFICE VISIT (OUTPATIENT)
Dept: SURGERY | Age: 71
End: 2021-12-01

## 2021-12-01 VITALS
DIASTOLIC BLOOD PRESSURE: 74 MMHG | WEIGHT: 217 LBS | HEIGHT: 66 IN | SYSTOLIC BLOOD PRESSURE: 130 MMHG | BODY MASS INDEX: 34.87 KG/M2 | TEMPERATURE: 97.2 F

## 2021-12-01 DIAGNOSIS — Z98.890 STATUS POST RIGHT BREAST LUMPECTOMY: ICD-10-CM

## 2021-12-01 DIAGNOSIS — Z17.0 MALIGNANT NEOPLASM OF OVERLAPPING SITES OF RIGHT BREAST IN FEMALE, ESTROGEN RECEPTOR POSITIVE (HCC): Primary | ICD-10-CM

## 2021-12-01 DIAGNOSIS — C50.811 MALIGNANT NEOPLASM OF OVERLAPPING SITES OF RIGHT BREAST IN FEMALE, ESTROGEN RECEPTOR POSITIVE (HCC): Primary | ICD-10-CM

## 2021-12-01 PROCEDURE — 99024 POSTOP FOLLOW-UP VISIT: CPT | Performed by: SURGERY

## 2021-12-13 ENCOUNTER — HOSPITAL ENCOUNTER (OUTPATIENT)
Dept: WOMENS IMAGING | Age: 71
Discharge: HOME | End: 2021-12-13
Payer: MEDICARE

## 2021-12-13 DIAGNOSIS — Z78.0 POSTMENOPAUSAL STATUS: ICD-10-CM

## 2021-12-13 PROCEDURE — 77080 DXA BONE DENSITY AXIAL: CPT

## 2021-12-20 RX ORDER — AMLODIPINE BESYLATE 5 MG/1
TABLET ORAL
Qty: 90 TABLET | Refills: 1 | Status: SHIPPED | OUTPATIENT
Start: 2021-12-20 | End: 2022-07-13

## 2021-12-20 NOTE — TELEPHONE ENCOUNTER
[facility] called requesting a refill of the below medication which has been pended for you:     Requested Prescriptions     Pending Prescriptions Disp Refills    amLODIPine (NORVASC) 5 MG tablet [Pharmacy Med Name: AMLODIPINE BESYLATE 5 MG Tablet] 90 tablet 1     Sig: TAKE 1 TABLET EVERY DAY       Last Appointment Date: 11/17/2021  Next Appointment Date: 2/17/2022    Allergies   Allergen Reactions    Medrol [Methylprednisolone] Other (See Comments)     Affects her eyes

## 2021-12-27 ENCOUNTER — TELEPHONE (OUTPATIENT)
Dept: SURGERY | Age: 71
End: 2021-12-27

## 2021-12-27 NOTE — TELEPHONE ENCOUNTER
Josie Groves from [facility] [employer] called about DOS 09/16/2021 Papito Velasquez has been denied. She stated that it is something that Medicare does not cover. Thank you.

## 2022-01-11 DIAGNOSIS — Z17.0 MALIGNANT NEOPLASM OF OVERLAPPING SITES OF RIGHT BREAST IN FEMALE, ESTROGEN RECEPTOR POSITIVE (HCC): Primary | ICD-10-CM

## 2022-01-11 DIAGNOSIS — C50.811 MALIGNANT NEOPLASM OF OVERLAPPING SITES OF RIGHT BREAST IN FEMALE, ESTROGEN RECEPTOR POSITIVE (HCC): Primary | ICD-10-CM

## 2022-01-11 DIAGNOSIS — Z98.890 STATUS POST RIGHT BREAST LUMPECTOMY: ICD-10-CM

## 2022-01-27 ENCOUNTER — OFFICE VISIT (OUTPATIENT)
Dept: CARDIOLOGY CLINIC | Age: 72
End: 2022-01-27
Payer: MEDICARE

## 2022-01-27 VITALS
HEIGHT: 66 IN | WEIGHT: 212 LBS | SYSTOLIC BLOOD PRESSURE: 138 MMHG | HEART RATE: 69 BPM | DIASTOLIC BLOOD PRESSURE: 78 MMHG | BODY MASS INDEX: 34.07 KG/M2

## 2022-01-27 DIAGNOSIS — Z95.1 HISTORY OF CORONARY ARTERY BYPASS GRAFT: ICD-10-CM

## 2022-01-27 DIAGNOSIS — I25.5 ISCHEMIC CARDIOMYOPATHY: ICD-10-CM

## 2022-01-27 DIAGNOSIS — I25.10 CORONARY ARTERY DISEASE INVOLVING NATIVE CORONARY ARTERY OF NATIVE HEART WITHOUT ANGINA PECTORIS: Primary | ICD-10-CM

## 2022-01-27 DIAGNOSIS — I10 PRIMARY HYPERTENSION: ICD-10-CM

## 2022-01-27 DIAGNOSIS — Z95.1 STATUS POST AORTO-CORONARY ARTERY BYPASS GRAFT: ICD-10-CM

## 2022-01-27 DIAGNOSIS — E78.2 MIXED HYPERLIPIDEMIA: ICD-10-CM

## 2022-01-27 PROCEDURE — G8417 CALC BMI ABV UP PARAM F/U: HCPCS | Performed by: INTERNAL MEDICINE

## 2022-01-27 PROCEDURE — G8484 FLU IMMUNIZE NO ADMIN: HCPCS | Performed by: INTERNAL MEDICINE

## 2022-01-27 PROCEDURE — 1090F PRES/ABSN URINE INCON ASSESS: CPT | Performed by: INTERNAL MEDICINE

## 2022-01-27 PROCEDURE — G8427 DOCREV CUR MEDS BY ELIG CLIN: HCPCS | Performed by: INTERNAL MEDICINE

## 2022-01-27 PROCEDURE — 3017F COLORECTAL CA SCREEN DOC REV: CPT | Performed by: INTERNAL MEDICINE

## 2022-01-27 PROCEDURE — 99213 OFFICE O/P EST LOW 20 MIN: CPT | Performed by: INTERNAL MEDICINE

## 2022-01-27 PROCEDURE — 1036F TOBACCO NON-USER: CPT | Performed by: INTERNAL MEDICINE

## 2022-01-27 PROCEDURE — 4040F PNEUMOC VAC/ADMIN/RCVD: CPT | Performed by: INTERNAL MEDICINE

## 2022-01-27 PROCEDURE — 1123F ACP DISCUSS/DSCN MKR DOCD: CPT | Performed by: INTERNAL MEDICINE

## 2022-01-27 PROCEDURE — G8399 PT W/DXA RESULTS DOCUMENT: HCPCS | Performed by: INTERNAL MEDICINE

## 2022-01-27 RX ORDER — LISINOPRIL 5 MG/1
5 TABLET ORAL DAILY
Qty: 90 TABLET | Refills: 2 | Status: SHIPPED | OUTPATIENT
Start: 2022-01-27 | End: 2022-10-21 | Stop reason: SDUPTHER

## 2022-01-27 ASSESSMENT — ENCOUNTER SYMPTOMS
VOMITING: 0
SHORTNESS OF BREATH: 0
DIARRHEA: 0
GASTROINTESTINAL NEGATIVE: 1
EYES NEGATIVE: 1
NAUSEA: 0
RESPIRATORY NEGATIVE: 1

## 2022-01-27 NOTE — PROGRESS NOTES
[facility] Progress Note                            Date:  1/27/2022  Patient: Rakel Hyde  Age:  66 y.o., 1950      Reason for evaluation:         SUBJECTIVE:    Returns today follow-up assessment follow-up for ischemic cardiomyopathy coronary artery disease hypertension hyperlipidemia previous CABG. Had recent breast surgery for carcinoma several months ago. Presently doing reasonably well denies limiting dyspnea denies chest pain denies palpitations. Echocardiogram in July was remarkable for ejection fraction 25 to 30%. I discussed with her possible ICD prior to the breast surgery and I discussed this again she will think about it and let us know. No other complaints or issues. Blood pressure 138/78 heart 69. Review of Systems   Constitutional: Negative. Negative for chills, fever and unexpected weight change. HENT: Negative. Eyes: Negative. Respiratory: Negative. Negative for shortness of breath. Cardiovascular: Negative. Negative for chest pain. Gastrointestinal: Negative. Negative for diarrhea, nausea and vomiting. Endocrine: Negative. Genitourinary: Negative. Musculoskeletal: Negative. Skin: Negative. Neurological: Negative. All other systems reviewed and are negative. OBJECTIVE:     /78   Pulse 69   Ht 5' 6\" (1.676 m)   Wt 212 lb (96.2 kg)   BMI 34.22 kg/m²     Labs:   CBC: No results for input(s): WBC, HGB, HCT, PLT in the last 72 hours. BMP:No results for input(s): NA, K, CO2, BUN, CREATININE, LABGLOM, GLUCOSE in the last 72 hours. BNP: No results for input(s): BNP in the last 72 hours. PT/INR: No results for input(s): PROTIME, INR in the last 72 hours. APTT:No results for input(s): APTT in the last 72 hours. CARDIAC ENZYMES:No results for input(s): CKTOTAL, CKMB, CKMBINDEX, TROPONINI in the last 72 hours.   FASTING LIPID PANEL:  Lab Results   Component Value Date    HDL 41 11/17/2021    LDLCALC 56 11/17/2021    TRIG 94 11/17/2021     LIVER PROFILE:No results for input(s): AST, ALT, LABALBU in the last 72 hours.         Past Medical History:   Diagnosis Date    Acute sinusitis     [facility]APARRO (acute kidney injury) ([facility] 05/18/2018    Anemia     Bleeding ulcer     hx of; cauterized by dr. Gaffney Cuff    Breast lump     CAD (coronary artery disease)     [facility]     Cataract     COVID-19 01/2021    fatigue    Dermatitis     Diabetes mellitus ([facility]     Diabetic nephropathy ([facility]     [facility]     Glaucoma     Gout     Hemorrhoids     Hx of TB skin testing     positive    Hyperlipidemia     Hypertension     Palliative care patient 06/24/2021    Type 2 diabetes mellitus ([facility] 05/18/2018    Vitamin D deficiency      Past Surgical History:   Procedure Laterality Date    BREAST LUMPECTOMY Right 10/29/2021    RIGHT LUMPECTOMY, SNB, PREOP ULTRASOUND, INTRAOP ULTRASOUND GUIDED NL, BIOSORB, PEC BLOCK, MARGIN PROBE, FLAPS, IORT performed by Angie Luke MD at [facility] Right 9/3/2020    RIGHT CARPAL TUNNEL RELEASE performed [facility] at [facility]      COLONOSCOPY N/A 10/11/2019    Dr De La Garza Leaks of a hemostatic clip-Suboptimal prep, diverticulosis, internal hemorrhoids-Grade 2, AP x 3, 1 yr recall     COLONOSCOPY N/A 10/12/2020    Dr Juliana Lal hemorrhoids-Grade 1-2, AP, 3 yr recall    CORONARY ARTERY BYPASS GRAFT  2001    ENDOSCOPY, COLON, DIAGNOSTIC      EYE SURGERY      MOUTH SURGERY      OTHER SURGICAL HISTORY      sternal resection    SKIN BIOPSY      UPPER GASTROINTESTINAL ENDOSCOPY N/A 10/11/2019    Dr Pete Wilcox (+) H Pylori    US BREAST NEEDLE BIOPSY LEFT Left 9/1/2021    US BREAST NEEDLE BIOPSY LEFT 9/1/2021 LPS GENERAL SURGERY    VASCULAR SURGERY       Family History   Problem Relation Age of Onset    Cancer Mother     Diabetes Father     COPD Father     Diabetes Sister     Heart Disease Sister     Colon Polyps Sister     Coronary Art Dis Other         grandfather    Diabetes Other         aunt    Colon Cancer Neg Hx     Liver Cancer Neg Hx     Rectal Cancer Neg Hx      Allergies   Allergen Reactions    Medrol [Methylprednisolone] Other (See Comments)     Affects her eyes     Current Outpatient Medications   Medication Sig Dispense Refill    amLODIPine (NORVASC) 5 MG tablet TAKE 1 TABLET EVERY DAY 90 tablet 1    letrozole (FEMARA) 2.5 MG tablet Take 1 tablet by mouth daily 90 tablet 3    montelukast (SINGULAIR) 10 MG tablet Take 1 tablet by mouth daily 90 tablet 3    simvastatin (ZOCOR) 40 MG tablet Take 1 tablet by mouth nightly 90 tablet 3    glipiZIDE (GLUCOTROL XL) 2.5 MG extended release tablet Take 1 tablet by mouth daily 90 tablet 3    metoprolol tartrate (LOPRESSOR) 25 MG tablet Take 1 tablet by mouth 2 times daily 180 tablet 3    isosorbide mononitrate (IMDUR) 30 MG extended release tablet Take 1 tablet by mouth 2 times daily 180 tablet 3    Blood Glucose Calibration (ACCU-CHEK ROSA) SOLN Calibration done daily.  1 each 1    Blood Glucose Monitoring Suppl (ACCU-CHEK ROSA) ARMANDO Use 4 times a day to check BS. 1 Device 0    blood glucose test strips (ACCU-CHEK ROSA PLUS) strip TEST BLOOD SUGAR FOUR TIMES DAILY E11.9 400 strip 3    Accu-Chek Softclix Lancets MISC TEST FOUR TIMES DAILY E11.9 400 each 3    lisinopril (PRINIVIL;ZESTRIL) 5 MG tablet Take 1 tablet by mouth daily 90 tablet 2    metFORMIN (GLUCOPHAGE) 500 MG tablet TAKE 2 TABLETS TWICE DAILY WITH MEALS (Patient taking differently: Take 1,000 mg by mouth 2 times daily (with meals) ) 360 tablet 3    furosemide (LASIX) 40 MG tablet Take 1 tablet by mouth daily as needed (excess fluid) 60 tablet 1    Alcohol Swabs (B-D SINGLE USE SWABS REGULAR) PADS USE THREE TIMES DAILY 100 each 11    aspirin 81 MG tablet Take 81 mg by mouth daily      Omega-3 Fatty file    Frequency of Social Gatherings with Friends and Family: Not on file    Attends Rastafarian Services: Not on file    Active Member of Clubs or Organizations: Not on file    Attends Club or Organization Meetings: Not on file    Marital Status: Not on file   Intimate Partner Violence:     Fear of Current or Ex-Partner: Not on file    Emotionally Abused: Not on file    Physically Abused: Not on file    Sexually Abused: Not on file   Housing Stability:     Unable to Pay for Housing in the Last Year: Not on file    Number of [place] in the Last Year: Not on file    Unstable Housing in the Last Year: Not on file       Physical Examination:  /78   Pulse 69   Ht 5' 6\" (1.676 m)   Wt 212 lb (96.2 kg)   BMI 34.22 kg/m²   Physical Exam  Vitals reviewed. Constitutional:       Appearance: She is well-developed. Neck:      Vascular: No carotid bruit or JVD. Cardiovascular:      Rate and Rhythm: Normal rate and regular rhythm. Heart sounds: Normal heart sounds. No murmur heard. No friction rub. No gallop. Pulmonary:      Effort: Pulmonary effort is normal. No respiratory distress. Breath sounds: Normal breath sounds. No wheezing or rales. Abdominal:      General: There is no distension. Tenderness: There is no abdominal tenderness. Lymphadenopathy:      Cervical: No cervical adenopathy. Skin:     General: Skin is warm and dry. ASSESSMENT:     Diagnosis Orders   1. Coronary artery disease involving native coronary artery of native heart without angina pectoris     2. Primary hypertension     3. Mixed hyperlipidemia     4. History of coronary artery bypass graft     5. Ischemic cardiomyopathy     6. Status post aorto-coronary artery bypass graft         PLAN:  No orders of the defined types were placed in this encounter. No orders of the defined types were placed in this encounter. 1. Continue present medications  2.  Recommend follow-up assessment in 6 months  3. We discussed again possible ICD implantation she will think about it and let us know in a few days she certainly would qualify for this. She would probably benefit from cardiac catheterization prior to that. Return in about 6 months (around 7/27/2022) for return to Dr. Danitza Gibbs only. Aspen Valdivia MD 1/27/2022 1:19 PM [facility]      Thisdictation was generated by voice recognition computer software. Although all attempts are made to edit the dictation for accuracy, there may be errors in the transcription that are not intended.

## 2022-01-31 ENCOUNTER — TELEPHONE (OUTPATIENT)
Dept: CARDIOLOGY CLINIC | Age: 72
End: 2022-01-31

## 2022-01-31 NOTE — TELEPHONE ENCOUNTER
Patient is scheduled for 2/15 at 6 arrival for 730 procedure. COVID swab MUST be done at [place] [address] on 2/14 between 9AM and 12PM then required to quarantine until day of procedure. If patient does not go as instructed for covid swab patient will be rescheduled and required to retest again regardless. If quarantine is broke patient will be rescheduled and required to retest. Will only call with results if positive. Patient voiced understanding of all of this and had patient repeat back to me. Other instructions given below. [place] at the [address] [place] [address] and [address] [facility] located on the first floor of [place] [address] through hospital main entrance and turn immediately to the left. Patient's contact number:  [phone] (home)     Implantable Cardioverter-Defibrillator [overnight stay]    An implantable cardioverter-defibrillator, or ICD, is a small electronic device designed to treat dangerous tachycardias. The ICD monitors the heart rhythm at all times. If it senses a dangerous tachycardia, the ICD delivers one or more impulses or shocks to the heart and restores a normal rhythm. Prep Instructions:    1. Do not eat or drink anything after midnight the night before your procedure. May take morning medications with a sip of water unless otherwise directed not to.  2.  You should arrange to have someone take you home rather than drive yourself. 3.  Further plans will be made following your procedure. 4.  Hold Glucophage (Metformin) and Coumadin (Warfarin) 48 hours prior to procedure.

## 2022-02-10 ENCOUNTER — TELEPHONE (OUTPATIENT)
Dept: CARDIOLOGY CLINIC | Age: 72
End: 2022-02-10

## 2022-02-10 NOTE — TELEPHONE ENCOUNTER
Yessy calling from [address] [facility] called stating patient has been denied to have replacement of Defibrillation. Denial  #[id number]. She will fax over the letter. Thank you!     Ph: [phone]

## 2022-02-14 DIAGNOSIS — Z11.52 ENCOUNTER FOR SCREENING FOR COVID-19: Primary | ICD-10-CM

## 2022-02-14 LAB — SARS-COV-2, PCR: NOT DETECTED

## 2022-02-15 ENCOUNTER — APPOINTMENT (OUTPATIENT)
Dept: GENERAL RADIOLOGY | Age: 72
End: 2022-02-15
Attending: INTERNAL MEDICINE
Payer: MEDICARE

## 2022-02-15 ENCOUNTER — HOSPITAL ENCOUNTER (OUTPATIENT)
Dept: CARDIAC CATH/INVASIVE PROCEDURES | Age: 72
Discharge: HOME | End: 2022-02-15
Attending: INTERNAL MEDICINE | Admitting: INTERNAL MEDICINE
Payer: MEDICARE

## 2022-02-15 VITALS
SYSTOLIC BLOOD PRESSURE: 129 MMHG | HEART RATE: 80 BPM | WEIGHT: 204 LBS | HEIGHT: 66 IN | BODY MASS INDEX: 32.78 KG/M2 | DIASTOLIC BLOOD PRESSURE: 95 MMHG | OXYGEN SATURATION: 94 % | RESPIRATION RATE: 20 BRPM | TEMPERATURE: 97.5 F

## 2022-02-15 LAB
ALBUMIN SERPL-MCNC: 3.8 G/DL (ref 3.5–5.2)
ALP BLD-CCNC: 40 U/L (ref 35–104)
ALT SERPL-CCNC: 10 U/L (ref 5–33)
ANION GAP SERPL CALCULATED.3IONS-SCNC: 12 MMOL/L (ref 7–19)
AST SERPL-CCNC: 20 U/L (ref 5–32)
BILIRUB SERPL-MCNC: 0.3 MG/DL (ref 0.2–1.2)
BUN BLDV-MCNC: 33 MG/DL (ref 8–23)
CALCIUM SERPL-MCNC: 9.2 MG/DL (ref 8.8–10.2)
CHLORIDE BLD-SCNC: 102 MMOL/L (ref 98–111)
CO2: 23 MMOL/L (ref 22–29)
CREAT SERPL-MCNC: 1.8 MG/DL (ref 0.5–0.9)
EKG P AXIS: 81 DEGREES
EKG P-R INTERVAL: 178 MS
EKG Q-T INTERVAL: 416 MS
EKG QRS DURATION: 142 MS
EKG QTC CALCULATION (BAZETT): 433 MS
EKG T AXIS: 148 DEGREES
GFR AFRICAN AMERICAN: 33
GFR NON-AFRICAN AMERICAN: 28
GLUCOSE BLD-MCNC: 87 MG/DL (ref 74–109)
HCT VFR BLD CALC: 30.6 % (ref 37–47)
HEMOGLOBIN: 9.4 G/DL (ref 12–16)
LV EF: 33 %
LVEF MODALITY: NORMAL
MCH RBC QN AUTO: 30.7 PG (ref 27–31)
MCHC RBC AUTO-ENTMCNC: 30.7 G/DL (ref 33–37)
MCV RBC AUTO: 100 FL (ref 81–99)
PDW BLD-RTO: 15.3 % (ref 11.5–14.5)
PLATELET # BLD: 209 K/UL (ref 130–400)
PMV BLD AUTO: 10.5 FL (ref 9.4–12.3)
POTASSIUM REFLEX MAGNESIUM: 4.9 MMOL/L (ref 3.5–5)
RBC # BLD: 3.06 M/UL (ref 4.2–5.4)
SODIUM BLD-SCNC: 137 MMOL/L (ref 136–145)
TOTAL PROTEIN: 7.1 G/DL (ref 6.6–8.7)
WBC # BLD: 5.7 K/UL (ref 4.8–10.8)

## 2022-02-15 PROCEDURE — 71046 X-RAY EXAM CHEST 2 VIEWS: CPT

## 2022-02-15 PROCEDURE — 6360000004 HC RX CONTRAST MEDICATION: Performed by: INTERNAL MEDICINE

## 2022-02-15 PROCEDURE — C8923 2D TTE W OR W/O FOL W/CON,CO: HCPCS

## 2022-02-15 PROCEDURE — 2709999900 HC NON-CHARGEABLE SUPPLY

## 2022-02-15 PROCEDURE — C1894 INTRO/SHEATH, NON-LASER: HCPCS

## 2022-02-15 PROCEDURE — 80053 COMPREHEN METABOLIC PANEL: CPT

## 2022-02-15 PROCEDURE — 2500000003 HC RX 250 WO HCPCS

## 2022-02-15 PROCEDURE — 6360000002 HC RX W HCPCS

## 2022-02-15 PROCEDURE — C1769 GUIDE WIRE: HCPCS

## 2022-02-15 PROCEDURE — 93005 ELECTROCARDIOGRAM TRACING: CPT | Performed by: INTERNAL MEDICINE

## 2022-02-15 PROCEDURE — 99152 MOD SED SAME PHYS/QHP 5/>YRS: CPT

## 2022-02-15 PROCEDURE — 2580000003 HC RX 258: Performed by: INTERNAL MEDICINE

## 2022-02-15 PROCEDURE — 99024 POSTOP FOLLOW-UP VISIT: CPT | Performed by: INTERNAL MEDICINE

## 2022-02-15 PROCEDURE — 6370000000 HC RX 637 (ALT 250 FOR IP): Performed by: INTERNAL MEDICINE

## 2022-02-15 PROCEDURE — 36415 COLL VENOUS BLD VENIPUNCTURE: CPT

## 2022-02-15 PROCEDURE — 85027 COMPLETE CBC AUTOMATED: CPT

## 2022-02-15 PROCEDURE — 93010 ELECTROCARDIOGRAM REPORT: CPT | Performed by: INTERNAL MEDICINE

## 2022-02-15 PROCEDURE — 93459 L HRT ART/GRFT ANGIO: CPT | Performed by: INTERNAL MEDICINE

## 2022-02-15 PROCEDURE — 93459 L HRT ART/GRFT ANGIO: CPT

## 2022-02-15 PROCEDURE — 99152 MOD SED SAME PHYS/QHP 5/>YRS: CPT | Performed by: INTERNAL MEDICINE

## 2022-02-15 RX ORDER — HYDRALAZINE HYDROCHLORIDE 20 MG/ML
10 INJECTION INTRAMUSCULAR; INTRAVENOUS EVERY 10 MIN PRN
Status: DISCONTINUED | OUTPATIENT
Start: 2022-02-15 | End: 2022-02-15 | Stop reason: HOSPADM

## 2022-02-15 RX ORDER — ASPIRIN 81 MG/1
81 TABLET ORAL ONCE
Status: COMPLETED | OUTPATIENT
Start: 2022-02-15 | End: 2022-02-15

## 2022-02-15 RX ORDER — ACETAMINOPHEN 325 MG/1
650 TABLET ORAL EVERY 4 HOURS PRN
Status: DISCONTINUED | OUTPATIENT
Start: 2022-02-15 | End: 2022-02-15 | Stop reason: HOSPADM

## 2022-02-15 RX ORDER — SODIUM CHLORIDE 9 MG/ML
1000 INJECTION, SOLUTION INTRAVENOUS CONTINUOUS
Status: DISCONTINUED | OUTPATIENT
Start: 2022-02-15 | End: 2022-02-15 | Stop reason: HOSPADM

## 2022-02-15 RX ORDER — SODIUM CHLORIDE 9 MG/ML
INJECTION, SOLUTION INTRAVENOUS CONTINUOUS
Status: DISCONTINUED | OUTPATIENT
Start: 2022-02-15 | End: 2022-02-15 | Stop reason: SDUPTHER

## 2022-02-15 RX ORDER — ONDANSETRON 2 MG/ML
4 INJECTION INTRAMUSCULAR; INTRAVENOUS EVERY 6 HOURS PRN
Status: DISCONTINUED | OUTPATIENT
Start: 2022-02-15 | End: 2022-02-15 | Stop reason: HOSPADM

## 2022-02-15 RX ADMIN — IOPAMIDOL 290 ML: 612 INJECTION, SOLUTION INTRAVENOUS at 09:06

## 2022-02-15 RX ADMIN — PERFLUTREN 1.5 ML: 6.52 INJECTION, SUSPENSION INTRAVENOUS at 11:50

## 2022-02-15 RX ADMIN — ASPIRIN 81 MG: 81 TABLET, COATED ORAL at 07:09

## 2022-02-15 RX ADMIN — SODIUM CHLORIDE: 9 INJECTION, SOLUTION INTRAVENOUS at 07:09

## 2022-02-15 NOTE — PROGRESS NOTES
Cardiac catheterization preliminary findings left ventricular function moderately severely impaired global hypokinesis coronary angiograms right coronary artery 100% mid severe diffuse disease throughout the LAD mid distal circumflex   Patent LIMA graft LAD  Patent saphenous vein graft to distal right coronary artery  Atretic severely diseased saphenous vein graft to?   OM  Recommend medical management and consideration for staged ICD

## 2022-02-15 NOTE — PROGRESS NOTES
L radial site cdi, no s/s of bleeding or hematoma present upon dc. Dc instructions explained to pt and pts sister, both v/u and had no further questions. Pt dc'd with AVS and belongings. Pt assisted to front entrance via wheelchair, sister plans to drive pt home.  Electronically signed by Mik Ochoa RN on 2/15/2022 at 12:54 PM

## 2022-02-15 NOTE — H&P
Office Visit    1/27/2022  [facility] MD    Interventional Cardiology  Coronary artery disease involving native coronary artery of native heart without angina pectoris +5 more    Dx  3 Month Follow-Up; Referred by Neeta Rao MD    Reason for Visit       Progress Notes  Iliana Martínez MD (Physician) [facility]iology  Expand All Collapse All  [facility] Progress Note                              Date:                1/27/2022  Patient: David Orellana  Age:                 66 y.o., 1950        Reason for evaluation:            SUBJECTIVE:    Returns today follow-up assessment follow-up for ischemic cardiomyopathy coronary artery disease hypertension hyperlipidemia previous CABG. Had recent breast surgery for carcinoma several months ago. Presently doing reasonably well denies limiting dyspnea denies chest pain denies palpitations. Echocardiogram in July was remarkable for ejection fraction 25 to 30%. I discussed with her possible ICD prior to the breast surgery and I discussed this again she will think about it and let us know. No other complaints or issues. Blood pressure 138/78 heart 69.     Review of Systems   Constitutional: Negative. Negative for chills, fever and unexpected weight change. HENT: Negative. Eyes: Negative. Respiratory: Negative. Negative for shortness of breath. Cardiovascular: Negative. Negative for chest pain. Gastrointestinal: Negative. Negative for diarrhea, nausea and vomiting. Endocrine: Negative. Genitourinary: Negative. Musculoskeletal: Negative. Skin: Negative. Neurological: Negative. All other systems reviewed and are negative.           OBJECTIVE:     /78   Pulse 69   Ht 5' 6\" (1.676 m)   Wt 212 lb (96.2 kg)   BMI 34.22 kg/m²      Labs:   CBC: No results for input(s): WBC, HGB, HCT, PLT in the last 72 hours.   BMP:No results for input(s): NA, K, CO2, BUN, CREATININE, LABGLOM, GLUCOSE in the last 72 hours. BNP: No results for input(s): BNP in the last 72 hours. PT/INR: No results for input(s): PROTIME, INR in the last 72 hours. APTT:No results for input(s): APTT in the last 72 hours. CARDIAC ENZYMES:No results for input(s): CKTOTAL, CKMB, CKMBINDEX, TROPONINI in the last 72 hours.   FASTING LIPID PANEL:        Lab Results   Component Value Date     HDL 41 11/17/2021     LDLCALC 56 11/17/2021     TRIG 94 11/17/2021      LIVER PROFILE:No results for input(s): AST, ALT, LABALBU in the last 72 hours.         Past Medical History        Past Medical History:   Diagnosis Date    Acute sinusitis      [facility] kidney injury) ([facility] 05/18/2018    Anemia      Bleeding ulcer       hx of; cauterized by dr. Gaffney Cuff    Breast lump      CAD (coronary artery disease)       sees dr. Dave Blank ulcer ([facility]      Cataract      COVID-19 01/2021     fatigue    Dermatitis      Diabetes mellitus ([facility]      Diabetic nephropathy (HCC)      Diabetic retinopathy ([facility]      Glaucoma      Gout      Hemorrhoids      Hx of TB skin testing       positive    Hyperlipidemia      Hypertension      Palliative care patient 06/24/2021    Type 2 diabetes mellitus ([facility] 05/18/2018    Vitamin D deficiency           Past Surgical History         Past Surgical History:   Procedure Laterality Date    BREAST LUMPECTOMY Right 10/29/2021     RIGHT LUMPECTOMY, SNB, PREOP ULTRASOUND, INTRAOP ULTRASOUND GUIDED NL, BIOSORB, PEC BLOCK, MARGIN PROBE, FLAPS, IORT performed by Angie Luke MD at [address] Right 9/3/2020     RIGHT CARPAL TUNNEL RELEASE performed by Efrain Mcdaniels MD at [address] [place] [address]        CHOLECYSTECTOMY        COLONOSCOPY N/A 10/11/2019     Dr De La Garza Leaks of a hemostatic clip-Suboptimal prep, diverticulosis, internal hemorrhoids-Grade 2, AP x 3, 1 yr recall  COLONOSCOPY N/A 10/12/2020     Dr Ryan Manuel hemorrhoids-Grade 1-2, AP, 3 yr recall    CORONARY ARTERY BYPASS GRAFT   2001    ENDOSCOPY, COLON, DIAGNOSTIC        EYE SURGERY        MOUTH SURGERY        OTHER SURGICAL HISTORY         sternal resection    SKIN BIOPSY        UPPER GASTROINTESTINAL ENDOSCOPY N/A 10/11/2019     Dr Jaswinder Roca (+) H Pylori    US BREAST NEEDLE BIOPSY LEFT Left 9/1/2021     US BREAST NEEDLE BIOPSY LEFT 9/1/2021 [facility]AL SURGERY    VASCULAR SURGERY             Family History         Family History   Problem Relation Age of Onset    Cancer Mother      Diabetes Father      COPD Father      Diabetes Sister      Heart Disease Sister      Colon Polyps Sister      Coronary Art Dis Other           grandfather    Diabetes Other           aunt    Colon Cancer Neg Hx      Liver Cancer Neg Hx      Rectal Cancer Neg Hx                 Allergies   Allergen Reactions    Medrol [Methylprednisolone] Other (See Comments)       Affects her eyes      Current Facility-Administered Medications          Current Outpatient Medications   Medication Sig Dispense Refill    amLODIPine (NORVASC) 5 MG tablet TAKE 1 TABLET EVERY DAY 90 tablet 1    letrozole (FEMARA) 2.5 MG tablet Take 1 tablet by mouth daily 90 tablet 3    montelukast (SINGULAIR) 10 MG tablet Take 1 tablet by mouth daily 90 tablet 3    simvastatin (ZOCOR) 40 MG tablet Take 1 tablet by mouth nightly 90 tablet 3    glipiZIDE (GLUCOTROL XL) 2.5 MG extended release tablet Take 1 tablet by mouth daily 90 tablet 3    metoprolol tartrate (LOPRESSOR) 25 MG tablet Take 1 tablet by mouth 2 times daily 180 tablet 3    isosorbide mononitrate (IMDUR) 30 MG extended release tablet Take 1 tablet by mouth 2 times daily 180 tablet 3    Blood Glucose Calibration (ACCU-CHEK ROSA) SOLN Calibration done daily.  1 each 1    Blood Glucose Monitoring Suppl (ACCU-CHEK ROSA) ARMANDO Use 4 times a day to check BS. 1 Device 0    blood glucose test strips (ACCU-CHEK ROSA PLUS) strip TEST BLOOD SUGAR FOUR TIMES DAILY E11.9 400 strip 3    Accu-Chek Softclix Lancets MISC TEST FOUR TIMES DAILY E11.9 400 each 3    lisinopril (PRINIVIL;ZESTRIL) 5 MG tablet Take 1 tablet by mouth daily 90 tablet 2    metFORMIN (GLUCOPHAGE) 500 MG tablet TAKE 2 TABLETS TWICE DAILY WITH MEALS (Patient taking differently: Take 1,000 mg by mouth 2 times daily (with meals) ) 360 tablet 3    furosemide (LASIX) 40 MG tablet Take 1 tablet by mouth daily as needed (excess fluid) 60 tablet 1    Alcohol Swabs (B-D SINGLE USE SWABS REGULAR) PADS USE THREE TIMES DAILY 100 each 11    aspirin 81 MG tablet Take 81 mg by mouth daily        Omega-3 Fatty Acids (FISH OIL) 1000 MG CAPS Take 1,000 mg by mouth 2 times daily        MULTIPLE VITAMIN PO Take 1 tablet by mouth daily        Calcium Carbonate-Vitamin D (CALCIUM 500/D PO) Take 1 tablet by mouth 2 times daily        vitamin B-12 (CYANOCOBALAMIN) 1000 MCG tablet Take 1,000 mcg by mouth daily        timolol (TIMOPTIC) 0.5 % ophthalmic solution Place 1 drop into the right eye daily         HYDROcodone-acetaminophen (NORCO) 5-325 MG per tablet Take 1 tablet by mouth every 6 hours as needed for Pain. (Patient not taking: Reported on 1/27/2022) 12 tablet 0      No current facility-administered medications for this visit.         Social History               Socioeconomic History    Marital status: Single       Spouse name: Not on file    Number of children: Not on file    Years of education: Not on file    Highest education level: Not on file   Occupational History       Employer: [employer]   Tobacco Use    Smoking status: Never Smoker    Smokeless tobacco: Never Used   Vaping Use    Vaping Use: Never used   Substance and Sexual Activity    Alcohol use:  Yes       Alcohol/week: 1.0 standard drink       Types: 1 Shots of liquor per week       Comment: rare    Drug use: No    Sexual activity: Never   Other Topics Concern    Not on file   Social History Narrative    Not on file      Social Determinants of Health          Financial Resource Strain: Low Risk     Difficulty of Paying Living Expenses: Not hard at all   Food Insecurity: No Food Insecurity    Worried About Running Out of Food in the Last Year: Never true    [address] in the Last Year: Never true   Transportation Needs: No Transportation Needs    Lack of Transportation (Medical): No    Lack of Transportation (Non-Medical): No   Physical Activity:     Days of Exercise per Week: Not on file    Minutes of Exercise per Session: Not on file   Stress:     Feeling of Stress : Not on file   Social Connections:     Frequency of Communication with Friends and Family: Not on file    Frequency of Social Gatherings with Friends and Family: Not on file    Attends Zoroastrian Services: Not on file    Active Member of [address] [place] [address] or Organizations: Not on file    Attends Club or Organization Meetings: Not on file    Marital Status: Not on file   Intimate Partner Violence:     Fear of Current or Ex-Partner: Not on file    Emotionally Abused: Not on file    Physically Abused: Not on file    Sexually Abused: Not on file   Housing Stability:     Unable to Pay for Housing in the Last Year: Not on file    Number of [place] in the Last Year: Not on file    Unstable Housing in the Last Year: Not on file            Physical Examination:  /78   Pulse 69   Ht 5' 6\" (1.676 m)   Wt 212 lb (96.2 kg)   BMI 34.22 kg/m²   Physical Exam  Vitals reviewed. Constitutional:       Appearance: She is well-developed. Neck:      Vascular: No carotid bruit or JVD. Cardiovascular:      Rate and Rhythm: Normal rate and regular rhythm. Heart sounds: Normal heart sounds. No murmur heard. No friction rub. No gallop. Pulmonary:      Effort: Pulmonary effort is normal. No respiratory distress. Breath sounds: Normal breath sounds. No wheezing or rales. Abdominal:      General: There is no distension. Tenderness: There is no abdominal tenderness. Lymphadenopathy:      Cervical: No cervical adenopathy. Skin:     General: Skin is warm and dry.                ASSESSMENT:       Diagnosis Orders   1. Coronary artery disease involving native coronary artery of native heart without angina pectoris      2. Primary hypertension      3. Mixed hyperlipidemia      4. History of coronary artery bypass graft      5. Ischemic cardiomyopathy      6. Status post aorto-coronary artery bypass graft            PLAN:  No orders of the defined types were placed in this encounter.       Encounter Medications    No orders of the defined types were placed in this encounter.            1. Continue present medications  2. Recommend follow-up assessment in 6 months  3. We discussed again possible ICD implantation she will think about it and let us know in a few days she certainly would qualify for this. She would probably benefit from cardiac catheterization prior to that.     Return in about 6 months (around 7/27/2022) for return to Dr. Zack Penny only.        Cheryle Lopes, MD 1/27/2022 1:19 PM [facility]        Thisdictation was generated by voice recognition computer software. Although all attempts are made to edit the dictation for accuracy, there may be errors in the transcription that are not intended.                                Now admitted for elective diagnostic cardiac catheterization advise indication alternatives benefits and risk patient agreeable plan today.   I have discussed with the patient regarding indications for the proposed procedure LEFT HEART CATHETERIZATION AND POSSIBLE PERCUTANEOUS INTERVENTION  along with possible alternatives benefits and risks including but not limited to risks of death, myocardial infarction, stroke, contrast induced nephropathy which in some cases may lead to acute kidney failure requiring dialysis, allergic reactions, bleeding requiring blood transfusion,  cardiac arrhthymias, respiratory failure which may require placing the patient on respiratory support such as a ventilator or breathing machine,risk of complications which may require vascular surgery, and if coronary intervention is performed emergency CABG may be required in less than 1% of cases. The patient is awake and alert and understands the issues involved and indicates willingness to proceed as ordered. The patient does not have any contraindications to dual antiplatelet therapy. The patient does not have any known  pending surgical procedures in the next 12 months at this time. The patient is  a reasonable candidate for moderate conscious sedation.     ASA score:  ASA 3 - Patient with moderate systemic disease with functional limitations    Mallampati: I (soft palate, uvula, fauces, tonsillar pillars visible)    Preferred vascular access site will be: right radial artery

## 2022-02-17 ENCOUNTER — TELEPHONE (OUTPATIENT)
Dept: INTERNAL MEDICINE | Age: 72
End: 2022-02-17

## 2022-02-17 ENCOUNTER — OFFICE VISIT (OUTPATIENT)
Dept: INTERNAL MEDICINE | Age: 72
End: 2022-02-17
Payer: MEDICARE

## 2022-02-17 VITALS
SYSTOLIC BLOOD PRESSURE: 102 MMHG | DIASTOLIC BLOOD PRESSURE: 62 MMHG | WEIGHT: 214.2 LBS | OXYGEN SATURATION: 94 % | BODY MASS INDEX: 35.69 KG/M2 | HEART RATE: 83 BPM | HEIGHT: 65 IN

## 2022-02-17 DIAGNOSIS — E11.311 DIABETIC MACULAR EDEMA (HCC): ICD-10-CM

## 2022-02-17 DIAGNOSIS — I10 PRIMARY HYPERTENSION: ICD-10-CM

## 2022-02-17 DIAGNOSIS — E55.9 VITAMIN D DEFICIENCY: ICD-10-CM

## 2022-02-17 DIAGNOSIS — E11.9 TYPE 2 DIABETES MELLITUS WITHOUT COMPLICATION, UNSPECIFIED WHETHER LONG TERM INSULIN USE (HCC): ICD-10-CM

## 2022-02-17 DIAGNOSIS — Z28.21 REFUSED STREPTOCOCCUS PNEUMONIAE VACCINATION: ICD-10-CM

## 2022-02-17 DIAGNOSIS — E78.5 HYPERLIPIDEMIA, UNSPECIFIED HYPERLIPIDEMIA TYPE: ICD-10-CM

## 2022-02-17 DIAGNOSIS — E11.3299 NONPROLIFERATIVE DIABETIC RETINOPATHY (HCC): ICD-10-CM

## 2022-02-17 DIAGNOSIS — N17.9 AKI (ACUTE KIDNEY INJURY) (HCC): ICD-10-CM

## 2022-02-17 DIAGNOSIS — I25.10 CORONARY ARTERY DISEASE DUE TO LIPID RICH PLAQUE: ICD-10-CM

## 2022-02-17 DIAGNOSIS — Z91.09 ENVIRONMENTAL ALLERGIES: ICD-10-CM

## 2022-02-17 DIAGNOSIS — Z17.0 MALIGNANT NEOPLASM OF OVERLAPPING SITES OF RIGHT BREAST IN FEMALE, ESTROGEN RECEPTOR POSITIVE (HCC): ICD-10-CM

## 2022-02-17 DIAGNOSIS — R79.89 ELEVATED SERUM CREATININE: Primary | ICD-10-CM

## 2022-02-17 DIAGNOSIS — M25.561 RIGHT KNEE PAIN, UNSPECIFIED CHRONICITY: ICD-10-CM

## 2022-02-17 DIAGNOSIS — N18.4 CKD (CHRONIC KIDNEY DISEASE) STAGE 4, GFR 15-29 ML/MIN (HCC): ICD-10-CM

## 2022-02-17 DIAGNOSIS — G89.29 CHRONIC LOW BACK PAIN, UNSPECIFIED BACK PAIN LATERALITY, UNSPECIFIED WHETHER SCIATICA PRESENT: ICD-10-CM

## 2022-02-17 DIAGNOSIS — M54.50 CHRONIC LOW BACK PAIN, UNSPECIFIED BACK PAIN LATERALITY, UNSPECIFIED WHETHER SCIATICA PRESENT: ICD-10-CM

## 2022-02-17 DIAGNOSIS — I25.83 CORONARY ARTERY DISEASE DUE TO LIPID RICH PLAQUE: ICD-10-CM

## 2022-02-17 DIAGNOSIS — E11.69 TYPE 2 DIABETES MELLITUS WITH OTHER SPECIFIED COMPLICATION, UNSPECIFIED WHETHER LONG TERM INSULIN USE (HCC): Primary | ICD-10-CM

## 2022-02-17 DIAGNOSIS — C50.811 MALIGNANT NEOPLASM OF OVERLAPPING SITES OF RIGHT BREAST IN FEMALE, ESTROGEN RECEPTOR POSITIVE (HCC): ICD-10-CM

## 2022-02-17 LAB
ALBUMIN SERPL-MCNC: 4 G/DL (ref 3.5–5.2)
ALP BLD-CCNC: 47 U/L (ref 35–104)
ALT SERPL-CCNC: 11 U/L (ref 5–33)
ANION GAP SERPL CALCULATED.3IONS-SCNC: 15 MMOL/L (ref 7–19)
AST SERPL-CCNC: 23 U/L (ref 5–32)
BASOPHILS ABSOLUTE: 0.1 K/UL (ref 0–0.2)
BASOPHILS RELATIVE PERCENT: 0.9 % (ref 0–1)
BILIRUB SERPL-MCNC: 0.3 MG/DL (ref 0.2–1.2)
BUN BLDV-MCNC: 31 MG/DL (ref 8–23)
CALCIUM SERPL-MCNC: 9.3 MG/DL (ref 8.8–10.2)
CHLORIDE BLD-SCNC: 99 MMOL/L (ref 98–111)
CHOLESTEROL, TOTAL: 107 MG/DL (ref 160–199)
CO2: 21 MMOL/L (ref 22–29)
CREAT SERPL-MCNC: 2.3 MG/DL (ref 0.5–0.9)
CREATININE URINE: 64.1 MG/DL (ref 4.2–622)
EOSINOPHILS ABSOLUTE: 0.2 K/UL (ref 0–0.6)
EOSINOPHILS RELATIVE PERCENT: 2.6 % (ref 0–5)
GFR AFRICAN AMERICAN: 25
GFR NON-AFRICAN AMERICAN: 21
GLUCOSE BLD-MCNC: 81 MG/DL (ref 74–109)
HBA1C MFR BLD: 5.7 % (ref 4–6)
HCT VFR BLD CALC: 31.9 % (ref 37–47)
HDLC SERPL-MCNC: 56 MG/DL (ref 65–121)
HEMOGLOBIN: 9.7 G/DL (ref 12–16)
IMMATURE GRANULOCYTES #: 0 K/UL
LDL CHOLESTEROL CALCULATED: 35 MG/DL
LYMPHOCYTES ABSOLUTE: 1.3 K/UL (ref 1.1–4.5)
LYMPHOCYTES RELATIVE PERCENT: 20.3 % (ref 20–40)
MCH RBC QN AUTO: 30.7 PG (ref 27–31)
MCHC RBC AUTO-ENTMCNC: 30.4 G/DL (ref 33–37)
MCV RBC AUTO: 100.9 FL (ref 81–99)
MICROALBUMIN UR-MCNC: 112.3 MG/DL (ref 0–19)
MICROALBUMIN/CREAT UR-RTO: 1752 MG/G
MONOCYTES ABSOLUTE: 0.4 K/UL (ref 0–0.9)
MONOCYTES RELATIVE PERCENT: 6.1 % (ref 0–10)
NEUTROPHILS ABSOLUTE: 4.6 K/UL (ref 1.5–7.5)
NEUTROPHILS RELATIVE PERCENT: 69.8 % (ref 50–65)
PDW BLD-RTO: 15.6 % (ref 11.5–14.5)
PLATELET # BLD: 206 K/UL (ref 130–400)
PMV BLD AUTO: 10.7 FL (ref 9.4–12.3)
POTASSIUM SERPL-SCNC: 4.7 MMOL/L (ref 3.5–5)
RBC # BLD: 3.16 M/UL (ref 4.2–5.4)
SODIUM BLD-SCNC: 135 MMOL/L (ref 136–145)
TOTAL PROTEIN: 7.1 G/DL (ref 6.6–8.7)
TRIGL SERPL-MCNC: 79 MG/DL (ref 0–149)
TSH SERPL DL<=0.05 MIU/L-ACNC: 2.37 UIU/ML (ref 0.27–4.2)
VITAMIN D 25-HYDROXY: 53.8 NG/ML
WBC # BLD: 6.6 K/UL (ref 4.8–10.8)

## 2022-02-17 PROCEDURE — G8399 PT W/DXA RESULTS DOCUMENT: HCPCS | Performed by: INTERNAL MEDICINE

## 2022-02-17 PROCEDURE — 3044F HG A1C LEVEL LT 7.0%: CPT | Performed by: INTERNAL MEDICINE

## 2022-02-17 PROCEDURE — 99214 OFFICE O/P EST MOD 30 MIN: CPT | Performed by: INTERNAL MEDICINE

## 2022-02-17 PROCEDURE — 1036F TOBACCO NON-USER: CPT | Performed by: INTERNAL MEDICINE

## 2022-02-17 PROCEDURE — G8417 CALC BMI ABV UP PARAM F/U: HCPCS | Performed by: INTERNAL MEDICINE

## 2022-02-17 PROCEDURE — 4040F PNEUMOC VAC/ADMIN/RCVD: CPT | Performed by: INTERNAL MEDICINE

## 2022-02-17 PROCEDURE — 3017F COLORECTAL CA SCREEN DOC REV: CPT | Performed by: INTERNAL MEDICINE

## 2022-02-17 PROCEDURE — 2022F DILAT RTA XM EVC RTNOPTHY: CPT | Performed by: INTERNAL MEDICINE

## 2022-02-17 PROCEDURE — 1123F ACP DISCUSS/DSCN MKR DOCD: CPT | Performed by: INTERNAL MEDICINE

## 2022-02-17 PROCEDURE — 1090F PRES/ABSN URINE INCON ASSESS: CPT | Performed by: INTERNAL MEDICINE

## 2022-02-17 PROCEDURE — G8427 DOCREV CUR MEDS BY ELIG CLIN: HCPCS | Performed by: INTERNAL MEDICINE

## 2022-02-17 PROCEDURE — G8484 FLU IMMUNIZE NO ADMIN: HCPCS | Performed by: INTERNAL MEDICINE

## 2022-02-17 RX ORDER — GLIMEPIRIDE 2 MG/1
1 TABLET ORAL DAILY
COMMUNITY
Start: 2021-12-16

## 2022-02-17 SDOH — ECONOMIC STABILITY: FOOD INSECURITY: WITHIN THE PAST 12 MONTHS, YOU WORRIED THAT YOUR FOOD WOULD RUN OUT BEFORE YOU GOT MONEY TO BUY MORE.: NEVER TRUE

## 2022-02-17 SDOH — ECONOMIC STABILITY: FOOD INSECURITY: WITHIN THE PAST 12 MONTHS, THE FOOD YOU BOUGHT JUST DIDN'T LAST AND YOU DIDN'T HAVE MONEY TO GET MORE.: NEVER TRUE

## 2022-02-17 ASSESSMENT — ENCOUNTER SYMPTOMS
EYE DISCHARGE: 0
BLOOD IN STOOL: 0
SINUS PAIN: 0
WHEEZING: 0
SORE THROAT: 0
VOMITING: 0
CHEST TIGHTNESS: 0
STRIDOR: 0
CHOKING: 0
ABDOMINAL PAIN: 0
ABDOMINAL DISTENTION: 0
SINUS PRESSURE: 0
TROUBLE SWALLOWING: 0
NAUSEA: 0
VOICE CHANGE: 0
COUGH: 0
SHORTNESS OF BREATH: 0
CONSTIPATION: 0
RHINORRHEA: 0
DIARRHEA: 0
EYE ITCHING: 0
COLOR CHANGE: 0

## 2022-02-17 ASSESSMENT — SOCIAL DETERMINANTS OF HEALTH (SDOH): HOW HARD IS IT FOR YOU TO PAY FOR THE VERY BASICS LIKE FOOD, HOUSING, MEDICAL CARE, AND HEATING?: NOT HARD AT ALL

## 2022-02-17 NOTE — PROGRESS NOTES
Chief Complaint   Patient presents with    3 Month Follow-Up    Diabetes       HPI: Zina Martin is a 66 y.o. female is here for follow-up of type 2 diabetes. Her A1c is currently pending. She states that her blood sugars have been running about 160. She did have a heart catheterization yesterday that did show diffuse coronary artery disease. It will be medically managed. She also has global hypokinesis and she will be set up for a defibrillator in the near future. Her blood pressure is well controlled. She denies any complaints of chest pain, chest pressure or shortness of breath. She is taking her Femara for her history of breast cancer. Most of her labs are currently pending. She is tolerating her statin without side effects. She does take hydrocodone as needed for chronic back pain. Her back pain has been stable. She has not had any chest pain or chest pressure. She has not had any lower extremity edema. She is taking her Lasix as prescribed. She does complain of some right knee pain at times. She plans to try some topical agents to see if they will help. She does have a lot of bruising from yesterday's heart catheterization. She declines a pneumonia vaccine today.     Past Medical History:   Diagnosis Date    Acute sinusitis     CHAPARRO (acute kidney injury) ([address] 05/18/2018    Anemia     Bleeding ulcer     hx of; cauterized by dr. Joi Iglesias    Breast lump     CAD (coronary artery disease)     sees dr. Candelaria Diaz ulcer ([address]     Cataract     COVID-19 01/2021    fatigue    Dermatitis     Diabetes mellitus ([address]     Diabetic nephropathy ([address]     Diabetic retinopathy ([address]     Glaucoma     Gout     Hemorrhoids     Hx of TB skin testing     positive    Hyperlipidemia     Hypertension     Palliative care patient 06/24/2021    Type 2 diabetes mellitus ([address] 05/18/2018    Vitamin D deficiency       Past Surgical History:   Procedure Laterality Date    BREAST the Last Year: Never true   [address] in the Last Year: Never true   Transportation Needs:     Lack of Transportation (Medical): Not on file    Lack of Transportation (Non-Medical): Not on file   Physical Activity:     Days of Exercise per Week: Not on file    Minutes of Exercise per Session: Not on file   Stress:     Feeling of Stress : Not on file   Social Connections:     Frequency of Communication with Friends and Family: Not on file    Frequency of Social Gatherings with Friends and Family: Not on file    Attends Roman Catholic Services: Not on file    Active Member of Clubs or Organizations: Not on file    Attends Club or Organization Meetings: Not on file    Marital Status: Not on file   Intimate Partner Violence:     Fear of Current or Ex-Partner: Not on file    Emotionally Abused: Not on file    Physically Abused: Not on file    Sexually Abused: Not on file   Housing Stability:     Unable to Pay for Housing in the Last Year: Not on file    Number of [place] in the Last Year: Not on file    Unstable Housing in the Last Year: Not on file      Family History   Problem Relation Age of Onset    Cancer Mother     Diabetes Father     COPD Father     Diabetes Sister     Heart Disease Sister     Colon Polyps Sister     Coronary Art Dis Other         grandfather    Diabetes Other         aunt    Colon Cancer Neg Hx     Liver Cancer Neg Hx     Rectal Cancer Neg Hx         Current Outpatient Medications   Medication Sig Dispense Refill    timolol (TIMOPTIC) 0.25 % ophthalmic solution       [START ON 2/18/2022] metFORMIN (GLUCOPHAGE) 500 MG tablet Take 2 tablets by mouth 2 times daily (with meals) Please hold for 48 hrs after cardiac cath.  You may resume like normal on 2/18/2022. 60 tablet 3    lisinopril (PRINIVIL;ZESTRIL) 5 MG tablet Take 1 tablet by mouth daily 90 tablet 2    amLODIPine (NORVASC) 5 MG tablet TAKE 1 TABLET EVERY DAY 90 tablet 1    letrozole (FEMARA) 2.5 MG tablet Take 1 tablet by mouth daily 90 tablet 3    montelukast (SINGULAIR) 10 MG tablet Take 1 tablet by mouth daily 90 tablet 3    simvastatin (ZOCOR) 40 MG tablet Take 1 tablet by mouth nightly 90 tablet 3    HYDROcodone-acetaminophen (NORCO) 5-325 MG per tablet Take 1 tablet by mouth every 6 hours as needed for Pain. 12 tablet 0    glipiZIDE (GLUCOTROL XL) 2.5 MG extended release tablet Take 1 tablet by mouth daily 90 tablet 3    metoprolol tartrate (LOPRESSOR) 25 MG tablet Take 1 tablet by mouth 2 times daily 180 tablet 3    isosorbide mononitrate (IMDUR) 30 MG extended release tablet Take 1 tablet by mouth 2 times daily 180 tablet 3    Blood Glucose Calibration (ACCU-CHEK ROSA) SOLN Calibration done daily. 1 each 1    Blood Glucose Monitoring Suppl (ACCU-CHEK ROSA) ARMANDO Use 4 times a day to check BS. 1 Device 0    blood glucose test strips (ACCU-CHEK ROSA PLUS) strip TEST BLOOD SUGAR FOUR TIMES DAILY E11.9 400 strip 3    Accu-Chek Softclix Lancets MISC TEST FOUR TIMES DAILY E11.9 400 each 3    furosemide (LASIX) 40 MG tablet Take 1 tablet by mouth daily as needed (excess fluid) 60 tablet 1    Alcohol Swabs (B-D SINGLE USE SWABS REGULAR) PADS USE THREE TIMES DAILY 100 each 11    aspirin 81 MG tablet Take 81 mg by mouth daily      Omega-3 Fatty Acids (FISH OIL) 1000 MG CAPS Take 1,000 mg by mouth 2 times daily      MULTIPLE VITAMIN PO Take 1 tablet by mouth daily      Calcium Carbonate-Vitamin D (CALCIUM 500/D PO) Take 1 tablet by mouth 2 times daily      vitamin B-12 (CYANOCOBALAMIN) 1000 MCG tablet Take 1,000 mcg by mouth daily       No current facility-administered medications for this visit.         Patient Active Problem List   Diagnosis    [facility]    Hypertension    Hyperlipidemia    Ocular hypertension    CAD (coronary artery disease)    Type 2 diabetes mellitus ([facility]    Diabetes mellitus ([facility]    Acute cystitis without hematuria    Carpal tunnel syndrome  Chest pain    Chronic kidney disease, stage 3b ([facility]    History of coronary artery bypass graft    Achilles tendinitis    Bursitis of right foot    Contracture of Achilles tendon    Diabetic macular edema (HCC)    Foot pain    Nonproliferative diabetic retinopathy (HCC)    Posterior calcaneal exostosis    Retinal neovascularization    Malignant neoplasm of overlapping sites of right breast in female, estrogen receptor positive ([facility]    Status post right breast lumpectomy with sentinel node and IORT on 10/29/2021    CKD (chronic kidney disease) stage 4, GFR 15-29 ml/min (HCC)    Ischemic cardiomyopathy        Review of Systems   Constitutional: Negative for activity change, appetite change, chills, diaphoresis, fatigue, fever and unexpected weight change. HENT: Negative for congestion, ear discharge, ear pain, mouth sores, nosebleeds, postnasal drip, rhinorrhea, sinus pressure, sinus pain, sneezing, sore throat, tinnitus, trouble swallowing and voice change. Eyes: Negative for discharge, itching and visual disturbance. Respiratory: Negative for cough, choking, chest tightness, shortness of breath, wheezing and stridor. Cardiovascular: Negative for chest pain, palpitations and leg swelling. Gastrointestinal: Negative for abdominal distention, abdominal pain, blood in stool, constipation, diarrhea, nausea and vomiting. Endocrine: Negative for cold intolerance, polydipsia and polyuria. Genitourinary: Negative for difficulty urinating, dysuria, frequency and urgency. Musculoskeletal: Positive for arthralgias. Negative for gait problem, joint swelling and myalgias. Right knee pain   Skin: Negative for color change and rash. Allergic/Immunologic: Negative for food allergies and immunocompromised state. Neurological: Negative for dizziness, tremors, syncope, speech difficulty, weakness, numbness and headaches. Hematological: Negative for adenopathy.  Does not bruise/bleed easily. Psychiatric/Behavioral: Negative for confusion and hallucinations. The patient is not nervous/anxious. /62   Pulse 83   Ht 5' 5\" (1.651 m)   Wt 214 lb 3.2 oz (97.2 kg)   SpO2 94%   BMI 35.64 kg/m²   Physical Exam  Vitals and nursing note reviewed. Constitutional:       General: She is not in acute distress. Appearance: Normal appearance. She is well-developed and normal weight. She is not ill-appearing, toxic-appearing or diaphoretic. HENT:      Head: Normocephalic and atraumatic. Right Ear: Tympanic membrane, ear canal and external ear normal. There is no impacted cerumen. Left Ear: Tympanic membrane, ear canal and external ear normal. There is no impacted cerumen. Nose: Nose normal. No congestion or rhinorrhea. Mouth/Throat:      Mouth: Mucous membranes are moist.      Pharynx: Oropharynx is clear. No oropharyngeal exudate or posterior oropharyngeal erythema. Eyes:      General: No scleral icterus. Right eye: No discharge. Left eye: No discharge. Extraocular Movements: Extraocular movements intact. Conjunctiva/sclera: Conjunctivae normal.      Pupils: Pupils are equal, round, and reactive to light. Neck:      Thyroid: No thyromegaly. Vascular: No carotid bruit or JVD. Trachea: No tracheal deviation. Cardiovascular:      Rate and Rhythm: Normal rate and regular rhythm. Pulses: Normal pulses. Heart sounds: Normal heart sounds. No murmur heard. No friction rub. No gallop. Pulmonary:      Effort: Pulmonary effort is normal. No respiratory distress. Breath sounds: Normal breath sounds. No stridor. No wheezing, rhonchi or rales. Chest:      Chest wall: No tenderness. Abdominal:      General: Bowel sounds are normal. There is no distension. Palpations: Abdomen is soft. There is no mass. Tenderness: There is no abdominal tenderness.  There is no right CVA tenderness, left CVA tenderness, guarding or rebound. Hernia: No hernia is present. Musculoskeletal:         General: No swelling, tenderness, deformity or signs of injury. Normal range of motion. Cervical back: Normal range of motion and neck supple. No rigidity. No muscular tenderness. Right lower leg: No edema. Left lower leg: No edema. Lymphadenopathy:      Cervical: No cervical adenopathy. Skin:     General: Skin is warm and dry. Capillary Refill: Capillary refill takes less than 2 seconds. Coloration: Skin is not jaundiced or pale. Findings: Bruising present. No erythema, lesion or rash. Comments: Visual inspection:  Deformity/amputation: absent  Skin lesions/pre-ulcerative calluses: present - bilateral great toe  Edema: right- negative, left- negative    Sensory exam:  Monofilament sensation: normal  (minimum of 5 random plantar locations tested, avoiding callused areas - > 1 area with absence of sensation is + for neuropathy)    Plus at least one of the following:  Pulses: normal,   Pinprick: Intact  Proprioception: N/A  Vibration (128 Hz): N/A    Does have multiple bruises on her upper arms. Neurological:      General: No focal deficit present. Mental Status: She is alert and oriented to person, place, and time. Mental status is at baseline. Cranial Nerves: No cranial nerve deficit. Sensory: No sensory deficit. Motor: No weakness or abnormal muscle tone. Coordination: Coordination normal.      Gait: Gait normal.      Deep Tendon Reflexes: Reflexes are normal and symmetric. Reflexes normal.   Psychiatric:         Mood and Affect: Mood normal.         Behavior: Behavior normal.         Thought Content: Thought content normal.         Judgment: Judgment normal.         1. Type 2 diabetes mellitus with other specified complication, unspecified whether long term insulin use ([facility]    2. CHAPARRO (acute kidney injury) ([facility]    3.  CKD (chronic kidney disease) stage 4, GFR 15-29 ml/min ([address]    4. Malignant neoplasm of overlapping sites of right breast in female, estrogen receptor positive ([address]    5. Diabetic macular edema ([address]    6. Nonproliferative diabetic retinopathy ([address]    7. Vitamin D deficiency    8. Refused Streptococcus pneumoniae vaccination    9. Primary hypertension    10. Environmental allergies    11. Hyperlipidemia, unspecified hyperlipidemia type    12. Chronic low back pain, unspecified back pain laterality, unspecified whether sciatica present    13. Coronary artery disease due to lipid rich plaque    14. Right knee pain, unspecified chronicity        ASSESSMENT/PLAN:    75-year-old woman here for follow-up    1. Type 2 diabetes: A1c is currently pending. Continue medications as prescribed    2. Pneumovax vaccine declined    3. Hypertension: Blood pressure stable    4. History of breast cancer: Continue Femara as prescribed    5. Environmental allergies: Continue Singulair    6. Hyperlipidemia: Continue simvastatin as prescribed    7. Chronic back pain: Stable    8. Coronary artery disease: Continue isosorbide. Patient is going to be scheduled for defibrillator placement in the near future secondary to global hypokinesis    9  Right knee pain: Declines x-rays. She can try over-the-counter topical agents to see if they will help with pain control    10. History of acute kidney injury in the setting of chronic kidney disease: Labs are currently pending    11  Diabetic macular edema and nonproliferative diabetic retinopathy: Followed by ophthalmology    12. Vitamin D deficiency: Check vitamin D level with next lab draw  Saida Hyatt was seen today for 3 month follow-up and diabetes. Diagnoses and all orders for this visit:    Type 2 diabetes mellitus with other specified complication, unspecified whether long term insulin use (HCC)  -     CBC with Auto Differential; Future  -     Hemoglobin A1C; Future  -     Comprehensive Metabolic Panel;  Future  -     Lipid Panel; Future  -     TSH; Future  -     Microalbumin / Creatinine Urine Ratio; Future  -     Vitamin D 25 Hydroxy; Future    CHAPARRO (acute kidney injury) ([facility]  -     Vitamin D 25 Hydroxy; Future    CKD (chronic kidney disease) stage 4, GFR 15-29 ml/min (HCC)    Malignant neoplasm of overlapping sites of right breast in female, estrogen receptor positive ([facility]    Diabetic macular edema (HCC)    Nonproliferative diabetic retinopathy (HCC)    Vitamin D deficiency  -     Vitamin D 25 Hydroxy; Future    Refused Streptococcus pneumoniae vaccination    Primary hypertension    Environmental allergies    Hyperlipidemia, unspecified hyperlipidemia type    Chronic low back pain, unspecified back pain laterality, unspecified whether sciatica present    Coronary artery disease due to lipid rich plaque    Right knee pain, unspecified chronicity          Return in about 3 months (around 5/17/2022).      Orders Placed This Encounter   Procedures    CBC with Auto Differential     Fast 12 hours     Standing Status:   Future     Standing Expiration Date:   2/17/2023    Hemoglobin A1C     Fast 12 hours     Standing Status:   Future     Standing Expiration Date:   2/17/2023    Comprehensive Metabolic Panel     Fasting 12 hours     Standing Status:   Future     Standing Expiration Date:   2/17/2023    Lipid Panel     Standing Status:   Future     Standing Expiration Date:   2/17/2023     Order Specific Question:   Is Patient Fasting?/# of Hours     Answer:   12    TSH     Fast 12 hours     Standing Status:   Future     Standing Expiration Date:   2/17/2023    Microalbumin / Creatinine Urine Ratio     Standing Status:   Future     Standing Expiration Date:   2/17/2023    Vitamin D 25 Hydroxy     Standing Status:   Future     Standing Expiration Date:   2/17/2023       Enrike Otero MD

## 2022-02-18 ENCOUNTER — TELEPHONE (OUTPATIENT)
Dept: CARDIOLOGY CLINIC | Age: 72
End: 2022-02-18

## 2022-02-18 DIAGNOSIS — Z11.59 SCREENING FOR VIRAL DISEASE: Primary | ICD-10-CM

## 2022-02-18 NOTE — TELEPHONE ENCOUNTER
I have an opening for Bi-V next week arrive at 1130A for 1230P procedure. Patient said that would work great for her. She will have covid swab on 2/21 between 9A-noon same place as last time. NPO after midnight and she will be staying overnight. She voiced understanding of everything.

## 2022-02-21 DIAGNOSIS — Z11.59 SCREENING FOR VIRAL DISEASE: ICD-10-CM

## 2022-02-21 LAB — SARS-COV-2, PCR: NOT DETECTED

## 2022-02-22 ENCOUNTER — ANESTHESIA EVENT (OUTPATIENT)
Dept: CARDIAC CATH/INVASIVE PROCEDURES | Age: 72
End: 2022-02-22
Payer: MEDICARE

## 2022-02-23 ENCOUNTER — ANESTHESIA (OUTPATIENT)
Dept: CARDIAC CATH/INVASIVE PROCEDURES | Age: 72
End: 2022-02-23
Payer: MEDICARE

## 2022-02-23 ENCOUNTER — APPOINTMENT (OUTPATIENT)
Dept: GENERAL RADIOLOGY | Age: 72
End: 2022-02-23
Attending: INTERNAL MEDICINE
Payer: MEDICARE

## 2022-02-23 ENCOUNTER — HOSPITAL ENCOUNTER (OUTPATIENT)
Dept: CARDIAC CATH/INVASIVE PROCEDURES | Age: 72
Discharge: HOME | End: 2022-02-24
Attending: INTERNAL MEDICINE | Admitting: INTERNAL MEDICINE
Payer: MEDICARE

## 2022-02-23 VITALS — SYSTOLIC BLOOD PRESSURE: 129 MMHG | OXYGEN SATURATION: 77 % | DIASTOLIC BLOOD PRESSURE: 58 MMHG | TEMPERATURE: 97.4 F

## 2022-02-23 DIAGNOSIS — Z95.0 PACEMAKER: Primary | ICD-10-CM

## 2022-02-23 PROBLEM — R93.1 DECREASED CARDIAC EJECTION FRACTION: Status: ACTIVE | Noted: 2022-02-23

## 2022-02-23 LAB
BACTERIA: ABNORMAL /HPF
BILIRUBIN URINE: NEGATIVE
BLOOD, URINE: NEGATIVE
CLARITY: CLEAR
COLOR: YELLOW
CRYSTALS, UA: ABNORMAL /HPF
EPITHELIAL CELLS, UA: 4 /HPF (ref 0–5)
GLUCOSE URINE: NEGATIVE MG/DL
HYALINE CASTS: 4 /HPF (ref 0–8)
KETONES, URINE: NEGATIVE MG/DL
LEUKOCYTE ESTERASE, URINE: ABNORMAL
NITRITE, URINE: NEGATIVE
PH UA: 5.5 (ref 5–8)
PRO-BNP: ABNORMAL PG/ML (ref 0–900)
PROTEIN UA: 100 MG/DL
RBC UA: 2 /HPF (ref 0–4)
SPECIFIC GRAVITY UA: 1.01 (ref 1–1.03)
UROBILINOGEN, URINE: 0.2 E.U./DL
WBC UA: 21 /HPF (ref 0–5)

## 2022-02-23 PROCEDURE — 2580000003 HC RX 258: Performed by: INTERNAL MEDICINE

## 2022-02-23 PROCEDURE — C1894 INTRO/SHEATH, NON-LASER: HCPCS

## 2022-02-23 PROCEDURE — 2580000003 HC RX 258: Performed by: NURSE ANESTHETIST, CERTIFIED REGISTERED

## 2022-02-23 PROCEDURE — 2500000003 HC RX 250 WO HCPCS

## 2022-02-23 PROCEDURE — C1887 CATHETER, GUIDING: HCPCS

## 2022-02-23 PROCEDURE — 33225 L VENTRIC PACING LEAD ADD-ON: CPT

## 2022-02-23 PROCEDURE — 6370000000 HC RX 637 (ALT 250 FOR IP): Performed by: INTERNAL MEDICINE

## 2022-02-23 PROCEDURE — 6360000004 HC RX CONTRAST MEDICATION: Performed by: NURSE ANESTHETIST, CERTIFIED REGISTERED

## 2022-02-23 PROCEDURE — 6360000002 HC RX W HCPCS: Performed by: NURSE ANESTHETIST, CERTIFIED REGISTERED

## 2022-02-23 PROCEDURE — C1769 GUIDE WIRE: HCPCS

## 2022-02-23 PROCEDURE — 2709999900 HC NON-CHARGEABLE SUPPLY

## 2022-02-23 PROCEDURE — 2500000003 HC RX 250 WO HCPCS: Performed by: NURSE ANESTHETIST, CERTIFIED REGISTERED

## 2022-02-23 PROCEDURE — 7100000000 HC PACU RECOVERY - FIRST 15 MIN

## 2022-02-23 PROCEDURE — 81001 URINALYSIS AUTO W/SCOPE: CPT

## 2022-02-23 PROCEDURE — C1898 LEAD, PMKR, OTHER THAN TRANS: HCPCS

## 2022-02-23 PROCEDURE — 3700000001 HC ADD 15 MINUTES (ANESTHESIA)

## 2022-02-23 PROCEDURE — 3700000000 HC ANESTHESIA ATTENDED CARE

## 2022-02-23 PROCEDURE — 33249 INSJ/RPLCMT DEFIB W/LEAD(S): CPT

## 2022-02-23 PROCEDURE — 93971 EXTREMITY STUDY: CPT

## 2022-02-23 PROCEDURE — C1882 AICD, OTHER THAN SING/DUAL: HCPCS

## 2022-02-23 PROCEDURE — C1900 LEAD, CORONARY VENOUS: HCPCS

## 2022-02-23 PROCEDURE — 6360000002 HC RX W HCPCS: Performed by: INTERNAL MEDICINE

## 2022-02-23 PROCEDURE — 6360000002 HC RX W HCPCS

## 2022-02-23 PROCEDURE — 36415 COLL VENOUS BLD VENIPUNCTURE: CPT

## 2022-02-23 PROCEDURE — 2780000010 HC IMPLANT OTHER

## 2022-02-23 PROCEDURE — 71045 X-RAY EXAM CHEST 1 VIEW: CPT

## 2022-02-23 PROCEDURE — 6370000000 HC RX 637 (ALT 250 FOR IP)

## 2022-02-23 PROCEDURE — C1895 LEAD, AICD, ENDO DUAL COIL: HCPCS

## 2022-02-23 PROCEDURE — 83880 ASSAY OF NATRIURETIC PEPTIDE: CPT

## 2022-02-23 PROCEDURE — 7100000001 HC PACU RECOVERY - ADDTL 15 MIN

## 2022-02-23 RX ORDER — NICOTINE POLACRILEX 4 MG
15 LOZENGE BUCCAL PRN
Status: DISCONTINUED | OUTPATIENT
Start: 2022-02-23 | End: 2022-02-23 | Stop reason: CLARIF

## 2022-02-23 RX ORDER — FUROSEMIDE 40 MG/1
40 TABLET ORAL DAILY PRN
Status: DISCONTINUED | OUTPATIENT
Start: 2022-02-23 | End: 2022-02-24 | Stop reason: HOSPADM

## 2022-02-23 RX ORDER — PROPOFOL 10 MG/ML
INJECTION, EMULSION INTRAVENOUS CONTINUOUS PRN
Status: DISCONTINUED | OUTPATIENT
Start: 2022-02-23 | End: 2022-02-23 | Stop reason: SDUPTHER

## 2022-02-23 RX ORDER — FENTANYL CITRATE 50 UG/ML
INJECTION, SOLUTION INTRAMUSCULAR; INTRAVENOUS PRN
Status: DISCONTINUED | OUTPATIENT
Start: 2022-02-23 | End: 2022-02-23 | Stop reason: SDUPTHER

## 2022-02-23 RX ORDER — DEXTROSE MONOHYDRATE 50 MG/ML
100 INJECTION, SOLUTION INTRAVENOUS PRN
Status: DISCONTINUED | OUTPATIENT
Start: 2022-02-23 | End: 2022-02-24 | Stop reason: HOSPADM

## 2022-02-23 RX ORDER — SODIUM CHLORIDE 9 MG/ML
INJECTION, SOLUTION INTRAVENOUS CONTINUOUS
Status: DISCONTINUED | OUTPATIENT
Start: 2022-02-23 | End: 2022-02-24 | Stop reason: HOSPADM

## 2022-02-23 RX ORDER — MONTELUKAST SODIUM 10 MG/1
10 TABLET ORAL DAILY
Status: DISCONTINUED | OUTPATIENT
Start: 2022-02-23 | End: 2022-02-24 | Stop reason: HOSPADM

## 2022-02-23 RX ORDER — SODIUM CHLORIDE 9 MG/ML
INJECTION, SOLUTION INTRAVENOUS CONTINUOUS PRN
Status: DISCONTINUED | OUTPATIENT
Start: 2022-02-23 | End: 2022-02-23 | Stop reason: SDUPTHER

## 2022-02-23 RX ORDER — ONDANSETRON 2 MG/ML
4 INJECTION INTRAMUSCULAR; INTRAVENOUS EVERY 6 HOURS PRN
Status: DISCONTINUED | OUTPATIENT
Start: 2022-02-23 | End: 2022-02-24 | Stop reason: HOSPADM

## 2022-02-23 RX ORDER — DEXTROSE MONOHYDRATE 25 G/50ML
12.5 INJECTION, SOLUTION INTRAVENOUS PRN
Status: DISCONTINUED | OUTPATIENT
Start: 2022-02-23 | End: 2022-02-23 | Stop reason: CLARIF

## 2022-02-23 RX ORDER — CEFAZOLIN SODIUM 2 G/100ML
2000 INJECTION, SOLUTION INTRAVENOUS EVERY 12 HOURS
Status: DISCONTINUED | OUTPATIENT
Start: 2022-02-25 | End: 2022-02-24

## 2022-02-23 RX ORDER — AMLODIPINE BESYLATE 5 MG/1
5 TABLET ORAL DAILY
Status: DISCONTINUED | OUTPATIENT
Start: 2022-02-23 | End: 2022-02-24 | Stop reason: HOSPADM

## 2022-02-23 RX ORDER — CHOLECALCIFEROL (VITAMIN D3) 125 MCG
1000 CAPSULE ORAL DAILY
Status: DISCONTINUED | OUTPATIENT
Start: 2022-02-23 | End: 2022-02-24 | Stop reason: HOSPADM

## 2022-02-23 RX ORDER — ISOSORBIDE MONONITRATE 30 MG/1
30 TABLET, EXTENDED RELEASE ORAL 2 TIMES DAILY
Status: DISCONTINUED | OUTPATIENT
Start: 2022-02-23 | End: 2022-02-24 | Stop reason: HOSPADM

## 2022-02-23 RX ORDER — CEFAZOLIN SODIUM 2 G/100ML
2000 INJECTION, SOLUTION INTRAVENOUS
Status: COMPLETED | OUTPATIENT
Start: 2022-02-23 | End: 2022-02-23

## 2022-02-23 RX ORDER — GLIPIZIDE 2.5 MG/1
2.5 TABLET, EXTENDED RELEASE ORAL DAILY
Status: DISCONTINUED | OUTPATIENT
Start: 2022-02-23 | End: 2022-02-23 | Stop reason: CLARIF

## 2022-02-23 RX ORDER — LETROZOLE 2.5 MG/1
2.5 TABLET, FILM COATED ORAL DAILY
Status: DISCONTINUED | OUTPATIENT
Start: 2022-02-23 | End: 2022-02-24 | Stop reason: HOSPADM

## 2022-02-23 RX ORDER — GLIMEPIRIDE 2 MG/1
1 TABLET ORAL DAILY
Status: DISCONTINUED | OUTPATIENT
Start: 2022-02-23 | End: 2022-02-24 | Stop reason: HOSPADM

## 2022-02-23 RX ORDER — LISINOPRIL 5 MG/1
5 TABLET ORAL DAILY
Status: DISCONTINUED | OUTPATIENT
Start: 2022-02-23 | End: 2022-02-24 | Stop reason: HOSPADM

## 2022-02-23 RX ORDER — LIDOCAINE HYDROCHLORIDE 10 MG/ML
INJECTION, SOLUTION INFILTRATION; PERINEURAL PRN
Status: DISCONTINUED | OUTPATIENT
Start: 2022-02-23 | End: 2022-02-23 | Stop reason: SDUPTHER

## 2022-02-23 RX ORDER — ASPIRIN 81 MG/1
81 TABLET, CHEWABLE ORAL DAILY
Status: DISCONTINUED | OUTPATIENT
Start: 2022-02-23 | End: 2022-02-24 | Stop reason: HOSPADM

## 2022-02-23 RX ORDER — ONDANSETRON 2 MG/ML
INJECTION INTRAMUSCULAR; INTRAVENOUS PRN
Status: DISCONTINUED | OUTPATIENT
Start: 2022-02-23 | End: 2022-02-23 | Stop reason: SDUPTHER

## 2022-02-23 RX ORDER — ATORVASTATIN CALCIUM 20 MG/1
20 TABLET, FILM COATED ORAL DAILY
Status: DISCONTINUED | OUTPATIENT
Start: 2022-02-23 | End: 2022-02-24 | Stop reason: HOSPADM

## 2022-02-23 RX ORDER — GLIPIZIDE 5 MG/1
2.5 TABLET ORAL DAILY
Status: DISCONTINUED | OUTPATIENT
Start: 2022-02-23 | End: 2022-02-24 | Stop reason: HOSPADM

## 2022-02-23 RX ORDER — HYDROCODONE BITARTRATE AND ACETAMINOPHEN 5; 325 MG/1; MG/1
1 TABLET ORAL EVERY 6 HOURS PRN
Status: DISCONTINUED | OUTPATIENT
Start: 2022-02-23 | End: 2022-02-24 | Stop reason: HOSPADM

## 2022-02-23 RX ORDER — PROPOFOL 10 MG/ML
INJECTION, EMULSION INTRAVENOUS PRN
Status: DISCONTINUED | OUTPATIENT
Start: 2022-02-23 | End: 2022-02-23 | Stop reason: SDUPTHER

## 2022-02-23 RX ORDER — CHLORAL HYDRATE 500 MG
1000 CAPSULE ORAL 2 TIMES DAILY
Status: DISCONTINUED | OUTPATIENT
Start: 2022-02-23 | End: 2022-02-23 | Stop reason: RX

## 2022-02-23 RX ORDER — EPHEDRINE SULFATE 50 MG/ML
INJECTION, SOLUTION INTRAVENOUS PRN
Status: DISCONTINUED | OUTPATIENT
Start: 2022-02-23 | End: 2022-02-23 | Stop reason: SDUPTHER

## 2022-02-23 RX ORDER — SODIUM CHLORIDE 9 MG/ML
INJECTION, SOLUTION INTRAVENOUS CONTINUOUS
Status: DISCONTINUED | OUTPATIENT
Start: 2022-02-23 | End: 2022-02-24 | Stop reason: SDUPTHER

## 2022-02-23 RX ADMIN — CEFAZOLIN SODIUM 2000 MG: 2 INJECTION, SOLUTION INTRAVENOUS at 13:32

## 2022-02-23 RX ADMIN — ONDANSETRON 4 MG: 2 INJECTION INTRAMUSCULAR; INTRAVENOUS at 16:18

## 2022-02-23 RX ADMIN — LISINOPRIL 5 MG: 5 TABLET ORAL at 20:48

## 2022-02-23 RX ADMIN — LIDOCAINE HYDROCHLORIDE 50 MG: 10 INJECTION, SOLUTION INFILTRATION; PERINEURAL at 13:28

## 2022-02-23 RX ADMIN — ASPIRIN 81 MG 81 MG: 81 TABLET ORAL at 20:47

## 2022-02-23 RX ADMIN — CYANOCOBALAMIN TAB 500 MCG 1000 MCG: 500 TAB at 20:47

## 2022-02-23 RX ADMIN — MONTELUKAST 10 MG: 10 TABLET, FILM COATED ORAL at 20:47

## 2022-02-23 RX ADMIN — SODIUM CHLORIDE: 9 INJECTION, SOLUTION INTRAVENOUS at 12:37

## 2022-02-23 RX ADMIN — AMLODIPINE BESYLATE 5 MG: 5 TABLET ORAL at 20:48

## 2022-02-23 RX ADMIN — SODIUM CHLORIDE: 9 INJECTION, SOLUTION INTRAVENOUS at 13:17

## 2022-02-23 RX ADMIN — ISOSORBIDE MONONITRATE 30 MG: 30 TABLET, EXTENDED RELEASE ORAL at 20:47

## 2022-02-23 RX ADMIN — HYDROCODONE BITARTRATE AND ACETAMINOPHEN 1 TABLET: 5; 325 TABLET ORAL at 20:47

## 2022-02-23 RX ADMIN — MUPIROCIN: 20 OINTMENT TOPICAL at 13:12

## 2022-02-23 RX ADMIN — SODIUM CHLORIDE: 9 INJECTION, SOLUTION INTRAVENOUS at 20:59

## 2022-02-23 RX ADMIN — FENTANYL CITRATE 25 MCG: 50 INJECTION, SOLUTION INTRAMUSCULAR; INTRAVENOUS at 14:12

## 2022-02-23 RX ADMIN — EPHEDRINE SULFATE 10 MG: 50 INJECTION INTRAMUSCULAR; INTRAVENOUS; SUBCUTANEOUS at 15:23

## 2022-02-23 RX ADMIN — ATORVASTATIN CALCIUM 20 MG: 20 TABLET, FILM COATED ORAL at 20:47

## 2022-02-23 RX ADMIN — METOPROLOL TARTRATE 25 MG: 25 TABLET, FILM COATED ORAL at 20:47

## 2022-02-23 RX ADMIN — CHLORHEXIDINE GLUCONATE: 4 LIQUID TOPICAL at 13:12

## 2022-02-23 RX ADMIN — IOPAMIDOL 25 ML: 612 INJECTION, SOLUTION INTRAVENOUS at 16:57

## 2022-02-23 RX ADMIN — PROPOFOL 50 MCG/KG/MIN: 10 INJECTION, EMULSION INTRAVENOUS at 13:28

## 2022-02-23 RX ADMIN — PROPOFOL 50 MG: 10 INJECTION, EMULSION INTRAVENOUS at 13:28

## 2022-02-23 RX ADMIN — GLIPIZIDE 2.5 MG: 5 TABLET ORAL at 20:47

## 2022-02-23 ASSESSMENT — PAIN SCALES - GENERAL
PAINLEVEL_OUTOF10: 0
PAINLEVEL_OUTOF10: 0
PAINLEVEL_OUTOF10: 5

## 2022-02-23 ASSESSMENT — LIFESTYLE VARIABLES: SMOKING_STATUS: 0

## 2022-02-23 ASSESSMENT — ENCOUNTER SYMPTOMS: SHORTNESS OF BREATH: 0

## 2022-02-23 NOTE — LETTER
[facility]  Cardiac Rehab Department  [address] [facility] [address]  [phone]  Toll Free [phone]              February 25, 2022    Dear Francisco Anaya,    Please find enclosed information concerning the care of your pacemaker insertion site and the guidelines you should follow for the next four weeks of your recovery. Each of these recommendations apply unless you were specifically instructed otherwise by your cardiologist.    If you have not already received one, a transtelephonic patient transmitter has been ordered for you on your behalf. When in your possession and appropriate, unbox the transmitter, plug it in and complete your first pacemaker check by following the instructional pictures in the easy-to-follow pamphlet or on the transmitter itself. In the event you have a 'smartphone', an ernestine may have been downloaded on your phone during your hospitilization to allow you to use your phone for the same purpose. If any questions or concerns arise or you experience difficulty completing the steps stated above, you should contact your cardiologist's office for assistance. Rest assured that the use of your transmitter will be reviewed with you as needed during your upcoming follow-up visit in Dr. Waters's office or via teleconference. Thank you. To Your [facility],        [facility] Cardiac Rehab Staff    Lorraine England, BS, DWIGHT Garcia RN  Registered Nurse  Exercise Physiologist  Registered Nurse

## 2022-02-23 NOTE — ANESTHESIA PRE PROCEDURE
Department of Anesthesiology  Preprocedure Note       Name:  Paco Renee   Age:  66 y.o.  :  1950                                          MRN:  [id number]         Date:  2022      Surgeon: * Surgery not found *    Procedure:     Medications prior to admission:   Prior to Admission medications    Medication Sig Start Date End Date Taking? Authorizing Provider   timolol (TIMOPTIC) 0.25 % ophthalmic solution  21   Historical Provider, MD   metFORMIN (GLUCOPHAGE) 500 MG tablet Take 2 tablets by mouth 2 times daily (with meals) Please hold for 48 hrs after cardiac cath. You may resume like normal on 2022. 22   Mynor Parmar MD   lisinopril (PRINIVIL;ZESTRIL) 5 MG tablet Take 1 tablet by mouth daily 22   Mynor Parmar MD   amLODIPine (NORVASC) 5 MG tablet TAKE 1 TABLET EVERY DAY 21   Sandro Berry MD   letrozole [facility]) 2.5 MG tablet Take 1 tablet by mouth daily 21   Mihir Vang MD   montelukast (SINGULAIR) 10 MG tablet Take 1 tablet by mouth daily 21   Mihir Vang MD   simvastatin (ZOCOR) 40 MG tablet Take 1 tablet by mouth nightly 11/15/21   Sandro Berry MD   HYDROcodone-acetaminophen (NORCO) 5-325 MG per tablet Take 1 tablet by mouth every 6 hours as needed for Pain. 10/29/21 10/29/22  Ildefonso Cedillo MD   glipiZIDE (GLUCOTROL XL) 2.5 MG extended release tablet Take 1 tablet by mouth daily 10/15/21   Sandro Berry MD   metoprolol tartrate (LOPRESSOR) 25 MG tablet Take 1 tablet by mouth 2 times daily 21   Sandro Berry MD   isosorbide mononitrate (IMDUR) 30 MG extended release tablet Take 1 tablet by mouth 2 times daily 21   Sandro Berry MD   Blood Glucose Calibration (ACCU-CHEK ROSA) SOLN Calibration done daily.  21   Sandro Berry MD   Blood Glucose Monitoring Suppl (ACCU-CHEK ROSA) ARMANDO Use 4 times a day to check BS. 21   Sandro Berry MD   blood glucose test strips (ACCU-CHEK ROSA PLUS) strip TEST BLOOD SUGAR FOUR TIMES DAILY E11.9 7/26/21   Norbert Marquez MD   Accu-Chek Softclix Lancets MISC TEST FOUR TIMES DAILY E11.9 7/26/21   Norbert Marquez MD   furosemide (LASIX) 40 MG tablet Take 1 tablet by mouth daily as needed (excess fluid) 6/24/21   Ashanti Pinedo MD   Alcohol Swabs (B-D SINGLE USE SWABS REGULAR) PADS USE THREE TIMES DAILY 6/27/18   Norbert Marquez MD   aspirin 81 MG tablet Take 81 mg by mouth daily    Historical Provider, MD   Omega-3 Fatty Acids (FISH OIL) 1000 MG CAPS Take 1,000 mg by mouth 2 times daily    Historical Provider, MD   MULTIPLE VITAMIN PO Take 1 tablet by mouth daily    Historical Provider, MD   Calcium Carbonate-Vitamin D (CALCIUM 500/D PO) Take 1 tablet by mouth 2 times daily    Historical Provider, MD   vitamin B-12 (CYANOCOBALAMIN) 1000 MCG tablet Take 1,000 mcg by mouth daily    Historical Provider, MD       Current medications:    No current facility-administered medications for this visit. No current outpatient medications on file. Facility-Administered Medications Ordered in Other Visits   Medication Dose Route Frequency Provider Last Rate Last Admin    0.9 % sodium chloride infusion   IntraVENous Continuous Bronson Necessary,  mL/hr at 02/23/22 1237 New Bag at 02/23/22 1237    ceFAZolin (ANCEF) 2000 mg in dextrose 4 % 100 mL IVPB (premix)  2,000 mg IntraVENous On Call to Braulio Villalobos MD        chlorhexidine gluconate (ANTISEPTIC SKIN CLEANSER) 4 % solution   Topical Once Bronson Necessary, MD        mupirocin (BACTROBAN) 2 % ointment   Nasal Once Bronson Necessary, MD           Allergies:     Allergies   Allergen Reactions    Medrol [Methylprednisolone] Other (See Comments)     Affects her eyes       Problem List:    Patient Active Problem List   Diagnosis Code    CHAPARRO (acute kidney injury) ([facility] [address] N17.9    Hypertension I10    Hyperlipidemia E78.5    Ocular hypertension H40.059    CAD (coronary artery 21 02/17/2022    BUN 31 02/17/2022    CREATININE 2.3 02/17/2022    GFRAA 25 02/17/2022    LABGLOM 21 02/17/2022    GLUCOSE 81 02/17/2022    PROT 7.1 02/17/2022    PROT 6.7 02/04/2013    CALCIUM 9.3 02/17/2022    BILITOT 0.3 02/17/2022    ALKPHOS 47 02/17/2022    AST 23 02/17/2022    ALT 11 02/17/2022       POC Tests:   No results for input(s): POCGLU, POCNA, POCK, POCCL, POCBUN, POCHEMO, POCHCT in the last 72 hours. Coags:   Lab Results   Component Value Date    PROTIME 12.8 06/21/2021    INR 0.97 06/21/2021       HCG (If Applicable): No results found for: PREGTESTUR, PREGSERUM, HCG, HCGQUANT     ABGs: No results found for: PHART, PO2ART, FAL2SAZ, VKD9VLI, BEART, R4NRJQIT     Type & Screen (If Applicable):  No results found for: LABABO, LABRH    Drug/Infectious Status (If Applicable):  No results found for: HIV, HEPCAB    COVID-19 Screening (If Applicable):   Lab Results   Component Value Date    COVID19 Not Detected 02/21/2022           Anesthesia Evaluation  Patient summary reviewed and Nursing notes reviewed no history of anesthetic complications:   Airway: Mallampati: II  TM distance: >3 FB   Neck ROM: full  Mouth opening: > = 3 FB Dental:    (+) upper dentures  Comment: bootom gum loss    Pulmonary:Negative Pulmonary ROS and normal exam  breath sounds clear to auscultation      (-) shortness of breath and not a current smoker          Patient did not smoke on day of surgery. Cardiovascular:    (+) hypertension:, CAD:, CABG/stent:, CHF:,     (-)  angina    NYHA Classification: I  ECG reviewed  Rhythm: regular  Rate: normal           Beta Blocker:  Not on Beta Blocker      ROS comment: Summary   Left ventricular ejection fraction is visually estimated at 25-30%. Left ventricular size is moderately increased .    Severe global hypokinesis      Signature      ----------------------------------------------------------------   Electronically signed by Royce Santiago MD(Interpreting   physician) on 07/28/2021 07:52 AM   ----------------------------------------------------------------      Findings      Left Ventricle   Left ventricular ejection fraction is visually estimated at 25-30%. Left ventricular size is moderately increased . Severe global hypokinesis     M-Mode Measurements (cm)        Neuro/Psych:   Negative Neuro/Psych ROS  (+) neuromuscular disease:,    (-) seizures, CVA and depression/anxiety            GI/Hepatic/Renal: Neg GI/Hepatic/Renal ROS  (+) PUD, renal disease: CRI, morbid obesity     (-) hiatal hernia and GERD       Endo/Other: Negative Endo/Other ROS   (+) DiabetesType II DM, , blood dyscrasia: anemia, arthritis: OA., malignancy/cancer. Pt had PAT visit. Abdominal:       Abdomen: soft. Vascular: Other Findings:               Anesthesia Plan      MAC and TIVA     ASA 3     (ISummary   Left ventricular ejection fraction is visually estimated at 25-30%. Left ventricular size is moderately increased . Severe global hypokinesis      Signature      ----------------------------------------------------------------   Electronically signed by Tony Morse MD(Interpreting   physician) on 07/28/2021 07:52 AM   ----------------------------------------------------------------      Findings      Left Ventricle   Left ventricular ejection fraction is visually estimated at 25-30%. Left ventricular size is moderately increased . Severe global hypokinesis     M-Mode Measurements (cm)     )  Induction: intravenous. MIPS: Postoperative opioids intended and Prophylactic antiemetics administered. Anesthetic plan and risks discussed with patient. Use of blood products discussed with patient whom. Plan discussed with CRNA.     Attending anesthesiologist reviewed and agrees with Sherie Blakely [id number], APRN - CRNA   2/23/2022

## 2022-02-23 NOTE — H&P
Office Visit    1/27/2022  [facility]    Interventional Cardiology  Coronary artery disease involving native coronary artery of native heart without angina pectoris +5 more    Dx  3 Month Follow-Up; Referred by Clifford Frey MD    Reason for Visit       Progress Notes  Danae York MD (Physician) [facility] Inter[facility]iology  Expand All Collapse All  [facility] Progress Note                              Date:                1/27/2022  Patient: Frances Gilbert  Age:                 66 y.o., 1950        Reason for evaluation:            SUBJECTIVE:    Returns today follow-up assessment follow-up for ischemic cardiomyopathy coronary artery disease hypertension hyperlipidemia previous CABG. Had recent breast surgery for carcinoma several months ago. Presently doing reasonably well denies limiting dyspnea denies chest pain denies palpitations. Echocardiogram in July was remarkable for ejection fraction 25 to 30%. I discussed with her possible ICD prior to the breast surgery and I discussed this again she will think about it and let us know. No other complaints or issues. Blood pressure 138/78 heart 69.     Review of Systems   Constitutional: Negative. Negative for chills, fever and unexpected weight change. HENT: Negative. Eyes: Negative. Respiratory: Negative. Negative for shortness of breath. Cardiovascular: Negative. Negative for chest pain. Gastrointestinal: Negative. Negative for diarrhea, nausea and vomiting. Endocrine: Negative. Genitourinary: Negative. Musculoskeletal: Negative. Skin: Negative. Neurological: Negative. All other systems reviewed and are negative.           OBJECTIVE:     /78   Pulse 69   Ht 5' 6\" (1.676 m)   Wt 212 lb (96.2 kg)   BMI 34.22 kg/m²      Labs:   CBC: No results for input(s): WBC, HGB, HCT, PLT in the last 72 hours.   BMP:No results for input(s): NA, K, CO2, BUN, CREATININE, LABGLOM, GLUCOSE in the last 72 hours. BNP: No results for input(s): BNP in the last 72 hours. PT/INR: No results for input(s): PROTIME, INR in the last 72 hours. APTT:No results for input(s): APTT in the last 72 hours. CARDIAC ENZYMES:No results for input(s): CKTOTAL, CKMB, CKMBINDEX, TROPONINI in the last 72 hours.   FASTING LIPID PANEL:        Lab Results   Component Value Date     HDL 41 11/17/2021     LDLCALC 56 11/17/2021     TRIG 94 11/17/2021      LIVER PROFILE:No results for input(s): AST, ALT, LABALBU in the last 72 hours.         Past Medical History        Past Medical History:   Diagnosis Date    Acute sinusitis      [facility]ARRO [facility]acute kidney injury) ([facility] [address] 05/18/2018    Anemia      Bleeding ulcer       hx of; cauterized by dr. Reyna Lerma    Breast lump      CAD (coronary artery disease)       sees dr. Botello Cyphers ulcer ([facility] [address]      Cataract      COVID-19 01/2021     fatigue    Dermatitis      Diabetes mellitus ([facility] [address]      Diabetic nephropathy (HCC)      Diabetic retinopathy ([facility] [address]      Glaucoma      Gout      Hemorrhoids      Hx of TB skin testing       positive    Hyperlipidemia      Hypertension      Palliative care patient 06/24/2021    Type 2 diabetes mellitus ([facility] [address] 05/18/2018    Vitamin D deficiency           Past Surgical History         Past Surgical History:   Procedure Laterality Date    BREAST LUMPECTOMY Right 10/29/2021     RIGHT LUMPECTOMY, SNB, PREOP ULTRASOUND, INTRAOP ULTRASOUND GUIDED NL, BIOSORB, PEC BLOCK, MARGIN PROBE, FLAPS, IORT performed by Hola Montenegro MD at [address] Right 9/3/2020     RIGHT CARPAL TUNNEL RELEASE performed by Tiffany Seals MD at [address]        CHOLECYSTECTOMY        COLONOSCOPY N/A 10/11/2019     Dr David Flores of a hemostatic clip-Suboptimal prep, diverticulosis, internal hemorrhoids-Grade 2, AP x 3, 1 yr recall  COLONOSCOPY N/A 10/12/2020     Dr Masters Light hemorrhoids-Grade 1-2, AP, 3 yr recall    CORONARY ARTERY BYPASS GRAFT   2001    ENDOSCOPY, COLON, DIAGNOSTIC        EYE SURGERY        MOUTH SURGERY        OTHER SURGICAL HISTORY         sternal resection    SKIN BIOPSY        UPPER GASTROINTESTINAL ENDOSCOPY N/A 10/11/2019     Dr Wai Harmon (+) H Pylori    US BREAST NEEDLE BIOPSY LEFT Left 9/1/2021     US BREAST NEEDLE BIOPSY LEFT 9/1/2021 [facility]GERY    VASCULAR SURGERY             Family History         Family History   Problem Relation Age of Onset    Cancer Mother      Diabetes Father      COPD Father      Diabetes Sister      Heart Disease Sister      Colon Polyps Sister      Coronary Art Dis Other           grandfather    Diabetes Other           aunt    Colon Cancer Neg Hx      Liver Cancer Neg Hx      Rectal Cancer Neg Hx                 Allergies   Allergen Reactions    Medrol [Methylprednisolone] Other (See Comments)       Affects her eyes      Current Facility-Administered Medications          Current Outpatient Medications   Medication Sig Dispense Refill    amLODIPine (NORVASC) 5 MG tablet TAKE 1 TABLET EVERY DAY 90 tablet 1    letrozole (FEMARA) 2.5 MG tablet Take 1 tablet by mouth daily 90 tablet 3    montelukast (SINGULAIR) 10 MG tablet Take 1 tablet by mouth daily 90 tablet 3    simvastatin (ZOCOR) 40 MG tablet Take 1 tablet by mouth nightly 90 tablet 3    glipiZIDE (GLUCOTROL XL) 2.5 MG extended release tablet Take 1 tablet by mouth daily 90 tablet 3    metoprolol tartrate (LOPRESSOR) 25 MG tablet Take 1 tablet by mouth 2 times daily 180 tablet 3    isosorbide mononitrate (IMDUR) 30 MG extended release tablet Take 1 tablet by mouth 2 times daily 180 tablet 3    Blood Glucose Calibration (ACCU-CHEK ROSA) SOLN Calibration done daily.  1 each 1    Blood Glucose Monitoring Suppl (ACCU-CHEK ROSA) ARMANDO Use 4 times a day to check BS. 1 Device 0    blood glucose test strips (ACCU-CHEK ROSA PLUS) strip TEST BLOOD SUGAR FOUR TIMES DAILY E11.9 400 strip 3    Accu-Chek Softclix Lancets MISC TEST FOUR TIMES DAILY E11.9 400 each 3    lisinopril (PRINIVIL;ZESTRIL) 5 MG tablet Take 1 tablet by mouth daily 90 tablet 2    metFORMIN (GLUCOPHAGE) 500 MG tablet TAKE 2 TABLETS TWICE DAILY WITH MEALS (Patient taking differently: Take 1,000 mg by mouth 2 times daily (with meals) ) 360 tablet 3    furosemide (LASIX) 40 MG tablet Take 1 tablet by mouth daily as needed (excess fluid) 60 tablet 1    Alcohol Swabs (B-D SINGLE USE SWABS REGULAR) PADS USE THREE TIMES DAILY 100 each 11    aspirin 81 MG tablet Take 81 mg by mouth daily        Omega-3 Fatty Acids (FISH OIL) 1000 MG CAPS Take 1,000 mg by mouth 2 times daily        MULTIPLE VITAMIN PO Take 1 tablet by mouth daily        Calcium Carbonate-Vitamin D (CALCIUM 500/D PO) Take 1 tablet by mouth 2 times daily        vitamin B-12 (CYANOCOBALAMIN) 1000 MCG tablet Take 1,000 mcg by mouth daily        timolol (TIMOPTIC) 0.5 % ophthalmic solution Place 1 drop into the right eye daily         HYDROcodone-acetaminophen (NORCO) 5-325 MG per tablet Take 1 tablet by mouth every 6 hours as needed for Pain. (Patient not taking: Reported on 1/27/2022) 12 tablet 0      No current facility-administered medications for this visit.         Social History               Socioeconomic History    Marital status: Single       Spouse name: Not on file    Number of children: Not on file    Years of education: Not on file    Highest education level: Not on file   Occupational History       Employer: [employer]   Tobacco Use    Smoking status: Never Smoker    Smokeless tobacco: Never Used   Vaping Use    Vaping Use: Never used   Substance and Sexual Activity    Alcohol use:  Yes       Alcohol/week: 1.0 standard drink       Types: 1 Shots of liquor per week       Comment: rare    Drug use: No    Sexual activity: Never   Other Topics Concern    Not on file   Social History Narrative    Not on file      Social Determinants of Health          Financial Resource Strain: Low Risk     Difficulty of Paying Living Expenses: Not hard at all   Food Insecurity: No Food Insecurity    Worried About Running Out of Food in the Last Year: Never true    Richelle Uribe in the Last Year: Never true   Transportation Needs: No Transportation Needs    Lack of Transportation (Medical): No    Lack of Transportation (Non-Medical): No   Physical Activity:     Days of Exercise per Week: Not on file    Minutes of Exercise per Session: Not on file   Stress:     Feeling of Stress : Not on file   Social Connections:     Frequency of Communication with Friends and Family: Not on file    Frequency of Social Gatherings with Friends and Family: Not on file    Attends [employer] Services: Not on file    Active Member of [address] [place] [address] or Organizations: Not on file    Attends Club or Organization Meetings: Not on file    Marital Status: Not on file   Intimate Partner Violence:     Fear of Current or Ex-Partner: Not on file    Emotionally Abused: Not on file    Physically Abused: Not on file    Sexually Abused: Not on file   Housing Stability:     Unable to Pay for Housing in the Last Year: Not on file    Number of [place] in the Last Year: Not on file    Unstable Housing in the Last Year: Not on file            Physical Examination:  /78   Pulse 69   Ht 5' 6\" (1.676 m)   Wt 212 lb (96.2 kg)   BMI 34.22 kg/m²   Physical Exam  Vitals reviewed. Constitutional:       Appearance: She is well-developed. Neck:      Vascular: No carotid bruit or JVD. Cardiovascular:      Rate and Rhythm: Normal rate and regular rhythm. Heart sounds: Normal heart sounds. No murmur heard. No friction rub. No gallop. Pulmonary:      Effort: Pulmonary effort is normal. No respiratory distress. Breath sounds: Normal breath sounds. No wheezing or rales. Abdominal:      General: There is no distension. Tenderness: There is no abdominal tenderness. Lymphadenopathy:      Cervical: No cervical adenopathy. Skin:     General: Skin is warm and dry.                ASSESSMENT:       Diagnosis Orders   1. Coronary artery disease involving native coronary artery of native heart without angina pectoris      2. Primary hypertension      3. Mixed hyperlipidemia      4. History of coronary artery bypass graft      5. Ischemic cardiomyopathy      6. Status post aorto-coronary artery bypass graft            PLAN:  No orders of the defined types were placed in this encounter.       Encounter Medications    No orders of the defined types were placed in this encounter.            1. Continue present medications  2. Recommend follow-up assessment in 6 months  3. We discussed again possible ICD implantation she will think about it and let us know in a few days she certainly would qualify for this. She would probably benefit from cardiac catheterization prior to that.     Return in about 6 months (around 7/27/2022) for return to Dr. Glenny Jolley only.        Fadi Cao MD 1/27/2022 1:19 PM [facility]        Thisdictation was generated by voice recognition computer software. Although all attempts are made to edit the dictation for accuracy, there may be errors in the transcription that are not intended. Here for elective ICD implantation advise indication alternatives benefits and risk patient agreeable plan today.   I have discussed with the patient regarding indications for the proposed procedure ICD/ Pacemaker implantation along with possible alternatives benefits and risks including but not limited to risks of death, myocardial infarction, stroke, contrast induced nephropathy which in some cases may lead to acute kidney failure requiring dialysis, allergic reactions, infections which may require treatment with antibiotics or removal of the device, bleeding requiring blood transfusion,  cardiac arrhthymias, respiratory failure which may require placing the patient on respiratory support such as a ventilator or breathing machine,risk of complications which may require vascular surgery,risks of collapsing the lung with development of a pneumothorax which may require placement of a chest tube, and risks of lead dislodgement which may require follow up surgery and repositioning of the leads. In addition there are long term risks including infection, and device or component failure which may require removal and/or replacement of various components. Risk of wound nonhealing t subsequent erosion etc. also discussed. We also discussed the risk of potential stroke or thromboembolic phenomena during the timeframe that the patient may be off off of anticoagulation. The patient is advised the device battery will eventually become depleted and require replacement at some point. The patient is awake and alert and understands the issues involved and indicates willingness to proceed as ordered. The patient is  a reasonable candidate for moderate conscious sedation. ASA score:  ASA 3 - Patient with moderate systemic disease with functional limitations    Mallampati: I (soft palate, uvula, fauces, tonsillar pillars visible)    Preferred vascular access site will be: left subclavian vein.

## 2022-02-24 VITALS
WEIGHT: 214 LBS | OXYGEN SATURATION: 97 % | HEIGHT: 65 IN | SYSTOLIC BLOOD PRESSURE: 118 MMHG | HEART RATE: 82 BPM | DIASTOLIC BLOOD PRESSURE: 52 MMHG | TEMPERATURE: 97.2 F | BODY MASS INDEX: 35.65 KG/M2 | RESPIRATION RATE: 18 BRPM

## 2022-02-24 LAB
EKG P AXIS: NORMAL DEGREES
EKG P-R INTERVAL: NORMAL MS
EKG Q-T INTERVAL: 428 MS
EKG QRS DURATION: 138 MS
EKG QTC CALCULATION (BAZETT): 435 MS
EKG T AXIS: -33 DEGREES
HCT VFR BLD CALC: 23.1 % (ref 37–47)
HEMOGLOBIN: 7 G/DL (ref 12–16)
MCH RBC QN AUTO: 31 PG (ref 27–31)
MCHC RBC AUTO-ENTMCNC: 30.3 G/DL (ref 33–37)
MCV RBC AUTO: 102.2 FL (ref 81–99)
PDW BLD-RTO: 15.7 % (ref 11.5–14.5)
PLATELET # BLD: 175 K/UL (ref 130–400)
PMV BLD AUTO: 10.2 FL (ref 9.4–12.3)
RBC # BLD: 2.26 M/UL (ref 4.2–5.4)
WBC # BLD: 6.6 K/UL (ref 4.8–10.8)

## 2022-02-24 PROCEDURE — 93005 ELECTROCARDIOGRAM TRACING: CPT | Performed by: INTERNAL MEDICINE

## 2022-02-24 PROCEDURE — 6370000000 HC RX 637 (ALT 250 FOR IP)

## 2022-02-24 PROCEDURE — 36415 COLL VENOUS BLD VENIPUNCTURE: CPT

## 2022-02-24 PROCEDURE — 6360000002 HC RX W HCPCS: Performed by: INTERNAL MEDICINE

## 2022-02-24 PROCEDURE — 85027 COMPLETE CBC AUTOMATED: CPT

## 2022-02-24 PROCEDURE — 6370000000 HC RX 637 (ALT 250 FOR IP): Performed by: INTERNAL MEDICINE

## 2022-02-24 PROCEDURE — 93010 ELECTROCARDIOGRAM REPORT: CPT | Performed by: INTERNAL MEDICINE

## 2022-02-24 RX ORDER — CEFAZOLIN SODIUM 2 G/100ML
2000 INJECTION, SOLUTION INTRAVENOUS EVERY 12 HOURS
Status: COMPLETED | OUTPATIENT
Start: 2022-02-24 | End: 2022-02-24

## 2022-02-24 RX ADMIN — ISOSORBIDE MONONITRATE 30 MG: 30 TABLET, EXTENDED RELEASE ORAL at 08:35

## 2022-02-24 RX ADMIN — HYDROCODONE BITARTRATE AND ACETAMINOPHEN 1 TABLET: 5; 325 TABLET ORAL at 02:42

## 2022-02-24 RX ADMIN — GLIPIZIDE 2.5 MG: 5 TABLET ORAL at 08:35

## 2022-02-24 RX ADMIN — TIMOLOL MALEATE 1 DROP: 2.5 SOLUTION OPHTHALMIC at 08:35

## 2022-02-24 RX ADMIN — MONTELUKAST 10 MG: 10 TABLET, FILM COATED ORAL at 08:35

## 2022-02-24 RX ADMIN — ATORVASTATIN CALCIUM 20 MG: 20 TABLET, FILM COATED ORAL at 08:35

## 2022-02-24 RX ADMIN — CEFAZOLIN SODIUM 2000 MG: 2 INJECTION, SOLUTION INTRAVENOUS at 10:05

## 2022-02-24 RX ADMIN — LISINOPRIL 5 MG: 5 TABLET ORAL at 08:35

## 2022-02-24 RX ADMIN — AMLODIPINE BESYLATE 5 MG: 5 TABLET ORAL at 08:35

## 2022-02-24 RX ADMIN — ASPIRIN 81 MG 81 MG: 81 TABLET ORAL at 08:34

## 2022-02-24 RX ADMIN — METOPROLOL TARTRATE 25 MG: 25 TABLET, FILM COATED ORAL at 08:35

## 2022-02-24 RX ADMIN — CYANOCOBALAMIN TAB 500 MCG 1000 MCG: 500 TAB at 08:35

## 2022-02-24 RX ADMIN — LETROZOLE 2.5 MG: 2.5 TABLET ORAL at 08:35

## 2022-02-24 ASSESSMENT — PAIN SCALES - GENERAL
PAINLEVEL_OUTOF10: 4
PAINLEVEL_OUTOF10: 0

## 2022-02-24 NOTE — DISCHARGE SUMMARY
Discharge Summary    Rakesh Abrams  :  1950  MRN:  [id number]    Admit date:  2022  Discharge date:      Admitting Physician:  Jaleesa Bonner MD    Advance Directive: Full Code    Consults: none    Primary Care Physician:  Nely Sorto MD    Discharge Diagnoses:  Principal Problem:    Decreased cardiac ejection fraction  Resolved Problems:    * No resolved hospital problems. *      Cardiology Specific Data:  Specialty Problems        Cardiology Problems    Ischemic cardiomyopathy        CAD (coronary artery disease)        Hyperlipidemia        Hypertension        Chest pain        Retinal neovascularization              Significant Diagnostic Studies:   XR CHEST PORTABLE    Result Date: 2022  XR CHEST PORTABLE 2022 6:38 PM History: Pacemaker placement. Portable chest x-ray. Comparison is made with February 15, 2022. Cardiomegaly. Left cardiac pacer now present. No pneumothorax. Chronic interstitial lung disease. Mild atelectasis. Scattered calcified granulomas. Indeterminate 12 mm nodule adjacent to the right hilum. This may represent a granuloma though it appears to be larger compared with previous exams. Chest CT correlation is recommended (with contrast if renal function allows). 1. Left cardiac pacer present. No pneumothorax. 2. 12 mm left upper lobe nodule adjacent to the hilum. Chest CT follow-up recommended. Signed by Dr Rui Haddad      Pertinent Labs:   CBC:   Recent Labs     22  0219   WBC 6.6   HGB 7.0*        BMP:  No results for input(s): NA, K, CL, CO2, BUN, CREATININE, GLUCOSE in the last 72 hours. INR: No results for input(s): INR in the last 72 hours. Lipids: No results for input(s): CHOL, HDL in the last 72 hours. Invalid input(s): LDLCALCU  ABGs:No results for input(s): PHART, JRI9QRW, PO2ART, QVK4XZM, BEART, HGBAE, X4JFAEZM, CARBOXHGBART, 02THERAPY in the last 72 hours.   HgBA1c:  No results for input(s): LABA1C in the last 72 hours.    Procedures: Biventricular ICD implant    Hospital Course: Admitted yesterday for elective biventricular ICD implantation from a left infraclavicular approach tolerated well. Ultrasound guidance utilized for access. All 3 leads placed with excellent electrical parameters tolerated well. Kept overnight. Today doing well wound healing appropriately no hematoma. I personally gave her written and oral wound care instructions. Postprocedural chest x-ray satisfactory no evidence of pneumothorax or other complications. Device interrogated today functioning appropriately. Now felt to be stable and appropriate for discharge discharged in stable improved condition. Follow-up in the device clinic as arranged. Note chest x-ray indicates 12 mm nodule left upper lobe CT of the chest recommended we will arrange. Physical Exam:    Vital Signs: BP (!) 118/52   Pulse 82   Temp 97.2 °F (36.2 °C) (Temporal)   Resp 18   Ht 5' 5\" (1.651 m)   Wt 214 lb (97.1 kg)   SpO2 97%   BMI 35.61 kg/m²     Physical Exam      Discharge Medications:         Medication List      CONTINUE taking these medications    Accu-Chek Kim María  Use 4 times a day to check BS. Accu-Chek Kim Plus strip  Generic drug: blood glucose test strips  TEST BLOOD SUGAR FOUR TIMES DAILY E11.9     Accu-Chek Kim Soln  Calibration done daily.      Accu-Chek Softclix Lancets Misc  TEST FOUR TIMES DAILY E11.9     amLODIPine 5 MG tablet  Commonly known as: NORVASC  TAKE 1 TABLET EVERY DAY     aspirin 81 MG tablet     B-D SINGLE USE SWABS REGULAR Pads  USE THREE TIMES DAILY     CALCIUM 500/D PO     fish oil 1000 MG Caps     furosemide 40 MG tablet  Commonly known as: LASIX  Take 1 tablet by mouth daily as needed (excess fluid)     glipiZIDE 2.5 MG extended release tablet  Commonly known as: GLUCOTROL XL  Take 1 tablet by mouth daily     HYDROcodone-acetaminophen 5-325 MG per tablet  Commonly known as: Norco  Take 1 tablet by mouth every 6 hours as needed for Pain.     isosorbide mononitrate 30 MG extended release tablet  Commonly known as: IMDUR  Take 1 tablet by mouth 2 times daily     letrozole 2.5 MG tablet  Commonly known as: Femara  Take 1 tablet by mouth daily     lisinopril 5 MG tablet  Commonly known as: PRINIVIL;ZESTRIL  Take 1 tablet by mouth daily     metFORMIN 500 MG tablet  Commonly known as: GLUCOPHAGE  Take 2 tablets by mouth 2 times daily (with meals) Please hold for 48 hrs after cardiac cath. You may resume like normal on 2/18/2022. metoprolol tartrate 25 MG tablet  Commonly known as: LOPRESSOR  Take 1 tablet by mouth 2 times daily     montelukast 10 MG tablet  Commonly known as: SINGULAIR  Take 1 tablet by mouth daily     MULTIPLE VITAMIN PO     simvastatin 40 MG tablet  Commonly known as: ZOCOR  Take 1 tablet by mouth nightly     timolol 0.25 % ophthalmic solution  Commonly known as: TIMOPTIC     vitamin B-12 1000 MCG tablet  Commonly known as: CYANOCOBALAMIN            Discharge Instructions:   John Damon MD  [address] [place] [address] [place]  [phone]    On 3/3/2022  2:30 pm DEVICE/WOUND 50 [facility], MD  [address]  [phone]    On 4/7/2022  9:45 AM POST-OP    Alda Miller MD  [address] [place] [address] Dr Stacie Santillan [address] [facility] [phone]    Go on 3/3/2022  Hospital follow-up appointment is scheduled for 3/3 at 8:30am.        Take medications as directed. Resume activity as tolerated. Diet: ADULT DIET;  Regular; 3 carb choices (45 gm/meal)     Disposition: Patient is medically stable and will be discharged *    Shad Arango MD, 2/24/2022 12:09 PM

## 2022-02-24 NOTE — PROGRESS NOTES
4 Eyes Skin Assessment    Ludmila Murphy is being assessed upon: Admission    I agree that I, Norm Jimenes RN, along with Bijal Small RN have performed a thorough Head to Toe Skin Assessment on the patient. ALL assessment sites listed below have been assessed. Areas assessed by both nurses:     [x]   Head, Face, and Ears   [x]   Shoulders, Back, and Chest  [x]   Arms, Elbows, and Hands   [x]   Coccyx, Sacrum, and Ischium  [x]   Legs, Feet, and Heels    Does the Patient have Skin Breakdown? No    Scout Prevention initiated: Yes  Wound Care Orders initiated: NA    [facility] nurse consulted for Pressure Injury (Stage 3,4, Unstageable, DTI, NWPT, and Complex wounds) and New or Established Ostomies: NA        Primary Nurse eSignature:  Norm Jimenes RN on 2/23/2022 at 6:31 PM      Co-Signer eSignature: Electronically signed by Timmy Calderon RN on 2/23/22 at 6:33 PM CST
PHARMACY NOTE  Laxmi Ireland was ordered [employer]. Per the [address], this medication is non-formulary and not stocked by pharmacy. It has been discontinued. The medication can be reordered at discharge.      Electronically signed by Justin Coe [facility] on 2/23/2022 at 6:56 PM
Patient informed this nurse of history of anemia. Patient states that she does not want a blood transfusion this visit. Hemoglobin 7.0 this AM on labs.  Will pass along to dayshift team.
Pharmacy Renal Adjustment    Hoa Zambrano is a 66 y.o. female. Pharmacy has renally adjusted medications per protocol. No results for input(s): BUN in the last 72 hours. No results for input(s): CREATININE in the last 72 hours. Estimated Creatinine Clearance: 25 mL/min (A) (based on SCr of 2.3 mg/dL (H)). Height:   Ht Readings from Last 1 Encounters:   02/23/22 5' 5\" (1.651 m)     Weight:  Wt Readings from Last 1 Encounters:   02/23/22 214 lb (97.1 kg)       Plan: Adjust the following medications based on renal function:           Ancef 2gm IV q8hr x 2 doses post op to 2gm IV q 12hr X 2 doses.     Electronically signed by CRISTAL Emmanuel [facility] on 2/23/2022 at 6:55 PM
Pt's discharge is complete, but her ride can't get here until after 2 pm.
Xray at bedside for chest xray
15-Sep-2019 21:19

## 2022-02-24 NOTE — PLAN OF CARE
Problem: Falls - Risk of:  Goal: Will remain free from falls  2/24/2022 9726 by Lucho Mcgill RN  Outcome: Ongoing  2/23/2022 1824 by Lucho Mcgill RN  Outcome: Ongoing  Goal: Absence of physical injury  2/24/2022 3390 by Lucho Mcgill RN  Outcome: Ongoing  2/23/2022 1824 by Lucho Mcgill RN  Outcome: Ongoing

## 2022-02-25 ENCOUNTER — TELEPHONE (OUTPATIENT)
Dept: INTERNAL MEDICINE | Age: 72
End: 2022-02-25

## 2022-02-25 NOTE — TELEPHONE ENCOUNTER
Anay 45 Transitions Initial Follow Up Call    Outreach made within 2 business days of discharge: Yes    Patient: Linda Nina   Patient : 1950  MRN: [id number]   Reason for Admission: Admitted 22 for heart cath, ICD implanted left subclavian vein  Discharge Date: 22      Discharge Diagnoses:  Principal Problem:    Decreased cardiac ejection fraction     Spoke with: patient     Discharge department/facility: [facility]     TCM Interactive Patient Contact:  Was patient able to fill all prescriptions: Yes  Was patient instructed to bring all medications to the follow-up visit: Yes  Is patient taking all medications as directed in the discharge summary? Yes  Does patient understand their discharge instructions: Yes  Does patient have questions or concerns that need addressed prior to 7-14 day follow up office visit: no    I spoke with Mrs. Anjelica Stevens. She states she is doing well. Her procedure went well and she reports she has not been in a lot of pain. She took a tylenol before bed last night. Her sister is there with her. She did have a question regarding the postop instructions. She states she was told not to raise her arm above her head, but needs to use both hands to help raise herself up out of her chair. I have send a message to cardiology regarding this. She states she has all of her medications. She is following all other post op directed and denies any other needs at this time. She will follow up as scheduled next week.        Scheduled appointment with PCP within 7-14 days    Follow Up  Future Appointments   Date Time Provider Ina Chowdary   3/3/2022  8:30 AM MD MYRTLE [place]   3/3/2022  2:30 PM MD NAMRATA Guillaume [facility] [place]   2022  9:45 AM MD NAMRATA Guillaume [facility] [place]   2022  2:15 PM MD NAMRATA Morataya Gen [facility] [place]   2022  9:15 AM MD MYRTLE Wang [facility] [place]   2022  9:15 AM SARA [place] N DARY HEMONC [place]   5/25/2022  9:30 AM SCHEDULE, [facility] MED ONC MA [facility] MED ONC [facility]   8/1/2022  2:00 PM Mynor Parmar MD N [facility] Cardio [facility]       Maya Lobe, [place]

## 2022-02-25 NOTE — TELEPHONE ENCOUNTER
. Elizabeth Lou has a question about her discharge instructions. She states she was told not to raise her arm above her head, but was not sure if she was ok to use it to help get herself up out of the chair. Please call the patient to discuss post op instructions.

## 2022-02-25 NOTE — TELEPHONE ENCOUNTER
Returned call to patient. She stated she has to use her arm a little to help push herself out of the chair. Patient stated she will be as careful as she can. She will follow her discharged instructions. Patient stated her instructions stated to wear her sling for 2 weeks. Ordered patient a [employer] monitor.

## 2022-02-27 NOTE — PROCEDURES
Cardiac Biventricular  Placement Operative Report    Ana Champion  [id number]  2/27/2022    Surgeon: Clair Mendoza    Anesthesia: Monitored anesthesia care    Procedure(s):1. Biventricular ICD implantation      Indications:   1. Dilated cardiomyopathy  2. Chronic congestive heart failure systolic and diastolic    Procedure Details  The risks, benefits, complications, treatment options, and expected outcomes were discussed with the patient. The patient and/or family concurred with the proposed plan, giving informed consent. Patient was prepped and draped in the usual strict sterile fashion. After the antibiotic was completely infused, 30 cc Sensorcaine was infiltrated into the left Infraclavicular area. Venous access obtained utilizing a micropuncture needle under ultrasonographic guidance and the micropuncture wire was advanced followed by the dilator. The wire was then exchanged for a standard 0.0.35 wire and then the dilator was removed. Next, an incision was made adjacent to the wire entry site. Utilizing sharp and blunt dissection a pocket was then created down to the prepectoralis fascia. The guidewire was identified, freed from the surrounding subcutaneous tissue, and delivered into the operative field. Next an 9french sheath and dilator was advanced over the wire then the dilator was removed. Another similar wire was advanced into the sheath then the sheath was removed leaving two wires in place in the subclavian vein. Next, the 7french sheath and dilators were advanced over their respective wires into the subclavian vein. The dilator and wires were then removed. Next the atrial and ventricular leads were inserted into their respective sheaths   And advanced into the right atrium and ventricle where the leads were positioned under fluoroscopic guidance. The sheaths were peeled away and removed. Appropriate sensing and thresholds were obtained. No diaphragmatic pacing occurred at 10 V and 1.5 ms. The active fixation mechanisms were extended. Next, the leads were then sutured utilizing 2-0 ethibond sutures. Lead measurements were then rechecked. Next, the subclavian vein was cannulated in a separate location slightly lateral to the original point of entry and a 0.035 wire was advanced into the subclavian vein and subsequently into the right side of the heart. Next, the 7french coronary sinus sheath and dilator was advanced over the wire under fluoroscopic guidance into the right ventricle. The dilator was removed. The wire was withdrawn. The sheath was then withdrawn slowly with counterclockwise torque applied and was brought into the right atrium and directed into the ostium of the coronary sinus. The wire was then advanced into the coronary sinus and position confirmed in MCQUEEN and Romansh views. The sheath was then gently advanced into the coronary sinus. Next the wire was removed and the coronary sinus flotation balloon was advanced into the coronary sinus just distal to the tip of the sheath. The balloon was then gently inflated and a small amount of contrast was injected in MCQUEEN, Romansh, and AP views to obtain a coronary sinus venogram. Next an appropriate branch of the coronary sinus was selected for lead placement. The flotation balloon was withdrawn and removed. Next a 0.014 coronary guidewire BMW and advanced into the respective branch. The coronary sinus lead was then advanced over the wire into the respective branch and position confirmed in several views. Sensing and pacing parameters were then assessed and found to be suitable. The lead was then paced at 10 volts with no evidence of diaphragmatic stimulation. After having assured adequate lead position the guidewire was removed and the sheath was incised using the appropriate splitter device provided by the . The lead was then secured in position utilizing 2-0 ethibond sutures.       After having assured adequate hemostasis the leads were connected to the pulse generator and the pulse generator and leads were placed in the pocket. The pocket was copiously irrigated utilizing antibiotic solution. The pacemaker and leads system were visualized under fluoroscopy. Appropriate redundancy/slack in the leads were noted. The pins of the leads were beyond the set screws. Hemostasis was reverified. The pocket was then closed using 2-0 Vicryl for the deep layer and 3-0 Vicryl for the middle layer. Surgical staples were then applied to the skin and sterile dressing was applied. At the end of the procedure instrument, needle, and sponge counts were correct. An arm immobilizer was applied at this point and the patient was transferred. An independent trained observer administered medications under my direction. The patient was continuously monitored with respect to level of consciousness, and vital signs/physiologic status throughout the case. For further details regarding specific medications and doses please refer to Cath Lab procedural notes. Anesthesia start time:1357  Anesthesia stop time:1616          Pacemaker Data:       Atrial lead   Medtronic  Model   [id number]   serial #   [id number]  Volt    0.75 V    PW    0.5 ms    Current   NA   ma       Impedance:   399   ohms        Slew rate:   NA   V/sec  P wave:   1.0 mv    Right Ventricular lead    Medtronic  Model   [id number]   serial #   [id number]  Volt    0.5    V     PW    0.5 ms     Current   NA   ma    I  Impedance:   732   ohms       Slew rate:   NA   V/sec  R wave:    9.8 mv    Coronary sinus lead    Medtronic  Model   [id number]   serial #   [id number]  Volt    0.75   V    PW    0.4   ms    Current   NA   ma    I  Impedance:   983   ohms          Slew rate:   NA   V/sec  R wave:   9.8 mv    Generator  Medtronic  Model   [id number]  Serial   [id number]            Estimated Blood Loss:  Minimal         Complications:  None; patient tolerated the procedure well. Disposition:  To progressive care unit           Condition: stable      Scott Arriola MD 2/27/2022 1:59 PM

## 2022-03-01 DIAGNOSIS — R91.1 LUNG NODULE: Primary | ICD-10-CM

## 2022-03-03 ENCOUNTER — NURSE ONLY (OUTPATIENT)
Dept: CARDIOLOGY CLINIC | Age: 72
End: 2022-03-03
Payer: MEDICARE

## 2022-03-03 ENCOUNTER — OFFICE VISIT (OUTPATIENT)
Dept: INTERNAL MEDICINE | Age: 72
End: 2022-03-03
Payer: MEDICARE

## 2022-03-03 VITALS
DIASTOLIC BLOOD PRESSURE: 60 MMHG | BODY MASS INDEX: 36.59 KG/M2 | OXYGEN SATURATION: 97 % | WEIGHT: 219.6 LBS | SYSTOLIC BLOOD PRESSURE: 118 MMHG | HEART RATE: 94 BPM | HEIGHT: 65 IN

## 2022-03-03 DIAGNOSIS — I25.5 ISCHEMIC CARDIOMYOPATHY: ICD-10-CM

## 2022-03-03 DIAGNOSIS — N18.30 TYPE 2 DIABETES MELLITUS WITH STAGE 3 CHRONIC KIDNEY DISEASE, UNSPECIFIED WHETHER LONG TERM INSULIN USE, UNSPECIFIED WHETHER STAGE 3A OR 3B CKD (HCC): ICD-10-CM

## 2022-03-03 DIAGNOSIS — I10 PRIMARY HYPERTENSION: ICD-10-CM

## 2022-03-03 DIAGNOSIS — I42.0 DILATED CARDIOMYOPATHY (HCC): Primary | ICD-10-CM

## 2022-03-03 DIAGNOSIS — I50.42 CHRONIC COMBINED SYSTOLIC (CONGESTIVE) AND DIASTOLIC (CONGESTIVE) HEART FAILURE (HCC): ICD-10-CM

## 2022-03-03 DIAGNOSIS — Z51.89 VISIT FOR WOUND CHECK: Primary | ICD-10-CM

## 2022-03-03 DIAGNOSIS — Z95.1 HISTORY OF CORONARY ARTERY BYPASS GRAFT: ICD-10-CM

## 2022-03-03 DIAGNOSIS — Z95.810 BIVENTRICULAR ICD (IMPLANTABLE CARDIOVERTER-DEFIBRILLATOR) IN PLACE: ICD-10-CM

## 2022-03-03 DIAGNOSIS — I20.1 ANGINA PECTORIS WITH DOCUMENTED SPASM (HCC): ICD-10-CM

## 2022-03-03 DIAGNOSIS — I25.10 CORONARY ARTERY DISEASE INVOLVING NATIVE CORONARY ARTERY OF NATIVE HEART WITHOUT ANGINA PECTORIS: ICD-10-CM

## 2022-03-03 DIAGNOSIS — E11.22 TYPE 2 DIABETES MELLITUS WITH STAGE 3 CHRONIC KIDNEY DISEASE, UNSPECIFIED WHETHER LONG TERM INSULIN USE, UNSPECIFIED WHETHER STAGE 3A OR 3B CKD (HCC): ICD-10-CM

## 2022-03-03 PROCEDURE — 99214 OFFICE O/P EST MOD 30 MIN: CPT | Performed by: INTERNAL MEDICINE

## 2022-03-03 PROCEDURE — 99496 TRANSJ CARE MGMT HIGH F2F 7D: CPT | Performed by: INTERNAL MEDICINE

## 2022-03-03 PROCEDURE — 1111F DSCHRG MED/CURRENT MED MERGE: CPT | Performed by: INTERNAL MEDICINE

## 2022-03-03 RX ORDER — SULFAMETHOXAZOLE AND TRIMETHOPRIM 800; 160 MG/1; MG/1
1 TABLET ORAL 2 TIMES DAILY
Qty: 10 TABLET | Refills: 0 | Status: SHIPPED | OUTPATIENT
Start: 2022-03-03 | End: 2022-03-08

## 2022-03-03 ASSESSMENT — ENCOUNTER SYMPTOMS
SINUS PAIN: 0
CONSTIPATION: 0
EYE DISCHARGE: 0
BLOOD IN STOOL: 0
SORE THROAT: 0
VOMITING: 0
ABDOMINAL PAIN: 0
VOICE CHANGE: 0
TROUBLE SWALLOWING: 0
WHEEZING: 0
SINUS PRESSURE: 0
CHOKING: 0
STRIDOR: 0
EYE ITCHING: 0
ABDOMINAL DISTENTION: 0
RHINORRHEA: 0
COLOR CHANGE: 0
SHORTNESS OF BREATH: 0
CHEST TIGHTNESS: 0
DIARRHEA: 0
COUGH: 0
NAUSEA: 0

## 2022-03-03 NOTE — PROGRESS NOTES
[facility] Progress Note                            Date:  3/3/2022  Patient: Frances Gilbert  Age:  66 y.o., 1950      Reason for evaluation:         SUBJECTIVE:    Returns today follow-up assessment. Underwent biventricular ICD placement 2/27/2022. Here today for wound check and staple removal.  Has 1 small localized area lateral two thirds of the incision with some erythema. Device functioning appropriately no other complaints or issues reported. Review of Systems      OBJECTIVE:     There were no vitals taken for this visit. Labs:   CBC: No results for input(s): WBC, HGB, HCT, PLT in the last 72 hours. BMP:No results for input(s): NA, K, CO2, BUN, CREATININE, LABGLOM, GLUCOSE in the last 72 hours. BNP: No results for input(s): BNP in the last 72 hours. PT/INR: No results for input(s): PROTIME, INR in the last 72 hours. APTT:No results for input(s): APTT in the last 72 hours. CARDIAC ENZYMES:No results for input(s): CKTOTAL, CKMB, CKMBINDEX, TROPONINI in the last 72 hours. FASTING LIPID PANEL:  Lab Results   Component Value Date    HDL 56 02/17/2022    LDLCALC 35 02/17/2022    TRIG 79 02/17/2022     LIVER PROFILE:No results for input(s): AST, ALT, LABALBU in the last 72 hours.         Past Medical History:   Diagnosis Date    Acute sinusitis     CHAPARRO (acute kidney injury) ([address] 05/18/2018    Anemia     Bleeding ulcer     hx of; cauterized by dr. Manuel Padron    Breast lump     CAD (coronary artery disease)     sees dr. Rosario Den ulcer ([address]     Cataract     COVID-19 01/2021    fatigue    Dermatitis     Diabetes mellitus ([address]     Diabetic nephropathy ([address]     Diabetic retinopathy ([address]     Glaucoma     Gout     Hemorrhoids     Hx of TB skin testing     positive    Hyperlipidemia     Hypertension     Palliative care patient 06/24/2021    Type 2 diabetes mellitus ([address] 05/18/2018    Vitamin D deficiency      Past Surgical History:   Procedure Laterality Date    BREAST LUMPECTOMY Right 10/29/2021    RIGHT LUMPECTOMY, SNB, PREOP ULTRASOUND, INTRAOP ULTRASOUND GUIDED NL, BIOSORB, PEC BLOCK, MARGIN PROBE, FLAPS, IORT performed by Selena Weeks MD at [address] [place] [address]  02/15/2022   Ever Jose  2001    CARPAL TUNNEL RELEASE Right 9/3/2020    RIGHT CARPAL TUNNEL RELEASE performed by Ranulfo Blancas [facility] at [facility]      COLONOSCOPY N/A 10/11/2019    Dr John Goins of a hemostatic clip-Suboptimal prep, diverticulosis, internal hemorrhoids-Grade 2, AP x 3, 1 yr recall     COLONOSCOPY N/A 10/12/2020    Dr Dyer Stamp hemorrhoids-Grade 1-2, AP, 3 yr recall    CORONARY ARTERY BYPASS GRAFT  2001    ENDOSCOPY, COLON, DIAGNOSTIC      EYE SURGERY      MOUTH SURGERY      OTHER SURGICAL HISTORY      sternal resection    PACEMAKER PLACEMENT  02/23/2022    SKIN BIOPSY      UPPER GASTROINTESTINAL ENDOSCOPY N/A 10/11/2019    Dr Lind Balloon (+) H Pylori    US BREAST NEEDLE BIOPSY LEFT Left 9/1/2021    US BREAST NEEDLE BIOPSY LEFT 9/1/2021 LPS GENERAL SURGERY    VASCULAR SURGERY       Family History   Problem Relation Age of Onset    Cancer Mother     Diabetes Father     COPD Father     Diabetes Sister     Heart Disease Sister     Colon Polyps Sister     Coronary Art Dis Other         grandfather    Diabetes Other         aunt    Colon Cancer Neg Hx     Liver Cancer Neg Hx     Rectal Cancer Neg Hx      Allergies   Allergen Reactions    Medrol [Methylprednisolone] Other (See Comments)     Affects her eyes     Current Outpatient Medications   Medication Sig Dispense Refill    sulfamethoxazole-trimethoprim (BACTRIM DS;SEPTRA DS) 800-160 MG per tablet Take 1 tablet by mouth 2 times daily for 5 days 10 tablet 0    timolol (TIMOPTIC) 0.25 % ophthalmic solution       metFORMIN (GLUCOPHAGE) 500 MG tablet Take 2 tablets by mouth 2 times daily (with meals) Please hold for 48 hrs after cardiac cath. You may resume like normal on 2/18/2022. 60 tablet 3    lisinopril (PRINIVIL;ZESTRIL) 5 MG tablet Take 1 tablet by mouth daily 90 tablet 2    amLODIPine (NORVASC) 5 MG tablet TAKE 1 TABLET EVERY DAY 90 tablet 1    letrozole (FEMARA) 2.5 MG tablet Take 1 tablet by mouth daily 90 tablet 3    montelukast (SINGULAIR) 10 MG tablet Take 1 tablet by mouth daily 90 tablet 3    simvastatin (ZOCOR) 40 MG tablet Take 1 tablet by mouth nightly 90 tablet 3    HYDROcodone-acetaminophen (NORCO) 5-325 MG per tablet Take 1 tablet by mouth every 6 hours as needed for Pain. (Patient not taking: Reported on 3/3/2022) 12 tablet 0    glipiZIDE (GLUCOTROL XL) 2.5 MG extended release tablet Take 1 tablet by mouth daily 90 tablet 3    metoprolol tartrate (LOPRESSOR) 25 MG tablet Take 1 tablet by mouth 2 times daily 180 tablet 3    isosorbide mononitrate (IMDUR) 30 MG extended release tablet Take 1 tablet by mouth 2 times daily 180 tablet 3    Blood Glucose Calibration (ACCU-CHEK ROSA) SOLN Calibration done daily.  1 each 1    Blood Glucose Monitoring Suppl (ACCU-CHEK ROSA) ARMANDO Use 4 times a day to check BS. 1 Device 0    blood glucose test strips (ACCU-CHEK ROSA PLUS) strip TEST BLOOD SUGAR FOUR TIMES DAILY E11.9 400 strip 3    Accu-Chek Softclix Lancets MISC TEST FOUR TIMES DAILY E11.9 400 each 3    furosemide (LASIX) 40 MG tablet Take 1 tablet by mouth daily as needed (excess fluid) 60 tablet 1    Alcohol Swabs (B-D SINGLE USE SWABS REGULAR) PADS USE THREE TIMES DAILY 100 each 11    aspirin 81 MG tablet Take 81 mg by mouth daily      Omega-3 Fatty Acids (FISH OIL) 1000 MG CAPS Take 1,000 mg by mouth 2 times daily      MULTIPLE VITAMIN PO Take 1 tablet by mouth daily      Calcium Carbonate-Vitamin D (CALCIUM 500/D PO) Take 1 tablet by mouth 2 times daily      vitamin B-12 (CYANOCOBALAMIN) 1000 MCG tablet Take 1,000 mcg by mouth daily       No current facility-administered medications for this visit. Social History     Socioeconomic History    Marital status: Single     Spouse name: Not on file    Number of children: Not on file    Years of education: Not on file    Highest education level: Not on file   Occupational History     Employer: [employer]   Tobacco Use    Smoking status: Never Smoker    Smokeless tobacco: Never Used   Vaping Use    Vaping Use: Never used   Substance and Sexual Activity    Alcohol use: Yes     Alcohol/week: 1.0 standard drink     Types: 1 Shots of liquor per week     Comment: rare    Drug use: No    Sexual activity: Never   Other Topics Concern    Not on file   Social History Narrative    Not on file     Social Determinants of Health     Financial Resource Strain: Low Risk     Difficulty of Paying Living Expenses: Not hard at all   Food Insecurity: No Food Insecurity    Worried About Running Out of Food in the Last Year: Never true    [address] in the Last Year: Never true   Transportation Needs:     Lack of Transportation (Medical): Not on file    Lack of Transportation (Non-Medical):  Not on file   Physical Activity:     Days of Exercise per Week: Not on file    Minutes of Exercise per Session: Not on file   Stress:     Feeling of Stress : Not on file   Social Connections:     Frequency of Communication with Friends and Family: Not on file    Frequency of Social Gatherings with Friends and Family: Not on file    Attends Faith Services: Not on file    Active Member of Clubs or Organizations: Not on file    Attends Club or Organization Meetings: Not on file    Marital Status: Not on file   Intimate Partner Violence:     Fear of Current or Ex-Partner: Not on file    Emotionally Abused: Not on file    Physically Abused: Not on file    Sexually Abused: Not on file   Housing Stability:     Unable to Pay for Housing in the Last Year: Not on file    Number of [place] in the Last Year: Not on file    Unstable Housing in the Last Year: Not on file       Physical Examination:  There were no vitals taken for this visit. Physical Exam        ASSESSMENT:     Diagnosis Orders   1. Visit for wound check     2. Ischemic cardiomyopathy     3. Coronary artery disease involving native coronary artery of native heart without angina pectoris     4. Angina pectoris with documented spasm ([facility]     5. History of coronary artery bypass graft     6. Primary hypertension     7. Chronic combined systolic (congestive) and diastolic (congestive) heart failure ([address]     8. Biventricular ICD (implantable cardioverter-defibrillator) in place         PLAN:  No orders of the defined types were placed in this encounter. Orders Placed This Encounter   Medications    sulfamethoxazole-trimethoprim (BACTRIM DS;SEPTRA DS) 800-160 MG per tablet     Sig: Take 1 tablet by mouth 2 times daily for 5 days     Dispense:  10 tablet     Refill:  0         1. Continue present medications  2. Suggest Septra double strength 1 p.o. twice daily for 10 days  3. Follow-up in the clinic in 1 week    Return in about 1 week (around 3/10/2022) for return to Dr. Varghese Sheridan only. Susan Parker MD 3/3/2022 2:51 PM [facility]      Thisdictation was generated by voice recognition computer software. Although all attempts are made to edit the dictation for accuracy, there may be errors in the transcription that are not intended. [Has family members/adults to turn to for help] : has family members/adults to turn to for help [Home is free of violence] : home is free of violence [Has peer relationships free of violence] : has peer relationships free of violence [Has had sexual intercourse] : has not had sexual intercourse [Displays self-confidence] : displays self-confidence

## 2022-03-03 NOTE — PROGRESS NOTES
Post-Discharge Transitional Care Follow Up      Francisco Jaclyn   YOB: 1950    Date of Office Visit:  3/3/2022  Date of Hospital Admission: 2/23/22  Date of Hospital Discharge: 2/24/22  Readmission Risk Score (high >=14%. Medium >=10%):No data recorded    Care management risk score Rising risk (score 2-5) and Complex Care (Scores >=6): 3     Non face to face  following discharge, date last encounter closed (first attempt may have been earlier): 2/25/2022 10:45 AM     Call initiated 2 business days of discharge: Yes     Dilated cardiomyopathy ([facility]  Type 2 diabetes mellitus with stage 3 chronic kidney disease, unspecified whether long term insulin use, unspecified whether stage 3a or 3b CKD ([facility]  Primary hypertension      Medical Decision Making: high complexity  No follow-ups on file. On this date 3/3/2022 I have spent 20 minutes reviewing previous notes, test results and face to face with the patient discussing the diagnosis and importance of compliance with the treatment plan as well as documenting on the day of the visit. Subjective:   HPI    Inpatient course: Discharge summary reviewed- see chart. Interval history/Current status: 1year-old woman here for follow-up. She was recently admitted to [facility] for an elective biventricular ICD implantation due to dilated cardiomyopathy. She still feels fatigue since having her surgery. However, she denies any complaints of shortness of breath, chest pain or chest pressure. She does have some lower extremity swelling. She did take some Lasix last night. She also was noted to have an incidental 12 mm nodule to the left upper lobe of her chest on x-ray. She is already been set up for CT of her chest for further evaluation and treatment. Her blood sugars are running between 80 and 160. Her last A1c was 5.7.   Her blood pressure remains well controlled at this time    Patient Active Problem List   Diagnosis    [facility]O (acute kidney injury) ([facility]    Hypertension    Hyperlipidemia    Ocular hypertension    CAD (coronary artery disease)    Type 2 diabetes mellitus ([facility]    Diabetes mellitus ([facility]    Acute cystitis without hematuria    Carpal tunnel syndrome    Chest pain    Chronic kidney disease, stage 3b ([facility]    History of coronary artery bypass graft    Achilles tendinitis    Bursitis of right foot    Contracture of Achilles tendon    Diabetic macular edema (HCC)    Foot pain    Nonproliferative diabetic retinopathy ([facility]    Posterior calcaneal exostosis    Retinal neovascularization    Malignant neoplasm of overlapping sites of right breast in female, estrogen receptor positive ([facility]    Status post right breast lumpectomy with sentinel node and IORT on 10/29/2021    CKD (chronic kidney disease) stage 4, GFR 15-29 ml/min ([facility]    Ischemic cardiomyopathy    Decreased cardiac ejection fraction    Pacemaker       Medications listed as ordered at the time of discharge from hospital  [unfilled]     Medications marked \"taking\" at this time  Outpatient Medications Marked as Taking for the 3/3/22 encounter (Office Visit) with Stephen Casper MD   Medication Sig Dispense Refill    timolol (TIMOPTIC) 0.25 % ophthalmic solution       metFORMIN (GLUCOPHAGE) 500 MG tablet Take 2 tablets by mouth 2 times daily (with meals) Please hold for 48 hrs after cardiac cath.  You may resume like normal on 2/18/2022. 60 tablet 3    lisinopril (PRINIVIL;ZESTRIL) 5 MG tablet Take 1 tablet by mouth daily 90 tablet 2    amLODIPine (NORVASC) 5 MG tablet TAKE 1 TABLET EVERY DAY 90 tablet 1    letrozole (FEMARA) 2.5 MG tablet Take 1 tablet by mouth daily 90 tablet 3    montelukast (SINGULAIR) 10 MG tablet Take 1 tablet by mouth daily 90 tablet 3    simvastatin (ZOCOR) 40 MG tablet Take 1 tablet by mouth nightly 90 tablet 3    glipiZIDE (GLUCOTROL XL) 2.5 MG extended release tablet Take 1 tablet by mouth daily 90 tablet 3    metoprolol tartrate (LOPRESSOR) 25 MG tablet Take 1 tablet by mouth 2 times daily 180 tablet 3    isosorbide mononitrate (IMDUR) 30 MG extended release tablet Take 1 tablet by mouth 2 times daily 180 tablet 3    Blood Glucose Calibration (ACCU-CHEK ROSA) SOLN Calibration done daily. 1 each 1    Blood Glucose Monitoring Suppl (ACCU-CHEK ROSA) ARMANDO Use 4 times a day to check BS. 1 Device 0    blood glucose test strips (ACCU-CHEK ROSA PLUS) strip TEST BLOOD SUGAR FOUR TIMES DAILY E11.9 400 strip 3    Accu-Chek Softclix Lancets MISC TEST FOUR TIMES DAILY E11.9 400 each 3    furosemide (LASIX) 40 MG tablet Take 1 tablet by mouth daily as needed (excess fluid) 60 tablet 1    Alcohol Swabs (B-D SINGLE USE SWABS REGULAR) PADS USE THREE TIMES DAILY 100 each 11    aspirin 81 MG tablet Take 81 mg by mouth daily      Omega-3 Fatty Acids (FISH OIL) 1000 MG CAPS Take 1,000 mg by mouth 2 times daily      MULTIPLE VITAMIN PO Take 1 tablet by mouth daily      Calcium Carbonate-Vitamin D (CALCIUM 500/D PO) Take 1 tablet by mouth 2 times daily      vitamin B-12 (CYANOCOBALAMIN) 1000 MCG tablet Take 1,000 mcg by mouth daily          Medications patient taking as of now reconciled against medications ordered at time of hospital discharge: Yes    Review of Systems   Constitutional: Negative for activity change, appetite change, chills, diaphoresis, fatigue, fever and unexpected weight change. HENT: Negative for congestion, ear discharge, ear pain, mouth sores, nosebleeds, postnasal drip, rhinorrhea, sinus pressure, sinus pain, sneezing, sore throat, tinnitus, trouble swallowing and voice change. Eyes: Negative for discharge, itching and visual disturbance. Respiratory: Negative for cough, choking, chest tightness, shortness of breath, wheezing and stridor. Cardiovascular: Negative for chest pain, palpitations and leg swelling.    Gastrointestinal: Negative for abdominal distention, abdominal pain, blood in stool, constipation, diarrhea, nausea and vomiting. Endocrine: Negative for cold intolerance, polydipsia and polyuria. Genitourinary: Negative for difficulty urinating, dysuria, frequency and urgency. Musculoskeletal: Positive for arthralgias. Negative for gait problem, joint swelling and myalgias. Right knee pain   Skin: Negative for color change and rash. Recently had AICD placement to the left upper chest.   Allergic/Immunologic: Negative for food allergies and immunocompromised state. Neurological: Negative for dizziness, tremors, syncope, speech difficulty, weakness, numbness and headaches. Hematological: Negative for adenopathy. Does not bruise/bleed easily. Psychiatric/Behavioral: Negative for confusion and hallucinations. The patient is not nervous/anxious. Objective:    /60   Pulse 94   Ht 5' 5\" (1.651 m)   Wt 219 lb 9.6 oz (99.6 kg)   SpO2 97%   BMI 36.54 kg/m²   Physical Exam  Vitals and nursing note reviewed. Constitutional:       General: She is not in acute distress. Appearance: Normal appearance. She is well-developed and normal weight. She is not ill-appearing, toxic-appearing or diaphoretic. HENT:      Head: Normocephalic and atraumatic. Right Ear: Tympanic membrane, ear canal and external ear normal. There is no impacted cerumen. Left Ear: Tympanic membrane, ear canal and external ear normal. There is no impacted cerumen. Nose: Nose normal. No congestion or rhinorrhea. Mouth/Throat:      Mouth: Mucous membranes are moist.      Pharynx: Oropharynx is clear. No oropharyngeal exudate or posterior oropharyngeal erythema. Eyes:      General: No scleral icterus. Right eye: No discharge. Left eye: No discharge. Extraocular Movements: Extraocular movements intact. Conjunctiva/sclera: Conjunctivae normal.      Pupils: Pupils are equal, round, and reactive to light. Neck:      Thyroid: No thyromegaly.       Vascular: No carotid bruit or JVD. Trachea: No tracheal deviation. Cardiovascular:      Rate and Rhythm: Normal rate and regular rhythm. Pulses: Normal pulses. Heart sounds: Normal heart sounds. No murmur heard. No friction rub. No gallop. Pulmonary:      Effort: Pulmonary effort is normal. No respiratory distress. Breath sounds: Normal breath sounds. No stridor. No wheezing, rhonchi or rales. Chest:      Chest wall: No tenderness. Abdominal:      General: Bowel sounds are normal. There is no distension. Palpations: Abdomen is soft. There is no mass. Tenderness: There is no abdominal tenderness. There is no right CVA tenderness, left CVA tenderness, guarding or rebound. Hernia: No hernia is present. Musculoskeletal:         General: No swelling, tenderness, deformity or signs of injury. Normal range of motion. Cervical back: Normal range of motion and neck supple. No rigidity. No muscular tenderness. Right lower leg: No edema. Left lower leg: No edema. Lymphadenopathy:      Cervical: No cervical adenopathy. Skin:     General: Skin is warm and dry. Capillary Refill: Capillary refill takes less than 2 seconds. Coloration: Skin is not jaundiced or pale. Findings: Bruising present. No erythema, lesion or rash. Comments: Patient site is intact with staples. There is slight erythema at the site    Visual inspection:  Deformity/amputation: absent  Skin lesions/pre-ulcerative calluses: present - bilateral great toe  Edema: right- negative, left- negative    Sensory exam:  Monofilament sensation: normal  (minimum of 5 random plantar locations tested, avoiding callused areas - > 1 area with absence of sensation is + for neuropathy)    Plus at least one of the following:  Pulses: normal,   Pinprick: Intact  Proprioception: N/A  Vibration (128 Hz): N/A    Does have multiple bruises on her upper arms.    Neurological:      General: No focal deficit present. Mental Status: She is alert and oriented to person, place, and time. Mental status is at baseline. Cranial Nerves: No cranial nerve deficit. Sensory: No sensory deficit. Motor: No weakness or abnormal muscle tone. Coordination: Coordination normal.      Gait: Gait normal.      Deep Tendon Reflexes: Reflexes are normal and symmetric. Reflexes normal.   Psychiatric:         Mood and Affect: Mood normal.         Behavior: Behavior normal.         Thought Content: Thought content normal.         Judgment: Judgment normal.         66-year-old woman here for follow-up    1. Dilated cardiomyopathy: She seems to be doing well at this time post AICD placement. She has upcoming appointment with cardiology later today    2. Type 2 diabetes: A1c 5.7. Blood sugars are running between 80 and 160    3. Hypertension: Blood pressure currently well controlled on her current medication regimen. Continue metoprolol, amlodipine and lisinopril for now. 4.  History of stage III chronic kidney disease: Relatively stable. An electronic signature was used to authenticate this note.   --Kayode Hassan MD

## 2022-03-03 NOTE — PROGRESS NOTES
Patient here for a wound check visit status post ICD implant. Incision intact. Small blister-like spot above incision. Has some redness in color, but no drainage noted. Patient states that it is painful in that spot. Incision is otherwise healed. Edges well approximated  Instructed patient to wash area with antibacterial soap and keep it clean and dry. Reviewed discharged instructions and questions answered.   Reviewed [employer] home monitor and patient verbalized understanding  Follow up as scheduled

## 2022-03-04 ENCOUNTER — TELEPHONE (OUTPATIENT)
Dept: CARDIOLOGY CLINIC | Age: 72
End: 2022-03-04

## 2022-03-07 DIAGNOSIS — E11.21 TYPE II DIABETES MELLITUS WITH NEPHROPATHY (HCC): ICD-10-CM

## 2022-03-07 RX ORDER — BLOOD-GLUCOSE METER
EACH MISCELLANEOUS
Qty: 1 KIT | Refills: 2 | Status: SHIPPED | OUTPATIENT
Start: 2022-03-07 | End: 2022-05-17

## 2022-03-11 ENCOUNTER — NURSE ONLY (OUTPATIENT)
Dept: CARDIOLOGY CLINIC | Age: 72
End: 2022-03-11

## 2022-03-11 DIAGNOSIS — Z51.89 VISIT FOR WOUND CHECK: Primary | ICD-10-CM

## 2022-03-11 PROCEDURE — 99024 POSTOP FOLLOW-UP VISIT: CPT | Performed by: NURSE PRACTITIONER

## 2022-03-11 NOTE — PROGRESS NOTES
Patient here for a wound check visit status post ICD implant. There is a scab on incision. Patient stated they are watching her incision closely and will call if any problems. Incision dry and intact. No redness, swelling, or drainage noted  Edges well approximated  Instructed patient to wash area with antibacterial soap and keep it clean and dry. Reviewed discharged instructions and questions answered. Reviewed [facility] home monitor and patient verbalized understanding  Follow up as scheduled     Patient reported she did not finish all her antibiotic. She stated she had 4 pills left. She stated she stopped them because she was so weak and lethargic. She stated her symptoms improved when she stopped taking them. Reviewed with KAL Taylor. Instructed patient to try to finish her antibiotics as ordered.

## 2022-03-21 DIAGNOSIS — C50.811 MALIGNANT NEOPLASM OF OVERLAPPING SITES OF RIGHT BREAST IN FEMALE, ESTROGEN RECEPTOR POSITIVE (HCC): ICD-10-CM

## 2022-03-21 DIAGNOSIS — Z17.0 MALIGNANT NEOPLASM OF OVERLAPPING SITES OF RIGHT BREAST IN FEMALE, ESTROGEN RECEPTOR POSITIVE (HCC): ICD-10-CM

## 2022-03-21 RX ORDER — MONTELUKAST SODIUM 10 MG/1
10 TABLET ORAL DAILY
Qty: 90 TABLET | Refills: 3 | Status: SHIPPED | OUTPATIENT
Start: 2022-03-21

## 2022-03-21 RX ORDER — LETROZOLE 2.5 MG/1
2.5 TABLET, FILM COATED ORAL DAILY
Qty: 90 TABLET | Refills: 3 | Status: SHIPPED | OUTPATIENT
Start: 2022-03-21

## 2022-04-07 ENCOUNTER — OFFICE VISIT (OUTPATIENT)
Dept: CARDIOLOGY CLINIC | Age: 72
End: 2022-04-07
Payer: MEDICARE

## 2022-04-07 VITALS
BODY MASS INDEX: 36.82 KG/M2 | SYSTOLIC BLOOD PRESSURE: 134 MMHG | WEIGHT: 221 LBS | DIASTOLIC BLOOD PRESSURE: 68 MMHG | HEART RATE: 78 BPM | HEIGHT: 65 IN

## 2022-04-07 DIAGNOSIS — I25.10 CORONARY ARTERY DISEASE INVOLVING NATIVE CORONARY ARTERY OF NATIVE HEART WITHOUT ANGINA PECTORIS: ICD-10-CM

## 2022-04-07 DIAGNOSIS — E78.2 MIXED HYPERLIPIDEMIA: ICD-10-CM

## 2022-04-07 DIAGNOSIS — I25.5 ISCHEMIC CARDIOMYOPATHY: ICD-10-CM

## 2022-04-07 DIAGNOSIS — Z95.810 BIVENTRICULAR ICD (IMPLANTABLE CARDIOVERTER-DEFIBRILLATOR) IN PLACE: ICD-10-CM

## 2022-04-07 DIAGNOSIS — Z95.1 STATUS POST AORTO-CORONARY ARTERY BYPASS GRAFT: ICD-10-CM

## 2022-04-07 DIAGNOSIS — I10 PRIMARY HYPERTENSION: Primary | ICD-10-CM

## 2022-04-07 PROCEDURE — 4040F PNEUMOC VAC/ADMIN/RCVD: CPT | Performed by: INTERNAL MEDICINE

## 2022-04-07 PROCEDURE — G8417 CALC BMI ABV UP PARAM F/U: HCPCS | Performed by: INTERNAL MEDICINE

## 2022-04-07 PROCEDURE — 1090F PRES/ABSN URINE INCON ASSESS: CPT | Performed by: INTERNAL MEDICINE

## 2022-04-07 PROCEDURE — 1036F TOBACCO NON-USER: CPT | Performed by: INTERNAL MEDICINE

## 2022-04-07 PROCEDURE — 93282 PRGRMG EVAL IMPLANTABLE DFB: CPT | Performed by: INTERNAL MEDICINE

## 2022-04-07 PROCEDURE — G8399 PT W/DXA RESULTS DOCUMENT: HCPCS | Performed by: INTERNAL MEDICINE

## 2022-04-07 PROCEDURE — 99214 OFFICE O/P EST MOD 30 MIN: CPT | Performed by: INTERNAL MEDICINE

## 2022-04-07 PROCEDURE — 3017F COLORECTAL CA SCREEN DOC REV: CPT | Performed by: INTERNAL MEDICINE

## 2022-04-07 PROCEDURE — G8427 DOCREV CUR MEDS BY ELIG CLIN: HCPCS | Performed by: INTERNAL MEDICINE

## 2022-04-07 PROCEDURE — 1123F ACP DISCUSS/DSCN MKR DOCD: CPT | Performed by: INTERNAL MEDICINE

## 2022-04-07 RX ORDER — METOPROLOL TARTRATE 50 MG/1
25 TABLET, FILM COATED ORAL 2 TIMES DAILY
Qty: 180 TABLET | Refills: 2 | Status: SHIPPED | OUTPATIENT
Start: 2022-04-07 | End: 2022-05-17 | Stop reason: SDUPTHER

## 2022-04-07 RX ORDER — FUROSEMIDE 40 MG/1
40 TABLET ORAL DAILY PRN
Qty: 90 TABLET | Refills: 1 | Status: SHIPPED | OUTPATIENT
Start: 2022-04-07 | End: 2022-07-07 | Stop reason: SDUPTHER

## 2022-04-07 ASSESSMENT — ENCOUNTER SYMPTOMS
EYES NEGATIVE: 1
NAUSEA: 0
GASTROINTESTINAL NEGATIVE: 1
DIARRHEA: 0
VOMITING: 0
RESPIRATORY NEGATIVE: 1
SHORTNESS OF BREATH: 0

## 2022-04-07 NOTE — PROGRESS NOTES
[facility] Progress Note                            Date:  4/7/2022  Patient: Chico Georges  Age:  66 y.o., 1950      Reason for evaluation:         SUBJECTIVE:    Returns today follow-up assessment for cardiomyopathy coronary artery disease previous CABG biventricular ICD present. .  Overall doing well gets a little breathless with activities. Device interrogated today no atrial fibrillation no device therapy 7.1 years longevity remaining no other complaints or issues reported. Blood pressure 134/68 heart 78. Review of Systems   Constitutional: Negative. Negative for chills, fever and unexpected weight change. HENT: Negative. Eyes: Negative. Respiratory: Negative. Negative for shortness of breath. Cardiovascular: Negative. Negative for chest pain. Gastrointestinal: Negative. Negative for diarrhea, nausea and vomiting. Endocrine: Negative. Genitourinary: Negative. Musculoskeletal: Negative. Skin: Negative. Neurological: Negative. All other systems reviewed and are negative. OBJECTIVE:     /68   Pulse 78   Ht 5' 5\" (1.651 m)   Wt 221 lb (100.2 kg)   BMI 36.78 kg/m²     Labs:   CBC: No results for input(s): WBC, HGB, HCT, PLT in the last 72 hours. BMP:No results for input(s): NA, K, CO2, BUN, CREATININE, LABGLOM, GLUCOSE in the last 72 hours. BNP: No results for input(s): BNP in the last 72 hours. PT/INR: No results for input(s): PROTIME, INR in the last 72 hours. APTT:No results for input(s): APTT in the last 72 hours. CARDIAC ENZYMES:No results for input(s): CKTOTAL, CKMB, CKMBINDEX, TROPONINI in the last 72 hours. FASTING LIPID PANEL:  Lab Results   Component Value Date    HDL 56 02/17/2022    LDLCALC 35 02/17/2022    TRIG 79 02/17/2022     LIVER PROFILE:No results for input(s): AST, ALT, LABALBU in the last 72 hours.         Past Medical History:   Diagnosis Date    Acute sinusitis     CHAPARRO (acute kidney injury) ([facility] 05/18/2018  Anemia     Bleeding ulcer     hx of; cauterized by dr. resendiz    Breast lump     CAD (coronary artery disease)     [facility] dr. Da Silva C[facility]     Cataract     COVID-19 01/2021    fatigue    Dermatitis     Diabetes mellitus ([facility]     Diabetic nephropathy ([facility]     Diabetic retinopathy ([facility]     Glaucoma     Gout     Hemorrhoids     Hx of TB skin testing     positive    Hyperlipidemia     Hypertension     Palliative care patient 06/24/2021    Type 2 diabetes mellitus ([facility] 05/18/2018    Vitamin D deficiency      Past Surgical History:   Procedure Laterality Date    BREAST LUMPECTOMY Right 10/29/2021    RIGHT LUMPECTOMY, SNB, PREOP ULTRASOUND, INTRAOP ULTRASOUND GUIDED NL, BIOSORB, PEC BLOCK, MARGIN PROBE, FLAPS, IORT performed by Risa Hi MD at [place] [id number]  02/15/2022   [address]  2001    CARPAL TUNNEL RELEASE Right 9/3/2020    RIGHT CARPAL TUNNEL RELEASE performed by [facility]vonne Sanderson [facility] at [facility]      COLONOSCOPY N/A 10/11/2019    Dr Hemal Cisneros of a hemostatic clip-Suboptimal prep, diverticulosis, internal hemorrhoids-Grade 2, AP x 3, 1 yr recall     COLONOSCOPY N/A 10/12/2020    Dr Nidia Panda hemorrhoids-Grade 1-2, AP, 3 yr recall    CORONARY ARTERY BYPASS GRAFT  2001    ENDOSCOPY, COLON, DIAGNOSTIC      EYE SURGERY      MOUTH SURGERY      OTHER SURGICAL HISTORY      sternal resection    PACEMAKER PLACEMENT  02/23/2022    SKIN BIOPSY      UPPER GASTROINTESTINAL ENDOSCOPY N/A 10/11/2019    Dr Faheem Londono (+) H Pylori    US BREAST NEEDLE BIOPSY LEFT Left 9/1/2021    US BREAST NEEDLE BIOPSY LEFT 9/1/2021 LPS GENERAL SURGERY    VASCULAR SURGERY       Family History   Problem Relation Age of Onset    Cancer Mother     Diabetes Father     COPD Father     Diabetes Sister     Heart Disease Sister     Colon Polyps Sister     Coronary Art Dis Other grandfather    Diabetes Other         aunt    Colon Cancer Neg Hx     Liver Cancer Neg Hx     Rectal Cancer Neg Hx      Allergies   Allergen Reactions    Medrol [Methylprednisolone] Other (See Comments)     Affects her eyes     Current Outpatient Medications   Medication Sig Dispense Refill    furosemide (LASIX) 40 MG tablet Take 1 tablet by mouth daily as needed (excess fluid) 90 tablet 1    metoprolol tartrate (LOPRESSOR) 50 MG tablet Take 0.5 tablets by mouth 2 times daily 180 tablet 2    letrozole (FEMARA) 2.5 MG tablet Take 1 tablet by mouth daily 90 tablet 3    montelukast (SINGULAIR) 10 MG tablet Take 1 tablet by mouth daily 90 tablet 3    Blood Glucose Monitoring Suppl (ACCU-CHEK GUIDE) w/Device KIT CHECK BLOOD SUGAR FOUR TIMES DAILY 1 kit 2    timolol (TIMOPTIC) 0.25 % ophthalmic solution       metFORMIN (GLUCOPHAGE) 500 MG tablet Take 2 tablets by mouth 2 times daily (with meals) Please hold for 48 hrs after cardiac cath. You may resume like normal on 2/18/2022. 60 tablet 3    lisinopril (PRINIVIL;ZESTRIL) 5 MG tablet Take 1 tablet by mouth daily 90 tablet 2    amLODIPine (NORVASC) 5 MG tablet TAKE 1 TABLET EVERY DAY 90 tablet 1    simvastatin (ZOCOR) 40 MG tablet Take 1 tablet by mouth nightly 90 tablet 3    glipiZIDE (GLUCOTROL XL) 2.5 MG extended release tablet Take 1 tablet by mouth daily 90 tablet 3    isosorbide mononitrate (IMDUR) 30 MG extended release tablet Take 1 tablet by mouth 2 times daily 180 tablet 3    Blood Glucose Calibration (ACCU-CHEK ROSA) SOLN Calibration done daily.  1 each 1    blood glucose test strips (ACCU-CHEK ROSA PLUS) strip TEST BLOOD SUGAR FOUR TIMES DAILY E11.9 400 strip 3    Accu-Chek Softclix Lancets MISC TEST FOUR TIMES DAILY E11.9 400 each 3    Alcohol Swabs (B-D SINGLE USE SWABS REGULAR) PADS USE THREE TIMES DAILY 100 each 11    aspirin 81 MG tablet Take 81 mg by mouth daily      Omega-3 Fatty Acids (FISH OIL) 1000 MG CAPS Take 1,000 mg by mouth 2 times daily      MULTIPLE VITAMIN PO Take 1 tablet by mouth daily      Calcium Carbonate-Vitamin D (CALCIUM 500/D PO) Take 1 tablet by mouth 2 times daily      vitamin B-12 (CYANOCOBALAMIN) 1000 MCG tablet Take 1,000 mcg by mouth daily       No current facility-administered medications for this visit. Social History     Socioeconomic History    Marital status: Single     Spouse name: Not on file    Number of children: Not on file    Years of education: Not on file    Highest education level: Not on file   Occupational History     Employer: [employer]   Tobacco Use    Smoking status: Never Smoker    Smokeless tobacco: Never Used   Vaping Use    Vaping Use: Never used   Substance and Sexual Activity    Alcohol use: Yes     Alcohol/week: 1.0 standard drink     Types: 1 Shots of liquor per week     Comment: rare    Drug use: No    Sexual activity: Never   Other Topics Concern    Not on file   Social History Narrative    Not on file     Social Determinants of Health     Financial Resource Strain: Low Risk     Difficulty of Paying Living Expenses: Not hard at all   Food Insecurity: No Food Insecurity    Worried About Running Out of Food in the Last Year: Never true    [address] in the Last Year: Never true   Transportation Needs:     Lack of Transportation (Medical): Not on file    Lack of Transportation (Non-Medical):  Not on file   Physical Activity:     Days of Exercise per Week: Not on file    Minutes of Exercise per Session: Not on file   Stress:     Feeling of Stress : Not on file   Social Connections:     Frequency of Communication with Friends and Family: Not on file    Frequency of Social Gatherings with Friends and Family: Not on file    Attends Denominational Services: Not on file    Active Member of Clubs or Organizations: Not on file    Attends Club or Organization Meetings: Not on file    Marital Status: Not on file   Intimate Partner Violence:     Fear of Current or Ex-Partner: Not on file    Emotionally Abused: Not on file    Physically Abused: Not on file    Sexually Abused: Not on file   Housing Stability:     Unable to Pay for Housing in the Last Year: Not on file    Number of Places Lived in the Last Year: Not on file    Unstable Housing in the Last Year: Not on file       Physical Examination:  /68   Pulse 78   Ht 5' 5\" (1.651 m)   Wt 221 lb (100.2 kg)   BMI 36.78 kg/m²   Physical Exam  Vitals reviewed. Constitutional:       Appearance: She is well-developed. Neck:      Vascular: No carotid bruit or JVD. Cardiovascular:      Rate and Rhythm: Normal rate and regular rhythm. Heart sounds: Normal heart sounds. No murmur heard. No friction rub. No gallop. Pulmonary:      Effort: Pulmonary effort is normal. No respiratory distress. Breath sounds: Normal breath sounds. No wheezing or rales. Abdominal:      General: There is no distension. Tenderness: There is no abdominal tenderness. Lymphadenopathy:      Cervical: No cervical adenopathy. Skin:     General: Skin is warm and dry. ASSESSMENT:     Diagnosis Orders   1. Primary hypertension     2. Ischemic cardiomyopathy     3. Coronary artery disease involving native coronary artery of native heart without angina pectoris     4. Mixed hyperlipidemia     5. Biventricular ICD (implantable cardioverter-defibrillator) in place     6. Status post aorto-coronary artery bypass graft         PLAN:  No orders of the defined types were placed in this encounter. Orders Placed This Encounter   Medications    furosemide (LASIX) 40 MG tablet     Sig: Take 1 tablet by mouth daily as needed (excess fluid)     Dispense:  90 tablet     Refill:  1    metoprolol tartrate (LOPRESSOR) 50 MG tablet     Sig: Take 0.5 tablets by mouth 2 times daily     Dispense:  180 tablet     Refill:  2         1. Continue present medications  2.  Recommend increasing metoprolol 50 mg p.o. twice

## 2022-04-07 NOTE — PROGRESS NOTES
ICD interrogation  Presenting rhythm: AS/, AP 2.2%,  97.1%  Battery status: 7.1 years  Lead status: lead impedance within range and stable  Sensing: P waves 0.9 mV,  R waves 3.9 mV  Thresholds:  Atrial 1.000 V @ 0.4ms, ventricular 1.000 V @ 0.4ms, LV 2.250 V @ 0.60ms  Observations:  1 monitored episode NSVT  Next remote home check scheduled for 07/07/22

## 2022-04-29 NOTE — PROGRESS NOTES
HISTORY OF PRESENT ILLNESS:    Ms. Anabel Bravo presents today for a 5-month breast exam following imaging. This is following a right lumpectomy with sentinel node and IORT on 10/29/2021     is status post ultrasound guided breast biopsy on the right which revealed a 2.6 cm high grade infiltrating ductal carcinoma. ER is positive at 96%. MO is positive at 94%. Her2 is Equivocal. Ki67 is low at 6%. Fish is negative. Mammaprint is low risk luminal A type. MRI was not performed. Pathology:  FINAL DIAGNOSIS:     A.  Breast, right simple mastectomy:   1.  Infiltrating ductal carcinoma, no special type, grade 2.   2.  Infiltrating carcinoma measures 2.5 cm in greatest dimension grossly. 3.  Infiltrating carcinoma extends to within 0.1 cm of the anterior   surgical excision margin. 4.  Infiltrating carcinoma extends to within 0.4 cm of the nearest caudal   surgical excision margin. 5.  Tumor invades the deepest aspect of the overlying dermis. 6.  Negative for evidence of dermal lymphovascular space invasion. 7.  Negative for evidence of epidermal invasion. 8.  Sections of nipple, negative for evidence of malignancy. B.  Breast, excision of additional right breast cranial margin: Benign   breast parenchyma. C.  Breast, excision of additional right breast caudal margin: Benign   breast parenchyma. D.  Breast, excision of additional right breast deep margin: Benign   breast parenchyma. E.  Lymph node, right sentinel lymph node biopsy: 2 lymph nodes, negative   for evidence of malignancy. AJCC STAGE: pT2, (sn)pN0, pMx     ADDENDUM:  At the clinician's request, an immunohistochemical stain for   Ki-67 is performed using appropriately staining controls.  The Ki-67   staining fraction is low and estimated at 2% which is decreased from the   original percentage of 6%. She is seen Dr. Soni Shelby and going to continue letrozole.   He offered Oncotype DX however she said she would not take chemotherapy regardless. Unilateral Mammogram-5/2/2022  FINDINGS: There is a Biozorb device placed at the time of prior  lumpectomy. There is mild residual density in the medial right breast  without any focal mass. There is mild skin thickening of the right  breast. There are no suspicious microcalcifications. IMPRESSION:  1. Postsurgical changes right breast. No suspicious abnormality. Benign mammogram. BI-RADS 2.  2. Screening mammography recommended in August 2022. 3. Computer aided detection and 3-D tomosynthesis were utilized. Signed by Dr Lucius Graham       PHYSICAL EXAM:  The  wounds look good with no evidence of infection, fluid accumulation, or skin necrosis. IMPRESSION:    Doing well s/p right lumpectomy and sentinel node    PLAN: October with bilateral mammograms          I have seen, examined and reviewed this patient medication list, appropriate labs and imaging studies. I reviewed relevant medical records and others physicians notes. I discussed the plans of care with the patient. I answered all the questions to the patients satisfaction. I, Dr Allan Connolly, personally performed the services described in this documentation as scribed by Saira Bueno MA in my presence and is both accurate and complete. (Please note that portions of this note were completed with a voice recognition program. Efforts were made to edit the dictations but occasionally words are mis-transcribed.)  Over 50% of the total visit time of 20 minutes in face to face encounter with the patient, out of which more than 50% of the time was spent in counseling patient or family and coordination of care. Counseling included but was not limited to time spent reviewing labs, imaging studies/ treatment plan and answering questions.

## 2022-05-02 ENCOUNTER — HOSPITAL ENCOUNTER (OUTPATIENT)
Dept: WOMENS IMAGING | Age: 72
Discharge: HOME | End: 2022-05-02
Payer: MEDICARE

## 2022-05-02 ENCOUNTER — OFFICE VISIT (OUTPATIENT)
Dept: SURGERY | Age: 72
End: 2022-05-02
Payer: MEDICARE

## 2022-05-02 VITALS — SYSTOLIC BLOOD PRESSURE: 118 MMHG | HEART RATE: 80 BPM | DIASTOLIC BLOOD PRESSURE: 70 MMHG

## 2022-05-02 DIAGNOSIS — Z17.0 MALIGNANT NEOPLASM OF OVERLAPPING SITES OF RIGHT BREAST IN FEMALE, ESTROGEN RECEPTOR POSITIVE (HCC): ICD-10-CM

## 2022-05-02 DIAGNOSIS — Z98.890 STATUS POST RIGHT BREAST LUMPECTOMY: ICD-10-CM

## 2022-05-02 DIAGNOSIS — Z17.0 MALIGNANT NEOPLASM OF OVERLAPPING SITES OF RIGHT BREAST IN FEMALE, ESTROGEN RECEPTOR POSITIVE (HCC): Primary | ICD-10-CM

## 2022-05-02 DIAGNOSIS — C50.811 MALIGNANT NEOPLASM OF OVERLAPPING SITES OF RIGHT BREAST IN FEMALE, ESTROGEN RECEPTOR POSITIVE (HCC): Primary | ICD-10-CM

## 2022-05-02 DIAGNOSIS — C50.811 MALIGNANT NEOPLASM OF OVERLAPPING SITES OF RIGHT BREAST IN FEMALE, ESTROGEN RECEPTOR POSITIVE (HCC): ICD-10-CM

## 2022-05-02 PROCEDURE — G8427 DOCREV CUR MEDS BY ELIG CLIN: HCPCS | Performed by: SURGERY

## 2022-05-02 PROCEDURE — G0279 TOMOSYNTHESIS, MAMMO: HCPCS

## 2022-05-02 PROCEDURE — G8417 CALC BMI ABV UP PARAM F/U: HCPCS | Performed by: SURGERY

## 2022-05-02 PROCEDURE — 1090F PRES/ABSN URINE INCON ASSESS: CPT | Performed by: SURGERY

## 2022-05-02 PROCEDURE — 1123F ACP DISCUSS/DSCN MKR DOCD: CPT | Performed by: SURGERY

## 2022-05-02 PROCEDURE — 4040F PNEUMOC VAC/ADMIN/RCVD: CPT | Performed by: SURGERY

## 2022-05-02 PROCEDURE — G8399 PT W/DXA RESULTS DOCUMENT: HCPCS | Performed by: SURGERY

## 2022-05-02 PROCEDURE — 99213 OFFICE O/P EST LOW 20 MIN: CPT | Performed by: SURGERY

## 2022-05-02 PROCEDURE — 3017F COLORECTAL CA SCREEN DOC REV: CPT | Performed by: SURGERY

## 2022-05-02 PROCEDURE — 1036F TOBACCO NON-USER: CPT | Performed by: SURGERY

## 2022-05-06 DIAGNOSIS — Z85.3 PERSONAL HISTORY OF BREAST CANCER: Primary | ICD-10-CM

## 2022-05-16 DIAGNOSIS — E55.9 VITAMIN D DEFICIENCY: ICD-10-CM

## 2022-05-16 DIAGNOSIS — N17.9 AKI (ACUTE KIDNEY INJURY) (HCC): ICD-10-CM

## 2022-05-16 DIAGNOSIS — E11.69 TYPE 2 DIABETES MELLITUS WITH OTHER SPECIFIED COMPLICATION, UNSPECIFIED WHETHER LONG TERM INSULIN USE (HCC): ICD-10-CM

## 2022-05-16 LAB
ALBUMIN SERPL-MCNC: 4 G/DL (ref 3.5–5.2)
ALP BLD-CCNC: 50 U/L (ref 35–104)
ALT SERPL-CCNC: 9 U/L (ref 5–33)
ANION GAP SERPL CALCULATED.3IONS-SCNC: 12 MMOL/L (ref 7–19)
AST SERPL-CCNC: 20 U/L (ref 5–32)
BASOPHILS ABSOLUTE: 0 K/UL (ref 0–0.2)
BASOPHILS RELATIVE PERCENT: 1 % (ref 0–1)
BILIRUB SERPL-MCNC: 0.3 MG/DL (ref 0.2–1.2)
BUN BLDV-MCNC: 20 MG/DL (ref 8–23)
CALCIUM SERPL-MCNC: 9.2 MG/DL (ref 8.8–10.2)
CHLORIDE BLD-SCNC: 104 MMOL/L (ref 98–111)
CHOLESTEROL, TOTAL: 106 MG/DL (ref 160–199)
CO2: 25 MMOL/L (ref 22–29)
CREAT SERPL-MCNC: 1.8 MG/DL (ref 0.5–0.9)
CREATININE URINE: 49.4 MG/DL (ref 4.2–622)
EOSINOPHILS ABSOLUTE: 0.1 K/UL (ref 0–0.6)
EOSINOPHILS RELATIVE PERCENT: 3.3 % (ref 0–5)
GFR AFRICAN AMERICAN: 33
GFR NON-AFRICAN AMERICAN: 28
GLUCOSE BLD-MCNC: 101 MG/DL (ref 74–109)
HBA1C MFR BLD: 5.1 % (ref 4–6)
HCT VFR BLD CALC: 30.6 % (ref 37–47)
HDLC SERPL-MCNC: 50 MG/DL (ref 65–121)
HEMOGLOBIN: 9.2 G/DL (ref 12–16)
IMMATURE GRANULOCYTES #: 0 K/UL
LDL CHOLESTEROL CALCULATED: 40 MG/DL
LYMPHOCYTES ABSOLUTE: 1 K/UL (ref 1.1–4.5)
LYMPHOCYTES RELATIVE PERCENT: 26.2 % (ref 20–40)
MCH RBC QN AUTO: 29.8 PG (ref 27–31)
MCHC RBC AUTO-ENTMCNC: 30.1 G/DL (ref 33–37)
MCV RBC AUTO: 99 FL (ref 81–99)
MICROALBUMIN UR-MCNC: 161.5 MG/DL (ref 0–19)
MICROALBUMIN/CREAT UR-RTO: 3269.2 MG/G
MONOCYTES ABSOLUTE: 0.3 K/UL (ref 0–0.9)
MONOCYTES RELATIVE PERCENT: 7.4 % (ref 0–10)
NEUTROPHILS ABSOLUTE: 2.4 K/UL (ref 1.5–7.5)
NEUTROPHILS RELATIVE PERCENT: 61.8 % (ref 50–65)
PDW BLD-RTO: 15.7 % (ref 11.5–14.5)
PLATELET # BLD: 212 K/UL (ref 130–400)
PMV BLD AUTO: 10.8 FL (ref 9.4–12.3)
POTASSIUM SERPL-SCNC: 5.1 MMOL/L (ref 3.5–5)
RBC # BLD: 3.09 M/UL (ref 4.2–5.4)
SODIUM BLD-SCNC: 141 MMOL/L (ref 136–145)
TOTAL PROTEIN: 6.7 G/DL (ref 6.6–8.7)
TRIGL SERPL-MCNC: 81 MG/DL (ref 0–149)
TSH SERPL DL<=0.05 MIU/L-ACNC: 2.06 UIU/ML (ref 0.27–4.2)
VITAMIN D 25-HYDROXY: 54.4 NG/ML
WBC # BLD: 3.9 K/UL (ref 4.8–10.8)

## 2022-05-17 ENCOUNTER — OFFICE VISIT (OUTPATIENT)
Dept: INTERNAL MEDICINE | Age: 72
End: 2022-05-17
Payer: MEDICARE

## 2022-05-17 VITALS
OXYGEN SATURATION: 96 % | WEIGHT: 212 LBS | BODY MASS INDEX: 35.32 KG/M2 | SYSTOLIC BLOOD PRESSURE: 114 MMHG | HEIGHT: 65 IN | RESPIRATION RATE: 22 BRPM | HEART RATE: 71 BPM | DIASTOLIC BLOOD PRESSURE: 60 MMHG

## 2022-05-17 DIAGNOSIS — E78.5 HYPERLIPIDEMIA, UNSPECIFIED HYPERLIPIDEMIA TYPE: ICD-10-CM

## 2022-05-17 DIAGNOSIS — N18.4 STAGE 4 CHRONIC KIDNEY DISEASE (HCC): ICD-10-CM

## 2022-05-17 DIAGNOSIS — E55.9 VITAMIN D DEFICIENCY: ICD-10-CM

## 2022-05-17 DIAGNOSIS — I10 PRIMARY HYPERTENSION: ICD-10-CM

## 2022-05-17 DIAGNOSIS — I25.10 CORONARY ARTERY DISEASE DUE TO LIPID RICH PLAQUE: ICD-10-CM

## 2022-05-17 DIAGNOSIS — I25.83 CORONARY ARTERY DISEASE DUE TO LIPID RICH PLAQUE: ICD-10-CM

## 2022-05-17 DIAGNOSIS — D50.9 IRON DEFICIENCY ANEMIA, UNSPECIFIED IRON DEFICIENCY ANEMIA TYPE: ICD-10-CM

## 2022-05-17 DIAGNOSIS — E11.21 TYPE II DIABETES MELLITUS WITH NEPHROPATHY (HCC): Primary | ICD-10-CM

## 2022-05-17 DIAGNOSIS — Z91.09 ENVIRONMENTAL ALLERGIES: ICD-10-CM

## 2022-05-17 DIAGNOSIS — C50.919 MALIGNANT NEOPLASM OF FEMALE BREAST, UNSPECIFIED ESTROGEN RECEPTOR STATUS, UNSPECIFIED LATERALITY, UNSPECIFIED SITE OF BREAST (HCC): ICD-10-CM

## 2022-05-17 PROCEDURE — 99214 OFFICE O/P EST MOD 30 MIN: CPT | Performed by: INTERNAL MEDICINE

## 2022-05-17 PROCEDURE — 3017F COLORECTAL CA SCREEN DOC REV: CPT | Performed by: INTERNAL MEDICINE

## 2022-05-17 PROCEDURE — G8417 CALC BMI ABV UP PARAM F/U: HCPCS | Performed by: INTERNAL MEDICINE

## 2022-05-17 PROCEDURE — G8427 DOCREV CUR MEDS BY ELIG CLIN: HCPCS | Performed by: INTERNAL MEDICINE

## 2022-05-17 PROCEDURE — 3044F HG A1C LEVEL LT 7.0%: CPT | Performed by: INTERNAL MEDICINE

## 2022-05-17 PROCEDURE — 4040F PNEUMOC VAC/ADMIN/RCVD: CPT | Performed by: INTERNAL MEDICINE

## 2022-05-17 PROCEDURE — 2022F DILAT RTA XM EVC RTNOPTHY: CPT | Performed by: INTERNAL MEDICINE

## 2022-05-17 PROCEDURE — 1090F PRES/ABSN URINE INCON ASSESS: CPT | Performed by: INTERNAL MEDICINE

## 2022-05-17 PROCEDURE — 1036F TOBACCO NON-USER: CPT | Performed by: INTERNAL MEDICINE

## 2022-05-17 PROCEDURE — G8399 PT W/DXA RESULTS DOCUMENT: HCPCS | Performed by: INTERNAL MEDICINE

## 2022-05-17 PROCEDURE — 1123F ACP DISCUSS/DSCN MKR DOCD: CPT | Performed by: INTERNAL MEDICINE

## 2022-05-17 RX ORDER — METOPROLOL TARTRATE 50 MG/1
TABLET, FILM COATED ORAL
Qty: 360 TABLET | Refills: 3 | Status: SHIPPED | OUTPATIENT
Start: 2022-05-17

## 2022-05-17 NOTE — PROGRESS NOTES
Chief Complaint   Patient presents with    Diabetes       HPI: Shayan Mccloud is a 66 y.o. female is here for follow-up of type 2 diabetes. Her A1c is 5.1. She is not checking her blood sugar. She does have 2 blisters to the top of her right foot that are drying out. She states that her feet were swelling. She is taking her Lasix. The swelling has gone down. She denies  any complaints of chest pain, chest pressure or shortness of breath. She is tolerating her Femara well for breast cancer. Her allergies are stable. Her blood pressure is well controlled. She is tolerating her statin without side effects. Her renal parameters are improving. Her creatinine has creased from 2.3-1.8. Her GFR has risen from 21-28. Her anemia has improved. Her cholesterol is 106. Her blood sugars do run about 96 when she does take them. Again, she does not take her blood sugars regularly.       Past Medical History:   Diagnosis Date    Acute sinusitis     CHAPARRO (acute kidney injury) ([facility] 05/18/2018    Anemia     Bleeding ulcer     hx of; cauterized by dr. resendiz    Breast cancer [facility])     Breast lump     CAD (coronary artery disease)     sees dr. Lashell Ortiz ulcer ([facility]     Cataract     COVID-19 01/2021    fatigue    Dermatitis     Diabetes mellitus ([address]     Diabetic nephropathy ([address]     Diabetic retinopathy ([facility]     Glaucoma     Gout     Hemorrhoids     Hx of TB skin testing     positive    Hyperlipidemia     Hypertension     Palliative care patient 06/24/2021    Type 2 diabetes mellitus ([facility] 05/18/2018    Vitamin D deficiency       Past Surgical History:   Procedure Laterality Date    BREAST LUMPECTOMY Right 10/29/2021    RIGHT LUMPECTOMY, SNB, PREOP ULTRASOUND, INTRAOP ULTRASOUND GUIDED NL, BIOSORB, PEC BLOCK, MARGIN PROBE, FLAPS, IORT performed by Patrice Willis MD at [facility]  02/15/2022   [address]    CARPAL TUNNEL RELEASE Right 9/3/2020    RIGHT CARPAL TUNNEL RELEASE performed by Janie Eubansk MD at [facility]      COLONOSCOPY N/A 10/11/2019    Dr Tim Mar of a hemostatic clip-Suboptimal prep, diverticulosis, internal hemorrhoids-Grade 2, AP x 3, 1 yr recall     COLONOSCOPY N/A 10/12/2020    Dr Mike Montenegro hemorrhoids-Grade 1-2, AP, 3 yr recall    CORONARY ARTERY BYPASS GRAFT  2001    ENDOSCOPY, COLON, DIAGNOSTIC      EYE SURGERY      MOUTH SURGERY      OTHER SURGICAL HISTORY      sternal resection    PACEMAKER PLACEMENT  02/23/2022    SKIN BIOPSY      UPPER GASTROINTESTINAL ENDOSCOPY N/A 10/11/2019    Dr Franko Stewart (+) H Pylori    US BREAST NEEDLE BIOPSY LEFT Left 9/1/2021    US BREAST NEEDLE BIOPSY LEFT 9/1/2021 [facility] GENERAL SURGERY    VASCULAR SURGERY        Social History     Socioeconomic History    Marital status: Single     Spouse name: None    Number of children: None    Years of education: None    Highest education level: None   Occupational History     Employer: [employer]   Tobacco Use    Smoking status: Never Smoker    Smokeless tobacco: Never Used   Vaping Use    Vaping Use: Never used   Substance and Sexual Activity    Alcohol use: Yes     Alcohol/week: 1.0 standard drink     Types: 1 Shots of liquor per week     Comment: rare    Drug use: No    Sexual activity: Never   Other Topics Concern    None   Social History Narrative    None     Social Determinants of Health     Financial Resource Strain: Low Risk     Difficulty of Paying Living Expenses: Not hard at all   Food Insecurity: No Food Insecurity    Worried About Running Out of Food in the Last Year: Never true    Matthew of Food in the Last Year: Never true   Transportation Needs:     Lack of Transportation (Medical): Not on file    Lack of Transportation (Non-Medical):  Not on file   Physical Activity:     Days of Exercise per Week: Not on file    Minutes of Exercise per Session: Not on file   Stress:     Feeling of Stress : Not on file   Social Connections:     Frequency of Communication with Friends and Family: Not on file    Frequency of Social Gatherings with Friends and Family: Not on file    Attends Hinduism Services: Not on file    Active Member of Clubs or Organizations: Not on file    Attends Club or Organization Meetings: Not on file    Marital Status: Not on file   Intimate Partner Violence:     Fear of Current or Ex-Partner: Not on file    Emotionally Abused: Not on file    Physically Abused: Not on file    Sexually Abused: Not on file   Housing Stability:     Unable to Pay for Housing in the Last Year: Not on file    Number of [place] in the Last Year: Not on file    Unstable Housing in the Last Year: Not on file      Family History   Problem Relation Age of Onset    Cancer Mother     Diabetes Father     COPD Father     Diabetes Sister     Heart Disease Sister     Colon Polyps Sister     Coronary Art Dis Other         grandfather    Diabetes Other         aunt    Colon Cancer Neg Hx     Liver Cancer Neg Hx     Rectal Cancer Neg Hx         Current Outpatient Medications   Medication Sig Dispense Refill    metoprolol tartrate (LOPRESSOR) 50 MG tablet 2 tablets  tablet 3    furosemide (LASIX) 40 MG tablet Take 1 tablet by mouth daily as needed (excess fluid) 90 tablet 1    letrozole (FEMARA) 2.5 MG tablet Take 1 tablet by mouth daily 90 tablet 3    montelukast (SINGULAIR) 10 MG tablet Take 1 tablet by mouth daily 90 tablet 3    timolol (TIMOPTIC) 0.25 % ophthalmic solution Place 1 drop into the right eye daily       metFORMIN (GLUCOPHAGE) 500 MG tablet Take 2 tablets by mouth 2 times daily (with meals) Please hold for 48 hrs after cardiac cath.  You may resume like normal on 2/18/2022. 60 tablet 3    lisinopril (PRINIVIL;ZESTRIL) 5 MG tablet Take 1 tablet by mouth daily 90 tablet 2    amLODIPine (NORVASC) 5 MG tablet TAKE 1 TABLET EVERY DAY 90 tablet 1    simvastatin (ZOCOR) 40 MG tablet Take 1 tablet by mouth nightly 90 tablet 3    glipiZIDE (GLUCOTROL XL) 2.5 MG extended release tablet Take 1 tablet by mouth daily 90 tablet 3    isosorbide mononitrate (IMDUR) 30 MG extended release tablet Take 1 tablet by mouth 2 times daily 180 tablet 3    Blood Glucose Calibration (ACCU-CHEK ROSA) SOLN Calibration done daily. 1 each 1    blood glucose test strips (ACCU-CHEK ROSA PLUS) strip TEST BLOOD SUGAR FOUR TIMES DAILY E11.9 400 strip 3    Accu-Chek Softclix Lancets MISC TEST FOUR TIMES DAILY E11.9 400 each 3    Alcohol Swabs (B-D SINGLE USE SWABS REGULAR) PADS USE THREE TIMES DAILY 100 each 11    aspirin 81 MG tablet Take 81 mg by mouth daily      Omega-3 Fatty Acids (FISH OIL) 1000 MG CAPS Take 1,000 mg by mouth 2 times daily      MULTIPLE VITAMIN PO Take 1 tablet by mouth daily      Calcium Carbonate-Vitamin D (CALCIUM 500/D PO) Take 1 tablet by mouth 2 times daily      vitamin B-12 (CYANOCOBALAMIN) 1000 MCG tablet Take 1,000 mcg by mouth daily       No current facility-administered medications for this visit.         Patient Active Problem List   Diagnosis    CHAPARRO (acute kidney injury) ([address]    Hypertension    Hyperlipidemia    Ocular hypertension    CAD (coronary artery disease)    Type 2 diabetes mellitus ([address]    Diabetes mellitus ([address]    Acute cystitis without hematuria    Carpal tunnel syndrome    Chest pain    Chronic kidney disease, stage 3b ([address]    History of coronary artery bypass graft    Achilles tendinitis    Bursitis of right foot    Contracture of Achilles tendon    Diabetic macular edema (HCC)    Foot pain    Nonproliferative diabetic retinopathy ([address]    Posterior calcaneal exostosis    Retinal neovascularization    Malignant neoplasm of overlapping sites of right breast in female, estrogen receptor positive ([address]    Status post right breast lumpectomy with sentinel node and IORT on 10/29/2021    CKD (chronic kidney disease) stage 4, GFR 15-29 ml/min ([facility]    Ischemic cardiomyopathy    Decreased cardiac ejection fraction    Pacemaker        Review of Systems   Constitutional: Negative for activity change, appetite change, chills, diaphoresis, fatigue, fever and unexpected weight change. HENT: Negative for congestion, ear discharge, ear pain, mouth sores, nosebleeds, postnasal drip, rhinorrhea, sinus pressure, sinus pain, sneezing, sore throat, tinnitus, trouble swallowing and voice change. Eyes: Negative for discharge, itching and visual disturbance. Respiratory: Negative for cough, choking, chest tightness, shortness of breath, wheezing and stridor. Cardiovascular: Positive for leg swelling. Negative for chest pain and palpitations. She was having some foot swelling. However, the swelling has improved. Gastrointestinal: Negative for abdominal distention, abdominal pain, blood in stool, constipation, diarrhea, nausea and vomiting. Endocrine: Negative for cold intolerance, polydipsia and polyuria. Genitourinary: Negative for difficulty urinating, dysuria, frequency and urgency. Musculoskeletal: Positive for arthralgias. Negative for gait problem, joint swelling and myalgias. Right knee pain   Skin: Negative for color change and rash. 2 dried blisters to her right foot. There is no sign of infection noted. Allergic/Immunologic: Negative for food allergies and immunocompromised state. Neurological: Negative for dizziness, tremors, syncope, speech difficulty, weakness, numbness and headaches. Hematological: Negative for adenopathy. Does not bruise/bleed easily. Psychiatric/Behavioral: Negative for confusion and hallucinations. The patient is not nervous/anxious.         /60 (Site: Left Upper Arm, Position: Sitting)   Pulse 71   Resp 22   Ht 5' 5\" (1.651 m)   Wt 212 lb (96.2 kg)   SpO2 96%   BMI 35.28 kg/m²   Physical Exam  Vitals and nursing note reviewed. Constitutional:       General: She is not in acute distress. Appearance: Normal appearance. She is well-developed and normal weight. She is not ill-appearing, toxic-appearing or diaphoretic. HENT:      Head: Normocephalic and atraumatic. Right Ear: Tympanic membrane, ear canal and external ear normal. There is no impacted cerumen. Left Ear: Tympanic membrane, ear canal and external ear normal. There is no impacted cerumen. Nose: Nose normal. No congestion or rhinorrhea. Mouth/Throat:      Mouth: Mucous membranes are moist.      Pharynx: Oropharynx is clear. No oropharyngeal exudate or posterior oropharyngeal erythema. Eyes:      General: No scleral icterus. Right eye: No discharge. Left eye: No discharge. Extraocular Movements: Extraocular movements intact. Conjunctiva/sclera: Conjunctivae normal.      Pupils: Pupils are equal, round, and reactive to light. Neck:      Thyroid: No thyromegaly. Vascular: No carotid bruit or JVD. Trachea: No tracheal deviation. Cardiovascular:      Rate and Rhythm: Normal rate and regular rhythm. Pulses: Normal pulses. Heart sounds: Normal heart sounds. No murmur heard. No friction rub. No gallop. Pulmonary:      Effort: Pulmonary effort is normal. No respiratory distress. Breath sounds: Normal breath sounds. No stridor. No wheezing, rhonchi or rales. Chest:      Chest wall: No tenderness. Abdominal:      General: Bowel sounds are normal. There is no distension. Palpations: Abdomen is soft. There is no mass. Tenderness: There is no abdominal tenderness. There is no right CVA tenderness, left CVA tenderness, guarding or rebound. Hernia: No hernia is present. Musculoskeletal:         General: No swelling, tenderness, deformity or signs of injury. Normal range of motion. Cervical back: Normal range of motion and neck supple. No rigidity.  No muscular tenderness. Right lower leg: No edema. Left lower leg: No edema. Lymphadenopathy:      Cervical: No cervical adenopathy. Skin:     General: Skin is warm and dry. Capillary Refill: Capillary refill takes less than 2 seconds. Coloration: Skin is not jaundiced or pale. Findings: Bruising present. No erythema, lesion or rash. Comments: 2 dried blisters to the top of the right foot. There is no sign of infection noted  Visual inspection:  Deformity/amputation: absent  Skin lesions/pre-ulcerative calluses: present - bilateral great toe  Edema: right- negative, left- negative    Sensory exam:  Monofilament sensation: normal  (minimum of 5 random plantar locations tested, avoiding callused areas - > 1 area with absence of sensation is + for neuropathy)    Plus at least one of the following:  Pulses: normal,   Pinprick: Intact  Proprioception: N/A  Vibration (128 Hz): N/A    Does have multiple bruises on her upper arms. Neurological:      General: No focal deficit present. Mental Status: She is alert and oriented to person, place, and time. Mental status is at baseline. Cranial Nerves: No cranial nerve deficit. Sensory: No sensory deficit. Motor: No weakness or abnormal muscle tone. Coordination: Coordination normal.      Gait: Gait normal.      Deep Tendon Reflexes: Reflexes are normal and symmetric. Reflexes normal.   Psychiatric:         Mood and Affect: Mood normal.         Behavior: Behavior normal.         Thought Content: Thought content normal.         Judgment: Judgment normal.         1. Type II diabetes mellitus with nephropathy ([facility]    2. Primary hypertension    3. Vitamin D deficiency     4. Malignant neoplasm of female breast, unspecified estrogen receptor status, unspecified laterality, unspecified site of breast ([facility]    5. Environmental allergies    6. Hyperlipidemia, unspecified hyperlipidemia type    7.  Coronary artery disease due to lipid rich plaque    8. Stage 4 chronic kidney disease ([facility]    9. Iron deficiency anemia, unspecified iron deficiency anemia type        ASSESSMENT/PLAN:    75-year-old woman here for follow-up    1. Hypertension: Blood pressure well controlled on Lopressor, Lasix. She is also taking lisinopril and Norvasc as prescribed    2. Breast cancer: Continue Femara    3. Environmental allergies: Stable on Singulair    4. Type 2 diabetes: Continue metformin beside as prescribed    5. Hyperlipidemia: Continue simvastatin as prescribed    6. Coronary artery disease: Continue Imdur    7. Renal insufficiency: Renal parameters are improving    8. Anemia: Improving    9. Vitamin D deficiency: Check a vitamin D level with next lab draw    Luz Cabrera was seen today for diabetes. Diagnoses and all orders for this visit:    Type II diabetes mellitus with nephropathy ([facility]  -     CBC with Auto Differential; Future  -     Hemoglobin A1C; Future  -     Comprehensive Metabolic Panel; Future  -     Lipid Panel; Future  -     TSH; Future  -     Microalbumin / Creatinine Urine Ratio; Future  -     Vitamin D 25 Hydroxy; Future    Primary hypertension  -     metoprolol tartrate (LOPRESSOR) 50 MG tablet; 2 tablets BID  -     CBC with Auto Differential; Future  -     Hemoglobin A1C; Future  -     Comprehensive Metabolic Panel; Future  -     Lipid Panel; Future  -     TSH; Future  -     Microalbumin / Creatinine Urine Ratio; Future  -     Vitamin D 25 Hydroxy; Future    Vitamin D deficiency   -     Vitamin D 25 Hydroxy;  Future    Malignant neoplasm of female breast, unspecified estrogen receptor status, unspecified laterality, unspecified site of breast ([facility]    Environmental allergies    Hyperlipidemia, unspecified hyperlipidemia type    Coronary artery disease due to lipid rich plaque    Stage 4 chronic kidney disease (HCC)    Iron deficiency anemia, unspecified iron deficiency anemia type          Return in about 3 months (around 8/17/2022) for diabetes.      Orders Placed This Encounter   Procedures    CBC with Auto Differential     Fast 12 hours     Standing Status:   Future     Standing Expiration Date:   5/17/2023    Hemoglobin A1C     Fast 12 hours     Standing Status:   Future     Standing Expiration Date:   5/17/2023    Comprehensive Metabolic Panel     Fasting 12 hours     Standing Status:   Future     Standing Expiration Date:   5/17/2023    Lipid Panel     Standing Status:   Future     Standing Expiration Date:   5/17/2023     Order Specific Question:   Is Patient Fasting?/# of Hours     Answer:   12    TSH     Fast 12 hours     Standing Status:   Future     Standing Expiration Date:   5/17/2023    Microalbumin / Creatinine Urine Ratio     Standing Status:   Future     Standing Expiration Date:   5/17/2023    Vitamin D 25 Hydroxy     Standing Status:   Future     Standing Expiration Date:   5/17/2023       Dm Bravo MD

## 2022-05-18 ASSESSMENT — ENCOUNTER SYMPTOMS
COUGH: 0
SINUS PAIN: 0
EYE DISCHARGE: 0
VOMITING: 0
SINUS PRESSURE: 0
TROUBLE SWALLOWING: 0
SHORTNESS OF BREATH: 0
CHEST TIGHTNESS: 0
NAUSEA: 0
ABDOMINAL DISTENTION: 0
VOICE CHANGE: 0
STRIDOR: 0
ABDOMINAL PAIN: 0
COLOR CHANGE: 0
CONSTIPATION: 0
SORE THROAT: 0
CHOKING: 0
WHEEZING: 0
BLOOD IN STOOL: 0
EYE ITCHING: 0
DIARRHEA: 0
RHINORRHEA: 0

## 2022-05-23 NOTE — PROGRESS NOTES
Progress Note      Pt Name: Janny Kolb: 1950  MRN: [id number]    Date of evaluation: 5/25/2022  History Obtained From:  patient, electronic medical record    CHIEF COMPLAINT:    Chief Complaint   Patient presents with    Follow-up    Breast Cancer    Anemia     HISTORY OF PRESENT ILLNESS:    Angelica Jean-Baptiste is a 66 y.o.  female who is currently being followed for infiltrating ductal carcinoma the right breast, ER/VT positive, [id number], 9/1/2021. She is status post right lumpectomy with IORT on 10/29/2021 and currently taking adjuvant endocrine therapy with letrozole 2.5 mg p.o. daily for recommended 5 years. Gigi Jason returns today in scheduled follow-up for evaluation, side effect monitoring, lab monitoring and further treatment recommendations. Today's clinic visit to include physical assessment, review of systems, any lab or radiographic findings that were available and plan of care are documented below. ONCOLOGIC HISTORY:     Diagnosis  · Infiltrating ductal carcinoma, right breast, Sept 2021  · ER 96%, VT 94%, Her-2 2+/equivocal, Ki67 6%, FISH negative  · MammaPrint Luminal A/low risk  · [id number]     Treatment Summary  · 9/20/21-adjuvant endocrine hormone therapy with Letrozole 2.5mg daily. Will continue for 5 years  · 10/29/21-right lumpectomy/sentinel lymph node biopsy  · 10/29/2021-IORT 2000 cGy     Charles Lawrence was first seen by Dr Brown Gross on 11/24/2021. She was referred by Dr. Bob Silverman for a diagnosis of right breast cancer. She is a retired RN. She noticed a mass in the right breast several months prior to her diagnosis. · 8/17/21 US right & left breast: 2.5 cm lower right breast mass at 6:00, highly suspicious for breast malignancy.  In the left breast there is a 0.6 cm benign-appearing circumscribed hypoechoic solid mass at the 2:00 position. Primary considerations would include intramammary lymph node versus fibroadenoma.  Typical recommendation would be a 6 month follow-up ultrasound to ensure stability although biopsy could be considered given what appears to be a contralateral malignancy. No suspicious axillary adenopathy seen. · 8/17/21 Bilateral diagnostic mammogram: There is a 2.5 cm irregularly-shaped, lobulated high density mass in the lower right breast near 5:00. Overlying skin marker. No other mass is identified in the right breast. There is also a 0.8 cm oval circumscribed equal density mass in the upper outer left breast near 2:00, mid depth. There are a few scattered bilateral benign calcifications. At the 6:00 position in the right breast, approximately 1 cm from the nipple, there is a 2.5 x 2.0 x 2.5 cm hypoechoic lobulated solid mass. · 9/1/21 Breast, right breast needle core biopsies at 6 o'clock position: Infiltrating ductal carcinoma, no special type, favor grade 3. Infiltrating carcinoma measures 1.5 cm in greatest linear dimension and is present in multiple cores. Breast, left breast needle core biopsies at 2 o'clock position: Benign spindle cell nodule, consistent with partially hyalinized schwannoma. ER 96%, CA 94%, Her-2 2+/equivocal, Ki67 6%, FISH negative, MammaPrint Luminal A/low risk. · 9/20/21-Initiated endocrine hormone therapy with Letrozole 2.5mg daily  · 10/29/21-she underwent a right simple mastectomy by Dr. Jasmin Plunkett at [facility]: Infiltrating ductal carcinoma, no special type, grade 2. Benign breast parenchyma. Lymph node, right sentinel lymph node biopsy: 2 lymph nodes, negative for evidence of malignancy. AJCC STAGE: pT2, (sn)pN0, pMx. Ki-67 staining fraction is low and estimated at 2% which is decreased from the original percentage of 6%. · 10/29/2021-IORT 2000 Gy by Dr Qamra Martinez. · 11/24/2021-she was first seen by Atlee Hamman. Discussed NCCN guidelines recommendations. Recommended Oncotype DX to assess need for chemotherapy.   The patient said she would decline adjuvant chemotherapy regardless of results of Oncotype DX. Therefore, Oncotype DX was not sent. No recommendation for radiation due to right mastectomy with clear margins and negative nodes. Continue letrozole x5 years. · 2022 Right mammogram- Postsurgical changes right breast. No suspicious abnormality.  Benign mammogram.     Age-appropriate health screenin2021 Bone density- osteopenia; femoral neck T score -0.9; lumbar spine T score 1.7  10/12/2020 Colonoscopy- polys x2-tubular adenoma; internal hemorrhoids without bleeding    Past Medical History:    Past Medical History:   Diagnosis Date    Acute sinusitis     CHAPARRO (acute kidney injury) ([facility] 2018    Anemia     Bleeding ulcer     hx of; cauterized by dr. resendiz    Breast cancer [facility])     Breast lump     CAD (coronary artery disease)     sees dr. Urrutia Guard ulcer ([facility] [address]     Cataract     COVID-19 2021    fatigue    Dermatitis     Diabetes mellitus ([facility]     Diabetic nephropathy ([facility] [facility]     Diabetic retinopathy ([facility] [address] [facility]     Glaucoma     Gout     Hemorrhoids     Hx of TB skin testing     positive    Hyperlipidemia     Hypertension     Long term current use of aromatase inhibitor 2022    Osteopenia of left hip 2022    Palliative care patient 2021    Type 2 diabetes mellitus ([facility] [address] 2018    Vitamin D deficiency        Past Surgical History:    Past Surgical History:   Procedure Laterality Date    BREAST LUMPECTOMY Right 10/29/2021    RIGHT LUMPECTOMY, SNB, PREOP ULTRASOUND, INTRAOP ULTRASOUND GUIDED NL, BIOSORB, PEC BLOCK, MARGIN PROBE, FLAPS, IORT performed by Stanislaw Dumont MD at [address] [place] [address]  02/15/2022    CARDIAC SURGERY      CARPAL TUNNEL RELEASE Right 9/3/2020    RIGHT CARPAL TUNNEL RELEASE performed by Silviano Rice MD at [facility] N/A 10/11/2019    Dr Justus Mcneal of a hemostatic clip-Suboptimal prep, diverticulosis, internal hemorrhoids-Grade 2, AP x 3, 1 yr recall     COLONOSCOPY N/A 10/12/2020    Dr Goff Och hemorrhoids-Grade 1-2, AP, 3 yr recall    CORONARY ARTERY BYPASS GRAFT  2001    ENDOSCOPY, COLON, DIAGNOSTIC      EYE SURGERY      MOUTH SURGERY      OTHER SURGICAL HISTORY      sternal resection    PACEMAKER PLACEMENT  02/23/2022    SKIN BIOPSY      UPPER GASTROINTESTINAL ENDOSCOPY N/A 10/11/2019    Dr Aleta Champagne (+) H Pylori    US BREAST NEEDLE BIOPSY LEFT Left 9/1/2021    US BREAST NEEDLE BIOPSY LEFT 9/1/2021 [facility] GENERAL SURGERY    VASCULAR SURGERY         Current Medications:    Current Outpatient Medications   Medication Sig Dispense Refill    metoprolol tartrate (LOPRESSOR) 50 MG tablet 2 tablets  tablet 3    furosemide (LASIX) 40 MG tablet Take 1 tablet by mouth daily as needed (excess fluid) 90 tablet 1    letrozole (FEMARA) 2.5 MG tablet Take 1 tablet by mouth daily 90 tablet 3    montelukast (SINGULAIR) 10 MG tablet Take 1 tablet by mouth daily 90 tablet 3    timolol (TIMOPTIC) 0.25 % ophthalmic solution Place 1 drop into the right eye daily       metFORMIN (GLUCOPHAGE) 500 MG tablet Take 2 tablets by mouth 2 times daily (with meals) Please hold for 48 hrs after cardiac cath. You may resume like normal on 2/18/2022. 60 tablet 3    lisinopril (PRINIVIL;ZESTRIL) 5 MG tablet Take 1 tablet by mouth daily 90 tablet 2    amLODIPine (NORVASC) 5 MG tablet TAKE 1 TABLET EVERY DAY 90 tablet 1    simvastatin (ZOCOR) 40 MG tablet Take 1 tablet by mouth nightly 90 tablet 3    glipiZIDE (GLUCOTROL XL) 2.5 MG extended release tablet Take 1 tablet by mouth daily 90 tablet 3    isosorbide mononitrate (IMDUR) 30 MG extended release tablet Take 1 tablet by mouth 2 times daily 180 tablet 3    Blood Glucose Calibration (ACCU-CHEK ROSA) SOLN Calibration done daily.  1 each 1    blood glucose test strips (ACCU-CHEK ROSA PLUS) strip TEST BLOOD SUGAR FOUR TIMES DAILY E11.9 400 strip 3    Accu-Chek Softclix Lancets MISC TEST FOUR TIMES DAILY E11.9 400 each 3    Alcohol Swabs (B-D SINGLE USE SWABS REGULAR) PADS USE THREE TIMES DAILY 100 each 11    aspirin 81 MG tablet Take 81 mg by mouth daily      Omega-3 Fatty Acids (FISH OIL) 1000 MG CAPS Take 1,000 mg by mouth 2 times daily      MULTIPLE VITAMIN PO Take 1 tablet by mouth daily      Calcium Carbonate-Vitamin D (CALCIUM 500/D PO) Take 1 tablet by mouth 2 times daily      vitamin B-12 (CYANOCOBALAMIN) 1000 MCG tablet Take 1,000 mcg by mouth daily      ferrous sulfate (IRON 325) 325 (65 Fe) MG tablet Take 1 tablet by mouth 2 times daily 60 tablet 5     No current facility-administered medications for this visit. Allergies: Allergies   Allergen Reactions    Medrol [Methylprednisolone] Other (See Comments)     Affects her eyes       Social History:    Social History     Tobacco Use    Smoking status: Never Smoker    Smokeless tobacco: Never Used   Vaping Use    Vaping Use: Never used   Substance Use Topics    Alcohol use: Yes     Alcohol/week: 1.0 standard drink     Types: 1 Shots of liquor per week     Comment: rare    Drug use: No       Family History:   Family History   Problem Relation Age of Onset    Cancer Mother     Diabetes Father     COPD Father     Diabetes Sister     Heart Disease Sister     Colon Polyps Sister     Coronary Art Dis Other         grandfather    Diabetes Other         aunt    Colon Cancer Neg Hx     Liver Cancer Neg Hx     Rectal Cancer Neg Hx        Vitals:  Vitals:    05/25/22 0902   BP: (!) 140/60   Pulse: 56   SpO2: 97%   Weight: 214 lb 1.6 oz (97.1 kg)   Height: 5' 5\" (1.651 m)        Subjective   REVIEW OF SYSTEMS:   Review of Systems   Constitutional: Negative. Negative for chills, diaphoresis and fever. HENT: Negative. Negative for congestion, ear pain, hearing loss, nosebleeds, sore throat and tinnitus. Eyes: Negative. Negative for pain, discharge and redness. Respiratory: Negative. Negative for cough, shortness of breath and wheezing. Cardiovascular: Negative. Negative for chest pain, palpitations and leg swelling. Gastrointestinal: Negative. Negative for abdominal pain, blood in stool, constipation, diarrhea, nausea and vomiting. Endocrine: Negative for polydipsia. Genitourinary: Negative for dysuria, flank pain, frequency, hematuria and urgency. Musculoskeletal: Negative. Negative for back pain, myalgias and neck pain. Skin: Negative. Negative for rash. Neurological: Negative. Negative for dizziness, tremors, seizures, weakness and headaches. Hematological: Does not bruise/bleed easily. Psychiatric/Behavioral: Negative. The patient is not nervous/anxious. Objective   PHYSICAL EXAM:  Physical Exam  Vitals reviewed. Constitutional:       General: She is not in acute distress. Appearance: She is well-developed. HENT:      Head: Normocephalic and atraumatic. Mouth/Throat:      Pharynx: Uvula midline. Tonsils: No tonsillar exudate. Eyes:      General: Lids are normal.      Conjunctiva/sclera: Conjunctivae normal.      Pupils: Pupils are equal, round, and reactive to light. Neck:      Thyroid: No thyroid mass or thyromegaly. Vascular: No JVD. Trachea: Trachea normal. No tracheal deviation. Cardiovascular:      Rate and Rhythm: Normal rate and regular rhythm. Pulses: Normal pulses. Heart sounds: Normal heart sounds. Pulmonary:      Effort: Pulmonary effort is normal. No respiratory distress. Breath sounds: Normal breath sounds. No wheezing or rales. Chest:      Chest wall: No tenderness. Abdominal:      General: Bowel sounds are normal. There is no distension. Palpations: Abdomen is soft. There is no mass. Tenderness: There is no abdominal tenderness. There is no guarding. Musculoskeletal:         General: No tenderness or deformity.       Cervical back: Normal range of motion and neck supple. Comments: Range of motion within normal limits x4 extremities   Skin:     General: Skin is warm. Findings: No bruising, erythema or rash. Neurological:      Mental Status: She is alert and oriented to person, place, and time. Cranial Nerves: No cranial nerve deficit. Coordination: Coordination normal.   Psychiatric:         Behavior: Behavior normal.         Thought Content: Thought content normal.         Labs reviewed today:  Lab Results   Component Value Date    WBC 6.46 05/25/2022    HGB 9.4 (L) 05/25/2022    HCT 30.6 (L) 05/25/2022    .6 (H) 05/25/2022     (L) 05/25/2022     Lab Results   Component Value Date    NEUTROABS 4.06 05/25/2022       ASSESSMENT/PLAN:      1. Infiltrating ductal carcinoma the right breast, ER/TX positive, Her-2 2+/equivocal, Ki67 6%, FISH negative, MammaPrint Luminal A/low risk,  [id number], 9/1/2021. Currently taking adjuvant endocrine therapy with letrozole 2.5 mg daily, anticipate dosing through September 2026. Reports compliant with letrozole and has no new breast complaints, declined breast examination today. 05/02/2022 Right mammogram- Postsurgical changes right breast. No suspicious abnormality. Benign mammogram.     Last seen in follow-up by Dr. Romain Lam on 5/2/2022, I reviewed his clinic visit documentation that suggested no new concerning findings with bilateral breast examination.     -Continue letrozole 2.5 mg daily  - Consider breast cancer index at year 5 to assess extended adjuvant endocrine therapy. - Encouraged self breast examinations  -Anticipate annual bilateral mammogram in October 2022  -Follow-up with Dr. Romain Lam as recommended    I discussed the importance of compliance with Femara/letrozole. Decreased compliance with adjuvant endocrine therapy has been linked to decreased survival. We discussed the various barriers and side effects of endocrine therapy and ways to improve compliance.   She acknowledged understanding. 2. Encounter for monitoring aromatase inhibitor therapy  -Denies any hot flashes or significant arthralgias. Reports tolerating without difficulty    3. Macrocytic anemia, denies any excessive bruising or bleeding to include melena, epistaxis, hemoptysis, hematuria or hematochezia. No known history of iron deficiency. Reports currently taking vitamin B12 supplement 1000 mcg daily    10/12/2020 Colonoscopy- polys x2-tubular adenoma; internal hemorrhoids without bleeding    The following will be completed for further evaluation:  · Iron panel  · Ferritin  · Folate  · Vitamin B12  · Sed rate  · Absolute reticulocyte count  · CRP  · Erythropoietin  · Myeloma studies  · Beta-2 glycoprotein    -Requested blood Occult Stool Screen #1; Future    Discussed possible need for iron replacement which could include IV replacement based upon levels. 4. Postmenopausal status and at risk for bone loss: 12/13/2021 Bone density- osteopenia; femoral neck T score -0.9; lumbar spine T score 1.7    Vitamin D 54.4 with a calcium of 9.2 on 5/16/2022    -Currently taking calcium 500 mg with vitamin D 500 mg twice daily  -Monitor for bone loss closely, will repeat bone mineral density study after 12/13/2023        5. Survivorship and health maintenance recommendations  Keep a healthy body weight. Dietary recommendations include limit energy intake from total fats and sugars; increase consumption of fruit and vegetables, as well as legumes, whole grains and nuts. Engage in regular physical activity (150 minutes spread through the week). Other recommendations include minimizing alcohol intake, avoid use of tobacco products. Practice sun safety: Utilize a sunscreen with an SPF of at least 27. Avoiding tanning beds. Ensure adequate amount of sleep.       Follow-up with primary care regularly and for further recommendations regarding age-appropriate screening for cancer, wellbeing visit (preventive measures), follow-up and treatment for other medical comorbidities. I discussed all of the above findings included in the assessment and plan with the patient and the patient is in agreement to move forward with current recommendations/treatment. I have addressed all of their questions and concerns that were verbalized. FOLLOW UP:  1. Follow up given for 2 months or sooner, if needed  2. Continue to follow with other medical providers as recommended  3. Labs at next visit: CBC and iron panel    EMR Dragon/Transcription disclaimer:   Much of this encounter note is an electronic transcription/translation of spoken language to printed text. The electronic translation of spoken language may permit erroneous, or at times, nonsensical words or phrases to be inadvertently transcribed; although attempts have made to review the note for such errors, some may still exist.  Please excuse any unrecognized transcription errors and contact us if the air is unintelligible or needs documented correction. Also, portions of this note have been copied forward, however, changed to reflect the most current clinical status of this patient. Electronically signed by SARA Plata on 6/7/2022 at 4:31 PM  Lexi JIMENES am pre-charting as a registered nurse for SARA Choudhary.

## 2022-05-24 ENCOUNTER — TELEPHONE (OUTPATIENT)
Dept: INTERNAL MEDICINE | Age: 72
End: 2022-05-24

## 2022-05-24 NOTE — TELEPHONE ENCOUNTER
Katie Mcneil is a nurse practioner with [facility]-she is requesting a copy of the patients last labs be faxed to her?     Ph# [phone]  Fax# [phone]

## 2022-05-25 ENCOUNTER — HOSPITAL ENCOUNTER (OUTPATIENT)
Dept: INFUSION THERAPY | Age: 72
Discharge: HOME | End: 2022-05-25
Payer: MEDICARE

## 2022-05-25 ENCOUNTER — TELEPHONE (OUTPATIENT)
Dept: HEMATOLOGY | Age: 72
End: 2022-05-25

## 2022-05-25 ENCOUNTER — OFFICE VISIT (OUTPATIENT)
Dept: HEMATOLOGY | Age: 72
End: 2022-05-25
Payer: MEDICARE

## 2022-05-25 VITALS
BODY MASS INDEX: 35.67 KG/M2 | WEIGHT: 214.1 LBS | OXYGEN SATURATION: 97 % | DIASTOLIC BLOOD PRESSURE: 60 MMHG | SYSTOLIC BLOOD PRESSURE: 140 MMHG | HEART RATE: 56 BPM | HEIGHT: 65 IN

## 2022-05-25 DIAGNOSIS — C50.811 MALIGNANT NEOPLASM OF OVERLAPPING SITES OF RIGHT BREAST IN FEMALE, ESTROGEN RECEPTOR POSITIVE (HCC): Primary | ICD-10-CM

## 2022-05-25 DIAGNOSIS — Z17.0 MALIGNANT NEOPLASM OF OVERLAPPING SITES OF RIGHT BREAST IN FEMALE, ESTROGEN RECEPTOR POSITIVE (HCC): Primary | ICD-10-CM

## 2022-05-25 DIAGNOSIS — Z17.0 MALIGNANT NEOPLASM OF OVERLAPPING SITES OF RIGHT BREAST IN FEMALE, ESTROGEN RECEPTOR POSITIVE (HCC): ICD-10-CM

## 2022-05-25 DIAGNOSIS — Z51.81 ENCOUNTER FOR MONITORING AROMATASE INHIBITOR THERAPY: ICD-10-CM

## 2022-05-25 DIAGNOSIS — C50.811 MALIGNANT NEOPLASM OF OVERLAPPING SITES OF RIGHT BREAST IN FEMALE, ESTROGEN RECEPTOR POSITIVE (HCC): ICD-10-CM

## 2022-05-25 DIAGNOSIS — D64.9 ANEMIA, UNSPECIFIED TYPE: ICD-10-CM

## 2022-05-25 DIAGNOSIS — Z79.811 ENCOUNTER FOR MONITORING AROMATASE INHIBITOR THERAPY: ICD-10-CM

## 2022-05-25 DIAGNOSIS — Z78.0 POSTMENOPAUSAL STATUS: ICD-10-CM

## 2022-05-25 LAB
BASOPHILS ABSOLUTE: 0.05 K/UL (ref 0.01–0.08)
BASOPHILS RELATIVE PERCENT: 0.8 % (ref 0.1–1.2)
C-REACTIVE PROTEIN: <0.3 MG/DL (ref 0–0.5)
EOSINOPHILS ABSOLUTE: 0.12 K/UL (ref 0.04–0.54)
EOSINOPHILS RELATIVE PERCENT: 1.9 % (ref 0.7–7)
FERRITIN: 23.8 NG/ML (ref 13–150)
FOLATE: >20 NG/ML (ref 4.8–37.3)
HCT VFR BLD CALC: 30.6 % (ref 37–47)
HCT VFR BLD CALC: 31.9 % (ref 34.1–44.9)
HEMOGLOBIN: 9.4 G/DL (ref 11.2–15.7)
IRON SATURATION: 10 % (ref 14–50)
IRON: 44 UG/DL (ref 37–145)
LYMPHOCYTES ABSOLUTE: 1.77 K/UL (ref 1.18–3.74)
LYMPHOCYTES RELATIVE PERCENT: 27.4 % (ref 19.3–53.1)
MCH RBC QN AUTO: 29.9 PG (ref 25.6–32.2)
MCHC RBC AUTO-ENTMCNC: 29.5 G/DL (ref 32.3–35.5)
MCV RBC AUTO: 101.6 FL (ref 79.4–94.8)
MONOCYTES ABSOLUTE: 0.44 K/UL (ref 0.24–0.82)
MONOCYTES RELATIVE PERCENT: 6.8 % (ref 4.7–12.5)
NEUTROPHILS ABSOLUTE: 4.06 K/UL (ref 1.56–6.13)
NEUTROPHILS RELATIVE PERCENT: 62.8 % (ref 34–71.1)
PDW BLD-RTO: 16.2 % (ref 11.7–14.4)
PLATELET # BLD: 176 K/UL (ref 182–369)
PMV BLD AUTO: 10.9 FL (ref 7.4–10.4)
RBC # BLD: 3.14 M/UL (ref 3.93–5.22)
RETICULOCYTE ABSOLUTE COUNT: 0.04 M/UL (ref 0.03–0.12)
RETICULOCYTE COUNT PCT: 1.38 % (ref 0.5–1.5)
SEDIMENTATION RATE, ERYTHROCYTE: 31 MM/HR (ref 0–25)
TOTAL IRON BINDING CAPACITY: 433 UG/DL (ref 250–400)
VITAMIN B-12: 1015 PG/ML (ref 211–946)
WBC # BLD: 6.46 K/UL (ref 3.98–10.04)

## 2022-05-25 PROCEDURE — 1123F ACP DISCUSS/DSCN MKR DOCD: CPT | Performed by: NURSE PRACTITIONER

## 2022-05-25 PROCEDURE — 99214 OFFICE O/P EST MOD 30 MIN: CPT | Performed by: NURSE PRACTITIONER

## 2022-05-25 PROCEDURE — 1036F TOBACCO NON-USER: CPT | Performed by: NURSE PRACTITIONER

## 2022-05-25 PROCEDURE — 85025 COMPLETE CBC W/AUTO DIFF WBC: CPT

## 2022-05-25 PROCEDURE — 3017F COLORECTAL CA SCREEN DOC REV: CPT | Performed by: NURSE PRACTITIONER

## 2022-05-25 PROCEDURE — G8399 PT W/DXA RESULTS DOCUMENT: HCPCS | Performed by: NURSE PRACTITIONER

## 2022-05-25 PROCEDURE — G8427 DOCREV CUR MEDS BY ELIG CLIN: HCPCS | Performed by: NURSE PRACTITIONER

## 2022-05-25 PROCEDURE — G8417 CALC BMI ABV UP PARAM F/U: HCPCS | Performed by: NURSE PRACTITIONER

## 2022-05-25 PROCEDURE — 1090F PRES/ABSN URINE INCON ASSESS: CPT | Performed by: NURSE PRACTITIONER

## 2022-05-25 PROCEDURE — 99212 OFFICE O/P EST SF 10 MIN: CPT

## 2022-05-25 RX ORDER — FERROUS SULFATE 325(65) MG
325 TABLET ORAL 2 TIMES DAILY
Qty: 60 TABLET | Refills: 5 | Status: SHIPPED | OUTPATIENT
Start: 2022-05-25 | End: 2022-07-28 | Stop reason: SDUPTHER

## 2022-05-25 NOTE — PATIENT INSTRUCTIONS
Patient Education        denosumab (Prolia)  Pronunciation: raven crow mab  Brand: Prolia  What is the most important information I should know about Prolia? This medication guide provides information about the Prolia brand of denosumab. Corky Dino is another brand of denosumab used to prevent bone fractures and otherskeletal conditions in people with tumors that have spread to the bone. Prolia can cause many serious side effects. Call your doctor at once if you have a fever, chills, pain or burning when you urinate, severe stomach pain, cough, shortness of breath, skin problems,numbness or tingling, severe or unusual pain, or skin problems. Do not use if you are pregnant. Use effective birth control while using Prolia, and for at least 5 monthsafter you stop. What is denosumab (Prolia)? Denosumab is a monoclonal antibody. Monoclonal antibodies are made to target and destroy only certain cells in the body. This may help to protect healthycells from damage. The Prolia brand of denosumab is used to treat osteoporosis or bone loss in men and women who have a high risk of bone fracture. Prolia is sometimes used in people whosebone fracture is caused by certain medicines or cancer treatments. This medication guide provides information about the Prolia brand of denosumab. Corky Dino is another brand of denosumab used to prevent bone fractures and otherskeletal conditions in people with tumors that have spread to the bone. Denosumab may also be used for purposes not listed in this medication guide. What should I discuss with my healthcare provider before receiving Prolia? You should not receive Prolia if you are allergic to denosumab, or if you have:   low levels of calcium in your blood (hypocalcemia); or   if you are pregnant. While you are using Prolia, you should not receive Xgeva, another brand of denosumab.   Tell your doctor if you have ever had:   kidney disease (or if you are on dialysis);   a weak immune system (caused by disease or by using certain medicines);   hypoparathyroidism (decreased functioning of the parathyroid glands);   thyroid surgery;   any condition that makes it hard for your body to absorb nutrients from food (malabsorption);   a latex allergy;   if you are scheduled for a dental procedure; or   if you cannot take daily calcium and vitamin D. Denosumab may cause bone loss (osteonecrosis) in the jaw. Symptoms include jaw pain or numbness, red or swollen gums, loose teeth, guminfection, or slow healing after dental work. The risk of osteonecrosis is highest in people with cancer, blood cell disorders, pre-existing dental problems, or people treated with steroids,chemotherapy, or radiation. Ask your doctor about your own risk. You may need to have a negative pregnancy test before starting this treatment. Do not use Prolia if you are pregnant. It could harm the unborn baby or cause birth defects. Use effective birth control to prevent pregnancy while you are using this medicine and for at least 5 months after your last dose. Tell your doctor right away if you becomepregnant. You should not breastfeed while using denosumab. How is Prolia given? Denosumab is injected under the skin of your stomach, upper thigh, or upperarm. A healthcare provider will give you this injection. Prolia is usually given once every 6 months. Your doctor may have you take extra calcium and vitamin D while you are being treated with denosumab. Take only the amount of calcium and vitamin D that yourdoctor has prescribed. If you need to have any dental work (especially surgery), tell the dentistahead of time that you are receiving denosumab. Pay special attention to your dental hygiene. Brush and floss your teeth regularly while receiving this medication. You may need to have a dental exambefore you begin treatment with Prolia. Follow your doctor's instructions.   Your risk of bone fractures can increase when you stop using Prolia. Do not stop using this medicine without first talking to your doctor. If you keep this medicine at home, store it in the original carton in a refrigerator. Protect from light and do not freeze. Do not shake the prefilled syringe. You may take the carton out of the refrigerator and allow it to reach roomtemperature before the injection is given. After you have taken Prolia out of the refrigerator, you may keep it at room temperature for up to 14 days. Store in the original container away from [place]. Each prefilled syringe is for one use only. Throw it away after one use, evenif there is still medicine left inside. Use a needle and syringe only once and then place them in a puncture-proof \"sharps\" container. Follow state or local laws about how to dispose of thiscontainer. Keep it out of the reach of children and pets. Do not share this medicine with another person, even if they have the same symptoms you have. What happens if I miss a dose? Call your doctor for instructions if you miss a dose or miss an appointment for your Prolia injection. You should receive your missed injection as soon aspossible. What happens if I overdose? Seek emergency medical attention or call the Poison Help line at [phone]. What should I avoid while receiving Prolia? Follow your doctor's instructions about any restrictions on food, beverages, oractivity. What are the possible side effects of Prolia? Get emergency medical help if you have signs of an allergic reaction: hives, itching, rash; difficult breathing, feeling light-headed; swelling ofyour face, lips, tongue, or throat.   Call your doctor at once if you have:   new or unusual pain in your thigh, hip, or groin;   severe pain in your joints, muscles, or bones;   skin problems such as dryness, peeling, redness, itching, blisters, bumps, oozing, or crusting; or   low calcium level --muscle spasms or contractions, numbness or tingly feeling (around your mouth, or in your fingers and toes). Serious infections may occur during treatment with Prolia. Call your doctor right away if you have signs of infection such as:   fever, chills, night sweats;   swelling, pain, tenderness, warmth, or redness anywhere on your body;   pain or burning when you urinate;   increased or urgent need to urinate;   severe stomach pain; or   cough, wheezing, feeling short of breath. Common side effects may include:   bladder infection (painful or difficult urination);   lung infection (cough, shortness of breath);   headache;   back pain, muscle pain, joint pain;   increased blood pressure;   cold symptoms such as stuffy nose, sneezing, sore throat;   high cholesterol; or   pain in your arms or legs. This is not a complete list of side effects and others may occur. Call your doctor for medical advice about side effects. You may report side effects toFDA at [phone]. What other drugs will affect Prolia? Other drugs may affect Prolia, including prescription and over-the-counter medicines, vitamins, and herbal products. Tell your doctor about all yourcurrent medicines and any medicine you start or stop using. Where can I get more information? Your doctor or pharmacist can provide more information about denosumab (Prolia). Remember, keep this and all other medicines out of the reach of children, never share your medicines with others, and use this medication only for the indication prescribed. Every effort has been made to ensure that the information provided by [address] is accurate, up-to-date, and complete, but no guarantee is made to that effect. Drug information contained herein may be time sensitive.  [employer] information has been compiled for use by healthcare practitioners and consumers in the [place] and therefore [employer] does not warrant that uses outside of the [place] are appropriate, unless specifically indicated otherwise. [facility] drug information does not endorse drugs, diagnose patients or recommend therapy. [facility] drug information is an informational resource designed to assist licensed healthcare practitioners in caring for their patients and/or to serve consumers viewing this service as a supplement to, and not a substitute for, the expertise, skill, knowledge and judgment of healthcare practitioners. The absence of a warning for a given drug or drug combination in no way should be construed to indicate that the drug or drug combination is safe, effective or appropriate for any given patient. [facility] does not assume any responsibility for any aspect of healthcare administered with the aid of information [facility] provides. The information contained herein is not intended to cover all possible uses, directions, precautions, warnings, drug interactions, allergic reactions, or adverse effects. If you have questions about the drugs you are taking, check with yourdoctor, nurse or pharmacist.  Copyright 3279-7242 [address]: 12.01. Revision date:2/18/2020. Care instructions adapted under license by [facility] ([facility]). If you have questions about a medical condition or this instruction, always ask your healthcare professional. [place] [address] any warranty or liability for your use of this information.

## 2022-05-25 NOTE — TELEPHONE ENCOUNTER
----- Message from SARA Galvez sent at 5/25/2022 12:40 PM CDT -----  Please call and discuss the need for IV iron replacementPlease build supportive plan for Venofer for a total of 1000 mg IV, given over 3 doses.   Please contact patient once insurance approval has been obtained and appointments have been arranged

## 2022-05-25 NOTE — TELEPHONE ENCOUNTER
Called patient and reviewed labs. Iron saturation is 10%. Instructed that SARA Irvin would like for her to get 3 doses of IV iron. Patient states she would like to try oral iron first due to financial reasons. Talked to SARA Irvin and she would like for her to start Ferrous Sulfate 325mg BID. Instructed on medication, dosage, frequency, and side effects. Instructed to call with any problems. Patient v/u and is agreeable to plan.

## 2022-05-25 NOTE — Clinical Note
PLease build supportive plan for Prolia 60 mg sq every 6 months and then change appointment to correlate with 2nd Prolia dose.

## 2022-05-26 LAB — ERYTHROPOIETIN: 86 MU/ML (ref 4–27)

## 2022-05-27 DIAGNOSIS — M85.852 OSTEOPENIA OF LEFT HIP: Primary | ICD-10-CM

## 2022-05-27 DIAGNOSIS — Z79.811 LONG TERM CURRENT USE OF AROMATASE INHIBITOR: ICD-10-CM

## 2022-05-27 LAB
BETA-2 GLYCOPROTEIN 1 IGG ANTIBODY: <10 SGU
BETA-2 GLYCOPROTEIN 1 IGM ANTIBODY: <10 SMU

## 2022-05-27 RX ORDER — ACETAMINOPHEN 325 MG/1
650 TABLET ORAL
OUTPATIENT
Start: 2022-06-07

## 2022-05-27 RX ORDER — FAMOTIDINE 10 MG/ML
20 INJECTION, SOLUTION INTRAVENOUS
OUTPATIENT
Start: 2022-06-07

## 2022-05-27 RX ORDER — SODIUM CHLORIDE 9 MG/ML
INJECTION, SOLUTION INTRAVENOUS CONTINUOUS
OUTPATIENT
Start: 2022-06-07

## 2022-05-27 RX ORDER — ALBUTEROL SULFATE 90 UG/1
4 AEROSOL, METERED RESPIRATORY (INHALATION) PRN
OUTPATIENT
Start: 2022-06-07

## 2022-05-27 RX ORDER — ONDANSETRON 2 MG/ML
8 INJECTION INTRAMUSCULAR; INTRAVENOUS
OUTPATIENT
Start: 2022-06-07

## 2022-05-27 RX ORDER — DIPHENHYDRAMINE HYDROCHLORIDE 50 MG/ML
50 INJECTION INTRAMUSCULAR; INTRAVENOUS
OUTPATIENT
Start: 2022-06-07

## 2022-05-27 RX ORDER — EPINEPHRINE 1 MG/ML
0.3 INJECTION, SOLUTION, CONCENTRATE INTRAVENOUS PRN
OUTPATIENT
Start: 2022-06-07

## 2022-05-28 LAB
+IMM: ABNORMAL
ALBUMIN SERPL-MCNC: 3.58 G/DL (ref 3.75–5.01)
ALPHA-1-GLOBULIN: 0.45 G/DL (ref 0.19–0.46)
ALPHA-2-GLOBULIN: 0.9 G/DL (ref 0.48–1.05)
BETA GLOBULIN: 0.92 G/DL (ref 0.48–1.1)
GAMMA GLOBULIN: 1.16 G/DL (ref 0.62–1.51)
IGA: 182 MG/DL (ref 68–408)
IGG: 1196 MG/DL (ref 768–1632)
IGM: 50 MG/DL (ref 35–263)
KAPPA FREE LIGHT CHAINS QNT: 107.94 MG/L (ref 3.3–19.4)
KAPPA/LAMBDA FREE LIGHT CHAIN RATIO: 1.36 (ref 0.26–1.65)
LAMBDA FREE LIGHT CHAINS QNT: 79.33 MG/L (ref 5.71–26.3)
SPE/IFE INTERPRETATION: ABNORMAL
TOTAL PROTEIN: 7 G/DL (ref 6.3–8.2)

## 2022-06-07 ENCOUNTER — TELEPHONE (OUTPATIENT)
Dept: INFUSION THERAPY | Age: 72
End: 2022-06-07

## 2022-06-07 ASSESSMENT — ENCOUNTER SYMPTOMS
EYE PAIN: 0
EYE DISCHARGE: 0
CONSTIPATION: 0
ABDOMINAL PAIN: 0
BACK PAIN: 0
SHORTNESS OF BREATH: 0
VOMITING: 0
NAUSEA: 0
WHEEZING: 0
EYE REDNESS: 0
COUGH: 0
EYES NEGATIVE: 1
SORE THROAT: 0
DIARRHEA: 0
GASTROINTESTINAL NEGATIVE: 1
BLOOD IN STOOL: 0
RESPIRATORY NEGATIVE: 1

## 2022-06-07 NOTE — TELEPHONE ENCOUNTER
Grady Cross called to cancel her appointment today for her Prolia injection because it is too expensive. She would like to talk to Janie about other options. I left a message with Janie Rob's nurse to return her call.      [facility], [address] [place] [address] Medical Oncology and Hematology  [phone]

## 2022-06-10 ENCOUNTER — TELEPHONE (OUTPATIENT)
Dept: HEMATOLOGY | Age: 72
End: 2022-06-10

## 2022-06-10 DIAGNOSIS — M85.80 OSTEOPENIA, UNSPECIFIED LOCATION: Primary | ICD-10-CM

## 2022-06-10 RX ORDER — ALENDRONATE SODIUM 70 MG/1
70 TABLET ORAL
Qty: 4 TABLET | Refills: 3 | Status: SHIPPED | OUTPATIENT
Start: 2022-06-10 | End: 2022-07-28 | Stop reason: SDUPTHER

## 2022-06-10 NOTE — TELEPHONE ENCOUNTER
Called patient regarding Prolia injection. Patient states that she could not afford her part of the injection and would like to be prescribed something different. Talked to SARA Hinojosa and she would like for her to start Fosamax 70mg PO weekly. Instructed on medication, dosage, and frequency. Instructed to call if she cannot tolerate medication. Patient v/u and is agreeable to plan.

## 2022-07-07 ENCOUNTER — OFFICE VISIT (OUTPATIENT)
Dept: CARDIOLOGY CLINIC | Age: 72
End: 2022-07-07
Payer: COMMERCIAL

## 2022-07-07 VITALS
HEIGHT: 65 IN | DIASTOLIC BLOOD PRESSURE: 58 MMHG | HEART RATE: 58 BPM | SYSTOLIC BLOOD PRESSURE: 118 MMHG | BODY MASS INDEX: 34.82 KG/M2 | WEIGHT: 209 LBS

## 2022-07-07 DIAGNOSIS — I25.5 ISCHEMIC CARDIOMYOPATHY: Primary | ICD-10-CM

## 2022-07-07 DIAGNOSIS — Z95.810 ICD (IMPLANTABLE CARDIOVERTER-DEFIBRILLATOR), BIVENTRICULAR, IN SITU: ICD-10-CM

## 2022-07-07 DIAGNOSIS — Z95.810 BIVENTRICULAR ICD (IMPLANTABLE CARDIOVERTER-DEFIBRILLATOR) IN PLACE: ICD-10-CM

## 2022-07-07 DIAGNOSIS — E78.2 MIXED HYPERLIPIDEMIA: ICD-10-CM

## 2022-07-07 DIAGNOSIS — I10 PRIMARY HYPERTENSION: ICD-10-CM

## 2022-07-07 DIAGNOSIS — Z95.1 STATUS POST AORTO-CORONARY ARTERY BYPASS GRAFT: ICD-10-CM

## 2022-07-07 DIAGNOSIS — I25.10 CORONARY ARTERY DISEASE INVOLVING NATIVE CORONARY ARTERY OF NATIVE HEART WITHOUT ANGINA PECTORIS: ICD-10-CM

## 2022-07-07 DIAGNOSIS — I50.42 CHRONIC COMBINED SYSTOLIC (CONGESTIVE) AND DIASTOLIC (CONGESTIVE) HEART FAILURE (HCC): ICD-10-CM

## 2022-07-07 PROCEDURE — 99213 OFFICE O/P EST LOW 20 MIN: CPT | Performed by: INTERNAL MEDICINE

## 2022-07-07 PROCEDURE — 1123F ACP DISCUSS/DSCN MKR DOCD: CPT | Performed by: INTERNAL MEDICINE

## 2022-07-07 PROCEDURE — 93284 PRGRMG EVAL IMPLANTABLE DFB: CPT | Performed by: INTERNAL MEDICINE

## 2022-07-07 RX ORDER — FUROSEMIDE 40 MG/1
40 TABLET ORAL DAILY
Qty: 90 TABLET | Refills: 3 | Status: SHIPPED | OUTPATIENT
Start: 2022-07-07 | End: 2022-08-11 | Stop reason: SDUPTHER

## 2022-07-07 ASSESSMENT — ENCOUNTER SYMPTOMS
GASTROINTESTINAL NEGATIVE: 1
RESPIRATORY NEGATIVE: 1
NAUSEA: 0
DIARRHEA: 0
VOMITING: 0
EYES NEGATIVE: 1
SHORTNESS OF BREATH: 0

## 2022-07-07 NOTE — PROGRESS NOTES
[facility] Progress Note                            Date:  7/7/2022  Patient: Alex Cassidy  Age:  74 y.o., 1950      Reason for evaluation:         SUBJECTIVE:    Returns today follow-up assessment coronary artery disease ischemic cardiomyopathy previous CABG hyperlipidemia hypertension biventricular ICD chronic congestive heart failure systolic and diastolic overall doing reasonably well. Dyspnea stable denies anginal chest pain denies ICD shocks. Device interrogated today functioning appropriately. Pressure 118/58 heart 58. Review of Systems   Constitutional: Negative. Negative for chills, fever and unexpected weight change. HENT: Negative. Eyes: Negative. Respiratory: Negative. Negative for shortness of breath. Cardiovascular: Negative. Negative for chest pain. Gastrointestinal: Negative. Negative for diarrhea, nausea and vomiting. Endocrine: Negative. Genitourinary: Negative. Musculoskeletal: Negative. Skin: Negative. Neurological: Negative. All other systems reviewed and are negative. OBJECTIVE:     BP (!) 118/58   Pulse 58   Ht 5' 5\" (1.651 m)   Wt 209 lb (94.8 kg)   BMI 34.78 kg/m²     Labs:   CBC: No results for input(s): WBC, HGB, HCT, PLT in the last 72 hours. BMP:No results for input(s): NA, K, CO2, BUN, CREATININE, LABGLOM, GLUCOSE in the last 72 hours. BNP: No results for input(s): BNP in the last 72 hours. PT/INR: No results for input(s): PROTIME, INR in the last 72 hours. APTT:No results for input(s): APTT in the last 72 hours. CARDIAC ENZYMES:No results for input(s): CKTOTAL, CKMB, CKMBINDEX, TROPONINI in the last 72 hours. FASTING LIPID PANEL:  Lab Results   Component Value Date/Time    HDL 50 05/16/2022 09:06 AM    LDLCALC 40 05/16/2022 09:06 AM    TRIG 81 05/16/2022 09:06 AM     LIVER PROFILE:No results for input(s): AST, ALT, LABALBU in the last 72 hours.         Past Medical History:   Diagnosis Date    Acute sinusitis     CHAPARRO [facility]acute kidney [facility]) ([facility] 05/18/2018    Anemia     Bleeding ulcer     hx of; cauterized by dr. resendiz    Breast cancer [facility])     Breast lump     CAD (coronary artery disease)     sees dr. Thomas Lax ulcer ([facility]     Cataract     COVID-19 01/2021    fatigue    Dermatitis     Diabetes mellitus ([facility]     Diabetic nephropathy ([facility]     [facility]     Glaucoma     Gout     Hemorrhoids     Hx of TB skin testing     positive    Hyperlipidemia     Hypertension     Long term current use of aromatase inhibitor 5/27/2022    Osteopenia of left hip 5/27/2022    Palliative care patient 06/24/2021    Type 2 diabetes mellitus ([facility] 05/18/2018    Vitamin D deficiency      Past Surgical History:   Procedure Laterality Date    BREAST LUMPECTOMY Right 10/29/2021    RIGHT LUMPECTOMY, SNB, PREOP ULTRASOUND, INTRAOP ULTRASOUND GUIDED NL, BIOSORB, PEC BLOCK, MARGIN PROBE, FLAPS, IORT performed by Clinton Huerta MD at [place] [address]  02/15/2022   [address]  2001    CARPAL TUNNEL RELEASE Right 9/3/2020    RIGHT CARPAL TUNNEL RELEASE performed by Rom[facility]aldivar [facility] at [facility]      COLONOSCOPY N/A 10/11/2019    Dr Lorie Lim of a hemostatic clip-Suboptimal prep, diverticulosis, internal hemorrhoids-Grade 2, AP x 3, 1 yr recall     COLONOSCOPY N/A 10/12/2020    Dr Jake Vasquez hemorrhoids-Grade 1-2, AP, 3 yr recall    CORONARY ARTERY BYPASS GRAFT  2001    ENDOSCOPY, COLON, DIAGNOSTIC      EYE SURGERY      MOUTH SURGERY      OTHER SURGICAL HISTORY      sternal resection    PACEMAKER PLACEMENT  02/23/2022    SKIN BIOPSY      UPPER GASTROINTESTINAL ENDOSCOPY N/A 10/11/2019    Dr Fuller Pale (+) H Pylori    US BREAST NEEDLE BIOPSY LEFT Left 9/1/2021    US BREAST NEEDLE BIOPSY LEFT 9/1/2021 LPS GENERAL SURGERY    VASCULAR SURGERY       Family History   Problem Relation Age of Onset   Carmine Linda Cancer Mother    Carmine Linda Diabetes Father    Carmine Linda COPD Father     Diabetes Sister     Heart Disease Sister     Colon Polyps Sister     Coronary Art Dis Other         grandfather    Diabetes Other         aunt    Colon Cancer Neg Hx     Liver Cancer Neg Hx     Rectal Cancer Neg Hx      Allergies   Allergen Reactions    Medrol [Methylprednisolone] Other (See Comments)     Affects her eyes     Current Outpatient Medications   Medication Sig Dispense Refill    furosemide (LASIX) 40 MG tablet Take 1 tablet by mouth daily 90 tablet 3    alendronate (FOSAMAX) 70 MG tablet Take 1 tablet by mouth every 7 days Take with a full glass of water upon arising for the day. Consume on an empty stomach at least 30 minutes before the first food, beverage, or medication. Stay upright (do not lie down) for at least 30 minutes. Do not crush or break. 4 tablet 3    ferrous sulfate (IRON 325) 325 (65 Fe) MG tablet Take 1 tablet by mouth 2 times daily 60 tablet 5    metoprolol tartrate (LOPRESSOR) 50 MG tablet 2 tablets  tablet 3    letrozole (FEMARA) 2.5 MG tablet Take 1 tablet by mouth daily 90 tablet 3    montelukast (SINGULAIR) 10 MG tablet Take 1 tablet by mouth daily 90 tablet 3    timolol (TIMOPTIC) 0.25 % ophthalmic solution Place 1 drop into the right eye daily       metFORMIN (GLUCOPHAGE) 500 MG tablet Take 2 tablets by mouth 2 times daily (with meals) Please hold for 48 hrs after cardiac cath.  You may resume like normal on 2/18/2022. 60 tablet 3    lisinopril (PRINIVIL;ZESTRIL) 5 MG tablet Take 1 tablet by mouth daily 90 tablet 2    amLODIPine (NORVASC) 5 MG tablet TAKE 1 TABLET EVERY DAY 90 tablet 1    simvastatin (ZOCOR) 40 MG tablet Take 1 tablet by mouth nightly 90 tablet 3    glipiZIDE (GLUCOTROL XL) 2.5 MG extended release tablet Take 1 tablet by mouth daily 90 tablet 3    isosorbide mononitrate (IMDUR) 30 MG extended release tablet Take 1 tablet by mouth 2 times daily 180 tablet 3    Blood Glucose Calibration (ACCU-CHEK ROSA) SOLN Calibration done daily. 1 each 1    blood glucose test strips (ACCU-CHEK ROSA PLUS) strip TEST BLOOD SUGAR FOUR TIMES DAILY E11.9 400 strip 3    Accu-Chek Softclix Lancets MISC TEST FOUR TIMES DAILY E11.9 400 each 3    Alcohol Swabs (B-D SINGLE USE SWABS REGULAR) PADS USE THREE TIMES DAILY 100 each 11    aspirin 81 MG tablet Take 81 mg by mouth daily      Omega-3 Fatty Acids (FISH OIL) 1000 MG CAPS Take 1,000 mg by mouth 2 times daily      MULTIPLE VITAMIN PO Take 1 tablet by mouth daily      Calcium Carbonate-Vitamin D (CALCIUM 500/D PO) Take 1 tablet by mouth 2 times daily      vitamin B-12 (CYANOCOBALAMIN) 1000 MCG tablet Take 1,000 mcg by mouth daily       No current facility-administered medications for this visit. Social History     Socioeconomic History    Marital status: Single     Spouse name: Not on file    Number of children: Not on file    Years of education: Not on file    Highest education level: Not on file   Occupational History     Employer: [employer]   Tobacco Use    Smoking status: Never Smoker    Smokeless tobacco: Never Used   Vaping Use    Vaping Use: Never used   Substance and Sexual Activity    Alcohol use: Yes     Alcohol/week: 1.0 standard drink     Types: 1 Shots of liquor per week     Comment: rare    Drug use: No    Sexual activity: Never   Other Topics Concern    Not on file   Social History Narrative    Not on file     Social Determinants of Health     Financial Resource Strain: Low Risk     Difficulty of Paying Living Expenses: Not hard at all   Food Insecurity: No Food Insecurity    Worried About Running Out of Food in the Last Year: Never true    [address] in the Last Year: Never true   Transportation Needs:     Lack of Transportation (Medical): Not on file    Lack of Transportation (Non-Medical):  Not on file   Physical Activity:     Days of Exercise per Week: Not on file    Minutes of Exercise per Session: Not on file   Stress:     Feeling of Stress : Not on file   Social Connections:     Frequency of Communication with Friends and Family: Not on file    Frequency of Social Gatherings with Friends and Family: Not on file    Attends Hinduism Services: Not on file    Active Member of [address] [place] [address] or Organizations: Not on file    Attends Club or Organization Meetings: Not on file    Marital Status: Not on file   Intimate Partner Violence:     Fear of Current or Ex-Partner: Not on file    Emotionally Abused: Not on file    Physically Abused: Not on file    Sexually Abused: Not on file   Housing Stability:     Unable to Pay for Housing in the Last Year: Not on file    Number of [place] in the Last Year: Not on file    Unstable Housing in the Last Year: Not on file       Physical Examination:  BP (!) 118/58   Pulse 58   Ht 5' 5\" (1.651 m)   Wt 209 lb (94.8 kg)   BMI 34.78 kg/m²   Physical Exam  Vitals reviewed. Constitutional:       Appearance: She is well-developed. Neck:      Vascular: No carotid bruit or JVD. Cardiovascular:      Rate and Rhythm: Normal rate and regular rhythm. Heart sounds: Normal heart sounds. No murmur heard. No friction rub. No gallop. Pulmonary:      Effort: Pulmonary effort is normal. No respiratory distress. Breath sounds: Normal breath sounds. No wheezing or rales. Abdominal:      General: There is no distension. Tenderness: There is no abdominal tenderness. Lymphadenopathy:      Cervical: No cervical adenopathy. Skin:     General: Skin is warm and dry. ASSESSMENT:     Diagnosis Orders   1. Ischemic cardiomyopathy     2. Primary hypertension     3. Coronary artery disease involving native coronary artery of native heart without angina pectoris     4. Mixed hyperlipidemia     5. Biventricular ICD (implantable cardioverter-defibrillator) in place     6. Status post aorto-coronary artery bypass graft     7.  Chronic combined systolic (congestive) and diastolic (congestive) heart failure (HCC)         PLAN:  No orders of the defined types were placed in this encounter. Orders Placed This Encounter   Medications    furosemide (LASIX) 40 MG tablet     Sig: Take 1 tablet by mouth daily     Dispense:  90 tablet     Refill:  3         1. Continue present medications  2. Recommend follow-up assessment in 3 months    Return in about 3 months (around 10/7/2022) for return to Dr. Marcus Rutledge only. Benny Angulo MD 7/7/2022 11:01 AM CDT    [id number] [facility]      Thisdictation was generated by voice recognition computer software. Although all attempts are made to edit the dictation for accuracy, there may be errors in the transcription that are not intended.

## 2022-07-13 RX ORDER — AMLODIPINE BESYLATE 5 MG/1
TABLET ORAL
Qty: 90 TABLET | Refills: 1 | Status: SHIPPED | OUTPATIENT
Start: 2022-07-13

## 2022-07-13 RX ORDER — LANCETS
EACH MISCELLANEOUS
Qty: 400 EACH | Refills: 3 | Status: SHIPPED | OUTPATIENT
Start: 2022-07-13

## 2022-07-13 RX ORDER — BLOOD SUGAR DIAGNOSTIC
STRIP MISCELLANEOUS
Qty: 400 STRIP | Refills: 3 | Status: SHIPPED | OUTPATIENT
Start: 2022-07-13

## 2022-07-27 NOTE — PROGRESS NOTES
Progress Note      Pt Name: Shavon Gabo: 1950  MRN: [id number]    Date of evaluation: 07/28/2022  History Obtained From:  patient, electronic medical record    CHIEF COMPLAINT:    Chief Complaint   Patient presents with    Follow-up     Malignant neoplasm of overlapping sites of right breast in female, estrogen receptor positive ([address]    Anemia    Discuss Labs    Medication Refill     Iron and Fosamax     HISTORY OF PRESENT ILLNESS:    Govind Tatum is a 74 y.o.  female who is currently being followed for infiltrating ductal carcinoma the right breast, ER/NM positive, [id number], 9/1/2021 and iron deficiency anemia. She is status post right lumpectomy with IORT on 10/29/2021 and currently taking adjuvant endocrine therapy with letrozole 2.5 mg p.o. daily for recommended 5 years. She is currently taking ferrous sulfate 325 mg twice daily along with stool softener. Wale Body returns today in scheduled follow-up for evaluation, lab monitoring and further treatment recommendations. Today's clinic visit to include physical assessment, review of systems, any lab or radiographic findings that were available and plan of care are documented below. ONCOLOGIC HISTORY:     Diagnosis  Infiltrating ductal carcinoma, right breast, Sept 2021  ER 96%, NM 94%, Her-2 2+/equivocal, Ki67 6%, FISH negative  MammaPrint Luminal A/low risk  [id number]  Iron deficiency anemia     Treatment Summary  9/20/21-adjuvant endocrine hormone therapy with Letrozole 2.5mg daily. Will continue for 5 years  10/29/21-right lumpectomy/sentinel lymph node biopsy  10/29/2021-IORT 2000 cGy  5/25/2022 -initiated ferrous sulfate 325 mg twice daily     Yady Grewal was first seen by Dr Nathen Larsen on 11/24/2021. She was referred by Dr. Chi Rosenthal for a diagnosis of right breast cancer. She is a retired RN. She noticed a mass in the right breast several months prior to her diagnosis.     8/17/21 US right & left breast: 2.5 cm lower right breast mass at 6:00, highly suspicious for breast malignancy. In the left breast there is a 0.6 cm benign-appearing circumscribed hypoechoic solid mass at the 2:00 position. Primary considerations would include intramammary lymph node versus fibroadenoma. Typical recommendation would be a 6 month follow-up ultrasound to ensure stability although biopsy could be considered given what appears to be a contralateral malignancy. No suspicious axillary adenopathy seen. 8/17/21 Bilateral diagnostic mammogram: There is a 2.5 cm irregularly-shaped, lobulated high density mass in the lower right breast near 5:00. Overlying skin marker. No other mass is identified in the right breast. There is also a 0.8 cm oval circumscribed equal density mass in the upper outer left breast near 2:00, mid depth. There are a few scattered bilateral benign calcifications. At the 6:00 position in the right breast, approximately 1 cm from the nipple, there is a 2.5 x 2.0 x 2.5 cm hypoechoic lobulated solid mass. 9/1/21 Breast, right breast needle core biopsies at 6 o'clock position: Infiltrating ductal carcinoma, no special type, favor grade 3. Infiltrating carcinoma measures 1.5 cm in greatest linear dimension and is present in multiple cores. Breast, left breast needle core biopsies at 2 o'clock position: Benign spindle cell nodule, consistent with partially hyalinized schwannoma. ER 96%, MA 94%, Her-2 2+/equivocal, Ki67 6%, FISH negative, MammaPrint Luminal A/low risk. 9/20/21-Initiated endocrine hormone therapy with Letrozole 2.5mg daily  10/29/21-she underwent a right simple mastectomy by Dr. Madiha Mixon at [facility]: Infiltrating ductal carcinoma, no special type, grade 2. Benign breast parenchyma. Lymph node, right sentinel lymph node biopsy: 2 lymph nodes, negative for evidence of malignancy. AJCC STAGE: pT2, (sn)pN0, pMx.  Ki-67 staining fraction is low and estimated at 2% which is decreased from the original percentage of 6%. 10/29/2021-IORT 2000 Gy by Dr Luciana Nelson. 2021-she was first seen by Manisha Carpenter. Discussed NCCN guidelines recommendations. Recommended Oncotype DX to assess need for chemotherapy. The patient said she would decline adjuvant chemotherapy regardless of results of Oncotype DX. Therefore, Oncotype DX was not sent. No recommendation for radiation due to right mastectomy with clear margins and negative nodes. Continue letrozole x5 years. 2022 Right mammogram- Postsurgical changes right breast. No suspicious abnormality.  Benign mammogram.     Age-appropriate health screenin2021 Bone density- osteopenia; femoral neck T score -0.9; lumbar spine T score 1.7  10/12/2020 Colonoscopy- polys x2-tubular adenoma; internal hemorrhoids without bleeding    Past Medical History:    Past Medical History:   Diagnosis Date    Acute sinusitis     CHAPARRO (acute kidney injury) ([address] 2018    Anemia     Bleeding ulcer     hx of; cauterized by dr. resendiz    Breast cancer [facility])     Breast lump     CAD (coronary artery disease)     sees dr. Justen Cagle ulcer [facility])     Cataract     COVID-19 2021    fatigue    Dermatitis     Diabetes mellitus ([address]     Diabetic nephropathy ([address]     Diabetic retinopathy ([address]     Glaucoma     Gout     Hemorrhoids     Hx of TB skin testing     positive    Hyperlipidemia     Hypertension     Long term current use of aromatase inhibitor 2022    Osteopenia of left hip 2022    Palliative care patient 2021    Type 2 diabetes mellitus ([address] 2018    Vitamin D deficiency        Past Surgical History:    Past Surgical History:   Procedure Laterality Date    BREAST LUMPECTOMY Right 10/29/2021    RIGHT LUMPECTOMY, SNB, PREOP ULTRASOUND, INTRAOP ULTRASOUND GUIDED NL, BIOSORB, PEC BLOCK, MARGIN PROBE, FLAPS, IORT performed by Yudith Driver MD at [place] [address]  02/15/2022    CARDIAC SURGERY      CARPAL TUNNEL RELEASE Right 9/3/2020    RIGHT CARPAL TUNNEL RELEASE performed by Xiomy Savage MD at [facility] N/A 10/11/2019    Dr Walsh Flatness of a hemostatic clip-Suboptimal prep, diverticulosis, internal hemorrhoids-Grade 2, AP x 3, 1 yr recall     COLONOSCOPY N/A 10/12/2020    Dr Mika Wise hemorrhoids-Grade 1-2, AP, 3 yr recall    CORONARY ARTERY BYPASS GRAFT  2001    ENDOSCOPY, COLON, DIAGNOSTIC      EYE SURGERY      MOUTH SURGERY      OTHER SURGICAL HISTORY      sternal resection    PACEMAKER PLACEMENT  02/23/2022    SKIN BIOPSY      UPPER GASTROINTESTINAL ENDOSCOPY N/A 10/11/2019    Dr Shelley Wilkins (+) H Pylori    US BREAST NEEDLE BIOPSY LEFT Left 9/1/2021    US BREAST NEEDLE BIOPSY LEFT 9/1/2021 [facility] GENERAL SURGERY    VASCULAR SURGERY         Current Medications:    Current Outpatient Medications   Medication Sig Dispense Refill    alendronate (FOSAMAX) 70 MG tablet Take 1 tablet by mouth every 7 days Take with a full glass of water upon arising for the day. Consume on an empty stomach at least 30 minutes before the first food, beverage, or medication. Stay upright (do not lie down) for at least 30 minutes. Do not crush or break. 12 tablet 3    ferrous sulfate (IRON 325) 325 (65 Fe) MG tablet Take 1 tablet by mouth in the morning and 1 tablet before bedtime.  180 tablet 3    Accu-Chek Softclix Lancets MISC TEST FOUR TIMES DAILY 400 each 3    blood glucose test strips (ACCU-CHEK GUIDE) strip TEST BLOOD SUGAR FOUR TIMES DAILY 400 strip 3    metFORMIN (GLUCOPHAGE) 500 MG tablet TAKE 2 TABLETS TWICE DAILY WITH MEALS 360 tablet 1    amLODIPine (NORVASC) 5 MG tablet TAKE 1 TABLET EVERY DAY 90 tablet 1    furosemide (LASIX) 40 MG tablet Take 1 tablet by mouth daily 90 tablet 3    metoprolol tartrate (LOPRESSOR) 50 MG tablet 2 tablets  tablet 3    letrozole (FEMARA) 2.5 MG tablet Take 1 tablet by mouth daily 90 tablet 3 montelukast (SINGULAIR) 10 MG tablet Take 1 tablet by mouth daily 90 tablet 3    timolol (TIMOPTIC) 0.25 % ophthalmic solution Place 1 drop into the right eye daily       lisinopril (PRINIVIL;ZESTRIL) 5 MG tablet Take 1 tablet by mouth daily 90 tablet 2    simvastatin (ZOCOR) 40 MG tablet Take 1 tablet by mouth nightly 90 tablet 3    glipiZIDE (GLUCOTROL XL) 2.5 MG extended release tablet Take 1 tablet by mouth daily 90 tablet 3    isosorbide mononitrate (IMDUR) 30 MG extended release tablet Take 1 tablet by mouth 2 times daily 180 tablet 3    Blood Glucose Calibration (ACCU-CHEK ROSA) SOLN Calibration done daily. 1 each 1    Alcohol Swabs (B-D SINGLE USE SWABS REGULAR) PADS USE THREE TIMES DAILY 100 each 11    aspirin 81 MG tablet Take 81 mg by mouth daily      Omega-3 Fatty Acids (FISH OIL) 1000 MG CAPS Take 1,000 mg by mouth 2 times daily      MULTIPLE VITAMIN PO Take 1 tablet by mouth daily      Calcium Carbonate-Vitamin D (CALCIUM 500/D PO) Take 1 tablet by mouth 2 times daily      vitamin B-12 (CYANOCOBALAMIN) 1000 MCG tablet Take 1,000 mcg by mouth daily       No current facility-administered medications for this visit. Allergies: Allergies   Allergen Reactions    Medrol [Methylprednisolone] Other (See Comments)     Affects her eyes       Social History:    Social History     Tobacco Use    Smoking status: Never    Smokeless tobacco: Never   Vaping Use    Vaping Use: Never used   Substance Use Topics    Alcohol use:  Yes     Alcohol/week: 1.0 standard drink     Types: 1 Shots of liquor per week     Comment: rare    Drug use: No       Family History:   Family History   Problem Relation Age of Onset    Cancer Mother     Diabetes Father     COPD Father     Diabetes Sister     Heart Disease Sister     Colon Polyps Sister     Coronary Art Dis Other         grandfather    Diabetes Other         aunt    Colon Cancer Neg Hx     Liver Cancer Neg Hx     Rectal Cancer Neg Hx        Vitals:  Vitals:    07/28/22 1118   BP: 120/60   Pulse: 80   SpO2: 95%   Weight: 210 lb (95.3 kg)   Height: 5' 5\" (1.651 m)        Subjective   REVIEW OF SYSTEMS:   Review of Systems   Constitutional:  Positive for fatigue. Negative for chills, diaphoresis and fever. HENT: Negative. Negative for congestion, ear pain, hearing loss, nosebleeds, sore throat and tinnitus. Eyes: Negative. Negative for pain, discharge and redness. Respiratory:  Positive for shortness of breath (with exertion). Negative for cough and wheezing. Cardiovascular: Negative. Negative for chest pain, palpitations and leg swelling. Gastrointestinal: Negative. Negative for abdominal pain, blood in stool, constipation, diarrhea, nausea and vomiting. Endocrine: Negative for polydipsia. Genitourinary:  Negative for dysuria, flank pain, frequency, hematuria and urgency. Musculoskeletal: Negative. Negative for back pain, myalgias and neck pain. Skin: Negative. Negative for rash. Neurological: Negative. Negative for dizziness, tremors, seizures, weakness and headaches. Hematological:  Does not bruise/bleed easily. Psychiatric/Behavioral: Negative. The patient is not nervous/anxious. Objective   PHYSICAL EXAM:  Physical Exam  Vitals reviewed. Constitutional:       General: She is not in acute distress. Appearance: She is well-developed. HENT:      Head: Normocephalic and atraumatic. Mouth/Throat:      Pharynx: Uvula midline. Tonsils: No tonsillar exudate. Eyes:      General: Lids are normal.      Conjunctiva/sclera: Conjunctivae normal.      Pupils: Pupils are equal, round, and reactive to light. Neck:      Thyroid: No thyroid mass or thyromegaly. Vascular: No JVD. Trachea: Trachea normal. No tracheal deviation. Cardiovascular:      Rate and Rhythm: Normal rate and regular rhythm. Pulses: Normal pulses. Heart sounds: Normal heart sounds.    Pulmonary:      Effort: Pulmonary effort is normal. No respiratory distress. Breath sounds: Normal breath sounds. No wheezing or rales. Chest:      Chest wall: No tenderness. Abdominal:      General: Bowel sounds are normal. There is no distension. Palpations: Abdomen is soft. There is no mass. Tenderness: There is no abdominal tenderness. There is no guarding. Musculoskeletal:         General: No tenderness or deformity. Cervical back: Normal range of motion and neck supple. Comments: Range of motion within normal limits x4 extremities   Skin:     General: Skin is warm. Findings: No bruising, erythema or rash. Neurological:      Mental Status: She is alert and oriented to person, place, and time. Cranial Nerves: No cranial nerve deficit. Coordination: Coordination normal.   Psychiatric:         Behavior: Behavior normal.         Thought Content: Thought content normal.       Labs reviewed today:  Lab Results   Component Value Date    WBC 6.02 07/28/2022    HGB 10.4 (L) 07/28/2022    HCT 34.4 07/28/2022    .7 (H) 07/28/2022     07/28/2022     Lab Results   Component Value Date    NEUTROABS 3.44 07/28/2022       ASSESSMENT/PLAN:      1. Infiltrating ductal carcinoma the right breast, ER/OH positive, Her-2 2+/equivocal, Ki67 6%, FISH negative, MammaPrint Luminal A/low risk,  [id number], 9/1/2021. Currently taking adjuvant endocrine therapy with letrozole 2.5 mg daily, anticipate dosing through September 2026. Reports compliant with letrozole. No new breast complaints. Declined breast examination today. 05/02/2022 Right mammogram- Postsurgical changes right breast. No suspicious abnormality.  Benign mammogram.     Last seen in follow-up by Dr. Taj Her on 5/2/2022, I reviewed his clinic visit documentation that suggested no new concerning findings with bilateral breast examination.     -Continue letrozole 2.5 mg daily  -Consider breast cancer index at approximately January 2026 to assess extended benefit present for additional 5 years of adjuvant endocrine therapy.  -Encouraged self breast examinations  -Anticipate annual bilateral mammogram in October 2022  -Follow-up with Dr. Timoteo Nelson as recommended    2. Encounter for monitoring aromatase inhibitor therapy  -Hot flashes-denies  -Chronic arthralgias, minimal.  Has noted having shin bone pain since starting Fosamax otherwise no significant arthralgias    3. Iron deficiency anemia, currently taking ferrous sulfate 325 mg twice daily. Reports continues to take B12 supplement of 1000 mcg daily. Reports tolerating iron fairly well is taking stool softener for mild constipation. Reports not as fatigued or experiencing as much shortness of air as prior to initiating the iron replacement. Hemoglobin 10.4, hematocrit 34.4 and .7 today, shows improvement from hemoglobin of 9.4 on 5/25/2022.    10/12/2020 Colonoscopy- polys x2-tubular adenoma; internal hemorrhoids without bleeding    05/25/2022 Serology results  Iron 44  TIBC 433  Saturation 10%  Ferritin 23.8  B12/Folate 1015/>20  Sed Rate 31  C-Reactive Protein <0.30  Retic Pct 1.38, Retic Abs 0.0429  Erythropoietin 86  Beta 2 Glyco IgG <10; IgM <10  Kappa light chains 107.94  Lambda light chains 79.33  Kappa/Lambda ratio 1.36  IgG 1196, IgA 182, IgM 50  Normal SPEP pattern. CAROLINE gel shows a normal pattern; no monoclonal proteins seen. - Continue Ferrous Sulfate BID, electronic prescription sent  -Recommend adding vitamin C 500 mg daily with one of her doses of ferrous sulfate    The following will be completed for further evaluation:  Iron panel  Ferritin    -Requested blood Occult Stool Screen #1 on 5/25/2022, yet to be completed follow-up on    4.  Postmenopausal status and at risk for bone loss: 12/13/2021 Bone density- osteopenia; femoral neck T score -0.9; lumbar spine T score 1.7    Vitamin D 54.4 with a calcium of 9.2 on 5/16/2022    -Continue Fosamax 70mg once a week, electronic prescription sent  -Continue taking calcium 500 mg with vitamin D 500 mg twice daily  -Monitor for bone loss closely, will repeat bone mineral density study after 12/13/2023      5. Survivorship and health maintenance recommendations  Keep a healthy body weight. Dietary recommendations include limit energy intake from total fats and sugars; increase consumption of fruit and vegetables, as well as legumes, whole grains and nuts. Engage in regular physical activity (150 minutes spread through the week). Other recommendations include minimizing alcohol intake, avoid use of tobacco products. Practice sun safety: Utilize a sunscreen with an SPF of at least 27. Avoiding tanning beds. Ensure adequate amount of sleep. Follow-up with primary care regularly and for further recommendations regarding age-appropriate screening for cancer, wellbeing visit (preventive measures), follow-up and treatment for other medical comorbidities. I discussed all of the above findings included in the assessment and plan with the patient and the patient is in agreement to move forward with current recommendations/treatment. I have addressed all of their questions and concerns that were verbalized. FOLLOW UP:  Follow up given for 3 months or sooner, if needed  Continue to follow with other medical providers as recommended  Labs at next visit: CBC and iron panel    EMR Dragon/Transcription disclaimer:   Much of this encounter note is an electronic transcription/translation of spoken language to printed text. The electronic translation of spoken language may permit erroneous, or at times, nonsensical words or phrases to be inadvertently transcribed; although attempts have made to review the note for such errors, some may still exist.  Please excuse any unrecognized transcription errors and contact us if the air is unintelligible or needs documented correction.   Also, portions of this note have been copied forward, however, changed to reflect the most current clinical status of this patient. Electronically signed by SARA Morris on 8/1/2022 at 3:59 PM  Jeannette JIMENES am pre-charting as a registered nurse for SARA Rm.

## 2022-07-28 ENCOUNTER — OFFICE VISIT (OUTPATIENT)
Dept: HEMATOLOGY | Age: 72
End: 2022-07-28
Payer: COMMERCIAL

## 2022-07-28 ENCOUNTER — HOSPITAL ENCOUNTER (OUTPATIENT)
Dept: INFUSION THERAPY | Age: 72
Discharge: HOME | End: 2022-07-28
Payer: COMMERCIAL

## 2022-07-28 VITALS
BODY MASS INDEX: 34.99 KG/M2 | DIASTOLIC BLOOD PRESSURE: 60 MMHG | SYSTOLIC BLOOD PRESSURE: 120 MMHG | HEART RATE: 80 BPM | HEIGHT: 65 IN | OXYGEN SATURATION: 95 % | WEIGHT: 210 LBS

## 2022-07-28 DIAGNOSIS — D50.9 IRON DEFICIENCY ANEMIA, UNSPECIFIED IRON DEFICIENCY ANEMIA TYPE: ICD-10-CM

## 2022-07-28 DIAGNOSIS — Z79.811 ENCOUNTER FOR MONITORING AROMATASE INHIBITOR THERAPY: ICD-10-CM

## 2022-07-28 DIAGNOSIS — C50.811 MALIGNANT NEOPLASM OF OVERLAPPING SITES OF RIGHT BREAST IN FEMALE, ESTROGEN RECEPTOR POSITIVE (HCC): ICD-10-CM

## 2022-07-28 DIAGNOSIS — C50.811 MALIGNANT NEOPLASM OF OVERLAPPING SITES OF RIGHT BREAST IN FEMALE, ESTROGEN RECEPTOR POSITIVE (HCC): Primary | ICD-10-CM

## 2022-07-28 DIAGNOSIS — M85.80 OSTEOPENIA, UNSPECIFIED LOCATION: ICD-10-CM

## 2022-07-28 DIAGNOSIS — Z78.0 POSTMENOPAUSAL STATUS: ICD-10-CM

## 2022-07-28 DIAGNOSIS — Z17.0 MALIGNANT NEOPLASM OF OVERLAPPING SITES OF RIGHT BREAST IN FEMALE, ESTROGEN RECEPTOR POSITIVE (HCC): Primary | ICD-10-CM

## 2022-07-28 DIAGNOSIS — Z71.89 CARE PLAN DISCUSSED WITH PATIENT: ICD-10-CM

## 2022-07-28 DIAGNOSIS — Z17.0 MALIGNANT NEOPLASM OF OVERLAPPING SITES OF RIGHT BREAST IN FEMALE, ESTROGEN RECEPTOR POSITIVE (HCC): ICD-10-CM

## 2022-07-28 DIAGNOSIS — Z51.81 ENCOUNTER FOR MONITORING AROMATASE INHIBITOR THERAPY: ICD-10-CM

## 2022-07-28 LAB
ALBUMIN SERPL-MCNC: 4.1 G/DL (ref 3.5–5.2)
ALP BLD-CCNC: 89 U/L (ref 35–104)
ALT SERPL-CCNC: 13 U/L (ref 9–52)
ANION GAP SERPL CALCULATED.3IONS-SCNC: 10 MMOL/L (ref 7–19)
AST SERPL-CCNC: 30 U/L (ref 14–36)
BASOPHILS ABSOLUTE: 0.06 K/UL (ref 0.01–0.08)
BASOPHILS RELATIVE PERCENT: 1 % (ref 0.1–1.2)
BILIRUB SERPL-MCNC: 0.3 MG/DL (ref 0.2–1.3)
BUN BLDV-MCNC: 35 MG/DL (ref 7–17)
CALCIUM SERPL-MCNC: 9.3 MG/DL (ref 8.4–10.2)
CHLORIDE BLD-SCNC: 100 MMOL/L (ref 98–111)
CO2: 30 MMOL/L (ref 22–29)
CREAT SERPL-MCNC: 1.9 MG/DL (ref 0.5–1)
EOSINOPHILS ABSOLUTE: 0.18 K/UL (ref 0.04–0.54)
EOSINOPHILS RELATIVE PERCENT: 3 % (ref 0.7–7)
FERRITIN: 25.4 NG/ML (ref 13–150)
GFR NON-AFRICAN AMERICAN: 26
GLOBULIN: 2.8 G/DL
GLUCOSE BLD-MCNC: 175 MG/DL (ref 74–106)
HCT VFR BLD CALC: 34.4 % (ref 34.1–44.9)
HEMOGLOBIN: 10.4 G/DL (ref 11.2–15.7)
IRON SATURATION: 13 % (ref 14–50)
IRON: 47 UG/DL (ref 37–145)
LYMPHOCYTES ABSOLUTE: 1.9 K/UL (ref 1.18–3.74)
LYMPHOCYTES RELATIVE PERCENT: 31.6 % (ref 19.3–53.1)
MCH RBC QN AUTO: 31 PG (ref 25.6–32.2)
MCHC RBC AUTO-ENTMCNC: 30.2 G/DL (ref 32.3–35.5)
MCV RBC AUTO: 102.7 FL (ref 79.4–94.8)
MONOCYTES ABSOLUTE: 0.43 K/UL (ref 0.24–0.82)
MONOCYTES RELATIVE PERCENT: 7.1 % (ref 4.7–12.5)
NEUTROPHILS ABSOLUTE: 3.44 K/UL (ref 1.56–6.13)
NEUTROPHILS RELATIVE PERCENT: 57.1 % (ref 34–71.1)
PDW BLD-RTO: 16.6 % (ref 11.7–14.4)
PLATELET # BLD: 221 K/UL (ref 182–369)
PMV BLD AUTO: 10.9 FL (ref 7.4–10.4)
POTASSIUM SERPL-SCNC: 5.2 MMOL/L (ref 3.5–5.1)
RBC # BLD: 3.35 M/UL (ref 3.93–5.22)
SODIUM BLD-SCNC: 140 MMOL/L (ref 137–145)
TOTAL IRON BINDING CAPACITY: 370 UG/DL (ref 250–400)
TOTAL PROTEIN: 6.8 G/DL (ref 6.3–8.2)
WBC # BLD: 6.02 K/UL (ref 3.98–10.04)

## 2022-07-28 PROCEDURE — 85025 COMPLETE CBC W/AUTO DIFF WBC: CPT

## 2022-07-28 PROCEDURE — 99212 OFFICE O/P EST SF 10 MIN: CPT

## 2022-07-28 PROCEDURE — 80053 COMPREHEN METABOLIC PANEL: CPT

## 2022-07-28 PROCEDURE — 99214 OFFICE O/P EST MOD 30 MIN: CPT | Performed by: NURSE PRACTITIONER

## 2022-07-28 PROCEDURE — 36415 COLL VENOUS BLD VENIPUNCTURE: CPT

## 2022-07-28 PROCEDURE — 1123F ACP DISCUSS/DSCN MKR DOCD: CPT | Performed by: NURSE PRACTITIONER

## 2022-07-28 RX ORDER — FERROUS SULFATE 325(65) MG
325 TABLET ORAL 2 TIMES DAILY
Qty: 180 TABLET | Refills: 3 | Status: SHIPPED | OUTPATIENT
Start: 2022-07-28

## 2022-07-28 RX ORDER — ALENDRONATE SODIUM 70 MG/1
70 TABLET ORAL
Qty: 12 TABLET | Refills: 3 | Status: SHIPPED | OUTPATIENT
Start: 2022-07-28

## 2022-07-28 ASSESSMENT — ENCOUNTER SYMPTOMS: SHORTNESS OF BREATH: 1

## 2022-07-29 ENCOUNTER — TELEPHONE (OUTPATIENT)
Dept: INFUSION THERAPY | Age: 72
End: 2022-07-29

## 2022-07-29 NOTE — TELEPHONE ENCOUNTER
Spoke with Herbie Pickens regarding elevated potassium She will limit dietary potassium. Faxed to Dr. Rupesh Nielson.

## 2022-07-29 NOTE — TELEPHONE ENCOUNTER
----- Message from SARA Morris sent at 7/29/2022  9:39 AM CDT -----  Please call Ms. Froy Chaparro and make sure she is not taking a potassium supplement and she needs to decrease intake of her potassium rich foods. Also please fax CMP results to Dr. Jace Colin for review and if further recommendations are warranted.

## 2022-08-01 ASSESSMENT — ENCOUNTER SYMPTOMS
DIARRHEA: 0
EYE REDNESS: 0
WHEEZING: 0
CONSTIPATION: 0
BACK PAIN: 0
COUGH: 0
EYE PAIN: 0
VOMITING: 0
GASTROINTESTINAL NEGATIVE: 1
EYES NEGATIVE: 1
SORE THROAT: 0
NAUSEA: 0
BLOOD IN STOOL: 0
ABDOMINAL PAIN: 0
EYE DISCHARGE: 0

## 2022-08-10 DIAGNOSIS — E11.21 TYPE II DIABETES MELLITUS WITH NEPHROPATHY (HCC): ICD-10-CM

## 2022-08-10 DIAGNOSIS — I10 PRIMARY HYPERTENSION: ICD-10-CM

## 2022-08-10 DIAGNOSIS — E55.9 VITAMIN D DEFICIENCY: ICD-10-CM

## 2022-08-10 LAB
ALBUMIN SERPL-MCNC: 4 G/DL (ref 3.5–5.2)
ALP BLD-CCNC: 63 U/L (ref 35–104)
ALT SERPL-CCNC: 11 U/L (ref 5–33)
ANION GAP SERPL CALCULATED.3IONS-SCNC: 11 MMOL/L (ref 7–19)
AST SERPL-CCNC: 21 U/L (ref 5–32)
BASOPHILS ABSOLUTE: 0 K/UL (ref 0–0.2)
BASOPHILS RELATIVE PERCENT: 0.7 % (ref 0–1)
BILIRUB SERPL-MCNC: <0.2 MG/DL (ref 0.2–1.2)
BUN BLDV-MCNC: 41 MG/DL (ref 8–23)
CALCIUM SERPL-MCNC: 9.4 MG/DL (ref 8.8–10.2)
CHLORIDE BLD-SCNC: 98 MMOL/L (ref 98–111)
CHOLESTEROL, TOTAL: 119 MG/DL (ref 160–199)
CO2: 25 MMOL/L (ref 22–29)
CREAT SERPL-MCNC: 2.3 MG/DL (ref 0.5–0.9)
CREATININE URINE: 72.1 MG/DL (ref 4.2–622)
EOSINOPHILS ABSOLUTE: 0.2 K/UL (ref 0–0.6)
EOSINOPHILS RELATIVE PERCENT: 3.7 % (ref 0–5)
GFR AFRICAN AMERICAN: 25
GFR NON-AFRICAN AMERICAN: 21
GLUCOSE BLD-MCNC: 104 MG/DL (ref 74–109)
HBA1C MFR BLD: 6.3 % (ref 4–6)
HCT VFR BLD CALC: 33.6 % (ref 37–47)
HDLC SERPL-MCNC: 53 MG/DL (ref 65–121)
HEMOGLOBIN: 10.3 G/DL (ref 12–16)
IMMATURE GRANULOCYTES #: 0 K/UL
LDL CHOLESTEROL CALCULATED: 48 MG/DL
LYMPHOCYTES ABSOLUTE: 1.4 K/UL (ref 1.1–4.5)
LYMPHOCYTES RELATIVE PERCENT: 26.3 % (ref 20–40)
MCH RBC QN AUTO: 30.1 PG (ref 27–31)
MCHC RBC AUTO-ENTMCNC: 30.7 G/DL (ref 33–37)
MCV RBC AUTO: 98.2 FL (ref 81–99)
MICROALBUMIN UR-MCNC: 82 MG/DL (ref 0–19)
MICROALBUMIN/CREAT UR-RTO: 1137.3 MG/G
MONOCYTES ABSOLUTE: 0.4 K/UL (ref 0–0.9)
MONOCYTES RELATIVE PERCENT: 7.2 % (ref 0–10)
NEUTROPHILS ABSOLUTE: 3.4 K/UL (ref 1.5–7.5)
NEUTROPHILS RELATIVE PERCENT: 61.7 % (ref 50–65)
PDW BLD-RTO: 15.7 % (ref 11.5–14.5)
PLATELET # BLD: 248 K/UL (ref 130–400)
PMV BLD AUTO: 10.7 FL (ref 9.4–12.3)
POTASSIUM SERPL-SCNC: 5.3 MMOL/L (ref 3.5–5)
RBC # BLD: 3.42 M/UL (ref 4.2–5.4)
SODIUM BLD-SCNC: 134 MMOL/L (ref 136–145)
TOTAL PROTEIN: 7.2 G/DL (ref 6.6–8.7)
TRIGL SERPL-MCNC: 89 MG/DL (ref 0–149)
TSH SERPL DL<=0.05 MIU/L-ACNC: 2.05 UIU/ML (ref 0.27–4.2)
VITAMIN D 25-HYDROXY: 52.6 NG/ML
WBC # BLD: 5.4 K/UL (ref 4.8–10.8)

## 2022-08-11 ENCOUNTER — TELEPHONE (OUTPATIENT)
Dept: INTERNAL MEDICINE | Age: 72
End: 2022-08-11

## 2022-08-11 ENCOUNTER — OFFICE VISIT (OUTPATIENT)
Dept: INTERNAL MEDICINE | Age: 72
End: 2022-08-11
Payer: COMMERCIAL

## 2022-08-11 VITALS
DIASTOLIC BLOOD PRESSURE: 78 MMHG | WEIGHT: 210.6 LBS | HEART RATE: 63 BPM | SYSTOLIC BLOOD PRESSURE: 138 MMHG | OXYGEN SATURATION: 95 % | BODY MASS INDEX: 35.09 KG/M2 | HEIGHT: 65 IN

## 2022-08-11 DIAGNOSIS — N28.9 RENAL INSUFFICIENCY: ICD-10-CM

## 2022-08-11 DIAGNOSIS — E11.21 TYPE II DIABETES MELLITUS WITH NEPHROPATHY (HCC): Primary | ICD-10-CM

## 2022-08-11 DIAGNOSIS — I25.83 CORONARY ARTERY DISEASE DUE TO LIPID RICH PLAQUE: ICD-10-CM

## 2022-08-11 DIAGNOSIS — Z91.09 ENVIRONMENTAL ALLERGIES: ICD-10-CM

## 2022-08-11 DIAGNOSIS — I25.10 CORONARY ARTERY DISEASE DUE TO LIPID RICH PLAQUE: ICD-10-CM

## 2022-08-11 DIAGNOSIS — I10 PRIMARY HYPERTENSION: ICD-10-CM

## 2022-08-11 DIAGNOSIS — N18.4 CKD (CHRONIC KIDNEY DISEASE) STAGE 4, GFR 15-29 ML/MIN (HCC): ICD-10-CM

## 2022-08-11 DIAGNOSIS — C50.811 MALIGNANT NEOPLASM OF OVERLAPPING SITES OF RIGHT BREAST IN FEMALE, ESTROGEN RECEPTOR POSITIVE (HCC): ICD-10-CM

## 2022-08-11 DIAGNOSIS — N28.9 RENAL FUNCTION IMPAIRMENT: Primary | ICD-10-CM

## 2022-08-11 DIAGNOSIS — E11.21 TYPE II DIABETES MELLITUS WITH NEPHROPATHY (HCC): ICD-10-CM

## 2022-08-11 DIAGNOSIS — Z17.0 MALIGNANT NEOPLASM OF OVERLAPPING SITES OF RIGHT BREAST IN FEMALE, ESTROGEN RECEPTOR POSITIVE (HCC): ICD-10-CM

## 2022-08-11 DIAGNOSIS — E78.5 HYPERLIPIDEMIA, UNSPECIFIED HYPERLIPIDEMIA TYPE: ICD-10-CM

## 2022-08-11 DIAGNOSIS — D50.9 IRON DEFICIENCY ANEMIA, UNSPECIFIED IRON DEFICIENCY ANEMIA TYPE: ICD-10-CM

## 2022-08-11 LAB
ANION GAP SERPL CALCULATED.3IONS-SCNC: 10 MMOL/L (ref 7–19)
BUN BLDV-MCNC: 42 MG/DL (ref 8–23)
CALCIUM SERPL-MCNC: 9.7 MG/DL (ref 8.8–10.2)
CHLORIDE BLD-SCNC: 96 MMOL/L (ref 98–111)
CO2: 27 MMOL/L (ref 22–29)
CREAT SERPL-MCNC: 2.3 MG/DL (ref 0.5–0.9)
GFR AFRICAN AMERICAN: 25
GFR NON-AFRICAN AMERICAN: 21
GLUCOSE BLD-MCNC: 133 MG/DL (ref 74–109)
POTASSIUM SERPL-SCNC: 5.3 MMOL/L (ref 3.5–5)
SODIUM BLD-SCNC: 133 MMOL/L (ref 136–145)

## 2022-08-11 PROCEDURE — 99214 OFFICE O/P EST MOD 30 MIN: CPT | Performed by: INTERNAL MEDICINE

## 2022-08-11 PROCEDURE — 3044F HG A1C LEVEL LT 7.0%: CPT | Performed by: INTERNAL MEDICINE

## 2022-08-11 PROCEDURE — 1123F ACP DISCUSS/DSCN MKR DOCD: CPT | Performed by: INTERNAL MEDICINE

## 2022-08-11 RX ORDER — FUROSEMIDE 20 MG/1
20 TABLET ORAL DAILY
Qty: 90 TABLET | Refills: 1 | Status: SHIPPED | OUTPATIENT
Start: 2022-08-11

## 2022-08-11 ASSESSMENT — ENCOUNTER SYMPTOMS
EYE DISCHARGE: 0
DIARRHEA: 0
COLOR CHANGE: 0
SINUS PAIN: 0
VOMITING: 0
RHINORRHEA: 0
COUGH: 0
ABDOMINAL DISTENTION: 0
TROUBLE SWALLOWING: 0
STRIDOR: 0
SORE THROAT: 0
WHEEZING: 0
CHOKING: 0
NAUSEA: 0
VOICE CHANGE: 0
SHORTNESS OF BREATH: 0
ABDOMINAL PAIN: 0
BLOOD IN STOOL: 0
SINUS PRESSURE: 0
CHEST TIGHTNESS: 0
CONSTIPATION: 0
EYE ITCHING: 0

## 2022-08-11 ASSESSMENT — PATIENT HEALTH QUESTIONNAIRE - PHQ9
SUM OF ALL RESPONSES TO PHQ QUESTIONS 1-9: 0
SUM OF ALL RESPONSES TO PHQ9 QUESTIONS 1 & 2: 0
SUM OF ALL RESPONSES TO PHQ QUESTIONS 1-9: 0
SUM OF ALL RESPONSES TO PHQ QUESTIONS 1-9: 0
2. FEELING DOWN, DEPRESSED OR HOPELESS: 0
SUM OF ALL RESPONSES TO PHQ QUESTIONS 1-9: 0
1. LITTLE INTEREST OR PLEASURE IN DOING THINGS: 0

## 2022-08-11 NOTE — TELEPHONE ENCOUNTER
----- Message from Verena Alberts MD sent at 8/11/2022  1:32 PM CDT -----  She is still showing some renal insuffiency. We cut down her lasix.  Needs BMP in 2  weeks
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
## 2022-08-31 ENCOUNTER — OFFICE VISIT (OUTPATIENT)
Dept: INTERNAL MEDICINE | Age: 72
End: 2022-08-31
Payer: COMMERCIAL

## 2022-08-31 VITALS
OXYGEN SATURATION: 98 % | WEIGHT: 213.8 LBS | HEART RATE: 71 BPM | HEIGHT: 65 IN | SYSTOLIC BLOOD PRESSURE: 118 MMHG | BODY MASS INDEX: 35.62 KG/M2 | DIASTOLIC BLOOD PRESSURE: 68 MMHG

## 2022-08-31 DIAGNOSIS — I25.83 CORONARY ARTERY DISEASE DUE TO LIPID RICH PLAQUE: ICD-10-CM

## 2022-08-31 DIAGNOSIS — I25.10 CORONARY ARTERY DISEASE DUE TO LIPID RICH PLAQUE: ICD-10-CM

## 2022-08-31 DIAGNOSIS — N28.9 RENAL FUNCTION IMPAIRMENT: ICD-10-CM

## 2022-08-31 DIAGNOSIS — E11.21 TYPE II DIABETES MELLITUS WITH NEPHROPATHY (HCC): ICD-10-CM

## 2022-08-31 DIAGNOSIS — N17.9 AKI (ACUTE KIDNEY INJURY) (HCC): Primary | ICD-10-CM

## 2022-08-31 DIAGNOSIS — I10 PRIMARY HYPERTENSION: ICD-10-CM

## 2022-08-31 LAB
ANION GAP SERPL CALCULATED.3IONS-SCNC: 12 MMOL/L (ref 7–19)
BUN BLDV-MCNC: 29 MG/DL (ref 8–23)
CALCIUM SERPL-MCNC: 9.8 MG/DL (ref 8.8–10.2)
CHLORIDE BLD-SCNC: 102 MMOL/L (ref 98–111)
CO2: 24 MMOL/L (ref 22–29)
CREAT SERPL-MCNC: 2 MG/DL (ref 0.5–0.9)
GFR AFRICAN AMERICAN: 29
GFR NON-AFRICAN AMERICAN: 24
GLUCOSE BLD-MCNC: 108 MG/DL (ref 74–109)
POTASSIUM SERPL-SCNC: 5.6 MMOL/L (ref 3.5–5)
SODIUM BLD-SCNC: 138 MMOL/L (ref 136–145)

## 2022-08-31 PROCEDURE — 3044F HG A1C LEVEL LT 7.0%: CPT | Performed by: INTERNAL MEDICINE

## 2022-08-31 PROCEDURE — 99214 OFFICE O/P EST MOD 30 MIN: CPT | Performed by: INTERNAL MEDICINE

## 2022-08-31 PROCEDURE — 1123F ACP DISCUSS/DSCN MKR DOCD: CPT | Performed by: INTERNAL MEDICINE

## 2022-08-31 NOTE — PROGRESS NOTES
Chief Complaint   Patient presents with    2 Week Follow-Up       HPI: Autumn Valdez is a 74 y.o. female is here for follow-up for acute kidney injury. She did not get her BMP done as of yet. We will check that today. Her blood sugars are running between 90 and 102. Her blood pressure is well controlled. She states that she feels well at this time. At her last office visit, we decreased the dose of her Lasix due to a rise in her creatinine and a decrease in her GFR. Her allergies are stable.   She denies any complaints of chest pain, chest pressure or shortness of breath    Past Medical History:   Diagnosis Date    Acute sinusitis     CHAPARRO (acute kidney injury) ([address] 05/18/2018    Anemia     Bleeding ulcer     hx of; cauterized by dr. resendiz    Breast cancer [facility])     Breast lump     CAD (coronary artery disease)     sees dr. Katie Dash ulcer [facility])     Cataract     COVID-19 01/2021    fatigue    Dermatitis     Diabetes mellitus ([address]     Diabetic nephropathy ([address]     Diabetic retinopathy ([address]     Glaucoma     Gout     Hemorrhoids     Hx of TB skin testing     positive    Hyperlipidemia     Hypertension     Long term current use of aromatase inhibitor 5/27/2022    Osteopenia of left hip 5/27/2022    Palliative care patient 06/24/2021    Type 2 diabetes mellitus ([address] 05/18/2018    Vitamin D deficiency       Past Surgical History:   Procedure Laterality Date    BREAST LUMPECTOMY Right 10/29/2021    RIGHT LUMPECTOMY, SNB, PREOP ULTRASOUND, INTRAOP ULTRASOUND GUIDED NL, BIOSORB, PEC BLOCK, MARGIN PROBE, FLAPS, IORT performed by Tiffanie Zuniga MD at [place] [address]  02/15/2022    CARDIAC SURGERY  2001    CARPAL TUNNEL RELEASE Right 9/3/2020    RIGHT CARPAL TUNNEL RELEASE performed by Darek Ramon MD at [facility] N/A 10/11/2019    Dr Manzo Soldtoshia of a hemostatic clip-Suboptimal prep, diverticulosis, internal hemorrhoids-Grade 2, AP x 3, 1 yr recall     COLONOSCOPY N/A 10/12/2020    Dr Navin Graves hemorrhoids-Grade 1-2, AP, 3 yr recall    CORONARY ARTERY BYPASS GRAFT  2001    ENDOSCOPY, COLON, DIAGNOSTIC      EYE SURGERY      MOUTH SURGERY      OTHER SURGICAL HISTORY      sternal resection    PACEMAKER PLACEMENT  02/23/2022    SKIN BIOPSY      UPPER GASTROINTESTINAL ENDOSCOPY N/A 10/11/2019    Dr Asad Rodgers (+) H Pylori    US BREAST NEEDLE BIOPSY LEFT Left 9/1/2021    US BREAST NEEDLE BIOPSY LEFT 9/1/2021 [facility] SURGERY    VASCULAR SURGERY        Social History     Socioeconomic History    Marital status: Single   Occupational History     Employer: [employer]   Tobacco Use    Smoking status: Never    Smokeless tobacco: Never   Vaping Use    Vaping Use: Never used   Substance and Sexual Activity    Alcohol use: Yes     Alcohol/week: 1.0 standard drink     Types: 1 Shots of liquor per week     Comment: rare    Drug use: No    Sexual activity: Never     Social Determinants of Health     Financial Resource Strain: Low Risk     Difficulty of Paying Living Expenses: Not hard at all   Food Insecurity: No Food Insecurity    Worried About Running Out of Food in the Last Year: Never true    Ran Out of Food in the Last Year: Never true      Family History   Problem Relation Age of Onset    Cancer Mother     Diabetes Father     COPD Father     Diabetes Sister     Heart Disease Sister     Colon Polyps Sister     Coronary Art Dis Other         grandfather    Diabetes Other         aunt    Colon Cancer Neg Hx     Liver Cancer Neg Hx     Rectal Cancer Neg Hx         Current Outpatient Medications   Medication Sig Dispense Refill    furosemide (LASIX) 20 MG tablet Take 1 tablet by mouth in the morning. 90 tablet 1    alendronate (FOSAMAX) 70 MG tablet Take 1 tablet by mouth every 7 days Take with a full glass of water upon arising for the day.  Consume on an empty stomach at least 30 minutes before the first food, beverage, or medication. Stay upright (do not lie down) for at least 30 minutes. Do not crush or break. 12 tablet 3    ferrous sulfate (IRON 325) 325 (65 Fe) MG tablet Take 1 tablet by mouth in the morning and 1 tablet before bedtime. 180 tablet 3    Accu-Chek Softclix Lancets MISC TEST FOUR TIMES DAILY 400 each 3    blood glucose test strips (ACCU-CHEK GUIDE) strip TEST BLOOD SUGAR FOUR TIMES DAILY 400 strip 3    metFORMIN (GLUCOPHAGE) 500 MG tablet TAKE 2 TABLETS TWICE DAILY WITH MEALS 360 tablet 1    amLODIPine (NORVASC) 5 MG tablet TAKE 1 TABLET EVERY DAY 90 tablet 1    metoprolol tartrate (LOPRESSOR) 50 MG tablet 2 tablets  tablet 3    letrozole (FEMARA) 2.5 MG tablet Take 1 tablet by mouth daily 90 tablet 3    montelukast (SINGULAIR) 10 MG tablet Take 1 tablet by mouth daily 90 tablet 3    timolol (TIMOPTIC) 0.25 % ophthalmic solution Place 1 drop into the right eye daily       lisinopril (PRINIVIL;ZESTRIL) 5 MG tablet Take 1 tablet by mouth daily 90 tablet 2    simvastatin (ZOCOR) 40 MG tablet Take 1 tablet by mouth nightly 90 tablet 3    glipiZIDE (GLUCOTROL XL) 2.5 MG extended release tablet Take 1 tablet by mouth daily 90 tablet 3    isosorbide mononitrate (IMDUR) 30 MG extended release tablet Take 1 tablet by mouth 2 times daily 180 tablet 3    Blood Glucose Calibration (ACCU-CHEK ROSA) SOLN Calibration done daily. 1 each 1    Alcohol Swabs (B-D SINGLE USE SWABS REGULAR) PADS USE THREE TIMES DAILY 100 each 11    aspirin 81 MG tablet Take 81 mg by mouth daily      Omega-3 Fatty Acids (FISH OIL) 1000 MG CAPS Take 1,000 mg by mouth 2 times daily      MULTIPLE VITAMIN PO Take 1 tablet by mouth daily      Calcium Carbonate-Vitamin D (CALCIUM 500/D PO) Take 1 tablet by mouth 2 times daily      vitamin B-12 (CYANOCOBALAMIN) 1000 MCG tablet Take 1,000 mcg by mouth daily       No current facility-administered medications for this visit.         Patient Active Problem List   Diagnosis    CHAPARRO (acute kidney injury) ([facility]    Hypertension    Hyperlipidemia    Ocular hypertension    CAD (coronary artery disease)    Type 2 diabetes mellitus ([facility]    Diabetes mellitus ([facility]    Acute cystitis without hematuria    Carpal tunnel syndrome    Chest pain    Chronic kidney disease, stage 3b ([facility]    History of coronary artery bypass graft    Achilles tendinitis    Bursitis of right foot    Contracture of Achilles tendon    Diabetic macular edema (HCC)    Foot pain    Nonproliferative diabetic retinopathy ([facility]    Posterior calcaneal exostosis    Retinal neovascularization    Malignant neoplasm of overlapping sites of right breast in female, estrogen receptor positive ([facility]    Status post right breast lumpectomy with sentinel node and IORT on 10/29/2021    CKD (chronic kidney disease) stage 4, GFR 15-29 ml/min ([facility])    Ischemic cardiomyopathy    Decreased cardiac ejection fraction    Pacemaker    Osteopenia of left hip    Long term current use of aromatase inhibitor        Review of Systems   Constitutional:  Negative for activity change, appetite change, chills, diaphoresis, fatigue, fever and unexpected weight change. HENT:  Negative for congestion, ear discharge, ear pain, mouth sores, nosebleeds, postnasal drip, rhinorrhea, sinus pressure, sinus pain, sneezing, sore throat, tinnitus, trouble swallowing and voice change. Eyes:  Negative for discharge, itching and visual disturbance. Respiratory:  Negative for cough, choking, chest tightness, shortness of breath, wheezing and stridor. Cardiovascular:  Negative for chest pain, palpitations and leg swelling. Gastrointestinal:  Negative for abdominal distention, abdominal pain, blood in stool, constipation, diarrhea, nausea and vomiting. Endocrine: Negative for cold intolerance, polydipsia and polyuria. Genitourinary:  Negative for difficulty urinating, dysuria, frequency and urgency.    Musculoskeletal:  Negative for arthralgias, gait problem, joint swelling and myalgias. Skin:  Negative for color change and rash. Allergic/Immunologic: Negative for food allergies and immunocompromised state. Neurological:  Negative for dizziness, tremors, syncope, speech difficulty, weakness, numbness and headaches. Hematological:  Negative for adenopathy. Does not bruise/bleed easily. Psychiatric/Behavioral:  Negative for confusion and hallucinations. The patient is not nervous/anxious. /68   Pulse 71   Ht 5' 5\" (1.651 m)   Wt 213 lb 12.8 oz (97 kg)   SpO2 98%   BMI 35.58 kg/m²   Physical Exam  Vitals and nursing note reviewed. Constitutional:       General: She is not in acute distress. Appearance: Normal appearance. She is well-developed and normal weight. She is not ill-appearing, toxic-appearing or diaphoretic. HENT:      Head: Normocephalic and atraumatic. Right Ear: Tympanic membrane, ear canal and external ear normal. There is no impacted cerumen. Left Ear: Tympanic membrane, ear canal and external ear normal. There is no impacted cerumen. Nose: Nose normal. No congestion or rhinorrhea. Mouth/Throat:      Mouth: Mucous membranes are moist.      Pharynx: Oropharynx is clear. No oropharyngeal exudate or posterior oropharyngeal erythema. Eyes:      General: No scleral icterus. Right eye: No discharge. Left eye: No discharge. Extraocular Movements: Extraocular movements intact. Conjunctiva/sclera: Conjunctivae normal.      Pupils: Pupils are equal, round, and reactive to light. Neck:      Thyroid: No thyromegaly. Vascular: No carotid bruit or JVD. Trachea: No tracheal deviation. Cardiovascular:      Rate and Rhythm: Normal rate and regular rhythm. Pulses: Normal pulses. Heart sounds: Normal heart sounds. No murmur heard. No friction rub. No gallop. Pulmonary:      Effort: Pulmonary effort is normal. No respiratory distress. Breath sounds: Normal breath sounds. No stridor. No wheezing, rhonchi or rales. Chest:      Chest wall: No tenderness. Abdominal:      General: Bowel sounds are normal. There is no distension. Palpations: Abdomen is soft. There is no mass. Tenderness: There is no abdominal tenderness. There is no right CVA tenderness, left CVA tenderness, guarding or rebound. Hernia: No hernia is present. Musculoskeletal:         General: No swelling, tenderness, deformity or signs of injury. Normal range of motion. Cervical back: Normal range of motion and neck supple. No rigidity. No muscular tenderness. Right lower leg: No edema. Left lower leg: No edema. Lymphadenopathy:      Cervical: No cervical adenopathy. Skin:     General: Skin is warm and dry. Capillary Refill: Capillary refill takes less than 2 seconds. Coloration: Skin is not jaundiced or pale. Findings: No bruising, erythema, lesion or rash. Comments: Deformity/amputation: absent  Skin lesions/pre-ulcerative calluses: present - bilateral great toe  Edema: right- negative, left- negative    Sensory exam:  Monofilament sensation: normal  (minimum of 5 random plantar locations tested, avoiding callused areas - > 1 area with absence of sensation is + for neuropathy)    Plus at least one of the following:  Pulses: normal,   Pinprick: Intact  Proprioception: N/A  Vibration (128 Hz): N/A     Neurological:      General: No focal deficit present. Mental Status: She is alert and oriented to person, place, and time. Mental status is at baseline. Cranial Nerves: No cranial nerve deficit. Sensory: No sensory deficit. Motor: No weakness or abnormal muscle tone. Coordination: Coordination normal.      Gait: Gait normal.      Deep Tendon Reflexes: Reflexes are normal and symmetric. Reflexes normal.   Psychiatric:         Mood and Affect: Mood normal.         Behavior: Behavior normal.         Thought Content:  Thought content normal.         Judgment: Judgment normal.       1. CHAPARRO (acute kidney injury) ([address]    2. Primary hypertension    3. Type II diabetes mellitus with nephropathy ([address]    4. Coronary artery disease due to lipid rich plaque        ASSESSMENT/PLAN:    53-year-old woman here for follow-up    1. Acute kidney injury: Labs are currently pending. We will check a BMP today. 2.  Hypertension: Blood pressure appears to be well controlled on her current medication regimen    3. Type 2 diabetes: Blood sugars running between 90 and 100 today    4. Coronary artery disease: Stable on isosorbide    [facility] was seen today for 2 week follow-up. Diagnoses and all orders for this visit:    CHAPARRO (acute kidney injury) ([address]    Primary hypertension    Type II diabetes mellitus with nephropathy ([address]    Coronary artery disease due to lipid rich plaque        No follow-ups on file. No orders of the defined types were placed in this encounter.       Purnima Mendosa MD

## 2022-09-04 ASSESSMENT — ENCOUNTER SYMPTOMS
WHEEZING: 0
VOMITING: 0
SORE THROAT: 0
COLOR CHANGE: 0
CHEST TIGHTNESS: 0
ABDOMINAL PAIN: 0
RHINORRHEA: 0
VOICE CHANGE: 0
BLOOD IN STOOL: 0
TROUBLE SWALLOWING: 0
STRIDOR: 0
SINUS PRESSURE: 0
ABDOMINAL DISTENTION: 0
NAUSEA: 0
SINUS PAIN: 0
SHORTNESS OF BREATH: 0
CHOKING: 0
EYE DISCHARGE: 0
CONSTIPATION: 0
COUGH: 0
EYE ITCHING: 0
DIARRHEA: 0

## 2022-09-21 RX ORDER — ISOSORBIDE MONONITRATE 30 MG/1
TABLET, EXTENDED RELEASE ORAL
Qty: 180 TABLET | Refills: 3 | Status: SHIPPED | OUTPATIENT
Start: 2022-09-21

## 2022-09-21 NOTE — TELEPHONE ENCOUNTER
Naz Alexander called to request a refill on her medication.       Last office visit : 8/31/2022   Next office visit : 11/11/2022     Requested Prescriptions     Pending Prescriptions Disp Refills    isosorbide mononitrate (IMDUR) 30 MG extended release tablet [Pharmacy Med Name: ISOSORBIDE MONONITRATE ER 30 MG Tablet Extended Release 24 Hour] 180 tablet 3     Sig: TAKE 1 TABLET TWICE DAILY            Duncan Buchanan

## 2022-10-07 DIAGNOSIS — I50.42 CHRONIC COMBINED SYSTOLIC (CONGESTIVE) AND DIASTOLIC (CONGESTIVE) HEART FAILURE (HCC): ICD-10-CM

## 2022-10-07 DIAGNOSIS — Z95.810 BIVENTRICULAR ICD (IMPLANTABLE CARDIOVERTER-DEFIBRILLATOR) IN PLACE: Primary | ICD-10-CM

## 2022-10-07 DIAGNOSIS — I25.5 ISCHEMIC CARDIOMYOPATHY: ICD-10-CM

## 2022-10-07 PROCEDURE — 93296 REM INTERROG EVL PM/IDS: CPT | Performed by: INTERNAL MEDICINE

## 2022-10-07 PROCEDURE — 93295 DEV INTERROG REMOTE 1/2/MLT: CPT | Performed by: INTERNAL MEDICINE

## 2022-10-10 ENCOUNTER — HOSPITAL ENCOUNTER (OUTPATIENT)
Dept: GENERAL RADIOLOGY | Age: 72
Discharge: HOME | End: 2022-10-10
Payer: MEDICARE

## 2022-10-10 ENCOUNTER — TELEPHONE (OUTPATIENT)
Dept: INTERNAL MEDICINE | Age: 72
End: 2022-10-10

## 2022-10-10 ENCOUNTER — OFFICE VISIT (OUTPATIENT)
Dept: INTERNAL MEDICINE | Age: 72
End: 2022-10-10
Payer: COMMERCIAL

## 2022-10-10 VITALS
OXYGEN SATURATION: 93 % | TEMPERATURE: 98.6 F | HEART RATE: 60 BPM | DIASTOLIC BLOOD PRESSURE: 78 MMHG | SYSTOLIC BLOOD PRESSURE: 130 MMHG

## 2022-10-10 DIAGNOSIS — I25.5 ISCHEMIC CARDIOMYOPATHY: ICD-10-CM

## 2022-10-10 DIAGNOSIS — J21.9 ACUTE BRONCHIOLITIS DUE TO UNSPECIFIED ORGANISM: ICD-10-CM

## 2022-10-10 DIAGNOSIS — R05.1 ACUTE COUGH: Primary | ICD-10-CM

## 2022-10-10 PROCEDURE — 99214 OFFICE O/P EST MOD 30 MIN: CPT | Performed by: NURSE PRACTITIONER

## 2022-10-10 PROCEDURE — 71046 X-RAY EXAM CHEST 2 VIEWS: CPT | Performed by: RADIOLOGY

## 2022-10-10 PROCEDURE — 93000 ELECTROCARDIOGRAM COMPLETE: CPT | Performed by: NURSE PRACTITIONER

## 2022-10-10 PROCEDURE — 71046 X-RAY EXAM CHEST 2 VIEWS: CPT

## 2022-10-10 PROCEDURE — 1123F ACP DISCUSS/DSCN MKR DOCD: CPT | Performed by: NURSE PRACTITIONER

## 2022-10-10 RX ORDER — ALBUTEROL SULFATE 90 UG/1
2 AEROSOL, METERED RESPIRATORY (INHALATION) EVERY 6 HOURS PRN
Qty: 18 G | Refills: 3 | Status: SHIPPED | OUTPATIENT
Start: 2022-10-10

## 2022-10-10 RX ORDER — CEFDINIR 300 MG/1
300 CAPSULE ORAL 2 TIMES DAILY
Qty: 14 CAPSULE | Refills: 0 | Status: SHIPPED | OUTPATIENT
Start: 2022-10-10 | End: 2022-10-17

## 2022-10-10 ASSESSMENT — ENCOUNTER SYMPTOMS
WHEEZING: 0
EYE DISCHARGE: 0
ABDOMINAL PAIN: 0
BLOOD IN STOOL: 0
COLOR CHANGE: 0
CONSTIPATION: 0
SHORTNESS OF BREATH: 1
CHOKING: 0
TROUBLE SWALLOWING: 0
DIARRHEA: 0
NAUSEA: 0
VOMITING: 0
SORE THROAT: 0
STRIDOR: 0
ABDOMINAL DISTENTION: 0
EYE ITCHING: 0
COUGH: 1

## 2022-10-10 NOTE — PATIENT INSTRUCTIONS
Cough    Cxr today   Cefdinir 300 mg twice daily for 7 days;  get 2 doses in today   Proair in [employer] inhaler;   2 puffs 4 tmies a day until cough is gone  then use use 24 more hours, then start weaning down so every 3 days  by one dose  3 times daily for 3 , 2 times day for 3 days, then daily for 3 days, then stop   2. CAD  stable  for now   3. Tw DM;  stable for now   4. CKD;  decreased  you cant use mucinex or robitussion, or delsym    5. Atrial fib on EKG. I reviewed the EKG with Dr. Ryan Rojas and she advised just to let her keep appointment with cardiology next week and let them treat as well.

## 2022-10-17 ENCOUNTER — HOSPITAL ENCOUNTER (OUTPATIENT)
Dept: WOMENS IMAGING | Age: 72
Discharge: HOME | End: 2022-10-17
Payer: MEDICARE

## 2022-10-17 ENCOUNTER — OFFICE VISIT (OUTPATIENT)
Dept: SURGERY | Age: 72
End: 2022-10-17
Payer: MEDICARE

## 2022-10-17 VITALS
HEIGHT: 65 IN | BODY MASS INDEX: 35.16 KG/M2 | TEMPERATURE: 96.8 F | HEART RATE: 62 BPM | DIASTOLIC BLOOD PRESSURE: 67 MMHG | WEIGHT: 211 LBS | SYSTOLIC BLOOD PRESSURE: 130 MMHG

## 2022-10-17 DIAGNOSIS — Z85.3 PERSONAL HISTORY OF MALIGNANT NEOPLASM OF BREAST: Primary | ICD-10-CM

## 2022-10-17 DIAGNOSIS — Z85.3 PERSONAL HISTORY OF BREAST CANCER: ICD-10-CM

## 2022-10-17 PROCEDURE — 1036F TOBACCO NON-USER: CPT | Performed by: PHYSICIAN ASSISTANT

## 2022-10-17 PROCEDURE — 99213 OFFICE O/P EST LOW 20 MIN: CPT | Performed by: PHYSICIAN ASSISTANT

## 2022-10-17 PROCEDURE — 3074F SYST BP LT 130 MM HG: CPT | Performed by: PHYSICIAN ASSISTANT

## 2022-10-17 PROCEDURE — G0279 TOMOSYNTHESIS, MAMMO: HCPCS

## 2022-10-17 PROCEDURE — G8484 FLU IMMUNIZE NO ADMIN: HCPCS | Performed by: PHYSICIAN ASSISTANT

## 2022-10-17 PROCEDURE — 3078F DIAST BP <80 MM HG: CPT | Performed by: PHYSICIAN ASSISTANT

## 2022-10-17 PROCEDURE — G8417 CALC BMI ABV UP PARAM F/U: HCPCS | Performed by: PHYSICIAN ASSISTANT

## 2022-10-17 PROCEDURE — 1090F PRES/ABSN URINE INCON ASSESS: CPT | Performed by: PHYSICIAN ASSISTANT

## 2022-10-17 PROCEDURE — 3017F COLORECTAL CA SCREEN DOC REV: CPT | Performed by: PHYSICIAN ASSISTANT

## 2022-10-17 PROCEDURE — G8427 DOCREV CUR MEDS BY ELIG CLIN: HCPCS | Performed by: PHYSICIAN ASSISTANT

## 2022-10-17 PROCEDURE — 1123F ACP DISCUSS/DSCN MKR DOCD: CPT | Performed by: PHYSICIAN ASSISTANT

## 2022-10-17 PROCEDURE — G8399 PT W/DXA RESULTS DOCUMENT: HCPCS | Performed by: PHYSICIAN ASSISTANT

## 2022-10-17 RX ORDER — NYSTATIN 100000 U/G
CREAM TOPICAL
Qty: 30 G | Refills: 5 | Status: SHIPPED | OUTPATIENT
Start: 2022-10-17

## 2022-10-17 NOTE — PROGRESS NOTES
HISTORY OF PRESENT ILLNESS:    Ms. Miguel Golden presents today for a breast exam following imaging. This is following a right lumpectomy with sentinel node and IORT on 10/29/2021    She is status post ultrasound guided breast biopsy on the right which revealed a 2.6 cm high grade infiltrating ductal carcinoma. ER is positive at 96%. IN is positive at 94%. Her2 is Equivocal. Ki67 is low at 6%. Fish is negative. Mammaprint is low risk luminal A type. MRI was not performed. Pathology:  FINAL DIAGNOSIS:     A. Breast, right partial mastectomy:   1. Infiltrating ductal carcinoma, no special type, grade 2.   2. Infiltrating carcinoma measures 2.5 cm in greatest dimension grossly. 3.  Infiltrating carcinoma extends to within 0.1 cm of the anterior   surgical excision margin. 4.  Infiltrating carcinoma extends to within 0.4 cm of the nearest caudal   surgical excision margin. 5.  Tumor invades the deepest aspect of the overlying dermis. 6.  Negative for evidence of dermal lymphovascular space invasion. 7.  Negative for evidence of epidermal invasion. 8.  Sections of nipple, negative for evidence of malignancy. B.  Breast, excision of additional right breast cranial margin: Benign   breast parenchyma. C.  Breast, excision of additional right breast caudal margin: Benign   breast parenchyma. D.  Breast, excision of additional right breast deep margin: Benign   breast parenchyma. E.  Lymph node, right sentinel lymph node biopsy: 2 lymph nodes, negative   for evidence of malignancy. AJCC STAGE: pT2, (sn)pN0, pMx     She is seen Dr. Gustavo Macdonald and going to continue letrozole. Patient name Samina Smith   Date of birth 1950   Physician   Digital bilateral diagnostic mammogram with tomosynthesis, dated   10/17/2022 2:35 PM   TECHNIQUE: The digital mammographic exam of both breast in   craniocaudal, mediolateral and mediolateral oblique views along with   R2 CAD was obtained. Doenll  of both breasts were obtained in   craniocaudal, mediolateral and mediolateral oblique medial views. HISTORY: This is a 74 year-old female  1, Para 1 AB 0 for   diagnostic mammogram. Patient has history of right breast cancer with   lumpectomy. Patient had had right nipple removal. This was performed   on 10/29/2021. Patient also has a left breast biopsy which was benign. Patient has no family history of breast cancer. Patient has no   complaints. Reference: Prior mammogram from 2022, 2021, 2021 are   available   Breast composition: There are scattered areas of fibroglandular   density. Findings: The digital mammographic examination of both breasts in   craniocaudal, medial lateral and medial lateral oblique view along   with R2 CAD demonstrates right (small the left breast from prior   lumpectomy with surgical clips in place. Both breasts appears to be   mildly dense with mostly interstitial fatty changes. There appears to   be a Port-A-Cath seen in the left axilla. There are multiple surgical   clips seen in the right chest wall from prior lumpectomy. There is no   evidence of any dendritic  mass, cluster microcalcification or   architectural distortion. The retromammary fat appears to be normal.       Impression   No radiographic evidence of malignancy changes. We would recommend   annual follow up with tomosynthesis unless otherwise clinically   indicated. Final assessment: ACR: Bi -RADS - 2. PHYSICAL EXAM:  BREAST EXAM:  There are fibrocystic changes throughout both breasts. There are no dominant masses, skin dimpling or nipple retraction. There is no axillary lymphadenopathy bilaterally. There are appropriate post operative changes on the right. IMPRESSION:  Doing well s/p right lumpectomy and sentinel node    PLAN:   I will see her in 1 year with mammograms. She will call sooner with any concerns.

## 2022-10-20 ENCOUNTER — TELEPHONE (OUTPATIENT)
Dept: CARDIOLOGY CLINIC | Age: 72
End: 2022-10-20

## 2022-10-21 ENCOUNTER — OFFICE VISIT (OUTPATIENT)
Dept: CARDIOLOGY CLINIC | Age: 72
End: 2022-10-21
Payer: MEDICARE

## 2022-10-21 VITALS
WEIGHT: 215 LBS | HEART RATE: 60 BPM | HEIGHT: 65 IN | SYSTOLIC BLOOD PRESSURE: 104 MMHG | BODY MASS INDEX: 35.82 KG/M2 | DIASTOLIC BLOOD PRESSURE: 64 MMHG

## 2022-10-21 DIAGNOSIS — E78.2 MIXED HYPERLIPIDEMIA: ICD-10-CM

## 2022-10-21 DIAGNOSIS — I25.5 ISCHEMIC CARDIOMYOPATHY: Primary | ICD-10-CM

## 2022-10-21 DIAGNOSIS — I25.10 CORONARY ARTERY DISEASE INVOLVING NATIVE CORONARY ARTERY OF NATIVE HEART WITHOUT ANGINA PECTORIS: ICD-10-CM

## 2022-10-21 DIAGNOSIS — Z95.810 BIVENTRICULAR ICD (IMPLANTABLE CARDIOVERTER-DEFIBRILLATOR) IN PLACE: ICD-10-CM

## 2022-10-21 DIAGNOSIS — Z95.1 STATUS POST AORTO-CORONARY ARTERY BYPASS GRAFT: ICD-10-CM

## 2022-10-21 DIAGNOSIS — I10 PRIMARY HYPERTENSION: ICD-10-CM

## 2022-10-21 DIAGNOSIS — I50.42 CHRONIC COMBINED SYSTOLIC (CONGESTIVE) AND DIASTOLIC (CONGESTIVE) HEART FAILURE (HCC): ICD-10-CM

## 2022-10-21 PROCEDURE — G8484 FLU IMMUNIZE NO ADMIN: HCPCS | Performed by: INTERNAL MEDICINE

## 2022-10-21 PROCEDURE — 1036F TOBACCO NON-USER: CPT | Performed by: INTERNAL MEDICINE

## 2022-10-21 PROCEDURE — G8427 DOCREV CUR MEDS BY ELIG CLIN: HCPCS | Performed by: INTERNAL MEDICINE

## 2022-10-21 PROCEDURE — 1090F PRES/ABSN URINE INCON ASSESS: CPT | Performed by: INTERNAL MEDICINE

## 2022-10-21 PROCEDURE — G8399 PT W/DXA RESULTS DOCUMENT: HCPCS | Performed by: INTERNAL MEDICINE

## 2022-10-21 PROCEDURE — 99214 OFFICE O/P EST MOD 30 MIN: CPT | Performed by: INTERNAL MEDICINE

## 2022-10-21 PROCEDURE — 1123F ACP DISCUSS/DSCN MKR DOCD: CPT | Performed by: INTERNAL MEDICINE

## 2022-10-21 PROCEDURE — G8417 CALC BMI ABV UP PARAM F/U: HCPCS | Performed by: INTERNAL MEDICINE

## 2022-10-21 PROCEDURE — 93000 ELECTROCARDIOGRAM COMPLETE: CPT | Performed by: INTERNAL MEDICINE

## 2022-10-21 PROCEDURE — 3017F COLORECTAL CA SCREEN DOC REV: CPT | Performed by: INTERNAL MEDICINE

## 2022-10-21 RX ORDER — LISINOPRIL 5 MG/1
5 TABLET ORAL DAILY
Qty: 90 TABLET | Refills: 3 | Status: SHIPPED | OUTPATIENT
Start: 2022-10-21

## 2022-10-21 ASSESSMENT — ENCOUNTER SYMPTOMS
GASTROINTESTINAL NEGATIVE: 1
DIARRHEA: 0
VOMITING: 0
SHORTNESS OF BREATH: 0
RESPIRATORY NEGATIVE: 1
NAUSEA: 0
EYES NEGATIVE: 1

## 2022-10-21 NOTE — PROGRESS NOTES
[facility] Progress Note                            Date:  10/21/2022  Patient: Burnell Schirmer  Age:  74 y.o., 1950      Reason for evaluation:         SUBJECTIVE:    Seen today follow-up assessment chronic combined systolic and diastolic congestive heart failure ischemic cardiomyopathy coronary artery disease previous CABG biventricular ICD placement hypertension hyperlipidemia overall doing well. Clearance for cataract surgery requested which should not be an issue. ICD recently evaluated found to be functioning appropriately. EKG tracing paced rhythm unremarkable. Gets out of breath somewhat if she overexerts herself but this has been stable and unchanged in recent months. Denies anginal chest pain denies palpitations no other complaints or issues reported. Blood pressure today 104/64 heart rate 60. Review of Systems   Constitutional: Negative. Negative for chills, fever and unexpected weight change. HENT: Negative. Eyes: Negative. Respiratory: Negative. Negative for shortness of breath. Cardiovascular: Negative. Negative for chest pain. Gastrointestinal: Negative. Negative for diarrhea, nausea and vomiting. Endocrine: Negative. Genitourinary: Negative. Musculoskeletal: Negative. Skin: Negative. Neurological: Negative. All other systems reviewed and are negative. OBJECTIVE:     /64   Pulse 60   Ht 5' 5\" (1.651 m)   Wt 215 lb (97.5 kg)   BMI 35.78 kg/m²     Labs:   CBC: No results for input(s): WBC, HGB, HCT, PLT in the last 72 hours. BMP:No results for input(s): NA, K, CO2, BUN, CREATININE, LABGLOM, GLUCOSE in the last 72 hours. BNP: No results for input(s): BNP in the last 72 hours. PT/INR: No results for input(s): PROTIME, INR in the last 72 hours. APTT:No results for input(s): APTT in the last 72 hours. CARDIAC ENZYMES:No results for input(s): CKTOTAL, CKMB, CKMBINDEX, TROPONINI in the last 72 hours.   FASTING LIPID PANEL:  Lab Results   Component Value Date/Time    HDL 53 08/10/2022 09:05 AM    LDLCALC 48 08/10/2022 09:05 AM    TRIG 89 08/10/2022 09:05 AM     LIVER PROFILE:No results for input(s): AST, ALT, LABALBU in the last 72 hours.         Past Medical History:   Diagnosis Date    Acute sinusitis     [place]APARRO (acute kidney injury) ([facility] 05/18/2018    Anemia     Bleeding ulcer     hx of; cauterized by dr. resendiz    Breast cancer [facility])     Breast lump     CAD (coronary artery disease)     sees dr. Limon Em ulcer [facility])     Cataract     COVID-19 01/2021    fatigue    Dermatitis     Diabetes mellitus ([facility]     Diabetic nephropathy ([facility]     Diabetic retinopathy ([facility]     Glaucoma     Gout     Hemorrhoids     Hx of TB skin testing     positive    Hyperlipidemia     Hypertension     Long term current use of aromatase inhibitor 5/27/2022    Osteopenia of left hip 5/27/2022    Palliative care patient 06/24/2021    Type 2 diabetes mellitus ([facility] [address] 05/18/2018    Vitamin D deficiency      Past Surgical History:   Procedure Laterality Date    BREAST LUMPECTOMY Right 10/29/2021    RIGHT LUMPECTOMY, SNB, PREOP ULTRASOUND, INTRAOP ULTRASOUND GUIDED NL, BIOSORB, PEC BLOCK, MARGIN PROBE, FLAPS, IORT performed by Paz Favre, MD at [place] [address]  02/15/2022    CARDIAC SURGERY  2001    CARPAL TUNNEL RELEASE Right 9/3/2020    RIGHT CARPAL TUNNEL RELEASE performed by Bal Bach MD at [facility] N/A 10/11/2019    Dr Elisabet Dawkins of a hemostatic clip-Suboptimal prep, diverticulosis, internal hemorrhoids-Grade 2, AP x 3, 1 yr recall     COLONOSCOPY N/A 10/12/2020    Dr Onesimo Landau hemorrhoids-Grade 1-2, AP, 3 yr recall    CORONARY ARTERY BYPASS GRAFT  2001    ENDOSCOPY, COLON, DIAGNOSTIC      EYE SURGERY      MOUTH SURGERY      OTHER SURGICAL HISTORY      sternal resection    PACEMAKER PLACEMENT  02/23/2022    SKIN BIOPSY      UPPER GASTROINTESTINAL ENDOSCOPY N/A 10/11/2019    Dr Kilo Becker (+) H Pylori    US BREAST NEEDLE BIOPSY LEFT Left 9/1/2021    US BREAST NEEDLE BIOPSY LEFT 9/1/2021 [facility] SURGERY    VASCULAR SURGERY       Family History   Problem Relation Age of Onset    Cancer Mother     Diabetes Father     COPD Father     Diabetes Sister     Heart Disease Sister     Colon Polyps Sister     Coronary Art Dis Other         grandfather    Diabetes Other         aunt    Colon Cancer Neg Hx     Liver Cancer Neg Hx     Rectal Cancer Neg Hx      Allergies   Allergen Reactions    Medrol [Methylprednisolone] Other (See Comments)     Affects her eyes     Current Outpatient Medications   Medication Sig Dispense Refill    lisinopril (PRINIVIL;ZESTRIL) 5 MG tablet Take 1 tablet by mouth daily 90 tablet 3    nystatin (MYCOSTATIN) 449012 UNIT/GM cream Apply topically 2 times daily. 30 g 5    albuterol sulfate HFA (PROVENTIL;VENTOLIN;PROAIR) 108 (90 Base) MCG/ACT inhaler Inhale 2 puffs into the lungs every 6 hours as needed for Wheezing 18 g 3    isosorbide mononitrate (IMDUR) 30 MG extended release tablet TAKE 1 TABLET TWICE DAILY 180 tablet 3    furosemide (LASIX) 20 MG tablet Take 1 tablet by mouth in the morning. 90 tablet 1    alendronate (FOSAMAX) 70 MG tablet Take 1 tablet by mouth every 7 days Take with a full glass of water upon arising for the day. Consume on an empty stomach at least 30 minutes before the first food, beverage, or medication. Stay upright (do not lie down) for at least 30 minutes. Do not crush or break. 12 tablet 3    ferrous sulfate (IRON 325) 325 (65 Fe) MG tablet Take 1 tablet by mouth in the morning and 1 tablet before bedtime.  180 tablet 3    Accu-Chek Softclix Lancets MISC TEST FOUR TIMES DAILY 400 each 3    blood glucose test strips (ACCU-CHEK GUIDE) strip TEST BLOOD SUGAR FOUR TIMES DAILY 400 strip 3    metFORMIN (GLUCOPHAGE) 500 MG tablet TAKE 2 TABLETS TWICE DAILY WITH MEALS 360 tablet 1    amLODIPine (NORVASC) 5 MG tablet TAKE 1 TABLET EVERY DAY 90 tablet 1    metoprolol tartrate (LOPRESSOR) 50 MG tablet 2 tablets  tablet 3    letrozole (FEMARA) 2.5 MG tablet Take 1 tablet by mouth daily 90 tablet 3    montelukast (SINGULAIR) 10 MG tablet Take 1 tablet by mouth daily 90 tablet 3    timolol (TIMOPTIC) 0.25 % ophthalmic solution Place 1 drop into the right eye daily       simvastatin (ZOCOR) 40 MG tablet Take 1 tablet by mouth nightly 90 tablet 3    glipiZIDE (GLUCOTROL XL) 2.5 MG extended release tablet Take 1 tablet by mouth daily 90 tablet 3    Blood Glucose Calibration (ACCU-CHEK ROSA) SOLN Calibration done daily. 1 each 1    Alcohol Swabs (B-D SINGLE USE SWABS REGULAR) PADS USE THREE TIMES DAILY 100 each 11    aspirin 81 MG tablet Take 81 mg by mouth daily      Omega-3 Fatty Acids (FISH OIL) 1000 MG CAPS Take 1,000 mg by mouth 2 times daily      MULTIPLE VITAMIN PO Take 1 tablet by mouth daily      Calcium Carbonate-Vitamin D (CALCIUM 500/D PO) Take 1 tablet by mouth 2 times daily      vitamin B-12 (CYANOCOBALAMIN) 1000 MCG tablet Take 1,000 mcg by mouth daily       No current facility-administered medications for this visit.      Social History     Socioeconomic History    Marital status: Single     Spouse name: Not on file    Number of children: Not on file    Years of education: Not on file    Highest education level: Not on file   Occupational History     Employer: [employer]   Tobacco Use    Smoking status: Never    Smokeless tobacco: Never   Vaping Use    Vaping Use: Never used   Substance and Sexual Activity    Alcohol use: Not Currently     Alcohol/week: 1.0 standard drink     Types: 1 Shots of liquor per week     Comment: rare    Drug use: No    Sexual activity: Never   Other Topics Concern    Not on file   Social History Narrative    Not on file     Social Determinants of Health     Financial Resource Strain: Low Risk     Difficulty of Paying Living Expenses: Not hard at all   Food Insecurity: No Food Insecurity    Worried About Running Out of Food in the Last Year: Never true    Ran Out of Food in the Last Year: Never true   Transportation Needs: Not on file   Physical Activity: Not on file   Stress: Not on file   Social Connections: Not on file   Intimate Partner Violence: Not on file   Housing Stability: Not on file       Physical Examination:  /64   Pulse 60   Ht 5' 5\" (1.651 m)   Wt 215 lb (97.5 kg)   BMI 35.78 kg/m²   Physical Exam  Vitals reviewed. Constitutional:       Appearance: She is well-developed. Neck:      Vascular: No carotid bruit or JVD. Cardiovascular:      Rate and Rhythm: Normal rate and regular rhythm. Heart sounds: Normal heart sounds. No murmur heard. No friction rub. No gallop. Pulmonary:      Effort: Pulmonary effort is normal. No respiratory distress. Breath sounds: Normal breath sounds. No wheezing or rales. Abdominal:      General: There is no distension. Tenderness: There is no abdominal tenderness. Lymphadenopathy:      Cervical: No cervical adenopathy. Skin:     General: Skin is warm and dry. ASSESSMENT:     Diagnosis Orders   1. Ischemic cardiomyopathy  EKG 12 lead      2. Primary hypertension  lisinopril (PRINIVIL;ZESTRIL) 5 MG tablet      3. Biventricular ICD (implantable cardioverter-defibrillator) in place        4. Chronic combined systolic (congestive) and diastolic (congestive) heart failure ([address]        5. Coronary artery disease involving native coronary artery of native heart without angina pectoris        6. Mixed hyperlipidemia        7.  Status post aorto-coronary artery bypass graft            PLAN:  Orders Placed This Encounter   Procedures    EKG 12 lead     Orders Placed This Encounter   Medications    lisinopril (PRINIVIL;ZESTRIL) 5 MG tablet     Sig: Take 1 tablet by mouth daily     Dispense:  90 tablet     Refill:  3         Continue present medications  No objection to planned cataract surgery  Recommend follow-up assessment in 3 months    Return in about 3 months (around 1/21/2023) for return to Dr. Amy Morel only. Mary Ann Crisostomo MD 10/21/2022 11:07 AM CDT    [facility]      Thisdictation was generated by voice recognition computer software. Although all attempts are made to edit the dictation for accuracy, there may be errors in the transcription that are not intended.

## 2022-11-09 NOTE — PROGRESS NOTES
Progress Note      Pt Name: Meena Joe: 1950  MRN: [id number]    Date of evaluation: 11/10/2022  History Obtained From:  patient, electronic medical record    CHIEF COMPLAINT:    Chief Complaint   Patient presents with    Follow-up     Malignant neoplasm of overlapping sites of right breast in female, estrogen receptor positive ([address]    Discuss Labs    Anemia    Other     Osteopenia     HISTORY OF PRESENT ILLNESS:    Judith Azul is a 74 y.o.  female who is currently being followed for infiltrating ductal carcinoma the right breast, ER/SD positive, [id number], 9/1/2021 and iron deficiency anemia. Current recommendation for her breast cancer is adjuvant endocrine therapy with letrozole 2.5 mg p.o. daily for 5 years, through September 2026. She also has osteopenia so Fosamax 70 mg weekly was recommended in the setting of aromatase inhibitor therapy. In relation to her diagnosis of iron deficiency, recommendation is for ferrous sulfate 325 mg twice daily along with vitamin C 500 mg daily and stool softener. Carmine Owens returns today in scheduled follow-up for evaluation, lab monitoring and further treatment recommendations. She presents today indicating that she is compliant with her medications and has no new breast complaints. Today's clinic visit to include physical assessment, review of systems, any lab or radiographic findings that were available and plan of care are documented below. ONCOLOGIC HISTORY:     Diagnosis  Infiltrating ductal carcinoma, right breast, Sept 2021  ER 96%, SD 94%, Her-2 2+/equivocal, Ki67 6%, FISH negative  MammaPrint Luminal A/low risk  [id number]  Iron deficiency anemia     Treatment Summary  9/20/21-adjuvant endocrine hormone therapy with Letrozole 2.5mg daily.   Will continue for 5 years  10/29/21-right lumpectomy/sentinel lymph node biopsy  10/29/2021-IORT 2000 cGy  5/25/2022 -initiated ferrous sulfate 325 mg twice daily     Perla Rock was first seen by Dr David Sutherland on 11/24/2021. She was referred by Dr. Denice Levy for a diagnosis of right breast cancer. She is a retired RN. She noticed a mass in the right breast several months prior to her diagnosis. 8/17/21 US right & left breast: 2.5 cm lower right breast mass at 6:00, highly suspicious for breast malignancy. In the left breast there is a 0.6 cm benign-appearing circumscribed hypoechoic solid mass at the 2:00 position. Primary considerations would include intramammary lymph node versus fibroadenoma. Typical recommendation would be a 6 month follow-up ultrasound to ensure stability although biopsy could be considered given what appears to be a contralateral malignancy. No suspicious axillary adenopathy seen. 8/17/21 Bilateral diagnostic mammogram: There is a 2.5 cm irregularly-shaped, lobulated high density mass in the lower right breast near 5:00. Overlying skin marker. No other mass is identified in the right breast. There is also a 0.8 cm oval circumscribed equal density mass in the upper outer left breast near 2:00, mid depth. There are a few scattered bilateral benign calcifications. At the 6:00 position in the right breast, approximately 1 cm from the nipple, there is a 2.5 x 2.0 x 2.5 cm hypoechoic lobulated solid mass. 9/1/21 Breast, right breast needle core biopsies at 6 o'clock position: Infiltrating ductal carcinoma, no special type, favor grade 3. Infiltrating carcinoma measures 1.5 cm in greatest linear dimension and is present in multiple cores. Breast, left breast needle core biopsies at 2 o'clock position: Benign spindle cell nodule, consistent with partially hyalinized schwannoma. ER 96%, [place] 94%, Her-2 2+/equivocal, Ki67 6%, FISH negative, MammaPrint Luminal A/low risk.   9/20/21-Initiated endocrine hormone therapy with Letrozole 2.5mg daily  10/29/21-she underwent a right simple mastectomy by Dr. Da Lerma at [facility]: Infiltrating ductal carcinoma, no special type, grade 2. Benign breast parenchyma. Lymph node, right sentinel lymph node biopsy: 2 lymph nodes, negative for evidence of malignancy. AJCC STAGE: pT2, (sn)pN0, pMx. Ki-67 staining fraction is low and estimated at 2% which is decreased from the original percentage of 6%. 10/29/2021-IORT 2000 Gy by Dr Luis A Morrison. 2021-she was first seen by Tanner Cox. Discussed NCCN guidelines recommendations. Recommended Oncotype DX to assess need for chemotherapy. The patient said she would decline adjuvant chemotherapy regardless of results of Oncotype DX. Therefore, Oncotype DX was not sent. No recommendation for radiation due to right mastectomy with clear margins and negative nodes. Continue letrozole x5 years. 2022 Right mammogram- Postsurgical changes right breast. No suspicious abnormality.  Benign mammogram.   10/17/2022 Bilateral mammogram- no radiographic evidence of malignancy    Age-appropriate health screenin2021 Bone density- osteopenia; femoral neck T score -0.9; lumbar spine T score 1.7  10/12/2020 Colonoscopy- polys x2-tubular adenoma; internal hemorrhoids without bleeding    Past Medical History:    Past Medical History:   Diagnosis Date    Acute sinusitis     CHAPARRO (acute kidney injury) ([address] 2018    Anemia     Bleeding ulcer     hx of; cauterized by dr. resendiz    Breast cancer [facility])     Breast lump     CAD (coronary artery disease)     sees dr. Andres Robledo ulcer [facility])     Cataract     COVID-19 2021    fatigue    Dermatitis     Diabetes mellitus ([address]     Diabetic nephropathy ([address]     Diabetic retinopathy ([address]     Glaucoma     Gout     Hemorrhoids     Hx of TB skin testing     positive    Hyperlipidemia     Hypertension     Long term current use of aromatase inhibitor 2022    Osteopenia of left hip 2022    Palliative care patient 2021    Type 2 diabetes mellitus ([address] 2018    Vitamin D deficiency        Past Surgical History:    Past Surgical History:   Procedure Laterality Date    BREAST LUMPECTOMY Right 10/29/2021    RIGHT LUMPECTOMY, SNB, PREOP ULTRASOUND, INTRAOP ULTRASOUND GUIDED NL, BIOSORB, PEC BLOCK, MARGIN PROBE, FLAPS, IORT performed by Elaina Dawkins MD at [place] [address]  02/15/2022    CARDIAC SURGERY  2001    CARPAL TUNNEL RELEASE Right 9/3/2020    RIGHT CARPAL TUNNEL RELEASE performed by Benigno Puente MD at [facility] N/A 10/11/2019    Dr Cecy Torres of a hemostatic clip-Suboptimal prep, diverticulosis, internal hemorrhoids-Grade 2, AP x 3, 1 yr recall     COLONOSCOPY N/A 10/12/2020    Dr Yelena Smith hemorrhoids-Grade 1-2, AP, 3 yr recall    CORONARY ARTERY BYPASS GRAFT  2001    ENDOSCOPY, COLON, DIAGNOSTIC      EYE SURGERY      MOUTH SURGERY      OTHER SURGICAL HISTORY      sternal resection    PACEMAKER PLACEMENT  02/23/2022    SKIN BIOPSY      UPPER GASTROINTESTINAL ENDOSCOPY N/A 10/11/2019    Dr Jac Estevez (+) H Pylori    US BREAST NEEDLE BIOPSY LEFT Left 9/1/2021    US BREAST NEEDLE BIOPSY LEFT 9/1/2021 [facility] GENERAL SURGERY    VASCULAR SURGERY         Current Medications:    Current Outpatient Medications   Medication Sig Dispense Refill    furosemide (LASIX) 40 MG tablet Take 1 tablet by mouth daily 90 tablet 3    lisinopril (PRINIVIL;ZESTRIL) 5 MG tablet Take 1 tablet by mouth daily 90 tablet 3    nystatin (MYCOSTATIN) 686945 UNIT/GM cream Apply topically 2 times daily. 30 g 5    albuterol sulfate HFA (PROVENTIL;VENTOLIN;PROAIR) 108 (90 Base) MCG/ACT inhaler Inhale 2 puffs into the lungs every 6 hours as needed for Wheezing 18 g 3    isosorbide mononitrate (IMDUR) 30 MG extended release tablet TAKE 1 TABLET TWICE DAILY 180 tablet 3    furosemide (LASIX) 20 MG tablet Take 1 tablet by mouth in the morning.  90 tablet 1    alendronate (FOSAMAX) 70 MG tablet Take 1 tablet by mouth every 7 days Take with a full glass of water upon arising for the day. Consume on an empty stomach at least 30 minutes before the first food, beverage, or medication. Stay upright (do not lie down) for at least 30 minutes. Do not crush or break. 12 tablet 3    ferrous sulfate (IRON 325) 325 (65 Fe) MG tablet Take 1 tablet by mouth in the morning and 1 tablet before bedtime. 180 tablet 3    Accu-Chek Softclix Lancets MISC TEST FOUR TIMES DAILY 400 each 3    blood glucose test strips (ACCU-CHEK GUIDE) strip TEST BLOOD SUGAR FOUR TIMES DAILY 400 strip 3    metFORMIN (GLUCOPHAGE) 500 MG tablet TAKE 2 TABLETS TWICE DAILY WITH MEALS 360 tablet 1    amLODIPine (NORVASC) 5 MG tablet TAKE 1 TABLET EVERY DAY 90 tablet 1    metoprolol tartrate (LOPRESSOR) 50 MG tablet 2 tablets  tablet 3    letrozole (FEMARA) 2.5 MG tablet Take 1 tablet by mouth daily 90 tablet 3    montelukast (SINGULAIR) 10 MG tablet Take 1 tablet by mouth daily 90 tablet 3    timolol (TIMOPTIC) 0.25 % ophthalmic solution Place 1 drop into the right eye daily       simvastatin (ZOCOR) 40 MG tablet Take 1 tablet by mouth nightly 90 tablet 3    glipiZIDE (GLUCOTROL XL) 2.5 MG extended release tablet Take 1 tablet by mouth daily 90 tablet 3    Blood Glucose Calibration (ACCU-CHEK ROSA) SOLN Calibration done daily.  1 each 1    Alcohol Swabs (B-D SINGLE USE SWABS REGULAR) PADS USE THREE TIMES DAILY 100 each 11    aspirin 81 MG tablet Take 81 mg by mouth daily      Omega-3 Fatty Acids (FISH OIL) 1000 MG CAPS Take 1,000 mg by mouth 2 times daily      MULTIPLE VITAMIN PO Take 1 tablet by mouth daily      Calcium Carbonate-Vitamin D (CALCIUM 500/D PO) Take 1 tablet by mouth 2 times daily      vitamin B-12 (CYANOCOBALAMIN) 1000 MCG tablet Take 1,000 mcg by mouth daily      prednisoLONE acetate (PRED FORTE) 1 % ophthalmic suspension  (Patient not taking: Reported on 11/11/2022)      tobramycin (TOBREX) 0.3 % ophthalmic solution  (Patient not taking: Reported on 11/11/2022)      Continuous Blood Gluc Sensor (DEXCOM G6 SENSOR) MISC Changer every 2 weeks E11.9 6 each 3    Continuous Blood Gluc Transmit (DEXCOM G6 TRANSMITTER) MISC Change every 3 months E11.9 1 each 0    Continuous Blood Gluc  (DEXCOM G6 ) ARMANDO Change every 2 weeks E11.9 6 each 0     No current facility-administered medications for this visit. Allergies: Allergies   Allergen Reactions    Medrol [Methylprednisolone] Other (See Comments)     Affects her eyes       Social History:    Social History     Tobacco Use    Smoking status: Never    Smokeless tobacco: Never   Vaping Use    Vaping Use: Never used   Substance Use Topics    Alcohol use: Not Currently     Alcohol/week: 1.0 standard drink     Types: 1 Shots of liquor per week     Comment: rare    Drug use: No       Family History:   Family History   Problem Relation Age of Onset    Cancer Mother     Diabetes Father     COPD Father     Diabetes Sister     Heart Disease Sister     Colon Polyps Sister     Coronary Art Dis Other         grandfather    Diabetes Other         aunt    Colon Cancer Neg Hx     Liver Cancer Neg Hx     Rectal Cancer Neg Hx        Vitals:  Vitals:    11/10/22 0911   BP: 126/76   Pulse: 75   SpO2: 96%   Weight: 216 lb (98 kg)        Subjective   REVIEW OF SYSTEMS:   Review of Systems   Constitutional:  Positive for fatigue. Negative for chills, diaphoresis and fever. HENT:  Positive for congestion. Negative for ear pain, hearing loss, nosebleeds, sore throat and tinnitus. Eyes: Negative. Negative for pain, discharge and redness. Respiratory:  Positive for cough and shortness of breath. Negative for wheezing. Cardiovascular: Negative. Negative for chest pain, palpitations and leg swelling. Gastrointestinal: Negative. Negative for abdominal pain, blood in stool, constipation, diarrhea, nausea and vomiting. Endocrine: Negative for polydipsia. Genitourinary:  Negative for dysuria, flank pain, frequency, hematuria and urgency. Musculoskeletal: Negative. Negative for back pain, myalgias and neck pain. Skin: Negative. Negative for rash. Neurological: Negative. Negative for dizziness, tremors, seizures, weakness and headaches. Hematological:  Does not bruise/bleed easily. Psychiatric/Behavioral: Negative. The patient is not nervous/anxious. Objective   PHYSICAL EXAM:  Physical Exam  Vitals reviewed. Constitutional:       General: She is not in acute distress. Appearance: She is well-developed. HENT:      Head: Normocephalic and atraumatic. Mouth/Throat:      Pharynx: Uvula midline. Tonsils: No tonsillar exudate. Eyes:      General: Lids are normal.      Conjunctiva/sclera: Conjunctivae normal.      Pupils: Pupils are equal, round, and reactive to light. Neck:      Thyroid: No thyroid mass or thyromegaly. Vascular: No JVD. Trachea: Trachea normal. No tracheal deviation. Cardiovascular:      Rate and Rhythm: Normal rate and regular rhythm. Pulses: Normal pulses. Heart sounds: Normal heart sounds. Pulmonary:      Effort: Pulmonary effort is normal. No respiratory distress. Breath sounds: Normal breath sounds. No wheezing or rales. Comments: Faint rub noted in right lower lobe  Chest:      Chest wall: No tenderness. Abdominal:      General: Bowel sounds are normal. There is no distension. Palpations: Abdomen is soft. There is no mass. Tenderness: There is no abdominal tenderness. There is no guarding. Musculoskeletal:         General: No tenderness or deformity. Cervical back: Normal range of motion and neck supple. Comments: Range of motion within normal limits x4 extremities   Skin:     General: Skin is warm. Findings: No bruising, erythema or rash. Neurological:      Mental Status: She is alert and oriented to person, place, and time. Cranial Nerves: No cranial nerve deficit.       Coordination: Coordination normal.   Psychiatric: Behavior: Behavior normal.         Thought Content: Thought content normal.       Labs reviewed today:  Lab Results   Component Value Date    WBC 5.97 11/10/2022    HGB 11.1 (L) 11/10/2022    HCT 34.9 11/10/2022    .7 (H) 11/10/2022     (L) 11/10/2022     Lab Results   Component Value Date    NEUTROABS 3.85 11/10/2022       ASSESSMENT/PLAN:      1. Infiltrating ductal carcinoma the right breast, ER/MA positive, Her-2 2+/equivocal, Ki67 6%, FISH negative, MammaPrint Luminal A/low risk,  [id number], 9/1/2021. Current recommendation is for adjuvant endocrine therapy with letrozole 2.5 mg daily, anticipate dosing through September 2026. Reports compliant with letrozole 2.5 mg daily. No new breast complaints. Declined breast examination today. 10/17/2022 Bilateral mammogram- no radiographic evidence of malignancy    Dilia Barfield was seen in follow-up by ZULEMA Medina on 10/17/2022, I reviewed his clinic visit documentation that suggested no new concerning findings with bilateral breast examination. Note states that she has appropriate post operative changes on the right breast.     -Continue letrozole 2.5 mg daily  -Consider breast cancer index at approximately January 2026 to assess extended benefit present for additional 5 years of adjuvant endocrine therapy.  -Encouraged self breast examinations  -Follow-up with Dr. Arturo Menon as recommended    2. Encounter for monitoring aromatase inhibitor therapy  -Hot flashes-denies  -Chronic arthralgias-stable with no significant change from previous follow-up    3. Iron deficiency anemia current recommendation is for ferrous sulfate 325 mg p.o. twice daily and vitamin C 500 mg daily. She reports she is also taking an over-the-counter supplement of B12. Reports compliant and tolerating iron without significant difficulty.   Hemoglobin 11.1, hematocrit 34.9 and .7 today, continues to suggest improvement    07/28/2022 Serology results  Iron 47  TIBC 370  Saturation 13%  Ferritin 25.4    - Continue Ferrous Sulfate 325 mg p.o. twice daily  -Okay to continue OTC B12 replacement  -Continue vitamin C 500 mg daily with one of her doses of ferrous sulfate    The following will be completed for further evaluation:  Iron panel  Ferritin    -Requested blood Occult Stool Screen #1 on 5/25/2022, yet to be completed follow-up on    4. Postmenopausal status and at risk for bone loss: 12/13/2021 Bone density- osteopenia; femoral neck T score -0.9; lumbar spine T score 1.7    Vitamin D 54.4 with a calcium of 9.2 on 5/16/2022    -Continue Fosamax 70mg once a week, electronic prescription sent  -Continue taking calcium 500 mg with vitamin D 500 mg twice daily  -Monitor for bone loss closely, will repeat bone mineral density study after 12/13/2023      5. Survivorship and health maintenance recommendations  Keep a healthy body weight. Dietary recommendations include limit energy intake from total fats and sugars; increase consumption of fruit and vegetables, as well as legumes, whole grains and nuts. Engage in regular physical activity (150 minutes spread through the week). Other recommendations include minimizing alcohol intake, avoid use of tobacco products. Practice sun safety: Utilize a sunscreen with an SPF of at least 27. Avoiding tanning beds. Ensure adequate amount of sleep. Follow-up with primary care regularly and for further recommendations regarding age-appropriate screening for cancer, wellbeing visit (preventive measures), follow-up and treatment for other medical comorbidities. I discussed all of the above findings included in the assessment and plan with the patient and the patient is in agreement to move forward with current recommendations/treatment. I have addressed all of their questions and concerns that were verbalized.     FOLLOW UP:  Follow up given for 4 months or sooner, if needed  Continue to follow with other medical providers as recommended  Labs at next visit: CBC and iron panel    EMR Dragon/Transcription disclaimer:   Much of this encounter note is an electronic transcription/translation of spoken language to printed text. The electronic translation of spoken language may permit erroneous, or at times, nonsensical words or phrases to be inadvertently transcribed; although attempts have made to review the note for such errors, some may still exist.  Please excuse any unrecognized transcription errors and contact us if the error is unintelligible or needs documented correction. Also, portions of this note have been copied forward, however, changed to reflect the most current clinical status of this patient. Electronically signed by SARA Chambers on 11/15/2022 at 6:55 PM  Rj JIMENES am pre-charting as a registered nurse for SARA Payan.

## 2022-11-10 ENCOUNTER — OFFICE VISIT (OUTPATIENT)
Dept: HEMATOLOGY | Age: 72
End: 2022-11-10
Payer: MEDICARE

## 2022-11-10 ENCOUNTER — HOSPITAL ENCOUNTER (OUTPATIENT)
Dept: INFUSION THERAPY | Age: 72
Discharge: HOME | End: 2022-11-10
Payer: MEDICARE

## 2022-11-10 VITALS
SYSTOLIC BLOOD PRESSURE: 126 MMHG | HEART RATE: 75 BPM | BODY MASS INDEX: 35.94 KG/M2 | WEIGHT: 216 LBS | DIASTOLIC BLOOD PRESSURE: 76 MMHG | OXYGEN SATURATION: 96 %

## 2022-11-10 DIAGNOSIS — Z51.81 ENCOUNTER FOR MONITORING AROMATASE INHIBITOR THERAPY: ICD-10-CM

## 2022-11-10 DIAGNOSIS — Z17.0 MALIGNANT NEOPLASM OF OVERLAPPING SITES OF RIGHT BREAST IN FEMALE, ESTROGEN RECEPTOR POSITIVE (HCC): ICD-10-CM

## 2022-11-10 DIAGNOSIS — Z79.811 ENCOUNTER FOR MONITORING AROMATASE INHIBITOR THERAPY: ICD-10-CM

## 2022-11-10 DIAGNOSIS — C50.811 MALIGNANT NEOPLASM OF OVERLAPPING SITES OF RIGHT BREAST IN FEMALE, ESTROGEN RECEPTOR POSITIVE (HCC): ICD-10-CM

## 2022-11-10 DIAGNOSIS — E11.21 TYPE II DIABETES MELLITUS WITH NEPHROPATHY (HCC): ICD-10-CM

## 2022-11-10 DIAGNOSIS — D50.9 IRON DEFICIENCY ANEMIA, UNSPECIFIED IRON DEFICIENCY ANEMIA TYPE: Primary | ICD-10-CM

## 2022-11-10 DIAGNOSIS — M85.80 OSTEOPENIA, UNSPECIFIED LOCATION: ICD-10-CM

## 2022-11-10 DIAGNOSIS — Z71.89 CARE PLAN DISCUSSED WITH PATIENT: ICD-10-CM

## 2022-11-10 LAB
ALBUMIN SERPL-MCNC: 4.3 G/DL (ref 3.5–5.2)
ALP BLD-CCNC: 47 U/L (ref 35–104)
ALT SERPL-CCNC: 11 U/L (ref 5–33)
ANION GAP SERPL CALCULATED.3IONS-SCNC: 12 MMOL/L (ref 7–19)
AST SERPL-CCNC: 24 U/L (ref 5–32)
BASOPHILS ABSOLUTE: 0 K/UL (ref 0–0.2)
BASOPHILS ABSOLUTE: 0.06 K/UL (ref 0.01–0.08)
BASOPHILS RELATIVE PERCENT: 0.7 % (ref 0–1)
BASOPHILS RELATIVE PERCENT: 1 % (ref 0.1–1.2)
BILIRUB SERPL-MCNC: 0.3 MG/DL (ref 0.2–1.2)
BUN BLDV-MCNC: 25 MG/DL (ref 8–23)
CALCIUM SERPL-MCNC: 8.6 MG/DL (ref 8.8–10.2)
CHLORIDE BLD-SCNC: 101 MMOL/L (ref 98–111)
CHOLESTEROL, TOTAL: 116 MG/DL (ref 160–199)
CO2: 26 MMOL/L (ref 22–29)
CREAT SERPL-MCNC: 2.1 MG/DL (ref 0.5–0.9)
CREATININE URINE: 43.5 MG/DL (ref 4.2–622)
EOSINOPHILS ABSOLUTE: 0.18 K/UL (ref 0.04–0.54)
EOSINOPHILS ABSOLUTE: 0.2 K/UL (ref 0–0.6)
EOSINOPHILS RELATIVE PERCENT: 2.9 % (ref 0–5)
EOSINOPHILS RELATIVE PERCENT: 3 % (ref 0.7–7)
FERRITIN: 41.5 NG/ML (ref 13–150)
GFR SERPL CREATININE-BSD FRML MDRD: 24 ML/MIN/{1.73_M2}
GLUCOSE BLD-MCNC: 113 MG/DL (ref 74–109)
HBA1C MFR BLD: 5.7 % (ref 4–6)
HCT VFR BLD CALC: 34.9 % (ref 34.1–44.9)
HCT VFR BLD CALC: 35.6 % (ref 37–47)
HDLC SERPL-MCNC: 51 MG/DL (ref 65–121)
HEMOGLOBIN: 11.1 G/DL (ref 11.2–15.7)
HEMOGLOBIN: 11.3 G/DL (ref 12–16)
IMMATURE GRANULOCYTES #: 0 K/UL
IRON SATURATION: 15 % (ref 14–50)
IRON: 52 UG/DL (ref 37–145)
LDL CHOLESTEROL CALCULATED: 48 MG/DL
LYMPHOCYTES ABSOLUTE: 1.3 K/UL (ref 1.1–4.5)
LYMPHOCYTES ABSOLUTE: 1.49 K/UL (ref 1.18–3.74)
LYMPHOCYTES RELATIVE PERCENT: 23.9 % (ref 20–40)
LYMPHOCYTES RELATIVE PERCENT: 25 % (ref 19.3–53.1)
MCH RBC QN AUTO: 32.4 PG (ref 25.6–32.2)
MCH RBC QN AUTO: 32.6 PG (ref 27–31)
MCHC RBC AUTO-ENTMCNC: 31.7 G/DL (ref 33–37)
MCHC RBC AUTO-ENTMCNC: 31.8 G/DL (ref 32.3–35.5)
MCV RBC AUTO: 101.7 FL (ref 79.4–94.8)
MCV RBC AUTO: 102.6 FL (ref 81–99)
MICROALBUMIN UR-MCNC: 127.9 MG/DL (ref 0–19)
MICROALBUMIN/CREAT UR-RTO: 2940.2 MG/G
MONOCYTES ABSOLUTE: 0.3 K/UL (ref 0–0.9)
MONOCYTES ABSOLUTE: 0.38 K/UL (ref 0.24–0.82)
MONOCYTES RELATIVE PERCENT: 6 % (ref 0–10)
MONOCYTES RELATIVE PERCENT: 6.4 % (ref 4.7–12.5)
NEUTROPHILS ABSOLUTE: 3.6 K/UL (ref 1.5–7.5)
NEUTROPHILS ABSOLUTE: 3.85 K/UL (ref 1.56–6.13)
NEUTROPHILS RELATIVE PERCENT: 64.4 % (ref 34–71.1)
NEUTROPHILS RELATIVE PERCENT: 66.1 % (ref 50–65)
PDW BLD-RTO: 13.8 % (ref 11.7–14.4)
PDW BLD-RTO: 13.9 % (ref 11.5–14.5)
PLATELET # BLD: 167 K/UL (ref 182–369)
PLATELET # BLD: 172 K/UL (ref 130–400)
PMV BLD AUTO: 10.4 FL (ref 7.4–10.4)
PMV BLD AUTO: 11.2 FL (ref 9.4–12.3)
POTASSIUM SERPL-SCNC: 5 MMOL/L (ref 3.5–5)
RBC # BLD: 3.43 M/UL (ref 3.93–5.22)
RBC # BLD: 3.47 M/UL (ref 4.2–5.4)
SODIUM BLD-SCNC: 139 MMOL/L (ref 136–145)
TOTAL IRON BINDING CAPACITY: 348 UG/DL (ref 250–400)
TOTAL PROTEIN: 6.6 G/DL (ref 6.6–8.7)
TRIGL SERPL-MCNC: 84 MG/DL (ref 0–149)
TSH SERPL DL<=0.05 MIU/L-ACNC: 2.33 UIU/ML (ref 0.27–4.2)
VITAMIN B-12: 1124 PG/ML (ref 211–946)
VITAMIN D 25-HYDROXY: 53.2 NG/ML
WBC # BLD: 5.5 K/UL (ref 4.8–10.8)
WBC # BLD: 5.97 K/UL (ref 3.98–10.04)

## 2022-11-10 PROCEDURE — 3078F DIAST BP <80 MM HG: CPT | Performed by: NURSE PRACTITIONER

## 2022-11-10 PROCEDURE — 1090F PRES/ABSN URINE INCON ASSESS: CPT | Performed by: NURSE PRACTITIONER

## 2022-11-10 PROCEDURE — 3017F COLORECTAL CA SCREEN DOC REV: CPT | Performed by: NURSE PRACTITIONER

## 2022-11-10 PROCEDURE — 1036F TOBACCO NON-USER: CPT | Performed by: NURSE PRACTITIONER

## 2022-11-10 PROCEDURE — 99214 OFFICE O/P EST MOD 30 MIN: CPT | Performed by: NURSE PRACTITIONER

## 2022-11-10 PROCEDURE — 3074F SYST BP LT 130 MM HG: CPT | Performed by: NURSE PRACTITIONER

## 2022-11-10 PROCEDURE — G8417 CALC BMI ABV UP PARAM F/U: HCPCS | Performed by: NURSE PRACTITIONER

## 2022-11-10 PROCEDURE — G8427 DOCREV CUR MEDS BY ELIG CLIN: HCPCS | Performed by: NURSE PRACTITIONER

## 2022-11-10 PROCEDURE — G8484 FLU IMMUNIZE NO ADMIN: HCPCS | Performed by: NURSE PRACTITIONER

## 2022-11-10 PROCEDURE — G8399 PT W/DXA RESULTS DOCUMENT: HCPCS | Performed by: NURSE PRACTITIONER

## 2022-11-10 PROCEDURE — 85025 COMPLETE CBC W/AUTO DIFF WBC: CPT

## 2022-11-10 PROCEDURE — 36415 COLL VENOUS BLD VENIPUNCTURE: CPT

## 2022-11-10 PROCEDURE — 99212 OFFICE O/P EST SF 10 MIN: CPT

## 2022-11-10 PROCEDURE — 1123F ACP DISCUSS/DSCN MKR DOCD: CPT | Performed by: NURSE PRACTITIONER

## 2022-11-10 ASSESSMENT — ENCOUNTER SYMPTOMS
COUGH: 1
SHORTNESS OF BREATH: 1

## 2022-11-11 ENCOUNTER — OFFICE VISIT (OUTPATIENT)
Dept: INTERNAL MEDICINE | Age: 72
End: 2022-11-11
Payer: MEDICARE

## 2022-11-11 VITALS
HEART RATE: 64 BPM | DIASTOLIC BLOOD PRESSURE: 60 MMHG | RESPIRATION RATE: 20 BRPM | SYSTOLIC BLOOD PRESSURE: 130 MMHG | WEIGHT: 219.6 LBS | HEIGHT: 65 IN | OXYGEN SATURATION: 97 % | BODY MASS INDEX: 36.59 KG/M2

## 2022-11-11 DIAGNOSIS — Z17.0 MALIGNANT NEOPLASM OF OVERLAPPING SITES OF RIGHT BREAST IN FEMALE, ESTROGEN RECEPTOR POSITIVE (HCC): ICD-10-CM

## 2022-11-11 DIAGNOSIS — Z91.09 ENVIRONMENTAL ALLERGIES: ICD-10-CM

## 2022-11-11 DIAGNOSIS — I25.10 CORONARY ARTERY DISEASE DUE TO LIPID RICH PLAQUE: ICD-10-CM

## 2022-11-11 DIAGNOSIS — J45.20 MILD INTERMITTENT REACTIVE AIRWAY DISEASE WITHOUT COMPLICATION: ICD-10-CM

## 2022-11-11 DIAGNOSIS — I25.83 CORONARY ARTERY DISEASE DUE TO LIPID RICH PLAQUE: ICD-10-CM

## 2022-11-11 DIAGNOSIS — Z00.00 ROUTINE HEALTH MAINTENANCE: Primary | ICD-10-CM

## 2022-11-11 DIAGNOSIS — D50.9 IRON DEFICIENCY ANEMIA, UNSPECIFIED IRON DEFICIENCY ANEMIA TYPE: ICD-10-CM

## 2022-11-11 DIAGNOSIS — I10 PRIMARY HYPERTENSION: ICD-10-CM

## 2022-11-11 DIAGNOSIS — N18.4 STAGE 4 CHRONIC KIDNEY DISEASE (HCC): ICD-10-CM

## 2022-11-11 DIAGNOSIS — E78.5 HYPERLIPIDEMIA, UNSPECIFIED HYPERLIPIDEMIA TYPE: ICD-10-CM

## 2022-11-11 DIAGNOSIS — E11.21 TYPE II DIABETES MELLITUS WITH NEPHROPATHY (HCC): ICD-10-CM

## 2022-11-11 DIAGNOSIS — C50.811 MALIGNANT NEOPLASM OF OVERLAPPING SITES OF RIGHT BREAST IN FEMALE, ESTROGEN RECEPTOR POSITIVE (HCC): ICD-10-CM

## 2022-11-11 PROCEDURE — 3078F DIAST BP <80 MM HG: CPT | Performed by: INTERNAL MEDICINE

## 2022-11-11 PROCEDURE — 3074F SYST BP LT 130 MM HG: CPT | Performed by: INTERNAL MEDICINE

## 2022-11-11 PROCEDURE — 3017F COLORECTAL CA SCREEN DOC REV: CPT | Performed by: INTERNAL MEDICINE

## 2022-11-11 PROCEDURE — G0439 PPPS, SUBSEQ VISIT: HCPCS | Performed by: INTERNAL MEDICINE

## 2022-11-11 PROCEDURE — 1123F ACP DISCUSS/DSCN MKR DOCD: CPT | Performed by: INTERNAL MEDICINE

## 2022-11-11 PROCEDURE — 3044F HG A1C LEVEL LT 7.0%: CPT | Performed by: INTERNAL MEDICINE

## 2022-11-11 PROCEDURE — G8484 FLU IMMUNIZE NO ADMIN: HCPCS | Performed by: INTERNAL MEDICINE

## 2022-11-11 RX ORDER — BLOOD-GLUCOSE TRANSMITTER
EACH MISCELLANEOUS
Qty: 1 EACH | Refills: 0 | Status: SHIPPED | OUTPATIENT
Start: 2022-11-11

## 2022-11-11 RX ORDER — BLOOD-GLUCOSE SENSOR
EACH MISCELLANEOUS
Qty: 6 EACH | Refills: 3 | Status: SHIPPED | OUTPATIENT
Start: 2022-11-11

## 2022-11-11 RX ORDER — TOBRAMYCIN 3 MG/ML
SOLUTION/ DROPS OPHTHALMIC
COMMUNITY
Start: 2022-11-09

## 2022-11-11 RX ORDER — PREDNISOLONE ACETATE 10 MG/ML
SUSPENSION/ DROPS OPHTHALMIC
COMMUNITY
Start: 2022-11-09

## 2022-11-11 RX ORDER — BLOOD-GLUCOSE,RECEIVER,CONT
EACH MISCELLANEOUS
Qty: 6 EACH | Refills: 0 | Status: SHIPPED | OUTPATIENT
Start: 2022-11-11

## 2022-11-11 ASSESSMENT — PATIENT HEALTH QUESTIONNAIRE - PHQ9
SUM OF ALL RESPONSES TO PHQ QUESTIONS 1-9: 0
2. FEELING DOWN, DEPRESSED OR HOPELESS: 0
SUM OF ALL RESPONSES TO PHQ QUESTIONS 1-9: 0
SUM OF ALL RESPONSES TO PHQ9 QUESTIONS 1 & 2: 0
1. LITTLE INTEREST OR PLEASURE IN DOING THINGS: 0

## 2022-11-11 ASSESSMENT — LIFESTYLE VARIABLES
HOW OFTEN DO YOU HAVE A DRINK CONTAINING ALCOHOL: NEVER
HOW MANY STANDARD DRINKS CONTAINING ALCOHOL DO YOU HAVE ON A TYPICAL DAY: PATIENT DOES NOT DRINK

## 2022-11-11 NOTE — PROGRESS NOTES
Chief Complaint   Patient presents with    Medicare AWV       HPI: Praneeth Lucio is a 74 y.o. female is here for Medicare wellness exam.  Her functional status is good. She denies any history of falls. She has no concerns in regards to safety, hearing, or cognition. She sees Dr. Addie Gibson for history of cardiac issues. Dr. Darin Reis is her podiatrist.  She sees Dr. Paulino Gutierres for history of breast cancer. She sees SARA Newman for breast cancer. She is doing well. Her blood pressure is well controlled. She denies any complaints of chest pain, chest pressure or shortness of breath. Her blood sugars are running between 90 and 100. Her A1c dropped from 6.3-5.7. Her renal parameters are stable. Her cholesterol is 116. Her anemia has improved. She does have a history of reactive airways disease. She uses her albuterol inhaler as needed. This seems to work well for her. Tolerating her Femara without difficulty. She is also tolerating her simvastatin without difficulty.     Routine screening is as follows:  Eye exam yearly  Flu vaccine declined  Pap declined  Mammogram 10/2022  Colonoscopy 2020  DEXA 12/2021  Pneumovax declined    Past Medical History:   Diagnosis Date    Acute sinusitis     CHAPARRO (acute kidney injury) ([address] 05/18/2018    Anemia     Bleeding ulcer     hx of; cauterized by dr. resendiz    Breast cancer [facility])     Breast lump     CAD (coronary artery disease)     sees dr. Driver Hunger ulcer [facility])     Cataract     COVID-19 01/2021    fatigue    Dermatitis     Diabetes mellitus ([address]     Diabetic nephropathy ([address]     Diabetic retinopathy ([address]     Glaucoma     Gout     Hemorrhoids     Hx of TB skin testing     positive    Hyperlipidemia     Hypertension     Long term current use of aromatase inhibitor 5/27/2022    Osteopenia of left hip 5/27/2022    Palliative care patient 06/24/2021    Type 2 diabetes mellitus ([address] 05/18/2018    Vitamin D deficiency       Past Surgical History: Procedure Laterality Date    BREAST LUMPECTOMY Right 10/29/2021    RIGHT LUMPECTOMY, SNB, PREOP ULTRASOUND, INTRAOP ULTRASOUND GUIDED NL, BIOSORB, PEC BLOCK, MARGIN PROBE, FLAPS, IORT performed by Maria Del Rosario Loyd MD at [place] [address]  02/15/2022    CARDIAC SURGERY  2001    CARPAL TUNNEL RELEASE Right 9/3/2020    RIGHT CARPAL TUNNEL RELEASE performed by Fernanda Garcia MD at [facility] N/A 10/11/2019    Dr Leora Disla of a hemostatic clip-Suboptimal prep, diverticulosis, internal hemorrhoids-Grade 2, AP x 3, 1 yr recall     COLONOSCOPY N/A 10/12/2020    Dr Tj Covington hemorrhoids-Grade 1-2, AP, 3 yr recall    CORONARY ARTERY BYPASS GRAFT  2001    ENDOSCOPY, COLON, DIAGNOSTIC      EYE SURGERY      MOUTH SURGERY      OTHER SURGICAL HISTORY      sternal resection    PACEMAKER PLACEMENT  02/23/2022    SKIN BIOPSY      UPPER GASTROINTESTINAL ENDOSCOPY N/A 10/11/2019    Dr Kilo Becker (+) H Pylori    US BREAST NEEDLE BIOPSY LEFT Left 9/1/2021    US BREAST NEEDLE BIOPSY LEFT 9/1/2021 [facility] GENERAL SURGERY    VASCULAR SURGERY        Social History     Socioeconomic History    Marital status: Single     Spouse name: None    Number of children: None    Years of education: None    Highest education level: None   Occupational History     Employer: [employer]   Tobacco Use    Smoking status: Never    Smokeless tobacco: Never   Vaping Use    Vaping Use: Never used   Substance and Sexual Activity    Alcohol use: Not Currently     Alcohol/week: 1.0 standard drink     Types: 1 Shots of liquor per week     Comment: rare    Drug use: No    Sexual activity: Never     Social Determinants of Health     Financial Resource Strain: Low Risk     Difficulty of Paying Living Expenses: Not hard at all   Food Insecurity: No Food Insecurity    Worried About [address] in the Last Year: Never true    [address] in the Last Year: Never true   Physical Activity: Insufficiently Active    Days of Exercise per Week: 1 day    Minutes of Exercise per Session: 10 min      Family History   Problem Relation Age of Onset    Cancer Mother     Diabetes Father     COPD Father     Diabetes Sister     Heart Disease Sister     Colon Polyps Sister     Coronary Art Dis Other         grandfather    Diabetes Other         aunt    Colon Cancer Neg Hx     Liver Cancer Neg Hx     Rectal Cancer Neg Hx         Current Outpatient Medications   Medication Sig Dispense Refill    furosemide (LASIX) 40 MG tablet Take 1 tablet by mouth daily 90 tablet 3    Continuous Blood Gluc Sensor (DEXCOM G6 SENSOR) MISC Changer every 2 weeks E11.9 6 each 3    Continuous Blood Gluc Transmit (DEXCOM G6 TRANSMITTER) MISC Change every 3 months E11.9 1 each 0    Continuous Blood Gluc  (DEXCOM G6 ) ARMANDO Change every 2 weeks E11.9 6 each 0    lisinopril (PRINIVIL;ZESTRIL) 5 MG tablet Take 1 tablet by mouth daily 90 tablet 3    nystatin (MYCOSTATIN) 236574 UNIT/GM cream Apply topically 2 times daily. 30 g 5    albuterol sulfate HFA (PROVENTIL;VENTOLIN;PROAIR) 108 (90 Base) MCG/ACT inhaler Inhale 2 puffs into the lungs every 6 hours as needed for Wheezing 18 g 3    isosorbide mononitrate (IMDUR) 30 MG extended release tablet TAKE 1 TABLET TWICE DAILY 180 tablet 3    furosemide (LASIX) 20 MG tablet Take 1 tablet by mouth in the morning. 90 tablet 1    alendronate (FOSAMAX) 70 MG tablet Take 1 tablet by mouth every 7 days Take with a full glass of water upon arising for the day. Consume on an empty stomach at least 30 minutes before the first food, beverage, or medication. Stay upright (do not lie down) for at least 30 minutes. Do not crush or break. 12 tablet 3    ferrous sulfate (IRON 325) 325 (65 Fe) MG tablet Take 1 tablet by mouth in the morning and 1 tablet before bedtime.  180 tablet 3    Accu-Chek Softclix Lancets MISC TEST FOUR TIMES DAILY 400 each 3    blood glucose test strips (ACCU-CHEK GUIDE) strip TEST BLOOD SUGAR FOUR TIMES DAILY 400 strip 3    metFORMIN (GLUCOPHAGE) 500 MG tablet TAKE 2 TABLETS TWICE DAILY WITH MEALS 360 tablet 1    amLODIPine (NORVASC) 5 MG tablet TAKE 1 TABLET EVERY DAY 90 tablet 1    metoprolol tartrate (LOPRESSOR) 50 MG tablet 2 tablets  tablet 3    letrozole (FEMARA) 2.5 MG tablet Take 1 tablet by mouth daily 90 tablet 3    montelukast (SINGULAIR) 10 MG tablet Take 1 tablet by mouth daily 90 tablet 3    timolol (TIMOPTIC) 0.25 % ophthalmic solution Place 1 drop into the right eye daily       simvastatin (ZOCOR) 40 MG tablet Take 1 tablet by mouth nightly 90 tablet 3    glipiZIDE (GLUCOTROL XL) 2.5 MG extended release tablet Take 1 tablet by mouth daily 90 tablet 3    Blood Glucose Calibration (ACCU-CHEK ROSA) SOLN Calibration done daily. 1 each 1    Alcohol Swabs (B-D SINGLE USE SWABS REGULAR) PADS USE THREE TIMES DAILY 100 each 11    aspirin 81 MG tablet Take 81 mg by mouth daily      Omega-3 Fatty Acids (FISH OIL) 1000 MG CAPS Take 1,000 mg by mouth 2 times daily      MULTIPLE VITAMIN PO Take 1 tablet by mouth daily      Calcium Carbonate-Vitamin D (CALCIUM 500/D PO) Take 1 tablet by mouth 2 times daily      vitamin B-12 (CYANOCOBALAMIN) 1000 MCG tablet Take 1,000 mcg by mouth daily      prednisoLONE acetate (PRED FORTE) 1 % ophthalmic suspension  (Patient not taking: Reported on 11/11/2022)      tobramycin (TOBREX) 0.3 % ophthalmic solution  (Patient not taking: Reported on 11/11/2022)       No current facility-administered medications for this visit.         Patient Active Problem List   Diagnosis    CHAPARRO (acute kidney injury) ([facility]    Hypertension    Hyperlipidemia    Ocular hypertension    CAD (coronary artery disease)    Type 2 diabetes mellitus ([address]    Diabetes mellitus ([address]    Acute cystitis without hematuria    Carpal tunnel syndrome    Chest pain    Chronic kidney disease, stage 3b ([address]    History of coronary artery bypass graft Achilles tendinitis    Bursitis of right foot    Contracture of Achilles tendon    Diabetic macular edema (HCC)    Foot pain    Nonproliferative diabetic retinopathy (HCC)    Posterior calcaneal exostosis    Retinal neovascularization    Malignant neoplasm of overlapping sites of right breast in female, estrogen receptor positive ([facility]    Status post right breast lumpectomy with sentinel node and IORT on 10/29/2021    CKD (chronic kidney disease) stage 4, GFR 15-29 ml/min (HCC)    Ischemic cardiomyopathy    Decreased cardiac ejection fraction    Pacemaker    Osteopenia of left hip    Long term current use of aromatase inhibitor    Acute cough    Acute bronchiolitis        Review of Systems   Constitutional:  Negative for activity change, appetite change, chills, diaphoresis, fatigue, fever and unexpected weight change. HENT:  Negative for congestion, ear discharge, ear pain, mouth sores, nosebleeds, postnasal drip, rhinorrhea, sinus pressure, sinus pain, sneezing, sore throat, tinnitus, trouble swallowing and voice change. Eyes:  Negative for discharge, itching and visual disturbance. Respiratory:  Negative for cough, choking, chest tightness, shortness of breath, wheezing and stridor. Cardiovascular:  Negative for chest pain, palpitations and leg swelling. Gastrointestinal:  Negative for abdominal distention, abdominal pain, blood in stool, constipation, diarrhea, nausea and vomiting. Endocrine: Negative for cold intolerance, polydipsia and polyuria. Genitourinary:  Negative for difficulty urinating, dysuria, frequency and urgency. Musculoskeletal:  Negative for arthralgias, gait problem, joint swelling and myalgias. Skin:  Negative for color change and rash. Allergic/Immunologic: Negative for food allergies and immunocompromised state. Neurological:  Negative for dizziness, tremors, syncope, speech difficulty, weakness, numbness and headaches. Hematological:  Negative for adenopathy. Does not bruise/bleed easily. Psychiatric/Behavioral:  Negative for confusion and hallucinations. The patient is not nervous/anxious. /60 (Site: Left Upper Arm, Position: Sitting)   Pulse 64   Resp 20   Ht 5' 5\" (1.651 m)   Wt 219 lb 9.6 oz (99.6 kg)   SpO2 97%   BMI 36.54 kg/m²   Physical Exam  Vitals and nursing note reviewed. Constitutional:       General: She is not in acute distress. Appearance: Normal appearance. She is well-developed and normal weight. She is not ill-appearing, toxic-appearing or diaphoretic. HENT:      Head: Normocephalic and atraumatic. Right Ear: Tympanic membrane, ear canal and external ear normal. There is no impacted cerumen. Left Ear: Tympanic membrane, ear canal and external ear normal. There is no impacted cerumen. Nose: Nose normal. No congestion or rhinorrhea. Mouth/Throat:      Mouth: Mucous membranes are moist.      Pharynx: Oropharynx is clear. No oropharyngeal exudate or posterior oropharyngeal erythema. Eyes:      General: No scleral icterus. Right eye: No discharge. Left eye: No discharge. Extraocular Movements: Extraocular movements intact. Conjunctiva/sclera: Conjunctivae normal.      Pupils: Pupils are equal, round, and reactive to light. Neck:      Thyroid: No thyromegaly. Vascular: No carotid bruit or JVD. Trachea: No tracheal deviation. Cardiovascular:      Rate and Rhythm: Normal rate and regular rhythm. Pulses: Normal pulses. Heart sounds: Normal heart sounds. No murmur heard. No friction rub. No gallop. Pulmonary:      Effort: Pulmonary effort is normal. No respiratory distress. Breath sounds: Normal breath sounds. No stridor. No wheezing, rhonchi or rales. Chest:      Chest wall: No tenderness. Abdominal:      General: Bowel sounds are normal. There is no distension. Palpations: Abdomen is soft. There is no mass. Tenderness:  There is no abdominal tenderness. There is no right CVA tenderness, left CVA tenderness, guarding or rebound. Hernia: No hernia is present. Musculoskeletal:         General: No swelling, tenderness, deformity or signs of injury. Normal range of motion. Cervical back: Normal range of motion and neck supple. No rigidity. No muscular tenderness. Right lower leg: No edema. Left lower leg: No edema. Lymphadenopathy:      Cervical: No cervical adenopathy. Skin:     General: Skin is warm and dry. Capillary Refill: Capillary refill takes less than 2 seconds. Coloration: Skin is not jaundiced or pale. Findings: No bruising, erythema, lesion or rash. Comments: Deformity/amputation: absent  Skin lesions/pre-ulcerative calluses: present - bilateral great toe  Edema: right- negative, left- negative    Sensory exam:  Monofilament sensation: normal  (minimum of 5 random plantar locations tested, avoiding callused areas - > 1 area with absence of sensation is + for neuropathy)    Plus at least one of the following:  Pulses: normal,   Pinprick: Intact  Proprioception: N/A  Vibration (128 Hz): N/A     Neurological:      General: No focal deficit present. Mental Status: She is alert and oriented to person, place, and time. Mental status is at baseline. Cranial Nerves: No cranial nerve deficit. Sensory: No sensory deficit. Motor: No weakness or abnormal muscle tone. Coordination: Coordination normal.      Gait: Gait normal.      Deep Tendon Reflexes: Reflexes are normal and symmetric. Reflexes normal.   Psychiatric:         Mood and Affect: Mood normal.         Behavior: Behavior normal.         Thought Content: Thought content normal.         Judgment: Judgment normal.       1. Routine health maintenance    2. Type II diabetes mellitus with nephropathy ([facility]    3. Primary hypertension    4. Mild intermittent reactive airway disease without complication    5.  Coronary artery disease due to lipid rich plaque    6. Iron deficiency anemia, unspecified iron deficiency anemia type    7. Stage 4 chronic kidney disease ([facility]    8. Malignant neoplasm of overlapping sites of right breast in female, estrogen receptor positive ([facility]    9. Environmental allergies    10. Hyperlipidemia, unspecified hyperlipidemia type        ASSESSMENT/PLAN:    68-year-old woman here for follow-up    1. Health maintenance: Routine screening is as per [facility]. Labs discussed with patient today    2. Hypertension: Blood pressure well controlled on lisinopril    3. Reactive airways disease: Continue albuterol as prescribed    4. Coronary artery disease: Continue isosorbide. 5.  Hypertension: Blood pressure well controlled on Lasix amlodipine and metoprolol    6. Iron deficiency anemia: Continue ferrous sulfate as prescribed    7. Type 2 diabetes: A1c at goal.  Continue metformin and glipizide as prescribed    8. Chronic kidney disease: Stable    9. History of breast cancer: Continue femara as prescribed    10. Environmental allergies: Stable on Singulair    11. Hyperlipidemia: Continue simvastatin as prescribed    Kavin Leone was seen today for medicare awv. Diagnoses and all orders for this visit:    Routine health maintenance    Type II diabetes mellitus with nephropathy ([facility]  -      DIABETES FOOT EXAM  -     Continuous Blood Gluc Sensor (DEXCOM G6 SENSOR) MISC; Changer every 2 weeks E11.9  -     Continuous Blood Gluc Transmit (DEXCOM G6 TRANSMITTER) MISC; Change every 3 months E11.9  -     Continuous Blood Gluc  (DEXCOM G6 ) ARMANDO; Change every 2 weeks E11.9  -     CBC with Auto Differential; Future  -     Hemoglobin A1C; Future  -     Comprehensive Metabolic Panel; Future  -     Lipid Panel; Future  -     TSH; Future  -     Microalbumin / Creatinine Urine Ratio; Future  -     Vitamin D 25 Hydroxy;  Future    Primary hypertension    Mild intermittent reactive airway disease without complication    Coronary artery disease due to lipid rich plaque    Iron deficiency anemia, unspecified iron deficiency anemia type    Stage 4 chronic kidney disease (HCC)    Malignant neoplasm of overlapping sites of right breast in female, estrogen receptor positive ([address]    Environmental allergies    Hyperlipidemia, unspecified hyperlipidemia type        Return in about 3 months (around 2023) for diabetes. Orders Placed This Encounter   Procedures    CBC with Auto Differential     Standing Status:   Future     Standing Expiration Date:   2023    Hemoglobin A1C     Standing Status:   Future     Standing Expiration Date:   2023    Comprehensive Metabolic Panel     Fasting 12 hours     Standing Status:   Future     Standing Expiration Date:   2023    Lipid Panel     Standing Status:   Future     Standing Expiration Date:   2023    TSH     Standing Status:   Future     Standing Expiration Date:   2023    Microalbumin / Creatinine Urine Ratio     Standing Status:   Future     Standing Expiration Date:   2023    Vitamin D 25 Hydroxy     Standing Status:   Future     Standing Expiration Date:   2023     DIABETES FOOT EXAM       Lynn Mendoza MD     Medicare Annual Wellness Visit - Subsequent    Name: George Louie Date: 2022   MRN: [id number] Sex: Female   Age: 74 y.o. Ethnicity: Non- / Non    : 1950 Race: White (non-)      Henny Zaragoza is here for   Chief Complaint   Patient presents with    Medicare AWV        Screenings for behavioral, psychosocial and functional/safety risks, and cognitive dysfunction are all negative except as indicated below. These results, as well as other patient data from the [address] form, are documented in Flowsheets linked to this Encounter.     Allergies   Allergen Reactions    Medrol [Methylprednisolone] Other (See Comments)     Affects her eyes       Prior to Visit Medications    Medication Sig Taking? Authorizing Provider   furosemide (LASIX) 40 MG tablet Take 1 tablet by mouth daily  SARA Moran   Continuous Blood Gluc Sensor (DEXCOM G6 SENSOR) MISC Changer every 2 weeks E11.9 Yes Kartik Montelongo MD   Continuous Blood Gluc Transmit (DEXCOM G6 TRANSMITTER) MISC Change every 3 months E11.9 Yes Kartik Montelongo MD   Continuous Blood Gluc  (DEXCOM G6 ) ARMANDO Change every 2 weeks E11.9 Yes Kartik Montelongo MD   lisinopril (PRINIVIL;ZESTRIL) 5 MG tablet Take 1 tablet by mouth daily Yes Ned Richard MD   nystatin (MYCOSTATIN) 499655 UNIT/GM cream Apply topically 2 times daily. Yes Analia Rojas PA-C   albuterol sulfate HFA (PROVENTIL;VENTOLIN;PROAIR) 108 (90 Base) MCG/ACT inhaler Inhale 2 puffs into the lungs every 6 hours as needed for Wheezing Yes SARA Fuller   isosorbide mononitrate (IMDUR) 30 MG extended release tablet TAKE 1 TABLET TWICE DAILY Yes Kartik Montelongo MD   furosemide (LASIX) 20 MG tablet Take 1 tablet by mouth in the morning. Yes Kartik Montelongo MD   alendronate (FOSAMAX) 70 MG tablet Take 1 tablet by mouth every 7 days Take with a full glass of water upon arising for the day. Consume on an empty stomach at least 30 minutes before the first food, beverage, or medication. Stay upright (do not lie down) for at least 30 minutes. Do not crush or break. Yes SARA Hurst   ferrous sulfate (IRON 325) 325 (65 Fe) MG tablet Take 1 tablet by mouth in the morning and 1 tablet before bedtime.  Yes SARA Hurst   Accu-Chek Softclix Lancets MISC TEST FOUR TIMES DAILY Yes Kartik Montelongo MD   blood glucose test strips (ACCU-CHEK GUIDE) strip TEST BLOOD SUGAR FOUR TIMES DAILY Yes Kartik Montelongo MD   metFORMIN (GLUCOPHAGE) 500 MG tablet TAKE 2 TABLETS TWICE DAILY WITH MEALS Yes Kartik Montelongo MD   amLODIPine (NORVASC) 5 MG tablet TAKE 1 TABLET EVERY DAY Yes Kartik Montelongo MD   metoprolol tartrate (LOPRESSOR) 50 MG tablet 2 tablets BID Yes Lynn Mendoza MD   letrozole (FEMARA) 2.5 MG tablet Take 1 tablet by mouth daily Yes Josiane Mendoza MD   montelukast (SINGULAIR) 10 MG tablet Take 1 tablet by mouth daily Yes Josiane Mendoza MD   timolol (TIMOPTIC) 0.25 % ophthalmic solution Place 1 drop into the right eye daily  Yes Historical Provider, MD   simvastatin (ZOCOR) 40 MG tablet Take 1 tablet by mouth nightly Yes Lynn Mendoza MD   glipiZIDE (GLUCOTROL XL) 2.5 MG extended release tablet Take 1 tablet by mouth daily Yes Lynn Mendoza MD   Blood Glucose Calibration (ACCU-CHEK ROSA) SOLN Calibration done daily.  Yes Lynn Mendoza MD   Alcohol Swabs (B-D SINGLE USE SWABS REGULAR) PADS USE THREE TIMES DAILY Yes Lynn Mendoza MD   aspirin 81 MG tablet Take 81 mg by mouth daily Yes Historical Provider, MD   Omega-3 Fatty Acids (FISH OIL) 1000 MG CAPS Take 1,000 mg by mouth 2 times daily Yes Historical Provider, MD   MULTIPLE VITAMIN PO Take 1 tablet by mouth daily Yes Historical Provider, MD   Calcium Carbonate-Vitamin D (CALCIUM 500/D PO) Take 1 tablet by mouth 2 times daily Yes Historical Provider, MD   vitamin B-12 (CYANOCOBALAMIN) 1000 MCG tablet Take 1,000 mcg by mouth daily Yes Historical Provider, MD   prednisoLONE acetate (PRED FORTE) 1 % ophthalmic suspension   Historical Provider, MD   tobramycin (TOBREX) 0.3 % ophthalmic solution   Historical Provider, MD         Past Medical History:   Diagnosis Date    Acute sinusitis     CHAPARRO (acute kidney injury) ([facility] 05/18/2018    Anemia     Bleeding ulcer     hx of; cauterized by dr. resendiz    Breast cancer [facility])     Breast lump     CAD (coronary artery disease)     sees dr. John Baeza ulcer [facility])     Cataract     COVID-19 01/2021    fatigue    Dermatitis     Diabetes mellitus ([facility]     Diabetic nephropathy ([facility]     Diabetic retinopathy ([facility]     Glaucoma     Gout     Hemorrhoids     Hx of TB skin testing     positive    Hyperlipidemia Hypertension     Long term current use of aromatase inhibitor 5/27/2022    Osteopenia of left hip 5/27/2022    Palliative care patient 06/24/2021    Type 2 diabetes mellitus ([facility] 05/18/2018    Vitamin D deficiency          Past Surgical History:   Procedure Laterality Date    BREAST LUMPECTOMY Right 10/29/2021    RIGHT LUMPECTOMY, SNB, PREOP ULTRASOUND, INTRAOP ULTRASOUND GUIDED NL, BIOSORB, PEC BLOCK, MARGIN PROBE, FLAPS, IORT performed by Sindhu Mary MD at [address]  02/15/2022    CARDIAC SURGERY  2001    CARPAL TUNNEL RELEASE Right 9/3/2020    RIGHT CARPAL TUNNEL RELEASE performed by Yelena Saunders MD at [address]      COLONOSCOPY N/A 10/11/2019    Dr Nanci Huerta of a hemostatic clip-Suboptimal prep, diverticulosis, internal hemorrhoids-Grade 2, AP x 3, 1 yr recall     COLONOSCOPY N/A 10/12/2020    Dr Oneill Antes hemorrhoids-Grade 1-2, AP, 3 yr recall    CORONARY ARTERY BYPASS GRAFT  2001    ENDOSCOPY, COLON, DIAGNOSTIC      EYE SURGERY      MOUTH SURGERY      OTHER SURGICAL HISTORY      sternal resection    PACEMAKER PLACEMENT  02/23/2022    SKIN BIOPSY      UPPER GASTROINTESTINAL ENDOSCOPY N/A 10/11/2019    Dr Ledesma Medico (+) H Pylori    US BREAST NEEDLE BIOPSY LEFT Left 9/1/2021    US BREAST NEEDLE BIOPSY LEFT 9/1/2021 [facility] GENERAL SURGERY    VASCULAR SURGERY         Family History   Problem Relation Age of Onset    Cancer Mother     Diabetes Father     COPD Father     Diabetes Sister     Heart Disease Sister     Colon Polyps Sister     Coronary Art Dis Other         grandfather    Diabetes Other         aunt    Colon Cancer Neg Hx     Liver Cancer Neg Hx     Rectal Cancer Neg Hx        CareTeam (Including outside providers/suppliers regularly involved in providing care):   Patient Care Team:  Helena Jaime MD as PCP - General (Family Medicine)  Helena Jaime MD as PCP - [facility] Empaneled Provider  Alison Patel Gera Mensah NP as Advanced Practice Nurse (Gastroenterology)  Raffy Townsend RN as Registered Nurse  Chioma Irvin MD as Consulting Physician (Interventional Cardiology)    Wt Readings from Last 3 Encounters:   11/11/22 219 lb 9.6 oz (99.6 kg)   11/10/22 216 lb (98 kg)   10/21/22 215 lb (97.5 kg)     Vitals:    11/11/22 0932   BP: 130/60   Site: Left Upper Arm   Position: Sitting   Pulse: 64   Resp: 20   SpO2: 97%   Weight: 219 lb 9.6 oz (99.6 kg)   Height: 5' 5\" (1.651 m)           The following problems were reviewed today and where indicated follow up appointments were made and/or referrals ordered.     Risk Factor Screenings with Interventions     Fall Risk:  2 or more falls in past year?: no  Fall with injury in past year?: no  Fall Risk Interventions:    Home safety tips provided    Depression:  PHQ-2 Score: 0  Depression Interventions:  Relaxation techniques discussed    Anxiety:     Anxiety Interventions:  Relaxation techniques discussed    Cognitive:  Clock Drawing Test (CDT): Normal  Cognitive Impairment Interventions:  Patient declines any further evaluation/treatment for cognitive impairment    Substance Abuse:  Social History     Socioeconomic History    Marital status: Single     Spouse name: Not on file    Number of children: Not on file    Years of education: Not on file    Highest education level: Not on file   Occupational History     Employer: [employer]   Tobacco Use    Smoking status: Never    Smokeless tobacco: Never   Vaping Use    Vaping Use: Never used   Substance and Sexual Activity    Alcohol use: Not Currently     Alcohol/week: 1.0 standard drink     Types: 1 Shots of liquor per week     Comment: rare    Drug use: No    Sexual activity: Never   Other Topics Concern    Not on file   Social History Narrative    Not on file     Social Determinants of Health     Financial Resource Strain: Low Risk     Difficulty of Paying Living Expenses: Not hard at all   Food Insecurity: No Food Insecurity    Worried About Running Out of Food in the Last Year: Never true    Ran Out of Food in the Last Year: Never true   Transportation Needs: Not on file   Physical Activity: Insufficiently Active    Days of Exercise per Week: 1 day    Minutes of Exercise per Session: 10 min   Stress: Not on file   Social Connections: Not on file   Intimate Partner Violence: Not on file   Housing Stability: Not on file        Substance Abuse Interventions:  No concerns    Health Risk Assessment:     General  In general, how would you say your health is?: Good  In the past 7 days, have you experienced any of the following: New or Increased Pain, New or Increased Fatigue, Loneliness, Social Isolation, Stress or Anger?: No  Do you get the social and emotional support that you need?: Yes  General Health Risk Interventions:  No bernadette    Health Habits/Nutrition  On average, how many days per week do you engage in moderate to strenuous exercise (like a brisk walk)?: 1 day  On average, how many minutes do you engage in exercise at this level?: 10 min  Have you lost any weight without trying in the past 3 months?: No  Have you seen the dentist within the past year?: (!) No  Body mass index is 36.54 kg/m².   Health Habits/Nutrition Interventions:  No concerns    Hearing/Vision  Do you or your family notice any trouble with your hearing that hasn't been managed with hearing aids?: No  Do you have difficulty driving, watching TV, or doing any of your daily activities because of your eyesight?: No  Have you had an eye exam within the past year?: Yes  Hearing/Vision Interventions:  Vision concerns:  patient declines any further evaluation/treatment for this issue    Safety  Do you have working smoke detectors?: Yes  Do you have any tripping hazards - loose or unsecured carpets or rugs?: No  Do you have any tripping hazards - clutter in doorways, halls, or stairs?: No  Do you have either shower bars, grab bars, non-slip mats or non-slip surfaces in your shower or bathtub?: Yes  Do all of your stairways have a railing or banister?: Yes  Do you always fasten your seatbelt when you are in a car?: (!) No  Safety Interventions:  Patient declines any further evaluation/treatment for this issue    ADLs  In the past 7 days, did you need help from others to perform any of the following everyday activities: Eating, dressing, grooming, bathing, toileting, or walking/balance?: No  ADL Interventions:  Patient declines any further evaluation/treatment for this issue    Personalized Preventive Plan   Current Health Maintenance Status  Immunization History   Administered Date(s) Administered    Hepatitis A/Hepatitis B (Twinrix) 2009    Influenza Virus Vaccine 10/15/2017        Health Maintenance   Topic Date Due    COVID-19 Vaccine (1) Never done    Hepatitis C screen  Never done    DTaP/Tdap/Td vaccine (1 - Tdap) 2023 (Originally 1967)    Shingles vaccine (1 of 2) 2023 (Originally 1967)    Flu vaccine (1) 2023 (Originally 2022)    Pneumococcal 65+ years Vaccine (1 - PCV) 2030 (Originally 1954)    Colorectal Cancer Screen  10/12/2023    Breast cancer screen  10/17/2023    Diabetic retinal exam  10/18/2023    A1C test (Diabetic or Prediabetic)  11/10/2023    Lipids  11/10/2023    Diabetic foot exam  2023    Depression Screen  2023    Annual Wellness Visit (AWV)  2023    DEXA (modify frequency per FRAX score)  Completed    Hepatitis A vaccine  Aged Out    Hib vaccine  Aged Out    Meningococcal (ACWY) vaccine  Aged Out       Recommendations for [employer] Due: see orders. Recommended screening schedule for the next 5-10 years is provided to the patient in written form: see Patient Instructions/AVS.  Medicare Annual Wellness Visit - Subsequent    Name: Carlie Jean-Baptiste Date: 2022   MRN: [id number] Sex: Female   Age: 74 y.o.  Ethnicity: Non- / Non    : 1950 Race: White (non-)      Fay Gonsalves is here for   Chief Complaint   Patient presents with    Medicare AWV        Screenings for behavioral, psychosocial and functional/safety risks, and cognitive dysfunction are all negative except as indicated below. These results, as well as other patient data from the [address] form, are documented in Flowsheets linked to this Encounter. Allergies   Allergen Reactions    Medrol [Methylprednisolone] Other (See Comments)     Affects her eyes       Prior to Visit Medications    Medication Sig Taking? Authorizing Provider   furosemide (LASIX) 40 MG tablet Take 1 tablet by mouth daily  SARA Moran   Continuous Blood Gluc Sensor (DEXCOM G6 SENSOR) MISC Changer every 2 weeks E11.9 Yes Kartik Montelongo MD   Continuous Blood Gluc Transmit (DEXCOM G6 TRANSMITTER) MISC Change every 3 months E11.9 Yes Kartik Montelongo MD   Continuous Blood Gluc  (DEXCOM G6 ) ARMANDO Change every 2 weeks E11.9 Yes Kartik Montelongo MD   lisinopril (PRINIVIL;ZESTRIL) 5 MG tablet Take 1 tablet by mouth daily Yes Ned Richard MD   nystatin (MYCOSTATIN) 491058 UNIT/GM cream Apply topically 2 times daily. Yes Analia Rojas PA-C   albuterol sulfate HFA (PROVENTIL;VENTOLIN;PROAIR) 108 (90 Base) MCG/ACT inhaler Inhale 2 puffs into the lungs every 6 hours as needed for Wheezing Yes SARA Fuller   isosorbide mononitrate (IMDUR) 30 MG extended release tablet TAKE 1 TABLET TWICE DAILY Yes Kartik Montelongo MD   furosemide (LASIX) 20 MG tablet Take 1 tablet by mouth in the morning. Yes Kartik Montelongo MD   alendronate (FOSAMAX) 70 MG tablet Take 1 tablet by mouth every 7 days Take with a full glass of water upon arising for the day. Consume on an empty stomach at least 30 minutes before the first food, beverage, or medication. Stay upright (do not lie down) for at least 30 minutes. Do not crush or break.  Yes SARA Hurst   ferrous sulfate (IRON 325) 325 (65 Fe) MG tablet Take 1 tablet by mouth in the morning and 1 tablet before bedtime. Yes SARA Hurst   Accu-Chek Softclix Lancets MISC TEST FOUR TIMES DAILY Yes Stas Ruiz MD   blood glucose test strips (ACCU-CHEK GUIDE) strip TEST BLOOD SUGAR FOUR TIMES DAILY Yes Stas Ruiz MD   metFORMIN (GLUCOPHAGE) 500 MG tablet TAKE 2 TABLETS TWICE DAILY WITH MEALS Yes Stas Ruiz MD   amLODIPine (NORVASC) 5 MG tablet TAKE 1 TABLET EVERY DAY Yes Stas Ruiz MD   metoprolol tartrate (LOPRESSOR) 50 MG tablet 2 tablets BID Yes Stas Ruiz MD   letrozole (FEMARA) 2.5 MG tablet Take 1 tablet by mouth daily Yes Jelly Umaña MD   montelukast (SINGULAIR) 10 MG tablet Take 1 tablet by mouth daily Yes Jelly Umaña MD   timolol (TIMOPTIC) 0.25 % ophthalmic solution Place 1 drop into the right eye daily  Yes Historical Provider, MD   simvastatin (ZOCOR) 40 MG tablet Take 1 tablet by mouth nightly Yes Stas Ruiz MD   glipiZIDE (GLUCOTROL XL) 2.5 MG extended release tablet Take 1 tablet by mouth daily Yes Stas Ruiz MD   Blood Glucose Calibration (ACCU-CHEK ROSA) SOLN Calibration done daily.  Yes Stas Ruiz MD   Alcohol Swabs (B-D SINGLE USE SWABS REGULAR) PADS USE THREE TIMES DAILY Yes Stas Ruiz MD   aspirin 81 MG tablet Take 81 mg by mouth daily Yes Historical Provider, MD   Omega-3 Fatty Acids (FISH OIL) 1000 MG CAPS Take 1,000 mg by mouth 2 times daily Yes Historical Provider, MD   MULTIPLE VITAMIN PO Take 1 tablet by mouth daily Yes Historical Provider, MD   Calcium Carbonate-Vitamin D (CALCIUM 500/D PO) Take 1 tablet by mouth 2 times daily Yes Historical Provider, MD   vitamin B-12 (CYANOCOBALAMIN) 1000 MCG tablet Take 1,000 mcg by mouth daily Yes Historical Provider, MD   prednisoLONE acetate (PRED FORTE) 1 % ophthalmic suspension   Historical Provider, MD   tobramycin (TOBREX) 0.3 % ophthalmic solution   Historical Provider, MD         Past Medical History:   Diagnosis Date    Acute sinusitis     [place]ARRO (acute kidney injury) ([facility] [address] 05/18/2018    Anemia     Bleeding ulcer     hx of; cauterized by dr. resendiz    Breast cancer [facility])     Breast lump     CAD (coronary artery disease)     sees dr. Narvaez Lorenzo ulcer [facility])     Cataract     COVID-19 01/2021    fatigue    Dermatitis     Diabetes mellitus ([facility] [address]     Diabetic nephropathy ([facility]     Diabetic retinopathy ([facility]     Glaucoma     Gout     Hemorrhoids     Hx of TB skin testing     positive    Hyperlipidemia     Hypertension     Long term current use of aromatase inhibitor 5/27/2022    Osteopenia of left hip 5/27/2022    Palliative care patient 06/24/2021    Type 2 diabetes mellitus ([facility] 05/18/2018    Vitamin D deficiency          Past Surgical History:   Procedure Laterality Date    BREAST LUMPECTOMY Right 10/29/2021    RIGHT LUMPECTOMY, SNB, PREOP ULTRASOUND, INTRAOP ULTRASOUND GUIDED NL, BIOSORB, PEC BLOCK, MARGIN PROBE, FLAPS, IORT performed by Trixie Hemphill MD at [address] [place] [address]  02/15/2022    CARDIAC SURGERY  2001    CARPAL TUNNEL RELEASE Right 9/3/2020    RIGHT CARPAL TUNNEL RELEASE performed by Davion Galdamez MD at [facility] N/A 10/11/2019    Dr Samantha Zavaleta of a hemostatic clip-Suboptimal prep, diverticulosis, internal hemorrhoids-Grade 2, AP x 3, 1 yr recall     COLONOSCOPY N/A 10/12/2020    Dr Dey Alt hemorrhoids-Grade 1-2, AP, 3 yr recall    CORONARY ARTERY BYPASS GRAFT  2001    ENDOSCOPY, COLON, DIAGNOSTIC      EYE SURGERY      MOUTH SURGERY      OTHER SURGICAL HISTORY      sternal resection    PACEMAKER PLACEMENT  02/23/2022    SKIN BIOPSY      UPPER GASTROINTESTINAL ENDOSCOPY N/A 10/11/2019    Dr Herberth Santacruz (+) H Pylori    US BREAST NEEDLE BIOPSY LEFT Left 9/1/2021    US BREAST NEEDLE BIOPSY LEFT 9/1/2021 [facility] GENERAL SURGERY    VASCULAR SURGERY         Family History   Problem Relation Age of Onset    Cancer Mother     Diabetes Father     COPD Father     Diabetes Sister     Heart Disease Sister     Colon Polyps Sister     Coronary Art Dis Other         grandfather    Diabetes Other         aunt    Colon Cancer Neg Hx     Liver Cancer Neg Hx     Rectal Cancer Neg Hx        CareTeam (Including outside providers/suppliers regularly involved in providing care):   Patient Care Team:  Anahi Bean MD as PCP - General (Family Medicine)  Anahi Bean MD as PCP - [facility] Empaneled Provider  SARA Styles NP as Advanced Practice Nurse (Gastroenterology)  Lane Piper RN as Registered Nurse  Brien Stoner MD as Consulting Physician (Interventional Cardiology)    Wt Readings from Last 3 Encounters:   11/11/22 219 lb 9.6 oz (99.6 kg)   11/10/22 216 lb (98 kg)   10/21/22 215 lb (97.5 kg)     Vitals:    11/11/22 0932   BP: 130/60   Site: Left Upper Arm   Position: Sitting   Pulse: 64   Resp: 20   SpO2: 97%   Weight: 219 lb 9.6 oz (99.6 kg)   Height: 5' 5\" (1.651 m)           The following problems were reviewed today and where indicated follow up appointments were made and/or referrals ordered.     Risk Factor Screenings with Interventions     Fall Risk:  2 or more falls in past year?: no  Fall with injury in past year?: no  Fall Risk Interventions:    Home safety tips provided    Depression:  PHQ-2 Score: 0  Depression Interventions:Relaxation techniques discussed    Anxiety:     Anxiety Interventions:  Relaxation techniques discussed    Cognitive:  Clock Drawing Test (CDT): Normal  Cognitive Impairment Interventions:  Patient declines any further evaluation/treatment for cognitive impairment    Substance Abuse:  Social History     Socioeconomic History    Marital status: Single     Spouse name: Not on file    Number of children: Not on file    Years of education: Not on file    Highest education level: Not on file   Occupational History     Employer: [employer] Use Smoking status: Never    Smokeless tobacco: Never   Vaping Use    Vaping Use: Never used   Substance and Sexual Activity    Alcohol use: Not Currently     Alcohol/week: 1.0 standard drink     Types: 1 Shots of liquor per week     Comment: rare    Drug use: No    Sexual activity: Never   Other Topics Concern    Not on file   Social History Narrative    Not on file     Social Determinants of Health     Financial Resource Strain: Low Risk     Difficulty of Paying Living Expenses: Not hard at all   Food Insecurity: No Food Insecurity    Worried About Running Out of Food in the Last Year: Never true    Ran Out of Food in the Last Year: Never true   Transportation Needs: Not on file   Physical Activity: Insufficiently Active    Days of Exercise per Week: 1 day    Minutes of Exercise per Session: 10 min   Stress: Not on file   Social Connections: Not on file   Intimate Partner Violence: Not on file   Housing Stability: Not on file        Substance Abuse Interventions:  No [facility]    Health Risk Assessment:     General  In general, how would you say your health is?: Good  In the past 7 days, have you experienced any of the following: New or Increased Pain, New or Increased Fatigue, Loneliness, Social Isolation, Stress or Anger?: No  Do you get the social and emotional support that you need?: Yes  General Health Risk Interventions:  No concerns    Health Habits/Nutrition  On average, how many days per week do you engage in moderate to strenuous exercise (like a brisk walk)?: 1 day  On average, how many minutes do you engage in exercise at this level?: 10 min  Have you lost any weight without trying in the past 3 months?: No  Have you seen the dentist within the past year?: (!) No  Body mass index is 36.54 kg/m².   Health Habits/Nutrition Interventions:  No concerns    Hearing/Vision  Do you or your family notice any trouble with your hearing that hasn't been managed with hearing aids?: No  Do you have difficulty driving, watching TV, or doing any of your daily activities because of your eyesight?: No  Have you had an eye exam within the past year?: Yes  Hearing/Vision Interventions:  No concerns    Safety  Do you have working smoke detectors?: Yes  Do you have any tripping hazards - loose or unsecured carpets or rugs?: No  Do you have any tripping hazards - clutter in doorways, halls, or stairs?: No  Do you have either shower bars, grab bars, non-slip mats or non-slip surfaces in your shower or bathtub?: Yes  Do all of your stairways have a railing or banister?: Yes  Do you always fasten your seatbelt when you are in a car?: (!) No  Safety Interventions:  Patient declines any further evaluation/treatment for this issue    ADLs  In the past 7 days, did you need help from others to perform any of the following everyday activities: Eating, dressing, grooming, bathing, toileting, or walking/balance?: No  ADL Interventions:  Patient declines any further evaluation/treatment for this issue    Personalized Preventive Plan   Current Health Maintenance Status  Immunization History   Administered Date(s) Administered    Hepatitis A/Hepatitis B (Twinrix) 06/02/2009    Influenza Virus Vaccine 10/15/2017        Health Maintenance   Topic Date Due    COVID-19 Vaccine (1) Never done    Hepatitis C screen  Never done    DTaP/Tdap/Td vaccine (1 - Tdap) 05/17/2023 (Originally 6/25/1969)    Shingles vaccine (1 of 2) 05/17/2023 (Originally [birth year]25/1969)    Flu vaccine (1) 11/11/2023 (Originally 8/1/2022)    Pneumococcal 65+ years Vaccine (1 - PCV) 01/01/2030 (Originally 6/25/1956)    Colorectal Cancer Screen  10/12/2023    Breast cancer screen  10/17/2023    Diabetic retinal exam  10/18/2023    A1C test (Diabetic or Prediabetic)  11/10/2023    Lipids  11/10/2023    Diabetic foot exam  11/11/2023    Depression Screen  11/11/2023    Annual Wellness Visit (AWV)  11/12/2023    DEXA (modify frequency per FRAX score)  Completed    Hepatitis A vaccine  Aged Out Hib vaccine  Aged Out    Meningococcal (ACWY) vaccine  Aged Out       Recommendations for [employer] Due: see orders.   Recommended screening schedule for the next 5-10 years is provided to the patient in written form: see Patient Instructions/AVS.

## 2022-11-14 RX ORDER — FUROSEMIDE 40 MG/1
40 TABLET ORAL DAILY
Qty: 90 TABLET | Refills: 3 | Status: SHIPPED | OUTPATIENT
Start: 2022-11-14

## 2022-11-15 ASSESSMENT — ENCOUNTER SYMPTOMS
NAUSEA: 0
GASTROINTESTINAL NEGATIVE: 1
WHEEZING: 0
ABDOMINAL PAIN: 0
EYES NEGATIVE: 1
BACK PAIN: 0
SORE THROAT: 0
CONSTIPATION: 0
EYE REDNESS: 0
VOMITING: 0
EYE PAIN: 0
EYE DISCHARGE: 0
BLOOD IN STOOL: 0
DIARRHEA: 0

## 2022-11-19 ASSESSMENT — ENCOUNTER SYMPTOMS
WHEEZING: 0
SINUS PAIN: 0
ABDOMINAL PAIN: 0
CHEST TIGHTNESS: 0
SINUS PRESSURE: 0
COLOR CHANGE: 0
DIARRHEA: 0
BLOOD IN STOOL: 0
NAUSEA: 0
CHOKING: 0
TROUBLE SWALLOWING: 0
SHORTNESS OF BREATH: 0
ABDOMINAL DISTENTION: 0
CONSTIPATION: 0
VOMITING: 0
EYE DISCHARGE: 0
VOICE CHANGE: 0
RHINORRHEA: 0
EYE ITCHING: 0
STRIDOR: 0
COUGH: 0
SORE THROAT: 0

## 2022-12-12 RX ORDER — SIMVASTATIN 40 MG
TABLET ORAL
Qty: 90 TABLET | Refills: 3 | Status: SHIPPED | OUTPATIENT
Start: 2022-12-12

## 2022-12-28 DIAGNOSIS — D50.9 IRON DEFICIENCY ANEMIA, UNSPECIFIED IRON DEFICIENCY ANEMIA TYPE: ICD-10-CM

## 2022-12-28 RX ORDER — PNV NO.95/FERROUS FUM/FOLIC AC 28MG-0.8MG
TABLET ORAL
Qty: 60 TABLET | Refills: 3 | Status: SHIPPED | OUTPATIENT
Start: 2022-12-28

## 2023-01-12 DIAGNOSIS — I25.5 ISCHEMIC CARDIOMYOPATHY: ICD-10-CM

## 2023-01-12 DIAGNOSIS — Z95.810 BIVENTRICULAR ICD (IMPLANTABLE CARDIOVERTER-DEFIBRILLATOR) IN PLACE: Primary | ICD-10-CM

## 2023-01-12 DIAGNOSIS — I50.42 CHRONIC COMBINED SYSTOLIC (CONGESTIVE) AND DIASTOLIC (CONGESTIVE) HEART FAILURE (HCC): ICD-10-CM

## 2023-01-17 ENCOUNTER — TELEPHONE (OUTPATIENT)
Dept: INTERNAL MEDICINE | Age: 73
End: 2023-01-17

## 2023-01-17 DIAGNOSIS — R09.89 CHEST CONGESTION: Primary | ICD-10-CM

## 2023-01-17 RX ORDER — CEPHALEXIN 500 MG/1
500 CAPSULE ORAL 3 TIMES DAILY
Qty: 30 CAPSULE | Refills: 0 | Status: SHIPPED | OUTPATIENT
Start: 2023-01-17 | End: 2023-01-27

## 2023-01-17 NOTE — TELEPHONE ENCOUNTER
Pts daughter stated that she can not bring her in to be seen, but she really needs something to help with the cough and congestion.  Please advise

## 2023-01-19 ENCOUNTER — OFFICE VISIT (OUTPATIENT)
Dept: CARDIOLOGY CLINIC | Age: 73
End: 2023-01-19
Payer: MEDICARE

## 2023-01-19 VITALS
SYSTOLIC BLOOD PRESSURE: 124 MMHG | HEIGHT: 65 IN | WEIGHT: 225 LBS | HEART RATE: 63 BPM | DIASTOLIC BLOOD PRESSURE: 70 MMHG | BODY MASS INDEX: 37.49 KG/M2

## 2023-01-19 DIAGNOSIS — Z95.810 BIVENTRICULAR ICD (IMPLANTABLE CARDIOVERTER-DEFIBRILLATOR) IN PLACE: ICD-10-CM

## 2023-01-19 DIAGNOSIS — I25.5 ISCHEMIC CARDIOMYOPATHY: Primary | ICD-10-CM

## 2023-01-19 DIAGNOSIS — E78.2 MIXED HYPERLIPIDEMIA: ICD-10-CM

## 2023-01-19 DIAGNOSIS — I25.10 CORONARY ARTERY DISEASE INVOLVING NATIVE CORONARY ARTERY OF NATIVE HEART WITHOUT ANGINA PECTORIS: ICD-10-CM

## 2023-01-19 PROCEDURE — 3074F SYST BP LT 130 MM HG: CPT | Performed by: CLINICAL NURSE SPECIALIST

## 2023-01-19 PROCEDURE — 1036F TOBACCO NON-USER: CPT | Performed by: CLINICAL NURSE SPECIALIST

## 2023-01-19 PROCEDURE — 93290 INTERROG DEV EVAL ICPMS IP: CPT | Performed by: CLINICAL NURSE SPECIALIST

## 2023-01-19 PROCEDURE — 1123F ACP DISCUSS/DSCN MKR DOCD: CPT | Performed by: CLINICAL NURSE SPECIALIST

## 2023-01-19 PROCEDURE — 3017F COLORECTAL CA SCREEN DOC REV: CPT | Performed by: CLINICAL NURSE SPECIALIST

## 2023-01-19 PROCEDURE — G8399 PT W/DXA RESULTS DOCUMENT: HCPCS | Performed by: CLINICAL NURSE SPECIALIST

## 2023-01-19 PROCEDURE — G8427 DOCREV CUR MEDS BY ELIG CLIN: HCPCS | Performed by: CLINICAL NURSE SPECIALIST

## 2023-01-19 PROCEDURE — 1090F PRES/ABSN URINE INCON ASSESS: CPT | Performed by: CLINICAL NURSE SPECIALIST

## 2023-01-19 PROCEDURE — 99214 OFFICE O/P EST MOD 30 MIN: CPT | Performed by: CLINICAL NURSE SPECIALIST

## 2023-01-19 PROCEDURE — G8417 CALC BMI ABV UP PARAM F/U: HCPCS | Performed by: CLINICAL NURSE SPECIALIST

## 2023-01-19 PROCEDURE — G8484 FLU IMMUNIZE NO ADMIN: HCPCS | Performed by: CLINICAL NURSE SPECIALIST

## 2023-01-19 PROCEDURE — 93289 INTERROG DEVICE EVAL HEART: CPT | Performed by: CLINICAL NURSE SPECIALIST

## 2023-01-19 PROCEDURE — 3078F DIAST BP <80 MM HG: CPT | Performed by: CLINICAL NURSE SPECIALIST

## 2023-01-19 NOTE — PATIENT INSTRUCTIONS
ICD will send in 3 mos   Maintain good blood pressure control-goal<130/80 at rest  Maintain good cholesterol control LDL goal<70 with arterial disease  If you are diabetic work to keep/obtain hemoglobin A1c< 7    Follow up in 6 mos with ICD check with DR Waters   Call with any questions or concerns  Follow up with Markie Collado MD for non cardiac problems  Report any new problems  Cardiovascular Fitness-Exercise as tolerated. Fall precautions   Cardiac / Healthy Diet- Avoid processed high fat foods, maintain low sodium/salt   Continue current medications as directed  Continue plan of treatment  It is always recommended that you bring your medications bottles with you to each visit - this is for your safety!

## 2023-01-19 NOTE — PROGRESS NOTES
[address] [place], [address] [id number]  Phone: [phone]  Fax: [phone]    OFFICE VISIT:  2023    France Mclaughlin - : 1950    Reason For Visit:  Magdy Gorman is a 74 y.o. 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 She states overall she is doing fairly well. She has shortness of breath if she walks a far distance but none resting. She has a little bit of coughing that attributes it to her sinus drainage. She has peripheral edema by the end of the day but usually resolves by the next morning        Subjective  Magdy Gorman denies exertional chest pain, resting shortness of breath, orthopnea, paroxysmal nocturnal dyspnea, syncope, presyncope, arrhythmia,and fatigue. The patient denies numbness or weakness to suggest cerebrovascular accident or transient ischemic attack. Phil Lawler MD is PCP and follows labs .   France Mclaughlin has the following history as recorded in [facility]:    Patient Active Problem List    Diagnosis Date Noted    Pacemaker     Decreased cardiac ejection fraction 2022    Ischemic cardiomyopathy     Acute cough 10/10/2022    Acute bronchiolitis 10/10/2022    Osteopenia of left hip 2022    Long term current use of aromatase inhibitor 2022    CKD (chronic kidney disease) stage 4, GFR 15-29 ml/min ([address] 2021    Status post right breast lumpectomy with sentinel node and IORT on 10/29/2021 2021    Malignant neoplasm of overlapping sites of right breast in female, estrogen receptor positive ([address] 2021    Retinal neovascularization 2021    Chest pain 2021    Chronic kidney disease, stage 3b ([address] 06/21/2021    History of coronary artery bypass graft 06/21/2021    Carpal tunnel syndrome 09/02/2020    Achilles tendinitis 04/29/2019    Bursitis of right foot 04/29/2019    Contracture of Achilles tendon 04/29/2019    Foot pain 04/29/2019    Posterior calcaneal exostosis 04/29/2019    Diabetic macular edema ([address] 04/15/2019    Nonproliferative diabetic retinopathy ([address] 04/15/2019    CHAPARRO (acute kidney injury) ([address] 05/18/2018    Hypertension 05/18/2018    Hyperlipidemia 05/18/2018    Ocular hypertension 05/18/2018    CAD (coronary artery disease) 05/18/2018    Type 2 diabetes mellitus ([address] 05/18/2018    Diabetes mellitus ([address] 05/18/2018    Acute cystitis without hematuria 05/18/2018     Past Medical History:   Diagnosis Date    Acute sinusitis     CHAPARRO (acute kidney injury) ([address] 05/18/2018    Anemia     Bleeding ulcer     hx of; cauterized by dr. resendiz    Breast cancer [facility])     Breast lump     CAD (coronary artery disease)     sees dr. Saha Booty ulcer [facility])     Cataract     COVID-19 01/2021    fatigue    Dermatitis     Diabetes mellitus ([address]     Diabetic nephropathy ([address]     Diabetic retinopathy ([address]     Glaucoma     Gout     Hemorrhoids     Hx of TB skin testing     positive    Hyperlipidemia     Hypertension     Long term current use of aromatase inhibitor 5/27/2022    Osteopenia of left hip 5/27/2022    Palliative care patient 06/24/2021    Type 2 diabetes mellitus ([address] 05/18/2018    Vitamin D deficiency      Past Surgical History:   Procedure Laterality Date    BREAST LUMPECTOMY Right 10/29/2021    RIGHT LUMPECTOMY, SNB, PREOP ULTRASOUND, INTRAOP ULTRASOUND GUIDED NL, BIOSORB, PEC BLOCK, MARGIN PROBE, FLAPS, IORT performed by Sarah Zuleta MD at [address]   02/15/2022    CARDIAC SURGERY  2001    CARPAL TUNNEL RELEASE Right 9/3/2020    RIGHT CARPAL TUNNEL RELEASE performed by Dennis Valencia MD at [address] [place] [address] CHOLECYSTECTOMY      COLONOSCOPY N/A 10/11/2019    Dr Baeza-w/placement of a hemostatic clip-Suboptimal prep, diverticulosis, internal hemorrhoids-Grade 2, AP x 3, 1 yr recall     COLONOSCOPY N/A 10/12/2020    Dr Baeza-Internal hemorrhoids-Grade 1-2, AP, 3 yr recall    CORONARY ARTERY BYPASS GRAFT  2001    ENDOSCOPY, COLON, DIAGNOSTIC      EYE SURGERY      MOUTH SURGERY      OTHER SURGICAL HISTORY      sternal resection    PACEMAKER PLACEMENT  02/23/2022    SKIN BIOPSY      UPPER GASTROINTESTINAL ENDOSCOPY N/A 10/11/2019    Dr Baeza (+) H Pylori    US BREAST BIOPSY W LOC DEVICE 1ST LESION LEFT Left 9/1/2021    US BREAST NEEDLE BIOPSY LEFT 9/1/2021 [facility] GENERAL SURGERY    VASCULAR SURGERY       Family History   Problem Relation Age of Onset    Cancer Mother     Diabetes Father     COPD Father     Diabetes Sister     Heart Disease Sister     Colon Polyps Sister     Coronary Art Dis Other         grandfather    Diabetes Other         aunt    Colon Cancer Neg Hx     Liver Cancer Neg Hx     Rectal Cancer Neg Hx      Social History     Tobacco Use    Smoking status: Never    Smokeless tobacco: Never   Substance Use Topics    Alcohol use: Not Currently     Alcohol/week: 1.0 standard drink     Types: 1 Shots of liquor per week     Comment: rare      Current Outpatient Medications   Medication Sig Dispense Refill    cephALEXin (KEFLEX) 500 MG capsule Take 1 capsule by mouth 3 times daily for 10 days 30 capsule 0    Ferrous Sulfate (IRON) 325 (65 Fe) MG TABS Take 1 tablet by mouth twice daily 60 tablet 3    simvastatin (ZOCOR) 40 MG tablet TAKE 1 TABLET NIGHTLY 90 tablet 3    furosemide (LASIX) 40 MG tablet Take 1 tablet by mouth daily 90 tablet 3    lisinopril (PRINIVIL;ZESTRIL) 5 MG tablet Take 1 tablet by mouth daily 90 tablet 3    nystatin (MYCOSTATIN) 154032 UNIT/GM cream Apply topically 2 times daily. 30 g 5    albuterol sulfate HFA (PROVENTIL;VENTOLIN;PROAIR) 108 (90 Base) MCG/ACT inhaler Inhale  2 puffs into the lungs every 6 hours as needed for Wheezing 18 g 3    isosorbide mononitrate (IMDUR) 30 MG extended release tablet TAKE 1 TABLET TWICE DAILY 180 tablet 3    alendronate (FOSAMAX) 70 MG tablet Take 1 tablet by mouth every 7 days Take with a full glass of water upon arising for the day. Consume on an empty stomach at least 30 minutes before the first food, beverage, or medication. Stay upright (do not lie down) for at least 30 minutes. Do not crush or break. 12 tablet 3    Accu-Chek Softclix Lancets MISC TEST FOUR TIMES DAILY 400 each 3    blood glucose test strips (ACCU-CHEK GUIDE) strip TEST BLOOD SUGAR FOUR TIMES DAILY 400 strip 3    metFORMIN (GLUCOPHAGE) 500 MG tablet TAKE 2 TABLETS TWICE DAILY WITH MEALS 360 tablet 1    amLODIPine (NORVASC) 5 MG tablet TAKE 1 TABLET EVERY DAY 90 tablet 1    metoprolol tartrate (LOPRESSOR) 50 MG tablet 2 tablets  tablet 3    letrozole (FEMARA) 2.5 MG tablet Take 1 tablet by mouth daily 90 tablet 3    montelukast (SINGULAIR) 10 MG tablet Take 1 tablet by mouth daily 90 tablet 3    timolol (TIMOPTIC) 0.25 % ophthalmic solution Place 1 drop into the right eye daily       glipiZIDE (GLUCOTROL XL) 2.5 MG extended release tablet Take 1 tablet by mouth daily 90 tablet 3    Blood Glucose Calibration (ACCU-CHEK ROSA) SOLN Calibration done daily.  1 each 1    Alcohol Swabs (B-D SINGLE USE SWABS REGULAR) PADS USE THREE TIMES DAILY 100 each 11    aspirin 81 MG tablet Take 81 mg by mouth daily      Omega-3 Fatty Acids (FISH OIL) 1000 MG CAPS Take 1,000 mg by mouth 2 times daily      MULTIPLE VITAMIN PO Take 1 tablet by mouth daily      Calcium Carbonate-Vitamin D (CALCIUM 500/D PO) Take 1 tablet by mouth 2 times daily      vitamin B-12 (CYANOCOBALAMIN) 1000 MCG tablet Take 1,000 mcg by mouth daily      Continuous Blood Gluc Sensor (DEXCOM G6 SENSOR) MISC Changer every 2 weeks E11.9 (Patient not taking: Reported on 1/19/2023) 6 each 3    Continuous Blood Gluc Transmit (DEXCOM G6 TRANSMITTER) MISC Change every 3 months E11.9 (Patient not taking: Reported on 1/19/2023) 1 each 0    Continuous Blood Gluc  (DEXCOM G6 ) ARMANDO Change every 2 weeks E11.9 (Patient not taking: Reported on 1/19/2023) 6 each 0     No current facility-administered medications for this visit. Allergies: Medrol [methylprednisolone]    Review of Systems  Constitutional - no significant activity change, appetite change, or unexpected weight change. No fever, chills or diaphoresis. No fatigue. HEENT - no significant rhinorrhea or epistaxis. No tinnitus or significant hearing loss. Eyes - no sudden vision change or amaurosis. Respiratory - no significant wheezing, stridor, apnea or cough. + dyspnea on exertion no resting shortness of breath. Cardiovascular - no exertional chest pain, orthopnea or PND. No sensation of arrhythmia or slow heart rate. No claudication + leg edema. Gastrointestinal - no abdominal swelling or pain. No blood in stool. No severe constipation, diarrhea, nausea, or vomiting. Genitourinary - no difficulty urinating, dysuria, frequency, or urgency. No flank pain or hematuria. Musculoskeletal - no back pain, gait disturbance, or myalgia. Skin - no color change or rash. No pallor. No new surgical incision. Neurologic - no speech difficulty, facial asymmetry or lateralizing weakness. No seizures, presyncope, syncope, or significant dizziness. Hematologic - no easy bruising or excessive bleeding. Psychiatric - no severe anxiety or insomnia. No confusion. All other review of systems are negative. Objective  Vital Signs - /70   Pulse 63   Ht 5' 5\" (1.651 m)   Wt 225 lb (102.1 kg)   BMI 37.44 kg/m²   General - Willean Midget is alert, cooperative, and pleasant. Well groomed. No acute distress. Body habitus is obese  HEENT - The head is normocephalic. No circumoral cyanosis.   Dentition is normal.   EYES -  No Xanthelasma, no arcus senilis, no conjunctival hemorrhages or discharge. Neck - Supple, without increased jugular venous pressures. No carotid bruits. No mass. Respiratory - Lungs are clear bilaterally. No wheezes or rales. Normal effort without use of accessory muscles. Cardiovascular - Heart has regular rhythm and rate. No murmurs, rubs or gallops. + pedal pulses and no varicosities. Abdominal -  Soft, nontender, nondistended. Bowel sounds are intact. Extremities - No clubbing, cyanosis, or  edema. Musculoskeletal -  No clubbing . No Osler's nodes. Gait normal .  No kyphosis or scoliosis. Skin -  no statis ulcers or dermatitis. Neurological - No focal signs are identified. Oriented to person, place and time. Psychiatric -  Appropriate affect and mood. Assessment:     Diagnosis Orders   1. Ischemic cardiomyopathy        2. Biventricular ICD (implantable cardioverter-defibrillator) in place        3. Coronary artery disease involving native coronary artery of native heart without angina pectoris        4. Mixed hyperlipidemia          Data:  BP Readings from Last 3 Encounters:   01/19/23 124/70   11/11/22 130/60   11/10/22 126/76    Pulse Readings from Last 3 Encounters:   01/19/23 63   11/11/22 64   11/10/22 75        Wt Readings from Last 3 Encounters:   01/19/23 225 lb (102.1 kg)   11/11/22 219 lb 9.6 oz (99.6 kg)   11/10/22 216 lb (98 kg)   ICD interrogation showed adequate battery voltage   (5.1 years) and charge time with  normal diagnostics, no firings and no sustained dysrhythmias. Optivol -was elevated but back to baseline  Mode DDD at 50  Recheck in 3 mos. Via [employer] monitoring system    Blood pressure and heart rate well controlled. Medical management includes beta-blocker, ACE inhibitor and CCB. Takes long-acting nitrate and diuretic as well. Remains on aspirin and statin    Discussion watching fluid weight and sodium intake. We discussed when and how to take extra Lasix.      Reviewed PCP recent notes-has upcoming appointment next month   Reviewed recent labs    Heart catheterization 2/15/2022  Impaired left ventricular function   Severe multivessel coronary artery disease   100% mid RCA   90% distal circumflex   60-70% diffuse mid LAD   Patent LIMA graft to LAD   Patent SVG to distal RCA   Atretic/occluded SVG to ? OM      Recommendations      Routine Post Diagnostic Cath orders. Risk factor modification. Medical management. Consideration for staged ICD  ---------------------------------------   Electronically signed by Lionel Nava MD(Performing   Physician) on 02/22/2022 09:30       States taking medications as prescribed  Stable cardiovascular status. No evidence of overt heart failure, angina or dysrhythmia. 30 minutes were spent preparing, reviewing and seeing patient. All questions answered    Plan    ICD will send in 3 mos   Maintain good blood pressure control-goal<130/80 at rest  Maintain good cholesterol control LDL goal<70 with arterial disease  If you are diabetic work to keep/obtain hemoglobin A1c< 7    Follow up in 6 mos with ICD check with DR Waters   Call with any questions or concerns  Follow up with Smiley Hartley MD for non cardiac problems  Report any new problems  Cardiovascular Fitness-Exercise as tolerated. Fall precautions   Cardiac / Healthy Diet- Avoid processed high fat foods, maintain low sodium/salt   Continue current medications as directed  Continue plan of treatment  It is always recommended that you bring your medications bottles with you to each visit - this is for your safety! SARA Weeks    EMR dragon/transcription disclaimer: Much of this encounter note is electronic transcription/translation of spoken language to printed tach. Electronic translation of spoken language may be erroneous, or at times, nonsensical words or phrases may be inadvertently transcribed.  Although, I have reviewed the note for such errors, some may still exist.

## 2023-02-07 ENCOUNTER — OFFICE VISIT (OUTPATIENT)
Dept: INTERNAL MEDICINE | Age: 73
End: 2023-02-07
Payer: MEDICARE

## 2023-02-07 ENCOUNTER — TELEPHONE (OUTPATIENT)
Dept: INTERNAL MEDICINE | Age: 73
End: 2023-02-07

## 2023-02-07 VITALS — OXYGEN SATURATION: 94 % | HEART RATE: 69 BPM | DIASTOLIC BLOOD PRESSURE: 68 MMHG | SYSTOLIC BLOOD PRESSURE: 132 MMHG

## 2023-02-07 DIAGNOSIS — I25.83 CORONARY ARTERY DISEASE DUE TO LIPID RICH PLAQUE: ICD-10-CM

## 2023-02-07 DIAGNOSIS — S80.821A BLISTER OF RIGHT LOWER EXTREMITY, INITIAL ENCOUNTER: Primary | ICD-10-CM

## 2023-02-07 DIAGNOSIS — E11.22 TYPE 2 DIABETES MELLITUS WITH STAGE 3 CHRONIC KIDNEY DISEASE, UNSPECIFIED WHETHER LONG TERM INSULIN USE, UNSPECIFIED WHETHER STAGE 3A OR 3B CKD (HCC): ICD-10-CM

## 2023-02-07 DIAGNOSIS — Z95.0 PACEMAKER: ICD-10-CM

## 2023-02-07 DIAGNOSIS — C50.811 MALIGNANT NEOPLASM OF OVERLAPPING SITES OF RIGHT BREAST IN FEMALE, ESTROGEN RECEPTOR POSITIVE (HCC): ICD-10-CM

## 2023-02-07 DIAGNOSIS — E11.311 DIABETIC MACULAR EDEMA (HCC): ICD-10-CM

## 2023-02-07 DIAGNOSIS — Z91.09 ENVIRONMENTAL ALLERGIES: ICD-10-CM

## 2023-02-07 DIAGNOSIS — E78.5 HYPERLIPIDEMIA, UNSPECIFIED HYPERLIPIDEMIA TYPE: ICD-10-CM

## 2023-02-07 DIAGNOSIS — I25.5 ISCHEMIC CARDIOMYOPATHY: ICD-10-CM

## 2023-02-07 DIAGNOSIS — N18.32 CHRONIC KIDNEY DISEASE, STAGE 3B (HCC): ICD-10-CM

## 2023-02-07 DIAGNOSIS — E11.3299 NONPROLIFERATIVE DIABETIC RETINOPATHY (HCC): ICD-10-CM

## 2023-02-07 DIAGNOSIS — I25.10 CORONARY ARTERY DISEASE DUE TO LIPID RICH PLAQUE: ICD-10-CM

## 2023-02-07 DIAGNOSIS — E11.69 TYPE 2 DIABETES MELLITUS WITH OTHER SPECIFIED COMPLICATION, UNSPECIFIED WHETHER LONG TERM INSULIN USE (HCC): ICD-10-CM

## 2023-02-07 DIAGNOSIS — Z17.0 MALIGNANT NEOPLASM OF OVERLAPPING SITES OF RIGHT BREAST IN FEMALE, ESTROGEN RECEPTOR POSITIVE (HCC): ICD-10-CM

## 2023-02-07 DIAGNOSIS — D50.9 IRON DEFICIENCY ANEMIA, UNSPECIFIED IRON DEFICIENCY ANEMIA TYPE: ICD-10-CM

## 2023-02-07 DIAGNOSIS — N18.30 TYPE 2 DIABETES MELLITUS WITH STAGE 3 CHRONIC KIDNEY DISEASE, UNSPECIFIED WHETHER LONG TERM INSULIN USE, UNSPECIFIED WHETHER STAGE 3A OR 3B CKD (HCC): ICD-10-CM

## 2023-02-07 DIAGNOSIS — E55.9 VITAMIN D DEFICIENCY: ICD-10-CM

## 2023-02-07 DIAGNOSIS — J45.20 MILD INTERMITTENT REACTIVE AIRWAY DISEASE WITHOUT COMPLICATION: ICD-10-CM

## 2023-02-07 DIAGNOSIS — N18.4 CKD (CHRONIC KIDNEY DISEASE) STAGE 4, GFR 15-29 ML/MIN (HCC): ICD-10-CM

## 2023-02-07 DIAGNOSIS — N17.9 AKI (ACUTE KIDNEY INJURY) (HCC): ICD-10-CM

## 2023-02-07 PROCEDURE — 1090F PRES/ABSN URINE INCON ASSESS: CPT | Performed by: INTERNAL MEDICINE

## 2023-02-07 PROCEDURE — 2022F DILAT RTA XM EVC RTNOPTHY: CPT | Performed by: INTERNAL MEDICINE

## 2023-02-07 PROCEDURE — G8484 FLU IMMUNIZE NO ADMIN: HCPCS | Performed by: INTERNAL MEDICINE

## 2023-02-07 PROCEDURE — 1123F ACP DISCUSS/DSCN MKR DOCD: CPT | Performed by: INTERNAL MEDICINE

## 2023-02-07 PROCEDURE — 3017F COLORECTAL CA SCREEN DOC REV: CPT | Performed by: INTERNAL MEDICINE

## 2023-02-07 PROCEDURE — 3074F SYST BP LT 130 MM HG: CPT | Performed by: INTERNAL MEDICINE

## 2023-02-07 PROCEDURE — 99214 OFFICE O/P EST MOD 30 MIN: CPT | Performed by: INTERNAL MEDICINE

## 2023-02-07 PROCEDURE — G8399 PT W/DXA RESULTS DOCUMENT: HCPCS | Performed by: INTERNAL MEDICINE

## 2023-02-07 PROCEDURE — G8427 DOCREV CUR MEDS BY ELIG CLIN: HCPCS | Performed by: INTERNAL MEDICINE

## 2023-02-07 PROCEDURE — 3046F HEMOGLOBIN A1C LEVEL >9.0%: CPT | Performed by: INTERNAL MEDICINE

## 2023-02-07 PROCEDURE — 1036F TOBACCO NON-USER: CPT | Performed by: INTERNAL MEDICINE

## 2023-02-07 PROCEDURE — G8417 CALC BMI ABV UP PARAM F/U: HCPCS | Performed by: INTERNAL MEDICINE

## 2023-02-07 PROCEDURE — 3078F DIAST BP <80 MM HG: CPT | Performed by: INTERNAL MEDICINE

## 2023-02-07 RX ORDER — CEPHALEXIN 500 MG/1
500 CAPSULE ORAL 3 TIMES DAILY
Qty: 30 CAPSULE | Refills: 0 | Status: SHIPPED | OUTPATIENT
Start: 2023-02-07 | End: 2023-02-17

## 2023-02-07 RX ORDER — MUPIROCIN CALCIUM 20 MG/G
CREAM TOPICAL
Qty: 30 G | Refills: 1 | Status: SHIPPED | OUTPATIENT
Start: 2023-02-07 | End: 2023-03-09

## 2023-02-07 RX ORDER — PYRAZINAMIDE 500 MG/1
1 TABLET ORAL EVERY 4 HOURS PRN
Qty: 18 TABLET | Refills: 0 | Status: SHIPPED | OUTPATIENT
Start: 2023-02-07 | End: 2023-02-10

## 2023-02-07 ASSESSMENT — PATIENT HEALTH QUESTIONNAIRE - PHQ9
2. FEELING DOWN, DEPRESSED OR HOPELESS: 0
1. LITTLE INTEREST OR PLEASURE IN DOING THINGS: 0
SUM OF ALL RESPONSES TO PHQ QUESTIONS 1-9: 0
SUM OF ALL RESPONSES TO PHQ9 QUESTIONS 1 & 2: 0
SUM OF ALL RESPONSES TO PHQ QUESTIONS 1-9: 0

## 2023-02-07 NOTE — PROGRESS NOTES
Chief Complaint   Patient presents with    Blister     Ongoing one week, says they have gotten worse over night. Changing dressings everyday. HPI: Magen Pastrana is a 74 y.o. female is here for evaluation of blisters around her ankle. She states the blisters have been present for approximately 1 to 2 weeks. She has been putting dressings on them. However, they have gotten worse overnight. The blisters have now ruptured. Both open blisters are approximately the size of a golf ball. The first 1 is on the anterior shin of the right lower extremity. The other one is on the back of the right lower extremity. She has not injured her leg in any way. She has not had any fever or chills. She is interested in getting home health. The pain is quite severe. She cannot sleep at night due to the amount of pain that she is having. She will not let my staff or me touch her leg today. She does have a history of type 2 diabetes. Her blood sugars are running between 80 and 90. She continues to follow-up with oncology for her history of breast cancer. She is tolerating her Femara without difficulty. She does have a history of iron deficiency anemia, which is stable on ferrous sulfate. She is tolerating her statin without difficulty. Her blood pressure is well controlled. She does have a history of reactive airways disease, which is stable on albuterol. She also has a history of coronary artery disease: Which is stable.     Past Medical History:   Diagnosis Date    Acute sinusitis     CHAPARRO (acute kidney injury) ([facility] 05/18/2018    Anemia     Bleeding ulcer     hx of; cauterized by dr. resendiz    Breast cancer [facility])     Breast lump     CAD (coronary artery disease)     sees dr. Feliciano Wallace ulcer [facility])     Cataract     COVID-19 01/2021    fatigue    Dermatitis     Diabetes mellitus ([address]     Diabetic nephropathy ([address]     Diabetic retinopathy ([address]     Glaucoma     Gout     Hemorrhoids     Hx of TB skin testing     positive    Hyperlipidemia     Hypertension     Long term current use of aromatase inhibitor 5/27/2022    Osteopenia of left hip 5/27/2022    Palliative care patient 06/24/2021    Type 2 diabetes mellitus ([facility] [address] 05/18/2018    Vitamin D deficiency       Past Surgical History:   Procedure Laterality Date    BREAST LUMPECTOMY Right 10/29/2021    RIGHT LUMPECTOMY, SNB, PREOP ULTRASOUND, INTRAOP ULTRASOUND GUIDED NL, BIOSORB, PEC BLOCK, MARGIN PROBE, FLAPS, IORT performed by Eusebia Moon MD at [place] [id number]  02/15/2022    CARDIAC SURGERY  2001    CARPAL TUNNEL RELEASE Right 9/3/2020    RIGHT CARPAL TUNNEL RELEASE performed by Neena Cano MD at [facility] N/A 10/11/2019    Dr Michelle Rich of a hemostatic clip-Suboptimal prep, diverticulosis, internal hemorrhoids-Grade 2, AP x 3, 1 yr recall     COLONOSCOPY N/A 10/12/2020    Dr Heidi Montes De Oca hemorrhoids-Grade 1-2, AP, 3 yr recall    CORONARY ARTERY BYPASS GRAFT  2001    ENDOSCOPY, COLON, DIAGNOSTIC      EYE SURGERY      MOUTH SURGERY      OTHER SURGICAL HISTORY      sternal resection    PACEMAKER PLACEMENT  02/23/2022    SKIN BIOPSY      UPPER GASTROINTESTINAL ENDOSCOPY N/A 10/11/2019    Dr Yoselin Kee (+) H Pylori    US BREAST BIOPSY W LOC DEVICE 1ST LESION LEFT Left 9/1/2021    US BREAST NEEDLE BIOPSY LEFT 9/1/2021 [facility] GENERAL SURGERY    VASCULAR SURGERY        Social History     Socioeconomic History    Marital status: Single     Spouse name: None    Number of children: None    Years of education: None    Highest education level: None   Occupational History     Employer: [employer]   Tobacco Use    Smoking status: Never    Smokeless tobacco: Never   Vaping Use    Vaping Use: Never used   Substance and Sexual Activity    Alcohol use: Not Currently     Alcohol/week: 1.0 standard drink     Types: 1 Shots of liquor per week     Comment: rare Drug use: No    Sexual activity: Never     Social Determinants of Health     Financial Resource Strain: Low Risk     Difficulty of Paying Living Expenses: Not hard at all   Food Insecurity: No Food Insecurity    Worried About Running Out of Food in the Last Year: Never true    Ran Out of Food in the Last Year: Never true   Physical Activity: Insufficiently Active    Days of Exercise per Week: 1 day    Minutes of Exercise per Session: 10 min      Family History   Problem Relation Age of Onset    Cancer Mother     Diabetes Father     COPD Father     Diabetes Sister     Heart Disease Sister     Colon Polyps Sister     Coronary Art Dis Other         grandfather    Diabetes Other         aunt    Colon Cancer Neg Hx     Liver Cancer Neg Hx     Rectal Cancer Neg Hx         Current Outpatient Medications   Medication Sig Dispense Refill    mupirocin (BACTROBAN) 2 % cream Apply 3 times daily. 30 g 1    cephALEXin (KEFLEX) 500 MG capsule Take 1 capsule by mouth 3 times daily for 10 days 30 capsule 0    acetaminophen-codeine (TYLENOL/CODEINE #3) 300-30 MG per tablet Take 1 tablet by mouth every 4 hours as needed for Pain for up to 3 days. Intended supply: 3 days. Take lowest dose possible to manage pain Max Daily Amount: 6 tablets 18 tablet 0    Ferrous Sulfate (IRON) 325 (65 Fe) MG TABS Take 1 tablet by mouth twice daily 60 tablet 3    simvastatin (ZOCOR) 40 MG tablet TAKE 1 TABLET NIGHTLY 90 tablet 3    furosemide (LASIX) 40 MG tablet Take 1 tablet by mouth daily 90 tablet 3    Continuous Blood Gluc Sensor (DEXCOM G6 SENSOR) MISC Changer every 2 weeks E11.9 6 each 3    Continuous Blood Gluc Transmit (DEXCOM G6 TRANSMITTER) MISC Change every 3 months E11.9 1 each 0    Continuous Blood Gluc  (DEXCOM G6 ) ARMANDO Change every 2 weeks E11.9 6 each 0    lisinopril (PRINIVIL;ZESTRIL) 5 MG tablet Take 1 tablet by mouth daily 90 tablet 3    nystatin (MYCOSTATIN) 428239 UNIT/GM cream Apply topically 2 times daily.  30 g 5 albuterol sulfate HFA (PROVENTIL;VENTOLIN;PROAIR) 108 (90 Base) MCG/ACT inhaler Inhale 2 puffs into the lungs every 6 hours as needed for Wheezing 18 g 3    isosorbide mononitrate (IMDUR) 30 MG extended release tablet TAKE 1 TABLET TWICE DAILY 180 tablet 3    alendronate (FOSAMAX) 70 MG tablet Take 1 tablet by mouth every 7 days Take with a full glass of water upon arising for the day. Consume on an empty stomach at least 30 minutes before the first food, beverage, or medication. Stay upright (do not lie down) for at least 30 minutes. Do not crush or break. 12 tablet 3    Accu-Chek Softclix Lancets MISC TEST FOUR TIMES DAILY 400 each 3    blood glucose test strips (ACCU-CHEK GUIDE) strip TEST BLOOD SUGAR FOUR TIMES DAILY 400 strip 3    metFORMIN (GLUCOPHAGE) 500 MG tablet TAKE 2 TABLETS TWICE DAILY WITH MEALS 360 tablet 1    amLODIPine (NORVASC) 5 MG tablet TAKE 1 TABLET EVERY DAY 90 tablet 1    metoprolol tartrate (LOPRESSOR) 50 MG tablet 2 tablets  tablet 3    letrozole (FEMARA) 2.5 MG tablet Take 1 tablet by mouth daily 90 tablet 3    montelukast (SINGULAIR) 10 MG tablet Take 1 tablet by mouth daily 90 tablet 3    timolol (TIMOPTIC) 0.25 % ophthalmic solution Place 1 drop into the right eye daily       glipiZIDE (GLUCOTROL XL) 2.5 MG extended release tablet Take 1 tablet by mouth daily 90 tablet 3    Blood Glucose Calibration (ACCU-CHEK ROSA) SOLN Calibration done daily. 1 each 1    Alcohol Swabs (B-D SINGLE USE SWABS REGULAR) PADS USE THREE TIMES DAILY 100 each 11    aspirin 81 MG tablet Take 81 mg by mouth daily      Omega-3 Fatty Acids (FISH OIL) 1000 MG CAPS Take 1,000 mg by mouth 2 times daily      MULTIPLE VITAMIN PO Take 1 tablet by mouth daily      Calcium Carbonate-Vitamin D (CALCIUM 500/D PO) Take 1 tablet by mouth 2 times daily      vitamin B-12 (CYANOCOBALAMIN) 1000 MCG tablet Take 1,000 mcg by mouth daily       No current facility-administered medications for this visit.         Patient Active Problem List   Diagnosis    [facility]) [facility]    Hypertension    Hyperlipidemia    Ocular hypertension    CAD (coronary artery disease)    Type 2 diabetes mellitus ([facility]    Diabetes mellitus ([facility]    Acute cystitis without hematuria    Carpal tunnel syndrome    Chest pain    Chronic kidney disease, stage 3b (HCC)    History of coronary artery bypass graft    Achilles tendinitis    Bursitis of right foot    Contracture of Achilles tendon    Diabetic macular edema ([facility])    Foot pain    Nonproliferative diabetic retinopathy ([facility])    Posterior calcaneal exostosis    Retinal neovascularization    Malignant neoplasm of overlapping sites of right breast in female, estrogen receptor positive ([facility]    Status post right breast lumpectomy with sentinel node and IORT on 10/29/2021    CKD (chronic kidney disease) stage 4, GFR 15-29 ml/min ([facility])    Ischemic cardiomyopathy    Decreased cardiac ejection fraction    Pacemaker    Osteopenia of left hip    Long term current use of aromatase inhibitor    Acute cough    Acute bronchiolitis        Review of Systems   Constitutional:  Negative for activity change, appetite change, chills, diaphoresis, fatigue, fever and unexpected weight change. HENT:  Negative for congestion, ear discharge, ear pain, mouth sores, nosebleeds, postnasal drip, rhinorrhea, sinus pressure, sinus pain, sneezing, sore throat, tinnitus, trouble swallowing and voice change. Eyes:  Negative for discharge, itching and visual disturbance. Respiratory:  Negative for cough, choking, chest tightness, shortness of breath, wheezing and stridor. Cardiovascular:  Negative for chest pain, palpitations and leg swelling. Gastrointestinal:  Negative for abdominal distention, abdominal pain, blood in stool, constipation, diarrhea, nausea and vomiting. Endocrine: Negative for cold intolerance, polydipsia and polyuria.    Genitourinary:  Negative for difficulty urinating, dysuria, frequency and urgency. Musculoskeletal:  Negative for arthralgias, gait problem, joint swelling and myalgias. Skin:  Positive for wound. Negative for color change and rash. Blisters to her right lower extremity   Allergic/Immunologic: Negative for food allergies and immunocompromised state. Neurological:  Negative for dizziness, tremors, syncope, speech difficulty, weakness, numbness and headaches. Hematological:  Negative for adenopathy. Does not bruise/bleed easily. Psychiatric/Behavioral:  Negative for confusion and hallucinations. The patient is not nervous/anxious. /68 (Site: Left Upper Arm, Position: Sitting, Cuff Size: Large Adult)   Pulse 69   SpO2 94%   Physical Exam  Vitals and nursing note reviewed. Constitutional:       General: She is not in acute distress. Appearance: Normal appearance. She is well-developed and normal weight. She is not ill-appearing, toxic-appearing or diaphoretic. HENT:      Head: Normocephalic and atraumatic. Right Ear: Tympanic membrane, ear canal and external ear normal. There is no impacted cerumen. Left Ear: Tympanic membrane, ear canal and external ear normal. There is no impacted cerumen. Nose: Nose normal. No congestion or rhinorrhea. Mouth/Throat:      Mouth: Mucous membranes are moist.      Pharynx: Oropharynx is clear. No oropharyngeal exudate or posterior oropharyngeal erythema. Eyes:      General: No scleral icterus. Right eye: No discharge. Left eye: No discharge. Extraocular Movements: Extraocular movements intact. Conjunctiva/sclera: Conjunctivae normal.      Pupils: Pupils are equal, round, and reactive to light. Neck:      Thyroid: No thyromegaly. Vascular: No carotid bruit or JVD. Trachea: No tracheal deviation. Cardiovascular:      Rate and Rhythm: Normal rate and regular rhythm. Pulses: Normal pulses. Heart sounds: Normal heart sounds. No murmur heard.     No friction rub. No gallop. Pulmonary:      Effort: Pulmonary effort is normal. No respiratory distress. Breath sounds: Normal breath sounds. No stridor. No wheezing, rhonchi or rales. Chest:      Chest wall: No tenderness. Abdominal:      General: Bowel sounds are normal. There is no distension. Palpations: Abdomen is soft. There is no mass. Tenderness: There is no abdominal tenderness. There is no right CVA tenderness, left CVA tenderness, guarding or rebound. Hernia: No hernia is present. Musculoskeletal:         General: No swelling, tenderness, deformity or signs of injury. Normal range of motion. Cervical back: Normal range of motion and neck supple. No rigidity. No muscular tenderness. Right lower leg: No edema. Left lower leg: No edema. Lymphadenopathy:      Cervical: No cervical adenopathy. Skin:     General: Skin is warm and dry. Capillary Refill: Capillary refill takes less than 2 seconds. Coloration: Skin is not jaundiced or pale. Findings: Lesion present. No bruising, erythema or rash. Comments: Two  golf ball sized open lesions to her right lower extremity. Very little redness around the lesions, but they are not using serosanguineous material.    Deformity/amputation: absent  Skin lesions/pre-ulcerative calluses: present - bilateral great toe  Edema: right- negative, left- negative    Sensory exam:  Monofilament sensation: normal  (minimum of 5 random plantar locations tested, avoiding callused areas - > 1 area with absence of sensation is + for neuropathy)    Plus at least one of the following:  Pulses: normal,   Pinprick: Intact  Proprioception: N/A  Vibration (128 Hz): N/A     Neurological:      General: No focal deficit present. Mental Status: She is alert and oriented to person, place, and time. Mental status is at baseline. Cranial Nerves: No cranial nerve deficit. Sensory: No sensory deficit.       Motor: No weakness or abnormal muscle tone. Coordination: Coordination normal.      Gait: Gait normal.      Deep Tendon Reflexes: Reflexes are normal and symmetric. Reflexes normal.   Psychiatric:         Mood and Affect: Mood normal.         Behavior: Behavior normal.         Thought Content: Thought content normal.         Judgment: Judgment normal.       1. Blister of right lower extremity, initial encounter    2. CKD (chronic kidney disease) stage 4, GFR 15-29 ml/min ([facility])    3. Malignant neoplasm of overlapping sites of right breast in female, estrogen receptor positive ([facility]    4. Nonproliferative diabetic retinopathy ([facility] [address]    5. Diabetic macular edema ([address]    6. Chronic kidney disease, stage 3b ([address]    7. Type 2 diabetes mellitus with stage 3 chronic kidney disease, unspecified whether long term insulin use, unspecified whether stage 3a or 3b CKD ([address]    8. CHAPARRO (acute kidney injury) ([facility]    9. Type 2 diabetes mellitus with other specified complication, unspecified whether long term insulin use ([facility]    10. Ischemic cardiomyopathy    11. Pacemaker    12. Iron deficiency anemia, unspecified iron deficiency anemia type    13. Hyperlipidemia, unspecified hyperlipidemia type    14. Mild intermittent reactive airway disease without complication    15. Coronary artery disease due to lipid rich plaque    16. Vitamin D deficiency    17. Environmental allergies        ASSESSMENT/PLAN:    64-year-old woman here for follow-up    Blisters to right lower extremity: Uncertain etiology. Mupirocin ointment and Keflex prescribed. We will get home health out for local wound care. She is in quite a bit of pain. Tylenol with codeine prescribed    2. Iron deficiency anemia: Continue ferrous sulfate as prescribed    3. Hyperlipidemia: Continue Simvastatin as prescribed    4. Hypertension: Blood pressure well controlled on Lasix, Prinivil, metoprolol amlodipine. 5.  Chronic kidney disease: Patient is scheduled for lab work next week. Has had a history of acute kidney injury in the past.    6.  Type 2 diabetes: Patient states that her blood sugars are running between 89 and 90. Continue metformin and glipizide as prescribed    7. Reactive airways disease: Continue albuterol    8. Coronary artery disease: Stable    9. Ischemic cardiomyopathy status post pacemaker placement: Appears to be doing well    10. Diabetic retinopathy/macular edema: Followed by ophthalmology    11. Breast cancer: Doing well with Femara    12. Vitamin D deficiency: Continue vitamin D supplementation    13. Environmental allergies: Continue Bessy Parish was seen today for blister. Diagnoses and all orders for this visit:    Blister of right lower extremity, initial encounter  -     acetaminophen-codeine (TYLENOL/CODEINE #3) 300-30 MG per tablet; Take 1 tablet by mouth every 4 hours as needed for Pain for up to 3 days. Intended supply: 3 days.  Take lowest dose possible to manage pain Max Daily Amount: 6 tablets  -     [facility]    CKD (chronic kidney disease) stage 4, GFR 15-29 ml/min (HCC)    Malignant neoplasm of overlapping sites of right breast in female, estrogen receptor positive (HCC)    Nonproliferative diabetic retinopathy (HCC)    Diabetic macular edema (HCC)    Chronic kidney disease, stage 3b (HCC)    Type 2 diabetes mellitus with stage 3 chronic kidney disease, unspecified whether long term insulin use, unspecified whether stage 3a or 3b CKD ([address]    CHAPARRO (acute kidney injury) ([address]    Type 2 diabetes mellitus with other specified complication, unspecified whether long term insulin use ([address]    Ischemic cardiomyopathy    Pacemaker    Iron deficiency anemia, unspecified iron deficiency anemia type    Hyperlipidemia, unspecified hyperlipidemia type    Mild intermittent reactive airway disease without complication    Coronary artery disease due to lipid rich plaque    Vitamin D deficiency    Environmental allergies    Other orders  -     mupirocin (BACTROBAN) 2 % cream; Apply 3 times daily. -     cephALEXin (KEFLEX) 500 MG capsule; Take 1 capsule by mouth 3 times daily for 10 days        No follow-ups on file.      Orders Placed This Encounter   Procedures    [facility]     Referral Priority:   Routine     Referral Type:   Home Health Care     Referral Reason:   Specialty Services Required     Requested Specialty:   [address]     Number of Visits Requested:   Villa Carrillo MD

## 2023-02-08 ENCOUNTER — TELEPHONE (OUTPATIENT)
Dept: INTERNAL MEDICINE CLINIC | Age: 73
End: 2023-02-08

## 2023-02-08 ENCOUNTER — TELEPHONE (OUTPATIENT)
Dept: INTERNAL MEDICINE | Age: 73
End: 2023-02-08

## 2023-02-08 ASSESSMENT — ENCOUNTER SYMPTOMS
WHEEZING: 0
SINUS PRESSURE: 0
COLOR CHANGE: 0
EYE ITCHING: 0
ABDOMINAL PAIN: 0
COUGH: 0
ABDOMINAL DISTENTION: 0
DIARRHEA: 0
NAUSEA: 0
TROUBLE SWALLOWING: 0
VOMITING: 0
VOICE CHANGE: 0
SORE THROAT: 0
BLOOD IN STOOL: 0
EYE DISCHARGE: 0
CHOKING: 0
RHINORRHEA: 0
SINUS PAIN: 0
SHORTNESS OF BREATH: 0
CONSTIPATION: 0
STRIDOR: 0
CHEST TIGHTNESS: 0

## 2023-02-08 NOTE — TELEPHONE ENCOUNTER
S/w pt, states her insurance will not pay for Mupirocin; can something different be sent in? [facility] on [address]

## 2023-02-08 NOTE — TELEPHONE ENCOUNTER
[facility] nurse admitted pt today and nursing will plan to see pt 2x week for 2 weeks m then 1x week for 3 wls     Do you have  a specific wound care order for her wound to [facility] ? Pt would like to be DNR is that ok with you, and if so is it ok for SW to assist with EMS DNR paperwork  ? Lastly potential for interaction noted with amlodipine and simvastatin. No issues reported however they are required to report and get the ok on continuing those ?       Please advise

## 2023-02-09 ENCOUNTER — TELEPHONE (OUTPATIENT)
Dept: INTERNAL MEDICINE | Age: 73
End: 2023-02-09

## 2023-02-09 ENCOUNTER — TELEPHONE (OUTPATIENT)
Dept: INTERNAL MEDICINE CLINIC | Age: 73
End: 2023-02-09

## 2023-02-09 DIAGNOSIS — D50.9 IRON DEFICIENCY ANEMIA, UNSPECIFIED IRON DEFICIENCY ANEMIA TYPE: Primary | ICD-10-CM

## 2023-02-09 LAB
ALBUMIN SERPL-MCNC: 3.3 G/DL (ref 3.5–5.2)
ALP BLD-CCNC: 63 U/L (ref 35–104)
ALT SERPL-CCNC: 8 U/L (ref 5–33)
ANION GAP SERPL CALCULATED.3IONS-SCNC: 11 MMOL/L (ref 7–19)
AST SERPL-CCNC: 18 U/L (ref 5–32)
BASOPHILS ABSOLUTE: 0 K/UL (ref 0–0.2)
BASOPHILS RELATIVE PERCENT: 0.9 % (ref 0–1)
BILIRUB SERPL-MCNC: <0.2 MG/DL (ref 0.2–1.2)
BUN BLDV-MCNC: 27 MG/DL (ref 8–23)
CALCIUM SERPL-MCNC: 8.4 MG/DL (ref 8.8–10.2)
CHLORIDE BLD-SCNC: 98 MMOL/L (ref 98–111)
CHOLESTEROL, TOTAL: 99 MG/DL (ref 160–199)
CO2: 27 MMOL/L (ref 22–29)
CREAT SERPL-MCNC: 2.4 MG/DL (ref 0.5–0.9)
EOSINOPHILS ABSOLUTE: 0.1 K/UL (ref 0–0.6)
EOSINOPHILS RELATIVE PERCENT: 3.2 % (ref 0–5)
GFR SERPL CREATININE-BSD FRML MDRD: 21 ML/MIN/{1.73_M2}
GLUCOSE BLD-MCNC: 102 MG/DL (ref 74–109)
HBA1C MFR BLD: 5.3 % (ref 4–6)
HCT VFR BLD CALC: 30.7 % (ref 37–47)
HDLC SERPL-MCNC: 43 MG/DL (ref 65–121)
HEMOGLOBIN: 9.6 G/DL (ref 12–16)
IMMATURE GRANULOCYTES #: 0 K/UL
LDL CHOLESTEROL CALCULATED: 40 MG/DL
LYMPHOCYTES ABSOLUTE: 1.2 K/UL (ref 1.1–4.5)
LYMPHOCYTES RELATIVE PERCENT: 27.2 % (ref 20–40)
MCH RBC QN AUTO: 32.9 PG (ref 27–31)
MCHC RBC AUTO-ENTMCNC: 31.3 G/DL (ref 33–37)
MCV RBC AUTO: 105.1 FL (ref 81–99)
MICROALBUMIN UR-MCNC: 80.5 MG/DL (ref 0–19)
MONOCYTES ABSOLUTE: 0.3 K/UL (ref 0–0.9)
MONOCYTES RELATIVE PERCENT: 7.4 % (ref 0–10)
NEUTROPHILS ABSOLUTE: 2.7 K/UL (ref 1.5–7.5)
NEUTROPHILS RELATIVE PERCENT: 61.1 % (ref 50–65)
PDW BLD-RTO: 15.4 % (ref 11.5–14.5)
PLATELET # BLD: 201 K/UL (ref 130–400)
PMV BLD AUTO: 10.4 FL (ref 9.4–12.3)
POTASSIUM SERPL-SCNC: 5.4 MMOL/L (ref 3.5–5)
RBC # BLD: 2.92 M/UL (ref 4.2–5.4)
SODIUM BLD-SCNC: 136 MMOL/L (ref 136–145)
TOTAL PROTEIN: 6.2 G/DL (ref 6.6–8.7)
TRIGL SERPL-MCNC: 78 MG/DL (ref 0–149)
TSH SERPL DL<=0.05 MIU/L-ACNC: 2.39 UIU/ML (ref 0.27–4.2)
VITAMIN D 25-HYDROXY: 41.8 NG/ML
WBC # BLD: 4.3 K/UL (ref 4.8–10.8)

## 2023-02-09 NOTE — TELEPHONE ENCOUNTER
[address] [place] [address] nurse recommending wound care to [facility] :  Clean with NS, pat dry, apply duoderm or generic equivalent, change PRN and every 7 days. Educate pt/cg to change on days nurse is not present.      They also would like to add A1C order as hers is almost at  90 days     Please advise if approved

## 2023-02-09 NOTE — TELEPHONE ENCOUNTER
----- Message from Aureliano Luna MD sent at 2/9/2023  3:20 PM CST -----  She is starting to get a little anemic. Her hemoglobin dropped from 11.1-9.6. It could be related to that blister, particular if it has been oozing and bleeding.   Repeat a CBC in about 2 weeks

## 2023-02-10 ENCOUNTER — TELEPHONE (OUTPATIENT)
Dept: CASE MANAGEMENT | Age: 73
End: 2023-02-10

## 2023-02-10 NOTE — TELEPHONE ENCOUNTER
Please notify Dr. Farzaneh Terrazas of the following message from Donny Le RN with [address]:    SN is decreasing visit frquency to 1 week 4 r/t wound care orders/needs. Also per DANTE Garcia with [facility]) Homecare:  Please notify Dr Pina Salguero that sw met with pt and sister today and completed an EMS DNR. No further need for sw. NIXON discharged.     Thank you,   Electronically signed by Justen Dahl RN on 2/10/2023 at 3:46 PM    (Covering for Imelda Thibodeaux RN Liaison)

## 2023-02-10 NOTE — PROGRESS NOTES
[address] [facility]  [address] [address] [place] [address] [place] [address]  Dept: [phone]  Dept Fax: [phone]: [phone]    Car Campos (:  1950) is a 74 y.o. female,Established patient  with fredy , here for evaluation of the following chief complaint(s): Cough      Car Campos is a 74 y.o. female who presents today for her medical conditions/complaints as noted below. Car Campos is c/david Cough        HPI:     Chief Complaint   Patient presents with    Cough     HPI    Cough for 3 days; no fever; sputum is clear; ;  she has not taken any medication for this;     She cant take prednisone ; \  CAd;  \"I have a bad heart with defbid\"  im always SOB;   3.  T2 DM;  80''s and 90's facting   4  CKD with GFR of 24;  we wont be able to use mucinex    Past Medical History:   Diagnosis Date    Acute sinusitis     CHAPARRO (acute kidney injury) ([facility] [address] 2018    Anemia     Bleeding ulcer     hx of; cauterized by dr. resendiz    Breast cancer [facility])     Breast lump     CAD (coronary artery disease)     sees dr. Limon Em ulcer [facility])     Cataract     COVID-19 2021    fatigue    Dermatitis     Diabetes mellitus ([facility] [address]     Diabetic nephropathy ([facility] [address]     Diabetic retinopathy ([facility] [address]     Glaucoma     Gout     Hemorrhoids     Hx of TB skin testing     positive    Hyperlipidemia     Hypertension     Long term current use of aromatase inhibitor 2022    Osteopenia of left hip 2022    Palliative care patient 2021    Type 2 diabetes mellitus ([facility] [address] 2018    Vitamin D deficiency       Past Surgical History:   Procedure Laterality Date    BREAST LUMPECTOMY Right 10/29/2021    RIGHT LUMPECTOMY, SNB, PREOP ULTRASOUND, INTRAOP ULTRASOUND GUIDED NL, BIOSORB, PEC BLOCK, MARGIN PROBE, FLAPS, IORT performed by Paz Favre, MD at [place] [address]  02/15/2022    CARDIAC SURGERY      CARPAL TUNNEL RELEASE Right 9/3/2020 RIGHT CARPAL TUNNEL RELEASE performed by Usman Gorman MD at [address]      COLONOSCOPY N/A 10/11/2019    Dr Ab Kovacs of a hemostatic clip-Suboptimal prep, diverticulosis, internal hemorrhoids-Grade 2, AP x 3, 1 yr recall     COLONOSCOPY N/A 10/12/2020    Dr Riccardo Jeffery hemorrhoids-Grade 1-2, AP, 3 yr recall    CORONARY ARTERY BYPASS GRAFT  2001    ENDOSCOPY, COLON, DIAGNOSTIC      EYE SURGERY      MOUTH SURGERY      OTHER SURGICAL HISTORY      sternal resection    PACEMAKER PLACEMENT  02/23/2022    SKIN BIOPSY      UPPER GASTROINTESTINAL ENDOSCOPY N/A 10/11/2019    Dr Airam Moya (+) H Pylori    US BREAST NEEDLE BIOPSY LEFT Left 9/1/2021    US BREAST NEEDLE BIOPSY LEFT 9/1/2021 [facility] GENERAL SURGERY    VASCULAR SURGERY         Vitals 10/10/2022 8/31/2022 8/11/2022 7/28/2022 7/7/2022 2/14/1662   SYSTOLIC 763 199 914 003 421 145   DIASTOLIC 78 68 78 60 58 60   Site - - - - - -   Position - - - - - -   Cuff Size - - - - - -   Pulse 60 71 63 80 58 56   Temp 98.6 - - - - -   Resp - - - - - -   SpO2 93 98 95 95 - 97   Weight - 213 lb 12.8 oz 210 lb 9.6 oz 210 lb 209 lb 214 lb 1.6 oz   Height - 5' 5\" 5' 5\" 5' 5\" 5' 5\" 5' 5\"   Body mass index - 35.57 kg/m2 35.04 kg/m2 34.94 kg/m2 34.78 kg/m2 35.62 kg/m2   Some recent data might be hidden       Family History   Problem Relation Age of Onset    Cancer Mother     Diabetes Father     COPD Father     Diabetes Sister     Heart Disease Sister     Colon Polyps Sister     Coronary Art Dis Other         grandfather    Diabetes Other         aunt    Colon Cancer Neg Hx     Liver Cancer Neg Hx     Rectal Cancer Neg Hx        Social History     Tobacco Use    Smoking status: Never    Smokeless tobacco: Never   Substance Use Topics    Alcohol use:  Yes     Alcohol/week: 1.0 standard drink     Types: 1 Shots of liquor per week     Comment: rare      Current Outpatient Medications   Medication Sig Dispense Refill cefdinir (OMNICEF) 300 MG capsule Take 1 capsule by mouth 2 times daily for 7 days 14 capsule 0    albuterol sulfate HFA (PROVENTIL;VENTOLIN;PROAIR) 108 (90 Base) MCG/ACT inhaler Inhale 2 puffs into the lungs every 6 hours as needed for Wheezing 18 g 3    isosorbide mononitrate (IMDUR) 30 MG extended release tablet TAKE 1 TABLET TWICE DAILY 180 tablet 3    furosemide (LASIX) 20 MG tablet Take 1 tablet by mouth in the morning. 90 tablet 1    alendronate (FOSAMAX) 70 MG tablet Take 1 tablet by mouth every 7 days Take with a full glass of water upon arising for the day. Consume on an empty stomach at least 30 minutes before the first food, beverage, or medication. Stay upright (do not lie down) for at least 30 minutes. Do not crush or break. 12 tablet 3    ferrous sulfate (IRON 325) 325 (65 Fe) MG tablet Take 1 tablet by mouth in the morning and 1 tablet before bedtime. 180 tablet 3    Accu-Chek Softclix Lancets MISC TEST FOUR TIMES DAILY 400 each 3    blood glucose test strips (ACCU-CHEK GUIDE) strip TEST BLOOD SUGAR FOUR TIMES DAILY 400 strip 3    metFORMIN (GLUCOPHAGE) 500 MG tablet TAKE 2 TABLETS TWICE DAILY WITH MEALS 360 tablet 1    amLODIPine (NORVASC) 5 MG tablet TAKE 1 TABLET EVERY DAY 90 tablet 1    metoprolol tartrate (LOPRESSOR) 50 MG tablet 2 tablets  tablet 3    letrozole (FEMARA) 2.5 MG tablet Take 1 tablet by mouth daily 90 tablet 3    montelukast (SINGULAIR) 10 MG tablet Take 1 tablet by mouth daily 90 tablet 3    timolol (TIMOPTIC) 0.25 % ophthalmic solution Place 1 drop into the right eye daily       lisinopril (PRINIVIL;ZESTRIL) 5 MG tablet Take 1 tablet by mouth daily 90 tablet 2    simvastatin (ZOCOR) 40 MG tablet Take 1 tablet by mouth nightly 90 tablet 3    glipiZIDE (GLUCOTROL XL) 2.5 MG extended release tablet Take 1 tablet by mouth daily 90 tablet 3    Blood Glucose Calibration (ACCU-CHEK ROSA) SOLN Calibration done daily.  1 each 1    Alcohol Swabs (B-D SINGLE USE SWABS REGULAR) PADS USE THREE TIMES DAILY 100 each 11    aspirin 81 MG tablet Take 81 mg by mouth daily      Omega-3 Fatty Acids (FISH OIL) 1000 MG CAPS Take 1,000 mg by mouth 2 times daily      MULTIPLE VITAMIN PO Take 1 tablet by mouth daily      Calcium Carbonate-Vitamin D (CALCIUM 500/D PO) Take 1 tablet by mouth 2 times daily      vitamin B-12 (CYANOCOBALAMIN) 1000 MCG tablet Take 1,000 mcg by mouth daily       No current facility-administered medications for this visit.      Allergies   Allergen Reactions    Medrol [Methylprednisolone] Other (See Comments)     Affects her eyes       Health Maintenance   Topic Date Due    COVID-19 Vaccine (1) Never done    Hepatitis C screen  Never done    Flu vaccine (1) 08/01/2022    Diabetic foot exam  08/13/2022    DTaP/Tdap/Td vaccine (1 - Tdap) 05/17/2023 (Originally 6/25/1969)    Shingles vaccine (1 of 2) 05/17/2023 (Originally 6/25/1969)    Pneumococcal 65+ years Vaccine (1 - PCV) 01/01/2030 (Originally 6/25/1956)    Breast cancer screen  05/02/2023    Diabetic retinal exam  06/13/2023    A1C test (Diabetic or Prediabetic)  08/10/2023    Lipids  08/10/2023    Depression Screen  08/11/2023    Colorectal Cancer Screen  10/12/2023    DEXA (modify frequency per FRAX score)  Completed    Hepatitis A vaccine  Aged Out    Hib vaccine  Aged Out    Meningococcal (ACWY) vaccine  Aged Out       Lab Results   Component Value Date    LABA1C 6.3 (H) 08/10/2022     No results found for: PSA, PSADIA  TSH   Date Value Ref Range Status   08/10/2022 2.050 0.270 - 4.200 uIU/mL Final   ]  Lab Results   Component Value Date     08/31/2022    K 5.6 (H) 08/31/2022     08/31/2022    CO2 24 08/31/2022    BUN 29 (H) 08/31/2022    CREATININE 2.0 (H) 08/31/2022    GLUCOSE 108 08/31/2022    CALCIUM 9.8 08/31/2022    PROT 7.2 08/10/2022    LABALBU 4.0 08/10/2022    BILITOT <0.2 08/10/2022    ALKPHOS 63 08/10/2022    AST 21 08/10/2022    ALT 11 08/10/2022    LABGLOM 24 (A) 08/31/2022    GFRAA 29 (L) 08/31/2022 GLOB 2.8 07/28/2022     Lab Results   Component Value Date    CHOL 119 (L) 08/10/2022    CHOL 106 (L) 05/16/2022    CHOL 107 (L) 02/17/2022     Lab Results   Component Value Date    TRIG 89 08/10/2022    TRIG 81 05/16/2022    TRIG 79 02/17/2022     Lab Results   Component Value Date    HDL 53 (L) 08/10/2022    HDL 50 (L) 05/16/2022    HDL 56 (L) 02/17/2022     Lab Results   Component Value Date    LDLCALC 48 08/10/2022    LDLCALC 40 05/16/2022    LDLCALC 35 02/17/2022     Lab Results   Component Value Date/Time     08/31/2022 10:48 AM    K 5.6 08/31/2022 10:48 AM    K 4.9 02/15/2022 07:24 AM     08/31/2022 10:48 AM    CO2 24 08/31/2022 10:48 AM    BUN 29 08/31/2022 10:48 AM    CREATININE 2.0 08/31/2022 10:48 AM    GLUCOSE 108 08/31/2022 10:48 AM    CALCIUM 9.8 08/31/2022 10:48 AM      Lab Results   Component Value Date    WBC 5.4 08/10/2022    HGB 10.3 (L) 08/10/2022    HCT 33.6 (L) 08/10/2022    MCV 98.2 08/10/2022     08/10/2022    LABLYMP 2.04 02/07/2013    LYMPHOPCT 26.3 08/10/2022    RBC 3.42 (L) 08/10/2022    MCH 30.1 08/10/2022    MCHC 30.7 (L) 08/10/2022    RDW 15.7 (H) 08/10/2022     Lab Results   Component Value Date    VITD25 52.6 08/10/2022     Labs reviewed from 8/10/22  Diagnostics reviewed from today   RHYTHM: Atrial fibs  RATE: 80s  KS INTERVAL: N/A   ECTOPY: None  ISCHEMIA:   Global  Subjective:      Review of Systems   Constitutional:  Negative for fatigue, fever and unexpected weight change. HENT:  Negative for ear discharge, ear pain, mouth sores, sore throat and trouble swallowing. Eyes:  Negative for discharge, itching and visual disturbance. Respiratory:  Positive for cough and shortness of breath. Negative for choking, wheezing and stridor. Cardiovascular:  Positive for palpitations. Negative for chest pain and leg swelling. Gastrointestinal:  Negative for abdominal distention, abdominal pain, blood in stool, constipation, diarrhea, nausea and vomiting. Endocrine: Negative for cold intolerance, polydipsia and polyuria. Genitourinary:  Negative for difficulty urinating, dysuria, frequency and urgency. Musculoskeletal:  Negative for arthralgias and gait problem. Skin:  Negative for color change and rash. Allergic/Immunologic: Negative for food allergies and immunocompromised state. Neurological:  Negative for dizziness, tremors, syncope, speech difficulty, weakness and headaches. Hematological:  Negative for adenopathy. Does not bruise/bleed easily. Psychiatric/Behavioral:  Negative for confusion and hallucinations. Objective:     Physical Exam  Constitutional:       General: She is not in acute distress. Appearance: She is well-developed. HENT:      Head: Normocephalic and atraumatic. Eyes:      General: No scleral icterus. Right eye: No discharge. Left eye: No discharge. Pupils: Pupils are equal, round, and reactive to light. Neck:      Thyroid: No thyromegaly. Vascular: No JVD. Cardiovascular:      Rate and Rhythm: Normal rate. Rhythm irregular. Heart sounds: Normal heart sounds. No murmur heard. Pulmonary:      Effort: Pulmonary effort is normal. No respiratory distress. Breath sounds: Normal breath sounds. No wheezing or rales. Abdominal:      General: Bowel sounds are normal. There is no distension. Palpations: Abdomen is soft. There is no mass. Tenderness: There is no abdominal tenderness. There is no guarding or rebound. Musculoskeletal:         General: No tenderness. Normal range of motion. Cervical back: Normal range of motion and neck supple. Skin:     General: Skin is warm and dry. Findings: No erythema or rash. Neurological:      Mental Status: She is alert and oriented to person, place, and time. Cranial Nerves: No cranial nerve deficit. Coordination: Coordination normal.      Deep Tendon Reflexes: Reflexes are normal and symmetric.  Reflexes normal.   Psychiatric:         Mood and Affect: Mood is not depressed. Behavior: Behavior normal.         Thought Content: Thought content normal.         Judgment: Judgment normal.     /78   Pulse 60   Temp 98.6 °F (37 °C)   SpO2 93%           Assessment:      Problem List       Ischemic cardiomyopathy    Relevant Medications    aspirin 81 MG tablet    simvastatin (ZOCOR) 40 MG tablet    lisinopril (PRINIVIL;ZESTRIL) 5 MG tablet    metoprolol tartrate (LOPRESSOR) 50 MG tablet    amLODIPine (NORVASC) 5 MG tablet    furosemide (LASIX) 20 MG tablet    isosorbide mononitrate (IMDUR) 30 MG extended release tablet    Other Relevant Orders    EKG 12 Lead - Clinic Performed (Completed)    Acute bronchiolitis    Relevant Orders    XR CHEST STANDARD (2 VW)    Acute cough - Primary     Acute cough   proair   mucinex                Plan:        Patient given educational materials - see patient instructions. Discussed use, benefit, and side effects of prescribed medications. Allpatient questions answered. Pt voiced understanding. Reviewed health maintenance. Instructed to continue current medications, diet and exercise. Patient agreed with treatment plan. Follow up as directed. MEDICATIONS:  Orders Placed This Encounter   Medications    cefdinir (OMNICEF) 300 MG capsule     Sig: Take 1 capsule by mouth 2 times daily for 7 days     Dispense:  14 capsule     Refill:  0    albuterol sulfate HFA (PROVENTIL;VENTOLIN;PROAIR) 108 (90 Base) MCG/ACT inhaler     Sig: Inhale 2 puffs into the lungs every 6 hours as needed for Wheezing     Dispense:  18 g     Refill:  3           ORDERS:  Orders Placed This Encounter   Procedures    XR CHEST STANDARD (2 VW)    EKG 12 Lead - Clinic Performed         Follow-up:  Return for keep fu appt.     PATIENT INSTRUCTIONS:  Patient Instructions   Cough    Cxr today   Cefdinir 300 mg twice daily for 7 days;  get 2 doses in today   Proair in [place] inhaler;   2 puffs 4 tmies a day until cough is gone  then use use 24 more hours, then start weaning down so every 3 days  by one dose  3 times daily for 3 , 2 times day for 3 days, then daily for 3 days, then stop   2. CAD  stable  for now   3. Tw DM;  stable for now   4. CKD;  decreased  you cant use mucinex or robitussion, or delsym    5. Atrial fib on EKG. I reviewed the EKG with Dr. Carl Thomas and she advised just to let her keep appointment with cardiology next week and let them treat as well. Electronically signed by Janethnie OfficerSARA on 10/10/2022 at 2:10 PM    @    Moe/transcription disclaimer:  Much of this encounter note is electronic transcription/translation of spoken language to printed texts. The electronic translation of spoken language may be erroneous, or at times,nonsensical words or phrases may be inadvertently transcribed.   Although I have reviewed the note for such errors, some may still exist. Opt out

## 2023-02-13 ENCOUNTER — OFFICE VISIT (OUTPATIENT)
Dept: INTERNAL MEDICINE | Age: 73
End: 2023-02-13
Payer: MEDICARE

## 2023-02-13 VITALS
WEIGHT: 227 LBS | HEART RATE: 117 BPM | BODY MASS INDEX: 36.48 KG/M2 | DIASTOLIC BLOOD PRESSURE: 68 MMHG | OXYGEN SATURATION: 97 % | SYSTOLIC BLOOD PRESSURE: 132 MMHG | HEIGHT: 66 IN

## 2023-02-13 DIAGNOSIS — I25.5 ISCHEMIC CARDIOMYOPATHY: Primary | ICD-10-CM

## 2023-02-13 DIAGNOSIS — E11.3299 NONPROLIFERATIVE DIABETIC RETINOPATHY (HCC): ICD-10-CM

## 2023-02-13 DIAGNOSIS — N18.4 CKD (CHRONIC KIDNEY DISEASE) STAGE 4, GFR 15-29 ML/MIN (HCC): ICD-10-CM

## 2023-02-13 DIAGNOSIS — D50.9 IRON DEFICIENCY ANEMIA, UNSPECIFIED IRON DEFICIENCY ANEMIA TYPE: ICD-10-CM

## 2023-02-13 DIAGNOSIS — E11.21 TYPE II DIABETES MELLITUS WITH NEPHROPATHY (HCC): ICD-10-CM

## 2023-02-13 DIAGNOSIS — E11.22 CKD STAGE 4 DUE TO TYPE 2 DIABETES MELLITUS (HCC): ICD-10-CM

## 2023-02-13 DIAGNOSIS — S80.821S: ICD-10-CM

## 2023-02-13 DIAGNOSIS — E11.311 DIABETIC MACULAR EDEMA (HCC): ICD-10-CM

## 2023-02-13 DIAGNOSIS — N18.4 CKD STAGE 4 DUE TO TYPE 2 DIABETES MELLITUS (HCC): ICD-10-CM

## 2023-02-13 DIAGNOSIS — I10 PRIMARY HYPERTENSION: ICD-10-CM

## 2023-02-13 DIAGNOSIS — E78.5 HYPERLIPIDEMIA, UNSPECIFIED HYPERLIPIDEMIA TYPE: ICD-10-CM

## 2023-02-13 DIAGNOSIS — J45.20 MILD INTERMITTENT REACTIVE AIRWAY DISEASE WITHOUT COMPLICATION: ICD-10-CM

## 2023-02-13 DIAGNOSIS — Z17.0 MALIGNANT NEOPLASM OF OVERLAPPING SITES OF RIGHT BREAST IN FEMALE, ESTROGEN RECEPTOR POSITIVE (HCC): ICD-10-CM

## 2023-02-13 DIAGNOSIS — C50.811 MALIGNANT NEOPLASM OF OVERLAPPING SITES OF RIGHT BREAST IN FEMALE, ESTROGEN RECEPTOR POSITIVE (HCC): ICD-10-CM

## 2023-02-13 DIAGNOSIS — E11.69 TYPE 2 DIABETES MELLITUS WITH OTHER SPECIFIED COMPLICATION, UNSPECIFIED WHETHER LONG TERM INSULIN USE (HCC): ICD-10-CM

## 2023-02-13 PROCEDURE — 3044F HG A1C LEVEL LT 7.0%: CPT | Performed by: INTERNAL MEDICINE

## 2023-02-13 PROCEDURE — G8417 CALC BMI ABV UP PARAM F/U: HCPCS | Performed by: INTERNAL MEDICINE

## 2023-02-13 PROCEDURE — G8427 DOCREV CUR MEDS BY ELIG CLIN: HCPCS | Performed by: INTERNAL MEDICINE

## 2023-02-13 PROCEDURE — G8484 FLU IMMUNIZE NO ADMIN: HCPCS | Performed by: INTERNAL MEDICINE

## 2023-02-13 PROCEDURE — G8399 PT W/DXA RESULTS DOCUMENT: HCPCS | Performed by: INTERNAL MEDICINE

## 2023-02-13 PROCEDURE — 99214 OFFICE O/P EST MOD 30 MIN: CPT | Performed by: INTERNAL MEDICINE

## 2023-02-13 PROCEDURE — 3078F DIAST BP <80 MM HG: CPT | Performed by: INTERNAL MEDICINE

## 2023-02-13 PROCEDURE — 1090F PRES/ABSN URINE INCON ASSESS: CPT | Performed by: INTERNAL MEDICINE

## 2023-02-13 PROCEDURE — 3017F COLORECTAL CA SCREEN DOC REV: CPT | Performed by: INTERNAL MEDICINE

## 2023-02-13 PROCEDURE — 1036F TOBACCO NON-USER: CPT | Performed by: INTERNAL MEDICINE

## 2023-02-13 PROCEDURE — 1123F ACP DISCUSS/DSCN MKR DOCD: CPT | Performed by: INTERNAL MEDICINE

## 2023-02-13 PROCEDURE — 2022F DILAT RTA XM EVC RTNOPTHY: CPT | Performed by: INTERNAL MEDICINE

## 2023-02-13 PROCEDURE — 3074F SYST BP LT 130 MM HG: CPT | Performed by: INTERNAL MEDICINE

## 2023-02-13 RX ORDER — FUROSEMIDE 40 MG/1
40 TABLET ORAL DAILY
Qty: 90 TABLET | Refills: 3 | Status: SHIPPED | OUTPATIENT
Start: 2023-02-13

## 2023-02-13 NOTE — PROGRESS NOTES
Chief Complaint   Patient presents with    3 Month Follow-Up    Diabetes       HPI: Randy Chin is a 74 y.o. female is here for type 2 diabetes, hypertension, iron deficiency anemia, hyperlipidemia, coronary artery disease, breast cancer, chronic kidney disease. She was recently seen in our office for blisters to her right ankle. She is now getting wound care through home health. Though blisters are healing well at this time. She is not having any complaints of fever or chills. Her blood sugars are running between . Her A1c is 5.3. Her most recent creatinine was 2.4 with a GFR 21. She has been advised to drink plenty of fluids. She declines a referral to nephrology at this time. Her blood pressure is well controlled. She denies any complaints of chest pain, chest pressure or shortness of breath. She does have a history of ischemic cardiomyopathy. At this time, she is not having any difficulty with lower extremity swelling. She does have a history of iron deficiency anemia. She is taking her ferrous sulfate as prescribed. She is tolerating her statin without side effects. She does have a history of reactive airways disease. She does take her albuterol as needed. She has not had to use her albuterol recently. Allergies are stable. She does have a history of breast cancer. She is taking her Femara as prescribed and she is tolerating it well.   Past Medical History:   Diagnosis Date    Acute sinusitis     CHAPARRO (acute kidney injury) ([address] 05/18/2018    CHAPARRO (acute kidney injury) ([address] 5/18/2018    Anemia     Bleeding ulcer     hx of; cauterized by dr. resendzi    Breast cancer [facility])     Breast lump     CAD (coronary artery disease)     sees dr. Palmer Lie ulcer [facility])     Cataract     Chronic kidney disease, stage 3b ([address] 6/21/2021    COVID-19 01/2021    fatigue    Dermatitis     Diabetes mellitus ([address]     Diabetic nephropathy ([address]     Diabetic retinopathy ([address] Glaucoma     Gout     Hemorrhoids     Hx of TB skin testing     positive    Hyperlipidemia     Hypertension     Long term current use of aromatase inhibitor 5/27/2022    Osteopenia of left hip 5/27/2022    Palliative care patient 06/24/2021    Type 2 diabetes mellitus ([facility] 05/18/2018    Vitamin D deficiency       Past Surgical History:   Procedure Laterality Date    BREAST LUMPECTOMY Right 10/29/2021    RIGHT LUMPECTOMY, SNB, PREOP ULTRASOUND, INTRAOP ULTRASOUND GUIDED NL, BIOSORB, PEC BLOCK, MARGIN PROBE, FLAPS, IORT performed by Ned Joe MD at [place] [address]  02/15/2022    CARDIAC SURGERY  2001    CARPAL TUNNEL RELEASE Right 9/3/2020    RIGHT CARPAL TUNNEL RELEASE performed by Janice Turk MD at [facility] N/A 10/11/2019    Dr Gregory Chadwick of a hemostatic clip-Suboptimal prep, diverticulosis, internal hemorrhoids-Grade 2, AP x 3, 1 yr recall     COLONOSCOPY N/A 10/12/2020    Dr Stephen Hylton hemorrhoids-Grade 1-2, AP, 3 yr recall    CORONARY ARTERY BYPASS GRAFT  2001    ENDOSCOPY, COLON, DIAGNOSTIC      EYE SURGERY      MOUTH SURGERY      OTHER SURGICAL HISTORY      sternal resection    PACEMAKER PLACEMENT  02/23/2022    SKIN BIOPSY      UPPER GASTROINTESTINAL ENDOSCOPY N/A 10/11/2019    Dr Benedicto Forbes (+) H Pylori    US BREAST BIOPSY W LOC DEVICE 1ST LESION LEFT Left 9/1/2021    US BREAST NEEDLE BIOPSY LEFT 9/1/2021 [facility] GENERAL SURGERY    VASCULAR SURGERY        Social History     Socioeconomic History    Marital status: Single     Spouse name: None    Number of children: None    Years of education: None    Highest education level: None   Occupational History     Employer: [employer]   Tobacco Use    Smoking status: Never    Smokeless tobacco: Never   Vaping Use    Vaping Use: Never used   Substance and Sexual Activity    Alcohol use: Not Currently     Alcohol/week: 1.0 standard drink     Types: 1 Shots of liquor per week     Comment: rare    Drug use: No    Sexual activity: Never     Social Determinants of Health     Financial Resource Strain: Low Risk     Difficulty of Paying Living Expenses: Not hard at all   Food Insecurity: No Food Insecurity    Worried About Running Out of Food in the Last Year: Never true    Ran Out of Food in the Last Year: Never true   Physical Activity: Insufficiently Active    Days of Exercise per Week: 1 day    Minutes of Exercise per Session: 10 min      Family History   Problem Relation Age of Onset    Cancer Mother     Diabetes Father     COPD Father     Diabetes Sister     Heart Disease Sister     Colon Polyps Sister     Coronary Art Dis Other         grandfather    Diabetes Other         aunt    Colon Cancer Neg Hx     Liver Cancer Neg Hx     Rectal Cancer Neg Hx         Current Outpatient Medications   Medication Sig Dispense Refill    furosemide (LASIX) 40 MG tablet Take 1 tablet by mouth daily 90 tablet 3    mupirocin (BACTROBAN) 2 % cream Apply 3 times daily. 30 g 1    Ferrous Sulfate (IRON) 325 (65 Fe) MG TABS Take 1 tablet by mouth twice daily 60 tablet 3    simvastatin (ZOCOR) 40 MG tablet TAKE 1 TABLET NIGHTLY 90 tablet 3    Continuous Blood Gluc Sensor (DEXCOM G6 SENSOR) MISC Changer every 2 weeks E11.9 6 each 3    Continuous Blood Gluc Transmit (DEXCOM G6 TRANSMITTER) MISC Change every 3 months E11.9 1 each 0    Continuous Blood Gluc  (DEXCOM G6 ) ARMANDO Change every 2 weeks E11.9 6 each 0    lisinopril (PRINIVIL;ZESTRIL) 5 MG tablet Take 1 tablet by mouth daily 90 tablet 3    nystatin (MYCOSTATIN) 700234 UNIT/GM cream Apply topically 2 times daily.  30 g 5    albuterol sulfate HFA (PROVENTIL;VENTOLIN;PROAIR) 108 (90 Base) MCG/ACT inhaler Inhale 2 puffs into the lungs every 6 hours as needed for Wheezing 18 g 3    isosorbide mononitrate (IMDUR) 30 MG extended release tablet TAKE 1 TABLET TWICE DAILY 180 tablet 3    alendronate (FOSAMAX) 70 MG tablet Take 1 tablet by mouth every 7 days Take with a full glass of water upon arising for the day. Consume on an empty stomach at least 30 minutes before the first food, beverage, or medication. Stay upright (do not lie down) for at least 30 minutes. Do not crush or break. 12 tablet 3    Accu-Chek Softclix Lancets MISC TEST FOUR TIMES DAILY 400 each 3    blood glucose test strips (ACCU-CHEK GUIDE) strip TEST BLOOD SUGAR FOUR TIMES DAILY 400 strip 3    metFORMIN (GLUCOPHAGE) 500 MG tablet TAKE 2 TABLETS TWICE DAILY WITH MEALS 360 tablet 1    amLODIPine (NORVASC) 5 MG tablet TAKE 1 TABLET EVERY DAY 90 tablet 1    metoprolol tartrate (LOPRESSOR) 50 MG tablet 2 tablets  tablet 3    letrozole (FEMARA) 2.5 MG tablet Take 1 tablet by mouth daily 90 tablet 3    montelukast (SINGULAIR) 10 MG tablet Take 1 tablet by mouth daily 90 tablet 3    timolol (TIMOPTIC) 0.25 % ophthalmic solution Place 1 drop into the right eye daily       glipiZIDE (GLUCOTROL XL) 2.5 MG extended release tablet Take 1 tablet by mouth daily 90 tablet 3    Blood Glucose Calibration (ACCU-CHEK ROSA) SOLN Calibration done daily. 1 each 1    Alcohol Swabs (B-D SINGLE USE SWABS REGULAR) PADS USE THREE TIMES DAILY 100 each 11    aspirin 81 MG tablet Take 81 mg by mouth daily      Omega-3 Fatty Acids (FISH OIL) 1000 MG CAPS Take 1,000 mg by mouth 2 times daily      MULTIPLE VITAMIN PO Take 1 tablet by mouth daily      Calcium Carbonate-Vitamin D (CALCIUM 500/D PO) Take 1 tablet by mouth 2 times daily      vitamin B-12 (CYANOCOBALAMIN) 1000 MCG tablet Take 1,000 mcg by mouth daily       No current facility-administered medications for this visit.         Patient Active Problem List   Diagnosis    Hypertension    Hyperlipidemia    Ocular hypertension    CAD (coronary artery disease)    Type 2 diabetes mellitus ([address]    Diabetes mellitus ([address]    Acute cystitis without hematuria    Carpal tunnel syndrome    Chest pain    History of coronary artery bypass graft Achilles tendinitis    Bursitis of right foot    Contracture of Achilles tendon    Diabetic macular edema (HCC)    Foot pain    Nonproliferative diabetic retinopathy (HCC)    Posterior calcaneal exostosis    Retinal neovascularization    Malignant neoplasm of overlapping sites of right breast in female, estrogen receptor positive ([facility]    Status post right breast lumpectomy with sentinel node and IORT on 10/29/2021    CKD (chronic kidney disease) stage 4, GFR 15-29 ml/min (HCC)    Ischemic cardiomyopathy    Decreased cardiac ejection fraction    Pacemaker    Osteopenia of left hip    Long term current use of aromatase inhibitor    Acute cough    Acute bronchiolitis        Review of Systems   Constitutional:  Negative for activity change, appetite change, chills, diaphoresis, fatigue, fever and unexpected weight change. HENT:  Negative for congestion, ear discharge, ear pain, mouth sores, nosebleeds, postnasal drip, rhinorrhea, sinus pressure, sinus pain, sneezing, sore throat, tinnitus, trouble swallowing and voice change. Eyes:  Negative for discharge, itching and visual disturbance. Respiratory:  Negative for cough, choking, chest tightness, shortness of breath, wheezing and stridor. Cardiovascular:  Negative for chest pain, palpitations and leg swelling. Gastrointestinal:  Negative for abdominal distention, abdominal pain, blood in stool, constipation, diarrhea, nausea and vomiting. Endocrine: Negative for cold intolerance, polydipsia and polyuria. Genitourinary:  Negative for difficulty urinating, dysuria, frequency and urgency. Musculoskeletal:  Negative for arthralgias, gait problem, joint swelling and myalgias. Skin:  Positive for wound. Negative for color change and rash. Blisters to her right lower extremity are improving. She does have a dressing in place at this time. She states the home health nurse came out today to put the dressing on.   She does have the pictures on her phone.  There has been a significant improvement since she was last seen in the office. She is taking her antibiotic as prescribed. Allergic/Immunologic: Negative for food allergies and immunocompromised state. Neurological:  Negative for dizziness, tremors, syncope, speech difficulty, weakness, numbness and headaches. Hematological:  Negative for adenopathy. Does not bruise/bleed easily. Psychiatric/Behavioral:  Negative for confusion and hallucinations. The patient is not nervous/anxious. /68 (Site: Left Upper Arm, Position: Sitting, Cuff Size: Medium Adult)   Pulse (!) 117   Ht 5' 5.5\" (1.664 m)   Wt 227 lb (103 kg)   SpO2 97%   BMI 37.20 kg/m²   Physical Exam  Vitals and nursing note reviewed. Constitutional:       General: She is not in acute distress. Appearance: Normal appearance. She is well-developed and normal weight. She is not ill-appearing, toxic-appearing or diaphoretic. HENT:      Head: Normocephalic and atraumatic. Right Ear: Tympanic membrane, ear canal and external ear normal. There is no impacted cerumen. Left Ear: Tympanic membrane, ear canal and external ear normal. There is no impacted cerumen. Nose: Nose normal. No congestion or rhinorrhea. Mouth/Throat:      Mouth: Mucous membranes are moist.      Pharynx: Oropharynx is clear. No oropharyngeal exudate or posterior oropharyngeal erythema. Eyes:      General: No scleral icterus. Right eye: No discharge. Left eye: No discharge. Extraocular Movements: Extraocular movements intact. Conjunctiva/sclera: Conjunctivae normal.      Pupils: Pupils are equal, round, and reactive to light. Neck:      Thyroid: No thyromegaly. Vascular: No carotid bruit or JVD. Trachea: No tracheal deviation. Cardiovascular:      Rate and Rhythm: Normal rate and regular rhythm. Pulses: Normal pulses. Heart sounds: Normal heart sounds. No murmur heard. No friction rub. No gallop. Pulmonary:      Effort: Pulmonary effort is normal. No respiratory distress. Breath sounds: Normal breath sounds. No stridor. No wheezing, rhonchi or rales. Chest:      Chest wall: No tenderness. Abdominal:      General: Bowel sounds are normal. There is no distension. Palpations: Abdomen is soft. There is no mass. Tenderness: There is no abdominal tenderness. There is no right CVA tenderness, left CVA tenderness, guarding or rebound. Hernia: No hernia is present. Musculoskeletal:         General: No swelling, tenderness, deformity or signs of injury. Normal range of motion. Cervical back: Normal range of motion and neck supple. No rigidity. No muscular tenderness. Right lower leg: No edema. Left lower leg: No edema. Lymphadenopathy:      Cervical: No cervical adenopathy. Skin:     General: Skin is warm and dry. Capillary Refill: Capillary refill takes less than 2 seconds. Coloration: Skin is not jaundiced or pale. Findings: Lesion present. No bruising, erythema or rash. Comments: Two  golf ball sized open lesions to her right lower extremity. Very little redness around the lesions she does have dressings in place today. She does have pictures of the lesions on her phone. The lesions are improving in size and erythema. Deformity/amputation: absent  Skin lesions/pre-ulcerative calluses: present - bilateral great toe  Edema: right- negative, left- negative    Sensory exam:  Monofilament sensation: normal  (minimum of 5 random plantar locations tested, avoiding callused areas - > 1 area with absence of sensation is + for neuropathy)    Plus at least one of the following:  Pulses: normal,   Pinprick: Intact  Proprioception: N/A  Vibration (128 Hz): N/A     Neurological:      General: No focal deficit present. Mental Status: She is alert and oriented to person, place, and time. Mental status is at baseline. Cranial Nerves:  No cranial nerve deficit. Sensory: No sensory deficit. Motor: No weakness or abnormal muscle tone. Coordination: Coordination normal.      Gait: Gait normal.      Deep Tendon Reflexes: Reflexes are normal and symmetric. Reflexes normal.   Psychiatric:         Mood and Affect: Mood normal.         Behavior: Behavior normal.         Thought Content: Thought content normal.         Judgment: Judgment normal.       1. Ischemic cardiomyopathy    2. Type II diabetes mellitus with nephropathy ([facility]    3. CKD stage 4 due to type 2 diabetes mellitus ([facility]    4. Type 2 diabetes mellitus with other specified complication, unspecified whether long term insulin use ([facility]    5. Diabetic macular edema ([facility]    6. Nonproliferative diabetic retinopathy ([facility]    7. Malignant neoplasm of overlapping sites of right breast in female, estrogen receptor positive ([facility]    8. CKD (chronic kidney disease) stage 4, GFR 15-29 ml/min (HCC)    9. Blister of right lower extremity, sequela    10. Iron deficiency anemia, unspecified iron deficiency anemia type    11. Hyperlipidemia, unspecified hyperlipidemia type    12. Primary hypertension    13. Mild intermittent reactive airway disease without complication        ASSESSMENT/PLAN:    68-year-old woman here for follow-up    1. Ischemic cardiomyopathy: She seems to be doing pretty well at this time. She does not have any lower extremity swelling. Continue her Lasix as prescribed    2. Blisters to the right lower extremity: Continue Keflex and Mupirocin as prescribed. She seems to be improving significantly. Continue local wound care    3. Iron deficiency anemia: Continue ferrous sulfate as prescribed    4. Hyperlipidemia: Continue Zocor as prescribed    5. Hypertension: Blood pressure well controlled on lisinopril, Norvasc and metoprolol    6. Reactive airways disease: Continue albuterol as prescribed    7. Coronary artery disease: Stable on isosorbide    8.   Type 2 diabetes: Continue metformin and glipizide as prescribed    9. Breast cancer: Continue Femara as prescribed    10. Environmental allergies: Continue Singulair    11. Stage IV chronic kidney disease: Renal parameters are slightly worse than they were at her last office visit. Declines any further intervention at this time    12. Diabetic macular edema/nonproliferative diabetic retinopathy: As per ophthalmology        Heladio Oropeza was seen today for 3 month follow-up and diabetes. Diagnoses and all orders for this visit:    Ischemic cardiomyopathy    Type II diabetes mellitus with nephropathy ([facility]  -     Comprehensive Metabolic Panel; Future  -     CBC with Auto Differential; Future  -     Hemoglobin A1C; Future  -     Lipid Panel; Future  -     TSH; Future  -     Microalbumin / Creatinine Urine Ratio; Future  -     Vitamin D 25 Hydroxy; Future    CKD stage 4 due to type 2 diabetes mellitus ([facility]    Type 2 diabetes mellitus with other specified complication, unspecified whether long term insulin use (HCC)    Diabetic macular edema (HCC)    Nonproliferative diabetic retinopathy (HCC)    Malignant neoplasm of overlapping sites of right breast in female, estrogen receptor positive ([facility]    CKD (chronic kidney disease) stage 4, GFR 15-29 ml/min (HCC)    Blister of right lower extremity, sequela    Iron deficiency anemia, unspecified iron deficiency anemia type    Hyperlipidemia, unspecified hyperlipidemia type    Primary hypertension    Mild intermittent reactive airway disease without complication    Other orders  -     furosemide (LASIX) 40 MG tablet; Take 1 tablet by mouth daily        Return in about 3 months (around 5/13/2023) for diabetes.      Orders Placed This Encounter   Procedures    Comprehensive Metabolic Panel     Fasting 12 hours     Standing Status:   Future     Standing Expiration Date:   2/13/2024    CBC with Auto Differential     Fast 12 hours     Standing Status:   Future     Standing Expiration Date: 2/13/2024    Hemoglobin A1C     Fast 12 hours     Standing Status:   Future     Standing Expiration Date:   2/13/2024    Lipid Panel     Standing Status:   Future     Standing Expiration Date:   2/13/2024     Order Specific Question:   Is Patient Fasting?/# of Hours     Answer:   12    TSH     Fast 12 hours     Standing Status:   Future     Standing Expiration Date:   2/13/2024    Microalbumin / Creatinine Urine Ratio     Standing Status:   Future     Standing Expiration Date:   2/13/2024    Vitamin D 25 Hydroxy     Standing Status:   Future     Standing Expiration Date:   2/13/2024       Ancelmo Carlos MD

## 2023-02-16 ENCOUNTER — TELEPHONE (OUTPATIENT)
Dept: INTERNAL MEDICINE | Age: 73
End: 2023-02-16

## 2023-02-16 NOTE — TELEPHONE ENCOUNTER
----- Message from Nic Lacie sent at 2/16/2023  2:57 PM CST -----  Subject: Referral Request    Reason for referral request? pt is needing bloodwork orders sent to her   home healthcare so home healthcare can do it when they will be see her on   2/23, call pt back with questions   Provider patient wants to be referred to(if known):     Provider Phone Number(if known):     Additional Information for Provider?   ---------------------------------------------------------------------------  --------------  [facility]    [phone]; OK to leave message on voicemail  ---------------------------------------------------------------------------  --------------

## 2023-02-20 PROBLEM — N18.32 CHRONIC KIDNEY DISEASE, STAGE 3B (HCC): Status: RESOLVED | Noted: 2021-06-21 | Resolved: 2023-02-20

## 2023-02-20 PROBLEM — N17.9 AKI (ACUTE KIDNEY INJURY) (HCC): Status: RESOLVED | Noted: 2018-05-18 | Resolved: 2023-02-20

## 2023-02-20 ASSESSMENT — ENCOUNTER SYMPTOMS
EYE DISCHARGE: 0
ABDOMINAL PAIN: 0
VOMITING: 0
CONSTIPATION: 0
EYE ITCHING: 0
WHEEZING: 0
ABDOMINAL DISTENTION: 0
STRIDOR: 0
VOICE CHANGE: 0
SINUS PAIN: 0
CHEST TIGHTNESS: 0
COUGH: 0
BLOOD IN STOOL: 0
NAUSEA: 0
TROUBLE SWALLOWING: 0
CHOKING: 0
DIARRHEA: 0
SORE THROAT: 0
COLOR CHANGE: 0
RHINORRHEA: 0
SINUS PRESSURE: 0
SHORTNESS OF BREATH: 0

## 2023-03-01 ENCOUNTER — TELEPHONE (OUTPATIENT)
Dept: INTERNAL MEDICINE CLINIC | Age: 73
End: 2023-03-01

## 2023-03-01 DIAGNOSIS — S81.801S WOUND OF RIGHT LOWER EXTREMITY, SEQUELA: Primary | ICD-10-CM

## 2023-03-01 RX ORDER — TRAMADOL HYDROCHLORIDE 50 MG/1
50 TABLET ORAL EVERY 6 HOURS PRN
Qty: 12 TABLET | Refills: 0 | Status: SHIPPED | OUTPATIENT
Start: 2023-03-01 | End: 2023-03-03 | Stop reason: SDUPTHER

## 2023-03-01 RX ORDER — CEPHALEXIN 500 MG/1
500 CAPSULE ORAL 3 TIMES DAILY
Qty: 30 CAPSULE | Refills: 0 | Status: SHIPPED | OUTPATIENT
Start: 2023-03-01 | End: 2023-03-02 | Stop reason: ALTCHOICE

## 2023-03-01 NOTE — TELEPHONE ENCOUNTER
[address] [address] nurse reporting that pt has new open areas to R Lower leg, one of which is reopening of a wound that had healed   They are shallow but slightly dark, presenting like venous statis   She had low grade temp 99.5 however pt stated \" ,\" My temp. is never above 97. Thats a temp. for me. \"   She also has 3+ pitting edema in that foot and ankle area  She also reported that the area has been itching    They would like to add nursing visits and was questioning if the pt needs abx and also about wound care , [place] nurse thought pt may benefit from unna boots but would need arterial studies to be sure that would be appropriate     Or may be [address] referral  ?  There are several open areas and one of which is 2.5 x 10 near the ankle but only 0.1 depth     Please advise

## 2023-03-01 NOTE — TELEPHONE ENCOUNTER
Pt states she has an appointment on Friday so she will just discuss everything with you then, she did not want to get out today.

## 2023-03-01 NOTE — TELEPHONE ENCOUNTER
Pt stated that her legs are worse this week then they were last week when Truong Correa was there. Pt states she is in so much pain she can not sleep or anything. She would like some pain medication sent in please.

## 2023-03-02 ENCOUNTER — HOSPITAL ENCOUNTER (OUTPATIENT)
Dept: WOUND CARE | Age: 73
Discharge: HOME | End: 2023-03-02
Payer: MEDICARE

## 2023-03-02 VITALS
HEART RATE: 69 BPM | BODY MASS INDEX: 36.48 KG/M2 | TEMPERATURE: 97.4 F | HEIGHT: 66 IN | SYSTOLIC BLOOD PRESSURE: 157 MMHG | WEIGHT: 227 LBS | DIASTOLIC BLOOD PRESSURE: 71 MMHG | RESPIRATION RATE: 22 BRPM

## 2023-03-02 PROCEDURE — 99214 OFFICE O/P EST MOD 30 MIN: CPT

## 2023-03-02 ASSESSMENT — PAIN SCALES - GENERAL: PAINLEVEL_OUTOF10: 10

## 2023-03-02 ASSESSMENT — PAIN - FUNCTIONAL ASSESSMENT: PAIN_FUNCTIONAL_ASSESSMENT: PREVENTS OR INTERFERES SOME ACTIVE ACTIVITIES AND ADLS

## 2023-03-02 ASSESSMENT — PAIN DESCRIPTION - DESCRIPTORS: DESCRIPTORS: STABBING

## 2023-03-02 ASSESSMENT — PAIN DESCRIPTION - PAIN TYPE: TYPE: ACUTE PAIN

## 2023-03-02 ASSESSMENT — PAIN DESCRIPTION - LOCATION: LOCATION: LEG

## 2023-03-02 ASSESSMENT — PAIN DESCRIPTION - ONSET: ONSET: ON-GOING

## 2023-03-02 ASSESSMENT — PAIN DESCRIPTION - ORIENTATION: ORIENTATION: RIGHT

## 2023-03-02 ASSESSMENT — PAIN DESCRIPTION - FREQUENCY: FREQUENCY: CONTINUOUS

## 2023-03-02 NOTE — DISCHARGE INSTRUCTIONS
[address] [facility] [facility]   Physician Orders and Discharge Instructions  [address]  Telephone: [phone]    NAME:  Tyson Ramirez OF BIRTH:  1950  MEDICAL RECORD NUMBER:  [id number]  DATE:  3/2/2023    Discharge condition: Stable    Discharge to: Home    Left via:Private automobile    Accompanied by:  Sister    ECF/HHA: [facility] - Please order supplies for patient    Dressing Orders: Both legs any open area: Soap and water wash, apply Xeroform, cover with dry gauze and secure with roll gauze daily. Wrap with 6\" ace wrap from base of toes wrapped up the leg to just below the knee and re-wrap these if they fall down. Treatment Orders:  Protein rich diet (unless restricted by your physician)  Multivitamin daily  Elevate legs when sitting, avoid standing for long periods of time. [address] [facility] follow up visit _______1 week___________________  (Please note your next appointment above and if you are unable to keep, kindly give a 24 hour notice. Thank you.)          If you experience any of the following, please call the [facility] [address] during business hours:    * Increase in Pain  * Temperature over 101  * Increase in drainage from your wound  * Drainage with a foul odor  * Bleeding  * Increase in swelling  * Need for compression bandage changes due to slippage, breakthrough drainage. If you need medical attention outside of the business hours of the [facility] [address] please contact your PCP or go to the nearest emergency room.

## 2023-03-02 NOTE — PLAN OF CARE
Problem: Chronic Conditions and Co-morbidities  Goal: Patient's chronic conditions and co-morbidity symptoms are monitored and maintained or improved  Outcome: Progressing     Problem: Discharge Planning  Goal: Discharge to home or other facility with appropriate resources  Outcome: Progressing     Problem: Pain  Goal: Verbalizes/displays adequate comfort level or baseline comfort level  Outcome: Progressing     Problem: Wound:  Goal: Will show signs of wound healing; wound closure and no evidence of infection  Description: Will show signs of wound healing; wound closure and no evidence of infection  Outcome: Progressing     Problem: Venous:  Goal: Signs of wound healing will improve  Description: Signs of wound healing will improve  Outcome: Progressing     Problem: Compression therapy:  Goal: Will be free from complications associated with compression therapy  Description: Will be free from complications associated with compression therapy  Outcome: Progressing     Problem: Falls - Risk of:  Goal: Will remain free from falls  Description: Will remain free from falls  Outcome: Progressing     Problem: Blood Glucose:  Goal: Ability to maintain appropriate glucose levels will improve  Description: Ability to maintain appropriate glucose levels will improve  Outcome: Progressing

## 2023-03-03 ENCOUNTER — TELEPHONE (OUTPATIENT)
Dept: INTERNAL MEDICINE CLINIC | Age: 73
End: 2023-03-03

## 2023-03-03 DIAGNOSIS — S81.801S WOUND OF RIGHT LOWER EXTREMITY, SEQUELA: ICD-10-CM

## 2023-03-03 RX ORDER — TRAMADOL HYDROCHLORIDE 50 MG/1
50 TABLET ORAL EVERY 6 HOURS PRN
Qty: 12 TABLET | Refills: 0 | Status: SHIPPED | OUTPATIENT
Start: 2023-03-03 | End: 2023-03-06

## 2023-03-03 NOTE — TELEPHONE ENCOUNTER
[facility] reporting that pt is asking for pain medication, she only has over the counter medication and it is not helping her  pain (they did not provide me with info on where her pain is but has wounds to [address] also needs to add nursing visits 2 X week for 3 weeks , then weekly thereafter to monitor for wound healing

## 2023-03-08 NOTE — PROGRESS NOTES
Progress Note      Pt Name: Risa Plummer: 1950  MRN: [id number]    Date of evaluation: 03/09/2023  History Obtained From:  patient, electronic medical record    CHIEF COMPLAINT:    Chief Complaint   Patient presents with    Follow-up     Iron deficiency anemia, unspecified iron deficiency anemia type    Breast Cancer     Monitoring and tolerance to letrozole    Discuss Labs    Other     Osteopenia in the setting adjuvant aromatase inhibitor therapy     HISTORY OF PRESENT ILLNESS:    Haja Douglas is a 74 y.o.  female who is currently being followed for infiltrating ductal carcinoma the right breast, ER/GA positive, [id number], 9/1/2021 and iron deficiency anemia. Current recommendation for her breast cancer is adjuvant endocrine therapy with letrozole 2.5 mg p.o. daily for 5 years, through September 2026. She has no known radiographic imaging to suggest progression. She also has osteopenia so Fosamax 70 mg weekly was recommended in the setting of aromatase inhibitor therapy. In relation to her diagnosis of iron deficiency, recommendation is for ferrous sulfate 325 mg twice daily along with vitamin C 500 mg daily and stool softener. Nora Weiss returns today in scheduled follow-up for evaluation, lab monitoring and further treatment recommendations. She reports being compliant with the letrozole, oral iron, Fosamax and stool softener. She reports tolerating her treatment regimen without difficulty. She has no new breast complaints other than having some yeast under her right breast.      Today's clinic visit to include physical assessment, review of systems, any lab or radiographic findings that were available and plan of care are documented below.     ONCOLOGIC HISTORY:     Diagnosis  Infiltrating ductal carcinoma, right breast, Sept 2021  ER 96%, GA 94%, Her-2 2+/equivocal, Ki67 6%, FISH negative  MammaPrint Luminal A/low risk  [id number]  Iron deficiency anemia     Treatment Summary  9/20/21-adjuvant endocrine hormone therapy with Letrozole 2.5mg daily. Will continue for 5 years  10/29/21-right lumpectomy/sentinel lymph node biopsy  10/29/2021-IORT 2000 cGy  5/25/2022 -initiated ferrous sulfate 325 mg twice daily     Luis Carlos Yo was first seen by Dr Pee Rangel on 11/24/2021. She was referred by Dr. Yola Guerrero for a diagnosis of right breast cancer. She is a retired RN. She noticed a mass in the right breast several months prior to her diagnosis. 8/17/21 US right & left breast: 2.5 cm lower right breast mass at 6:00, highly suspicious for breast malignancy. In the left breast there is a 0.6 cm benign-appearing circumscribed hypoechoic solid mass at the 2:00 position. Primary considerations would include intramammary lymph node versus fibroadenoma. Typical recommendation would be a 6 month follow-up ultrasound to ensure stability although biopsy could be considered given what appears to be a contralateral malignancy. No suspicious axillary adenopathy seen. 8/17/21 Bilateral diagnostic mammogram: There is a 2.5 cm irregularly-shaped, lobulated high density mass in the lower right breast near 5:00. Overlying skin marker. No other mass is identified in the right breast. There is also a 0.8 cm oval circumscribed equal density mass in the upper outer left breast near 2:00, mid depth. There are a few scattered bilateral benign calcifications. At the 6:00 position in the right breast, approximately 1 cm from the nipple, there is a 2.5 x 2.0 x 2.5 cm hypoechoic lobulated solid mass. 9/1/21 Breast, right breast needle core biopsies at 6 o'clock position: Infiltrating ductal carcinoma, no special type, favor grade 3. Infiltrating carcinoma measures 1.5 cm in greatest linear dimension and is present in multiple cores. Breast, left breast needle core biopsies at 2 o'clock position: Benign spindle cell nodule, consistent with partially hyalinized schwannoma.  ER 96%, AR 94%, Her-2 2+/equivocal, Ki67 6%, FISH negative, MammaPrint Luminal A/low risk. 21-Initiated endocrine hormone therapy with Letrozole 2.5mg daily  10/29/21-she underwent a right simple mastectomy by Dr. Dominique Montez at [facility]: Infiltrating ductal carcinoma, no special type, grade 2. Benign breast parenchyma. Lymph node, right sentinel lymph node biopsy: 2 lymph nodes, negative for evidence of malignancy. AJCC STAGE: pT2, (sn)pN0, pMx. Ki-67 staining fraction is low and estimated at 2% which is decreased from the original percentage of 6%. 10/29/2021-IORT 2000 Gy by Dr Catracho Correia. 2021-she was first seen by Alina Treviño. Discussed NCCN guidelines recommendations. Recommended Oncotype DX to assess need for chemotherapy. The patient said she would decline adjuvant chemotherapy regardless of results of Oncotype DX. Therefore, Oncotype DX was not sent. No recommendation for radiation due to right mastectomy with clear margins and negative nodes. Continue letrozole x5 years. 2022 Right mammogram- Postsurgical changes right breast. No suspicious abnormality.  Benign mammogram.   10/17/2022 Bilateral mammogram- no radiographic evidence of malignancy    Age-appropriate health screenin2021 Bone density- osteopenia; femoral neck T score -0.9; lumbar spine T score 1.7  10/12/2020 Colonoscopy- polys x2-tubular adenoma; internal hemorrhoids without bleeding    Past Medical History:    Past Medical History:   Diagnosis Date    Acute sinusitis     CHAPARRO (acute kidney injury) ([facility] [address] 2018    CHAPARRO (acute kidney injury) ([facility] [address] 2018    Anemia     Bleeding ulcer     hx of; cauterized by dr. resendiz    Breast cancer [facility])     Breast lump     CAD (coronary artery disease)     sees dr. Loyd Bolton ulcer [facility])     Cataract     Chronic kidney disease, stage 3b ([address] 2021    COVID-19 2021    fatigue    Dermatitis     Diabetes mellitus ([facility] [address]     Diabetic nephropathy ([facility] [address]     Diabetic retinopathy ([facility] [address]     Glaucoma     Gout     Hemorrhoids     Hx of TB skin testing     positive    Hyperlipidemia     Hypertension     Long term current use of aromatase inhibitor 5/27/2022    Osteopenia of left hip 5/27/2022    Palliative care patient 06/24/2021    Type 2 diabetes mellitus ([facility] [address] 05/18/2018    Vitamin D deficiency        Past Surgical History:    Past Surgical History:   Procedure Laterality Date    BREAST LUMPECTOMY Right 10/29/2021    RIGHT LUMPECTOMY, SNB, PREOP ULTRASOUND, INTRAOP ULTRASOUND GUIDED NL, BIOSORB, PEC BLOCK, MARGIN PROBE, FLAPS, IORT performed by Amber Moore MD at [place] [id number]  02/15/2022    CARDIAC SURGERY  2001    CARPAL TUNNEL RELEASE Right 9/3/2020    RIGHT CARPAL TUNNEL RELEASE performed by Colleen Lucia MD at [facility] N/A 10/11/2019    Dr Ce Lund of a hemostatic clip-Suboptimal prep, diverticulosis, internal hemorrhoids-Grade 2, AP x 3, 1 yr recall     COLONOSCOPY N/A 10/12/2020    Dr Blue Vann hemorrhoids-Grade 1-2, AP, 3 yr recall    CORONARY ARTERY BYPASS GRAFT  2001    ENDOSCOPY, COLON, DIAGNOSTIC      EYE SURGERY      MOUTH SURGERY      OTHER SURGICAL HISTORY      sternal resection    PACEMAKER PLACEMENT  02/23/2022    SKIN BIOPSY      UPPER GASTROINTESTINAL ENDOSCOPY N/A 10/11/2019    Dr Shiraz Cary (+) H Pylori    US BREAST BIOPSY W LOC DEVICE 1ST LESION LEFT Left 9/1/2021    US BREAST NEEDLE BIOPSY LEFT 9/1/2021 [facility] SURGERY    VASCULAR SURGERY         Current Medications:    Current Outpatient Medications   Medication Sig Dispense Refill    furosemide (LASIX) 40 MG tablet Take 1 tablet by mouth daily 90 tablet 3    Ferrous Sulfate (IRON) 325 (65 Fe) MG TABS Take 1 tablet by mouth twice daily 60 tablet 3    simvastatin (ZOCOR) 40 MG tablet TAKE 1 TABLET NIGHTLY 90 tablet 3    Continuous Blood Gluc Sensor (DEXCOM G6 SENSOR) MISC Changer every 2 weeks E11.9 6 each 3    Continuous Blood Gluc Transmit (DEXCOM G6 TRANSMITTER) MISC Change every 3 months E11.9 1 each 0    Continuous Blood Gluc  (DEXCOM G6 ) ARMANDO Change every 2 weeks E11.9 6 each 0    lisinopril (PRINIVIL;ZESTRIL) 5 MG tablet Take 1 tablet by mouth daily 90 tablet 3    nystatin (MYCOSTATIN) 983793 UNIT/GM cream Apply topically 2 times daily. 30 g 5    albuterol sulfate HFA (PROVENTIL;VENTOLIN;PROAIR) 108 (90 Base) MCG/ACT inhaler Inhale 2 puffs into the lungs every 6 hours as needed for Wheezing 18 g 3    isosorbide mononitrate (IMDUR) 30 MG extended release tablet TAKE 1 TABLET TWICE DAILY 180 tablet 3    alendronate (FOSAMAX) 70 MG tablet Take 1 tablet by mouth every 7 days Take with a full glass of water upon arising for the day. Consume on an empty stomach at least 30 minutes before the first food, beverage, or medication. Stay upright (do not lie down) for at least 30 minutes. Do not crush or break. 12 tablet 3    Accu-Chek Softclix Lancets MISC TEST FOUR TIMES DAILY 400 each 3    blood glucose test strips (ACCU-CHEK GUIDE) strip TEST BLOOD SUGAR FOUR TIMES DAILY 400 strip 3    metFORMIN (GLUCOPHAGE) 500 MG tablet TAKE 2 TABLETS TWICE DAILY WITH MEALS 360 tablet 1    amLODIPine (NORVASC) 5 MG tablet TAKE 1 TABLET EVERY DAY 90 tablet 1    metoprolol tartrate (LOPRESSOR) 50 MG tablet 2 tablets  tablet 3    montelukast (SINGULAIR) 10 MG tablet Take 1 tablet by mouth daily 90 tablet 3    timolol (TIMOPTIC) 0.25 % ophthalmic solution Place 1 drop into the right eye daily       glipiZIDE (GLUCOTROL XL) 2.5 MG extended release tablet Take 1 tablet by mouth daily 90 tablet 3    Blood Glucose Calibration (ACCU-CHEK ROSA) SOLN Calibration done daily.  1 each 1    Alcohol Swabs (B-D SINGLE USE SWABS REGULAR) PADS USE THREE TIMES DAILY 100 each 11    aspirin 81 MG tablet Take 81 mg by mouth daily      Omega-3 Fatty Acids (FISH OIL) 1000 MG CAPS Take 1,000 mg by mouth 2 times daily MULTIPLE VITAMIN PO Take 1 tablet by mouth daily      Calcium Carbonate-Vitamin D (CALCIUM 500/D PO) Take 1 tablet by mouth 2 times daily      vitamin B-12 (CYANOCOBALAMIN) 1000 MCG tablet Take 1,000 mcg by mouth daily      letrozole (FEMARA) 2.5 MG tablet TAKE 1 TABLET EVERY DAY 90 tablet 3     No current facility-administered medications for this visit. Allergies: Allergies   Allergen Reactions    Medrol [Methylprednisolone] Other (See Comments)     Affects her eyes       Social History:    Social History     Tobacco Use    Smoking status: Never    Smokeless tobacco: Never   Vaping Use    Vaping Use: Never used   Substance Use Topics    Alcohol use: Not Currently     Alcohol/week: 1.0 standard drink     Types: 1 Shots of liquor per week     Comment: rare    Drug use: No       Family History:   Family History   Problem Relation Age of Onset    Cancer Mother     Diabetes Father     COPD Father     Diabetes Sister     Heart Disease Sister     Colon Polyps Sister     Coronary Art Dis Other         grandfather    Diabetes Other         aunt    Colon Cancer Neg Hx     Liver Cancer Neg Hx     Rectal Cancer Neg Hx        Vitals:  Vitals:    03/09/23 0914   BP: (!) 156/62   Pulse: 51   SpO2: 92%   Weight: 219 lb 12.8 oz (99.7 kg)   Height: 5' 5.5\" (1.664 m)        Subjective   REVIEW OF SYSTEMS:   Review of Systems   Constitutional:  Positive for fatigue. Negative for chills, diaphoresis and fever. HENT: Negative. Negative for congestion, ear pain, hearing loss, nosebleeds, sore throat and tinnitus. Eyes: Negative. Negative for pain, discharge and redness. Respiratory: Negative. Negative for cough, shortness of breath and wheezing. Cardiovascular: Negative. Negative for chest pain, palpitations and leg swelling. Gastrointestinal: Negative. Negative for abdominal pain, blood in stool, constipation, diarrhea, nausea and vomiting. Endocrine: Negative for polydipsia.    Genitourinary:  Negative for dysuria, flank pain, frequency, hematuria and urgency. Musculoskeletal:  Positive for arthralgias and gait problem (uses wheelchair for distance). Negative for back pain, myalgias and neck pain. Pain right lower extremity-blisters, seeing wound care   Skin:  Positive for rash (Yeast under the right breast). Neurological:  Positive for weakness. Negative for dizziness, tremors, seizures and headaches. Hematological:  Does not bruise/bleed easily. Psychiatric/Behavioral: Negative. The patient is not nervous/anxious. Objective   PHYSICAL EXAM:  Physical Exam  Vitals reviewed. Constitutional:       General: She is not in acute distress. Appearance: She is well-developed. She is obese. HENT:      Head: Normocephalic and atraumatic. Mouth/Throat:      Pharynx: Uvula midline. Tonsils: No tonsillar exudate. Eyes:      General: Lids are normal.      Conjunctiva/sclera: Conjunctivae normal.      Pupils: Pupils are equal, round, and reactive to light. Neck:      Thyroid: No thyroid mass or thyromegaly. Vascular: No JVD. Trachea: Trachea normal. No tracheal deviation. Cardiovascular:      Rate and Rhythm: Normal rate and regular rhythm. Pulses: Normal pulses. Heart sounds: Normal heart sounds. Pulmonary:      Effort: Pulmonary effort is normal. No respiratory distress. Breath sounds: Normal breath sounds. No wheezing or rales. Chest:      Chest wall: No tenderness. Comments: Right breast surgically smaller than left. Nipple is folded under breast.  Yeast is noted under right breast.  Abdominal:      General: Bowel sounds are normal. There is no distension. Palpations: Abdomen is soft. There is no mass. Tenderness: There is no abdominal tenderness. There is no guarding. Musculoskeletal:         General: No tenderness or deformity. Cervical back: Normal range of motion and neck supple. Right lower leg: Edema present. Left lower leg: Edema present. Comments: Range of motion within normal limits x4 extremities   Skin:     General: Skin is warm. Findings: Erythema (Bilateral lower extremities) present. No bruising or rash. Comments: Dressing clean dry and intact to right lower extremity   Neurological:      Mental Status: She is alert and oriented to person, place, and time. Cranial Nerves: No cranial nerve deficit. Motor: Weakness present. Coordination: Coordination normal.      Gait: Gait abnormal (In a wheelchair for mobility). Psychiatric:         Behavior: Behavior normal.         Thought Content: Thought content normal.       Labs reviewed today:  Lab Results   Component Value Date    WBC 5.20 03/09/2023    HGB 10.9 (L) 03/09/2023    HCT 35.3 03/09/2023    .3 (H) 03/09/2023     03/09/2023     Lab Results   Component Value Date    NEUTROABS 3.25 03/09/2023       ASSESSMENT/PLAN:      1. Infiltrating ductal carcinoma the right breast, ER/NH positive, Her-2 2+/equivocal, Ki67 6%, FISH negative, MammaPrint Luminal A/low risk,  [id number], 9/1/2021. No known radiographic imaging to suggest progression of disease. Current recommendation is for adjuvant endocrine therapy with letrozole 2.5 mg daily, anticipate dosing through September 2026. Reports compliant with letrozole and has no new breast complaints other than yeast under her right breast.    10/17/2022 Bilateral mammogram- no radiographic evidence of malignancy    Bilateral breast examination today revealed no dominant masses, no skin or nipple changes and no axillary adenopathy. No suspicious abnormality to suggest recurrent breast cancer. Right nipple is folded under breast, secondary to lumpectomy, right breast is surgically smaller.   There is yeast noted under the right breast.  She reports currently using nystatin powder and reports it has improved    -Continue letrozole 2.5 mg daily: Continue to decrease risk of recurrence of breast cancer  -Consider breast cancer index at approximately January 2026 to assess extended benefit present for additional 5 years of adjuvant endocrine therapy.  -Encouraged self breast examinations  -Follow-up with Caryle Putnam, PA and annual mammogram in 10/2023  -Contact the clinic if yeast does not completely resolve under right breast    2. Encounter for monitoring aromatase inhibitor therapy  -Hot flashes-continues to deny  -Chronic arthralgias-stable with no change from previous evaluation    3. Iron deficiency anemia current recommendation is for ferrous sulfate 325 mg p.o. once daily and vitamin C 500 mg daily. She reports she is also taking an over-the-counter supplement of B12. Reports compliant and tolerating the oral iron without difficulty as long as she takes a stool softener. Hemoglobin 10.9, hematocrit 35.3 and .3 today, continues to suggest improvement    11/10/2022 Serology results suggest continued positive response to iron replacement  Iron 52  TIBC 348  Saturation 15%  Ferritin 41.5  B12:  1124    -Continue Ferrous Sulfate 325 mg p.o. once daily  -Okay to continue OTC B12 replacement  -Continue vitamin C 500 mg daily with one of her doses of ferrous sulfate  -Continue stool softener for constipation    The following will be completed for further evaluation:  Iron panel  Ferritin  CMP    4. Postmenopausal status and at risk for bone loss: 12/13/2021 Bone density- osteopenia; femoral neck T score -0.9; lumbar spine T score 1.7. Vitamin D 53.2 with a calcium of 8.6  on 511/10/2022    -Continue Fosamax 70mg once a week, to prevent further bone loss  -Continue taking calcium 500 mg with vitamin D 500 mg twice daily, to prevent further bone loss  -Monitor for bone loss closely, will repeat bone mineral density study after 12/13/2023      5. Survivorship and health maintenance recommendations  Keep a healthy body weight.  Dietary recommendations include limit energy intake from total fats and sugars; increase consumption of fruit and vegetables, as well as legumes, whole grains and nuts. Engage in regular physical activity (150 minutes spread through the week). Other recommendations include minimizing alcohol intake, avoid use of tobacco products. Practice sun safety: Utilize a sunscreen with an SPF of at least 27. Avoiding tanning beds. Ensure adequate amount of sleep. Follow-up with primary care regularly and for further recommendations regarding age-appropriate screening for cancer, wellbeing visit (preventive measures), follow-up and treatment for other medical comorbidities. I discussed all of the above findings included in the assessment and plan with the patient and the patient is in agreement to move forward with current recommendations/treatment. I have addressed all of their questions and concerns that were verbalized. FOLLOW UP:  Follow up given for 4 months for breast cancer, iron deficiency and osteopenia or sooner, if needed  Continue to follow with other medical providers as recommended  Labs at next visit: CBC and iron panel    EMR Dragon/Transcription disclaimer:   Much of this encounter note is an electronic transcription/translation of spoken language to printed text. The electronic translation of spoken language may permit erroneous, or at times, nonsensical words or phrases to be inadvertently transcribed; although attempts have made to review the note for such errors, some may still exist.  Please excuse any unrecognized transcription errors and contact us if the error is unintelligible or needs documented correction. Also, portions of this note have been copied forward, however, changed to reflect the most current clinical status of this patient. Electronically signed by SARA Chavis on 3/14/2023 at 6:54 PM  Mag JIMENES, iman starting this note as a registered nurse for SARA Beaulieu.

## 2023-03-09 ENCOUNTER — OFFICE VISIT (OUTPATIENT)
Dept: HEMATOLOGY | Age: 73
End: 2023-03-09
Payer: MEDICARE

## 2023-03-09 ENCOUNTER — HOSPITAL ENCOUNTER (OUTPATIENT)
Dept: INFUSION THERAPY | Age: 73
Discharge: HOME | End: 2023-03-09
Payer: MEDICARE

## 2023-03-09 VITALS
DIASTOLIC BLOOD PRESSURE: 62 MMHG | OXYGEN SATURATION: 92 % | WEIGHT: 219.8 LBS | SYSTOLIC BLOOD PRESSURE: 156 MMHG | HEIGHT: 66 IN | HEART RATE: 51 BPM | BODY MASS INDEX: 35.32 KG/M2

## 2023-03-09 DIAGNOSIS — C50.811 MALIGNANT NEOPLASM OF OVERLAPPING SITES OF RIGHT BREAST IN FEMALE, ESTROGEN RECEPTOR POSITIVE (HCC): Primary | ICD-10-CM

## 2023-03-09 DIAGNOSIS — Z51.81 ENCOUNTER FOR MONITORING AROMATASE INHIBITOR THERAPY: ICD-10-CM

## 2023-03-09 DIAGNOSIS — Z17.0 MALIGNANT NEOPLASM OF OVERLAPPING SITES OF RIGHT BREAST IN FEMALE, ESTROGEN RECEPTOR POSITIVE (HCC): Primary | ICD-10-CM

## 2023-03-09 DIAGNOSIS — C50.811 MALIGNANT NEOPLASM OF OVERLAPPING SITES OF RIGHT BREAST IN FEMALE, ESTROGEN RECEPTOR POSITIVE (HCC): ICD-10-CM

## 2023-03-09 DIAGNOSIS — D50.9 IRON DEFICIENCY ANEMIA, UNSPECIFIED IRON DEFICIENCY ANEMIA TYPE: ICD-10-CM

## 2023-03-09 DIAGNOSIS — Z79.811 ENCOUNTER FOR MONITORING AROMATASE INHIBITOR THERAPY: ICD-10-CM

## 2023-03-09 DIAGNOSIS — Z17.0 MALIGNANT NEOPLASM OF OVERLAPPING SITES OF RIGHT BREAST IN FEMALE, ESTROGEN RECEPTOR POSITIVE (HCC): ICD-10-CM

## 2023-03-09 DIAGNOSIS — M85.80 OSTEOPENIA, UNSPECIFIED LOCATION: ICD-10-CM

## 2023-03-09 DIAGNOSIS — Z71.89 CARE PLAN DISCUSSED WITH PATIENT: ICD-10-CM

## 2023-03-09 PROBLEM — S81.801A WOUND OF RIGHT LEG: Status: ACTIVE | Noted: 2023-03-09

## 2023-03-09 LAB
ALBUMIN SERPL-MCNC: 3.5 G/DL (ref 3.5–5.2)
ALP BLD-CCNC: 59 U/L (ref 35–104)
ALT SERPL-CCNC: 16 U/L (ref 9–52)
ANION GAP SERPL CALCULATED.3IONS-SCNC: 7 MMOL/L (ref 7–19)
AST SERPL-CCNC: 24 U/L (ref 14–36)
BASOPHILS ABSOLUTE: 0.07 K/UL (ref 0.01–0.08)
BASOPHILS RELATIVE PERCENT: 1.3 % (ref 0.1–1.2)
BILIRUB SERPL-MCNC: <0.2 MG/DL (ref 0.2–1.3)
BUN BLDV-MCNC: 28 MG/DL (ref 7–17)
CALCIUM SERPL-MCNC: 8.4 MG/DL (ref 8.4–10.2)
CHLORIDE BLD-SCNC: 104 MMOL/L (ref 98–111)
CO2: 30 MMOL/L (ref 22–29)
CREAT SERPL-MCNC: 2.1 MG/DL (ref 0.5–1)
EOSINOPHILS ABSOLUTE: 0.13 K/UL (ref 0.04–0.54)
EOSINOPHILS RELATIVE PERCENT: 2.5 % (ref 0.7–7)
FERRITIN: 54.9 NG/ML (ref 13–150)
GFR SERPL CREATININE-BSD FRML MDRD: 24 ML/MIN/{1.73_M2}
GLOBULIN: 2.6 G/DL
GLUCOSE BLD-MCNC: 116 MG/DL (ref 74–106)
HCT VFR BLD CALC: 35.3 % (ref 34.1–44.9)
HEMOGLOBIN: 10.9 G/DL (ref 11.2–15.7)
IRON SATURATION: 13 % (ref 14–50)
IRON: 45 UG/DL (ref 37–145)
LYMPHOCYTES ABSOLUTE: 1.41 K/UL (ref 1.18–3.74)
LYMPHOCYTES RELATIVE PERCENT: 27.1 % (ref 19.3–53.1)
MCH RBC QN AUTO: 33.1 PG (ref 25.6–32.2)
MCHC RBC AUTO-ENTMCNC: 30.9 G/DL (ref 32.3–35.5)
MCV RBC AUTO: 107.3 FL (ref 79.4–94.8)
MONOCYTES ABSOLUTE: 0.33 K/UL (ref 0.24–0.82)
MONOCYTES RELATIVE PERCENT: 6.3 % (ref 4.7–12.5)
NEUTROPHILS ABSOLUTE: 3.25 K/UL (ref 1.56–6.13)
NEUTROPHILS RELATIVE PERCENT: 62.6 % (ref 34–71.1)
PDW BLD-RTO: 14.3 % (ref 11.7–14.4)
PLATELET # BLD: 188 K/UL (ref 182–369)
PMV BLD AUTO: 10.8 FL (ref 7.4–10.4)
POTASSIUM SERPL-SCNC: 4.8 MMOL/L (ref 3.5–5.1)
RBC # BLD: 3.29 M/UL (ref 3.93–5.22)
SODIUM BLD-SCNC: 141 MMOL/L (ref 137–145)
TOTAL IRON BINDING CAPACITY: 341 UG/DL (ref 250–400)
TOTAL PROTEIN: 6.1 G/DL (ref 6.3–8.2)
WBC # BLD: 5.2 K/UL (ref 3.98–10.04)

## 2023-03-09 PROCEDURE — 3078F DIAST BP <80 MM HG: CPT | Performed by: NURSE PRACTITIONER

## 2023-03-09 PROCEDURE — 36415 COLL VENOUS BLD VENIPUNCTURE: CPT

## 2023-03-09 PROCEDURE — 3017F COLORECTAL CA SCREEN DOC REV: CPT | Performed by: NURSE PRACTITIONER

## 2023-03-09 PROCEDURE — 3074F SYST BP LT 130 MM HG: CPT | Performed by: NURSE PRACTITIONER

## 2023-03-09 PROCEDURE — 1090F PRES/ABSN URINE INCON ASSESS: CPT | Performed by: NURSE PRACTITIONER

## 2023-03-09 PROCEDURE — 1036F TOBACCO NON-USER: CPT | Performed by: NURSE PRACTITIONER

## 2023-03-09 PROCEDURE — G8417 CALC BMI ABV UP PARAM F/U: HCPCS | Performed by: NURSE PRACTITIONER

## 2023-03-09 PROCEDURE — 1123F ACP DISCUSS/DSCN MKR DOCD: CPT | Performed by: NURSE PRACTITIONER

## 2023-03-09 PROCEDURE — 99212 OFFICE O/P EST SF 10 MIN: CPT

## 2023-03-09 PROCEDURE — G8484 FLU IMMUNIZE NO ADMIN: HCPCS | Performed by: NURSE PRACTITIONER

## 2023-03-09 PROCEDURE — G8399 PT W/DXA RESULTS DOCUMENT: HCPCS | Performed by: NURSE PRACTITIONER

## 2023-03-09 PROCEDURE — 99214 OFFICE O/P EST MOD 30 MIN: CPT | Performed by: NURSE PRACTITIONER

## 2023-03-09 PROCEDURE — G8427 DOCREV CUR MEDS BY ELIG CLIN: HCPCS | Performed by: NURSE PRACTITIONER

## 2023-03-09 PROCEDURE — 80053 COMPREHEN METABOLIC PANEL: CPT

## 2023-03-09 PROCEDURE — 85025 COMPLETE CBC W/AUTO DIFF WBC: CPT

## 2023-03-13 DIAGNOSIS — C50.811 MALIGNANT NEOPLASM OF OVERLAPPING SITES OF RIGHT BREAST IN FEMALE, ESTROGEN RECEPTOR POSITIVE (HCC): ICD-10-CM

## 2023-03-13 DIAGNOSIS — Z17.0 MALIGNANT NEOPLASM OF OVERLAPPING SITES OF RIGHT BREAST IN FEMALE, ESTROGEN RECEPTOR POSITIVE (HCC): ICD-10-CM

## 2023-03-14 ENCOUNTER — TELEPHONE (OUTPATIENT)
Dept: HEMATOLOGY | Age: 73
End: 2023-03-14

## 2023-03-14 RX ORDER — LETROZOLE 2.5 MG/1
TABLET, FILM COATED ORAL
Qty: 90 TABLET | Refills: 3 | Status: SHIPPED | OUTPATIENT
Start: 2023-03-14

## 2023-03-14 ASSESSMENT — ENCOUNTER SYMPTOMS
SHORTNESS OF BREATH: 0
EYE PAIN: 0
NAUSEA: 0
GASTROINTESTINAL NEGATIVE: 1
BACK PAIN: 0
DIARRHEA: 0
WHEEZING: 0
EYES NEGATIVE: 1
BLOOD IN STOOL: 0
VOMITING: 0
ABDOMINAL PAIN: 0
RESPIRATORY NEGATIVE: 1
CONSTIPATION: 0
EYE REDNESS: 0
COUGH: 0
SORE THROAT: 0
EYE DISCHARGE: 0

## 2023-03-14 NOTE — TELEPHONE ENCOUNTER
Called patient regarding lab results, iron saturation 13%. Instructed that SARA Torres would like for her to get 3 doses of IV iron. Patient states that this has been checked in the past and that she cannot afford the copay for the infusion. Instructed to continue taking ferrous sulfate twice a day with vitamin C and increase intake of iron rich foods. Patient v/u.

## 2023-03-15 NOTE — DISCHARGE INSTRUCTIONS
[address] [facility]   Physician Orders and Discharge Instructions  [address]  Telephone: [phone]    NAME:  Branden Nieto OF BIRTH:  1950  MEDICAL RECORD NUMBER:  [id number]  DATE:  3/16/23    Discharge condition: {STABLE/UNSTABLE:011630503}    Discharge to: {[facility] Wound Discharge To:26543}    Left via:{Left ACU:[id number]}    Accompanied by:  {:[id number]}    F/HHA: [facility] - Please order supplies for patient     Dressing Orders: Both legs any open area: Soap and water wash, apply Xeroform, cover with dry gauze and secure with roll gauze daily. Wrap with 6\" ace wrap from base of toes wrapped up the leg to just below the knee and re-wrap these if they fall down. Treatment Orders:  Protein rich diet (unless restricted by your physician)  Multivitamin daily  Elevate legs when sitting, avoid standing for long periods of time. [facility] follow up visit _____________________________  (Please note your next appointment above and if you are unable to keep, kindly give a 24 hour notice. Thank you.)          If you experience any of the following, please call the [address] [place] [address] [facility] during business hours:    * Increase in Pain  * Temperature over 101  * Increase in drainage from your wound  * Drainage with a foul odor  * Bleeding  * Increase in swelling  * Need for compression bandage changes due to slippage, breakthrough drainage. If you need medical attention outside of the business hours of the [facility] [address] please contact your PCP or go to the nearest emergency room.

## 2023-03-16 ENCOUNTER — HOSPITAL ENCOUNTER (OUTPATIENT)
Dept: WOUND CARE | Age: 73
Discharge: HOME | End: 2023-03-16

## 2023-03-22 NOTE — DISCHARGE INSTRUCTIONS
continue to work properly. 10) If you are overweight, losing weight can be helpful. [facility] follow up visit ___as needed__________________________  (Please note your next appointment above and if you are unable to keep, kindly give a 24 hour notice. Thank you.)          If you experience any of the following, please call the [email or web address] during business hours:    * Increase in Pain  * Temperature over 101  * Increase in drainage from your wound  * Drainage with a foul odor  * Bleeding  * Increase in swelling  * Need for compression bandage changes due to slippage, breakthrough drainage. If you need medical attention outside of the business hours of the [address] please contact your PCP or go to the nearest emergency room.

## 2023-03-23 ENCOUNTER — HOSPITAL ENCOUNTER (OUTPATIENT)
Dept: NON INVASIVE DIAGNOSTICS | Age: 73
Discharge: HOME | End: 2023-03-23
Payer: MEDICARE

## 2023-03-23 ENCOUNTER — HOSPITAL ENCOUNTER (OUTPATIENT)
Dept: WOUND CARE | Age: 73
Discharge: HOME | End: 2023-03-23
Payer: MEDICARE

## 2023-03-23 VITALS
RESPIRATION RATE: 22 BRPM | SYSTOLIC BLOOD PRESSURE: 168 MMHG | TEMPERATURE: 97.3 F | HEART RATE: 75 BPM | DIASTOLIC BLOOD PRESSURE: 76 MMHG

## 2023-03-23 DIAGNOSIS — L97.912 SKIN ULCER OF RIGHT LOWER LEG WITH FAT LAYER EXPOSED (HCC): ICD-10-CM

## 2023-03-23 DIAGNOSIS — S81.801S WOUND OF RIGHT LOWER EXTREMITY, SEQUELA: ICD-10-CM

## 2023-03-23 DIAGNOSIS — L97.912 SKIN ULCER OF RIGHT LOWER LEG WITH FAT LAYER EXPOSED (HCC): Primary | ICD-10-CM

## 2023-03-23 DIAGNOSIS — I73.9 PVD (PERIPHERAL VASCULAR DISEASE) (HCC): ICD-10-CM

## 2023-03-23 PROCEDURE — 93923 UPR/LXTR ART STDY 3+ LVLS: CPT

## 2023-03-23 PROCEDURE — 99212 OFFICE O/P EST SF 10 MIN: CPT | Performed by: NURSE PRACTITIONER

## 2023-03-23 PROCEDURE — 99212 OFFICE O/P EST SF 10 MIN: CPT

## 2023-03-23 NOTE — PLAN OF CARE
Problem: Discharge Planning  Goal: Discharge to home or other facility with appropriate resources  Outcome: Completed     Patient healed today, follow up as needed.

## 2023-03-23 NOTE — PROGRESS NOTES
Wound 03/02/23 Pretibial Right #3 Right leg superior medial venous (Active)   Wound Image   03/23/23 1438   Wound Etiology Venous 03/23/23 1438   Dressing Status Dry; Intact 03/23/23 1438   Wound Cleansed Not Cleansed 03/23/23 1438   Dressing/Treatment Xeroform;Roll gauze; Ace wrap 03/02/23 1630   Wound Length (cm) 0 cm 03/23/23 1438   Wound Width (cm) 0 cm 03/23/23 1438   Wound Depth (cm) 0 cm 03/23/23 1438   Wound Surface Area (cm^2) 0 cm^2 03/23/23 1438   Change in Wound Size % (l*w) 100 03/23/23 1438   Wound Volume (cm^3) 0 cm^3 03/23/23 1438   Wound Healing % 100 03/23/23 1438   Wound Assessment Epithelialization 03/23/23 1438   Drainage Amount None 03/23/23 1438   Drainage Description Serous; Yellow 03/02/23 1455   Odor None 03/23/23 1438   Tram-wound Assessment Intact 03/23/23 1438   Margins Defined edges 03/02/23 1455   Wound Thickness Description not for Pressure Injury Full thickness 03/02/23 1455   Number of days: 20       Wound 03/02/23 Pretibial Right #4 Right superior lateral leg venous (Active)   Wound Image   03/23/23 1438   Wound Etiology Venous 03/23/23 1438   Dressing Status Dry; Intact 03/23/23 1438   Wound Cleansed Not Cleansed 03/23/23 1438   Dressing/Treatment Xeroform;Roll gauze; Ace wrap 03/02/23 1630   Wound Length (cm) 0 cm 03/23/23 1438   Wound Width (cm) 0 cm 03/23/23 1438   Wound Depth (cm) 0 cm 03/23/23 1438   Wound Surface Area (cm^2) 0 cm^2 03/23/23 1438   Change in Wound Size % (l*w) 100 03/23/23 1438   Wound Volume (cm^3) 0 cm^3 03/23/23 1438   Wound Healing % 100 03/23/23 1438   Wound Assessment Epithelialization 03/23/23 1438   Drainage Amount None 03/23/23 1438   Drainage Description Serous; Yellow 03/02/23 1455   Odor None 03/23/23 1438   Tram-wound Assessment Intact 03/23/23 1438   Margins Defined edges 03/02/23 1455   Wound Thickness Description not for Pressure Injury Full thickness 03/02/23 1455   Number of days: 20       Wound 03/02/23 Tibial Left;Posterior #2 Right posterior

## 2023-03-31 RX ORDER — GLIPIZIDE 2.5 MG/1
TABLET, EXTENDED RELEASE ORAL
Qty: 90 TABLET | Refills: 1 | Status: SHIPPED | OUTPATIENT
Start: 2023-03-31

## 2023-03-31 NOTE — TELEPHONE ENCOUNTER
Priscilla Douglas called requesting a refill of the below medication which has been pended for you:     Requested Prescriptions     Pending Prescriptions Disp Refills    glipiZIDE (GLUCOTROL XL) 2.5 MG extended release tablet [Pharmacy Med Name: GLIPIZIDE ER 2.5 MG Tablet Extended Release 24 Hour] 90 tablet 1     Sig: TAKE 1 TABLET EVERY DAY       Last Appointment Date: 2/13/2023  Next Appointment Date: 5/26/2023    Allergies   Allergen Reactions    Medrol [Methylprednisolone] Other (See Comments)     Affects her eyes

## 2023-04-03 DIAGNOSIS — Z17.0 MALIGNANT NEOPLASM OF OVERLAPPING SITES OF RIGHT BREAST IN FEMALE, ESTROGEN RECEPTOR POSITIVE (HCC): ICD-10-CM

## 2023-04-03 DIAGNOSIS — C50.811 MALIGNANT NEOPLASM OF OVERLAPPING SITES OF RIGHT BREAST IN FEMALE, ESTROGEN RECEPTOR POSITIVE (HCC): ICD-10-CM

## 2023-04-03 RX ORDER — MONTELUKAST SODIUM 10 MG/1
TABLET ORAL
Qty: 90 TABLET | Refills: 3 | Status: SHIPPED | OUTPATIENT
Start: 2023-04-03

## 2023-04-19 DIAGNOSIS — I10 PRIMARY HYPERTENSION: ICD-10-CM

## 2023-04-19 RX ORDER — METOPROLOL TARTRATE 50 MG/1
TABLET, FILM COATED ORAL
Qty: 360 TABLET | Refills: 3 | Status: SHIPPED | OUTPATIENT
Start: 2023-04-19

## 2023-04-19 NOTE — TELEPHONE ENCOUNTER
Justyn Alisson called to request a refill on her medication.       Last office visit : 2/13/2023   Next office visit : 5/26/2023     Requested Prescriptions     Pending Prescriptions Disp Refills    metoprolol tartrate (LOPRESSOR) 50 MG tablet [Pharmacy Med Name: METOPROLOL TARTRATE 50 MG Tablet] 360 tablet 3     Sig: TAKE 2 TABLETS TWICE DAILY            Posey Colace

## 2023-04-24 DIAGNOSIS — Z95.810 BIVENTRICULAR ICD (IMPLANTABLE CARDIOVERTER-DEFIBRILLATOR) IN PLACE: Primary | ICD-10-CM

## 2023-04-24 DIAGNOSIS — I25.5 ISCHEMIC CARDIOMYOPATHY: ICD-10-CM

## 2023-04-24 DIAGNOSIS — I50.42 CHRONIC COMBINED SYSTOLIC (CONGESTIVE) AND DIASTOLIC (CONGESTIVE) HEART FAILURE (HCC): ICD-10-CM

## 2023-04-24 PROCEDURE — 93295 DEV INTERROG REMOTE 1/2/MLT: CPT | Performed by: NURSE PRACTITIONER

## 2023-04-24 PROCEDURE — 93296 REM INTERROG EVL PM/IDS: CPT | Performed by: NURSE PRACTITIONER

## 2023-05-19 ENCOUNTER — HOSPITAL ENCOUNTER (OUTPATIENT)
Dept: WOUND CARE | Age: 73
Discharge: HOME | End: 2023-05-19
Payer: MEDICARE

## 2023-05-19 VITALS
WEIGHT: 219 LBS | BODY MASS INDEX: 36.49 KG/M2 | HEIGHT: 65 IN | TEMPERATURE: 97.4 F | RESPIRATION RATE: 20 BRPM | HEART RATE: 62 BPM | DIASTOLIC BLOOD PRESSURE: 78 MMHG | SYSTOLIC BLOOD PRESSURE: 176 MMHG

## 2023-05-19 DIAGNOSIS — I73.9 PVD (PERIPHERAL VASCULAR DISEASE) (HCC): ICD-10-CM

## 2023-05-19 DIAGNOSIS — I89.0 LYMPHEDEMA: ICD-10-CM

## 2023-05-19 DIAGNOSIS — L97.922 ULCER OF LEFT LOWER LEG, WITH FAT LAYER EXPOSED (HCC): ICD-10-CM

## 2023-05-19 DIAGNOSIS — E11.21 TYPE II DIABETES MELLITUS WITH NEPHROPATHY (HCC): ICD-10-CM

## 2023-05-19 DIAGNOSIS — L97.912 SKIN ULCER OF RIGHT LOWER LEG WITH FAT LAYER EXPOSED (HCC): Primary | ICD-10-CM

## 2023-05-19 LAB
25(OH)D3 SERPL-MCNC: 48.6 NG/ML
ALBUMIN SERPL-MCNC: 3.7 G/DL (ref 3.5–5.2)
ALP SERPL-CCNC: 57 U/L (ref 35–104)
ALT SERPL-CCNC: 10 U/L (ref 5–33)
ANION GAP SERPL CALCULATED.3IONS-SCNC: 13 MMOL/L (ref 7–19)
AST SERPL-CCNC: 23 U/L (ref 5–32)
BASOPHILS # BLD: 0.1 K/UL (ref 0–0.2)
BASOPHILS NFR BLD: 1 % (ref 0–1)
BILIRUB SERPL-MCNC: 0.3 MG/DL (ref 0.2–1.2)
BUN SERPL-MCNC: 31 MG/DL (ref 8–23)
CALCIUM SERPL-MCNC: 8.9 MG/DL (ref 8.8–10.2)
CHLORIDE SERPL-SCNC: 99 MMOL/L (ref 98–111)
CHOLEST SERPL-MCNC: 96 MG/DL (ref 160–199)
CO2 SERPL-SCNC: 26 MMOL/L (ref 22–29)
CREAT SERPL-MCNC: 2.2 MG/DL (ref 0.5–0.9)
CREAT UR-MCNC: 31.8 MG/DL (ref 4.2–622)
EOSINOPHIL # BLD: 0.1 K/UL (ref 0–0.6)
EOSINOPHIL NFR BLD: 2.5 % (ref 0–5)
ERYTHROCYTE [DISTWIDTH] IN BLOOD BY AUTOMATED COUNT: 15 % (ref 11.5–14.5)
GLUCOSE SERPL-MCNC: 107 MG/DL (ref 74–109)
HBA1C MFR BLD: 5.4 % (ref 4–6)
HCT VFR BLD AUTO: 32.6 % (ref 37–47)
HDLC SERPL-MCNC: 47 MG/DL (ref 65–121)
HGB BLD-MCNC: 10.4 G/DL (ref 12–16)
IMM GRANULOCYTES # BLD: 0 K/UL
LDLC SERPL CALC-MCNC: 31 MG/DL
LYMPHOCYTES # BLD: 1.3 K/UL (ref 1.1–4.5)
LYMPHOCYTES NFR BLD: 25 % (ref 20–40)
MCH RBC QN AUTO: 33.7 PG (ref 27–31)
MCHC RBC AUTO-ENTMCNC: 31.9 G/DL (ref 33–37)
MCV RBC AUTO: 105.5 FL (ref 81–99)
MICROALBUMIN UR-MCNC: 34.3 MG/DL (ref 0–19)
MICROALBUMIN/CREAT UR-RTO: 1078.6 MG/G
MONOCYTES # BLD: 0.4 K/UL (ref 0–0.9)
MONOCYTES NFR BLD: 7.6 % (ref 0–10)
NEUTROPHILS # BLD: 3.4 K/UL (ref 1.5–7.5)
NEUTS SEG NFR BLD: 63.7 % (ref 50–65)
PLATELET # BLD AUTO: 184 K/UL (ref 130–400)
PMV BLD AUTO: 11.3 FL (ref 9.4–12.3)
POTASSIUM SERPL-SCNC: 5.3 MMOL/L (ref 3.5–5)
PROT SERPL-MCNC: 6.6 G/DL (ref 6.6–8.7)
RBC # BLD AUTO: 3.09 M/UL (ref 4.2–5.4)
SODIUM SERPL-SCNC: 138 MMOL/L (ref 136–145)
TRIGL SERPL-MCNC: 91 MG/DL (ref 0–149)
TSH SERPL DL<=0.005 MIU/L-ACNC: 2.74 UIU/ML (ref 0.27–4.2)
WBC # BLD AUTO: 5.3 K/UL (ref 4.8–10.8)

## 2023-05-19 PROCEDURE — 99214 OFFICE O/P EST MOD 30 MIN: CPT

## 2023-05-19 PROCEDURE — 97597 DBRDMT OPN WND 1ST 20 CM/<: CPT

## 2023-05-19 PROCEDURE — 97598 DBRDMT OPN WND ADDL 20CM/<: CPT

## 2023-05-19 RX ORDER — BETAMETHASONE DIPROPIONATE 0.05 %
OINTMENT (GRAM) TOPICAL ONCE
OUTPATIENT
Start: 2023-05-19 | End: 2023-05-19

## 2023-05-19 RX ORDER — LIDOCAINE 50 MG/G
OINTMENT TOPICAL ONCE
OUTPATIENT
Start: 2023-05-19 | End: 2023-05-19

## 2023-05-19 RX ORDER — BACITRACIN ZINC AND POLYMYXIN B SULFATE 500; 1000 [USP'U]/G; [USP'U]/G
OINTMENT TOPICAL ONCE
OUTPATIENT
Start: 2023-05-19 | End: 2023-05-19

## 2023-05-19 RX ORDER — LIDOCAINE HYDROCHLORIDE 40 MG/ML
SOLUTION TOPICAL ONCE
OUTPATIENT
Start: 2023-05-19 | End: 2023-05-19

## 2023-05-19 RX ORDER — CLOBETASOL PROPIONATE 0.5 MG/G
OINTMENT TOPICAL ONCE
OUTPATIENT
Start: 2023-05-19 | End: 2023-05-19

## 2023-05-19 RX ORDER — SODIUM CHLOR/HYPOCHLOROUS ACID 0.033 %
SOLUTION, IRRIGATION IRRIGATION ONCE
OUTPATIENT
Start: 2023-05-19 | End: 2023-05-19

## 2023-05-19 RX ORDER — LIDOCAINE 40 MG/G
CREAM TOPICAL ONCE
OUTPATIENT
Start: 2023-05-19 | End: 2023-05-19

## 2023-05-19 RX ORDER — GENTAMICIN SULFATE 1 MG/G
OINTMENT TOPICAL ONCE
OUTPATIENT
Start: 2023-05-19 | End: 2023-05-19

## 2023-05-19 RX ORDER — LIDOCAINE HYDROCHLORIDE 20 MG/ML
JELLY TOPICAL ONCE
OUTPATIENT
Start: 2023-05-19 | End: 2023-05-19

## 2023-05-19 RX ORDER — BACITRACIN, NEOMYCIN, POLYMYXIN B 400; 3.5; 5 [USP'U]/G; MG/G; [USP'U]/G
OINTMENT TOPICAL ONCE
OUTPATIENT
Start: 2023-05-19 | End: 2023-05-19

## 2023-05-19 RX ORDER — GINSENG 100 MG
CAPSULE ORAL ONCE
OUTPATIENT
Start: 2023-05-19 | End: 2023-05-19

## 2023-05-19 ASSESSMENT — PAIN DESCRIPTION - LOCATION: LOCATION: LEG

## 2023-05-19 ASSESSMENT — PAIN DESCRIPTION - PAIN TYPE: TYPE: ACUTE PAIN

## 2023-05-19 ASSESSMENT — VISUAL ACUITY: OU: 1

## 2023-05-19 ASSESSMENT — PAIN DESCRIPTION - FREQUENCY: FREQUENCY: CONTINUOUS

## 2023-05-19 ASSESSMENT — PAIN - FUNCTIONAL ASSESSMENT: PAIN_FUNCTIONAL_ASSESSMENT: PREVENTS OR INTERFERES SOME ACTIVE ACTIVITIES AND ADLS

## 2023-05-19 ASSESSMENT — PAIN DESCRIPTION - ORIENTATION: ORIENTATION: LEFT

## 2023-05-19 ASSESSMENT — PAIN DESCRIPTION - DESCRIPTORS: DESCRIPTORS: SORE

## 2023-05-19 ASSESSMENT — PAIN DESCRIPTION - ONSET: ONSET: ON-GOING

## 2023-05-19 ASSESSMENT — PAIN SCALES - GENERAL: PAINLEVEL_OUTOF10: 10

## 2023-05-19 NOTE — DISCHARGE INSTRUCTIONS
[address]   Physician Orders and Discharge Instructions  [address] [facility] [address]   Telephone: [phone]    NAME:  Danii Mace OF BIRTH:  1950  MEDICAL RECORD NUMBER:  [id number]  DATE:  5/19/2023    Discharge condition: Stable    Discharge to: Home    Left via:Private automobile    Accompanied by:  sister    ECF/[facility]: [facility]    Dressing Orders:  Right and left ankle wound: Soap and water wash. Apply a double layer of Xeroform (the yellow sticky dressing) over the wound bed daily. Dry gauze and rolled gauze. Ace wrap from toes to the knee daily. Treatment Orders:  Protein rich diet (unless restricted by your physician); Multivitamin daily; Elevate legs above the level of your heart when sitting 3-4 times daily for at least one hour each time, avoid standing for long periods of time. [address] [place] [address],[address] follow up visit ___1 week with Abbey__________________________  (Please note your next appointment above and if you are unable to keep, kindly give a 24 hour notice. Thank you.)          If you experience any of the following, please call the [facility] [address] during business hours:    * Increase in Pain  * Temperature over 101  * Increase in drainage from your wound  * Drainage with a foul odor  * Bleeding  * Increase in swelling  * Need for compression bandage changes due to slippage, breakthrough drainage. If you need medical attention outside of the business hours of the [facility] please contact your PCP or go to the nearest emergency room.

## 2023-05-19 NOTE — PROGRESS NOTES
Patient Care Team:  Pedro Luis Murguia MD as PCP - General (Family Medicine)  Pedro Luis [facility]urg[facility]  SARA Mccollum - NP as Advanced Practice Nurse (Gastroenterology)  Yu Reeves RN as Registered Nurse  Jourdan Pacheco MD as Consulting Physician (Interventional Cardiology)    TODAY'S DATE:  5/19/2023     HISTORY of PRESENTILLNESS JOHN Arroyo is a 74 y.o. female who presents today for wound evaluation. She reports she developed a wound on right and leftleg. This started 1 week(s) ago. She believes this is not healing. She has been applying nothing. She has not had  fever or chills. She has a history of venous disease and lymphedema. Ms. Ana Thomas has lymphedema, hyperpigmentation, compromised skin integrity, and pitting edema. The patient's consistent use of gradient compression wrap of 20-30mmhg along with exercise, and elevation has had limited success in managing her swelling for years. Prognosis is fair, however, the patients calf, thigh, and truncal edema will worsen without further treatment.   Wound Type:venous  Wound Location: left and right legs  Modifying factors:edema, lymphedema, diabetes, and obesity    Patient Active Problem List   Diagnosis Code    Hypertension I10    Hyperlipidemia E78.5    Ocular hypertension H40.059    CAD (coronary artery disease) I25.10    Type 2 diabetes mellitus (HCC) E11.9    Diabetes mellitus ([facility] [facility] E11.9    Acute cystitis without hematuria N30.00    Carpal tunnel syndrome G56.00    Chest pain R07.9    History of coronary artery bypass graft Z95.1    Achilles tendinitis M76.60    Bursitis of right foot M77.51    Contracture of Achilles tendon M67.00    Diabetic macular edema (HCC) E11.311    Foot pain M79.673    Nonproliferative diabetic retinopathy (HCC) E11.3299    Posterior calcaneal exostosis M77.30    Retinal neovascularization H35.059    Malignant neoplasm of overlapping sites of right breast in female, estrogen

## 2023-05-19 NOTE — PLAN OF CARE
Problem: Chronic Conditions and Co-morbidities  Goal: Patient's chronic conditions and co-morbidity symptoms are monitored and maintained or improved  Outcome: Progressing     Problem: Discharge Planning  Goal: Discharge to home or other facility with appropriate resources  Outcome: Progressing     Problem: Pain  Goal: Verbalizes/displays adequate comfort level or baseline comfort level  Outcome: Progressing     Problem: Safety - Adult  Goal: Free from fall injury  Outcome: Progressing     Problem: Wound:  Goal: Will show signs of wound healing; wound closure and no evidence of infection  Description: Will show signs of wound healing; wound closure and no evidence of infection  Outcome: Progressing     Problem: Venous:  Goal: Signs of wound healing will improve  Description: Signs of wound healing will improve  Outcome: Progressing     Problem: Compression therapy:  Goal: Will be free from complications associated with compression therapy  Description: Will be free from complications associated with compression therapy  Outcome: Progressing     Problem: Falls - Risk of:  Goal: Will remain free from falls  Description: Will remain free from falls  Outcome: Progressing     Problem: Blood Glucose:  Goal: Ability to maintain appropriate glucose levels will improve  Description: Ability to maintain appropriate glucose levels will improve  Outcome: Progressing

## 2023-05-19 NOTE — HOME CARE
1023   Dressing/Treatment Xeroform;Gauze dressing/dressing sponge;Roll gauze; Ace wrap 05/19/23 1045   Wound Length (cm) 1.1 cm 05/19/23 1023   Wound Width (cm) 1 cm 05/19/23 1023   Wound Depth (cm) 0.1 cm 05/19/23 1023   Wound Surface Area (cm^2) 1.1 cm^2 05/19/23 1023   Wound Volume (cm^3) 0.11 cm^3 05/19/23 1023   Post-Procedure Length (cm) 1.1 cm 05/19/23 1120   Post-Procedure Width (cm) 1 cm 05/19/23 1120   Post-Procedure Depth (cm) 0.1 cm 05/19/23 1120   Post-Procedure Surface Area (cm^2) 1.1 cm^2 05/19/23 1120   Post-Procedure Volume (cm^3) 0.11 cm^3 05/19/23 1120   Wound Assessment Eschar dry 05/19/23 1023   Drainage Amount Scant 05/19/23 1023   Drainage Description Serosanguinous 05/19/23 1023   Odor None 05/19/23 1023   Tram-wound Assessment Intact 05/19/23 1023   Margins Attached edges 05/19/23 1023   Wound Thickness Description not for Pressure Injury Full thickness 05/19/23 1023   Number of days: 0          Supplies Requested :      WOUND #: 1 and 2   PRIMARY DRESSING:  Other: xeroform   Cover and Secure with: 4X4 gauze pad  Conforming roll gauze  Ace wrap     FREQUENCY OF DRESSING CHANGES:  Daily         ADDITIONAL ITEMS:  [] Gloves Small  [x] Gloves Medium [] Gloves Large [] Gloves XLarge  [] Tape 1\" [x] Tape 2\" [] Tape 3\"  [x] Medipore Tape  [x] Saline  [] Vashe  [] Skin Prep   [] Adhesive Remover   [] Cotton Tip Applicators   [] Other:    Patient Wound(s) Debrided: [x] Yes if yes please add date 5/19/23  [] No    Debribement Type: Excisional/Sharp and Mechanical     Is the patient currently on an antibiotic for their Wound(s): [] Yes if yes please add name and dose    [x] No    Patient currently being seen by Home Health: [] Yes   [x] No    Duration for needed supplies:  []15  [x]30  []60  []90 Days    Electronically signed by SARA Barlow CNP on 5/19/2023 at 11:25 AM     Provider Information:      PROVIDER'S NAME: Clare RUIZ    NPI: [id number]

## 2023-05-26 ENCOUNTER — OFFICE VISIT (OUTPATIENT)
Dept: INTERNAL MEDICINE | Age: 73
End: 2023-05-26
Payer: MEDICARE

## 2023-05-26 ENCOUNTER — HOSPITAL ENCOUNTER (OUTPATIENT)
Dept: WOUND CARE | Age: 73
Discharge: HOME | End: 2023-05-26
Payer: MEDICARE

## 2023-05-26 VITALS
TEMPERATURE: 97 F | WEIGHT: 219 LBS | RESPIRATION RATE: 20 BRPM | HEART RATE: 65 BPM | SYSTOLIC BLOOD PRESSURE: 182 MMHG | BODY MASS INDEX: 36.49 KG/M2 | DIASTOLIC BLOOD PRESSURE: 82 MMHG | HEIGHT: 65 IN

## 2023-05-26 VITALS
BODY MASS INDEX: 37.55 KG/M2 | DIASTOLIC BLOOD PRESSURE: 76 MMHG | OXYGEN SATURATION: 99 % | HEART RATE: 66 BPM | RESPIRATION RATE: 18 BRPM | SYSTOLIC BLOOD PRESSURE: 138 MMHG | HEIGHT: 65 IN | WEIGHT: 225.4 LBS

## 2023-05-26 DIAGNOSIS — C50.811 MALIGNANT NEOPLASM OF OVERLAPPING SITES OF RIGHT BREAST IN FEMALE, ESTROGEN RECEPTOR POSITIVE (HCC): ICD-10-CM

## 2023-05-26 DIAGNOSIS — Z17.0 MALIGNANT NEOPLASM OF OVERLAPPING SITES OF RIGHT BREAST IN FEMALE, ESTROGEN RECEPTOR POSITIVE (HCC): ICD-10-CM

## 2023-05-26 DIAGNOSIS — Z91.09 ENVIRONMENTAL ALLERGIES: ICD-10-CM

## 2023-05-26 DIAGNOSIS — L97.922 ULCER OF LEFT LOWER LEG, WITH FAT LAYER EXPOSED (HCC): ICD-10-CM

## 2023-05-26 DIAGNOSIS — D50.9 IRON DEFICIENCY ANEMIA, UNSPECIFIED IRON DEFICIENCY ANEMIA TYPE: ICD-10-CM

## 2023-05-26 DIAGNOSIS — S81.802D WOUND OF LEFT LOWER EXTREMITY, SUBSEQUENT ENCOUNTER: ICD-10-CM

## 2023-05-26 DIAGNOSIS — E78.5 HYPERLIPIDEMIA, UNSPECIFIED HYPERLIPIDEMIA TYPE: ICD-10-CM

## 2023-05-26 DIAGNOSIS — L97.912 SKIN ULCER OF RIGHT LOWER LEG WITH FAT LAYER EXPOSED (HCC): ICD-10-CM

## 2023-05-26 DIAGNOSIS — I25.83 CORONARY ARTERY DISEASE DUE TO LIPID RICH PLAQUE: ICD-10-CM

## 2023-05-26 DIAGNOSIS — N18.4 CKD (CHRONIC KIDNEY DISEASE) STAGE 4, GFR 15-29 ML/MIN (HCC): ICD-10-CM

## 2023-05-26 DIAGNOSIS — Z11.59 NEED FOR HEPATITIS C SCREENING TEST: ICD-10-CM

## 2023-05-26 DIAGNOSIS — I25.10 CORONARY ARTERY DISEASE DUE TO LIPID RICH PLAQUE: ICD-10-CM

## 2023-05-26 DIAGNOSIS — E11.69 TYPE 2 DIABETES MELLITUS WITH OTHER SPECIFIED COMPLICATION, UNSPECIFIED WHETHER LONG TERM INSULIN USE (HCC): ICD-10-CM

## 2023-05-26 DIAGNOSIS — J45.20 MILD INTERMITTENT REACTIVE AIRWAY DISEASE WITHOUT COMPLICATION: ICD-10-CM

## 2023-05-26 DIAGNOSIS — I89.0 LYMPHEDEMA: Primary | ICD-10-CM

## 2023-05-26 DIAGNOSIS — E11.21 TYPE II DIABETES MELLITUS WITH NEPHROPATHY (HCC): Primary | ICD-10-CM

## 2023-05-26 DIAGNOSIS — E55.9 VITAMIN D DEFICIENCY: ICD-10-CM

## 2023-05-26 DIAGNOSIS — I10 PRIMARY HYPERTENSION: ICD-10-CM

## 2023-05-26 PROCEDURE — 99213 OFFICE O/P EST LOW 20 MIN: CPT

## 2023-05-26 PROCEDURE — 3017F COLORECTAL CA SCREEN DOC REV: CPT | Performed by: INTERNAL MEDICINE

## 2023-05-26 PROCEDURE — G8427 DOCREV CUR MEDS BY ELIG CLIN: HCPCS | Performed by: INTERNAL MEDICINE

## 2023-05-26 PROCEDURE — 3074F SYST BP LT 130 MM HG: CPT | Performed by: INTERNAL MEDICINE

## 2023-05-26 PROCEDURE — 99214 OFFICE O/P EST MOD 30 MIN: CPT | Performed by: INTERNAL MEDICINE

## 2023-05-26 PROCEDURE — G8399 PT W/DXA RESULTS DOCUMENT: HCPCS | Performed by: INTERNAL MEDICINE

## 2023-05-26 PROCEDURE — 1090F PRES/ABSN URINE INCON ASSESS: CPT | Performed by: INTERNAL MEDICINE

## 2023-05-26 PROCEDURE — G8417 CALC BMI ABV UP PARAM F/U: HCPCS | Performed by: INTERNAL MEDICINE

## 2023-05-26 PROCEDURE — 1123F ACP DISCUSS/DSCN MKR DOCD: CPT | Performed by: INTERNAL MEDICINE

## 2023-05-26 PROCEDURE — 1036F TOBACCO NON-USER: CPT | Performed by: INTERNAL MEDICINE

## 2023-05-26 PROCEDURE — 2022F DILAT RTA XM EVC RTNOPTHY: CPT | Performed by: INTERNAL MEDICINE

## 2023-05-26 PROCEDURE — 6370000000 HC RX 637 (ALT 250 FOR IP): Performed by: NURSE PRACTITIONER

## 2023-05-26 PROCEDURE — 3044F HG A1C LEVEL LT 7.0%: CPT | Performed by: INTERNAL MEDICINE

## 2023-05-26 PROCEDURE — 99212 OFFICE O/P EST SF 10 MIN: CPT | Performed by: NURSE PRACTITIONER

## 2023-05-26 PROCEDURE — 3078F DIAST BP <80 MM HG: CPT | Performed by: INTERNAL MEDICINE

## 2023-05-26 RX ORDER — SODIUM CHLOR/HYPOCHLOROUS ACID 0.033 %
SOLUTION, IRRIGATION IRRIGATION ONCE
OUTPATIENT
Start: 2023-05-26 | End: 2023-05-26

## 2023-05-26 RX ORDER — GINSENG 100 MG
CAPSULE ORAL ONCE
OUTPATIENT
Start: 2023-05-26 | End: 2023-05-26

## 2023-05-26 RX ORDER — TRAMADOL HYDROCHLORIDE 50 MG/1
50 TABLET ORAL EVERY 6 HOURS PRN
Qty: 12 TABLET | Refills: 0 | Status: SHIPPED | OUTPATIENT
Start: 2023-05-26 | End: 2023-05-29

## 2023-05-26 RX ORDER — LIDOCAINE HYDROCHLORIDE 20 MG/ML
JELLY TOPICAL ONCE
Status: COMPLETED | OUTPATIENT
Start: 2023-05-26 | End: 2023-05-26

## 2023-05-26 RX ORDER — BACITRACIN ZINC AND POLYMYXIN B SULFATE 500; 1000 [USP'U]/G; [USP'U]/G
OINTMENT TOPICAL ONCE
OUTPATIENT
Start: 2023-05-26 | End: 2023-05-26

## 2023-05-26 RX ORDER — BACITRACIN, NEOMYCIN, POLYMYXIN B 400; 3.5; 5 [USP'U]/G; MG/G; [USP'U]/G
OINTMENT TOPICAL ONCE
OUTPATIENT
Start: 2023-05-26 | End: 2023-05-26

## 2023-05-26 RX ORDER — GENTAMICIN SULFATE 1 MG/G
OINTMENT TOPICAL ONCE
OUTPATIENT
Start: 2023-05-26 | End: 2023-05-26

## 2023-05-26 RX ORDER — LIDOCAINE HYDROCHLORIDE 40 MG/ML
SOLUTION TOPICAL ONCE
OUTPATIENT
Start: 2023-05-26 | End: 2023-05-26

## 2023-05-26 RX ORDER — LIDOCAINE HYDROCHLORIDE 20 MG/ML
JELLY TOPICAL ONCE
OUTPATIENT
Start: 2023-05-26 | End: 2023-05-26

## 2023-05-26 RX ORDER — CLOBETASOL PROPIONATE 0.5 MG/G
OINTMENT TOPICAL ONCE
OUTPATIENT
Start: 2023-05-26 | End: 2023-05-26

## 2023-05-26 RX ORDER — BETAMETHASONE DIPROPIONATE 0.05 %
OINTMENT (GRAM) TOPICAL ONCE
OUTPATIENT
Start: 2023-05-26 | End: 2023-05-26

## 2023-05-26 RX ORDER — LIDOCAINE 40 MG/G
CREAM TOPICAL ONCE
OUTPATIENT
Start: 2023-05-26 | End: 2023-05-26

## 2023-05-26 RX ORDER — LIDOCAINE 50 MG/G
OINTMENT TOPICAL ONCE
OUTPATIENT
Start: 2023-05-26 | End: 2023-05-26

## 2023-05-26 RX ADMIN — LIDOCAINE HYDROCHLORIDE: 20 JELLY TOPICAL at 08:50

## 2023-05-26 SDOH — ECONOMIC STABILITY: HOUSING INSECURITY
IN THE LAST 12 MONTHS, WAS THERE A TIME WHEN YOU DID NOT HAVE A STEADY PLACE TO SLEEP OR SLEPT IN A SHELTER (INCLUDING NOW)?: NO

## 2023-05-26 SDOH — ECONOMIC STABILITY: FOOD INSECURITY: WITHIN THE PAST 12 MONTHS, YOU WORRIED THAT YOUR FOOD WOULD RUN OUT BEFORE YOU GOT MONEY TO BUY MORE.: NEVER TRUE

## 2023-05-26 SDOH — ECONOMIC STABILITY: INCOME INSECURITY: HOW HARD IS IT FOR YOU TO PAY FOR THE VERY BASICS LIKE FOOD, HOUSING, MEDICAL CARE, AND HEATING?: NOT HARD AT ALL

## 2023-05-26 SDOH — ECONOMIC STABILITY: FOOD INSECURITY: WITHIN THE PAST 12 MONTHS, THE FOOD YOU BOUGHT JUST DIDN'T LAST AND YOU DIDN'T HAVE MONEY TO GET MORE.: NEVER TRUE

## 2023-05-26 ASSESSMENT — PAIN DESCRIPTION - ORIENTATION: ORIENTATION: RIGHT

## 2023-05-26 ASSESSMENT — PAIN DESCRIPTION - PAIN TYPE: TYPE: ACUTE PAIN

## 2023-05-26 ASSESSMENT — PAIN - FUNCTIONAL ASSESSMENT: PAIN_FUNCTIONAL_ASSESSMENT: PREVENTS OR INTERFERES SOME ACTIVE ACTIVITIES AND ADLS

## 2023-05-26 ASSESSMENT — PAIN SCALES - GENERAL: PAINLEVEL_OUTOF10: 10

## 2023-05-26 ASSESSMENT — PAIN DESCRIPTION - FREQUENCY: FREQUENCY: CONTINUOUS

## 2023-05-26 ASSESSMENT — PAIN DESCRIPTION - DESCRIPTORS: DESCRIPTORS: BURNING;SORE

## 2023-05-26 ASSESSMENT — PAIN DESCRIPTION - LOCATION: LOCATION: LEG

## 2023-05-26 ASSESSMENT — PAIN DESCRIPTION - ONSET: ONSET: ON-GOING

## 2023-05-26 NOTE — PROGRESS NOTES
Post-Procedure Depth (cm) 0.1 cm 05/19/23 1045   Post-Procedure Surface Area (cm^2) 72 cm^2 05/19/23 1045   Post-Procedure Volume (cm^3) 7.2 cm^3 05/19/23 1045   Distance Tunneling (cm) 0 cm 05/26/23 0839   Tunneling Position ___ O'Clock 0 05/26/23 0839   Undermining Starts ___ O'Clock 0 05/26/23 0839   Undermining Ends___ O'Clock 0 05/26/23 0839   Undermining Maxium Distance (cm) 0 05/26/23 0839   Wound Assessment Pink/red;Slough 05/26/23 0839   Drainage Amount Moderate 05/26/23 0839   Drainage Description Serosanguinous; Yellow;Pink 05/26/23 0839   Odor None 05/26/23 0839   Tram-wound Assessment Intact 05/26/23 0839   Margins Undefined edges 05/26/23 0839   Wound Thickness Description not for Pressure Injury Partial thickness 05/26/23 0839   Number of days: 6       Wound 05/19/23 Pretibial Left #2 Left leg pretibial venous (blister with roof) (Active)   Wound Image   05/26/23 0839   Wound Etiology Venous 05/26/23 0839   Dressing Status New dressing applied 05/26/23 0914   Wound Cleansed Soap and water 05/26/23 4366   Dressing/Treatment Xeroform;Gauze dressing/dressing sponge;Roll gauze; Ace wrap 05/26/23 0914   Wound Length (cm) 4.9 cm 05/26/23 0839   Wound Width (cm) 4.8 cm 05/26/23 0839   Wound Depth (cm) 0.1 cm 05/26/23 0839   Wound Surface Area (cm^2) 23.52 cm^2 05/26/23 0839   Change in Wound Size % (l*w) 44.33 05/26/23 0839   Wound Volume (cm^3) 2.352 cm^3 05/26/23 0839   Wound Healing % 44 05/26/23 0839   Post-Procedure Length (cm) 6.5 cm 05/19/23 1045   Post-Procedure Width (cm) 6.5 cm 05/19/23 1045   Post-Procedure Depth (cm) 0.1 cm 05/19/23 1045   Post-Procedure Surface Area (cm^2) 42.25 cm^2 05/19/23 1045   Post-Procedure Volume (cm^3) 4.225 cm^3 05/19/23 1045   Distance Tunneling (cm) 0 cm 05/26/23 0839   Tunneling Position ___ O'Clock 0 05/26/23 0839   Undermining Starts ___ O'Clock 0 05/26/23 0839   Undermining Ends___ O'Clock 0 05/26/23 0839   Undermining Maxium Distance (cm) 0 05/26/23 0839   Wound

## 2023-05-26 NOTE — PLAN OF CARE
Problem: Chronic Conditions and Co-morbidities  Goal: Patient's chronic conditions and co-morbidity symptoms are monitored and maintained or improved  Outcome: Progressing     Problem: Discharge Planning  Goal: Discharge to home or other facility with appropriate resources  Outcome: Progressing     Problem: Pain  Goal: Verbalizes/displays adequate comfort level or baseline comfort level  Outcome: Progressing     Problem: Wound:  Goal: Will show signs of wound healing; wound closure and no evidence of infection  Description: Will show signs of wound healing; wound closure and no evidence of infection  Outcome: Progressing     Problem: Venous:  Goal: Signs of wound healing will improve  Description: Signs of wound healing will improve  Outcome: Progressing     Problem: Compression therapy:  Goal: Will be free from complications associated with compression therapy  Description: Will be free from complications associated with compression therapy  Outcome: Progressing     Problem: Weight control:  Goal: Ability to maintain an optimal weight for height and age will be supported  Description: Ability to maintain an optimal weight for height and age will be supported  Outcome: Progressing     Problem: Falls - Risk of:  Goal: Will remain free from falls  Description: Will remain free from falls  Outcome: Progressing     Problem: Blood Glucose:  Goal: Ability to maintain appropriate glucose levels will improve  Description: Ability to maintain appropriate glucose levels will improve  Outcome: Progressing

## 2023-05-26 NOTE — DISCHARGE INSTRUCTIONS
[address] [facility]   Physician Orders and Discharge Instructions  [address] [place] [address]  Telephone: [phone]    NAME:  Yuridia Lucia OF BIRTH:  1950  MEDICAL RECORD NUMBER:  [id number]  DATE:  5/26/2023    Discharge condition: Stable    Discharge to: Home    Left via:Private automobile    Accompanied by:  sibling    ECF/HHA: [facility]     Dressing Orders:   Right and left ankle wound: Soap and water wash. Apply a double layer of Xeroform (the yellow sticky dressing) over the wound bed daily. Dry gauze and rolled gauze. Ace wrap from toes to the knee daily. Treatment Orders:   Protein rich diet (unless restricted by your physician); Multivitamin daily; Elevate legs above the level of your heart when sitting 3-4 times daily for at least one hour each time, avoid standing for long periods of time. [address] [place] [address],[address] follow up visit ____2 weeks with Abbey_________________________  (Please note your next appointment above and if you are unable to keep, kindly give a 24 hour notice. Thank you.)          If you experience any of the following, please call the [address] during business hours:    * Increase in Pain  * Temperature over 101  * Increase in drainage from your wound  * Drainage with a foul odor  * Bleeding  * Increase in swelling  * Need for compression bandage changes due to slippage, breakthrough drainage. If you need medical attention outside of the business hours of the [address] please contact your PCP or go to the nearest emergency room.

## 2023-05-30 ASSESSMENT — ENCOUNTER SYMPTOMS
CONSTIPATION: 0
VOICE CHANGE: 0
SINUS PRESSURE: 0
CHEST TIGHTNESS: 0
STRIDOR: 0
SINUS PAIN: 0
NAUSEA: 0
EYE DISCHARGE: 0
COUGH: 0
CHOKING: 0
ABDOMINAL PAIN: 0
EYE ITCHING: 0
TROUBLE SWALLOWING: 0
SORE THROAT: 0
SHORTNESS OF BREATH: 0
COLOR CHANGE: 0
RHINORRHEA: 0
ABDOMINAL DISTENTION: 0
WHEEZING: 0
DIARRHEA: 0
BLOOD IN STOOL: 0
VOMITING: 0

## 2023-05-31 ENCOUNTER — TELEPHONE (OUTPATIENT)
Dept: WOUND CARE | Age: 73
End: 2023-05-31

## 2023-05-31 NOTE — PROGRESS NOTES
Returned patient call re:has not received supplies. Noted on [facility] report that supplies have been shipped to patient. Patient stated she has received a package in the mail and believes this is the supplies. To call back is she has any further questions.

## 2023-06-06 NOTE — DISCHARGE INSTRUCTIONS
[address] [facility]   Physician Orders and Discharge Instructions  [address]  Telephone: [phone]    NAME:  Indra Simmons OF BIRTH:  1950  MEDICAL RECORD NUMBER:  [id number]  DATE:  6/8/23    Discharge condition: Stable    Discharge to: Home    Left via:Private automobile    Accompanied by:  family    ECF/H[facility]: [facility]     Dressing Orders:   Right and left ankle wound: Soap and water wash. Apply a double layer of Xeroform (the yellow sticky dressing) over the wound bed daily. Dry gauze and rolled gauze. Ace wrap from toes to the knee daily. Treatment Orders:   Protein rich diet (unless restricted by your physician); Multivitamin daily; Elevate legs above the level of your heart when sitting 3-4 times daily for at least one hour each time, avoid standing for long periods of time. [address] [place] [address],[address] follow up visit _________2 weeks____________________  (Please note your next appointment above and if you are unable to keep, kindly give a 24 hour notice. Thank you.)          If you experience any of the following, please call the [facility] during business hours:    * Increase in Pain  * Temperature over 101  * Increase in drainage from your wound  * Drainage with a foul odor  * Bleeding  * Increase in swelling  * Need for compression bandage changes due to slippage, breakthrough drainage. If you need medical attention outside of the business hours of the [facility] please contact your PCP or go to the nearest emergency room.

## 2023-06-08 ENCOUNTER — HOSPITAL ENCOUNTER (OUTPATIENT)
Dept: WOUND CARE | Age: 73
Discharge: HOME | End: 2023-06-08
Payer: MEDICARE

## 2023-06-08 VITALS
RESPIRATION RATE: 18 BRPM | DIASTOLIC BLOOD PRESSURE: 79 MMHG | HEART RATE: 65 BPM | TEMPERATURE: 97.4 F | SYSTOLIC BLOOD PRESSURE: 167 MMHG

## 2023-06-08 DIAGNOSIS — L97.922 ULCER OF LEFT LOWER LEG, WITH FAT LAYER EXPOSED (HCC): ICD-10-CM

## 2023-06-08 DIAGNOSIS — L97.912 SKIN ULCER OF RIGHT LOWER LEG WITH FAT LAYER EXPOSED (HCC): ICD-10-CM

## 2023-06-08 DIAGNOSIS — I89.0 LYMPHEDEMA: Primary | ICD-10-CM

## 2023-06-08 PROCEDURE — 99213 OFFICE O/P EST LOW 20 MIN: CPT

## 2023-06-08 PROCEDURE — 99212 OFFICE O/P EST SF 10 MIN: CPT | Performed by: NURSE PRACTITIONER

## 2023-06-08 PROCEDURE — 6370000000 HC RX 637 (ALT 250 FOR IP): Performed by: NURSE PRACTITIONER

## 2023-06-08 RX ORDER — LIDOCAINE HYDROCHLORIDE 20 MG/ML
JELLY TOPICAL ONCE
OUTPATIENT
Start: 2023-06-08 | End: 2023-06-08

## 2023-06-08 RX ORDER — BETAMETHASONE DIPROPIONATE 0.05 %
OINTMENT (GRAM) TOPICAL ONCE
OUTPATIENT
Start: 2023-06-08 | End: 2023-06-08

## 2023-06-08 RX ORDER — IBUPROFEN 200 MG
TABLET ORAL ONCE
OUTPATIENT
Start: 2023-06-08 | End: 2023-06-08

## 2023-06-08 RX ORDER — LIDOCAINE 40 MG/G
CREAM TOPICAL ONCE
OUTPATIENT
Start: 2023-06-08 | End: 2023-06-08

## 2023-06-08 RX ORDER — LIDOCAINE HYDROCHLORIDE 20 MG/ML
JELLY TOPICAL ONCE
Status: COMPLETED | OUTPATIENT
Start: 2023-06-08 | End: 2023-06-08

## 2023-06-08 RX ORDER — BACITRACIN ZINC AND POLYMYXIN B SULFATE 500; 1000 [USP'U]/G; [USP'U]/G
OINTMENT TOPICAL ONCE
OUTPATIENT
Start: 2023-06-08 | End: 2023-06-08

## 2023-06-08 RX ORDER — CLOBETASOL PROPIONATE 0.5 MG/G
OINTMENT TOPICAL ONCE
OUTPATIENT
Start: 2023-06-08 | End: 2023-06-08

## 2023-06-08 RX ORDER — TRAMADOL HYDROCHLORIDE 50 MG/1
50 TABLET ORAL EVERY 6 HOURS PRN
COMMUNITY

## 2023-06-08 RX ORDER — GENTAMICIN SULFATE 1 MG/G
OINTMENT TOPICAL ONCE
OUTPATIENT
Start: 2023-06-08 | End: 2023-06-08

## 2023-06-08 RX ORDER — SODIUM CHLOR/HYPOCHLOROUS ACID 0.033 %
SOLUTION, IRRIGATION IRRIGATION ONCE
OUTPATIENT
Start: 2023-06-08 | End: 2023-06-08

## 2023-06-08 RX ORDER — LIDOCAINE 50 MG/G
OINTMENT TOPICAL ONCE
OUTPATIENT
Start: 2023-06-08 | End: 2023-06-08

## 2023-06-08 RX ORDER — LIDOCAINE HYDROCHLORIDE 40 MG/ML
SOLUTION TOPICAL ONCE
OUTPATIENT
Start: 2023-06-08 | End: 2023-06-08

## 2023-06-08 RX ORDER — GINSENG 100 MG
CAPSULE ORAL ONCE
OUTPATIENT
Start: 2023-06-08 | End: 2023-06-08

## 2023-06-08 RX ADMIN — LIDOCAINE HYDROCHLORIDE: 20 JELLY TOPICAL at 09:09

## 2023-06-08 ASSESSMENT — PAIN DESCRIPTION - FREQUENCY: FREQUENCY: CONTINUOUS

## 2023-06-08 ASSESSMENT — PAIN DESCRIPTION - ONSET: ONSET: ON-GOING

## 2023-06-08 ASSESSMENT — PAIN DESCRIPTION - LOCATION: LOCATION: LEG

## 2023-06-08 ASSESSMENT — PAIN SCALES - GENERAL: PAINLEVEL_OUTOF10: 9

## 2023-06-08 ASSESSMENT — PAIN DESCRIPTION - DESCRIPTORS: DESCRIPTORS: SORE;SHARP

## 2023-06-08 ASSESSMENT — PAIN DESCRIPTION - PAIN TYPE: TYPE: ACUTE PAIN

## 2023-06-08 ASSESSMENT — PAIN DESCRIPTION - ORIENTATION: ORIENTATION: RIGHT;LEFT

## 2023-06-08 ASSESSMENT — PAIN - FUNCTIONAL ASSESSMENT: PAIN_FUNCTIONAL_ASSESSMENT: PREVENTS OR INTERFERES SOME ACTIVE ACTIVITIES AND ADLS

## 2023-06-08 NOTE — PROGRESS NOTES
[address]   Progress Note and Procedure Note      [facility] RECORD NUMBER:  [id number]  AGE: 74 y.o. GENDER: female  : 1950  EPISODE DATE:  2023    Subjective:     Chief Complaint   Patient presents with    Wound Check     Bilateral leg wounds         HISTORY of PRESENT ILLNESS HPI     Tina Alexander is a 74 y.o. female who presents today for wound/ulcer evaluation.    History of Wound Context: bilateral leg wound follow up/eval and treat    Ulcer Identification:  Ulcer Type: venous  Contributing Factors: lymphedema    Wound: N/A        PAST MEDICAL HISTORY        Diagnosis Date    Acute sinusitis     CHAPARRO (acute kidney injury) ([address] 2018    CHAP[place]O (acute kidney injury) ([address] 2018    Anemia     Bleeding ulcer     hx of; cauterized by dr. resendiz    Breast cancer [facility])     Breast lump     CAD (coronary artery disease)     sees dr. Marta Duarte ulcer [facility])     Cataract     Chronic kidney disease, stage 3b ([address] 2021    COVID-19 2021    fatigue    Dermatitis     Diabetes mellitus ([address]     Diabetic nephropathy ([address]     Diabetic retinopathy ([facility]     Glaucoma     Gout     Hemorrhoids     Hx of TB skin testing     positive    Hyperlipidemia     Hypertension     Long term current use of aromatase inhibitor 2022    Osteopenia of left hip 2022    Palliative care patient 2021    Type 2 diabetes mellitus ([address] 2018    Vitamin D deficiency        PAST SURGICAL HISTORY    Past Surgical History:   Procedure Laterality Date    BREAST LUMPECTOMY Right 10/29/2021    RIGHT LUMPECTOMY, SNB, PREOP ULTRASOUND, INTRAOP ULTRASOUND GUIDED NL, BIOSORB, PEC BLOCK, MARGIN PROBE, FLAPS, IORT performed by Diane Scott MD at [address]   02/15/2022    CARDIAC SURGERY      CARPAL TUNNEL RELEASE Right 9/3/2020    RIGHT CARPAL TUNNEL RELEASE performed by Dorota Mae MD at [facility]

## 2023-06-08 NOTE — PLAN OF CARE
Problem: Chronic Conditions and Co-morbidities  Goal: Patient's chronic conditions and co-morbidity symptoms are monitored and maintained or improved  Outcome: Progressing     Problem: Discharge Planning  Goal: Discharge to home or other facility with appropriate resources  Outcome: Progressing     Problem: Safety - Adult  Goal: Free from fall injury  Outcome: Progressing     Problem: Pain  Goal: Verbalizes/displays adequate comfort level or baseline comfort level  Outcome: Progressing     Problem: Wound:  Goal: Will show signs of wound healing; wound closure and no evidence of infection  Description: Will show signs of wound healing; wound closure and no evidence of infection  Outcome: Progressing     Problem: Venous:  Goal: Signs of wound healing will improve  Description: Signs of wound healing will improve  Outcome: Progressing     Problem: Compression therapy:  Goal: Will be free from complications associated with compression therapy  Description: Will be free from complications associated with compression therapy  Outcome: Progressing     Problem: Weight control:  Goal: Ability to maintain an optimal weight for height and age will be supported  Description: Ability to maintain an optimal weight for height and age will be supported  Outcome: Progressing     Problem: Falls - Risk of:  Goal: Will remain free from falls  Description: Will remain free from falls  Outcome: Progressing     Problem: Blood Glucose:  Goal: Ability to maintain appropriate glucose levels will improve  Description: Ability to maintain appropriate glucose levels will improve  Outcome: Progressing

## 2023-06-09 DIAGNOSIS — M85.80 OSTEOPENIA, UNSPECIFIED LOCATION: ICD-10-CM

## 2023-06-09 RX ORDER — ALENDRONATE SODIUM 70 MG/1
TABLET ORAL
Qty: 12 TABLET | Refills: 3 | Status: SHIPPED | OUTPATIENT
Start: 2023-06-09

## 2023-06-19 NOTE — DISCHARGE INSTRUCTIONS
[address] [facility]   Physician Orders and Discharge Instructions  [address] [facility] [place]   Telephone: [phone]    NAME:  Patrice Sanders OF BIRTH:  1950  MEDICAL RECORD NUMBER:  [id number]  DATE:  6/19/2023    Discharge condition: Stable    Discharge to: Home    Left via:Private automobile    Accompanied by:  family member    ECF/H[facility]: [facility]/[place]     Dressing Orders:   Right and left ankle wound: Soap and water wash. Apply a double layer of Xeroform (the yellow sticky dressing) over the wound bed daily. Dry gauze and rolled gauze. Ace wrap from toes to the knee daily. Treatment Orders:   Protein rich diet (unless restricted by your physician); Multivitamin daily; Elevate legs above the level of your heart when sitting 3-4 times daily for at least one hour each time, avoid standing for long periods of time. 1) Moisturize your  both lower legs with a good thick cream like Eucerin Cream, Aquaphor or Cocoa Butter (in a jar) at least twice daily. Dry skin has a greater likelihood of breaking down if it is stretched by swelling. 2) Elevate legs when sitting, above the heart preferably. Avoid sleeping in a recliner. 3) Avoid standing for long periods of time. 4) Walking is good. 5)  Wear knee high graduated compression stockings 20-30 mmHg mmHg on both legs. (This is what prevents these ulcerations)  Put these on in the morning no more than 20 minutes after rising and remove before going to bed. You can hand wash and hang to dry overnight. We recommend having two pairs to rotate washing and wearing. 6) Be very careful with your legs. Often these are caused by a bump or scrape that doesn't heal well. Healing is prevented or slowed by your Venous insufficiency. Compression and keeping the swelling out of your legs is the key to the condition of your skin.     7) You

## 2023-06-22 ENCOUNTER — HOSPITAL ENCOUNTER (OUTPATIENT)
Dept: WOUND CARE | Age: 73
Discharge: HOME | End: 2023-06-22
Payer: MEDICARE

## 2023-06-22 VITALS
HEART RATE: 66 BPM | DIASTOLIC BLOOD PRESSURE: 78 MMHG | RESPIRATION RATE: 18 BRPM | SYSTOLIC BLOOD PRESSURE: 151 MMHG | TEMPERATURE: 97.5 F

## 2023-06-22 DIAGNOSIS — I89.0 LYMPHEDEMA: Primary | ICD-10-CM

## 2023-06-22 DIAGNOSIS — L97.912 SKIN ULCER OF RIGHT LOWER LEG WITH FAT LAYER EXPOSED (HCC): ICD-10-CM

## 2023-06-22 DIAGNOSIS — L97.922 ULCER OF LEFT LOWER LEG, WITH FAT LAYER EXPOSED (HCC): ICD-10-CM

## 2023-06-22 PROCEDURE — 97597 DBRDMT OPN WND 1ST 20 CM/<: CPT

## 2023-06-22 RX ORDER — LIDOCAINE HYDROCHLORIDE 20 MG/ML
JELLY TOPICAL ONCE
OUTPATIENT
Start: 2023-06-22 | End: 2023-06-22

## 2023-06-22 RX ORDER — GENTAMICIN SULFATE 1 MG/G
OINTMENT TOPICAL ONCE
OUTPATIENT
Start: 2023-06-22 | End: 2023-06-22

## 2023-06-22 RX ORDER — SODIUM CHLOR/HYPOCHLOROUS ACID 0.033 %
SOLUTION, IRRIGATION IRRIGATION ONCE
OUTPATIENT
Start: 2023-06-22 | End: 2023-06-22

## 2023-06-22 RX ORDER — LIDOCAINE 50 MG/G
OINTMENT TOPICAL ONCE
OUTPATIENT
Start: 2023-06-22 | End: 2023-06-22

## 2023-06-22 RX ORDER — GINSENG 100 MG
CAPSULE ORAL ONCE
OUTPATIENT
Start: 2023-06-22 | End: 2023-06-22

## 2023-06-22 RX ORDER — BETAMETHASONE DIPROPIONATE 0.05 %
OINTMENT (GRAM) TOPICAL ONCE
OUTPATIENT
Start: 2023-06-22 | End: 2023-06-22

## 2023-06-22 RX ORDER — CLOBETASOL PROPIONATE 0.5 MG/G
OINTMENT TOPICAL ONCE
OUTPATIENT
Start: 2023-06-22 | End: 2023-06-22

## 2023-06-22 RX ORDER — LIDOCAINE 40 MG/G
CREAM TOPICAL ONCE
OUTPATIENT
Start: 2023-06-22 | End: 2023-06-22

## 2023-06-22 RX ORDER — IBUPROFEN 200 MG
TABLET ORAL ONCE
OUTPATIENT
Start: 2023-06-22 | End: 2023-06-22

## 2023-06-22 RX ORDER — LIDOCAINE HYDROCHLORIDE 40 MG/ML
SOLUTION TOPICAL ONCE
OUTPATIENT
Start: 2023-06-22 | End: 2023-06-22

## 2023-06-22 RX ORDER — BACITRACIN ZINC AND POLYMYXIN B SULFATE 500; 1000 [USP'U]/G; [USP'U]/G
OINTMENT TOPICAL ONCE
OUTPATIENT
Start: 2023-06-22 | End: 2023-06-22

## 2023-06-22 NOTE — PROGRESS NOTES
MULTIPLE VITAMIN PO Take 1 tablet by mouth daily      Calcium Carbonate-Vitamin D (CALCIUM 500/D PO) Take 1 tablet by mouth 2 times daily      vitamin B-12 (CYANOCOBALAMIN) 1000 MCG tablet Take 1 tablet by mouth daily       No current facility-administered medications on file prior to encounter. REVIEW OF SYSTEMS    Pertinent items are noted in HPI.     Objective:      BP (!) 151/78   Pulse 66   Temp 97.5 °F (36.4 °C) (Temporal)   Resp 18     Wt Readings from Last 3 Encounters:   05/26/23 219 lb (99.3 kg)   05/26/23 225 lb 6.4 oz (102.2 kg)   05/19/23 219 lb (99.3 kg)       PHYSICAL EXAM    General Appearance: alert and oriented to person, place and time, well developed and well- nourished, in no acute distress  Skin: warm and dry, no rash or erythema  Head: normocephalic and atraumatic  Eyes: pupils equal, round, and reactive to light, extraocular eye movements intact, conjunctivae normal  ENT: tympanic membrane, external ear and ear canal normal bilaterally, nose without deformity, nasal mucosa and turbinates normal without polyps  Neck: supple and non-tender without mass, no thyromegaly or thyroid nodules, no cervical lymphadenopathy  Pulmonary/Chest: clear to auscultation bilaterally- no wheezes, rales or rhonchi, normal air movement, no respiratory distress  Extremities: no cyanosis, clubbing or edema  Musculoskeletal: normal range of motion, no joint swelling, deformity or tenderness  Neurologic: reflexes normal and symmetric, no cranial nerve deficit, gait, coordination and speech normal      Assessment:      Patient Active Problem List   Diagnosis Code    Hypertension I10    Hyperlipidemia E78.5    Ocular hypertension H40.059    CAD (coronary artery disease) I25.10    Type 2 diabetes mellitus (HCC) E11.9    Diabetes mellitus (HCC) E11.9    Acute cystitis without hematuria N30.00    Carpal tunnel syndrome G56.00    Chest pain R07.9    History of coronary artery bypass graft Z95.1    Achilles

## 2023-06-26 ENCOUNTER — TELEPHONE (OUTPATIENT)
Dept: WOUND CARE | Age: 73
End: 2023-06-26

## 2023-06-27 ENCOUNTER — HOSPITAL ENCOUNTER (OUTPATIENT)
Dept: WOUND CARE | Age: 73
Discharge: HOME | End: 2023-06-27
Payer: MEDICARE

## 2023-06-27 VITALS
SYSTOLIC BLOOD PRESSURE: 163 MMHG | WEIGHT: 219 LBS | HEART RATE: 66 BPM | TEMPERATURE: 97 F | RESPIRATION RATE: 18 BRPM | HEIGHT: 65 IN | BODY MASS INDEX: 36.49 KG/M2 | DIASTOLIC BLOOD PRESSURE: 75 MMHG

## 2023-06-27 DIAGNOSIS — L97.912 SKIN ULCER OF RIGHT LOWER LEG WITH FAT LAYER EXPOSED (HCC): ICD-10-CM

## 2023-06-27 DIAGNOSIS — I89.0 LYMPHEDEMA: Primary | ICD-10-CM

## 2023-06-27 DIAGNOSIS — L97.922 ULCER OF LEFT LOWER LEG, WITH FAT LAYER EXPOSED (HCC): ICD-10-CM

## 2023-06-27 PROCEDURE — 97597 DBRDMT OPN WND 1ST 20 CM/<: CPT | Performed by: SURGERY

## 2023-06-27 PROCEDURE — 6370000000 HC RX 637 (ALT 250 FOR IP): Performed by: NURSE PRACTITIONER

## 2023-06-27 PROCEDURE — 97597 DBRDMT OPN WND 1ST 20 CM/<: CPT

## 2023-06-27 RX ORDER — GINSENG 100 MG
CAPSULE ORAL ONCE
OUTPATIENT
Start: 2023-06-27 | End: 2023-06-27

## 2023-06-27 RX ORDER — LIDOCAINE 50 MG/G
OINTMENT TOPICAL ONCE
OUTPATIENT
Start: 2023-06-27 | End: 2023-06-27

## 2023-06-27 RX ORDER — CLOBETASOL PROPIONATE 0.5 MG/G
OINTMENT TOPICAL ONCE
OUTPATIENT
Start: 2023-06-27 | End: 2023-06-27

## 2023-06-27 RX ORDER — SODIUM CHLOR/HYPOCHLOROUS ACID 0.033 %
SOLUTION, IRRIGATION IRRIGATION ONCE
OUTPATIENT
Start: 2023-06-27 | End: 2023-06-27

## 2023-06-27 RX ORDER — LIDOCAINE HYDROCHLORIDE 40 MG/ML
SOLUTION TOPICAL ONCE
OUTPATIENT
Start: 2023-06-27 | End: 2023-06-27

## 2023-06-27 RX ORDER — LIDOCAINE 40 MG/G
CREAM TOPICAL ONCE
OUTPATIENT
Start: 2023-06-27 | End: 2023-06-27

## 2023-06-27 RX ORDER — LIDOCAINE HYDROCHLORIDE 20 MG/ML
JELLY TOPICAL ONCE
Status: COMPLETED | OUTPATIENT
Start: 2023-06-27 | End: 2023-06-27

## 2023-06-27 RX ORDER — GENTAMICIN SULFATE 1 MG/G
OINTMENT TOPICAL ONCE
OUTPATIENT
Start: 2023-06-27 | End: 2023-06-27

## 2023-06-27 RX ORDER — BACITRACIN ZINC AND POLYMYXIN B SULFATE 500; 1000 [USP'U]/G; [USP'U]/G
OINTMENT TOPICAL ONCE
OUTPATIENT
Start: 2023-06-27 | End: 2023-06-27

## 2023-06-27 RX ORDER — BETAMETHASONE DIPROPIONATE 0.05 %
OINTMENT (GRAM) TOPICAL ONCE
OUTPATIENT
Start: 2023-06-27 | End: 2023-06-27

## 2023-06-27 RX ORDER — LIDOCAINE HYDROCHLORIDE 20 MG/ML
JELLY TOPICAL ONCE
OUTPATIENT
Start: 2023-06-27 | End: 2023-06-27

## 2023-06-27 RX ORDER — IBUPROFEN 200 MG
TABLET ORAL ONCE
OUTPATIENT
Start: 2023-06-27 | End: 2023-06-27

## 2023-06-27 RX ADMIN — LIDOCAINE HYDROCHLORIDE: 20 JELLY TOPICAL at 15:53

## 2023-06-28 DIAGNOSIS — D50.8 IRON DEFICIENCY ANEMIA SECONDARY TO INADEQUATE DIETARY IRON INTAKE: Primary | ICD-10-CM

## 2023-06-29 ENCOUNTER — HOSPITAL ENCOUNTER (OUTPATIENT)
Dept: INFUSION THERAPY | Age: 73
Discharge: HOME | End: 2023-06-29
Payer: MEDICARE

## 2023-06-29 ENCOUNTER — OFFICE VISIT (OUTPATIENT)
Dept: HEMATOLOGY | Age: 73
End: 2023-06-29
Payer: MEDICARE

## 2023-06-29 VITALS
WEIGHT: 224.2 LBS | DIASTOLIC BLOOD PRESSURE: 80 MMHG | BODY MASS INDEX: 37.36 KG/M2 | HEART RATE: 71 BPM | SYSTOLIC BLOOD PRESSURE: 138 MMHG | HEIGHT: 65 IN | OXYGEN SATURATION: 96 %

## 2023-06-29 DIAGNOSIS — C50.811 MALIGNANT NEOPLASM OF OVERLAPPING SITES OF RIGHT BREAST IN FEMALE, ESTROGEN RECEPTOR POSITIVE (HCC): Primary | ICD-10-CM

## 2023-06-29 DIAGNOSIS — Z79.811 LONG TERM CURRENT USE OF AROMATASE INHIBITOR: ICD-10-CM

## 2023-06-29 DIAGNOSIS — D50.8 IRON DEFICIENCY ANEMIA SECONDARY TO INADEQUATE DIETARY IRON INTAKE: ICD-10-CM

## 2023-06-29 DIAGNOSIS — M85.89 OSTEOPENIA OF MULTIPLE SITES: ICD-10-CM

## 2023-06-29 DIAGNOSIS — Z78.0 POSTMENOPAUSAL STATE: ICD-10-CM

## 2023-06-29 DIAGNOSIS — N18.4 CKD (CHRONIC KIDNEY DISEASE) STAGE 4, GFR 15-29 ML/MIN (HCC): ICD-10-CM

## 2023-06-29 DIAGNOSIS — B37.9 YEAST INFECTION: ICD-10-CM

## 2023-06-29 DIAGNOSIS — Z17.0 MALIGNANT NEOPLASM OF OVERLAPPING SITES OF RIGHT BREAST IN FEMALE, ESTROGEN RECEPTOR POSITIVE (HCC): Primary | ICD-10-CM

## 2023-06-29 LAB
ALBUMIN SERPL-MCNC: 3.6 G/DL (ref 3.5–5.2)
ALP SERPL-CCNC: 52 U/L (ref 35–104)
ALT SERPL-CCNC: 14 U/L (ref 9–52)
ANION GAP SERPL CALCULATED.3IONS-SCNC: 11 MMOL/L (ref 7–19)
AST SERPL-CCNC: 28 U/L (ref 14–36)
BASOPHILS # BLD: 0.03 K/UL (ref 0.01–0.08)
BASOPHILS NFR BLD: 0.6 % (ref 0.1–1.2)
BILIRUB SERPL-MCNC: 0.6 MG/DL (ref 0.2–1.3)
BUN SERPL-MCNC: 20 MG/DL (ref 7–17)
CALCIUM SERPL-MCNC: 8.1 MG/DL (ref 8.4–10.2)
CHLORIDE SERPL-SCNC: 102 MMOL/L (ref 98–111)
CO2 SERPL-SCNC: 26 MMOL/L (ref 22–29)
CREAT SERPL-MCNC: 2.2 MG/DL (ref 0.5–1)
EOSINOPHIL # BLD: 0.1 K/UL (ref 0.04–0.54)
EOSINOPHIL NFR BLD: 2 % (ref 0.7–7)
ERYTHROCYTE [DISTWIDTH] IN BLOOD BY AUTOMATED COUNT: 15.3 % (ref 11.7–14.4)
FERRITIN SERPL-MCNC: 41.6 NG/ML (ref 13–150)
GLOBULIN: 3 G/DL
GLUCOSE SERPL-MCNC: 73 MG/DL (ref 74–106)
HCT VFR BLD AUTO: 31.6 % (ref 34.1–44.9)
HGB BLD-MCNC: 10.1 G/DL (ref 11.2–15.7)
IRON SATN MFR SERPL: 27 % (ref 14–50)
IRON SERPL-MCNC: 86 UG/DL (ref 37–145)
LYMPHOCYTES # BLD: 1.34 K/UL (ref 1.18–3.74)
LYMPHOCYTES NFR BLD: 27.4 % (ref 19.3–53.1)
MCH RBC QN AUTO: 33.1 PG (ref 25.6–32.2)
MCHC RBC AUTO-ENTMCNC: 32 G/DL (ref 32.3–35.5)
MCV RBC AUTO: 103.6 FL (ref 79.4–94.8)
MONOCYTES # BLD: 0.36 K/UL (ref 0.24–0.82)
MONOCYTES NFR BLD: 7.4 % (ref 4.7–12.5)
NEUTROPHILS # BLD: 3.05 K/UL (ref 1.56–6.13)
NEUTS SEG NFR BLD: 62.4 % (ref 34–71.1)
PLATELET # BLD AUTO: 172 K/UL (ref 182–369)
PMV BLD AUTO: 10.8 FL (ref 7.4–10.4)
POTASSIUM SERPL-SCNC: 4.2 MMOL/L (ref 3.5–5.1)
PROT SERPL-MCNC: 6.7 G/DL (ref 6.3–8.2)
RBC # BLD AUTO: 3.05 M/UL (ref 3.93–5.22)
SODIUM SERPL-SCNC: 139 MMOL/L (ref 137–145)
TIBC SERPL-MCNC: 321 UG/DL (ref 250–400)
WBC # BLD AUTO: 4.89 K/UL (ref 3.98–10.04)

## 2023-06-29 PROCEDURE — 1036F TOBACCO NON-USER: CPT | Performed by: NURSE PRACTITIONER

## 2023-06-29 PROCEDURE — 1090F PRES/ABSN URINE INCON ASSESS: CPT | Performed by: NURSE PRACTITIONER

## 2023-06-29 PROCEDURE — G8427 DOCREV CUR MEDS BY ELIG CLIN: HCPCS | Performed by: NURSE PRACTITIONER

## 2023-06-29 PROCEDURE — 1123F ACP DISCUSS/DSCN MKR DOCD: CPT | Performed by: NURSE PRACTITIONER

## 2023-06-29 PROCEDURE — 99212 OFFICE O/P EST SF 10 MIN: CPT

## 2023-06-29 PROCEDURE — G8399 PT W/DXA RESULTS DOCUMENT: HCPCS | Performed by: NURSE PRACTITIONER

## 2023-06-29 PROCEDURE — 3017F COLORECTAL CA SCREEN DOC REV: CPT | Performed by: NURSE PRACTITIONER

## 2023-06-29 PROCEDURE — 80053 COMPREHEN METABOLIC PANEL: CPT

## 2023-06-29 PROCEDURE — 36415 COLL VENOUS BLD VENIPUNCTURE: CPT

## 2023-06-29 PROCEDURE — 99214 OFFICE O/P EST MOD 30 MIN: CPT | Performed by: NURSE PRACTITIONER

## 2023-06-29 PROCEDURE — 3078F DIAST BP <80 MM HG: CPT | Performed by: NURSE PRACTITIONER

## 2023-06-29 PROCEDURE — 3074F SYST BP LT 130 MM HG: CPT | Performed by: NURSE PRACTITIONER

## 2023-06-29 PROCEDURE — 85025 COMPLETE CBC W/AUTO DIFF WBC: CPT

## 2023-06-29 PROCEDURE — G8417 CALC BMI ABV UP PARAM F/U: HCPCS | Performed by: NURSE PRACTITIONER

## 2023-06-29 RX ORDER — NYSTATIN 100000 [USP'U]/G
POWDER TOPICAL
Qty: 30 G | Refills: 1 | Status: SHIPPED | OUTPATIENT
Start: 2023-06-29

## 2023-07-03 ENCOUNTER — TELEPHONE (OUTPATIENT)
Dept: INTERNAL MEDICINE | Age: 73
End: 2023-07-03

## 2023-07-03 RX ORDER — AMOXICILLIN AND CLAVULANATE POTASSIUM 875; 125 MG/1; MG/1
1 TABLET, FILM COATED ORAL 2 TIMES DAILY
Qty: 20 TABLET | Refills: 0 | Status: SHIPPED | OUTPATIENT
Start: 2023-07-03 | End: 2023-07-13

## 2023-07-03 NOTE — TELEPHONE ENCOUNTER
S/w pt, states her sinuses are bothering her and would like something sent in. She says she does not use anything OTC because of her heart condition. States she is allergic to Prednisone and Azithromycin.

## 2023-07-04 ASSESSMENT — ENCOUNTER SYMPTOMS
EYE REDNESS: 0
BLOOD IN STOOL: 0
COUGH: 0
SHORTNESS OF BREATH: 0
GASTROINTESTINAL NEGATIVE: 1
EYES NEGATIVE: 1
VOMITING: 0
CONSTIPATION: 0
ABDOMINAL PAIN: 0
NAUSEA: 0
SORE THROAT: 0
RESPIRATORY NEGATIVE: 1
EYE PAIN: 0
EYE DISCHARGE: 0
WHEEZING: 0
DIARRHEA: 0
BACK PAIN: 0

## 2023-07-05 NOTE — DISCHARGE INSTRUCTIONS
[address] and Hyperbaric Oxygen Therapy   Physician Orders and Discharge Instructions  18[address] [facility],[address] [facility]1 [facility], [address]  Telephone: [phone]    NAME:  Ruben Valderrama OF BIRTH:  1950  MEDICAL RECORD NUMBER:  [id number]  DATE:  7/5/2023    Discharge condition: Stable    Discharge to: Home    Left via:Private automobile    Accompanied by:  family    ECF/[facility]: [facility]      Dressing Orders:  Location of Wound:  Left Lower Leg and Right Lower Leg    Wash with soap and water. Apply xeroform gauze to wound bed. Apply Calamine Coflex Unnaboot toes to knee. Change once weekly . Compression Therapy is an important part of your program of care. Compression makes it easier for your body to pump the blood from your legs back to your heart, helping alleviate a condition called chronic venous insufficiency. Chronic venous insufficiency is an underlying cause of your injury, and managing it properly will help you to heal.      Keep the bandage in place at all times unless you experience foot pain or numbness or coolness of the toes. Do not attempt to remove and reapply the bandage system. Give it time. Your bandage may feel tight at first. This is normal. Your bandage will become more comfortable as swelling goes down. Be active, as suggested by your healthcare provider. Daily walks or mild exercise encourages better blood flow to aid healing. Put your feet up when sitting for long periods of time. Keep the bandage dry. Wet compression bandages are more likely to slip down and/or cause damage to good skin. PRECAUTIONS For your safety, compression therapy should be used under the supervision of a healthcare professional. Contact your care provider if you experience any of the following:  Pain that does not go away with elevation. Discoloration or numbness of the toes.   The bandage slipping out of place  Fluid

## 2023-07-06 ENCOUNTER — HOSPITAL ENCOUNTER (OUTPATIENT)
Dept: WOUND CARE | Age: 73
Discharge: HOME | End: 2023-07-06
Payer: MEDICARE

## 2023-07-06 VITALS
WEIGHT: 224 LBS | HEART RATE: 66 BPM | TEMPERATURE: 97.8 F | RESPIRATION RATE: 18 BRPM | BODY MASS INDEX: 37.32 KG/M2 | HEIGHT: 65 IN | SYSTOLIC BLOOD PRESSURE: 119 MMHG | DIASTOLIC BLOOD PRESSURE: 63 MMHG

## 2023-07-06 DIAGNOSIS — I89.0 LYMPHEDEMA: Primary | ICD-10-CM

## 2023-07-06 DIAGNOSIS — L97.922 ULCER OF LEFT LOWER LEG, WITH FAT LAYER EXPOSED (HCC): Chronic | ICD-10-CM

## 2023-07-06 DIAGNOSIS — L97.912 SKIN ULCER OF RIGHT LOWER LEG WITH FAT LAYER EXPOSED (HCC): ICD-10-CM

## 2023-07-06 PROCEDURE — 6370000000 HC RX 637 (ALT 250 FOR IP): Performed by: NURSE PRACTITIONER

## 2023-07-06 PROCEDURE — 29580 STRAPPING UNNA BOOT: CPT

## 2023-07-06 PROCEDURE — 99212 OFFICE O/P EST SF 10 MIN: CPT | Performed by: NURSE PRACTITIONER

## 2023-07-06 RX ORDER — IBUPROFEN 200 MG
TABLET ORAL ONCE
OUTPATIENT
Start: 2023-07-06 | End: 2023-07-06

## 2023-07-06 RX ORDER — BACITRACIN ZINC AND POLYMYXIN B SULFATE 500; 1000 [USP'U]/G; [USP'U]/G
OINTMENT TOPICAL ONCE
OUTPATIENT
Start: 2023-07-06 | End: 2023-07-06

## 2023-07-06 RX ORDER — LIDOCAINE HYDROCHLORIDE 20 MG/ML
JELLY TOPICAL ONCE
Status: COMPLETED | OUTPATIENT
Start: 2023-07-06 | End: 2023-07-06

## 2023-07-06 RX ORDER — LIDOCAINE HYDROCHLORIDE 20 MG/ML
JELLY TOPICAL ONCE
OUTPATIENT
Start: 2023-07-06 | End: 2023-07-06

## 2023-07-06 RX ORDER — GENTAMICIN SULFATE 1 MG/G
OINTMENT TOPICAL ONCE
OUTPATIENT
Start: 2023-07-06 | End: 2023-07-06

## 2023-07-06 RX ORDER — LIDOCAINE HYDROCHLORIDE 40 MG/ML
SOLUTION TOPICAL ONCE
OUTPATIENT
Start: 2023-07-06 | End: 2023-07-06

## 2023-07-06 RX ORDER — GINSENG 100 MG
CAPSULE ORAL ONCE
OUTPATIENT
Start: 2023-07-06 | End: 2023-07-06

## 2023-07-06 RX ORDER — BETAMETHASONE DIPROPIONATE 0.05 %
OINTMENT (GRAM) TOPICAL ONCE
OUTPATIENT
Start: 2023-07-06 | End: 2023-07-06

## 2023-07-06 RX ORDER — CLOBETASOL PROPIONATE 0.5 MG/G
OINTMENT TOPICAL ONCE
OUTPATIENT
Start: 2023-07-06 | End: 2023-07-06

## 2023-07-06 RX ORDER — SODIUM CHLOR/HYPOCHLOROUS ACID 0.033 %
SOLUTION, IRRIGATION IRRIGATION ONCE
OUTPATIENT
Start: 2023-07-06 | End: 2023-07-06

## 2023-07-06 RX ORDER — LIDOCAINE 50 MG/G
OINTMENT TOPICAL ONCE
OUTPATIENT
Start: 2023-07-06 | End: 2023-07-06

## 2023-07-06 RX ORDER — LIDOCAINE 40 MG/G
CREAM TOPICAL ONCE
OUTPATIENT
Start: 2023-07-06 | End: 2023-07-06

## 2023-07-06 RX ADMIN — LIDOCAINE HYDROCHLORIDE: 20 JELLY TOPICAL at 11:54

## 2023-07-06 ASSESSMENT — PAIN - FUNCTIONAL ASSESSMENT: PAIN_FUNCTIONAL_ASSESSMENT: PREVENTS OR INTERFERES SOME ACTIVE ACTIVITIES AND ADLS

## 2023-07-06 ASSESSMENT — PAIN DESCRIPTION - DESCRIPTORS: DESCRIPTORS: SORE;SHARP

## 2023-07-06 ASSESSMENT — PAIN DESCRIPTION - LOCATION: LOCATION: LEG

## 2023-07-06 ASSESSMENT — PAIN SCALES - GENERAL: PAINLEVEL_OUTOF10: 7

## 2023-07-06 ASSESSMENT — PAIN DESCRIPTION - ORIENTATION: ORIENTATION: RIGHT;LEFT

## 2023-07-06 NOTE — PLAN OF CARE
[facility] Application   Below Knee    NAME:  Aniyah Mota  YOB: 1950  MEDICAL RECORD NUMBER:  [id number]  DATE:  7/6/2023    Komal Nagy boot: Applied moisturizing agent to dry skin as needed. Appied primary and secondary dressing as ordered. Applied Unna roll from toes to knee overlapping each time. Applied ace wrap or coban from toes to below the knee. Instructed patient/caregiver to keep dressing dry and intact. DO NOT REMOVE DRESSING. Instructed pt/family/caregiver to report excessive draining, loose bandage, wet dressing, severe pain or tingling in toes. Applied [facility] dressing below the knee to right lower leg. Applied [facility] dressing below the knee to left lower leg. Unna Boot(s) were applied per  Guidelines.      Electronically signed by Margarita Arredondo RN on 7/6/2023 at 12:39 PM

## 2023-07-06 NOTE — PROGRESS NOTES
[address]   Progress Note and Procedure Note      200 [facility] RECORD NUMBER:  [id number]  AGE: 74 y.o. GENDER: female  : 1950  EPISODE DATE:  2023    Subjective:     Chief Complaint   Patient presents with    Wound Check     Patient presents for recheck of bilateral legs         HISTORY of PRESENT ILLNESS HPI     Dottie Santos is a 74 y.o. female who presents today for wound/ulcer evaluation.    History of Wound Context: bilateral leg wound follow up/eval and treat    Ulcer Identification:  Ulcer Type: venous  Contributing Factors: lymphedema    Wound: N/A        PAST MEDICAL HISTORY        Diagnosis Date    Acute sinusitis     CHAPARRO (acute kidney injury) ([address]) 2018    CHAPARRO (acute kidney injury) ([address]) 2018    Anemia     Bleeding ulcer     hx of; cauterized by dr. resendiz    Breast cancer [facility])     Breast lump     CAD (coronary artery disease)     sees dr. Mishra Done ulcer [facility])     Cataract     Chronic kidney disease, stage 3b ([address]) 2021    COVID-19 2021    fatigue    Dermatitis     Diabetes mellitus ([address])     Diabetic nephropathy ([address])     Diabetic retinopathy ([address])     Glaucoma     Gout     Hemorrhoids     Hx of TB skin testing     positive    Hyperlipidemia     Hypertension     Long term current use of aromatase inhibitor 2022    Osteopenia of left hip 2022    Palliative care patient 2021    Type 2 diabetes mellitus ([address]) 2018    Vitamin D deficiency        PAST SURGICAL HISTORY    Past Surgical History:   Procedure Laterality Date    BREAST LUMPECTOMY Right 10/29/2021    RIGHT LUMPECTOMY, SNB, PREOP ULTRASOUND, INTRAOP ULTRASOUND GUIDED NL, BIOSORB, PEC BLOCK, MARGIN PROBE, FLAPS, IORT performed by Balbina Reagan MD at [address]  02/15/2022    CARDIAC SURGERY      CARPAL TUNNEL RELEASE Right 9/3/2020    RIGHT CARPAL TUNNEL RELEASE performed by Pancho Gonsalves MD at [address]

## 2023-07-10 ENCOUNTER — TELEPHONE (OUTPATIENT)
Dept: CARDIOLOGY CLINIC | Age: 73
End: 2023-07-10

## 2023-07-10 NOTE — TELEPHONE ENCOUNTER
Tried to reach patient to advise 7/20 9:15 appt was switched to [facility] due to hospital call for Dr. Lacy Sumner. Could not lvm.

## 2023-07-12 ENCOUNTER — TELEPHONE (OUTPATIENT)
Dept: WOUND CARE | Age: 73
End: 2023-07-12

## 2023-07-12 NOTE — PROGRESS NOTES
Patient called 7/10/23 at 1024 stated she had received lymphedema pumps and had used them and noted some red drainage on the top of the right foot dressing, wondered if she should conitnue using thre pumps. Discussed with Billee Sender and patient may continue to use trhe pumps and to keep f/u appointment on 7/13/23 at 1115.

## 2023-07-12 NOTE — TELEPHONE ENCOUNTER
Patient called 7/10/23 at 1024 stated she had received lymphedema pumps and had used them and noted some red drainage on the top of the right foot dressing, wondered if she should conitnue using thre pumps. Discussed with Belkys Hamm and patient may continue to use trhe pumps and to keep f/u appointment on 7/13/23 at 1115.

## 2023-07-13 ENCOUNTER — HOSPITAL ENCOUNTER (OUTPATIENT)
Dept: WOUND CARE | Age: 73
Discharge: HOME | End: 2023-07-13
Payer: MEDICARE

## 2023-07-13 DIAGNOSIS — L97.912 SKIN ULCER OF RIGHT LOWER LEG WITH FAT LAYER EXPOSED (HCC): ICD-10-CM

## 2023-07-13 DIAGNOSIS — I89.0 LYMPHEDEMA: Primary | ICD-10-CM

## 2023-07-13 DIAGNOSIS — L97.922 ULCER OF LEFT LOWER LEG, WITH FAT LAYER EXPOSED (HCC): Chronic | ICD-10-CM

## 2023-07-13 PROCEDURE — 29580 STRAPPING UNNA BOOT: CPT

## 2023-07-13 PROCEDURE — 99212 OFFICE O/P EST SF 10 MIN: CPT | Performed by: NURSE PRACTITIONER

## 2023-07-13 PROCEDURE — 6370000000 HC RX 637 (ALT 250 FOR IP): Performed by: NURSE PRACTITIONER

## 2023-07-13 RX ORDER — SODIUM CHLOR/HYPOCHLOROUS ACID 0.033 %
SOLUTION, IRRIGATION IRRIGATION ONCE
OUTPATIENT
Start: 2023-07-13 | End: 2023-07-13

## 2023-07-13 RX ORDER — BETAMETHASONE DIPROPIONATE 0.05 %
OINTMENT (GRAM) TOPICAL ONCE
OUTPATIENT
Start: 2023-07-13 | End: 2023-07-13

## 2023-07-13 RX ORDER — LIDOCAINE HYDROCHLORIDE 20 MG/ML
JELLY TOPICAL ONCE
OUTPATIENT
Start: 2023-07-13 | End: 2023-07-13

## 2023-07-13 RX ORDER — GINSENG 100 MG
CAPSULE ORAL ONCE
OUTPATIENT
Start: 2023-07-13 | End: 2023-07-13

## 2023-07-13 RX ORDER — LIDOCAINE 40 MG/G
CREAM TOPICAL ONCE
OUTPATIENT
Start: 2023-07-13 | End: 2023-07-13

## 2023-07-13 RX ORDER — GENTAMICIN SULFATE 1 MG/G
OINTMENT TOPICAL ONCE
OUTPATIENT
Start: 2023-07-13 | End: 2023-07-13

## 2023-07-13 RX ORDER — BACITRACIN ZINC AND POLYMYXIN B SULFATE 500; 1000 [USP'U]/G; [USP'U]/G
OINTMENT TOPICAL ONCE
OUTPATIENT
Start: 2023-07-13 | End: 2023-07-13

## 2023-07-13 RX ORDER — IBUPROFEN 200 MG
TABLET ORAL ONCE
OUTPATIENT
Start: 2023-07-13 | End: 2023-07-13

## 2023-07-13 RX ORDER — LIDOCAINE 50 MG/G
OINTMENT TOPICAL ONCE
OUTPATIENT
Start: 2023-07-13 | End: 2023-07-13

## 2023-07-13 RX ORDER — LIDOCAINE HYDROCHLORIDE 40 MG/ML
SOLUTION TOPICAL ONCE
OUTPATIENT
Start: 2023-07-13 | End: 2023-07-13

## 2023-07-13 RX ORDER — LIDOCAINE HYDROCHLORIDE 20 MG/ML
JELLY TOPICAL ONCE
Status: COMPLETED | OUTPATIENT
Start: 2023-07-13 | End: 2023-07-13

## 2023-07-13 RX ORDER — CLOBETASOL PROPIONATE 0.5 MG/G
OINTMENT TOPICAL ONCE
OUTPATIENT
Start: 2023-07-13 | End: 2023-07-13

## 2023-07-13 RX ADMIN — LIDOCAINE HYDROCHLORIDE: 20 JELLY TOPICAL at 11:37

## 2023-07-13 NOTE — PLAN OF CARE
[facility] Application   Below Knee    NAME:  Norma Bartlett  YOB: 1950  MEDICAL RECORD NUMBER:  [id number]  DATE:  7/13/2023    Edmar Alfaro boot: Applied moisturizing agent to dry skin as needed. Appied primary and secondary dressing as ordered. Applied Unna roll from toes to knee overlapping each time. Applied ace wrap or coban from toes to below the knee. Instructed patient/caregiver to keep dressing dry and intact. DO NOT REMOVE DRESSING. Instructed pt/family/caregiver to report excessive draining, loose bandage, wet dressing, severe pain or tingling in toes. Applied [facility] dressing below the knee to right lower leg. Unna Boot(s) were applied per  Guidelines.      Electronically signed by Aashish Christine RN on 7/13/2023 at 12:20 PM

## 2023-07-13 NOTE — PROGRESS NOTES
mouth every 6 hours as needed for Pain.      metoprolol tartrate (LOPRESSOR) 50 MG tablet TAKE 2 TABLETS TWICE DAILY 360 tablet 3    montelukast (SINGULAIR) 10 MG tablet TAKE 1 TABLET EVERY DAY 90 tablet 3    glipiZIDE (GLUCOTROL XL) 2.5 MG extended release tablet TAKE 1 TABLET EVERY DAY 90 tablet 1    letrozole (FEMARA) 2.5 MG tablet TAKE 1 TABLET EVERY DAY 90 tablet 3    furosemide (LASIX) 40 MG tablet Take 1 tablet by mouth daily 90 tablet 3    Ferrous Sulfate (IRON) 325 (65 Fe) MG TABS Take 1 tablet by mouth twice daily 60 tablet 3    simvastatin (ZOCOR) 40 MG tablet TAKE 1 TABLET NIGHTLY 90 tablet 3    Continuous Blood Gluc Sensor (DEXCOM G6 SENSOR) MISC Changer every 2 weeks E11.9 6 each 3    Continuous Blood Gluc Transmit (DEXCOM G6 TRANSMITTER) MISC Change every 3 months E11.9 1 each 0    Continuous Blood Gluc  (DEXCOM G6 ) ARMANDO Change every 2 weeks E11.9 6 each 0    lisinopril (PRINIVIL;ZESTRIL) 5 MG tablet Take 1 tablet by mouth daily 90 tablet 3    nystatin (MYCOSTATIN) 674565 UNIT/GM cream Apply topically 2 times daily. 30 g 5    albuterol sulfate HFA (PROVENTIL;VENTOLIN;PROAIR) 108 (90 Base) MCG/ACT inhaler Inhale 2 puffs into the lungs every 6 hours as needed for Wheezing 18 g 3    isosorbide mononitrate (IMDUR) 30 MG extended release tablet TAKE 1 TABLET TWICE DAILY 180 tablet 3    Accu-Chek Softclix Lancets MISC TEST FOUR TIMES DAILY 400 each 3    blood glucose test strips (ACCU-CHEK GUIDE) strip TEST BLOOD SUGAR FOUR TIMES DAILY 400 strip 3    metFORMIN (GLUCOPHAGE) 500 MG tablet TAKE 2 TABLETS TWICE DAILY WITH MEALS 360 tablet 1    amLODIPine (NORVASC) 5 MG tablet TAKE 1 TABLET EVERY DAY 90 tablet 1    timolol (TIMOPTIC) 0.25 % ophthalmic solution Place 1 drop into the right eye daily      Blood Glucose Calibration (ACCU-CHEK ROSA) SOLN Calibration done daily.  1 each 1    Alcohol Swabs (B-D SINGLE USE SWABS REGULAR) PADS USE THREE TIMES DAILY 100 each 11    aspirin 81 MG tablet

## 2023-07-13 NOTE — PLAN OF CARE
Problem: Discharge Planning  Goal: Discharge to home or other facility with appropriate resources  Outcome: Progressing     Problem: Wound:  Goal: Will show signs of wound healing; wound closure and no evidence of infection  Description: Will show signs of wound healing; wound closure and no evidence of infection  Outcome: Progressing     Problem: Venous:  Goal: Signs of wound healing will improve  Description: Signs of wound healing will improve  Outcome: Progressing     Problem: Compression therapy:  Goal: Will be free from complications associated with compression therapy  Description: Will be free from complications associated with compression therapy  Outcome: Progressing     Problem: Weight control:  Goal: Ability to maintain an optimal weight for height and age will be supported  Description: Ability to maintain an optimal weight for height and age will be supported  Outcome: Progressing     Problem: Falls - Risk of:  Goal: Will remain free from falls  Description: Will remain free from falls  Outcome: Progressing     Problem: Blood Glucose:  Goal: Ability to maintain appropriate glucose levels will improve  Description: Ability to maintain appropriate glucose levels will improve  Outcome: Progressing

## 2023-07-13 NOTE — DISCHARGE INSTRUCTIONS
[address] and Hyperbaric Oxygen Therapy   Physician Orders and Discharge Instructions  1830 [facility],[address] [facility]1 [facility], [address]  Telephone: [phone]    NAME:  Bethany Song OF BIRTH:  1950  MEDICAL RECORD NUMBER:  [id number]  DATE:  7/13/2023    Discharge condition: Stable    Discharge to: Home    Left via:Private automobile    Accompanied by:  sister    ECF/HHA: [facility] and [facility]      Dressing Orders:    Left leg moisturize, wear compression sock    Location of Wound:   Right Lower Leg     Wash with soap and water. Apply xeroform gauze to wound bed. Apply Calamine Coflex Unnaboot toes to knee. Change once weekly . Compression Therapy is an important part of your program of care. Compression makes it easier for your body to pump the blood from your legs back to your heart, helping alleviate a condition called chronic venous insufficiency. Chronic venous insufficiency is an underlying cause of your injury, and managing it properly will help you to heal.        Keep the bandage in place at all times unless you experience foot pain or numbness or coolness of the toes. Do not attempt to remove and reapply the bandage system. Give it time. Your bandage may feel tight at first. This is normal. Your bandage will become more comfortable as swelling goes down. Be active, as suggested by your healthcare provider. Daily walks or mild exercise encourages better blood flow to aid healing. Put your feet up when sitting for long periods of time. Keep the bandage dry. Wet compression bandages are more likely to slip down and/or cause damage to good skin. PRECAUTIONS For your safety, compression therapy should be used under the supervision of a healthcare professional. Contact your care provider if you experience any of the following:  Pain that does not go away with elevation. Discoloration or numbness of the toes.   The

## 2023-07-19 NOTE — DISCHARGE INSTRUCTIONS
[address] and Hyperbaric Oxygen Therapy   Physician Orders and Discharge Instructions  1830 [facility],[address] [place] [address], [address]  Telephone: [phone]    NAME:  Bethany Song OF BIRTH:  1950  MEDICAL RECORD NUMBER:  [id number]  DATE:  7/20/23    Discharge condition: {STABLE/UNSTABLE:014980005}    Discharge to: {CHP Wound Discharge To:85621}    Left via:{Left KRZ:70871}    Accompanied by:  {:[id number]}    ECF/HHA: [facility] and [facility]      Dressing Orders:     Left leg moisturize, wear compression sock     Location of Wound:   Right Lower Leg     Wash with soap and water. Apply xeroform gauze to wound bed. Apply Calamine Coflex Unnaboot toes to knee. Change once weekly . Compression Therapy is an important part of your program of care. Compression makes it easier for your body to pump the blood from your legs back to your heart, helping alleviate a condition called chronic venous insufficiency. Chronic venous insufficiency is an underlying cause of your injury, and managing it properly will help you to heal.        Keep the bandage in place at all times unless you experience foot pain or numbness or coolness of the toes. Do not attempt to remove and reapply the bandage system. Give it time. Your bandage may feel tight at first. This is normal. Your bandage will become more comfortable as swelling goes down. Be active, as suggested by your healthcare provider. Daily walks or mild exercise encourages better blood flow to aid healing. Put your feet up when sitting for long periods of time. Keep the bandage dry. Wet compression bandages are more likely to slip down and/or cause damage to good skin.      PRECAUTIONS For your safety, compression therapy should be used under the supervision of a healthcare professional. Contact your care provider if you experience any of the following:  Pain that does not go away with

## 2023-07-20 ENCOUNTER — HOSPITAL ENCOUNTER (OUTPATIENT)
Dept: WOUND CARE | Age: 73
Discharge: HOME | End: 2023-07-20

## 2023-07-24 ENCOUNTER — HOSPITAL ENCOUNTER (OUTPATIENT)
Dept: WOUND CARE | Age: 73
Discharge: HOME | End: 2023-07-24
Payer: MEDICARE

## 2023-07-24 VITALS
BODY MASS INDEX: 37.32 KG/M2 | DIASTOLIC BLOOD PRESSURE: 72 MMHG | TEMPERATURE: 97 F | WEIGHT: 224 LBS | SYSTOLIC BLOOD PRESSURE: 144 MMHG | HEART RATE: 66 BPM | HEIGHT: 65 IN | RESPIRATION RATE: 18 BRPM

## 2023-07-24 DIAGNOSIS — L97.922 ULCER OF LEFT LOWER LEG, WITH FAT LAYER EXPOSED (HCC): Chronic | ICD-10-CM

## 2023-07-24 DIAGNOSIS — L97.912 SKIN ULCER OF RIGHT LOWER LEG WITH FAT LAYER EXPOSED (HCC): ICD-10-CM

## 2023-07-24 DIAGNOSIS — I89.0 LYMPHEDEMA: Primary | ICD-10-CM

## 2023-07-24 PROCEDURE — 99212 OFFICE O/P EST SF 10 MIN: CPT | Performed by: NURSE PRACTITIONER

## 2023-07-24 PROCEDURE — 29580 STRAPPING UNNA BOOT: CPT

## 2023-07-24 RX ORDER — LIDOCAINE HYDROCHLORIDE 20 MG/ML
JELLY TOPICAL ONCE
OUTPATIENT
Start: 2023-07-24 | End: 2023-07-24

## 2023-07-24 RX ORDER — BETAMETHASONE DIPROPIONATE 0.05 %
OINTMENT (GRAM) TOPICAL ONCE
OUTPATIENT
Start: 2023-07-24 | End: 2023-07-24

## 2023-07-24 RX ORDER — LIDOCAINE 50 MG/G
OINTMENT TOPICAL ONCE
OUTPATIENT
Start: 2023-07-24 | End: 2023-07-24

## 2023-07-24 RX ORDER — LIDOCAINE 40 MG/G
CREAM TOPICAL ONCE
OUTPATIENT
Start: 2023-07-24 | End: 2023-07-24

## 2023-07-24 RX ORDER — LIDOCAINE HYDROCHLORIDE 40 MG/ML
SOLUTION TOPICAL ONCE
OUTPATIENT
Start: 2023-07-24 | End: 2023-07-24

## 2023-07-24 RX ORDER — BACITRACIN ZINC AND POLYMYXIN B SULFATE 500; 1000 [USP'U]/G; [USP'U]/G
OINTMENT TOPICAL ONCE
OUTPATIENT
Start: 2023-07-24 | End: 2023-07-24

## 2023-07-24 RX ORDER — IBUPROFEN 200 MG
TABLET ORAL ONCE
OUTPATIENT
Start: 2023-07-24 | End: 2023-07-24

## 2023-07-24 RX ORDER — GENTAMICIN SULFATE 1 MG/G
OINTMENT TOPICAL ONCE
OUTPATIENT
Start: 2023-07-24 | End: 2023-07-24

## 2023-07-24 RX ORDER — CLOBETASOL PROPIONATE 0.5 MG/G
OINTMENT TOPICAL ONCE
OUTPATIENT
Start: 2023-07-24 | End: 2023-07-24

## 2023-07-24 RX ORDER — GINSENG 100 MG
CAPSULE ORAL ONCE
OUTPATIENT
Start: 2023-07-24 | End: 2023-07-24

## 2023-07-24 RX ORDER — SODIUM CHLOR/HYPOCHLOROUS ACID 0.033 %
SOLUTION, IRRIGATION IRRIGATION ONCE
OUTPATIENT
Start: 2023-07-24 | End: 2023-07-24

## 2023-07-24 NOTE — PLAN OF CARE
[facility] Application   Below Knee    NAME:  Kristie Ace  YOB: 1950  MEDICAL RECORD NUMBER:  [id number]  DATE:  7/24/2023    Abhishek hodgeot: Applied moisturizing agent to dry skin as needed. Appied primary and secondary dressing as ordered. Applied Unna roll from toes to knee overlapping each time. Applied ace wrap or coban from toes to below the knee. Secured with tape and/or metal clips covered with tape. Instructed patient/caregiver to keep dressing dry and intact. DO NOT REMOVE DRESSING. Instructed pt/family/caregiver to report excessive draining, loose bandage, wet dressing, severe pain or tingling in toes. Applied [facility] dressing below the knee to right lower leg. Applied [facility] dressing below the knee to left lower leg. Unna Boot(s) were applied per  Guidelines.      Electronically signed by Ana Maria Peña RN on 7/24/2023 at 10:35 AM

## 2023-07-24 NOTE — PLAN OF CARE
Problem: Chronic Conditions and Co-morbidities  Goal: Patient's chronic conditions and co-morbidity symptoms are monitored and maintained or improved  Outcome: Progressing     Problem: Discharge Planning  Goal: Discharge to home or other facility with appropriate resources  Outcome: Progressing     Problem: Safety - Adult  Goal: Free from fall injury  Outcome: Progressing     Problem: Wound:  Goal: Will show signs of wound healing; wound closure and no evidence of infection  Description: Will show signs of wound healing; wound closure and no evidence of infection  Outcome: Progressing     Problem: Venous:  Goal: Signs of wound healing will improve  Description: Signs of wound healing will improve  Outcome: Progressing     Problem: Compression therapy:  Goal: Will be free from complications associated with compression therapy  Description: Will be free from complications associated with compression therapy  Outcome: Progressing     Problem: Weight control:  Goal: Ability to maintain an optimal weight for height and age will be supported  Description: Ability to maintain an optimal weight for height and age will be supported  Outcome: Progressing     Problem: Falls - Risk of:  Goal: Will remain free from falls  Description: Will remain free from falls  Outcome: Progressing     Problem: Blood Glucose:  Goal: Ability to maintain appropriate glucose levels will improve  Description: Ability to maintain appropriate glucose levels will improve  Outcome: Progressing

## 2023-07-24 NOTE — PROGRESS NOTES
[address]   Progress Note and Procedure Note      [address] [place] RECORD NUMBER:  [id number]  AGE: 74 y.o. GENDER: female  : 1950  EPISODE DATE:  2023    Subjective:     Chief Complaint   Patient presents with    Wound Check         HISTORY of PRESENT ILLNESS HPI     Mary Garcia is a 74 y.o. female who presents today for wound/ulcer evaluation.    History of Wound Context: bilateral leg swelling follow up/eval and treat    Ulcer Identification:  Ulcer Type: venous  Contributing Factors: lymphedema    Wound: N/A        PAST MEDICAL HISTORY        Diagnosis Date    Acute sinusitis     CHAPARRO (acute kidney injury) ([address]) 2018    CHAPARRO (acute kidney injury) ([address]) 2018    Anemia     Bleeding ulcer     hx of; cauterized by dr. resendiz    Breast cancer [facility])     Breast lump     CAD (coronary artery disease)     sees dr. Da Hayden ulcer [facility])     Cataract     Chronic kidney disease, stage 3b ([address]) 2021    COVID-19 2021    fatigue    Dermatitis     Diabetes mellitus ([address])     Diabetic nephropathy ([address])     Diabetic retinopathy ([address])     Glaucoma     Gout     Hemorrhoids     Hx of TB skin testing     positive    Hyperlipidemia     Hypertension     Long term current use of aromatase inhibitor 2022    Osteopenia of left hip 2022    Palliative care patient 2021    Type 2 diabetes mellitus ([address]) 2018    Vitamin D deficiency        PAST SURGICAL HISTORY    Past Surgical History:   Procedure Laterality Date    BREAST LUMPECTOMY Right 10/29/2021    RIGHT LUMPECTOMY, SNB, PREOP ULTRASOUND, INTRAOP ULTRASOUND GUIDED NL, BIOSORB, PEC BLOCK, MARGIN PROBE, FLAPS, IORT performed by Aminata Rodriguez MD at [address]  02/15/2022    CARDIAC SURGERY      CARPAL TUNNEL RELEASE Right 9/3/2020    RIGHT CARPAL TUNNEL RELEASE performed by Eligio England MD at [address] [place] [address]

## 2023-07-27 ENCOUNTER — OFFICE VISIT (OUTPATIENT)
Dept: CARDIOLOGY CLINIC | Age: 73
End: 2023-07-27
Payer: MEDICARE

## 2023-07-27 VITALS
BODY MASS INDEX: 37.32 KG/M2 | DIASTOLIC BLOOD PRESSURE: 74 MMHG | WEIGHT: 224 LBS | HEART RATE: 68 BPM | SYSTOLIC BLOOD PRESSURE: 124 MMHG | HEIGHT: 65 IN

## 2023-07-27 DIAGNOSIS — I25.5 ISCHEMIC CARDIOMYOPATHY: Primary | ICD-10-CM

## 2023-07-27 DIAGNOSIS — I50.42 CHRONIC COMBINED SYSTOLIC (CONGESTIVE) AND DIASTOLIC (CONGESTIVE) HEART FAILURE (HCC): ICD-10-CM

## 2023-07-27 DIAGNOSIS — E78.2 MIXED HYPERLIPIDEMIA: ICD-10-CM

## 2023-07-27 DIAGNOSIS — I10 PRIMARY HYPERTENSION: ICD-10-CM

## 2023-07-27 DIAGNOSIS — Z95.810 BIVENTRICULAR ICD (IMPLANTABLE CARDIOVERTER-DEFIBRILLATOR) IN PLACE: ICD-10-CM

## 2023-07-27 DIAGNOSIS — I25.10 CORONARY ARTERY DISEASE INVOLVING NATIVE CORONARY ARTERY OF NATIVE HEART WITHOUT ANGINA PECTORIS: ICD-10-CM

## 2023-07-27 PROCEDURE — G8417 CALC BMI ABV UP PARAM F/U: HCPCS | Performed by: CLINICAL NURSE SPECIALIST

## 2023-07-27 PROCEDURE — 1036F TOBACCO NON-USER: CPT | Performed by: CLINICAL NURSE SPECIALIST

## 2023-07-27 PROCEDURE — 3078F DIAST BP <80 MM HG: CPT | Performed by: CLINICAL NURSE SPECIALIST

## 2023-07-27 PROCEDURE — 99214 OFFICE O/P EST MOD 30 MIN: CPT | Performed by: CLINICAL NURSE SPECIALIST

## 2023-07-27 PROCEDURE — G8399 PT W/DXA RESULTS DOCUMENT: HCPCS | Performed by: CLINICAL NURSE SPECIALIST

## 2023-07-27 PROCEDURE — 3017F COLORECTAL CA SCREEN DOC REV: CPT | Performed by: CLINICAL NURSE SPECIALIST

## 2023-07-27 PROCEDURE — G8427 DOCREV CUR MEDS BY ELIG CLIN: HCPCS | Performed by: CLINICAL NURSE SPECIALIST

## 2023-07-27 PROCEDURE — 1123F ACP DISCUSS/DSCN MKR DOCD: CPT | Performed by: CLINICAL NURSE SPECIALIST

## 2023-07-27 PROCEDURE — 93284 PRGRMG EVAL IMPLANTABLE DFB: CPT | Performed by: CLINICAL NURSE SPECIALIST

## 2023-07-27 PROCEDURE — 3074F SYST BP LT 130 MM HG: CPT | Performed by: CLINICAL NURSE SPECIALIST

## 2023-07-27 PROCEDURE — 1090F PRES/ABSN URINE INCON ASSESS: CPT | Performed by: CLINICAL NURSE SPECIALIST

## 2023-07-27 NOTE — PATIENT INSTRUCTIONS
ICD will send in 3 mos   Maintain good blood pressure control-goal<130/80 at rest  Maintain good cholesterol control LDL goal<70 with arterial disease  If you are diabetic work to keep/obtain hemoglobin A1c< 7    Follow up in 6 mos with ICD check with DR Waters   Call with any questions or concerns  Follow up with Anna Howe MD for non cardiac problems  Report any new problems  Cardiovascular Fitness-Exercise as tolerated. Fall precautions   Cardiac / Healthy Diet- Avoid processed high fat foods, maintain low sodium/salt   Continue current medications as directed  Continue plan of treatment  It is always recommended that you bring your medications bottles with you to each visit - this is for your safety!

## 2023-07-27 NOTE — PROGRESS NOTES
directed  Continue plan of treatment  It is always recommended that you bring your medications bottles with you to each visit - this is for your safety! SARA Moseley dragon/transcription disclaimer: Much of this encounter note is electronic transcription/translation of spoken language to printed tach. Electronic translation of spoken language may be erroneous, or at times, nonsensical words or phrases may be inadvertently transcribed.  Although, I have reviewed the note for such errors, some may still exist.

## 2023-07-31 ENCOUNTER — HOSPITAL ENCOUNTER (OUTPATIENT)
Dept: WOUND CARE | Age: 73
Discharge: HOME | End: 2023-07-31
Payer: MEDICARE

## 2023-07-31 VITALS
SYSTOLIC BLOOD PRESSURE: 163 MMHG | RESPIRATION RATE: 18 BRPM | HEIGHT: 65 IN | TEMPERATURE: 97 F | DIASTOLIC BLOOD PRESSURE: 76 MMHG | BODY MASS INDEX: 37.32 KG/M2 | WEIGHT: 224 LBS | HEART RATE: 67 BPM

## 2023-07-31 DIAGNOSIS — I89.0 LYMPHEDEMA: Primary | ICD-10-CM

## 2023-07-31 DIAGNOSIS — L97.922 ULCER OF LEFT LOWER LEG, WITH FAT LAYER EXPOSED (HCC): Chronic | ICD-10-CM

## 2023-07-31 DIAGNOSIS — L97.912 SKIN ULCER OF RIGHT LOWER LEG WITH FAT LAYER EXPOSED (HCC): ICD-10-CM

## 2023-07-31 PROCEDURE — 99212 OFFICE O/P EST SF 10 MIN: CPT | Performed by: NURSE PRACTITIONER

## 2023-07-31 PROCEDURE — 29580 STRAPPING UNNA BOOT: CPT

## 2023-07-31 RX ORDER — LIDOCAINE HYDROCHLORIDE 20 MG/ML
JELLY TOPICAL ONCE
OUTPATIENT
Start: 2023-07-31 | End: 2023-07-31

## 2023-07-31 RX ORDER — GENTAMICIN SULFATE 1 MG/G
OINTMENT TOPICAL ONCE
OUTPATIENT
Start: 2023-07-31 | End: 2023-07-31

## 2023-07-31 RX ORDER — LIDOCAINE HYDROCHLORIDE 40 MG/ML
SOLUTION TOPICAL ONCE
OUTPATIENT
Start: 2023-07-31 | End: 2023-07-31

## 2023-07-31 RX ORDER — GINSENG 100 MG
CAPSULE ORAL ONCE
OUTPATIENT
Start: 2023-07-31 | End: 2023-07-31

## 2023-07-31 RX ORDER — CLOBETASOL PROPIONATE 0.5 MG/G
OINTMENT TOPICAL ONCE
OUTPATIENT
Start: 2023-07-31 | End: 2023-07-31

## 2023-07-31 RX ORDER — LIDOCAINE 50 MG/G
OINTMENT TOPICAL ONCE
OUTPATIENT
Start: 2023-07-31 | End: 2023-07-31

## 2023-07-31 RX ORDER — BACITRACIN ZINC AND POLYMYXIN B SULFATE 500; 1000 [USP'U]/G; [USP'U]/G
OINTMENT TOPICAL ONCE
OUTPATIENT
Start: 2023-07-31 | End: 2023-07-31

## 2023-07-31 RX ORDER — LIDOCAINE 40 MG/G
CREAM TOPICAL ONCE
OUTPATIENT
Start: 2023-07-31 | End: 2023-07-31

## 2023-07-31 RX ORDER — BETAMETHASONE DIPROPIONATE 0.05 %
OINTMENT (GRAM) TOPICAL ONCE
OUTPATIENT
Start: 2023-07-31 | End: 2023-07-31

## 2023-07-31 RX ORDER — IBUPROFEN 200 MG
TABLET ORAL ONCE
OUTPATIENT
Start: 2023-07-31 | End: 2023-07-31

## 2023-07-31 RX ORDER — SODIUM CHLOR/HYPOCHLOROUS ACID 0.033 %
SOLUTION, IRRIGATION IRRIGATION ONCE
OUTPATIENT
Start: 2023-07-31 | End: 2023-07-31

## 2023-07-31 NOTE — PLAN OF CARE
[facility] Application   Below Knee    NAME:  Mary Garcia  YOB: 1950  MEDICAL RECORD NUMBER:  [id number]  DATE:  7/31/2023    Ana Tran boot: Applied moisturizing agent to dry skin as needed. Appied primary and secondary dressing as ordered. Applied Unna roll from toes to knee overlapping each time. Applied ace wrap or coban from toes to below the knee. Secured with tape and/or metal clips covered with tape. Instructed patient/caregiver to keep dressing dry and intact. DO NOT REMOVE DRESSING. Instructed pt/family/caregiver to report excessive draining, loose bandage, wet dressing, severe pain or tingling in toes. Applied [facility] dressing below the knee to right lower leg. Applied [facility] dressing below the knee to left lower leg. Unna Boot(s) were applied per  Guidelines.      Electronically signed by Israel Plaza RN on 7/31/2023 at 9:15 AM

## 2023-07-31 NOTE — PROGRESS NOTES
O'Clock 0 07/06/23 1152   Undermining Maxium Distance (cm) 0 07/06/23 1152   Wound Assessment Epithelialization 07/31/23 0832   Drainage Amount None 07/31/23 0832   Drainage Description Serous 07/13/23 1140   Odor None 07/31/23 0832   Tram-wound Assessment Intact 07/31/23 0832   Margins Attached edges 07/31/23 0832   Wound Thickness Description not for Pressure Injury Full thickness 07/31/23 0832   Number of days: 24       Wound 07/31/23 Pretibial Right;Medial Wound #2 Right Medial Pretibial leg (venous ulcer) (Active)   Wound Image    07/31/23 0846   Wound Etiology Venous 07/31/23 0846   Dressing Status Old drainage noted 07/31/23 0846   Wound Cleansed Soap and water 07/31/23 0846   Wound Length (cm) 2 cm 07/31/23 0846   Wound Width (cm) 3 cm 07/31/23 0846   Wound Depth (cm) 0.1 cm 07/31/23 0846   Wound Surface Area (cm^2) 6 cm^2 07/31/23 0846   Wound Volume (cm^3) 0.6 cm^3 07/31/23 0846   Wound Assessment Pink/red 07/31/23 0846   Drainage Amount Moderate 07/31/23 0846   Drainage Description Serous 07/31/23 0846   Odor None 07/31/23 0846   Tram-wound Assessment Blanchable erythema 07/31/23 0846   Margins Attached edges 07/31/23 0846   Wound Thickness Description not for Pressure Injury Full thickness 07/31/23 0846   Number of days: 0       Plan:     Problem List Items Addressed This Visit          Other    Ulcer of left lower leg, with fat layer exposed ([address]) (Chronic)    Relevant Orders    Initiate Outpatient Wound Care Protocol    * (Principal) Lymphedema - Primary    Relevant Orders    Initiate Outpatient Wound Care Protocol    Skin ulcer of right lower leg with fat layer exposed [facility])    Relevant Orders    Initiate Outpatient Wound Care Protocol           Treatment Note please see attached Discharge Instructions    In my professional opinion this patient would benefit from HBO Therapy: No    Written patient dismissal instructions given to patient and signed by patient or POA.          Ms. Adonis Del Valle is wearing

## 2023-07-31 NOTE — PLAN OF CARE
Problem: Chronic Conditions and Co-morbidities  Goal: Patient's chronic conditions and co-morbidity symptoms are monitored and maintained or improved  Outcome: Progressing     Problem: Discharge Planning  Goal: Discharge to home or other facility with appropriate resources  Outcome: Progressing     Problem: Safety - Adult  Goal: Free from fall injury  Outcome: Progressing     Problem: Wound:  Goal: Will show signs of wound healing; wound closure and no evidence of infection  Description: Will show signs of wound healing; wound closure and no evidence of infection  Outcome: Progressing     Problem: Venous:  Goal: Signs of wound healing will improve  Description: Signs of wound healing will improve  Outcome: Progressing     Problem: Compression therapy:  Goal: Will be free from complications associated with compression therapy  Description: Will be free from complications associated with compression therapy  Outcome: Progressing     Problem: Weight control:  Goal: Ability to maintain an optimal weight for height and age will be supported  Description: Ability to maintain an optimal weight for height and age will be supported  Outcome: Progressing     Problem: Falls - Risk of:  Goal: Will remain free from falls  Description: Will remain free from falls  Outcome: Progressing     Problem: Blood Glucose:  Goal: Ability to maintain appropriate glucose levels will improve  Description: Ability to maintain appropriate glucose levels will improve  Outcome: Progressing     Problem: Chronic Conditions and Co-morbidities  Goal: Patient's chronic conditions and co-morbidity symptoms are monitored and maintained or improved  Outcome: Progressing     Problem: Discharge Planning  Goal: Discharge to home or other facility with appropriate resources  Outcome: Progressing     Problem: Safety - Adult  Goal: Free from fall injury  Outcome: Progressing DISPLAY PLAN FREE TEXT DISPLAY PLAN FREE TEXT DISPLAY PLAN FREE TEXT DISPLAY PLAN FREE TEXT

## 2023-08-04 ENCOUNTER — HOSPITAL ENCOUNTER (OUTPATIENT)
Dept: WOUND CARE | Age: 73
Discharge: HOME | End: 2023-08-04

## 2023-08-07 NOTE — BRIEF OP NOTE
Brief Postoperative Note      Patient:  Malathi Connolly  YOB: 1950  MRN: [id number]    Date of Procedure: 9/3/2020    Pre-Op Diagnosis: G56.01    Post-Op Diagnosis: Same       Procedure(s):  RIGHT CARPAL TUNNEL RELEASE    Surgeon(s):  Ava Toribio MD    Assistant:  Physician Assistant: Jose E Nelson PA-C    Anesthesia: General    Estimated Blood Loss (mL): Minimal    Complications: None    Specimens:   * No specimens in log *    Implants:  * No implants in log *      Drains: * No LDAs found *    Findings: see op note    Electronically signed by Ava Toribio MD on 9/3/2020 at 7:41 AM
Statement Selected

## 2023-08-08 ENCOUNTER — HOSPITAL ENCOUNTER (OUTPATIENT)
Dept: WOUND CARE | Age: 73
Discharge: HOME | End: 2023-08-08
Payer: MEDICARE

## 2023-08-08 VITALS
BODY MASS INDEX: 37.32 KG/M2 | RESPIRATION RATE: 18 BRPM | HEART RATE: 62 BPM | WEIGHT: 224 LBS | DIASTOLIC BLOOD PRESSURE: 75 MMHG | TEMPERATURE: 98.1 F | SYSTOLIC BLOOD PRESSURE: 142 MMHG | HEIGHT: 65 IN

## 2023-08-08 DIAGNOSIS — L97.922 ULCER OF LEFT LOWER LEG, WITH FAT LAYER EXPOSED (HCC): Chronic | ICD-10-CM

## 2023-08-08 DIAGNOSIS — I89.0 LYMPHEDEMA: Primary | ICD-10-CM

## 2023-08-08 DIAGNOSIS — L97.912 SKIN ULCER OF RIGHT LOWER LEG WITH FAT LAYER EXPOSED (HCC): ICD-10-CM

## 2023-08-08 PROCEDURE — 29580 STRAPPING UNNA BOOT: CPT

## 2023-08-08 PROCEDURE — 99212 OFFICE O/P EST SF 10 MIN: CPT | Performed by: NURSE PRACTITIONER

## 2023-08-08 RX ORDER — SODIUM CHLOR/HYPOCHLOROUS ACID 0.033 %
SOLUTION, IRRIGATION IRRIGATION ONCE
OUTPATIENT
Start: 2023-08-08 | End: 2023-08-08

## 2023-08-08 RX ORDER — IBUPROFEN 200 MG
TABLET ORAL ONCE
OUTPATIENT
Start: 2023-08-08 | End: 2023-08-08

## 2023-08-08 RX ORDER — LIDOCAINE HYDROCHLORIDE 20 MG/ML
JELLY TOPICAL ONCE
OUTPATIENT
Start: 2023-08-08 | End: 2023-08-08

## 2023-08-08 RX ORDER — GINSENG 100 MG
CAPSULE ORAL ONCE
OUTPATIENT
Start: 2023-08-08 | End: 2023-08-08

## 2023-08-08 RX ORDER — CLOBETASOL PROPIONATE 0.5 MG/G
OINTMENT TOPICAL ONCE
OUTPATIENT
Start: 2023-08-08 | End: 2023-08-08

## 2023-08-08 RX ORDER — LIDOCAINE HYDROCHLORIDE 40 MG/ML
SOLUTION TOPICAL ONCE
OUTPATIENT
Start: 2023-08-08 | End: 2023-08-08

## 2023-08-08 RX ORDER — LIDOCAINE 40 MG/G
CREAM TOPICAL ONCE
OUTPATIENT
Start: 2023-08-08 | End: 2023-08-08

## 2023-08-08 RX ORDER — LIDOCAINE 50 MG/G
OINTMENT TOPICAL ONCE
OUTPATIENT
Start: 2023-08-08 | End: 2023-08-08

## 2023-08-08 RX ORDER — GENTAMICIN SULFATE 1 MG/G
OINTMENT TOPICAL ONCE
OUTPATIENT
Start: 2023-08-08 | End: 2023-08-08

## 2023-08-08 RX ORDER — BACITRACIN ZINC AND POLYMYXIN B SULFATE 500; 1000 [USP'U]/G; [USP'U]/G
OINTMENT TOPICAL ONCE
OUTPATIENT
Start: 2023-08-08 | End: 2023-08-08

## 2023-08-08 RX ORDER — BETAMETHASONE DIPROPIONATE 0.05 %
OINTMENT (GRAM) TOPICAL ONCE
OUTPATIENT
Start: 2023-08-08 | End: 2023-08-08

## 2023-08-08 NOTE — PLAN OF CARE
[facility] Application   Below Knee    NAME:  Hoda Goldstein  YOB: 1950  MEDICAL RECORD NUMBER:  [id number]  DATE:  8/8/2023    Zeferino Washington boot: Applied moisturizing agent to dry skin as needed. Appied primary and secondary dressing as ordered. Applied Unna roll from toes to knee overlapping each time. Applied ace wrap or coban from toes to below the knee. Secured with tape and/or metal clips covered with tape. Instructed patient/caregiver to keep dressing dry and intact. DO NOT REMOVE DRESSING. Instructed pt/family/caregiver to report excessive draining, loose bandage, wet dressing, severe pain or tingling in toes. Applied [facility] dressing below the knee to right lower leg. Applied [facility] dressing below the knee to left lower leg. Unna Boot(s) were applied per  Guidelines.      Electronically signed by Jessica Jarrell RN on 8/8/2023 at 12:52 PM

## 2023-08-08 NOTE — PROGRESS NOTES
hours:    * Increase in Pain  * Temperature over 101  * Increase in drainage from your wound  * Drainage with a foul odor  * Bleeding  * Increase in swelling  * Need for compression bandage changes due to slippage, breakthrough drainage. If you need medical attention outside of the business hours of the [address] [place] [address] please contact your PCP or go to the nearest emergency room.           Electronically signed by SARA Alejandro CNP on 8/8/2023 at 12:23 PM

## 2023-08-11 DIAGNOSIS — D50.9 IRON DEFICIENCY ANEMIA, UNSPECIFIED IRON DEFICIENCY ANEMIA TYPE: ICD-10-CM

## 2023-08-11 RX ORDER — FERROUS SULFATE 325(65) MG
TABLET ORAL
Qty: 60 TABLET | Refills: 0 | Status: SHIPPED | OUTPATIENT
Start: 2023-08-11

## 2023-08-14 NOTE — DISCHARGE INSTRUCTIONS
[address] and Hyperbaric Oxygen Therapy   Physician Orders and Discharge Instructions  [address]  [place], [place] [facility]  Telephone: [phone]    NAME:  Anya Dahl OF BIRTH:  1950  MEDICAL RECORD NUMBER:  [id number]  DATE:  8/14/2023    Discharge condition: Stable    Discharge to: Home    Left via:Private automobile    Accompanied by:  sister    1) Moisturize your  both lower legs with a good thick cream like Eucerin Cream, Aquaphor or Cocoa Butter (in a jar) at least twice daily. Dry skin has a greater likelihood of breaking down if it is stretched by swelling. 2) Elevate legs when sitting, above the heart preferably. Avoid sleeping in a recliner. 3) Avoid standing for long periods of time. 4) Walking is good. 5)  Wear knee high graduated compression stockings 20 mmHg on both legs. (This is what prevents these ulcerations)  Put these on in the morning no more than 20 minutes after rising and remove before going to bed. You can hand wash and hang to dry overnight. We recommend having two pairs to rotate washing and wearing. 6) Be very careful with your legs. Often these are caused by a bump or scrape that doesn't heal well. Healing is prevented or slowed by your Venous insufficiency. Compression and keeping the swelling out of your legs is the key to the condition of your skin. 7) You can buy these stockings  at many pharmacies and even online, but you must have a calf and ankle measurement (taken in the morning before your legs swell) to order them. You will also need the strength that the physician has ordered. Most insurance companies do not pay for these unless you have an open ulceration currently. 8)  Replace your stockings every 6 months as they stretch out and become less effective. 9) If you are overweight, losing weight can be helpful.       [facility] follow up visit __________as

## 2023-08-15 ENCOUNTER — HOSPITAL ENCOUNTER (OUTPATIENT)
Dept: WOUND CARE | Age: 73
Discharge: HOME | End: 2023-08-15
Payer: MEDICARE

## 2023-08-15 VITALS
BODY MASS INDEX: 37.32 KG/M2 | HEART RATE: 62 BPM | DIASTOLIC BLOOD PRESSURE: 66 MMHG | TEMPERATURE: 97.3 F | HEIGHT: 65 IN | SYSTOLIC BLOOD PRESSURE: 135 MMHG | RESPIRATION RATE: 18 BRPM | WEIGHT: 224 LBS

## 2023-08-15 DIAGNOSIS — L97.922 ULCER OF LEFT LOWER LEG, WITH FAT LAYER EXPOSED (HCC): Chronic | ICD-10-CM

## 2023-08-15 DIAGNOSIS — L97.912 SKIN ULCER OF RIGHT LOWER LEG WITH FAT LAYER EXPOSED (HCC): ICD-10-CM

## 2023-08-15 DIAGNOSIS — I89.0 LYMPHEDEMA: Primary | ICD-10-CM

## 2023-08-15 PROCEDURE — 99212 OFFICE O/P EST SF 10 MIN: CPT | Performed by: NURSE PRACTITIONER

## 2023-08-15 PROCEDURE — 99213 OFFICE O/P EST LOW 20 MIN: CPT

## 2023-08-15 NOTE — PLAN OF CARE
Problem: Chronic Conditions and Co-morbidities  Goal: Patient's chronic conditions and co-morbidity symptoms are monitored and maintained or improved  Outcome: Progressing     Problem: Discharge Planning  Goal: Discharge to home or other facility with appropriate resources  Outcome: Progressing     Problem: Wound:  Goal: Will show signs of wound healing; wound closure and no evidence of infection  Description: Will show signs of wound healing; wound closure and no evidence of infection  Outcome: Progressing     Problem: Compression therapy:  Goal: Will be free from complications associated with compression therapy  Description: Will be free from complications associated with compression therapy  Outcome: Progressing     Problem: Weight control:  Goal: Ability to maintain an optimal weight for height and age will be supported  Description: Ability to maintain an optimal weight for height and age will be supported  Outcome: Progressing     Problem: Falls - Risk of:  Goal: Will remain free from falls  Description: Will remain free from falls  Outcome: Progressing

## 2023-08-15 NOTE — PROGRESS NOTES
call the [address] [place] [address] during business hours:    * Increase in Pain  * Temperature over 101  * Increase in drainage from your wound  * Drainage with a foul odor  * Bleeding  * Increase in swelling  * Need for compression bandage changes due to slippage, breakthrough drainage. If you need medical attention outside of the business hours of the [address] [place] [address] please contact your PCP or go to the nearest emergency room.           Electronically signed by SARA Kemp CNP on 8/15/2023 at 11:18 AM

## 2023-08-21 ENCOUNTER — HOSPITAL ENCOUNTER (OUTPATIENT)
Dept: WOUND CARE | Age: 73
Discharge: HOME | End: 2023-08-21
Payer: MEDICARE

## 2023-08-21 VITALS
DIASTOLIC BLOOD PRESSURE: 79 MMHG | SYSTOLIC BLOOD PRESSURE: 162 MMHG | BODY MASS INDEX: 37.32 KG/M2 | TEMPERATURE: 95.2 F | RESPIRATION RATE: 18 BRPM | WEIGHT: 224 LBS | HEIGHT: 65 IN | HEART RATE: 85 BPM

## 2023-08-21 DIAGNOSIS — L97.922 ULCER OF LEFT LOWER LEG, WITH FAT LAYER EXPOSED (HCC): Primary | Chronic | ICD-10-CM

## 2023-08-21 DIAGNOSIS — E11.69 TYPE 2 DIABETES MELLITUS WITH OTHER SPECIFIED COMPLICATION, UNSPECIFIED WHETHER LONG TERM INSULIN USE (HCC): ICD-10-CM

## 2023-08-21 DIAGNOSIS — E11.21 TYPE II DIABETES MELLITUS WITH NEPHROPATHY (HCC): ICD-10-CM

## 2023-08-21 DIAGNOSIS — Z11.59 NEED FOR HEPATITIS C SCREENING TEST: ICD-10-CM

## 2023-08-21 DIAGNOSIS — L97.912 SKIN ULCER OF RIGHT LOWER LEG WITH FAT LAYER EXPOSED (HCC): ICD-10-CM

## 2023-08-21 DIAGNOSIS — E55.9 VITAMIN D DEFICIENCY: ICD-10-CM

## 2023-08-21 DIAGNOSIS — I89.0 LYMPHEDEMA: ICD-10-CM

## 2023-08-21 LAB
25(OH)D3 SERPL-MCNC: 53.3 NG/ML
ALBUMIN SERPL-MCNC: 3 G/DL (ref 3.5–5.2)
ALP SERPL-CCNC: 69 U/L (ref 35–104)
ALT SERPL-CCNC: 10 U/L (ref 5–33)
ANION GAP SERPL CALCULATED.3IONS-SCNC: 11 MMOL/L (ref 7–19)
AST SERPL-CCNC: 21 U/L (ref 5–32)
BASOPHILS # BLD: 0.1 K/UL (ref 0–0.2)
BASOPHILS NFR BLD: 1.3 % (ref 0–1)
BILIRUB SERPL-MCNC: 0.5 MG/DL (ref 0.2–1.2)
BUN SERPL-MCNC: 24 MG/DL (ref 8–23)
CALCIUM SERPL-MCNC: 8.2 MG/DL (ref 8.8–10.2)
CHLORIDE SERPL-SCNC: 101 MMOL/L (ref 98–111)
CHOLEST SERPL-MCNC: 88 MG/DL (ref 160–199)
CO2 SERPL-SCNC: 27 MMOL/L (ref 22–29)
CREAT SERPL-MCNC: 2.5 MG/DL (ref 0.5–0.9)
EOSINOPHIL # BLD: 0.4 K/UL (ref 0–0.6)
EOSINOPHIL NFR BLD: 6.4 % (ref 0–5)
ERYTHROCYTE [DISTWIDTH] IN BLOOD BY AUTOMATED COUNT: 15.9 % (ref 11.5–14.5)
GLUCOSE SERPL-MCNC: 77 MG/DL (ref 74–109)
HBA1C MFR BLD: 4.9 % (ref 4–6)
HCT VFR BLD AUTO: 29.2 % (ref 37–47)
HCV AB SERPL QL IA: NORMAL
HDLC SERPL-MCNC: 33 MG/DL (ref 65–121)
HGB BLD-MCNC: 9.6 G/DL (ref 12–16)
IMM GRANULOCYTES # BLD: 0 K/UL
LDLC SERPL CALC-MCNC: 36 MG/DL
LYMPHOCYTES # BLD: 1.2 K/UL (ref 1.1–4.5)
LYMPHOCYTES NFR BLD: 21.5 % (ref 20–40)
MCH RBC QN AUTO: 34 PG (ref 27–31)
MCHC RBC AUTO-ENTMCNC: 32.9 G/DL (ref 33–37)
MCV RBC AUTO: 103.5 FL (ref 81–99)
MONOCYTES # BLD: 0.4 K/UL (ref 0–0.9)
MONOCYTES NFR BLD: 7.1 % (ref 0–10)
NEUTROPHILS # BLD: 3.5 K/UL (ref 1.5–7.5)
NEUTS SEG NFR BLD: 63.5 % (ref 50–65)
PLATELET # BLD AUTO: 245 K/UL (ref 130–400)
PMV BLD AUTO: 10.6 FL (ref 9.4–12.3)
POTASSIUM SERPL-SCNC: 4.2 MMOL/L (ref 3.5–5)
PROT SERPL-MCNC: 6.4 G/DL (ref 6.6–8.7)
RBC # BLD AUTO: 2.82 M/UL (ref 4.2–5.4)
SODIUM SERPL-SCNC: 139 MMOL/L (ref 136–145)
TRIGL SERPL-MCNC: 97 MG/DL (ref 0–149)
TSH SERPL DL<=0.005 MIU/L-ACNC: 2.22 UIU/ML (ref 0.27–4.2)
WBC # BLD AUTO: 5.5 K/UL (ref 4.8–10.8)

## 2023-08-21 PROCEDURE — 99212 OFFICE O/P EST SF 10 MIN: CPT | Performed by: NURSE PRACTITIONER

## 2023-08-21 PROCEDURE — 29580 STRAPPING UNNA BOOT: CPT

## 2023-08-21 RX ORDER — LIDOCAINE HYDROCHLORIDE 20 MG/ML
JELLY TOPICAL PRN
Status: DISCONTINUED | OUTPATIENT
Start: 2023-08-21 | End: 2023-08-23 | Stop reason: HOSPADM

## 2023-08-21 ASSESSMENT — PAIN DESCRIPTION - ONSET: ONSET: ON-GOING

## 2023-08-21 ASSESSMENT — PAIN DESCRIPTION - PAIN TYPE: TYPE: ACUTE PAIN

## 2023-08-21 ASSESSMENT — PAIN DESCRIPTION - DESCRIPTORS: DESCRIPTORS: ACHING;BURNING;THROBBING;STABBING

## 2023-08-21 ASSESSMENT — PAIN SCALES - GENERAL: PAINLEVEL_OUTOF10: 10

## 2023-08-21 ASSESSMENT — PAIN DESCRIPTION - ORIENTATION: ORIENTATION: RIGHT;LEFT

## 2023-08-21 ASSESSMENT — PAIN DESCRIPTION - FREQUENCY: FREQUENCY: INTERMITTENT

## 2023-08-21 ASSESSMENT — PAIN DESCRIPTION - LOCATION: LOCATION: LEG

## 2023-08-21 ASSESSMENT — PAIN - FUNCTIONAL ASSESSMENT: PAIN_FUNCTIONAL_ASSESSMENT: PREVENTS OR INTERFERES SOME ACTIVE ACTIVITIES AND ADLS

## 2023-08-21 NOTE — PROGRESS NOTES
Distance (cm) 0 08/21/23 0845   Wound Assessment Pink/red;Ruptured blister 08/21/23 0845   Drainage Amount Moderate (25-50%) 08/21/23 0845   Drainage Description Serous;Pink 08/21/23 0845   Odor None 08/21/23 0845   Tram-wound Assessment Fragile;Edematous; Blisters 08/21/23 0845   Margins Defined edges 08/21/23 0845   Wound Thickness Description not for Pressure Injury Full thickness 08/21/23 0845   Number of days: 0       Wound 08/21/23 Pretibial Right; Anterior #3 RLE anterior cluster (venous) (Active)   Wound Image   08/21/23 0845   Wound Etiology Venous 08/21/23 0845   Dressing Status New dressing applied 08/21/23 0853   Wound Cleansed Soap and water 08/21/23 0845   Dressing/Treatment Xeroform 08/21/23 0853   Wound Length (cm) 13.5 cm 08/21/23 0845   Wound Width (cm) 4 cm 08/21/23 0845   Wound Depth (cm) 0.1 cm 08/21/23 0845   Wound Surface Area (cm^2) 54 cm^2 08/21/23 0845   Wound Volume (cm^3) 5.4 cm^3 08/21/23 0845   Distance Tunneling (cm) 0 cm 08/21/23 0845   Tunneling Position ___ O'Clock 0 08/21/23 0845   Undermining Starts ___ O'Clock 0 08/21/23 0845   Undermining Ends___ O'Clock 0 08/21/23 0845   Undermining Maxium Distance (cm) 0 08/21/23 0845   Wound Assessment Pink/red;Fluid filled blister 08/21/23 0845   Drainage Amount Moderate (25-50%) 08/21/23 0845   Drainage Description Serous;Pink 08/21/23 0845   Odor None 08/21/23 0845   Tram-wound Assessment Edematous;Fragile 08/21/23 0845   Margins Defined edges 08/21/23 0845   Wound Thickness Description not for Pressure Injury Full thickness 08/21/23 0845   Number of days: 0       Wound 08/21/23 Pretibial Right #2 RLE medial (venous) (Active)   Wound Image   08/21/23 0845   Wound Etiology Venous 08/21/23 0845   Dressing Status New dressing applied 08/21/23 0853   Wound Cleansed Soap and water 08/21/23 0845   Dressing/Treatment Xeroform 08/21/23 0853   Wound Length (cm) 8 cm 08/21/23 0845   Wound Width (cm) 7 cm 08/21/23 0845   Wound Depth (cm) 0.1 cm

## 2023-08-21 NOTE — DISCHARGE INSTRUCTIONS
Orders:  Protein rich diet (unless restricted by your physician); Multivitamin daily; Elevate legs above the level of your heart when sitting 3-4 times daily for at least one hour each time, avoid standing for long periods of time. [facility] follow up visit _________ this week nurse visit and 1 week Abbey____________________  (Please note your next appointment above and if you are unable to keep, kindly give a 24 hour notice. Thank you.)          If you experience any of the following, please call the [facility] during business hours:    * Increase in Pain  * Temperature over 101  * Increase in drainage from your wound  * Drainage with a foul odor  * Bleeding  * Increase in swelling  * Need for compression bandage changes due to slippage, breakthrough drainage. If you need medical attention outside of the business hours of the [facility] please contact your PCP or go to the nearest emergency room.

## 2023-08-21 NOTE — PLAN OF CARE
[facility] Application   Below Knee    NAME:  Teo Pagan  YOB: 1950  MEDICAL RECORD NUMBER:  [id number]  DATE:  8/21/2023    Sneha Whittington boot: Applied moisturizing agent to dry skin as needed. Appied primary and secondary dressing as ordered. Applied Unna roll from toes to knee overlapping each time. Applied ace wrap or coban from toes to below the knee. Instructed patient/caregiver to keep dressing dry and intact. DO NOT REMOVE DRESSING. Instructed pt/family/caregiver to report excessive draining, loose bandage, wet dressing, severe pain or tingling in toes. Applied [facility] dressing below the knee to right lower leg. Applied [facility] dressing below the knee to left lower leg. Unna Boot(s) were applied per  Guidelines.      Electronically signed by Jovita Cuellar RN on 8/21/2023 at 9:08 AM

## 2023-08-24 ENCOUNTER — TELEPHONE (OUTPATIENT)
Dept: WOUND CARE | Age: 73
End: 2023-08-24

## 2023-08-24 ENCOUNTER — HOSPITAL ENCOUNTER (OUTPATIENT)
Dept: WOUND CARE | Age: 73
Discharge: HOME | End: 2023-08-24
Payer: MEDICARE

## 2023-08-24 VITALS
RESPIRATION RATE: 20 BRPM | DIASTOLIC BLOOD PRESSURE: 73 MMHG | HEART RATE: 63 BPM | TEMPERATURE: 98.2 F | SYSTOLIC BLOOD PRESSURE: 141 MMHG | BODY MASS INDEX: 37.32 KG/M2 | HEIGHT: 65 IN | WEIGHT: 224 LBS

## 2023-08-24 DIAGNOSIS — L97.912 SKIN ULCER OF RIGHT LOWER LEG WITH FAT LAYER EXPOSED (HCC): ICD-10-CM

## 2023-08-24 DIAGNOSIS — L97.922 ULCER OF LEFT LOWER LEG, WITH FAT LAYER EXPOSED (HCC): Primary | Chronic | ICD-10-CM

## 2023-08-24 PROCEDURE — 29580 STRAPPING UNNA BOOT: CPT

## 2023-08-24 RX ORDER — DOXYCYCLINE 100 MG/1
100 CAPSULE ORAL 2 TIMES DAILY
Qty: 60 CAPSULE | Refills: 0 | Status: SHIPPED | OUTPATIENT
Start: 2023-08-24 | End: 2023-09-23

## 2023-08-24 ASSESSMENT — PAIN DESCRIPTION - PAIN TYPE: TYPE: ACUTE PAIN

## 2023-08-24 ASSESSMENT — PAIN SCALES - GENERAL: PAINLEVEL_OUTOF10: 10

## 2023-08-24 ASSESSMENT — PAIN DESCRIPTION - ORIENTATION: ORIENTATION: RIGHT;LEFT

## 2023-08-24 ASSESSMENT — PAIN - FUNCTIONAL ASSESSMENT: PAIN_FUNCTIONAL_ASSESSMENT: PREVENTS OR INTERFERES SOME ACTIVE ACTIVITIES AND ADLS

## 2023-08-24 ASSESSMENT — PAIN DESCRIPTION - FREQUENCY: FREQUENCY: INTERMITTENT

## 2023-08-24 ASSESSMENT — PAIN DESCRIPTION - ONSET: ONSET: ON-GOING

## 2023-08-24 NOTE — TELEPHONE ENCOUNTER
Ms. Elizabeth Poole is in a lot of pain and nurses are concerned for infection, I looked at her wounds and I think she would benefit from Doxycyline for 1 month to slow down drainage.

## 2023-08-24 NOTE — WOUND CARE
[facility] Application   Below Knee    NAME:  Miles Yang  YOB: 1950  MEDICAL RECORD NUMBER:  [id number]  DATE:  8/24/2023    Howard Mis boot: Applied moisturizing agent to dry skin as needed. Appied primary and secondary dressing as ordered. Applied Unna roll from toes to knee overlapping each time. Applied ace wrap or coban from toes to below the knee. Instructed patient/caregiver to keep dressing dry and intact. DO NOT REMOVE DRESSING. Instructed pt/family/caregiver to report excessive draining, loose bandage, wet dressing, severe pain or tingling in toes. Applied [facility] dressing below the knee to right lower leg. Applied [facility] dressing below the knee to left lower leg. Unna Boot(s) were applied per  Guidelines.      Electronically signed by Keshav Mott RN on 8/24/2023 at 11:32 AM

## 2023-08-25 NOTE — DISCHARGE INSTRUCTIONS
the bandage. Treatment Orders:   Protein rich diet (unless restricted by your physician); Multivitamin daily; Elevate legs above the level of your heart when sitting 3-4 times daily for at least one hour each time, avoid standing for long periods of time. [facility] follow up visit _________1 week with Abbey____________________  (Please note your next appointment above and if you are unable to keep, kindly give a 24 hour notice. Thank you.)          If you experience any of the following, please call the [facility] during business hours:    * Increase in Pain  * Temperature over 101  * Increase in drainage from your wound  * Drainage with a foul odor  * Bleeding  * Increase in swelling  * Need for compression bandage changes due to slippage, breakthrough drainage. If you need medical attention outside of the business hours of the [facility] please contact your PCP or go to the nearest emergency room. \

## 2023-08-28 ENCOUNTER — HOSPITAL ENCOUNTER (OUTPATIENT)
Dept: WOUND CARE | Age: 73
Discharge: HOME | End: 2023-08-28
Payer: MEDICARE

## 2023-08-28 ENCOUNTER — OFFICE VISIT (OUTPATIENT)
Dept: INTERNAL MEDICINE | Age: 73
End: 2023-08-28
Payer: MEDICARE

## 2023-08-28 VITALS
HEART RATE: 68 BPM | HEIGHT: 65 IN | SYSTOLIC BLOOD PRESSURE: 155 MMHG | DIASTOLIC BLOOD PRESSURE: 76 MMHG | WEIGHT: 224 LBS | RESPIRATION RATE: 20 BRPM | BODY MASS INDEX: 37.32 KG/M2 | TEMPERATURE: 97.9 F

## 2023-08-28 VITALS
HEART RATE: 72 BPM | DIASTOLIC BLOOD PRESSURE: 80 MMHG | OXYGEN SATURATION: 100 % | BODY MASS INDEX: 37.32 KG/M2 | WEIGHT: 224 LBS | SYSTOLIC BLOOD PRESSURE: 138 MMHG | HEIGHT: 65 IN | RESPIRATION RATE: 22 BRPM

## 2023-08-28 DIAGNOSIS — N18.4 STAGE 4 CHRONIC KIDNEY DISEASE (HCC): ICD-10-CM

## 2023-08-28 DIAGNOSIS — Z91.09 ENVIRONMENTAL ALLERGIES: ICD-10-CM

## 2023-08-28 DIAGNOSIS — E11.21 TYPE II DIABETES MELLITUS WITH NEPHROPATHY (HCC): Primary | ICD-10-CM

## 2023-08-28 DIAGNOSIS — R53.1 GENERAL WEAKNESS: ICD-10-CM

## 2023-08-28 DIAGNOSIS — E78.5 HYPERLIPIDEMIA, UNSPECIFIED HYPERLIPIDEMIA TYPE: ICD-10-CM

## 2023-08-28 DIAGNOSIS — J45.20 MILD INTERMITTENT REACTIVE AIRWAY DISEASE WITHOUT COMPLICATION: ICD-10-CM

## 2023-08-28 DIAGNOSIS — M19.90 OSTEOARTHRITIS, UNSPECIFIED OSTEOARTHRITIS TYPE, UNSPECIFIED SITE: ICD-10-CM

## 2023-08-28 DIAGNOSIS — L97.912 SKIN ULCER OF RIGHT LOWER LEG WITH FAT LAYER EXPOSED (HCC): Primary | ICD-10-CM

## 2023-08-28 DIAGNOSIS — I10 PRIMARY HYPERTENSION: ICD-10-CM

## 2023-08-28 DIAGNOSIS — E55.9 VITAMIN D DEFICIENCY: ICD-10-CM

## 2023-08-28 DIAGNOSIS — I25.83 CORONARY ARTERY DISEASE DUE TO LIPID RICH PLAQUE: ICD-10-CM

## 2023-08-28 DIAGNOSIS — D50.9 IRON DEFICIENCY ANEMIA, UNSPECIFIED IRON DEFICIENCY ANEMIA TYPE: ICD-10-CM

## 2023-08-28 DIAGNOSIS — E11.69 TYPE 2 DIABETES MELLITUS WITH OTHER SPECIFIED COMPLICATION, UNSPECIFIED WHETHER LONG TERM INSULIN USE (HCC): ICD-10-CM

## 2023-08-28 DIAGNOSIS — I25.10 CORONARY ARTERY DISEASE DUE TO LIPID RICH PLAQUE: ICD-10-CM

## 2023-08-28 PROCEDURE — 3044F HG A1C LEVEL LT 7.0%: CPT | Performed by: INTERNAL MEDICINE

## 2023-08-28 PROCEDURE — 99212 OFFICE O/P EST SF 10 MIN: CPT | Performed by: NURSE PRACTITIONER

## 2023-08-28 PROCEDURE — 99214 OFFICE O/P EST MOD 30 MIN: CPT | Performed by: INTERNAL MEDICINE

## 2023-08-28 PROCEDURE — 1123F ACP DISCUSS/DSCN MKR DOCD: CPT | Performed by: INTERNAL MEDICINE

## 2023-08-28 PROCEDURE — 3017F COLORECTAL CA SCREEN DOC REV: CPT | Performed by: INTERNAL MEDICINE

## 2023-08-28 PROCEDURE — G8417 CALC BMI ABV UP PARAM F/U: HCPCS | Performed by: INTERNAL MEDICINE

## 2023-08-28 PROCEDURE — 3075F SYST BP GE 130 - 139MM HG: CPT | Performed by: INTERNAL MEDICINE

## 2023-08-28 PROCEDURE — G8427 DOCREV CUR MEDS BY ELIG CLIN: HCPCS | Performed by: INTERNAL MEDICINE

## 2023-08-28 PROCEDURE — G8399 PT W/DXA RESULTS DOCUMENT: HCPCS | Performed by: INTERNAL MEDICINE

## 2023-08-28 PROCEDURE — 3079F DIAST BP 80-89 MM HG: CPT | Performed by: INTERNAL MEDICINE

## 2023-08-28 PROCEDURE — 1036F TOBACCO NON-USER: CPT | Performed by: INTERNAL MEDICINE

## 2023-08-28 PROCEDURE — 29581 APPL MULTLAYER CMPRN SYS LEG: CPT

## 2023-08-28 PROCEDURE — 1090F PRES/ABSN URINE INCON ASSESS: CPT | Performed by: INTERNAL MEDICINE

## 2023-08-28 PROCEDURE — 2022F DILAT RTA XM EVC RTNOPTHY: CPT | Performed by: INTERNAL MEDICINE

## 2023-08-28 RX ORDER — LIDOCAINE HYDROCHLORIDE 20 MG/ML
JELLY TOPICAL ONCE
OUTPATIENT
Start: 2023-08-28 | End: 2023-08-28

## 2023-08-28 RX ORDER — LIDOCAINE 40 MG/G
CREAM TOPICAL ONCE
OUTPATIENT
Start: 2023-08-28 | End: 2023-08-28

## 2023-08-28 RX ORDER — GENTAMICIN SULFATE 1 MG/G
OINTMENT TOPICAL ONCE
OUTPATIENT
Start: 2023-08-28 | End: 2023-08-28

## 2023-08-28 RX ORDER — LIDOCAINE HYDROCHLORIDE 40 MG/ML
SOLUTION TOPICAL ONCE
OUTPATIENT
Start: 2023-08-28 | End: 2023-08-28

## 2023-08-28 RX ORDER — LIDOCAINE 50 MG/G
OINTMENT TOPICAL ONCE
OUTPATIENT
Start: 2023-08-28 | End: 2023-08-28

## 2023-08-28 RX ORDER — CLOBETASOL PROPIONATE 0.5 MG/G
OINTMENT TOPICAL ONCE
OUTPATIENT
Start: 2023-08-28 | End: 2023-08-28

## 2023-08-28 RX ORDER — BETAMETHASONE DIPROPIONATE 0.05 %
OINTMENT (GRAM) TOPICAL ONCE
OUTPATIENT
Start: 2023-08-28 | End: 2023-08-28

## 2023-08-28 RX ORDER — IBUPROFEN 200 MG
TABLET ORAL ONCE
OUTPATIENT
Start: 2023-08-28 | End: 2023-08-28

## 2023-08-28 RX ORDER — SODIUM CHLOR/HYPOCHLOROUS ACID 0.033 %
SOLUTION, IRRIGATION IRRIGATION ONCE
OUTPATIENT
Start: 2023-08-28 | End: 2023-08-28

## 2023-08-28 RX ORDER — BACITRACIN ZINC AND POLYMYXIN B SULFATE 500; 1000 [USP'U]/G; [USP'U]/G
OINTMENT TOPICAL ONCE
OUTPATIENT
Start: 2023-08-28 | End: 2023-08-28

## 2023-08-28 RX ORDER — GINSENG 100 MG
CAPSULE ORAL ONCE
OUTPATIENT
Start: 2023-08-28 | End: 2023-08-28

## 2023-08-28 SDOH — ECONOMIC STABILITY: FOOD INSECURITY: WITHIN THE PAST 12 MONTHS, YOU WORRIED THAT YOUR FOOD WOULD RUN OUT BEFORE YOU GOT MONEY TO BUY MORE.: NEVER TRUE

## 2023-08-28 SDOH — ECONOMIC STABILITY: INCOME INSECURITY: HOW HARD IS IT FOR YOU TO PAY FOR THE VERY BASICS LIKE FOOD, HOUSING, MEDICAL CARE, AND HEATING?: NOT HARD AT ALL

## 2023-08-28 SDOH — ECONOMIC STABILITY: FOOD INSECURITY: WITHIN THE PAST 12 MONTHS, THE FOOD YOU BOUGHT JUST DIDN'T LAST AND YOU DIDN'T HAVE MONEY TO GET MORE.: NEVER TRUE

## 2023-08-28 ASSESSMENT — PAIN DESCRIPTION - FREQUENCY: FREQUENCY: INTERMITTENT

## 2023-08-28 ASSESSMENT — ENCOUNTER SYMPTOMS
COLOR CHANGE: 0
SINUS PRESSURE: 0
SINUS PAIN: 0
WHEEZING: 0
DIARRHEA: 0
EYE DISCHARGE: 0
VOICE CHANGE: 0
BLOOD IN STOOL: 0
TROUBLE SWALLOWING: 0
CHEST TIGHTNESS: 0
NAUSEA: 0
COUGH: 0
VOMITING: 0
SORE THROAT: 0
CONSTIPATION: 0
RHINORRHEA: 0
CHOKING: 0
SHORTNESS OF BREATH: 0
ABDOMINAL DISTENTION: 0
ABDOMINAL PAIN: 0
EYE ITCHING: 0
STRIDOR: 0

## 2023-08-28 ASSESSMENT — PAIN DESCRIPTION - ONSET: ONSET: ON-GOING

## 2023-08-28 ASSESSMENT — PATIENT HEALTH QUESTIONNAIRE - PHQ9
SUM OF ALL RESPONSES TO PHQ QUESTIONS 1-9: 0
SUM OF ALL RESPONSES TO PHQ QUESTIONS 1-9: 0
SUM OF ALL RESPONSES TO PHQ9 QUESTIONS 1 & 2: 0
SUM OF ALL RESPONSES TO PHQ QUESTIONS 1-9: 0
1. LITTLE INTEREST OR PLEASURE IN DOING THINGS: 0
SUM OF ALL RESPONSES TO PHQ QUESTIONS 1-9: 0
2. FEELING DOWN, DEPRESSED OR HOPELESS: 0

## 2023-08-28 ASSESSMENT — PAIN DESCRIPTION - ORIENTATION: ORIENTATION: RIGHT;LEFT

## 2023-08-28 ASSESSMENT — PAIN SCALES - GENERAL: PAINLEVEL_OUTOF10: 10

## 2023-08-28 ASSESSMENT — PAIN DESCRIPTION - PAIN TYPE: TYPE: ACUTE PAIN

## 2023-08-28 ASSESSMENT — PAIN DESCRIPTION - LOCATION: LOCATION: LEG

## 2023-08-28 ASSESSMENT — PAIN - FUNCTIONAL ASSESSMENT: PAIN_FUNCTIONAL_ASSESSMENT: PREVENTS OR INTERFERES SOME ACTIVE ACTIVITIES AND ADLS

## 2023-08-28 NOTE — PROGRESS NOTES
[address]   Progress Note and Procedure Note      [address] RECORD NUMBER:  [id number]  AGE: 74 y.o. GENDER: female  : 1950  EPISODE DATE:  2023    Subjective:     Chief Complaint   Patient presents with    Wound Check     Patient presents for recheck of bilateral leg wounds         HISTORY of PRESENT ILLNESS JOHN Ochoa is a 74 y.o. female who presents today for wound/ulcer evaluation.    History of Wound Context: right leg and left leg wound follow up/eval and treat    Ulcer Identification:  Ulcer Type: venous  Contributing Factors: edema, lymphedema, and diabetes    Wound: Blister        PAST MEDICAL HISTORY        Diagnosis Date    Acute sinusitis     [place]O (acute kidney injury) ([address]) 2018    [place]O (acute kidney injury) ([address]) 2018    Anemia     Bleeding ulcer     hx of; cauterized by dr. resendiz    Breast cancer [facility])     Breast lump     CAD (coronary artery disease)     sees dr. Pearson Abo ulcer [facility])     Cataract     Chronic kidney disease, stage 3b ([address]) 2021    COVID-19 2021    fatigue    Dermatitis     Diabetes mellitus ([address])     Diabetic nephropathy ([address])     Diabetic retinopathy ([address])     Glaucoma     Gout     Hemorrhoids     Hx of TB skin testing     positive    Hyperlipidemia     Hypertension     Long term current use of aromatase inhibitor 2022    Osteopenia of left hip 2022    Palliative care patient 2021    Type 2 diabetes mellitus ([address]) 2018    Vitamin D deficiency        PAST SURGICAL HISTORY    Past Surgical History:   Procedure Laterality Date    BREAST LUMPECTOMY Right 10/29/2021    RIGHT LUMPECTOMY, SNB, PREOP ULTRASOUND, INTRAOP ULTRASOUND GUIDED NL, BIOSORB, PEC BLOCK, MARGIN PROBE, FLAPS, I[facility] [address]  02/15/2022    CARDIAC SURGERY      CARPAL TUNNEL RELEASE Right 9/3/2020    RIGHT CARPAL TUNNEL RELEASE performed by

## 2023-08-28 NOTE — PLAN OF CARE
Multilayer Compression Wrap   (Not Unna) Below the Knee    NAME:  Katty Garza OF BIRTH:  1950  MEDICAL RECORD NUMBER:  [id number]  DATE:  8/28/2023    Multilayer compression wrap: Removed old Multilayer wrap if indicated and wash leg with mild soap/water. Applied moisturizing agent to dry skin as needed. Applied primary and secondary dressing as ordered. Applied multilayered dressing below the knee to right lower leg. Applied multilayered dressing below the knee to left lower leg. Instructed patient/caregiver not to remove dressing and to keep it clean and dry. Instructed patient/caregiver on complications to report to provider, such as pain, numbness in toes, heavy drainage, and slippage of dressing. Instructed patient on purpose of compression dressing and on activity and exercise recommendations.       Electronically signed by Tc Ashraf RN on 8/28/2023 at 9:51 AM

## 2023-08-31 ENCOUNTER — HOSPITAL ENCOUNTER (OUTPATIENT)
Dept: WOUND CARE | Age: 73
Discharge: HOME | End: 2023-08-31
Payer: MEDICARE

## 2023-08-31 VITALS
SYSTOLIC BLOOD PRESSURE: 155 MMHG | TEMPERATURE: 97.9 F | RESPIRATION RATE: 20 BRPM | HEART RATE: 68 BPM | DIASTOLIC BLOOD PRESSURE: 76 MMHG

## 2023-08-31 DIAGNOSIS — L97.922 ULCER OF LEFT LOWER LEG, WITH FAT LAYER EXPOSED (HCC): Chronic | ICD-10-CM

## 2023-08-31 DIAGNOSIS — L97.912 SKIN ULCER OF RIGHT LOWER LEG WITH FAT LAYER EXPOSED (HCC): Primary | ICD-10-CM

## 2023-08-31 PROCEDURE — 29580 STRAPPING UNNA BOOT: CPT

## 2023-08-31 RX ORDER — LIDOCAINE HYDROCHLORIDE 40 MG/ML
SOLUTION TOPICAL ONCE
OUTPATIENT
Start: 2023-08-31 | End: 2023-08-31

## 2023-08-31 RX ORDER — LIDOCAINE 50 MG/G
OINTMENT TOPICAL ONCE
OUTPATIENT
Start: 2023-08-31 | End: 2023-08-31

## 2023-08-31 RX ORDER — BACITRACIN ZINC AND POLYMYXIN B SULFATE 500; 1000 [USP'U]/G; [USP'U]/G
OINTMENT TOPICAL ONCE
OUTPATIENT
Start: 2023-08-31 | End: 2023-08-31

## 2023-08-31 RX ORDER — LIDOCAINE HYDROCHLORIDE 20 MG/ML
JELLY TOPICAL ONCE
OUTPATIENT
Start: 2023-08-31 | End: 2023-08-31

## 2023-08-31 RX ORDER — GENTAMICIN SULFATE 1 MG/G
OINTMENT TOPICAL ONCE
OUTPATIENT
Start: 2023-08-31 | End: 2023-08-31

## 2023-08-31 RX ORDER — IBUPROFEN 200 MG
TABLET ORAL ONCE
OUTPATIENT
Start: 2023-08-31 | End: 2023-08-31

## 2023-08-31 RX ORDER — GINSENG 100 MG
CAPSULE ORAL ONCE
OUTPATIENT
Start: 2023-08-31 | End: 2023-08-31

## 2023-08-31 RX ORDER — SODIUM CHLOR/HYPOCHLOROUS ACID 0.033 %
SOLUTION, IRRIGATION IRRIGATION ONCE
OUTPATIENT
Start: 2023-08-31 | End: 2023-08-31

## 2023-08-31 RX ORDER — BETAMETHASONE DIPROPIONATE 0.05 %
OINTMENT (GRAM) TOPICAL ONCE
OUTPATIENT
Start: 2023-08-31 | End: 2023-08-31

## 2023-08-31 RX ORDER — CLOBETASOL PROPIONATE 0.5 MG/G
OINTMENT TOPICAL ONCE
OUTPATIENT
Start: 2023-08-31 | End: 2023-08-31

## 2023-08-31 RX ORDER — LIDOCAINE 40 MG/G
CREAM TOPICAL ONCE
OUTPATIENT
Start: 2023-08-31 | End: 2023-08-31

## 2023-08-31 ASSESSMENT — PAIN DESCRIPTION - ONSET: ONSET: ON-GOING

## 2023-08-31 ASSESSMENT — PAIN DESCRIPTION - PAIN TYPE: TYPE: ACUTE PAIN

## 2023-08-31 ASSESSMENT — PAIN SCALES - GENERAL: PAINLEVEL_OUTOF10: 10

## 2023-08-31 ASSESSMENT — PAIN DESCRIPTION - LOCATION: LOCATION: LEG

## 2023-08-31 ASSESSMENT — PAIN DESCRIPTION - FREQUENCY: FREQUENCY: INTERMITTENT

## 2023-08-31 ASSESSMENT — PAIN - FUNCTIONAL ASSESSMENT: PAIN_FUNCTIONAL_ASSESSMENT: PREVENTS OR INTERFERES SOME ACTIVE ACTIVITIES AND ADLS

## 2023-08-31 ASSESSMENT — PAIN DESCRIPTION - ORIENTATION: ORIENTATION: RIGHT;LEFT

## 2023-08-31 NOTE — WOUND CARE
[facility] Application   Below Knee    NAME:  Teo Pagan  YOB: 1950  MEDICAL RECORD NUMBER:  [id number]  DATE:  8/31/2023    Sneha Whittington boot: Applied moisturizing agent to dry skin as needed. Appied primary and secondary dressing as ordered. Applied Unna roll from toes to knee overlapping each time. Applied ace wrap or coban from toes to below the knee. Instructed patient/caregiver to keep dressing dry and intact. DO NOT REMOVE DRESSING. Instructed pt/family/caregiver to report excessive draining, loose bandage, wet dressing, severe pain or tingling in toes. Applied [facility] dressing below the knee to right lower leg. Applied [facility] dressing below the knee to left lower leg. Unna Boot(s) were applied per  Guidelines.      Electronically signed by Aidee Cuellar RN on 8/31/2023 at 1:30 PM

## 2023-08-31 NOTE — PLAN OF CARE
Problem: Chronic Conditions and Co-morbidities  Goal: Patient's chronic conditions and co-morbidity symptoms are monitored and maintained or improved  Outcome: Progressing     Problem: Discharge Planning  Goal: Discharge to home or other facility with appropriate resources  Outcome: Progressing     Problem: Pain  Goal: Verbalizes/displays adequate comfort level or baseline comfort level  Outcome: Progressing     Problem: Wound:  Goal: Will show signs of wound healing; wound closure and no evidence of infection  Description: Will show signs of wound healing; wound closure and no evidence of infection  Outcome: Progressing     Problem: Venous:  Goal: Signs of wound healing will improve  Description: Signs of wound healing will improve  Outcome: Progressing     Problem: Compression therapy:  Goal: Will be free from complications associated with compression therapy  Description: Will be free from complications associated with compression therapy  Outcome: Progressing     Problem: Weight control:  Goal: Ability to maintain an optimal weight for height and age will be supported  Description: Ability to maintain an optimal weight for height and age will be supported  Outcome: Progressing     Problem: Falls - Risk of:  Goal: Will remain free from falls  Description: Will remain free from falls  Outcome: Progressing

## 2023-09-06 ENCOUNTER — HOSPITAL ENCOUNTER (OUTPATIENT)
Dept: WOUND CARE | Age: 73
Discharge: HOME | End: 2023-09-06
Payer: MEDICARE

## 2023-09-06 VITALS
HEIGHT: 65 IN | HEART RATE: 68 BPM | BODY MASS INDEX: 37.32 KG/M2 | SYSTOLIC BLOOD PRESSURE: 155 MMHG | WEIGHT: 224 LBS | RESPIRATION RATE: 20 BRPM | TEMPERATURE: 97.9 F | DIASTOLIC BLOOD PRESSURE: 76 MMHG

## 2023-09-06 DIAGNOSIS — I73.9 PVD (PERIPHERAL VASCULAR DISEASE) (HCC): ICD-10-CM

## 2023-09-06 DIAGNOSIS — L97.912 SKIN ULCER OF RIGHT LOWER LEG WITH FAT LAYER EXPOSED (HCC): Primary | ICD-10-CM

## 2023-09-06 DIAGNOSIS — L97.922 ULCER OF LEFT LOWER LEG, WITH FAT LAYER EXPOSED (HCC): Chronic | ICD-10-CM

## 2023-09-06 PROCEDURE — 99212 OFFICE O/P EST SF 10 MIN: CPT | Performed by: NURSE PRACTITIONER

## 2023-09-06 ASSESSMENT — PAIN DESCRIPTION - LOCATION: LOCATION: LEG

## 2023-09-06 ASSESSMENT — PAIN DESCRIPTION - DESCRIPTORS: DESCRIPTORS: BURNING

## 2023-09-06 ASSESSMENT — PAIN DESCRIPTION - ORIENTATION: ORIENTATION: RIGHT;LEFT

## 2023-09-06 ASSESSMENT — PAIN SCALES - GENERAL: PAINLEVEL_OUTOF10: 4

## 2023-09-06 NOTE — PROGRESS NOTES
you.)          If you experience any of the following, please call the [address] [place] [address] during business hours:    * Increase in Pain  * Temperature over 101  * Increase in drainage from your wound  * Drainage with a foul odor  * Bleeding  * Increase in swelling  * Need for compression bandage changes due to slippage, breakthrough drainage. If you need medical attention outside of the business hours of the [address] [place] [address] please contact your PCP or go to the nearest emergency room.           Electronically signed by SARA Sales CNP on 9/6/2023 at 9:38 AM

## 2023-09-06 NOTE — PLAN OF CARE
Problem: Chronic Conditions and Co-morbidities  Goal: Patient's chronic conditions and co-morbidity symptoms are monitored and maintained or improved  Outcome: Progressing     Problem: Discharge Planning  Goal: Discharge to home or other facility with appropriate resources  Outcome: Progressing     Problem: Pain  Goal: Verbalizes/displays adequate comfort level or baseline comfort level  Outcome: Progressing     Problem: Wound:  Goal: Will show signs of wound healing; wound closure and no evidence of infection  Description: Will show signs of wound healing; wound closure and no evidence of infection  Outcome: Progressing     Problem: Venous:  Goal: Signs of wound healing will improve  Description: Signs of wound healing will improve  Outcome: Progressing     Problem: Compression therapy:  Goal: Will be free from complications associated with compression therapy  Description: Will be free from complications associated with compression therapy  Outcome: Progressing

## 2023-09-06 NOTE — PLAN OF CARE
[facility] Application   Below Knee    NAME:  Levon Guallpa  YOB: 1950  MEDICAL RECORD NUMBER:  [id number]  DATE:  9/6/2023    Rue Marker boot: Applied moisturizing agent to dry skin as needed. Appied primary and secondary dressing as ordered. Applied Unna roll from toes to knee overlapping each time. Applied ace wrap or coban from toes to below the knee. Instructed patient/caregiver to keep dressing dry and intact. DO NOT REMOVE DRESSING. Instructed pt/family/caregiver to report excessive draining, loose bandage, wet dressing, severe pain or tingling in toes. Applied [facility] dressing below the knee to right lower leg. Applied [facility] dressing below the knee to left lower leg. Unna Boot(s) were applied per  Guidelines.      Electronically signed by Timmy Sheppard RN on 9/6/2023 at 2:07 PM

## 2023-09-11 ENCOUNTER — HOSPITAL ENCOUNTER (OUTPATIENT)
Dept: WOUND CARE | Age: 73
Discharge: HOME | End: 2023-09-11
Payer: MEDICARE

## 2023-09-11 VITALS
SYSTOLIC BLOOD PRESSURE: 147 MMHG | HEIGHT: 65 IN | DIASTOLIC BLOOD PRESSURE: 85 MMHG | BODY MASS INDEX: 37.32 KG/M2 | RESPIRATION RATE: 20 BRPM | TEMPERATURE: 97.3 F | HEART RATE: 67 BPM | WEIGHT: 224 LBS

## 2023-09-11 DIAGNOSIS — L97.912 SKIN ULCER OF RIGHT LOWER LEG WITH FAT LAYER EXPOSED (HCC): Primary | ICD-10-CM

## 2023-09-11 DIAGNOSIS — L97.922 ULCER OF LEFT LOWER LEG, WITH FAT LAYER EXPOSED (HCC): Chronic | ICD-10-CM

## 2023-09-11 PROCEDURE — 99212 OFFICE O/P EST SF 10 MIN: CPT | Performed by: NURSE PRACTITIONER

## 2023-09-11 PROCEDURE — 29580 STRAPPING UNNA BOOT: CPT

## 2023-09-11 ASSESSMENT — PAIN DESCRIPTION - PAIN TYPE: TYPE: ACUTE PAIN

## 2023-09-11 ASSESSMENT — PAIN DESCRIPTION - ORIENTATION: ORIENTATION: RIGHT;LEFT

## 2023-09-11 ASSESSMENT — PAIN DESCRIPTION - FREQUENCY: FREQUENCY: INTERMITTENT

## 2023-09-11 ASSESSMENT — PAIN - FUNCTIONAL ASSESSMENT: PAIN_FUNCTIONAL_ASSESSMENT: PREVENTS OR INTERFERES SOME ACTIVE ACTIVITIES AND ADLS

## 2023-09-11 ASSESSMENT — PAIN DESCRIPTION - LOCATION: LOCATION: LEG

## 2023-09-11 ASSESSMENT — PAIN DESCRIPTION - DESCRIPTORS: DESCRIPTORS: BURNING

## 2023-09-11 ASSESSMENT — PAIN SCALES - GENERAL: PAINLEVEL_OUTOF10: 3

## 2023-09-11 ASSESSMENT — PAIN DESCRIPTION - ONSET: ONSET: ON-GOING

## 2023-09-11 NOTE — PLAN OF CARE
[place] Application   Below Knee    NAME:  Michaelle Mcbride  YOB: 1950  MEDICAL RECORD NUMBER:  [id number]  DATE:  9/11/2023    Carly Flores boot: Applied moisturizing agent to dry skin as needed. Appied primary and secondary dressing as ordered. Applied Unna roll from toes to knee overlapping each time. Applied ace wrap or coban from toes to below the knee. Instructed patient/caregiver to keep dressing dry and intact. DO NOT REMOVE DRESSING. Instructed pt/family/caregiver to report excessive draining, loose bandage, wet dressing, severe pain or tingling in toes. Applied [place] dressing below the knee to right lower leg. Unna Boot(s) were applied per  Guidelines.      Electronically signed by Philipp Foley RN on 9/11/2023 at 9:58 AM

## 2023-09-11 NOTE — PLAN OF CARE
Problem: Chronic Conditions and Co-morbidities  Goal: Patient's chronic conditions and co-morbidity symptoms are monitored and maintained or improved  Outcome: Progressing     Problem: Pain  Goal: Verbalizes/displays adequate comfort level or baseline comfort level  Outcome: Progressing     Problem: Wound:  Goal: Will show signs of wound healing; wound closure and no evidence of infection  Description: Will show signs of wound healing; wound closure and no evidence of infection  Outcome: Progressing     Problem: Venous:  Goal: Signs of wound healing will improve  Description: Signs of wound healing will improve  Outcome: Progressing     Problem: Compression therapy:  Goal: Will be free from complications associated with compression therapy  Description: Will be free from complications associated with compression therapy  Outcome: Progressing     Problem: Weight control:  Goal: Ability to maintain an optimal weight for height and age will be supported  Description: Ability to maintain an optimal weight for height and age will be supported  Outcome: Progressing     Problem: Falls - Risk of:  Goal: Will remain free from falls  Description: Will remain free from falls  Outcome: Progressing     Problem: Blood Glucose:  Goal: Ability to maintain appropriate glucose levels will improve  Description: Ability to maintain appropriate glucose levels will improve  Outcome: Progressing

## 2023-09-18 DIAGNOSIS — D50.9 IRON DEFICIENCY ANEMIA, UNSPECIFIED IRON DEFICIENCY ANEMIA TYPE: ICD-10-CM

## 2023-09-18 RX ORDER — FERROUS SULFATE 325(65) MG
TABLET ORAL
Qty: 60 TABLET | Refills: 0 | Status: SHIPPED | OUTPATIENT
Start: 2023-09-18

## 2023-09-19 ENCOUNTER — HOSPITAL ENCOUNTER (OUTPATIENT)
Dept: WOUND CARE | Age: 73
Discharge: HOME | End: 2023-09-19
Payer: MEDICARE

## 2023-09-19 VITALS
DIASTOLIC BLOOD PRESSURE: 85 MMHG | HEIGHT: 65 IN | TEMPERATURE: 97.3 F | WEIGHT: 224 LBS | RESPIRATION RATE: 20 BRPM | SYSTOLIC BLOOD PRESSURE: 147 MMHG | BODY MASS INDEX: 37.32 KG/M2 | HEART RATE: 67 BPM

## 2023-09-19 DIAGNOSIS — L97.912 SKIN ULCER OF RIGHT LOWER LEG WITH FAT LAYER EXPOSED (HCC): Primary | ICD-10-CM

## 2023-09-19 DIAGNOSIS — I73.9 PVD (PERIPHERAL VASCULAR DISEASE) (HCC): ICD-10-CM

## 2023-09-19 DIAGNOSIS — N18.4 CKD (CHRONIC KIDNEY DISEASE) STAGE 4, GFR 15-29 ML/MIN (HCC): ICD-10-CM

## 2023-09-19 DIAGNOSIS — I89.0 LYMPHEDEMA: ICD-10-CM

## 2023-09-19 DIAGNOSIS — L97.922 ULCER OF LEFT LOWER LEG, WITH FAT LAYER EXPOSED (HCC): Chronic | ICD-10-CM

## 2023-09-19 PROCEDURE — 99212 OFFICE O/P EST SF 10 MIN: CPT | Performed by: NURSE PRACTITIONER

## 2023-09-19 PROCEDURE — 99212 OFFICE O/P EST SF 10 MIN: CPT

## 2023-09-19 NOTE — DISCHARGE INSTRUCTIONS
are overweight, losing weight can be helpful. [facility] follow up visit ____as needed_________________________  (Please note your next appointment above and if you are unable to keep, kindly give a 24 hour notice. Thank you.)          If you experience any of the following, please call the [facility] during business hours:    * Increase in Pain  * Temperature over 101  * Increase in drainage from your wound  * Drainage with a foul odor  * Bleeding  * Increase in swelling  * Need for compression bandage changes due to slippage, breakthrough drainage. If you need medical attention outside of the business hours of the [facility] please contact your PCP or go to the nearest emergency room.

## 2023-09-19 NOTE — PLAN OF CARE
Problem: Chronic Conditions and Co-morbidities  Goal: Patient's chronic conditions and co-morbidity symptoms are monitored and maintained or improved  Outcome: Completed     Problem: Wound:  Goal: Will show signs of wound healing; wound closure and no evidence of infection  Description: Will show signs of wound healing; wound closure and no evidence of infection  Outcome: Completed     Problem: Venous:  Goal: Signs of wound healing will improve  Description: Signs of wound healing will improve  Outcome: Completed     Problem: Compression therapy:  Goal: Will be free from complications associated with compression therapy  Description: Will be free from complications associated with compression therapy  Outcome: Completed

## 2023-09-19 NOTE — PROGRESS NOTES
[address]   Progress Note and Procedure Note      [address] RECORD NUMBER:  [id number]  AGE: 74 y.o. GENDER: female  : 1950  EPISODE DATE:  2023    Subjective:     Chief Complaint   Patient presents with    Wound Check     Right leg wound         HISTORY of PRESENT ILLNESS JOHN Orlando is a 74 y.o. female who presents today for wound/ulcer evaluation.    History of Wound Context: right leg wound follow up/eval and treat    Ulcer Identification:  Ulcer Type: venous  Contributing Factors: lymphedema    Wound: N/A        PAST MEDICAL HISTORY        Diagnosis Date    Acute sinusitis     CHAPARRO (acute kidney injury) ([address]) 2018    CHAPARRO (acute kidney injury) ([address]) 2018    Anemia     Bleeding ulcer     hx of; cauterized by dr. resendiz    Breast cancer [facility])     Breast lump     CAD (coronary artery disease)     sees dr. Gomes Rm ulcer [facility])     Cataract     Chronic kidney disease, stage 3b ([address]) 2021    COVID-19 2021    fatigue    Dermatitis     Diabetes mellitus ([address])     Diabetic nephropathy ([address])     Diabetic retinopathy ([address])     Glaucoma     Gout     Hemorrhoids     Hx of TB skin testing     positive    Hyperlipidemia     Hypertension     Long term current use of aromatase inhibitor 2022    Osteopenia of left hip 2022    Palliative care patient 2021    Type 2 diabetes mellitus ([address]) 2018    Vitamin D deficiency        PAST SURGICAL HISTORY    Past Surgical History:   Procedure Laterality Date    BREAST LUMPECTOMY Right 10/29/2021    RIGHT LUMPECTOMY, SNB, PREOP ULTRASOUND, INTRAOP ULTRASOUND GUIDED NL, BIOSORB, PEC BLOCK, MARGIN PROBE, FLAPS, IORT performed by Kath Aguayo MD at [address]  02/15/2022    CARDIAC SURGERY      CARPAL TUNNEL RELEASE Right 9/3/2020    RIGHT CARPAL TUNNEL RELEASE performed by Kervin Terrell MD at [address] [place] [address]

## 2023-09-26 ENCOUNTER — HOSPITAL ENCOUNTER (OUTPATIENT)
Dept: WOUND CARE | Age: 73
Discharge: HOME | End: 2023-09-26
Payer: MEDICARE

## 2023-09-26 VITALS
SYSTOLIC BLOOD PRESSURE: 150 MMHG | HEIGHT: 65 IN | DIASTOLIC BLOOD PRESSURE: 78 MMHG | WEIGHT: 224 LBS | BODY MASS INDEX: 37.32 KG/M2 | TEMPERATURE: 97.3 F | RESPIRATION RATE: 20 BRPM | HEART RATE: 67 BPM

## 2023-09-26 DIAGNOSIS — I89.0 LYMPHEDEMA: ICD-10-CM

## 2023-09-26 DIAGNOSIS — N18.4 CKD (CHRONIC KIDNEY DISEASE) STAGE 4, GFR 15-29 ML/MIN (HCC): ICD-10-CM

## 2023-09-26 DIAGNOSIS — L97.912 SKIN ULCER OF RIGHT LOWER LEG WITH FAT LAYER EXPOSED (HCC): Primary | ICD-10-CM

## 2023-09-26 DIAGNOSIS — I73.9 PVD (PERIPHERAL VASCULAR DISEASE) (HCC): ICD-10-CM

## 2023-09-26 DIAGNOSIS — E11.69 TYPE 2 DIABETES MELLITUS WITH OTHER SPECIFIED COMPLICATION, UNSPECIFIED WHETHER LONG TERM INSULIN USE (HCC): ICD-10-CM

## 2023-09-26 PROCEDURE — 99213 OFFICE O/P EST LOW 20 MIN: CPT | Performed by: NURSE PRACTITIONER

## 2023-09-26 PROCEDURE — 29580 STRAPPING UNNA BOOT: CPT

## 2023-09-26 RX ORDER — GENTAMICIN SULFATE 1 MG/G
OINTMENT TOPICAL ONCE
OUTPATIENT
Start: 2023-09-26 | End: 2023-09-26

## 2023-09-26 RX ORDER — BACITRACIN ZINC AND POLYMYXIN B SULFATE 500; 1000 [USP'U]/G; [USP'U]/G
OINTMENT TOPICAL ONCE
OUTPATIENT
Start: 2023-09-26 | End: 2023-09-26

## 2023-09-26 RX ORDER — LIDOCAINE HYDROCHLORIDE 40 MG/ML
SOLUTION TOPICAL ONCE
OUTPATIENT
Start: 2023-09-26 | End: 2023-09-26

## 2023-09-26 RX ORDER — BETAMETHASONE DIPROPIONATE 0.05 %
OINTMENT (GRAM) TOPICAL ONCE
OUTPATIENT
Start: 2023-09-26 | End: 2023-09-26

## 2023-09-26 RX ORDER — LIDOCAINE 50 MG/G
OINTMENT TOPICAL ONCE
OUTPATIENT
Start: 2023-09-26 | End: 2023-09-26

## 2023-09-26 RX ORDER — SODIUM CHLOR/HYPOCHLOROUS ACID 0.033 %
SOLUTION, IRRIGATION IRRIGATION ONCE
OUTPATIENT
Start: 2023-09-26 | End: 2023-09-26

## 2023-09-26 RX ORDER — LIDOCAINE HYDROCHLORIDE 20 MG/ML
JELLY TOPICAL PRN
Status: DISCONTINUED | OUTPATIENT
Start: 2023-09-26 | End: 2023-09-28 | Stop reason: HOSPADM

## 2023-09-26 RX ORDER — LIDOCAINE HYDROCHLORIDE 20 MG/ML
JELLY TOPICAL ONCE
OUTPATIENT
Start: 2023-09-26 | End: 2023-09-26

## 2023-09-26 RX ORDER — GINSENG 100 MG
CAPSULE ORAL ONCE
OUTPATIENT
Start: 2023-09-26 | End: 2023-09-26

## 2023-09-26 RX ORDER — CLOBETASOL PROPIONATE 0.5 MG/G
OINTMENT TOPICAL ONCE
OUTPATIENT
Start: 2023-09-26 | End: 2023-09-26

## 2023-09-26 RX ORDER — LIDOCAINE 40 MG/G
CREAM TOPICAL ONCE
OUTPATIENT
Start: 2023-09-26 | End: 2023-09-26

## 2023-09-26 RX ORDER — TRIAMCINOLONE ACETONIDE 1 MG/G
OINTMENT TOPICAL ONCE
OUTPATIENT
Start: 2023-09-26 | End: 2023-09-26

## 2023-09-26 RX ORDER — IBUPROFEN 200 MG
TABLET ORAL ONCE
OUTPATIENT
Start: 2023-09-26 | End: 2023-09-26

## 2023-09-26 ASSESSMENT — VISUAL ACUITY: OU: 1

## 2023-09-26 NOTE — PLAN OF CARE
Ok to refill for 90 days   [facility] Application   Below Knee    NAME:  Matt Lynne  YOB: 1950  MEDICAL RECORD NUMBER:  [id number]  DATE:  9/26/2023    Lili Schwartz boot: Applied moisturizing agent to dry skin as needed. Appied primary and secondary dressing as ordered. Applied Unna roll from toes to knee overlapping each time. Applied ace wrap or coban from toes to below the knee. Instructed patient/caregiver to keep dressing dry and intact. DO NOT REMOVE DRESSING. Instructed pt/family/caregiver to report excessive draining, loose bandage, wet dressing, severe pain or tingling in toes. Applied [facility] dressing below the knee to right lower leg. Unna Boot(s) were applied per  Guidelines.      Electronically signed by Guero Robles RN on 9/26/2023 at 11:57 AM

## 2023-09-26 NOTE — PROGRESS NOTES
1103   Wound Volume (cm^3) 0.4 cm^3 09/26/23 1103   Wound Assessment Pink/red 09/26/23 1103   Drainage Amount Moderate (25-50%) 09/26/23 1103   Drainage Description Serosanguinous 09/26/23 1103   Odor None 09/26/23 1103   Tram-wound Assessment Fragile 09/26/23 1103   Margins Defined edges 09/26/23 1103   Wound Thickness Description not for Pressure Injury Full thickness 09/26/23 1103   Number of days: 0       Wound 09/26/23 Tibial Posterior;Right wound 2- right leg medial venous (Active)   Wound Image   09/26/23 1103   Wound Etiology Venous 09/26/23 1103   Dressing Status Old drainage noted 09/26/23 1103   Wound Cleansed Soap and water 09/26/23 1103   Dressing/Treatment Xeroform 09/26/23 1135   Wound Length (cm) 12 cm 09/26/23 1103   Wound Width (cm) 3 cm 09/26/23 1103   Wound Depth (cm) 0.1 cm 09/26/23 1103   Wound Surface Area (cm^2) 36 cm^2 09/26/23 1103   Wound Volume (cm^3) 3.6 cm^3 09/26/23 1103   Wound Assessment Pink/red 09/26/23 1103   Drainage Amount Moderate (25-50%) 09/26/23 1103   Drainage Description Serosanguinous 09/26/23 1103   Odor None 09/26/23 1103   Tram-wound Assessment Fragile 09/26/23 1103   Margins Defined edges 09/26/23 1103   Wound Thickness Description not for Pressure Injury Full thickness 09/26/23 1103   Number of days: 0      Assessment    1. CKD (chronic kidney disease) stage 4, GFR 15-29 ml/min ([facility])    2. PVD (peripheral vascular disease) ([facility])    3. Lymphedema    4. Skin ulcer of right lower leg with fat layer exposed ([address])        Plan for wound - Dress per physician order  Treatment:     Compression : Yes   Offloading : No   Dressing : xeroform, unna boot    Discussed importance of compression, leg elevation, nutrition, and plan of care. Patient understanding and questions answered. I spent a total of  30 minutes face to face with the patient. Over 100% of that time was spent on counseling and care coordination.       Patient was told that if symptoms worsen or new symptoms

## 2023-09-26 NOTE — DISCHARGE INSTRUCTIONS
[address] and Hyperbaric Oxygen Therapy   Physician Orders and Discharge Instructions  [address] [facility],[address] [place] [address], [address]  Telephone: [phone]    NAME:  Steph Cat OF BIRTH:  1950  MEDICAL RECORD NUMBER:  [id number]  DATE:  9/26/2023    Discharge condition: Stable    Discharge to: Home    Left via:Private automobile    Accompanied by:  friends    ECF/HHA: none    Dressing Orders:  Right leg Wound:   Wash with soap and water. Apply xeroform then ABD pad to wound bed then apply Calamine Coflex Unnaboot from toes to knee. Change once weekly . Compression Therapy is an important part of your program of care. Compression makes it easier for your body to pump the blood from your legs back to your heart, helping alleviate a condition called chronic venous insufficiency. Chronic venous insufficiency is an underlying cause of your injury, and managing it properly will help you to heal.      Keep the bandage in place at all times unless you experience foot pain or numbness or coolness of the toes. Do not attempt to remove and reapply the bandage system. Give it time. Your bandage may feel tight at first. This is normal. Your bandage will become more comfortable as swelling goes down. Be active, as suggested by your healthcare provider. Daily walks or mild exercise encourages better blood flow to aid healing. Put your feet up when sitting for long periods of time. Keep the bandage dry. Wet compression bandages are more likely to slip down and/or cause damage to good skin. PRECAUTIONS For your safety, compression therapy should be used under the supervision of a healthcare professional. Contact your care provider if you experience any of the following:  Pain that does not go away with elevation. Discoloration or numbness of the toes.   The bandage slipping out of place  Fluid leaking through the bandage.'    Treatment

## 2023-10-02 ENCOUNTER — HOSPITAL ENCOUNTER (OUTPATIENT)
Dept: WOUND CARE | Age: 73
Discharge: HOME | End: 2023-10-02
Payer: MEDICARE

## 2023-10-02 VITALS
DIASTOLIC BLOOD PRESSURE: 76 MMHG | TEMPERATURE: 97.2 F | BODY MASS INDEX: 37.32 KG/M2 | RESPIRATION RATE: 20 BRPM | HEIGHT: 65 IN | SYSTOLIC BLOOD PRESSURE: 154 MMHG | WEIGHT: 224 LBS | HEART RATE: 67 BPM

## 2023-10-02 DIAGNOSIS — L97.912 SKIN ULCER OF RIGHT LOWER LEG WITH FAT LAYER EXPOSED (HCC): ICD-10-CM

## 2023-10-02 DIAGNOSIS — E11.69 TYPE 2 DIABETES MELLITUS WITH OTHER SPECIFIED COMPLICATION, UNSPECIFIED WHETHER LONG TERM INSULIN USE (HCC): ICD-10-CM

## 2023-10-02 DIAGNOSIS — I89.0 LYMPHEDEMA: Primary | ICD-10-CM

## 2023-10-02 DIAGNOSIS — N18.4 CKD (CHRONIC KIDNEY DISEASE) STAGE 4, GFR 15-29 ML/MIN (HCC): ICD-10-CM

## 2023-10-02 DIAGNOSIS — I73.9 PVD (PERIPHERAL VASCULAR DISEASE) (HCC): ICD-10-CM

## 2023-10-02 PROCEDURE — 99212 OFFICE O/P EST SF 10 MIN: CPT | Performed by: NURSE PRACTITIONER

## 2023-10-02 PROCEDURE — 99213 OFFICE O/P EST LOW 20 MIN: CPT

## 2023-10-02 PROCEDURE — 6370000000 HC RX 637 (ALT 250 FOR IP): Performed by: NURSE PRACTITIONER

## 2023-10-02 RX ORDER — LIDOCAINE HYDROCHLORIDE 20 MG/ML
JELLY TOPICAL ONCE
Status: COMPLETED | OUTPATIENT
Start: 2023-10-02 | End: 2023-10-02

## 2023-10-02 RX ORDER — IBUPROFEN 200 MG
TABLET ORAL ONCE
OUTPATIENT
Start: 2023-10-02 | End: 2023-10-02

## 2023-10-02 RX ORDER — CLOBETASOL PROPIONATE 0.5 MG/G
OINTMENT TOPICAL ONCE
OUTPATIENT
Start: 2023-10-02 | End: 2023-10-02

## 2023-10-02 RX ORDER — LIDOCAINE 50 MG/G
OINTMENT TOPICAL ONCE
OUTPATIENT
Start: 2023-10-02 | End: 2023-10-02

## 2023-10-02 RX ORDER — LIDOCAINE HYDROCHLORIDE 40 MG/ML
SOLUTION TOPICAL ONCE
OUTPATIENT
Start: 2023-10-02 | End: 2023-10-02

## 2023-10-02 RX ORDER — SODIUM CHLOR/HYPOCHLOROUS ACID 0.033 %
SOLUTION, IRRIGATION IRRIGATION ONCE
OUTPATIENT
Start: 2023-10-02 | End: 2023-10-02

## 2023-10-02 RX ORDER — GINSENG 100 MG
CAPSULE ORAL ONCE
OUTPATIENT
Start: 2023-10-02 | End: 2023-10-02

## 2023-10-02 RX ORDER — LIDOCAINE HYDROCHLORIDE 20 MG/ML
JELLY TOPICAL ONCE
OUTPATIENT
Start: 2023-10-02 | End: 2023-10-02

## 2023-10-02 RX ORDER — GENTAMICIN SULFATE 1 MG/G
OINTMENT TOPICAL ONCE
OUTPATIENT
Start: 2023-10-02 | End: 2023-10-02

## 2023-10-02 RX ORDER — BACITRACIN ZINC AND POLYMYXIN B SULFATE 500; 1000 [USP'U]/G; [USP'U]/G
OINTMENT TOPICAL ONCE
OUTPATIENT
Start: 2023-10-02 | End: 2023-10-02

## 2023-10-02 RX ORDER — LIDOCAINE 40 MG/G
CREAM TOPICAL ONCE
OUTPATIENT
Start: 2023-10-02 | End: 2023-10-02

## 2023-10-02 RX ORDER — TRIAMCINOLONE ACETONIDE 1 MG/G
OINTMENT TOPICAL ONCE
OUTPATIENT
Start: 2023-10-02 | End: 2023-10-02

## 2023-10-02 RX ORDER — BETAMETHASONE DIPROPIONATE 0.05 %
OINTMENT (GRAM) TOPICAL ONCE
OUTPATIENT
Start: 2023-10-02 | End: 2023-10-02

## 2023-10-02 RX ADMIN — LIDOCAINE HYDROCHLORIDE: 20 JELLY TOPICAL at 11:09

## 2023-10-02 NOTE — PATIENT INSTRUCTIONS
Orders:  Protein rich diet (unless restricted by your physician); Multivitamin daily; Elevate legs above the level of your heart when sitting 3-4 times daily for at least one hour each time, avoid standing for long periods of time. [facility] follow up visit __1 week with Abbey___________________________  (Please note your next appointment above and if you are unable to keep, kindly give a 24 hour notice. Thank you.)          If you experience any of the following, please call the [facility] during business hours:    * Increase in Pain  * Temperature over 101  * Increase in drainage from your wound  * Drainage with a foul odor  * Bleeding  * Increase in swelling  * Need for compression bandage changes due to slippage, breakthrough drainage. If you need medical attention outside of the business hours of the [facility] please contact your PCP or go to the nearest emergency room.

## 2023-10-02 NOTE — PLAN OF CARE
Problem: Chronic Conditions and Co-morbidities  Goal: Patient's chronic conditions and co-morbidity symptoms are monitored and maintained or improved  Outcome: Progressing     Problem: Wound:  Goal: Will show signs of wound healing; wound closure and no evidence of infection  Description: Will show signs of wound healing; wound closure and no evidence of infection  Outcome: Progressing     Problem: Venous:  Goal: Signs of wound healing will improve  Description: Signs of wound healing will improve  Outcome: Progressing     Problem: Compression therapy:  Goal: Will be free from complications associated with compression therapy  Description: Will be free from complications associated with compression therapy  Outcome: Progressing     Problem: Weight control:  Goal: Ability to maintain an optimal weight for height and age will be supported  Description: Ability to maintain an optimal weight for height and age will be supported  Outcome: Progressing     Problem: Falls - Risk of:  Goal: Will remain free from falls  Description: Will remain free from falls  Outcome: Progressing     Problem: Blood Glucose:  Goal: Ability to maintain appropriate glucose levels will improve  Description: Ability to maintain appropriate glucose levels will improve  Outcome: Progressing

## 2023-10-02 NOTE — DISCHARGE INSTRUCTIONS
Treatment Orders:  Protein rich diet (unless restricted by your physician); Multivitamin daily; Elevate legs above the level of your heart when sitting 3-4 times daily for at least one hour each time, avoid standing for long periods of time. [facility] follow up visit __1 week with Abbey___________________________  (Please note your next appointment above and if you are unable to keep, kindly give a 24 hour notice. Thank you.)          If you experience any of the following, please call the [facility] during business hours:    * Increase in Pain  * Temperature over 101  * Increase in drainage from your wound  * Drainage with a foul odor  * Bleeding  * Increase in swelling  * Need for compression bandage changes due to slippage, breakthrough drainage. If you need medical attention outside of the business hours of the [facility] please contact your PCP or go to the nearest emergency room.

## 2023-10-02 NOTE — PLAN OF CARE
[employer] Application   Below Knee    NAME:  Cameron Boyd  YOB: 1950  MEDICAL RECORD NUMBER:  [id number]  DATE:  10/2/2023    Summer Maldonado boot: Applied moisturizing agent to dry skin as needed. Appied primary and secondary dressing as ordered. Applied Unna roll from toes to knee overlapping each time. Applied ace wrap or coban from toes to below the knee. Instructed patient/caregiver to keep dressing dry and intact. DO NOT REMOVE DRESSING. Instructed pt/family/caregiver to report excessive draining, loose bandage, wet dressing, severe pain or tingling in toes. Applied [employer] dressing below the knee to right lower leg. Unna Boot(s) were applied per  Guidelines.      Electronically signed by Donnie Lala RN on 10/2/2023 at 11:16 AM

## 2023-10-02 NOTE — PROGRESS NOTES
[address]   Progress Note and Procedure Note      [address] [address] RECORD NUMBER:  [id number]  AGE: 74 y.o. GENDER: female  : 1950  EPISODE DATE:  10/2/2023    Subjective:     Chief Complaint   Patient presents with    Wound Check         HISTORY of PRESENT ILLNESS HPI     Aniyah Mota is a 74 y.o. female who presents today for wound/ulcer evaluation.    History of Wound Context: right leg wound follow up/eval and treat    Ulcer Identification:  Ulcer Type: venous  Contributing Factors: edema and lymphedema    Wound: N/A        PAST MEDICAL HISTORY        Diagnosis Date    Acute sinusitis     CHAPARRO (acute kidney injury) ([address]) 2018    CHAPARRO (acute kidney injury) ([address]) 2018    Anemia     Bleeding ulcer     hx of; cauterized by dr. resendiz    Breast cancer [facility])     Breast lump     CAD (coronary artery disease)     sees dr. Niraj Milliganestic ulcer [facility])     Cataract     Chronic kidney disease, stage 3b ([address]) 2021    COVID-19 2021    fatigue    Dermatitis     Diabetes mellitus ([address])     Diabetic nephropathy ([address])     Diabetic retinopathy ([address])     Glaucoma     Gout     Hemorrhoids     Hx of TB skin testing     positive    Hyperlipidemia     Hypertension     Long term current use of aromatase inhibitor 2022    Osteopenia of left hip 2022    Palliative care patient 2021    Type 2 diabetes mellitus ([address]) 2018    Vitamin D deficiency        PAST SURGICAL HISTORY    Past Surgical History:   Procedure Laterality Date    BREAST LUMPECTOMY Right 10/29/2021    RIGHT LUMPECTOMY, SNB, PREOP ULTRASOUND, INTRAOP ULTRASOUND GUIDED NL, BIOSORB, PEC BLOCK, MARGIN PROBE, FLAPS, IORT performed by Callie Amezcua MD at [address]  02/15/2022    CARDIAC SURGERY      CARPAL TUNNEL RELEASE Right 9/3/2020    RIGHT CARPAL TUNNEL RELEASE performed by Mary Huff MD at [address] [place] [address]

## 2023-10-05 DIAGNOSIS — I10 PRIMARY HYPERTENSION: ICD-10-CM

## 2023-10-05 DIAGNOSIS — E11.21 TYPE II DIABETES MELLITUS WITH NEPHROPATHY (HCC): Primary | ICD-10-CM

## 2023-10-05 RX ORDER — LISINOPRIL 5 MG/1
5 TABLET ORAL DAILY
Qty: 90 TABLET | Refills: 3 | Status: SHIPPED | OUTPATIENT
Start: 2023-10-05

## 2023-10-06 RX ORDER — LANCETS
EACH MISCELLANEOUS
Qty: 400 EACH | Refills: 3 | Status: SHIPPED | OUTPATIENT
Start: 2023-10-06

## 2023-10-06 RX ORDER — AMLODIPINE BESYLATE 5 MG/1
TABLET ORAL
Qty: 90 TABLET | Refills: 1 | Status: SHIPPED | OUTPATIENT
Start: 2023-10-06

## 2023-10-06 RX ORDER — BLOOD SUGAR DIAGNOSTIC
STRIP MISCELLANEOUS
Qty: 400 STRIP | Refills: 3 | Status: SHIPPED | OUTPATIENT
Start: 2023-10-06

## 2023-10-06 NOTE — DISCHARGE INSTRUCTIONS
[address] and Hyperbaric Oxygen Therapy   Physician Orders and Discharge Instructions  [address], [address] [place] [address]  Telephone: [phone]    NAME:  Thelbert Halsted OF BIRTH:  1950  MEDICAL RECORD NUMBER:  [id number]  DATE:  10/6/2023    Discharge condition: Stable    Discharge to: Home    Left via:Private automobile    Accompanied by:  granddaughter    ECF/HHA: none    Remove unna boot on 10/16/23, apply compression socks    1) Moisturize your  both lower legs with a good thick cream like Eucerin Cream, Aquaphor or Cocoa Butter (in a jar) at least twice daily. Dry skin has a greater likelihood of breaking down if it is stretched by swelling. 2) Elevate legs when sitting, above the heart preferably. Avoid sleeping in a recliner. 3) Avoid standing for long periods of time. 4) Walking is good. 5)  Wear knee high graduated compression stockings 20 mmHg on both legs. (This is what prevents these ulcerations)  Put these on in the morning no more than 20 minutes after rising and remove before going to bed. You can hand wash and hang to dry overnight. We recommend having two pairs to rotate washing and wearing. 6) Be very careful with your legs. Often these are caused by a bump or scrape that doesn't heal well. Healing is prevented or slowed by your Venous insufficiency. Compression and keeping the swelling out of your legs is the key to the condition of your skin. 7) You can buy these stockings  at many pharmacies and even online, but you must have a calf and ankle measurement (taken in the morning before your legs swell) to order them. You will also need the strength that the physician has ordered. Most insurance companies do not pay for these unless you have an open ulceration currently. 8)  Replace your stockings every 6 months as they stretch out and become less effective.      9) If you are overweight,

## 2023-10-09 ENCOUNTER — HOSPITAL ENCOUNTER (OUTPATIENT)
Dept: WOUND CARE | Age: 73
Discharge: HOME | End: 2023-10-09
Payer: MEDICARE

## 2023-10-09 VITALS
BODY MASS INDEX: 37.32 KG/M2 | WEIGHT: 224 LBS | HEIGHT: 65 IN | SYSTOLIC BLOOD PRESSURE: 152 MMHG | HEART RATE: 70 BPM | DIASTOLIC BLOOD PRESSURE: 75 MMHG | RESPIRATION RATE: 20 BRPM | TEMPERATURE: 97 F

## 2023-10-09 DIAGNOSIS — E11.69 TYPE 2 DIABETES MELLITUS WITH OTHER SPECIFIED COMPLICATION, UNSPECIFIED WHETHER LONG TERM INSULIN USE (HCC): ICD-10-CM

## 2023-10-09 DIAGNOSIS — L97.912 SKIN ULCER OF RIGHT LOWER LEG WITH FAT LAYER EXPOSED (HCC): ICD-10-CM

## 2023-10-09 DIAGNOSIS — N18.4 CKD (CHRONIC KIDNEY DISEASE) STAGE 4, GFR 15-29 ML/MIN (HCC): ICD-10-CM

## 2023-10-09 DIAGNOSIS — I73.9 PVD (PERIPHERAL VASCULAR DISEASE) (HCC): ICD-10-CM

## 2023-10-09 DIAGNOSIS — I89.0 LYMPHEDEMA: Primary | ICD-10-CM

## 2023-10-09 PROCEDURE — 29580 STRAPPING UNNA BOOT: CPT

## 2023-10-09 PROCEDURE — 99212 OFFICE O/P EST SF 10 MIN: CPT | Performed by: NURSE PRACTITIONER

## 2023-10-09 RX ORDER — CLOBETASOL PROPIONATE 0.5 MG/G
OINTMENT TOPICAL ONCE
Status: CANCELLED | OUTPATIENT
Start: 2023-10-09 | End: 2023-10-09

## 2023-10-09 RX ORDER — IBUPROFEN 200 MG
TABLET ORAL ONCE
Status: CANCELLED | OUTPATIENT
Start: 2023-10-09 | End: 2023-10-09

## 2023-10-09 RX ORDER — LIDOCAINE 50 MG/G
OINTMENT TOPICAL ONCE
Status: CANCELLED | OUTPATIENT
Start: 2023-10-09 | End: 2023-10-09

## 2023-10-09 RX ORDER — GENTAMICIN SULFATE 1 MG/G
OINTMENT TOPICAL ONCE
Status: CANCELLED | OUTPATIENT
Start: 2023-10-09 | End: 2023-10-09

## 2023-10-09 RX ORDER — SODIUM CHLOR/HYPOCHLOROUS ACID 0.033 %
SOLUTION, IRRIGATION IRRIGATION ONCE
Status: CANCELLED | OUTPATIENT
Start: 2023-10-09 | End: 2023-10-09

## 2023-10-09 RX ORDER — BETAMETHASONE DIPROPIONATE 0.05 %
OINTMENT (GRAM) TOPICAL ONCE
Status: CANCELLED | OUTPATIENT
Start: 2023-10-09 | End: 2023-10-09

## 2023-10-09 RX ORDER — LIDOCAINE HYDROCHLORIDE 20 MG/ML
JELLY TOPICAL ONCE
Status: CANCELLED | OUTPATIENT
Start: 2023-10-09 | End: 2023-10-09

## 2023-10-09 RX ORDER — LIDOCAINE 40 MG/G
CREAM TOPICAL ONCE
Status: CANCELLED | OUTPATIENT
Start: 2023-10-09 | End: 2023-10-09

## 2023-10-09 RX ORDER — BACITRACIN ZINC AND POLYMYXIN B SULFATE 500; 1000 [USP'U]/G; [USP'U]/G
OINTMENT TOPICAL ONCE
Status: CANCELLED | OUTPATIENT
Start: 2023-10-09 | End: 2023-10-09

## 2023-10-09 RX ORDER — GINSENG 100 MG
CAPSULE ORAL ONCE
Status: CANCELLED | OUTPATIENT
Start: 2023-10-09 | End: 2023-10-09

## 2023-10-09 RX ORDER — TRIAMCINOLONE ACETONIDE 1 MG/G
OINTMENT TOPICAL ONCE
Status: CANCELLED | OUTPATIENT
Start: 2023-10-09 | End: 2023-10-09

## 2023-10-09 RX ORDER — LIDOCAINE HYDROCHLORIDE 40 MG/ML
SOLUTION TOPICAL ONCE
Status: CANCELLED | OUTPATIENT
Start: 2023-10-09 | End: 2023-10-09

## 2023-10-09 NOTE — PLAN OF CARE
Problem: Chronic Conditions and Co-morbidities  Goal: Patient's chronic conditions and co-morbidity symptoms are monitored and maintained or improved  Outcome: Progressing     Problem: Chronic Conditions and Co-morbidities  Goal: Patient's chronic conditions and co-morbidity symptoms are monitored and maintained or improved  Outcome: Progressing     Problem: Wound:  Goal: Will show signs of wound healing; wound closure and no evidence of infection  Description: Will show signs of wound healing; wound closure and no evidence of infection  Outcome: Progressing     Problem: Venous:  Goal: Signs of wound healing will improve  Description: Signs of wound healing will improve  Outcome: Progressing     Problem: Compression therapy:  Goal: Will be free from complications associated with compression therapy  Description: Will be free from complications associated with compression therapy  Outcome: Progressing     Problem: Weight control:  Goal: Ability to maintain an optimal weight for height and age will be supported  Description: Ability to maintain an optimal weight for height and age will be supported  Outcome: Progressing     Problem: Falls - Risk of:  Goal: Will remain free from falls  Description: Will remain free from falls  Outcome: Progressing     Problem: Blood Glucose:  Goal: Ability to maintain appropriate glucose levels will improve  Description: Ability to maintain appropriate glucose levels will improve  Outcome: Progressing

## 2023-10-09 NOTE — PATIENT INSTRUCTIONS
[address] and Hyperbaric Oxygen Therapy   Physician Orders and Discharge Instructions  [address]  [place], [address] [place] [address]  Telephone: [phone]    NAME:  Lelo Reina OF BIRTH:  1950  MEDICAL RECORD NUMBER:  [id number]  DATE:  10/6/2023    Discharge condition: Stable    Discharge to: Home    Left via:Private automobile    Accompanied by:  granddaughter    ECF/HHA: none    Remove unna boot on 10/16/23, apply compression socks    1) Moisturize your  both lower legs with a good thick cream like Eucerin Cream, Aquaphor or Cocoa Butter (in a jar) at least twice daily. Dry skin has a greater likelihood of breaking down if it is stretched by swelling. 2) Elevate legs when sitting, above the heart preferably. Avoid sleeping in a recliner. 3) Avoid standing for long periods of time. 4) Walking is good. 5)  Wear knee high graduated compression stockings 20 mmHg on both legs. (This is what prevents these ulcerations)  Put these on in the morning no more than 20 minutes after rising and remove before going to bed. You can hand wash and hang to dry overnight. We recommend having two pairs to rotate washing and wearing. 6) Be very careful with your legs. Often these are caused by a bump or scrape that doesn't heal well. Healing is prevented or slowed by your Venous insufficiency. Compression and keeping the swelling out of your legs is the key to the condition of your skin. 7) You can buy these stockings  at many pharmacies and even online, but you must have a calf and ankle measurement (taken in the morning before your legs swell) to order them. You will also need the strength that the physician has ordered. Most insurance companies do not pay for these unless you have an open ulceration currently. 8)  Replace your stockings every 6 months as they stretch out and become less effective.      9) If you are overweight,

## 2023-10-09 NOTE — PLAN OF CARE
[facility] Application   Below Knee    NAME:  Micaela Monzon  YOB: 1950  MEDICAL RECORD NUMBER:  [id number]  DATE:  10/9/2023    Chuchoelorrie Johnston boot: Applied moisturizing agent to dry skin as needed. Appied primary and secondary dressing as ordered. Applied Unna roll from toes to knee overlapping each time. Applied ace wrap or coban from toes to below the knee. Instructed patient/caregiver to keep dressing dry and intact. DO NOT REMOVE DRESSING. Instructed pt/family/caregiver to report excessive draining, loose bandage, wet dressing, severe pain or tingling in toes. Applied [facility] dressing below the knee to right lower leg. Unna Boot(s) were applied per  Guidelines.      Electronically signed by Radha Ahumada RN on 10/9/2023 at 11:06 AM

## 2023-10-12 NOTE — TELEPHONE ENCOUNTER
Before you come to the hospital        Arrival time: AS DIRECTED BY OFFICE     YOU MAY TAKE THE FOLLOWING MEDICATION(S) THE MORNING OF SURGERY WITH A SIP OF WATER: PER MD    HOLD LISINOPRIL FOR 24 HOURS PRIOR TO SURGERY           ALL OTHER HOME MEDICATION CHECK WITH YOUR PHYSICIAN (especially if   you are taking diabetes medicines or blood thinners)    Do not take any Erectile Dysfunction medications (EX: CIALIS, VIAGRA) 24 hours prior to surgery.      If you were given and instructed to use a germ- killing soap, use as directed the night before surgery and again the morning of surgery or as directed by your surgeon. (Use one-half of the bottle with each shower.)   See attached information for How to Use Chlorhexidine for Bathing if applicable.            Eating and drinking restrictions prior to scheduled arrival time    2 Hours before arrival time STOP   Drinking Clear liquids (water, black coffee-NO CREAM,  apple juice-no pulp)      8 Hours before arrival time STOP   All food, full liquids, and dairy products    (It is extremely important that you follow these guidelines to prevent delay or cancelation of your procedure)     Clear Liquids  Water and flavored water                                                                      Clear Fruit juices, such as cranberry juice and apple juice.  Black coffee (NO cream of any kind, including powdered).  Plain tea  Clear bouillon or broth.  Flavored gelatin.  Soda.  Gatorade or Powerade.  Full liquid examples  Juices that have pulp.  Frozen ice pops that contain fruit pieces.  Coffee with creamer  Milk.  Yogurt.                MANAGING PAIN AFTER SURGERY    We know you are probably wondering what your pain will be like after surgery.  Following surgery it is unrealistic to expect you will not have pain.   Pain is how our bodies let us know that something is wrong or cautions us to be careful.  That said, our goal is to make your pain tolerable.    Methods we may use  Called results. Spoke with Peter Carrasco, her sister on her HIPPA form. She requested a call later this afternoon to schedule breast talk when pt would be home. to treat your pain include (oral or IV medications, PCAs, epidurals, nerve blocks, etc.)   While some procedures require IV pain medications for a short time after surgery, transitioning to pain medications by mouth allows for better management of pain.   Your nurse will encourage you to take oral pain medications whenever possible.  IV medications work almost immediately, but only last a short while.  Taking medications by mouth allows for a more constant level of medication in your blood stream for a longer period of time.      Once your pain is out of control it is harder to get back under control.  It is important you are aware when your next dose of pain medication is due.  If you are admitted, your nurse may write the time of your next dose on the white board in your room to help you remember.      We are interested in your pain and encourage you to inform us about aggravating factors during your visit.   Many times a simple repositioning every few hours can make a big difference.    If your physician says it is okay, do not let your pain prevent you from getting out of bed. Be sure to call your nurse for assistance prior to getting up so you do not fall.      Before surgery, please decide your tolerable pain goal.  These faces help describe the pain ratings we use on a 0-10 scale.   Be prepared to tell us your goal and whether or not you take pain or anxiety medications at home.          Preparing for Surgery  Preparing for surgery is an important part of your care. It can make things go more smoothly and help you avoid complications. The steps leading up to surgery may vary among hospitals. Follow all instructions given to you by your health care providers. Ask questions if you do not understand something. Talk about any concerns that you have.  Here are some questions to consider asking before your surgery:  If my surgery is not an emergency (is elective), when would be the best time to have the  surgery?  What arrangements do I need to make for work, home, or school?  What will my recovery be like? How long will it be before I can return to normal activities?  Will I need to prepare my home? Will I need to arrange care for me or my children?  Should I expect to have pain after surgery? What are my pain management options? Are there nonmedical options that I can try for pain?  Tell a health care provider about:  Any allergies you have.  All medicines you are taking, including vitamins, herbs, eye drops, creams, and over-the-counter medicines.  Any problems you or family members have had with anesthetic medicines.  Any blood disorders you have.  Any surgeries you have had.  Any medical conditions you have.  Whether you are pregnant or may be pregnant.  What are the risks?  The risks and complications of surgery depend on the specific procedure that you have. Discuss all the risks with your health care providers before your surgery. Ask about common surgical complications, which may include:  Infection.  Bleeding or a need for blood replacement (transfusion).  Allergic reactions to medicines.  Damage to surrounding nerves, tissues, or structures.  A blood clot.  Scarring.  Failure of the surgery to correct the problem.  Follow these instructions before the procedure:  Several days or weeks before your procedure  You may have a physical exam by your primary health care provider to make sure it is safe for you to have surgery.  You may have testing. This may include a chest X-ray, blood and urine tests, electrocardiogram (ECG), or other testing.  Ask your health care provider about:  Changing or stopping your regular medicines. This is especially important if you are taking diabetes medicines or blood thinners.  Taking medicines such as aspirin and ibuprofen. These medicines can thin your blood. Do not take these medicines unless your health care provider tells you to take them.  Taking over-the-counter  medicines, vitamins, herbs, and supplements.  Do not use any products that contain nicotine or tobacco, such as cigarettes and e-cigarettes. If you need help quitting, ask your health care provider.  Avoid alcohol.  Ask your health care provider if there are exercises you can do to prepare for surgery.  Eat a healthy diet.   Plan to have someone 18 years of age or older to take you home from the hospital. We will need to verify your ride on the morning of surgery if you are being discharged home on the same day. Tell your ride to be expecting a call from the hospital prior to your procedure.   Plan to have a responsible adult care for you for at least 24 hours after you leave the hospital or clinic. This is important.  The day before your procedure  You may be given antibiotic medicine to take by mouth to help prevent infection. Take it as told by your health care provider.  You may be asked to shower with a germ-killing soap.  Follow instructions from your health care provider about eating and drinking restrictions. This includes gum, mints and hard candy.  Pack comfortable clothes according to your procedure.   The day of your procedure  You may need to take another shower with a germ-killing soap before you leave home in the morning.  With a small sip of water, take only the medicines that you are told to take.  Remove all jewelry including rings.   Leave anything you consider valuable at home except hearing aids if needed.  You do not need to bring your home medications into the hospital.   Do not wear any makeup, nail polish, powder, deodorant, lotion, hair accessories, or anything on your skin or body except your clothes.  If you will be staying in the hospital, bring a case to hold your glasses, contacts, or dentures. You may also want to bring your robe and non-skid footwear.       (Do not use denture adhesives since you will be asked to remove them during  surgery).   If you wear oxygen at home, bring it  with you the day of surgery.  If instructed by your health care provider, bring your sleep apnea device with you on the day of your surgery (if this applies to you).  You may want to leave your suitcase and sleep apnea device in the car until after surgery.   Arrive at the hospital as scheduled.  Bring a friend or family member with you who can help to answer questions and be present while you meet with your health care provider.  At the hospital  When you arrive at the hospital:  Go to registration located at the main entrance of the hospital. You will be registered and given a beeper and a sticker sheet. Take the stickers to the Outpatient nurses desk and place in the black tray. This is to notify staff that you have arrived. Then return to the lobby to wait.   When your beeper lights up and vibrates proceed through the double doors, under the stairs, and a member of the Outpatient Surgery staff will escort you to your preoperative room.  You may have to wear compression sleeves. These help to prevent blood clots and reduce swelling in your legs.  An IV may be inserted into one of your veins.              In the operating room, you may be given one or more of the following:        A medicine to help you relax (sedative).        A medicine to numb the area (local anesthetic).        A medicine to make you fall asleep (general anesthetic).        A medicine that is injected into an area of your body to numb everything below the                      injection site (regional anesthetic).  You may be given an antibiotic through your IV to help prevent infection.  Your surgical site will be marked or identified.    Contact a health care provider if you:  Develop a fever of more than 100.4øF (38øC) or other feelings of illness during the 48 hours before your surgery.  Have symptoms that get worse.  Have questions or concerns about your surgery.  Summary  Preparing for surgery can make the procedure go more smoothly and  lower your risk of complications.  Before surgery, make a list of questions and concerns to discuss with your surgeon. Ask about the risks and possible complications.  In the days or weeks before your surgery, follow all instructions from your health care provider. You may need to stop smoking, avoid alcohol, follow eating restrictions, and change or stop your regular medicines.  Contact your surgeon if you develop a fever or other signs of illness during the few days before your surgery.  This information is not intended to replace advice given to you by your health care provider. Make sure you discuss any questions you have with your health care provider.  Document Revised: 12/21/2018 Document Reviewed: 10/23/2018  Elsevier Patient Education c 2021 [employer]

## 2023-10-16 DIAGNOSIS — I10 PRIMARY HYPERTENSION: Primary | ICD-10-CM

## 2023-10-16 RX ORDER — ISOSORBIDE MONONITRATE 30 MG/1
TABLET, EXTENDED RELEASE ORAL
Qty: 180 TABLET | Refills: 1 | Status: SHIPPED | OUTPATIENT
Start: 2023-10-16

## 2023-10-24 DIAGNOSIS — I47.29 NSVT (NONSUSTAINED VENTRICULAR TACHYCARDIA) (HCC): ICD-10-CM

## 2023-10-24 DIAGNOSIS — I25.5 ISCHEMIC CARDIOMYOPATHY: ICD-10-CM

## 2023-10-24 DIAGNOSIS — Z95.810 BIVENTRICULAR ICD (IMPLANTABLE CARDIOVERTER-DEFIBRILLATOR) IN PLACE: Primary | ICD-10-CM

## 2023-10-24 DIAGNOSIS — I48.0 PAF (PAROXYSMAL ATRIAL FIBRILLATION) (HCC): ICD-10-CM

## 2023-10-24 DIAGNOSIS — I50.42 CHRONIC COMBINED SYSTOLIC (CONGESTIVE) AND DIASTOLIC (CONGESTIVE) HEART FAILURE (HCC): ICD-10-CM

## 2023-10-24 PROCEDURE — 93296 REM INTERROG EVL PM/IDS: CPT | Performed by: CLINICAL NURSE SPECIALIST

## 2023-10-24 PROCEDURE — 93295 DEV INTERROG REMOTE 1/2/MLT: CPT | Performed by: CLINICAL NURSE SPECIALIST

## 2023-11-02 DIAGNOSIS — D50.9 IRON DEFICIENCY ANEMIA, UNSPECIFIED IRON DEFICIENCY ANEMIA TYPE: ICD-10-CM

## 2023-11-02 RX ORDER — FERROUS SULFATE 325(65) MG
TABLET ORAL
Qty: 60 TABLET | Refills: 0 | Status: SHIPPED | OUTPATIENT
Start: 2023-11-02

## 2023-11-08 DIAGNOSIS — E11.21 TYPE II DIABETES MELLITUS WITH NEPHROPATHY (HCC): Primary | ICD-10-CM

## 2023-11-08 RX ORDER — GLIPIZIDE 2.5 MG/1
TABLET, EXTENDED RELEASE ORAL
Qty: 90 TABLET | Refills: 1 | Status: SHIPPED | OUTPATIENT
Start: 2023-11-08

## 2023-11-13 ENCOUNTER — TELEPHONE (OUTPATIENT)
Dept: HEMATOLOGY | Age: 73
End: 2023-11-13

## 2023-11-13 DIAGNOSIS — D50.8 IRON DEFICIENCY ANEMIA SECONDARY TO INADEQUATE DIETARY IRON INTAKE: Primary | ICD-10-CM

## 2023-11-13 DIAGNOSIS — N18.4 CKD (CHRONIC KIDNEY DISEASE) STAGE 4, GFR 15-29 ML/MIN (HCC): ICD-10-CM

## 2023-11-13 NOTE — TELEPHONE ENCOUNTER
Called patient to remind of appointment but could not speak to patient because number sounded like a fax number.

## 2023-11-27 DIAGNOSIS — Z95.810 BIVENTRICULAR ICD (IMPLANTABLE CARDIOVERTER-DEFIBRILLATOR) IN PLACE: Primary | ICD-10-CM

## 2023-11-27 DIAGNOSIS — I50.42 CHRONIC COMBINED SYSTOLIC (CONGESTIVE) AND DIASTOLIC (CONGESTIVE) HEART FAILURE (HCC): ICD-10-CM

## 2023-11-27 DIAGNOSIS — I25.5 ISCHEMIC CARDIOMYOPATHY: ICD-10-CM

## 2023-12-06 ENCOUNTER — OFFICE VISIT (OUTPATIENT)
Dept: INTERNAL MEDICINE | Age: 73
End: 2023-12-06
Payer: MEDICARE

## 2023-12-06 VITALS
BODY MASS INDEX: 35.29 KG/M2 | WEIGHT: 211.8 LBS | RESPIRATION RATE: 18 BRPM | HEIGHT: 65 IN | DIASTOLIC BLOOD PRESSURE: 78 MMHG | OXYGEN SATURATION: 92 % | HEART RATE: 73 BPM | SYSTOLIC BLOOD PRESSURE: 136 MMHG

## 2023-12-06 DIAGNOSIS — E55.9 VITAMIN D DEFICIENCY: ICD-10-CM

## 2023-12-06 DIAGNOSIS — Z00.00 ROUTINE ADULT HEALTH MAINTENANCE: Primary | ICD-10-CM

## 2023-12-06 DIAGNOSIS — E78.5 HYPERLIPIDEMIA, UNSPECIFIED HYPERLIPIDEMIA TYPE: ICD-10-CM

## 2023-12-06 DIAGNOSIS — E11.21 TYPE II DIABETES MELLITUS WITH NEPHROPATHY (HCC): ICD-10-CM

## 2023-12-06 DIAGNOSIS — I25.83 CORONARY ARTERY DISEASE DUE TO LIPID RICH PLAQUE: ICD-10-CM

## 2023-12-06 DIAGNOSIS — J45.20 MILD INTERMITTENT REACTIVE AIRWAY DISEASE WITHOUT COMPLICATION: ICD-10-CM

## 2023-12-06 DIAGNOSIS — M81.0 OSTEOPOROSIS, UNSPECIFIED OSTEOPOROSIS TYPE, UNSPECIFIED PATHOLOGICAL FRACTURE PRESENCE: ICD-10-CM

## 2023-12-06 DIAGNOSIS — L03.116 CELLULITIS OF LEFT LOWER EXTREMITY: ICD-10-CM

## 2023-12-06 DIAGNOSIS — I25.10 CORONARY ARTERY DISEASE DUE TO LIPID RICH PLAQUE: ICD-10-CM

## 2023-12-06 DIAGNOSIS — N18.4 CKD (CHRONIC KIDNEY DISEASE) STAGE 4, GFR 15-29 ML/MIN (HCC): ICD-10-CM

## 2023-12-06 DIAGNOSIS — D50.9 IRON DEFICIENCY ANEMIA, UNSPECIFIED IRON DEFICIENCY ANEMIA TYPE: ICD-10-CM

## 2023-12-06 DIAGNOSIS — D50.8 IRON DEFICIENCY ANEMIA SECONDARY TO INADEQUATE DIETARY IRON INTAKE: ICD-10-CM

## 2023-12-06 DIAGNOSIS — Z91.09 ENVIRONMENTAL ALLERGIES: ICD-10-CM

## 2023-12-06 DIAGNOSIS — I10 PRIMARY HYPERTENSION: ICD-10-CM

## 2023-12-06 LAB
25(OH)D3 SERPL-MCNC: 49.5 NG/ML
ALBUMIN SERPL-MCNC: 3.6 G/DL (ref 3.5–5.2)
ALP SERPL-CCNC: 65 U/L (ref 35–104)
ALT SERPL-CCNC: 6 U/L (ref 5–33)
ANION GAP SERPL CALCULATED.3IONS-SCNC: 14 MMOL/L (ref 7–19)
AST SERPL-CCNC: 21 U/L (ref 5–32)
BASOPHILS # BLD: 0.1 K/UL (ref 0–0.2)
BASOPHILS NFR BLD: 1.1 % (ref 0–1)
BILIRUB SERPL-MCNC: 0.3 MG/DL (ref 0.2–1.2)
BUN SERPL-MCNC: 22 MG/DL (ref 8–23)
CALCIUM SERPL-MCNC: 8.4 MG/DL (ref 8.8–10.2)
CHLORIDE SERPL-SCNC: 105 MMOL/L (ref 98–111)
CHOLEST SERPL-MCNC: 104 MG/DL (ref 160–199)
CO2 SERPL-SCNC: 23 MMOL/L (ref 22–29)
CREAT SERPL-MCNC: 2.2 MG/DL (ref 0.5–0.9)
EOSINOPHIL # BLD: 0.2 K/UL (ref 0–0.6)
EOSINOPHIL NFR BLD: 3.6 % (ref 0–5)
ERYTHROCYTE [DISTWIDTH] IN BLOOD BY AUTOMATED COUNT: 15.4 % (ref 11.5–14.5)
FERRITIN SERPL-MCNC: 57.8 NG/ML (ref 13–150)
GLUCOSE SERPL-MCNC: 78 MG/DL (ref 74–109)
HBA1C MFR BLD: 5.1 % (ref 4–6)
HCT VFR BLD AUTO: 31.8 % (ref 37–47)
HDLC SERPL-MCNC: 51 MG/DL (ref 65–121)
HGB BLD-MCNC: 10.3 G/DL (ref 12–16)
IMM GRANULOCYTES # BLD: 0 K/UL
IRON SATN MFR SERPL: 18 % (ref 14–50)
IRON SERPL-MCNC: 53 UG/DL (ref 37–145)
LDLC SERPL CALC-MCNC: 35 MG/DL
LYMPHOCYTES # BLD: 1.5 K/UL (ref 1.1–4.5)
LYMPHOCYTES NFR BLD: 32.6 % (ref 20–40)
MCH RBC QN AUTO: 34 PG (ref 27–31)
MCHC RBC AUTO-ENTMCNC: 32.4 G/DL (ref 33–37)
MCV RBC AUTO: 105 FL (ref 81–99)
MONOCYTES # BLD: 0.3 K/UL (ref 0–0.9)
MONOCYTES NFR BLD: 6.6 % (ref 0–10)
NEUTROPHILS # BLD: 2.6 K/UL (ref 1.5–7.5)
NEUTS SEG NFR BLD: 55.9 % (ref 50–65)
PLATELET # BLD AUTO: 196 K/UL (ref 130–400)
PMV BLD AUTO: 10.7 FL (ref 9.4–12.3)
POTASSIUM SERPL-SCNC: 4.5 MMOL/L (ref 3.5–5)
PROT SERPL-MCNC: 6.6 G/DL (ref 6.6–8.7)
RBC # BLD AUTO: 3.03 M/UL (ref 4.2–5.4)
SODIUM SERPL-SCNC: 142 MMOL/L (ref 136–145)
TIBC SERPL-MCNC: 300 UG/DL (ref 250–400)
TRIGL SERPL-MCNC: 90 MG/DL (ref 0–149)
TSH SERPL DL<=0.005 MIU/L-ACNC: 2.09 UIU/ML (ref 0.27–4.2)
WBC # BLD AUTO: 4.7 K/UL (ref 4.8–10.8)

## 2023-12-06 PROCEDURE — 3044F HG A1C LEVEL LT 7.0%: CPT | Performed by: INTERNAL MEDICINE

## 2023-12-06 PROCEDURE — G0439 PPPS, SUBSEQ VISIT: HCPCS | Performed by: INTERNAL MEDICINE

## 2023-12-06 PROCEDURE — 3074F SYST BP LT 130 MM HG: CPT | Performed by: INTERNAL MEDICINE

## 2023-12-06 PROCEDURE — G8484 FLU IMMUNIZE NO ADMIN: HCPCS | Performed by: INTERNAL MEDICINE

## 2023-12-06 PROCEDURE — 36415 COLL VENOUS BLD VENIPUNCTURE: CPT | Performed by: NURSE PRACTITIONER

## 2023-12-06 PROCEDURE — 1123F ACP DISCUSS/DSCN MKR DOCD: CPT | Performed by: INTERNAL MEDICINE

## 2023-12-06 PROCEDURE — 3017F COLORECTAL CA SCREEN DOC REV: CPT | Performed by: INTERNAL MEDICINE

## 2023-12-06 PROCEDURE — 3078F DIAST BP <80 MM HG: CPT | Performed by: INTERNAL MEDICINE

## 2023-12-06 RX ORDER — CEPHALEXIN 500 MG/1
500 CAPSULE ORAL 3 TIMES DAILY
Qty: 30 CAPSULE | Refills: 0 | Status: SHIPPED | OUTPATIENT
Start: 2023-12-06 | End: 2023-12-16

## 2023-12-06 ASSESSMENT — PATIENT HEALTH QUESTIONNAIRE - PHQ9
SUM OF ALL RESPONSES TO PHQ QUESTIONS 1-9: 0
SUM OF ALL RESPONSES TO PHQ9 QUESTIONS 1 & 2: 0
1. LITTLE INTEREST OR PLEASURE IN DOING THINGS: 0
2. FEELING DOWN, DEPRESSED OR HOPELESS: 0
SUM OF ALL RESPONSES TO PHQ QUESTIONS 1-9: 0

## 2023-12-06 ASSESSMENT — LIFESTYLE VARIABLES
HOW MANY STANDARD DRINKS CONTAINING ALCOHOL DO YOU HAVE ON A TYPICAL DAY: PATIENT DOES NOT DRINK
HOW OFTEN DO YOU HAVE A DRINK CONTAINING ALCOHOL: NEVER

## 2023-12-11 SDOH — ECONOMIC STABILITY: FOOD INSECURITY: WITHIN THE PAST 12 MONTHS, THE FOOD YOU BOUGHT JUST DIDN'T LAST AND YOU DIDN'T HAVE MONEY TO GET MORE.: NEVER TRUE

## 2023-12-11 SDOH — ECONOMIC STABILITY: INCOME INSECURITY: HOW HARD IS IT FOR YOU TO PAY FOR THE VERY BASICS LIKE FOOD, HOUSING, MEDICAL CARE, AND HEATING?: NOT HARD AT ALL

## 2023-12-11 SDOH — ECONOMIC STABILITY: FOOD INSECURITY: WITHIN THE PAST 12 MONTHS, YOU WORRIED THAT YOUR FOOD WOULD RUN OUT BEFORE YOU GOT MONEY TO BUY MORE.: NEVER TRUE

## 2023-12-11 ASSESSMENT — ENCOUNTER SYMPTOMS
STRIDOR: 0
SINUS PRESSURE: 0
ABDOMINAL DISTENTION: 0
VOICE CHANGE: 0
CONSTIPATION: 0
COLOR CHANGE: 0
CHOKING: 0
SHORTNESS OF BREATH: 0
DIARRHEA: 0
SORE THROAT: 0
EYE ITCHING: 0
COUGH: 0
RHINORRHEA: 0
WHEEZING: 0
NAUSEA: 0
ABDOMINAL PAIN: 0
CHEST TIGHTNESS: 0
TROUBLE SWALLOWING: 0
SINUS PAIN: 0
VOMITING: 0
BLOOD IN STOOL: 0
EYE DISCHARGE: 0

## 2023-12-15 ENCOUNTER — TELEPHONE (OUTPATIENT)
Dept: SURGERY | Age: 73
End: 2023-12-15

## 2023-12-15 NOTE — TELEPHONE ENCOUNTER
Tried calling patient to cancel follow up appt with Vincenzo on Monday, she cancelled her imaging appt so we need to reschedule appts.  Phone just rang and non one answered, no vm set up.

## 2023-12-26 ENCOUNTER — TELEPHONE (OUTPATIENT)
Dept: INTERNAL MEDICINE | Age: 73
End: 2023-12-26

## 2023-12-26 DIAGNOSIS — R05.9 COUGH, UNSPECIFIED TYPE: Primary | ICD-10-CM

## 2023-12-26 RX ORDER — CEFDINIR 300 MG/1
300 CAPSULE ORAL 2 TIMES DAILY
Qty: 20 CAPSULE | Refills: 0 | Status: SHIPPED | OUTPATIENT
Start: 2023-12-26 | End: 2024-01-05

## 2023-12-26 NOTE — TELEPHONE ENCOUNTER
Patient has cough, congestion, and green mucus. Offered patient appointment with Anaid Gant and she does not want to see anyone else. Patient would like medication called in to pharmacy for this.

## 2023-12-26 NOTE — TELEPHONE ENCOUNTER
No fever, chills. Cough is productive green mucus.  [facility] [address] Patient declines appointment

## 2024-01-02 RX ORDER — SIMVASTATIN 40 MG
40 TABLET ORAL NIGHTLY
Qty: 90 TABLET | Refills: 3 | Status: SHIPPED | OUTPATIENT
Start: 2024-01-02

## 2024-01-11 ENCOUNTER — TELEPHONE (OUTPATIENT)
Dept: HEMATOLOGY | Age: 74
End: 2024-01-11

## 2024-01-11 NOTE — TELEPHONE ENCOUNTER
Called patient and reminded patient of their appointment on 1/15/2024and patient confirmed they would be here.

## 2024-01-14 DIAGNOSIS — N18.4 CKD (CHRONIC KIDNEY DISEASE) STAGE 4, GFR 15-29 ML/MIN (HCC): ICD-10-CM

## 2024-01-14 DIAGNOSIS — D50.8 IRON DEFICIENCY ANEMIA SECONDARY TO INADEQUATE DIETARY IRON INTAKE: Primary | ICD-10-CM

## 2024-02-01 ENCOUNTER — OFFICE VISIT (OUTPATIENT)
Dept: CARDIOLOGY CLINIC | Age: 74
End: 2024-02-01
Payer: MEDICARE

## 2024-02-01 VITALS
HEART RATE: 64 BPM | BODY MASS INDEX: 35.16 KG/M2 | HEIGHT: 65 IN | WEIGHT: 211 LBS | DIASTOLIC BLOOD PRESSURE: 68 MMHG | SYSTOLIC BLOOD PRESSURE: 130 MMHG

## 2024-02-01 DIAGNOSIS — E78.2 MIXED HYPERLIPIDEMIA: ICD-10-CM

## 2024-02-01 DIAGNOSIS — Z95.810 ICD (IMPLANTABLE CARDIOVERTER-DEFIBRILLATOR), BIVENTRICULAR, IN SITU: ICD-10-CM

## 2024-02-01 DIAGNOSIS — Z95.1 STATUS POST AORTO-CORONARY ARTERY BYPASS GRAFT: ICD-10-CM

## 2024-02-01 DIAGNOSIS — I10 PRIMARY HYPERTENSION: ICD-10-CM

## 2024-02-01 DIAGNOSIS — I25.5 ISCHEMIC CARDIOMYOPATHY: ICD-10-CM

## 2024-02-01 DIAGNOSIS — I50.42 CHRONIC COMBINED SYSTOLIC (CONGESTIVE) AND DIASTOLIC (CONGESTIVE) HEART FAILURE (HCC): ICD-10-CM

## 2024-02-01 DIAGNOSIS — I25.10 CORONARY ARTERY DISEASE INVOLVING NATIVE CORONARY ARTERY OF NATIVE HEART WITHOUT ANGINA PECTORIS: ICD-10-CM

## 2024-02-01 DIAGNOSIS — Z95.810 BIVENTRICULAR ICD (IMPLANTABLE CARDIOVERTER-DEFIBRILLATOR) IN PLACE: ICD-10-CM

## 2024-02-01 DIAGNOSIS — I25.5 ISCHEMIC CARDIOMYOPATHY: Primary | ICD-10-CM

## 2024-02-01 LAB — BNP BLD-MCNC: ABNORMAL PG/ML (ref 0–449)

## 2024-02-01 PROCEDURE — 1123F ACP DISCUSS/DSCN MKR DOCD: CPT | Performed by: INTERNAL MEDICINE

## 2024-02-01 PROCEDURE — G8427 DOCREV CUR MEDS BY ELIG CLIN: HCPCS | Performed by: INTERNAL MEDICINE

## 2024-02-01 PROCEDURE — 99214 OFFICE O/P EST MOD 30 MIN: CPT | Performed by: INTERNAL MEDICINE

## 2024-02-01 PROCEDURE — 1036F TOBACCO NON-USER: CPT | Performed by: INTERNAL MEDICINE

## 2024-02-01 PROCEDURE — 3078F DIAST BP <80 MM HG: CPT | Performed by: INTERNAL MEDICINE

## 2024-02-01 PROCEDURE — 3017F COLORECTAL CA SCREEN DOC REV: CPT | Performed by: INTERNAL MEDICINE

## 2024-02-01 PROCEDURE — G8399 PT W/DXA RESULTS DOCUMENT: HCPCS | Performed by: INTERNAL MEDICINE

## 2024-02-01 PROCEDURE — 1090F PRES/ABSN URINE INCON ASSESS: CPT | Performed by: INTERNAL MEDICINE

## 2024-02-01 PROCEDURE — G8484 FLU IMMUNIZE NO ADMIN: HCPCS | Performed by: INTERNAL MEDICINE

## 2024-02-01 PROCEDURE — 3075F SYST BP GE 130 - 139MM HG: CPT | Performed by: INTERNAL MEDICINE

## 2024-02-01 PROCEDURE — G8417 CALC BMI ABV UP PARAM F/U: HCPCS | Performed by: INTERNAL MEDICINE

## 2024-02-01 ASSESSMENT — ENCOUNTER SYMPTOMS
GASTROINTESTINAL NEGATIVE: 1
DIARRHEA: 0
NAUSEA: 0
SHORTNESS OF BREATH: 0
RESPIRATORY NEGATIVE: 1
VOMITING: 0
EYES NEGATIVE: 1

## 2024-02-01 NOTE — PROGRESS NOTES
results for input(s): \"AST\", \"ALT\", \"LABALBU\" in the last 72 hours.        Past Medical History:   Diagnosis Date    Acute sinusitis     CHAPARRO (acute kidney injury) (HCC) 05/18/2018    CHAPARRO (acute kidney injury) (HCC) 5/18/2018    Anemia     Bleeding ulcer     hx of; cauterized by dr. resendiz    Breast cancer (HCC)     Breast lump     CAD (coronary artery disease)     sees dr. shoemaker    Callous ulcer (HCC)     Cataract     Chronic kidney disease, stage 3b (HCC) 6/21/2021    COVID-19 01/2021    fatigue    Dermatitis     Diabetes mellitus (HCC)     Diabetic nephropathy (HCC)     Diabetic retinopathy (HCC)     Glaucoma     Gout     Hemorrhoids     Hx of TB skin testing     positive    Hyperlipidemia     Hypertension     Long term current use of aromatase inhibitor 5/27/2022    Osteopenia of left hip 5/27/2022    Palliative care patient 06/24/2021    Type 2 diabetes mellitus (HCC) 05/18/2018    Vitamin D deficiency      Past Surgical History:   Procedure Laterality Date    BREAST LUMPECTOMY Right 10/29/2021    RIGHT LUMPECTOMY, SNB, PREOP ULTRASOUND, INTRAOP ULTRASOUND GUIDED NL, BIOSORB, PEC BLOCK, MARGIN PROBE, FLAPS, IORT performed by Jose Angel Flores MD at [facility] OR    CARDIAC CATHETERIZATION  02/15/2022    CARDIAC SURGERY  2001    CARPAL TUNNEL RELEASE Right 9/3/2020    RIGHT CARPAL TUNNEL RELEASE performed by Esa Ovalle MD at [facility] OR    CATARACT REMOVAL      CHOLECYSTECTOMY      COLONOSCOPY N/A 10/11/2019    Dr Baeza-w/placement of a hemostatic clip-Suboptimal prep, diverticulosis, internal hemorrhoids-Grade 2, AP x 3, 1 yr recall     COLONOSCOPY N/A 10/12/2020    Dr Baeza-Internal hemorrhoids-Grade 1-2, AP, 3 yr recall    CORONARY ARTERY BYPASS GRAFT  2001    ENDOSCOPY, COLON, DIAGNOSTIC      EYE SURGERY      MOUTH SURGERY      OTHER SURGICAL HISTORY      sternal resection    PACEMAKER PLACEMENT  02/23/2022    SKIN BIOPSY      UPPER GASTROINTESTINAL ENDOSCOPY N/A 10/11/2019    Dr Baeza

## 2024-02-07 ENCOUNTER — HOSPITAL ENCOUNTER (OUTPATIENT)
Dept: NON INVASIVE DIAGNOSTICS | Age: 74
Discharge: HOME | End: 2024-02-07
Payer: MEDICARE

## 2024-02-07 DIAGNOSIS — I25.5 ISCHEMIC CARDIOMYOPATHY: ICD-10-CM

## 2024-02-07 PROCEDURE — C8929 TTE W OR WO FOL WCON,DOPPLER: HCPCS

## 2024-02-07 PROCEDURE — 6360000004 HC RX CONTRAST MEDICATION: Performed by: INTERNAL MEDICINE

## 2024-02-07 RX ADMIN — PERFLUTREN 1.5 ML: 6.52 INJECTION, SUSPENSION INTRAVENOUS at 11:26

## 2024-02-12 DIAGNOSIS — I10 PRIMARY HYPERTENSION: Primary | ICD-10-CM

## 2024-02-12 RX ORDER — FUROSEMIDE 40 MG/1
40 TABLET ORAL DAILY
Qty: 90 TABLET | Refills: 1 | Status: SHIPPED | OUTPATIENT
Start: 2024-02-12

## 2024-02-28 ENCOUNTER — TELEPHONE (OUTPATIENT)
Dept: SURGERY | Age: 74
End: 2024-02-28

## 2024-02-28 DIAGNOSIS — Z85.3 PERSONAL HISTORY OF MALIGNANT NEOPLASM OF BREAST: Primary | ICD-10-CM

## 2024-02-28 NOTE — TELEPHONE ENCOUNTER
Called patient and gave her the following appt information, this information will be mailed to her as well.  She is scheduled to have her follow up mammogram on 03/22/24 at 9am [facility], she will see Dr. Flores on 03/27/24 at 2:00.

## 2024-03-04 ENCOUNTER — TELEPHONE (OUTPATIENT)
Dept: HEMATOLOGY | Age: 74
End: 2024-03-04

## 2024-03-04 NOTE — TELEPHONE ENCOUNTER
Called patient and reminded patient of their appointment on 3/7/2024 and patient confirmed they would be here.

## 2024-03-06 ENCOUNTER — OFFICE VISIT (OUTPATIENT)
Dept: INTERNAL MEDICINE | Age: 74
End: 2024-03-06

## 2024-03-06 VITALS
SYSTOLIC BLOOD PRESSURE: 121 MMHG | HEART RATE: 65 BPM | BODY MASS INDEX: 33.66 KG/M2 | OXYGEN SATURATION: 98 % | DIASTOLIC BLOOD PRESSURE: 85 MMHG | WEIGHT: 202 LBS | HEIGHT: 65 IN

## 2024-03-06 DIAGNOSIS — D50.9 IRON DEFICIENCY ANEMIA, UNSPECIFIED IRON DEFICIENCY ANEMIA TYPE: ICD-10-CM

## 2024-03-06 DIAGNOSIS — I10 PRIMARY HYPERTENSION: ICD-10-CM

## 2024-03-06 DIAGNOSIS — M54.50 CHRONIC LOW BACK PAIN, UNSPECIFIED BACK PAIN LATERALITY, UNSPECIFIED WHETHER SCIATICA PRESENT: ICD-10-CM

## 2024-03-06 DIAGNOSIS — E55.9 VITAMIN D DEFICIENCY: ICD-10-CM

## 2024-03-06 DIAGNOSIS — L97.922 ULCER OF LEFT LOWER LEG, WITH FAT LAYER EXPOSED (HCC): Chronic | ICD-10-CM

## 2024-03-06 DIAGNOSIS — I25.10 CORONARY ARTERY DISEASE DUE TO LIPID RICH PLAQUE: ICD-10-CM

## 2024-03-06 DIAGNOSIS — J45.909 MILD REACTIVE AIRWAYS DISEASE, UNSPECIFIED WHETHER PERSISTENT: Primary | ICD-10-CM

## 2024-03-06 DIAGNOSIS — E11.69 TYPE 2 DIABETES MELLITUS WITH OTHER SPECIFIED COMPLICATION, UNSPECIFIED WHETHER LONG TERM INSULIN USE (HCC): ICD-10-CM

## 2024-03-06 DIAGNOSIS — Z12.11 SCREENING FOR COLON CANCER: ICD-10-CM

## 2024-03-06 DIAGNOSIS — E53.8 B12 DEFICIENCY: ICD-10-CM

## 2024-03-06 DIAGNOSIS — E11.311 DIABETIC MACULAR EDEMA (HCC): ICD-10-CM

## 2024-03-06 DIAGNOSIS — E78.5 HYPERLIPIDEMIA, UNSPECIFIED HYPERLIPIDEMIA TYPE: ICD-10-CM

## 2024-03-06 DIAGNOSIS — Z17.0 MALIGNANT NEOPLASM OF OVERLAPPING SITES OF RIGHT BREAST IN FEMALE, ESTROGEN RECEPTOR POSITIVE (HCC): ICD-10-CM

## 2024-03-06 DIAGNOSIS — L97.912 SKIN ULCER OF RIGHT LOWER LEG WITH FAT LAYER EXPOSED (HCC): ICD-10-CM

## 2024-03-06 DIAGNOSIS — G89.29 CHRONIC LOW BACK PAIN, UNSPECIFIED BACK PAIN LATERALITY, UNSPECIFIED WHETHER SCIATICA PRESENT: ICD-10-CM

## 2024-03-06 DIAGNOSIS — I25.83 CORONARY ARTERY DISEASE DUE TO LIPID RICH PLAQUE: ICD-10-CM

## 2024-03-06 DIAGNOSIS — E11.21 TYPE II DIABETES MELLITUS WITH NEPHROPATHY (HCC): ICD-10-CM

## 2024-03-06 DIAGNOSIS — L03.116 CELLULITIS OF LEFT LOWER EXTREMITY: ICD-10-CM

## 2024-03-06 DIAGNOSIS — Z91.09 ENVIRONMENTAL ALLERGIES: ICD-10-CM

## 2024-03-06 DIAGNOSIS — M81.0 OSTEOPOROSIS, UNSPECIFIED OSTEOPOROSIS TYPE, UNSPECIFIED PATHOLOGICAL FRACTURE PRESENCE: ICD-10-CM

## 2024-03-06 DIAGNOSIS — I73.9 PVD (PERIPHERAL VASCULAR DISEASE) (HCC): ICD-10-CM

## 2024-03-06 DIAGNOSIS — N18.4 CKD (CHRONIC KIDNEY DISEASE) STAGE 4, GFR 15-29 ML/MIN (HCC): ICD-10-CM

## 2024-03-06 DIAGNOSIS — E11.3299 NONPROLIFERATIVE DIABETIC RETINOPATHY (HCC): ICD-10-CM

## 2024-03-06 DIAGNOSIS — C50.811 MALIGNANT NEOPLASM OF OVERLAPPING SITES OF RIGHT BREAST IN FEMALE, ESTROGEN RECEPTOR POSITIVE (HCC): ICD-10-CM

## 2024-03-06 LAB
25(OH)D3 SERPL-MCNC: 35.5 NG/ML
ALBUMIN SERPL-MCNC: 3.5 G/DL (ref 3.5–5.2)
ALP SERPL-CCNC: 59 U/L (ref 35–104)
ALT SERPL-CCNC: 5 U/L (ref 5–33)
ANION GAP SERPL CALCULATED.3IONS-SCNC: 12 MMOL/L (ref 7–19)
AST SERPL-CCNC: 15 U/L (ref 5–32)
BASOPHILS # BLD: 0 K/UL (ref 0–0.2)
BASOPHILS NFR BLD: 0.9 % (ref 0–1)
BILIRUB SERPL-MCNC: 0.3 MG/DL (ref 0.2–1.2)
BUN SERPL-MCNC: 34 MG/DL (ref 8–23)
CALCIUM SERPL-MCNC: 8.8 MG/DL (ref 8.8–10.2)
CHLORIDE SERPL-SCNC: 106 MMOL/L (ref 98–111)
CHOLEST SERPL-MCNC: 112 MG/DL (ref 160–199)
CO2 SERPL-SCNC: 24 MMOL/L (ref 22–29)
CREAT SERPL-MCNC: 2.4 MG/DL (ref 0.5–0.9)
EOSINOPHIL # BLD: 0.2 K/UL (ref 0–0.6)
EOSINOPHIL NFR BLD: 4 % (ref 0–5)
ERYTHROCYTE [DISTWIDTH] IN BLOOD BY AUTOMATED COUNT: 13.2 % (ref 11.5–14.5)
FERRITIN SERPL-MCNC: 48.3 NG/ML (ref 13–150)
GLUCOSE SERPL-MCNC: 83 MG/DL (ref 74–109)
HBA1C MFR BLD: 4.8 % (ref 4–6)
HCT VFR BLD AUTO: 31.8 % (ref 37–47)
HDLC SERPL-MCNC: 59 MG/DL (ref 65–121)
HGB BLD-MCNC: 9.8 G/DL (ref 12–16)
IMM GRANULOCYTES # BLD: 0 K/UL
IRON SATN MFR SERPL: 20 % (ref 14–50)
IRON SERPL-MCNC: 65 UG/DL (ref 37–145)
LDLC SERPL CALC-MCNC: 36 MG/DL
LYMPHOCYTES # BLD: 1.2 K/UL (ref 1.1–4.5)
LYMPHOCYTES NFR BLD: 26.7 % (ref 20–40)
MCH RBC QN AUTO: 33.1 PG (ref 27–31)
MCHC RBC AUTO-ENTMCNC: 30.8 G/DL (ref 33–37)
MCV RBC AUTO: 107.4 FL (ref 81–99)
MONOCYTES # BLD: 0.3 K/UL (ref 0–0.9)
MONOCYTES NFR BLD: 7.2 % (ref 0–10)
NEUTROPHILS # BLD: 2.7 K/UL (ref 1.5–7.5)
NEUTS SEG NFR BLD: 61.2 % (ref 50–65)
PLATELET # BLD AUTO: 208 K/UL (ref 130–400)
PMV BLD AUTO: 11.4 FL (ref 9.4–12.3)
POTASSIUM SERPL-SCNC: 5.5 MMOL/L (ref 3.5–5)
PROT SERPL-MCNC: 6.9 G/DL (ref 6.6–8.7)
RBC # BLD AUTO: 2.96 M/UL (ref 4.2–5.4)
SODIUM SERPL-SCNC: 142 MMOL/L (ref 136–145)
TIBC SERPL-MCNC: 332 UG/DL (ref 250–400)
TRIGL SERPL-MCNC: 86 MG/DL (ref 0–149)
TSH SERPL DL<=0.005 MIU/L-ACNC: 2.75 UIU/ML (ref 0.27–4.2)
WBC # BLD AUTO: 4.5 K/UL (ref 4.8–10.8)

## 2024-03-06 RX ORDER — CEPHALEXIN 500 MG/1
500 CAPSULE ORAL 3 TIMES DAILY
Qty: 30 CAPSULE | Refills: 0 | Status: SHIPPED | OUTPATIENT
Start: 2024-03-06 | End: 2024-03-16

## 2024-03-06 ASSESSMENT — PATIENT HEALTH QUESTIONNAIRE - PHQ9
2. FEELING DOWN, DEPRESSED OR HOPELESS: 0
1. LITTLE INTEREST OR PLEASURE IN DOING THINGS: 0
SUM OF ALL RESPONSES TO PHQ9 QUESTIONS 1 & 2: 0
SUM OF ALL RESPONSES TO PHQ QUESTIONS 1-9: 0

## 2024-03-06 NOTE — PROGRESS NOTES
Chief Complaint   Patient presents with    Follow-up     3 month follow up no new concerns       HPI: Lyudmila Go is a 73 y.o. female is here for for follow-up of type 2 diabetes, reactive airways disease, hypertension, iron deficiency anemia, coronary artery disease, breast cancer, environmental allergies, hyperlipidemia, and chronic low back pain.    She does have a history of ulcers to her bilateral lower extremities.  At this time, the ulcers are clean and healing without any evidence of discharge or drainage.  They are actually feeling it with granulation tissue.  However, she does have some cellulitis to the left lower leg.  We will start her on some Keflex.  Her lab work is currently pending.  She denies any complaints of fever or chills.    She is due for her colonoscopy.  Will refer her to gastroenterology.  She declines any immunizations today.  Her exact words were \"no shots.\".    She does have a history of reactive airways disease.  She is on albuterol as prescribed.  She remains on Fosamax for history of osteoporosis.  Her blood pressure is well-controlled.  She denies any complaints of chest pain, chest pressure or shortness of breath.    She does have a history of iron deficiency anemia.  Her lab work is currently pending.  Her blood pressure is well-controlled.  Lasix is helpful for lower extremity edema.  She really has not had any swelling recently.    She is taking her isosorbide as prescribed.  She is also on Femara for history of breast cancer, which has been stable.  She does have a history of peripheral vascular disease.  At this time, she takes her aspirin as prescribed.  She denies any complaints of claudication.  Her allergies are stable on Singulair.    Occasionally, she does take tramadol for back pain.  However, she has not needed any of this recently.  She does have a history of B12 deficiency and is on B12 supplementation    Past Medical History:   Diagnosis Date    Acute

## 2024-03-07 ENCOUNTER — OFFICE VISIT (OUTPATIENT)
Dept: HEMATOLOGY | Age: 74
End: 2024-03-07
Payer: MEDICARE

## 2024-03-07 ENCOUNTER — HOSPITAL ENCOUNTER (OUTPATIENT)
Dept: INFUSION THERAPY | Age: 74
Discharge: HOME | End: 2024-03-07
Payer: MEDICARE

## 2024-03-07 VITALS
DIASTOLIC BLOOD PRESSURE: 60 MMHG | WEIGHT: 201 LBS | HEART RATE: 51 BPM | HEIGHT: 65 IN | TEMPERATURE: 97.8 F | BODY MASS INDEX: 33.49 KG/M2 | OXYGEN SATURATION: 95 % | SYSTOLIC BLOOD PRESSURE: 124 MMHG

## 2024-03-07 DIAGNOSIS — C50.811 MALIGNANT NEOPLASM OF OVERLAPPING SITES OF RIGHT BREAST IN FEMALE, ESTROGEN RECEPTOR POSITIVE (HCC): Primary | ICD-10-CM

## 2024-03-07 DIAGNOSIS — M85.89 OSTEOPENIA OF MULTIPLE SITES: ICD-10-CM

## 2024-03-07 DIAGNOSIS — N18.4 CKD (CHRONIC KIDNEY DISEASE) STAGE 4, GFR 15-29 ML/MIN (HCC): ICD-10-CM

## 2024-03-07 DIAGNOSIS — Z17.0 MALIGNANT NEOPLASM OF OVERLAPPING SITES OF RIGHT BREAST IN FEMALE, ESTROGEN RECEPTOR POSITIVE (HCC): Primary | ICD-10-CM

## 2024-03-07 DIAGNOSIS — D50.9 IRON DEFICIENCY ANEMIA, UNSPECIFIED IRON DEFICIENCY ANEMIA TYPE: ICD-10-CM

## 2024-03-07 DIAGNOSIS — D72.819 LEUKOPENIA, UNSPECIFIED TYPE: ICD-10-CM

## 2024-03-07 PROCEDURE — 1036F TOBACCO NON-USER: CPT | Performed by: NURSE PRACTITIONER

## 2024-03-07 PROCEDURE — 3017F COLORECTAL CA SCREEN DOC REV: CPT | Performed by: NURSE PRACTITIONER

## 2024-03-07 PROCEDURE — 1090F PRES/ABSN URINE INCON ASSESS: CPT | Performed by: NURSE PRACTITIONER

## 2024-03-07 PROCEDURE — 99214 OFFICE O/P EST MOD 30 MIN: CPT | Performed by: NURSE PRACTITIONER

## 2024-03-07 PROCEDURE — 1123F ACP DISCUSS/DSCN MKR DOCD: CPT | Performed by: NURSE PRACTITIONER

## 2024-03-07 PROCEDURE — 3078F DIAST BP <80 MM HG: CPT | Performed by: NURSE PRACTITIONER

## 2024-03-07 PROCEDURE — G8417 CALC BMI ABV UP PARAM F/U: HCPCS | Performed by: NURSE PRACTITIONER

## 2024-03-07 PROCEDURE — G8427 DOCREV CUR MEDS BY ELIG CLIN: HCPCS | Performed by: NURSE PRACTITIONER

## 2024-03-07 PROCEDURE — 3074F SYST BP LT 130 MM HG: CPT | Performed by: NURSE PRACTITIONER

## 2024-03-07 PROCEDURE — 99212 OFFICE O/P EST SF 10 MIN: CPT

## 2024-03-07 PROCEDURE — G8399 PT W/DXA RESULTS DOCUMENT: HCPCS | Performed by: NURSE PRACTITIONER

## 2024-03-07 PROCEDURE — G8484 FLU IMMUNIZE NO ADMIN: HCPCS | Performed by: NURSE PRACTITIONER

## 2024-03-08 ENCOUNTER — TELEPHONE (OUTPATIENT)
Dept: HEMATOLOGY | Age: 74
End: 2024-03-08

## 2024-03-08 DIAGNOSIS — F41.8 ANTICIPATORY ANXIETY: Primary | ICD-10-CM

## 2024-03-08 RX ORDER — DIAZEPAM 5 MG/1
TABLET ORAL
Qty: 2 TABLET | Refills: 0 | Status: SHIPPED | OUTPATIENT
Start: 2024-03-08 | End: 2024-03-08

## 2024-03-08 RX ORDER — DIAZEPAM 5 MG/1
TABLET ORAL
Qty: 2 TABLET | Refills: 0 | Status: SHIPPED | OUTPATIENT
Start: 2024-03-08 | End: 2024-03-11

## 2024-03-08 SDOH — ECONOMIC STABILITY: INCOME INSECURITY: HOW HARD IS IT FOR YOU TO PAY FOR THE VERY BASICS LIKE FOOD, HOUSING, MEDICAL CARE, AND HEATING?: NOT HARD AT ALL

## 2024-03-08 SDOH — ECONOMIC STABILITY: FOOD INSECURITY: WITHIN THE PAST 12 MONTHS, YOU WORRIED THAT YOUR FOOD WOULD RUN OUT BEFORE YOU GOT MONEY TO BUY MORE.: NEVER TRUE

## 2024-03-08 SDOH — ECONOMIC STABILITY: FOOD INSECURITY: WITHIN THE PAST 12 MONTHS, THE FOOD YOU BOUGHT JUST DIDN'T LAST AND YOU DIDN'T HAVE MONEY TO GET MORE.: NEVER TRUE

## 2024-03-08 ASSESSMENT — ENCOUNTER SYMPTOMS
SHORTNESS OF BREATH: 0
SINUS PRESSURE: 0
TROUBLE SWALLOWING: 0
ABDOMINAL PAIN: 0
COUGH: 0
CONSTIPATION: 0
VOMITING: 0
EYE ITCHING: 0
BLOOD IN STOOL: 0
RHINORRHEA: 0
NAUSEA: 0
COLOR CHANGE: 0
ABDOMINAL DISTENTION: 0
DIARRHEA: 0
SINUS PAIN: 0
STRIDOR: 0
CHEST TIGHTNESS: 0
EYE DISCHARGE: 0
WHEEZING: 0
CHOKING: 0
VOICE CHANGE: 0
SORE THROAT: 0

## 2024-03-08 NOTE — TELEPHONE ENCOUNTER
Called patient and informed that she cannot be put to sleep for her bone marrow biopsy on 03/11/2024 due to her low ejection fraction.  Appointment scheduled for 03/11 at 1115 for Janie to perform procedure in office.  Valium sent in as premed.  Instructed patient on medication, dosage, and frequency.  Instructed to call with any problems.  Patient v/u.

## 2024-03-11 ENCOUNTER — HOSPITAL ENCOUNTER (OUTPATIENT)
Dept: INFUSION THERAPY | Age: 74
Discharge: HOME | End: 2024-03-11
Payer: MEDICARE

## 2024-03-11 ENCOUNTER — OFFICE VISIT (OUTPATIENT)
Dept: HEMATOLOGY | Age: 74
End: 2024-03-11
Payer: MEDICARE

## 2024-03-11 VITALS
HEART RATE: 54 BPM | OXYGEN SATURATION: 98 % | HEIGHT: 65 IN | WEIGHT: 203.8 LBS | BODY MASS INDEX: 33.95 KG/M2 | SYSTOLIC BLOOD PRESSURE: 120 MMHG | DIASTOLIC BLOOD PRESSURE: 88 MMHG | TEMPERATURE: 98 F

## 2024-03-11 DIAGNOSIS — N18.4 CKD (CHRONIC KIDNEY DISEASE) STAGE 4, GFR 15-29 ML/MIN (HCC): ICD-10-CM

## 2024-03-11 DIAGNOSIS — D64.9 ANEMIA, UNSPECIFIED TYPE: Primary | ICD-10-CM

## 2024-03-11 DIAGNOSIS — D50.8 IRON DEFICIENCY ANEMIA SECONDARY TO INADEQUATE DIETARY IRON INTAKE: ICD-10-CM

## 2024-03-11 DIAGNOSIS — R20.0 ANALGESIA: Primary | ICD-10-CM

## 2024-03-11 LAB
BASOPHILS # BLD: 0.04 K/UL (ref 0.01–0.08)
BASOPHILS NFR BLD: 1.1 % (ref 0.1–1.2)
EOSINOPHIL # BLD: 0.23 K/UL (ref 0.04–0.54)
EOSINOPHIL NFR BLD: 6.5 % (ref 0.7–7)
ERYTHROCYTE [DISTWIDTH] IN BLOOD BY AUTOMATED COUNT: 13.2 % (ref 11.7–14.4)
HCT VFR BLD AUTO: 29.5 % (ref 34.1–44.9)
HGB BLD-MCNC: 9.4 G/DL (ref 11.2–15.7)
LYMPHOCYTES # BLD: 0.65 K/UL (ref 1.18–3.74)
LYMPHOCYTES NFR BLD: 18.4 % (ref 19.3–53.1)
MCH RBC QN AUTO: 33.3 PG (ref 25.6–32.2)
MCHC RBC AUTO-ENTMCNC: 31.9 G/DL (ref 32.3–35.5)
MCV RBC AUTO: 104.6 FL (ref 79.4–94.8)
MONOCYTES # BLD: 0.28 K/UL (ref 0.24–0.82)
MONOCYTES NFR BLD: 7.9 % (ref 4.7–12.5)
NEUTROPHILS # BLD: 2.33 K/UL (ref 1.56–6.13)
NEUTS SEG NFR BLD: 65.8 % (ref 34–71.1)
PLATELET # BLD AUTO: 183 K/UL (ref 182–369)
PMV BLD AUTO: 10.1 FL (ref 7.4–10.4)
RBC # BLD AUTO: 2.82 M/UL (ref 3.93–5.22)
WBC # BLD AUTO: 3.54 K/UL (ref 3.98–10.04)

## 2024-03-11 PROCEDURE — 85025 COMPLETE CBC W/AUTO DIFF WBC: CPT

## 2024-03-11 PROCEDURE — 96372 THER/PROPH/DIAG INJ SC/IM: CPT

## 2024-03-11 PROCEDURE — 2500000003 HC RX 250 WO HCPCS: Performed by: NURSE PRACTITIONER

## 2024-03-11 PROCEDURE — 36415 COLL VENOUS BLD VENIPUNCTURE: CPT

## 2024-03-11 PROCEDURE — 38222 DX BONE MARROW BX & ASPIR: CPT | Performed by: NURSE PRACTITIONER

## 2024-03-11 RX ORDER — LIDOCAINE HYDROCHLORIDE 20 MG/ML
5 INJECTION, SOLUTION INFILTRATION; PERINEURAL ONCE
Status: COMPLETED | OUTPATIENT
Start: 2024-03-11 | End: 2024-03-11

## 2024-03-11 RX ADMIN — LIDOCAINE HYDROCHLORIDE 10 ML: 20 INJECTION, SOLUTION INFILTRATION; PERINEURAL at 11:25

## 2024-03-11 ASSESSMENT — ENCOUNTER SYMPTOMS
EYE PAIN: 0
EYES NEGATIVE: 1
EYE REDNESS: 0
ABDOMINAL PAIN: 0
DIARRHEA: 0
BLOOD IN STOOL: 0
NAUSEA: 0
WHEEZING: 0
SHORTNESS OF BREATH: 0
EYE DISCHARGE: 0
SORE THROAT: 0
BACK PAIN: 0
CONSTIPATION: 0
GASTROINTESTINAL NEGATIVE: 1
COUGH: 0
RESPIRATORY NEGATIVE: 1
VOMITING: 0

## 2024-03-11 NOTE — PROGRESS NOTES
Patient name: Lyudmila Go  Patient : 1950  [id number]  3/11/2024    Procedure Note: Bone Marrow Aspirate and Biopsy    Indication: Chronic anemia with new onset of leukopenia      SITE: Right Iliac crest   After informed consent was obtained the patient was placed in the Left lateral ducubitus position. A time out was performed indicating the procedure and the patient, and all was comfirmed by the nurse. The Right posterior iliac crest was located and prepped and draped in a sterile fashion Lidocaine 1% was injected for local anesthesia. An Jamshidi needle was placed and a bone marrow aspirate was obtained. The Jamshidi needle was placed in a different location and a core biopsy was obtained. Pressure was held until homeostasis was obtained. A sterile dressing was applied. The patient tolerated the procedure well with no apparent complications. Estimated blood loss was minimal. The specimen was sent to pathology for flow cytometry, cytogenetics, histology and FISH if indicated.       SARA Hurst

## 2024-03-20 ENCOUNTER — CLINICAL DOCUMENTATION (OUTPATIENT)
Facility: HOSPITAL | Age: 74
End: 2024-03-20

## 2024-03-22 ENCOUNTER — HOSPITAL ENCOUNTER (OUTPATIENT)
Dept: WOMENS IMAGING | Age: 74
Discharge: HOME | End: 2024-03-22
Attending: SURGERY
Payer: MEDICARE

## 2024-03-22 DIAGNOSIS — Z79.811 LONG TERM CURRENT USE OF AROMATASE INHIBITOR: ICD-10-CM

## 2024-03-22 DIAGNOSIS — Z78.0 POSTMENOPAUSAL STATE: ICD-10-CM

## 2024-03-22 DIAGNOSIS — Z85.3 PERSONAL HISTORY OF MALIGNANT NEOPLASM OF BREAST: ICD-10-CM

## 2024-03-22 DIAGNOSIS — M85.89 OSTEOPENIA OF MULTIPLE SITES: ICD-10-CM

## 2024-03-22 PROCEDURE — G0279 TOMOSYNTHESIS, MAMMO: HCPCS

## 2024-03-22 PROCEDURE — 77080 DXA BONE DENSITY AXIAL: CPT

## 2024-03-27 ENCOUNTER — TELEPHONE (OUTPATIENT)
Dept: HEMATOLOGY | Age: 74
End: 2024-03-27

## 2024-03-27 DIAGNOSIS — C50.811 MALIGNANT NEOPLASM OF OVERLAPPING SITES OF RIGHT BREAST IN FEMALE, ESTROGEN RECEPTOR POSITIVE (HCC): ICD-10-CM

## 2024-03-27 DIAGNOSIS — Z17.0 MALIGNANT NEOPLASM OF OVERLAPPING SITES OF RIGHT BREAST IN FEMALE, ESTROGEN RECEPTOR POSITIVE (HCC): ICD-10-CM

## 2024-03-27 RX ORDER — LETROZOLE 2.5 MG/1
TABLET, FILM COATED ORAL
Qty: 90 TABLET | Refills: 3 | Status: SHIPPED | OUTPATIENT
Start: 2024-03-27

## 2024-03-27 NOTE — TELEPHONE ENCOUNTER
Called patient and reminded patient of their appointment on 4/1/2024 and patient confirmed they would be here.

## 2024-03-28 DIAGNOSIS — N18.4 CKD (CHRONIC KIDNEY DISEASE) STAGE 4, GFR 15-29 ML/MIN (HCC): Primary | ICD-10-CM

## 2024-03-28 NOTE — PROGRESS NOTES
Progress Note      Pt Name: Lyudmila Go  YOB: 1950  MRN: [id number]    Date of evaluation: 04/01/2024  History Obtained From:  patient, electronic medical record    CHIEF COMPLAINT:    Chief Complaint   Patient presents with    Follow-up     Malignant neoplasm of overlapping sites of right breast in female, estrogen receptor positive (HCC)  Anemia, unspecified type          Anemia    Discuss Labs    Results     HISTORY OF PRESENT ILLNESS:    Lyudmila Go is a 73 y.o.  female who is currently being followed for infiltrating ductal carcinoma the right breast, ER/DC positive, [id number], 9/1/2021 and multifactoral anemia to include iron deficiency with chronic kidney disease stage IV.  Current recommendation for her breast cancer is adjuvant endocrine therapy for 5 years, through September 2026.  She has previously been taking with letrozole 2.5 mg p.o. daily 9/20/2021, bone mineral density study on 3/22/2024 reported worsening osteoporosis despite the use of biphosphonate therapy with Fosamax 70 mg weekly, will discontinue the letrozole and began tamoxifen 20 mg p.o. daily.  She denied any known history of blood clots/thrombotic events.  She has no known radiographic imaging to suggest progression.  She reported being Fosamax 70 mg weekly for osteoporosis, recommended in the setting of aromatase inhibitor therapy.  In relation to her multifactorial anemia, recommendation is for ferrous sulfate 325 mg twice daily along with vitamin C 500 mg daily and stool softener.  Bone marrow aspiration biopsy was completed on 3/11/2024 for further evaluation of persistent leukopenia and progressive macrocytosis, ruled out any underlying malignancy, plasma cell dyscrasia and myelodysplastic syndrome.  She persistently has hemoglobin less than 10 in the setting of anemia of chronic kidney disease, recommendation is to initiateonsider ANUJ therapy with Retacrit.  She is on oral iron replacement with

## 2024-04-01 ENCOUNTER — HOSPITAL ENCOUNTER (OUTPATIENT)
Dept: INFUSION THERAPY | Age: 74
Discharge: HOME | End: 2024-04-01
Payer: MEDICARE

## 2024-04-01 ENCOUNTER — OFFICE VISIT (OUTPATIENT)
Dept: HEMATOLOGY | Age: 74
End: 2024-04-01
Payer: MEDICARE

## 2024-04-01 VITALS
HEART RATE: 66 BPM | SYSTOLIC BLOOD PRESSURE: 150 MMHG | WEIGHT: 211 LBS | TEMPERATURE: 97.9 F | BODY MASS INDEX: 35.16 KG/M2 | OXYGEN SATURATION: 97 % | HEIGHT: 65 IN | DIASTOLIC BLOOD PRESSURE: 76 MMHG

## 2024-04-01 DIAGNOSIS — N18.4 ANEMIA DUE TO STAGE 4 CHRONIC KIDNEY DISEASE (HCC): ICD-10-CM

## 2024-04-01 DIAGNOSIS — D50.8 IRON DEFICIENCY ANEMIA SECONDARY TO INADEQUATE DIETARY IRON INTAKE: ICD-10-CM

## 2024-04-01 DIAGNOSIS — N18.4 CKD (CHRONIC KIDNEY DISEASE) STAGE 4, GFR 15-29 ML/MIN (HCC): ICD-10-CM

## 2024-04-01 DIAGNOSIS — Z17.0 MALIGNANT NEOPLASM OF OVERLAPPING SITES OF RIGHT BREAST IN FEMALE, ESTROGEN RECEPTOR POSITIVE (HCC): Primary | ICD-10-CM

## 2024-04-01 DIAGNOSIS — M81.0 AGE-RELATED OSTEOPOROSIS WITHOUT CURRENT PATHOLOGICAL FRACTURE: ICD-10-CM

## 2024-04-01 DIAGNOSIS — C50.811 MALIGNANT NEOPLASM OF OVERLAPPING SITES OF RIGHT BREAST IN FEMALE, ESTROGEN RECEPTOR POSITIVE (HCC): Primary | ICD-10-CM

## 2024-04-01 DIAGNOSIS — Z79.83 ENCOUNTER FOR ONGOING OSTEOPOROSIS BISPHOSPHONATE THERAPY: ICD-10-CM

## 2024-04-01 DIAGNOSIS — Z79.811 LONG TERM CURRENT USE OF AROMATASE INHIBITOR: ICD-10-CM

## 2024-04-01 DIAGNOSIS — M81.0 ENCOUNTER FOR ONGOING OSTEOPOROSIS BISPHOSPHONATE THERAPY: ICD-10-CM

## 2024-04-01 DIAGNOSIS — Z51.81 ENCOUNTER FOR MONITORING AROMATASE INHIBITOR THERAPY: ICD-10-CM

## 2024-04-01 DIAGNOSIS — D63.1 ANEMIA DUE TO STAGE 4 CHRONIC KIDNEY DISEASE (HCC): ICD-10-CM

## 2024-04-01 DIAGNOSIS — Z79.811 ENCOUNTER FOR MONITORING AROMATASE INHIBITOR THERAPY: ICD-10-CM

## 2024-04-01 LAB
ALBUMIN SERPL-MCNC: 3.7 G/DL (ref 3.5–5.2)
ALP SERPL-CCNC: 83 U/L (ref 35–104)
ALT SERPL-CCNC: 9 U/L (ref 9–52)
ANION GAP SERPL CALCULATED.3IONS-SCNC: 13 MMOL/L (ref 7–19)
AST SERPL-CCNC: 22 U/L (ref 14–36)
BASOPHILS # BLD: 0.05 K/UL (ref 0.01–0.08)
BASOPHILS NFR BLD: 1 % (ref 0.1–1.2)
BILIRUB SERPL-MCNC: 0.3 MG/DL (ref 0.2–1.3)
BUN SERPL-MCNC: 31 MG/DL (ref 7–17)
CALCIUM SERPL-MCNC: 8.4 MG/DL (ref 8.4–10.2)
CHLORIDE SERPL-SCNC: 106 MMOL/L (ref 98–111)
CO2 SERPL-SCNC: 21 MMOL/L (ref 22–29)
CREAT SERPL-MCNC: 2.3 MG/DL (ref 0.5–1)
EOSINOPHIL # BLD: 0.14 K/UL (ref 0.04–0.54)
EOSINOPHIL NFR BLD: 2.9 % (ref 0.7–7)
ERYTHROCYTE [DISTWIDTH] IN BLOOD BY AUTOMATED COUNT: 13.7 % (ref 11.7–14.4)
GLOBULIN: 3.2 G/DL
GLUCOSE SERPL-MCNC: 66 MG/DL (ref 74–106)
HCT VFR BLD AUTO: 30 % (ref 34.1–44.9)
HGB BLD-MCNC: 9.5 G/DL (ref 11.2–15.7)
LYMPHOCYTES # BLD: 1.26 K/UL (ref 1.18–3.74)
LYMPHOCYTES NFR BLD: 26 % (ref 19.3–53.1)
MCH RBC QN AUTO: 32.9 PG (ref 25.6–32.2)
MCHC RBC AUTO-ENTMCNC: 31.7 G/DL (ref 32.3–35.5)
MCV RBC AUTO: 103.8 FL (ref 79.4–94.8)
MONOCYTES # BLD: 0.39 K/UL (ref 0.24–0.82)
MONOCYTES NFR BLD: 8.1 % (ref 4.7–12.5)
NEUTROPHILS # BLD: 2.99 K/UL (ref 1.56–6.13)
NEUTS SEG NFR BLD: 61.8 % (ref 34–71.1)
PLATELET # BLD AUTO: 217 K/UL (ref 182–369)
PMV BLD AUTO: 10.5 FL (ref 7.4–10.4)
POTASSIUM SERPL-SCNC: 5.1 MMOL/L (ref 3.5–5.1)
PROT SERPL-MCNC: 6.9 G/DL (ref 6.3–8.2)
RBC # BLD AUTO: 2.89 M/UL (ref 3.93–5.22)
SODIUM SERPL-SCNC: 140 MMOL/L (ref 137–145)
WBC # BLD AUTO: 4.84 K/UL (ref 3.98–10.04)

## 2024-04-01 PROCEDURE — 99212 OFFICE O/P EST SF 10 MIN: CPT

## 2024-04-01 PROCEDURE — 1123F ACP DISCUSS/DSCN MKR DOCD: CPT | Performed by: NURSE PRACTITIONER

## 2024-04-01 PROCEDURE — G8427 DOCREV CUR MEDS BY ELIG CLIN: HCPCS | Performed by: NURSE PRACTITIONER

## 2024-04-01 PROCEDURE — 3017F COLORECTAL CA SCREEN DOC REV: CPT | Performed by: NURSE PRACTITIONER

## 2024-04-01 PROCEDURE — 99214 OFFICE O/P EST MOD 30 MIN: CPT | Performed by: NURSE PRACTITIONER

## 2024-04-01 PROCEDURE — G8417 CALC BMI ABV UP PARAM F/U: HCPCS | Performed by: NURSE PRACTITIONER

## 2024-04-01 PROCEDURE — G2211 COMPLEX E/M VISIT ADD ON: HCPCS | Performed by: NURSE PRACTITIONER

## 2024-04-01 PROCEDURE — 3077F SYST BP >= 140 MM HG: CPT | Performed by: NURSE PRACTITIONER

## 2024-04-01 PROCEDURE — 3078F DIAST BP <80 MM HG: CPT | Performed by: NURSE PRACTITIONER

## 2024-04-01 PROCEDURE — 1036F TOBACCO NON-USER: CPT | Performed by: NURSE PRACTITIONER

## 2024-04-01 PROCEDURE — 1090F PRES/ABSN URINE INCON ASSESS: CPT | Performed by: NURSE PRACTITIONER

## 2024-04-01 PROCEDURE — 85025 COMPLETE CBC W/AUTO DIFF WBC: CPT

## 2024-04-01 PROCEDURE — 80053 COMPREHEN METABOLIC PANEL: CPT

## 2024-04-01 PROCEDURE — G8399 PT W/DXA RESULTS DOCUMENT: HCPCS | Performed by: NURSE PRACTITIONER

## 2024-04-01 PROCEDURE — 36415 COLL VENOUS BLD VENIPUNCTURE: CPT

## 2024-04-01 RX ORDER — TAMOXIFEN CITRATE 20 MG/1
20 TABLET ORAL DAILY
Qty: 90 TABLET | Refills: 1 | Status: SHIPPED | OUTPATIENT
Start: 2024-04-01

## 2024-04-02 PROBLEM — D63.1 ANEMIA DUE TO STAGE 4 CHRONIC KIDNEY DISEASE (HCC): Status: ACTIVE | Noted: 2021-11-17

## 2024-04-02 RX ORDER — ALBUTEROL SULFATE 90 UG/1
4 AEROSOL, METERED RESPIRATORY (INHALATION) PRN
OUTPATIENT
Start: 2024-04-05

## 2024-04-02 RX ORDER — ACETAMINOPHEN 325 MG/1
650 TABLET ORAL
OUTPATIENT
Start: 2024-04-05

## 2024-04-02 RX ORDER — SODIUM CHLORIDE 9 MG/ML
INJECTION, SOLUTION INTRAVENOUS CONTINUOUS
OUTPATIENT
Start: 2024-04-05

## 2024-04-02 RX ORDER — ONDANSETRON 2 MG/ML
8 INJECTION INTRAMUSCULAR; INTRAVENOUS
OUTPATIENT
Start: 2024-04-05

## 2024-04-02 RX ORDER — EPINEPHRINE 1 MG/ML
0.3 INJECTION, SOLUTION, CONCENTRATE INTRAVENOUS PRN
OUTPATIENT
Start: 2024-04-05

## 2024-04-02 RX ORDER — DIPHENHYDRAMINE HYDROCHLORIDE 50 MG/ML
50 INJECTION INTRAMUSCULAR; INTRAVENOUS
OUTPATIENT
Start: 2024-04-05

## 2024-04-02 RX ORDER — FAMOTIDINE 10 MG/ML
20 INJECTION, SOLUTION INTRAVENOUS
OUTPATIENT
Start: 2024-04-05

## 2024-04-02 ASSESSMENT — ENCOUNTER SYMPTOMS
EYE DISCHARGE: 0
ABDOMINAL PAIN: 0
EYE PAIN: 0
RESPIRATORY NEGATIVE: 1
WHEEZING: 0
SORE THROAT: 0
EYES NEGATIVE: 1
GASTROINTESTINAL NEGATIVE: 1
EYE REDNESS: 0
COUGH: 0
CONSTIPATION: 0
VOMITING: 0
NAUSEA: 0
BACK PAIN: 0
DIARRHEA: 0

## 2024-04-08 DIAGNOSIS — E11.21 TYPE II DIABETES MELLITUS WITH NEPHROPATHY (HCC): ICD-10-CM

## 2024-04-08 DIAGNOSIS — C50.811 MALIGNANT NEOPLASM OF OVERLAPPING SITES OF RIGHT BREAST IN FEMALE, ESTROGEN RECEPTOR POSITIVE (HCC): ICD-10-CM

## 2024-04-08 DIAGNOSIS — Z17.0 MALIGNANT NEOPLASM OF OVERLAPPING SITES OF RIGHT BREAST IN FEMALE, ESTROGEN RECEPTOR POSITIVE (HCC): ICD-10-CM

## 2024-04-08 RX ORDER — GLIPIZIDE 2.5 MG/1
TABLET, EXTENDED RELEASE ORAL
Qty: 90 TABLET | Refills: 1 | Status: SHIPPED | OUTPATIENT
Start: 2024-04-08

## 2024-04-08 RX ORDER — MONTELUKAST SODIUM 10 MG/1
TABLET ORAL
Qty: 90 TABLET | Refills: 3 | Status: SHIPPED | OUTPATIENT
Start: 2024-04-08

## 2024-04-19 NOTE — PROGRESS NOTES
of care. Counseling included but was not limited to time spent reviewing labs, imaging studies/ treatment plan and answering questions.

## 2024-04-25 ENCOUNTER — OFFICE VISIT (OUTPATIENT)
Dept: SURGERY | Age: 74
End: 2024-04-25

## 2024-04-25 VITALS — WEIGHT: 211 LBS | BODY MASS INDEX: 35.16 KG/M2 | HEIGHT: 65 IN | HEART RATE: 72 BPM

## 2024-04-25 DIAGNOSIS — C50.811 MALIGNANT NEOPLASM OF OVERLAPPING SITES OF RIGHT BREAST IN FEMALE, ESTROGEN RECEPTOR POSITIVE (HCC): ICD-10-CM

## 2024-04-25 DIAGNOSIS — Z17.0 MALIGNANT NEOPLASM OF OVERLAPPING SITES OF RIGHT BREAST IN FEMALE, ESTROGEN RECEPTOR POSITIVE (HCC): ICD-10-CM

## 2024-04-25 DIAGNOSIS — Z85.3 PERSONAL HISTORY OF MALIGNANT NEOPLASM OF BREAST: Primary | ICD-10-CM

## 2024-04-25 DIAGNOSIS — Z98.890 STATUS POST RIGHT BREAST LUMPECTOMY: ICD-10-CM

## 2024-05-02 DIAGNOSIS — I25.5 ISCHEMIC CARDIOMYOPATHY: ICD-10-CM

## 2024-05-02 DIAGNOSIS — Z95.810 BIVENTRICULAR ICD (IMPLANTABLE CARDIOVERTER-DEFIBRILLATOR) IN PLACE: Primary | ICD-10-CM

## 2024-05-02 PROCEDURE — 93295 DEV INTERROG REMOTE 1/2/MLT: CPT | Performed by: CLINICAL NURSE SPECIALIST

## 2024-05-02 PROCEDURE — 93296 REM INTERROG EVL PM/IDS: CPT | Performed by: CLINICAL NURSE SPECIALIST

## 2024-05-07 DIAGNOSIS — I10 PRIMARY HYPERTENSION: ICD-10-CM

## 2024-05-07 RX ORDER — METOPROLOL TARTRATE 50 MG/1
TABLET, FILM COATED ORAL
Qty: 360 TABLET | Refills: 3 | Status: SHIPPED | OUTPATIENT
Start: 2024-05-07

## 2024-05-07 NOTE — TELEPHONE ENCOUNTER
Lyudmila called requesting a refill of the below medication which has been pended for you:     Requested Prescriptions     Pending Prescriptions Disp Refills    metoprolol tartrate (LOPRESSOR) 50 MG tablet [Pharmacy Med Name: METOPROLOL TARTRATE 50 MG Tablet] 360 tablet 3     Sig: TAKE 2 TABLETS TWICE A DAY       Last Appointment Date: 10/10/2022  Next Appointment Date: 6/12/2024    Allergies   Allergen Reactions    Medrol [Methylprednisolone] Other (See Comments)     Affects her eyes

## 2024-05-13 DIAGNOSIS — I10 PRIMARY HYPERTENSION: ICD-10-CM

## 2024-05-13 DIAGNOSIS — E11.21 TYPE II DIABETES MELLITUS WITH NEPHROPATHY (HCC): ICD-10-CM

## 2024-05-13 RX ORDER — AMLODIPINE BESYLATE 5 MG/1
TABLET ORAL
Qty: 90 TABLET | Refills: 1 | Status: SHIPPED | OUTPATIENT
Start: 2024-05-13

## 2024-05-23 ENCOUNTER — TELEPHONE (OUTPATIENT)
Dept: HEMATOLOGY | Age: 74
End: 2024-05-23

## 2024-05-23 NOTE — TELEPHONE ENCOUNTER
Called pt. to remind them of appointment on 05/30/2024 and had to leave a detailed voicemail with appointment date and time.

## 2024-05-29 ENCOUNTER — TELEPHONE (OUTPATIENT)
Dept: HEMATOLOGY | Age: 74
End: 2024-05-29

## 2024-05-29 NOTE — TELEPHONE ENCOUNTER
Patient called the office and stated that she does not want to start Retacrit injections.  States that she is 73 and just does not want to do them. Instructed that it is her choice and will support her.  Instructed that I will let Janie know of her decision.  Wants to also rescheduled her follow up with Janie.  Instructed that I will have the  call her in the morning with a new appointment.  Patient v/u.

## 2024-06-08 DIAGNOSIS — M85.80 OSTEOPENIA, UNSPECIFIED LOCATION: ICD-10-CM

## 2024-06-09 RX ORDER — ALENDRONATE SODIUM 70 MG/1
TABLET ORAL
Qty: 12 TABLET | Refills: 3 | Status: SHIPPED | OUTPATIENT
Start: 2024-06-09

## 2024-06-12 DIAGNOSIS — I10 PRIMARY HYPERTENSION: ICD-10-CM

## 2024-06-12 RX ORDER — ISOSORBIDE MONONITRATE 30 MG/1
TABLET, EXTENDED RELEASE ORAL
Qty: 180 TABLET | Refills: 3 | Status: SHIPPED | OUTPATIENT
Start: 2024-06-12

## 2024-06-12 NOTE — TELEPHONE ENCOUNTER
Last OV 3/6/2024  Next OV 8/1/2024      Requested Prescriptions     Pending Prescriptions Disp Refills    isosorbide mononitrate (IMDUR) 30 MG extended release tablet [Pharmacy Med Name: ISOSORBIDE MONONITRATE ER 30 MG Tablet Extended Release 24 Hour] 180 tablet 3     Sig: TAKE 1 TABLET TWICE DAILY

## 2024-07-12 DIAGNOSIS — I10 PRIMARY HYPERTENSION: ICD-10-CM

## 2024-07-12 RX ORDER — FUROSEMIDE 40 MG/1
40 TABLET ORAL DAILY
Qty: 90 TABLET | Refills: 1 | Status: SHIPPED | OUTPATIENT
Start: 2024-07-12

## 2024-07-31 NOTE — PLAN OF CARE
HR=68 bpm, MGEQ=563/69 mmhg, ZsV1=029.0 %, Resp=13 B/min, EtCO2=38 mmHg, Apnea=2 Seconds, Pain=0, [place]=10, [place]=2 Problem: Chronic Conditions and Co-morbidities  Goal: Patient's chronic conditions and co-morbidity symptoms are monitored and maintained or improved  Outcome: Progressing     Problem: Discharge Planning  Goal: Discharge to home or other facility with appropriate resources  Outcome: Progressing     Problem: Safety - Adult  Goal: Free from fall injury  Outcome: Progressing     Problem: Wound:  Goal: Will show signs of wound healing; wound closure and no evidence of infection  Description: Will show signs of wound healing; wound closure and no evidence of infection  Outcome: Progressing     Problem: Venous:  Goal: Signs of wound healing will improve  Description: Signs of wound healing will improve  Outcome: Progressing     Problem: Compression therapy:  Goal: Will be free from complications associated with compression therapy  Description: Will be free from complications associated with compression therapy  Outcome: Progressing     Problem: Falls - Risk of:  Goal: Will remain free from falls  Description: Will remain free from falls  Outcome: Progressing     Problem: Blood Glucose:  Goal: Ability to maintain appropriate glucose levels will improve  Description: Ability to maintain appropriate glucose levels will improve  Outcome: Progressing

## 2024-08-05 PROCEDURE — 93296 REM INTERROG EVL PM/IDS: CPT | Performed by: CLINICAL NURSE SPECIALIST

## 2024-08-05 PROCEDURE — 93295 DEV INTERROG REMOTE 1/2/MLT: CPT | Performed by: CLINICAL NURSE SPECIALIST

## 2024-08-06 DIAGNOSIS — I50.42 CHRONIC COMBINED SYSTOLIC (CONGESTIVE) AND DIASTOLIC (CONGESTIVE) HEART FAILURE (HCC): ICD-10-CM

## 2024-08-06 DIAGNOSIS — I25.5 ISCHEMIC CARDIOMYOPATHY: ICD-10-CM

## 2024-08-06 DIAGNOSIS — Z95.810 ICD (IMPLANTABLE CARDIOVERTER-DEFIBRILLATOR), BIVENTRICULAR, IN SITU: Primary | ICD-10-CM

## 2024-08-23 ENCOUNTER — TELEPHONE (OUTPATIENT)
Dept: HEMATOLOGY | Age: 74
End: 2024-08-23

## 2024-08-23 NOTE — TELEPHONE ENCOUNTER
I called and left patient a detailed voicemail with their appointment date and time for 08/27/24 and to come at the follow up appointment time and not the lab appointment time. I also made them aware of what that time was.  I made patient aware that we are in the [facility] and where it is located and gave the address in case, they use GPS. Left message for patient to call our office if they could not keep this appointment or if they did not know where our new building is located.

## 2024-09-18 DIAGNOSIS — E11.21 TYPE II DIABETES MELLITUS WITH NEPHROPATHY (HCC): ICD-10-CM

## 2024-09-19 ENCOUNTER — TELEPHONE (OUTPATIENT)
Dept: INTERNAL MEDICINE | Age: 74
End: 2024-09-19

## 2024-09-19 RX ORDER — GLIPIZIDE 2.5 MG/1
TABLET, EXTENDED RELEASE ORAL
Qty: 90 TABLET | Refills: 0 | Status: SHIPPED | OUTPATIENT
Start: 2024-09-19

## 2024-10-08 ENCOUNTER — TELEPHONE (OUTPATIENT)
Dept: INTERNAL MEDICINE | Age: 74
End: 2024-10-08

## 2024-10-08 NOTE — TELEPHONE ENCOUNTER
Patient called requesting a Telehealth visit. Advised her that she would have to have an active mychart, which she currently does not. She stated her daughter was able to help her with this. She also asked if we had anyone that could come draw her blood as she does not leave her house. Please advise.

## 2024-10-09 DIAGNOSIS — I10 PRIMARY HYPERTENSION: ICD-10-CM

## 2024-10-09 DIAGNOSIS — E11.21 TYPE II DIABETES MELLITUS WITH NEPHROPATHY (HCC): ICD-10-CM

## 2024-10-09 NOTE — TELEPHONE ENCOUNTER
Lyudmila called requesting a refill of the below medication which has been pended for you:     Requested Prescriptions     Pending Prescriptions Disp Refills    ACCU-CHEK GUIDE strip [Pharmacy Med Name: Accu-Chek Guide In Vitro Strip] 400 strip 3     Sig: TEST BLOOD SUGAR FOUR TIMES DAILY    Accu-Chek Softclix Lancets MISC [Pharmacy Med Name: Accu-Chek Softclix Lancets Miscellaneous] 400 each 3     Sig: TEST FOUR TIMES DAILY       Last Appointment Date: 3/6/2024  Next Appointment Date: Visit date not found    Allergies   Allergen Reactions    Medrol [Methylprednisolone] Other (See Comments)     Affects her eyes

## 2024-10-10 RX ORDER — LISINOPRIL 5 MG/1
5 TABLET ORAL DAILY
Qty: 90 TABLET | Refills: 1 | Status: SHIPPED | OUTPATIENT
Start: 2024-10-10

## 2024-10-10 RX ORDER — LANCETS
EACH MISCELLANEOUS
Qty: 400 EACH | Refills: 3 | Status: SHIPPED | OUTPATIENT
Start: 2024-10-10

## 2024-10-10 RX ORDER — BLOOD SUGAR DIAGNOSTIC
STRIP MISCELLANEOUS
Qty: 400 STRIP | Refills: 3 | Status: SHIPPED | OUTPATIENT
Start: 2024-10-10

## 2024-10-31 RX ORDER — SIMVASTATIN 40 MG
40 TABLET ORAL NIGHTLY
Qty: 90 TABLET | Refills: 3 | Status: SHIPPED | OUTPATIENT
Start: 2024-10-31

## 2024-11-06 DIAGNOSIS — Z95.810 BIVENTRICULAR ICD (IMPLANTABLE CARDIOVERTER-DEFIBRILLATOR) IN PLACE: Primary | ICD-10-CM

## 2024-11-06 DIAGNOSIS — I50.42 CHRONIC COMBINED SYSTOLIC (CONGESTIVE) AND DIASTOLIC (CONGESTIVE) HEART FAILURE (HCC): ICD-10-CM

## 2024-11-06 DIAGNOSIS — I25.5 ISCHEMIC CARDIOMYOPATHY: ICD-10-CM

## 2024-12-05 DIAGNOSIS — I10 PRIMARY HYPERTENSION: ICD-10-CM

## 2024-12-05 RX ORDER — FUROSEMIDE 40 MG/1
40 TABLET ORAL DAILY
Qty: 90 TABLET | Refills: 3 | OUTPATIENT
Start: 2024-12-05

## 2024-12-06 DIAGNOSIS — E11.21 TYPE II DIABETES MELLITUS WITH NEPHROPATHY (HCC): ICD-10-CM

## 2024-12-06 DIAGNOSIS — I10 PRIMARY HYPERTENSION: ICD-10-CM

## 2024-12-06 RX ORDER — TAMOXIFEN CITRATE 20 MG/1
20 TABLET ORAL DAILY
Qty: 90 TABLET | Refills: 3 | Status: SHIPPED | OUTPATIENT
Start: 2024-12-06

## 2024-12-09 RX ORDER — AMLODIPINE BESYLATE 5 MG/1
TABLET ORAL
Qty: 90 TABLET | Refills: 3 | OUTPATIENT
Start: 2024-12-09

## 2024-12-09 RX ORDER — GLIPIZIDE 2.5 MG/1
TABLET, EXTENDED RELEASE ORAL
Qty: 90 TABLET | Refills: 3 | OUTPATIENT
Start: 2024-12-09

## 2024-12-19 ENCOUNTER — TELEPHONE (OUTPATIENT)
Dept: CARDIOLOGY CLINIC | Age: 74
End: 2024-12-19

## 2024-12-19 DIAGNOSIS — I25.5 ISCHEMIC CARDIOMYOPATHY: ICD-10-CM

## 2024-12-19 DIAGNOSIS — I48.0 PAF (PAROXYSMAL ATRIAL FIBRILLATION) (HCC): ICD-10-CM

## 2024-12-19 DIAGNOSIS — Z95.810 ICD (IMPLANTABLE CARDIOVERTER-DEFIBRILLATOR), BIVENTRICULAR, IN SITU: ICD-10-CM

## 2024-12-19 DIAGNOSIS — Z95.810 BIVENTRICULAR ICD (IMPLANTABLE CARDIOVERTER-DEFIBRILLATOR) IN PLACE: Primary | ICD-10-CM

## 2024-12-20 NOTE — RESULT ENCOUNTER NOTE
Spoke to patient regarding carelink ICD results.  She stated has not been out of the house since March and she has not intentions of getting out.  When the good Lord is ready to take her he will.  She stated she bleeds very easily and does not want to start a blood thinner.  She stated \"isn't this Afib a precursor to stroke\".  I informed patient Afib increasing risk of stroke.  She verbalized understanding and does not want to start a blood thinner.  She stated she has told her family as long as she can get out of bed and get to her chair, she wants to stay home.  If she becomes incapacitated then they can put her in a nursing home.  She does not feel a need to recheck her rhythm on Monday, because she does not want to do anything different than she is doing now.

## 2025-01-01 ENCOUNTER — RESULTS FOLLOW-UP (OUTPATIENT)
Dept: CARDIOLOGY CLINIC | Age: 75
End: 2025-01-01

## 2025-01-08 DIAGNOSIS — Z17.0 MALIGNANT NEOPLASM OF OVERLAPPING SITES OF RIGHT BREAST IN FEMALE, ESTROGEN RECEPTOR POSITIVE (HCC): ICD-10-CM

## 2025-01-08 DIAGNOSIS — C50.811 MALIGNANT NEOPLASM OF OVERLAPPING SITES OF RIGHT BREAST IN FEMALE, ESTROGEN RECEPTOR POSITIVE (HCC): ICD-10-CM

## 2025-01-08 RX ORDER — LETROZOLE 2.5 MG/1
TABLET, FILM COATED ORAL
Qty: 90 TABLET | Refills: 0 | Status: SHIPPED | OUTPATIENT
Start: 2025-01-08

## 2025-02-10 DIAGNOSIS — Z95.810 BIVENTRICULAR ICD (IMPLANTABLE CARDIOVERTER-DEFIBRILLATOR) IN PLACE: Primary | ICD-10-CM

## 2025-02-10 DIAGNOSIS — I25.5 ISCHEMIC CARDIOMYOPATHY: ICD-10-CM

## 2025-02-10 DIAGNOSIS — I48.0 PAF (PAROXYSMAL ATRIAL FIBRILLATION) (HCC): ICD-10-CM

## 2025-02-25 ENCOUNTER — TELEPHONE (OUTPATIENT)
Dept: INTERNAL MEDICINE | Age: 75
End: 2025-02-25

## 2025-02-25 NOTE — TELEPHONE ENCOUNTER
Pts sister called because she is worried about her sisters health. Pt is not doing well and she is very weak. Pt has been unable to keep food down and she is vomiting often. Pt does not wish to go to the hospital and does not wish to come for a visit here because she os afraid you will send her to the hospital. I informed pt that you would probably recommend that she come in for an office visit at least so that she can be evaluated. Pts sister stated that she would do everything that she could to get her here. Please Advise.

## 2025-02-27 ENCOUNTER — TELEPHONE (OUTPATIENT)
Dept: INTERNAL MEDICINE | Age: 75
End: 2025-02-27

## 2025-02-27 ENCOUNTER — OFFICE VISIT (OUTPATIENT)
Dept: INTERNAL MEDICINE | Age: 75
End: 2025-02-27

## 2025-02-27 VITALS
DIASTOLIC BLOOD PRESSURE: 54 MMHG | OXYGEN SATURATION: 99 % | SYSTOLIC BLOOD PRESSURE: 90 MMHG | BODY MASS INDEX: 35.11 KG/M2 | HEART RATE: 87 BPM | HEIGHT: 65 IN

## 2025-02-27 DIAGNOSIS — E11.311 DIABETIC MACULAR EDEMA (HCC): ICD-10-CM

## 2025-02-27 DIAGNOSIS — R60.0 BILATERAL LOWER EXTREMITY EDEMA: ICD-10-CM

## 2025-02-27 DIAGNOSIS — Z91.09 ENVIRONMENTAL ALLERGIES: ICD-10-CM

## 2025-02-27 DIAGNOSIS — N18.4 ANEMIA DUE TO STAGE 4 CHRONIC KIDNEY DISEASE (HCC): ICD-10-CM

## 2025-02-27 DIAGNOSIS — I10 PRIMARY HYPERTENSION: ICD-10-CM

## 2025-02-27 DIAGNOSIS — Z00.00 ROUTINE ADULT HEALTH MAINTENANCE: Primary | ICD-10-CM

## 2025-02-27 DIAGNOSIS — M81.0 OSTEOPOROSIS, UNSPECIFIED OSTEOPOROSIS TYPE, UNSPECIFIED PATHOLOGICAL FRACTURE PRESENCE: ICD-10-CM

## 2025-02-27 DIAGNOSIS — R11.2 NAUSEA AND VOMITING, UNSPECIFIED VOMITING TYPE: ICD-10-CM

## 2025-02-27 DIAGNOSIS — D63.1 ANEMIA DUE TO STAGE 4 CHRONIC KIDNEY DISEASE (HCC): ICD-10-CM

## 2025-02-27 DIAGNOSIS — C50.811 MALIGNANT NEOPLASM OF OVERLAPPING SITES OF RIGHT BREAST IN FEMALE, ESTROGEN RECEPTOR POSITIVE (HCC): ICD-10-CM

## 2025-02-27 DIAGNOSIS — E11.69 TYPE 2 DIABETES MELLITUS WITH OTHER SPECIFIED COMPLICATION, UNSPECIFIED WHETHER LONG TERM INSULIN USE (HCC): ICD-10-CM

## 2025-02-27 DIAGNOSIS — R53.1 WEAKNESS: ICD-10-CM

## 2025-02-27 DIAGNOSIS — D50.9 IRON DEFICIENCY ANEMIA, UNSPECIFIED IRON DEFICIENCY ANEMIA TYPE: ICD-10-CM

## 2025-02-27 DIAGNOSIS — R06.02 SHORTNESS OF BREATH: ICD-10-CM

## 2025-02-27 DIAGNOSIS — E11.3299 NONPROLIFERATIVE DIABETIC RETINOPATHY (HCC): ICD-10-CM

## 2025-02-27 DIAGNOSIS — L97.912 SKIN ULCER OF RIGHT LOWER LEG WITH FAT LAYER EXPOSED (HCC): ICD-10-CM

## 2025-02-27 DIAGNOSIS — E55.9 VITAMIN D DEFICIENCY: ICD-10-CM

## 2025-02-27 DIAGNOSIS — E11.21 TYPE II DIABETES MELLITUS WITH NEPHROPATHY (HCC): ICD-10-CM

## 2025-02-27 DIAGNOSIS — L97.922 ULCER OF LEFT LOWER LEG, WITH FAT LAYER EXPOSED (HCC): Chronic | ICD-10-CM

## 2025-02-27 DIAGNOSIS — I25.10 CORONARY ARTERY DISEASE INVOLVING NATIVE CORONARY ARTERY OF NATIVE HEART WITHOUT ANGINA PECTORIS: ICD-10-CM

## 2025-02-27 DIAGNOSIS — Z17.0 MALIGNANT NEOPLASM OF OVERLAPPING SITES OF RIGHT BREAST IN FEMALE, ESTROGEN RECEPTOR POSITIVE (HCC): ICD-10-CM

## 2025-02-27 DIAGNOSIS — I95.9 HYPOTENSION, UNSPECIFIED HYPOTENSION TYPE: ICD-10-CM

## 2025-02-27 DIAGNOSIS — E78.5 HYPERLIPIDEMIA, UNSPECIFIED HYPERLIPIDEMIA TYPE: ICD-10-CM

## 2025-02-27 LAB
25(OH)D3 SERPL-MCNC: 40.4 NG/ML
ALBUMIN SERPL-MCNC: 3.9 G/DL (ref 3.5–5.2)
ALP SERPL-CCNC: 51 U/L (ref 35–104)
ALT SERPL-CCNC: <5 U/L (ref 5–33)
ANION GAP SERPL CALCULATED.3IONS-SCNC: 15 MMOL/L (ref 8–16)
AST SERPL-CCNC: 12 U/L (ref 5–32)
BASOPHILS # BLD: 0.1 K/UL (ref 0–0.2)
BASOPHILS NFR BLD: 1.1 % (ref 0–1)
BILIRUB SERPL-MCNC: 0.5 MG/DL (ref 0.2–1.2)
BNP BLD-MCNC: ABNORMAL PG/ML (ref 0–449)
BUN SERPL-MCNC: 43 MG/DL (ref 8–23)
CALCIUM SERPL-MCNC: 8.7 MG/DL (ref 8.8–10.2)
CHLORIDE SERPL-SCNC: 103 MMOL/L (ref 98–107)
CHOLEST SERPL-MCNC: 96 MG/DL (ref 0–199)
CO2 SERPL-SCNC: 23 MMOL/L (ref 22–29)
CREAT SERPL-MCNC: 3.1 MG/DL (ref 0.5–0.9)
EOSINOPHIL # BLD: 0.2 K/UL (ref 0–0.6)
EOSINOPHIL NFR BLD: 3.8 % (ref 0–5)
ERYTHROCYTE [DISTWIDTH] IN BLOOD BY AUTOMATED COUNT: 15.2 % (ref 11.5–14.5)
FERRITIN SERPL-MCNC: 41.1 NG/ML (ref 13–150)
GLUCOSE SERPL-MCNC: 91 MG/DL (ref 70–99)
HBA1C MFR BLD: 5.2 % (ref 4–5.6)
HCT VFR BLD AUTO: 37.4 % (ref 37–47)
HDLC SERPL-MCNC: 27 MG/DL (ref 40–60)
HGB BLD-MCNC: 11.5 G/DL (ref 12–16)
IMM GRANULOCYTES # BLD: 0 K/UL
IRON SATN MFR SERPL: 11 % (ref 15–50)
IRON SERPL-MCNC: 41 UG/DL (ref 37–145)
LDLC SERPL CALC-MCNC: 42 MG/DL
LYMPHOCYTES # BLD: 1.2 K/UL (ref 1.1–4.5)
LYMPHOCYTES NFR BLD: 22.3 % (ref 20–40)
MCH RBC QN AUTO: 32.6 PG (ref 27–31)
MCHC RBC AUTO-ENTMCNC: 30.7 G/DL (ref 33–37)
MCV RBC AUTO: 105.9 FL (ref 81–99)
MONOCYTES # BLD: 0.4 K/UL (ref 0–0.9)
MONOCYTES NFR BLD: 8.3 % (ref 0–10)
NEUTROPHILS # BLD: 3.4 K/UL (ref 1.5–7.5)
NEUTS SEG NFR BLD: 64.3 % (ref 50–65)
PLATELET # BLD AUTO: 168 K/UL (ref 130–400)
PMV BLD AUTO: 11.2 FL (ref 9.4–12.3)
POTASSIUM SERPL-SCNC: 5.1 MMOL/L (ref 3.5–5.1)
PROT SERPL-MCNC: 6.6 G/DL (ref 6.4–8.3)
RBC # BLD AUTO: 3.53 M/UL (ref 4.2–5.4)
SODIUM SERPL-SCNC: 141 MMOL/L (ref 136–145)
TIBC SERPL-MCNC: 377 UG/DL (ref 250–400)
TRIGL SERPL-MCNC: 136 MG/DL (ref 0–149)
TSH SERPL DL<=0.005 MIU/L-ACNC: 2.57 UIU/ML (ref 0.27–4.2)
WBC # BLD AUTO: 5.3 K/UL (ref 4.8–10.8)

## 2025-02-27 RX ORDER — ONDANSETRON 4 MG/1
4 TABLET, ORALLY DISINTEGRATING ORAL 3 TIMES DAILY PRN
Qty: 21 TABLET | Refills: 0 | Status: SHIPPED | OUTPATIENT
Start: 2025-02-27

## 2025-02-27 SDOH — ECONOMIC STABILITY: FOOD INSECURITY: WITHIN THE PAST 12 MONTHS, THE FOOD YOU BOUGHT JUST DIDN'T LAST AND YOU DIDN'T HAVE MONEY TO GET MORE.: NEVER TRUE

## 2025-02-27 SDOH — ECONOMIC STABILITY: FOOD INSECURITY: WITHIN THE PAST 12 MONTHS, YOU WORRIED THAT YOUR FOOD WOULD RUN OUT BEFORE YOU GOT MONEY TO BUY MORE.: NEVER TRUE

## 2025-02-27 ASSESSMENT — PATIENT HEALTH QUESTIONNAIRE - PHQ9
SUM OF ALL RESPONSES TO PHQ QUESTIONS 1-9: 0
2. FEELING DOWN, DEPRESSED OR HOPELESS: NOT AT ALL
SUM OF ALL RESPONSES TO PHQ QUESTIONS 1-9: 0
SUM OF ALL RESPONSES TO PHQ QUESTIONS 1-9: 0
SUM OF ALL RESPONSES TO PHQ9 QUESTIONS 1 & 2: 0
SUM OF ALL RESPONSES TO PHQ QUESTIONS 1-9: 0
1. LITTLE INTEREST OR PLEASURE IN DOING THINGS: NOT AT ALL

## 2025-02-27 NOTE — PROGRESS NOTES
05/19/2024    Flu vaccine (1) 08/01/2024    COVID-19 Vaccine (1 - 2024-25 season) Never done    Annual Wellness Visit (Medicare Advantage)  01/01/2025    Pneumococcal 50+ years Vaccine (1 of 2 - PCV) 01/01/2030 (Originally 6/25/1969)    Lipids  02/27/2026    Depression Screen  02/27/2026    GFR test (Diabetes, CKD 3-4, OR last GFR 15-59)  02/27/2026    DEXA (modify frequency per FRAX score)  Completed    Hepatitis C screen  Completed    Hepatitis A vaccine  Aged Out    Hib vaccine  Aged Out    Polio vaccine  Aged Out    Meningococcal (ACWY) vaccine  Aged Out    Diabetic foot exam  Discontinued    A1C test (Diabetic or Prediabetic)  Discontinued    Diabetic retinal exam  Discontinued    Breast cancer screen  Discontinued    Colorectal Cancer Screen  Discontinued       Recommendations for Preventive Services Due: see orders.  Recommended screening schedule for the next 5-10 years is provided to the patient in written form: see Patient Instructions/AVS.

## 2025-02-27 NOTE — TELEPHONE ENCOUNTER
Pt is supposed to come back in one week but she is unable to come back next week and Dr. Lopez will be out the following week. Then there are no openings until 3/31. I did not want to schedule anything  inappropriately. Please call patient.

## 2025-03-01 ENCOUNTER — TELEPHONE (OUTPATIENT)
Dept: INTERNAL MEDICINE | Age: 75
End: 2025-03-01

## 2025-03-01 ASSESSMENT — ENCOUNTER SYMPTOMS
SINUS PRESSURE: 0
CHOKING: 0
DIARRHEA: 0
VOMITING: 1
TROUBLE SWALLOWING: 0
ABDOMINAL DISTENTION: 0
STRIDOR: 0
CONSTIPATION: 0
SINUS PAIN: 0
BLOOD IN STOOL: 0
COLOR CHANGE: 0
EYE ITCHING: 0
COUGH: 0
ABDOMINAL PAIN: 0
EYE DISCHARGE: 0
VOICE CHANGE: 0
CHEST TIGHTNESS: 0
NAUSEA: 1
SHORTNESS OF BREATH: 0
WHEEZING: 0
RHINORRHEA: 0
SORE THROAT: 0

## 2025-03-01 NOTE — TELEPHONE ENCOUNTER
With her renal parameters being as bad as they are, she does need to get into see a nephrologist.  I also want her to stop her metformin

## 2025-03-01 NOTE — DISCHARGE INSTRUCTIONS
[address] and [facility]   Physician Orders and Discharge Instructions  [address] [facility]  Telephone: [phone]    NAME:  Marcello Davila OF BIRTH:  1950  MEDICAL RECORD NUMBER:  [id number]  DATE:  8/24/2023    Discharge condition: Stable    Discharge to: Home    Left via:Private automobile    Accompanied by:  sibling    ECF/HHA: n/a    Dressing Orders:  Location of Wound: Right and Left legs  Wash with soap and water. Apply xeroform gauze to open areas. Grey Corrente Apply Blue Coflex unna boot from toes to knee. Change twice weekly . Compression Therapy is an important part of your program of care. Compression makes it easier for your body to pump the blood from your legs back to your heart, helping alleviate a condition called chronic venous insufficiency. Chronic venous insufficiency is an underlying cause of your injury, and managing it properly will help you to heal.        Keep the bandage in place at all times unless you experience foot pain or numbness or coolness of the toes. Do not attempt to remove and reapply the bandage system. Give it time. Your bandage may feel tight at first. This is normal. Your bandage will become more comfortable as swelling goes down. Be active, as suggested by your healthcare provider. Daily walks or mild exercise encourages better blood flow to aid healing. Put your feet up when sitting for long periods of time. Keep the bandage dry. Wet compression bandages are more likely to slip down and/or cause damage to good skin. PRECAUTIONS For your safety, compression therapy should be used under the supervision of a healthcare professional. Contact your care provider if you experience any of the following:  Pain that does not go away with elevation. Discoloration or numbness of the toes. The bandage slipping out of place  Fluid leaking through the bandage. - On pantoprazole 40mg daily, hold for now given WILD of unclear etiology - C/w levothyroxine 88mcg daily

## 2025-03-03 NOTE — TELEPHONE ENCOUNTER
Per other mychart message   With her renal parameters being as bad as they are, she does need to get into see a nephrologist.  I also want her to stop her metformin

## 2025-03-06 ENCOUNTER — TELEPHONE (OUTPATIENT)
Dept: INTERNAL MEDICINE | Age: 75
End: 2025-03-06

## 2025-03-06 DIAGNOSIS — I10 PRIMARY HYPERTENSION: ICD-10-CM

## 2025-03-06 RX ORDER — LISINOPRIL 5 MG/1
5 TABLET ORAL DAILY
Qty: 90 TABLET | Refills: 3 | OUTPATIENT
Start: 2025-03-06

## 2025-03-06 NOTE — TELEPHONE ENCOUNTER
Patient called back but phone was disconnected. Attempted to call patient back but went to . Tried again and went to .

## 2025-03-07 NOTE — TELEPHONE ENCOUNTER
Patient sister called and given results. Voiced understanding. Scheduled appointment with Sue. Sister will try to get to go to ER.

## 2025-03-12 ENCOUNTER — OFFICE VISIT (OUTPATIENT)
Dept: INTERNAL MEDICINE | Age: 75
End: 2025-03-12

## 2025-03-12 VITALS
HEART RATE: 75 BPM | DIASTOLIC BLOOD PRESSURE: 88 MMHG | TEMPERATURE: 97.6 F | SYSTOLIC BLOOD PRESSURE: 128 MMHG | OXYGEN SATURATION: 100 %

## 2025-03-12 DIAGNOSIS — R06.02 SHORTNESS OF BREATH: ICD-10-CM

## 2025-03-12 DIAGNOSIS — R60.0 BILATERAL LOWER EXTREMITY EDEMA: ICD-10-CM

## 2025-03-12 DIAGNOSIS — I10 PRIMARY HYPERTENSION: ICD-10-CM

## 2025-03-12 DIAGNOSIS — N18.4 CKD (CHRONIC KIDNEY DISEASE) STAGE 4, GFR 15-29 ML/MIN (HCC): Primary | ICD-10-CM

## 2025-03-12 DIAGNOSIS — R53.1 WEAKNESS: ICD-10-CM

## 2025-03-12 RX ORDER — FUROSEMIDE 40 MG/1
40 TABLET ORAL DAILY
Qty: 90 TABLET | Refills: 0 | Status: SHIPPED | OUTPATIENT
Start: 2025-03-12

## 2025-03-12 ASSESSMENT — ENCOUNTER SYMPTOMS
ALLERGIC/IMMUNOLOGIC NEGATIVE: 1
GASTROINTESTINAL NEGATIVE: 1
EYES NEGATIVE: 1
SHORTNESS OF BREATH: 1

## 2025-03-12 NOTE — PROGRESS NOTES
[facility]  [facility]  [address] [place] [address] DRIVE  [address]  [place] [address]  Dept: [phone]  Dept Fax: [phone]  Loc: [phone]    Lyudmila Go is a 74 y.o. female who presents today for her medical conditions/complaints as noted below.  Lyudmila Go is c/o of Follow-up (F/u for ulcer, HTN, edema, weakness and labs )        HPI:   She is a patient of Dr. Lopez that presents today with her sister and niece. The patient states \"I am here because they told me to be here\".  She states she does not understand why she had to come in for this appointment.  She states she was told to follow up on blood pressure, swelling, weakness, ulcer and blood work.    Explained to the patient, sister and niece that the chart states the patient's sister was notified of lab results on 3/7/25 and she voiced understanding.     Blood pressure is 128/88. She is taking Lisinopril 5mg, Lopressor 50mg.  States she is no longer taking Amlodipine because Dr. Lopez took her off it.     She declined referral to Nephrologist.  She states \"I have stage 4 kidney disease and I am a DNR, so what are they going to do\".   She states and \"I do not want dialysis\".    She states Dr. Waters is her Cardiologist.  She states she has defibrillator and she knows her heart is out of synk. She states again \"I am a DNR, so what are they gonna do\".  She states \"my sister wants a miracle but I know that I am going to die\". She is taking Lasix once daily.  She states she has not been that short of breath to take two.   She states she did have blisters on her legs, but they are gone now.  HPI   Chief Complaint   Patient presents with    Follow-up     F/u for ulcer, HTN, edema, weakness and labs      Past Medical History:   Diagnosis Date    Acute sinusitis     CHAPARRO (acute kidney injury) 05/18/2018    CHAPARRO (acute kidney injury) 05/18/2018    Anemia     Anemia due to stage 4 chronic kidney disease (HCC) 11/17/2021

## 2025-03-19 DIAGNOSIS — I10 PRIMARY HYPERTENSION: ICD-10-CM

## 2025-03-19 DIAGNOSIS — M85.80 OSTEOPENIA, UNSPECIFIED LOCATION: ICD-10-CM

## 2025-03-19 DIAGNOSIS — Z17.0 MALIGNANT NEOPLASM OF OVERLAPPING SITES OF RIGHT BREAST IN FEMALE, ESTROGEN RECEPTOR POSITIVE: ICD-10-CM

## 2025-03-19 DIAGNOSIS — C50.811 MALIGNANT NEOPLASM OF OVERLAPPING SITES OF RIGHT BREAST IN FEMALE, ESTROGEN RECEPTOR POSITIVE: ICD-10-CM

## 2025-03-19 RX ORDER — ALENDRONATE SODIUM 70 MG/1
TABLET ORAL
Qty: 12 TABLET | Refills: 3 | Status: SHIPPED | OUTPATIENT
Start: 2025-03-19

## 2025-03-19 RX ORDER — LETROZOLE 2.5 MG/1
2.5 TABLET, FILM COATED ORAL DAILY
Qty: 90 TABLET | Refills: 3 | Status: SHIPPED | OUTPATIENT
Start: 2025-03-19

## 2025-03-19 RX ORDER — ISOSORBIDE MONONITRATE 30 MG/1
30 TABLET, EXTENDED RELEASE ORAL 2 TIMES DAILY
Qty: 180 TABLET | Refills: 1 | Status: SHIPPED | OUTPATIENT
Start: 2025-03-19

## 2025-04-11 ENCOUNTER — TELEPHONE (OUTPATIENT)
Dept: INTERNAL MEDICINE | Age: 75
End: 2025-04-11

## 2025-04-11 DIAGNOSIS — S81.802A OPEN WOUND OF BOTH LOWER EXTREMITIES, INITIAL ENCOUNTER: Primary | ICD-10-CM

## 2025-04-11 DIAGNOSIS — S81.801A OPEN WOUND OF BOTH LOWER EXTREMITIES, INITIAL ENCOUNTER: Primary | ICD-10-CM

## 2025-04-11 RX ORDER — CEFDINIR 300 MG/1
300 CAPSULE ORAL 2 TIMES DAILY
Qty: 20 CAPSULE | Refills: 0 | Status: SHIPPED | OUTPATIENT
Start: 2025-04-11 | End: 2025-04-21

## 2025-04-11 NOTE — TELEPHONE ENCOUNTER
Patient states that she has a UTI. Needing an antibiotic. Also, needs an antibiotic for blisters on the back of her legs. They are not healing. Daughter is trying to keep them clean but areas look infected with redness noted. Patient is unable to come in for UA or appointment. Also requesting Wound care to come to home.

## 2025-04-13 ENCOUNTER — APPOINTMENT (OUTPATIENT)
Dept: GENERAL RADIOLOGY | Age: 75
DRG: 683 | End: 2025-04-13
Payer: MEDICARE

## 2025-04-13 ENCOUNTER — HOSPITAL ENCOUNTER (INPATIENT)
Age: 75
LOS: 2 days | Discharge: HOME HEALTH | DRG: 683 | End: 2025-04-15
Attending: STUDENT IN AN ORGANIZED HEALTH CARE EDUCATION/TRAINING PROGRAM | Admitting: INTERNAL MEDICINE
Payer: MEDICARE

## 2025-04-13 DIAGNOSIS — Z51.5 PALLIATIVE CARE PATIENT: ICD-10-CM

## 2025-04-13 DIAGNOSIS — N17.9 ACUTE RENAL FAILURE, UNSPECIFIED ACUTE RENAL FAILURE TYPE: Primary | ICD-10-CM

## 2025-04-13 LAB
ALBUMIN SERPL-MCNC: 3.4 G/DL (ref 3.5–5.2)
ALP SERPL-CCNC: 61 U/L (ref 35–104)
ALT SERPL-CCNC: <5 U/L (ref 10–35)
ANION GAP SERPL CALCULATED.3IONS-SCNC: 15 MMOL/L (ref 8–16)
AST SERPL-CCNC: 19 U/L (ref 10–35)
BASE EXCESS VENOUS: -6 MMOL/L
BASOPHILS # BLD: 0 K/UL (ref 0–0.2)
BASOPHILS NFR BLD: 0.3 % (ref 0–1)
BILIRUB SERPL-MCNC: 0.5 MG/DL (ref 0.2–1.2)
BUN SERPL-MCNC: 65 MG/DL (ref 8–23)
CALCIUM SERPL-MCNC: 8.5 MG/DL (ref 8.8–10.2)
CARBOXYHEMOGLOBIN: 1.7 %
CHLORIDE SERPL-SCNC: 105 MMOL/L (ref 98–107)
CHP ED QC CHECK: YES
CHP ED QC CHECK: YES
CO2 SERPL-SCNC: 19 MMOL/L (ref 22–29)
CREAT SERPL-MCNC: 4 MG/DL (ref 0.5–0.9)
EOSINOPHIL # BLD: 0 K/UL (ref 0–0.6)
EOSINOPHIL NFR BLD: 0.2 % (ref 0–5)
ERYTHROCYTE [DISTWIDTH] IN BLOOD BY AUTOMATED COUNT: 16.1 % (ref 11.5–14.5)
GLUCOSE BLD-MCNC: 119 MG/DL (ref 70–99)
GLUCOSE BLD-MCNC: 130 MG/DL (ref 70–99)
GLUCOSE BLD-MCNC: 141 MG/DL (ref 70–99)
GLUCOSE BLD-MCNC: 154 MG/DL (ref 70–99)
GLUCOSE BLD-MCNC: 161 MG/DL (ref 70–99)
GLUCOSE BLD-MCNC: 165 MG/DL (ref 70–99)
GLUCOSE BLD-MCNC: 168 MG/DL
GLUCOSE BLD-MCNC: 168 MG/DL (ref 70–99)
GLUCOSE BLD-MCNC: 175 MG/DL
GLUCOSE BLD-MCNC: 175 MG/DL (ref 70–99)
GLUCOSE BLD-MCNC: 183 MG/DL (ref 70–99)
GLUCOSE BLD-MCNC: 199 MG/DL (ref 70–99)
GLUCOSE BLD-MCNC: 56 MG/DL (ref 70–99)
GLUCOSE BLD-MCNC: 70 MG/DL
GLUCOSE BLD-MCNC: 70 MG/DL (ref 70–99)
GLUCOSE SERPL-MCNC: 30 MG/DL (ref 70–99)
HCO3 VENOUS: 22 MMOL/L (ref 23–29)
HCT VFR BLD AUTO: 42.3 % (ref 37–47)
HGB BLD-MCNC: 13.4 G/DL (ref 12–16)
IMM GRANULOCYTES # BLD: 0 K/UL
LACTATE BLDV-SCNC: 1.9 MMOL/L (ref 0.5–1.9)
LIPASE SERPL-CCNC: 9 U/L (ref 13–60)
LYMPHOCYTES # BLD: 0.8 K/UL (ref 1.1–4.5)
LYMPHOCYTES NFR BLD: 11.5 % (ref 20–40)
MAGNESIUM SERPL-MCNC: 1.9 MG/DL (ref 1.6–2.4)
MCH RBC QN AUTO: 32.1 PG (ref 27–31)
MCHC RBC AUTO-ENTMCNC: 31.7 G/DL (ref 33–37)
MCV RBC AUTO: 101.2 FL (ref 81–99)
METHEMOGLOBIN VENOUS: 1.2 %
MONOCYTES # BLD: 0.3 K/UL (ref 0–0.9)
MONOCYTES NFR BLD: 4.4 % (ref 0–10)
NEUTROPHILS # BLD: 5.5 K/UL (ref 1.5–7.5)
NEUTS SEG NFR BLD: 83.3 % (ref 50–65)
O2 CONTENT, VEN: 7 ML/DL
O2 SAT, VEN: 37 %
O2 THERAPY: ABNORMAL
PCO2 VENOUS: 49 MMHG (ref 40–50)
PERFORMED ON: ABNORMAL
PERFORMED ON: NORMAL
PH VENOUS: 7.25 (ref 7.35–7.45)
PLATELET # BLD AUTO: 205 K/UL (ref 130–400)
PMV BLD AUTO: 10.8 FL (ref 9.4–12.3)
PO2 VENOUS: 20 MMHG
POTASSIUM SERPL-SCNC: 6.3 MMOL/L (ref 3.5–5.1)
PROT SERPL-MCNC: 6.5 G/DL (ref 6.4–8.3)
RBC # BLD AUTO: 4.18 M/UL (ref 4.2–5.4)
SODIUM SERPL-SCNC: 139 MMOL/L (ref 136–145)
WBC # BLD AUTO: 6.6 K/UL (ref 4.8–10.8)

## 2025-04-13 PROCEDURE — 2500000003 HC RX 250 WO HCPCS: Performed by: INTERNAL MEDICINE

## 2025-04-13 PROCEDURE — 2580000003 HC RX 258: Performed by: INTERNAL MEDICINE

## 2025-04-13 PROCEDURE — 2580000003 HC RX 258: Performed by: STUDENT IN AN ORGANIZED HEALTH CARE EDUCATION/TRAINING PROGRAM

## 2025-04-13 PROCEDURE — 83690 ASSAY OF LIPASE: CPT

## 2025-04-13 PROCEDURE — 82962 GLUCOSE BLOOD TEST: CPT

## 2025-04-13 PROCEDURE — 85025 COMPLETE CBC W/AUTO DIFF WBC: CPT

## 2025-04-13 PROCEDURE — 36415 COLL VENOUS BLD VENIPUNCTURE: CPT

## 2025-04-13 PROCEDURE — 83735 ASSAY OF MAGNESIUM: CPT

## 2025-04-13 PROCEDURE — 1200000000 HC SEMI PRIVATE

## 2025-04-13 PROCEDURE — 82803 BLOOD GASES ANY COMBINATION: CPT

## 2025-04-13 PROCEDURE — 51702 INSERT TEMP BLADDER CATH: CPT

## 2025-04-13 PROCEDURE — 83605 ASSAY OF LACTIC ACID: CPT

## 2025-04-13 PROCEDURE — 94640 AIRWAY INHALATION TREATMENT: CPT

## 2025-04-13 PROCEDURE — 6360000002 HC RX W HCPCS: Performed by: STUDENT IN AN ORGANIZED HEALTH CARE EDUCATION/TRAINING PROGRAM

## 2025-04-13 PROCEDURE — 80053 COMPREHEN METABOLIC PANEL: CPT

## 2025-04-13 PROCEDURE — 71045 X-RAY EXAM CHEST 1 VIEW: CPT

## 2025-04-13 PROCEDURE — 6370000000 HC RX 637 (ALT 250 FOR IP): Performed by: STUDENT IN AN ORGANIZED HEALTH CARE EDUCATION/TRAINING PROGRAM

## 2025-04-13 PROCEDURE — 99285 EMERGENCY DEPT VISIT HI MDM: CPT

## 2025-04-13 PROCEDURE — 93005 ELECTROCARDIOGRAM TRACING: CPT | Performed by: STUDENT IN AN ORGANIZED HEALTH CARE EDUCATION/TRAINING PROGRAM

## 2025-04-13 PROCEDURE — 93005 ELECTROCARDIOGRAM TRACING: CPT | Performed by: INTERNAL MEDICINE

## 2025-04-13 PROCEDURE — 96374 THER/PROPH/DIAG INJ IV PUSH: CPT

## 2025-04-13 PROCEDURE — 6360000002 HC RX W HCPCS: Performed by: INTERNAL MEDICINE

## 2025-04-13 PROCEDURE — 82800 BLOOD PH: CPT

## 2025-04-13 PROCEDURE — 6370000000 HC RX 637 (ALT 250 FOR IP): Performed by: INTERNAL MEDICINE

## 2025-04-13 RX ORDER — SODIUM CHLORIDE 0.9 % (FLUSH) 0.9 %
10 SYRINGE (ML) INJECTION PRN
Status: DISCONTINUED | OUTPATIENT
Start: 2025-04-13 | End: 2025-04-15 | Stop reason: HOSPADM

## 2025-04-13 RX ORDER — GLUCAGON 1 MG/ML
1 KIT INJECTION PRN
Status: DISCONTINUED | OUTPATIENT
Start: 2025-04-13 | End: 2025-04-15 | Stop reason: HOSPADM

## 2025-04-13 RX ORDER — ALBUTEROL SULFATE 0.83 MG/ML
10 SOLUTION RESPIRATORY (INHALATION) ONCE
Status: COMPLETED | OUTPATIENT
Start: 2025-04-13 | End: 2025-04-13

## 2025-04-13 RX ORDER — ONDANSETRON 2 MG/ML
4 INJECTION INTRAMUSCULAR; INTRAVENOUS EVERY 6 HOURS PRN
Status: DISCONTINUED | OUTPATIENT
Start: 2025-04-13 | End: 2025-04-15

## 2025-04-13 RX ORDER — SODIUM CHLORIDE 9 MG/ML
INJECTION, SOLUTION INTRAVENOUS PRN
Status: DISCONTINUED | OUTPATIENT
Start: 2025-04-13 | End: 2025-04-15 | Stop reason: HOSPADM

## 2025-04-13 RX ORDER — HEPARIN SODIUM 5000 [USP'U]/ML
5000 INJECTION, SOLUTION INTRAVENOUS; SUBCUTANEOUS EVERY 8 HOURS SCHEDULED
Status: DISCONTINUED | OUTPATIENT
Start: 2025-04-13 | End: 2025-04-15 | Stop reason: HOSPADM

## 2025-04-13 RX ORDER — ACETAMINOPHEN 325 MG/1
650 TABLET ORAL EVERY 6 HOURS PRN
Status: DISCONTINUED | OUTPATIENT
Start: 2025-04-13 | End: 2025-04-15 | Stop reason: HOSPADM

## 2025-04-13 RX ORDER — CALCIUM GLUCONATE 20 MG/ML
1000 INJECTION, SOLUTION INTRAVENOUS ONCE
Status: COMPLETED | OUTPATIENT
Start: 2025-04-13 | End: 2025-04-13

## 2025-04-13 RX ORDER — ONDANSETRON 4 MG/1
4 TABLET, ORALLY DISINTEGRATING ORAL EVERY 8 HOURS PRN
Status: DISCONTINUED | OUTPATIENT
Start: 2025-04-13 | End: 2025-04-15

## 2025-04-13 RX ORDER — DEXTROSE MONOHYDRATE 100 MG/ML
INJECTION, SOLUTION INTRAVENOUS CONTINUOUS PRN
Status: DISCONTINUED | OUTPATIENT
Start: 2025-04-13 | End: 2025-04-15 | Stop reason: HOSPADM

## 2025-04-13 RX ORDER — HYDROCODONE BITARTRATE AND ACETAMINOPHEN 7.5; 325 MG/1; MG/1
1 TABLET ORAL EVERY 6 HOURS PRN
Refills: 0 | Status: DISCONTINUED | OUTPATIENT
Start: 2025-04-13 | End: 2025-04-14

## 2025-04-13 RX ORDER — DEXTROSE MONOHYDRATE 100 MG/ML
INJECTION, SOLUTION INTRAVENOUS CONTINUOUS
Status: DISCONTINUED | OUTPATIENT
Start: 2025-04-13 | End: 2025-04-14

## 2025-04-13 RX ORDER — 0.9 % SODIUM CHLORIDE 0.9 %
1000 INTRAVENOUS SOLUTION INTRAVENOUS ONCE
Status: COMPLETED | OUTPATIENT
Start: 2025-04-13 | End: 2025-04-13

## 2025-04-13 RX ORDER — ACETAMINOPHEN 650 MG/1
650 SUPPOSITORY RECTAL EVERY 6 HOURS PRN
Status: DISCONTINUED | OUTPATIENT
Start: 2025-04-13 | End: 2025-04-15 | Stop reason: HOSPADM

## 2025-04-13 RX ORDER — SODIUM CHLORIDE 0.9 % (FLUSH) 0.9 %
10 SYRINGE (ML) INJECTION EVERY 12 HOURS SCHEDULED
Status: DISCONTINUED | OUTPATIENT
Start: 2025-04-13 | End: 2025-04-15 | Stop reason: HOSPADM

## 2025-04-13 RX ADMIN — HYDROCODONE BITARTRATE AND ACETAMINOPHEN 1 TABLET: 7.5; 325 TABLET ORAL at 15:59

## 2025-04-13 RX ADMIN — CALCIUM GLUCONATE 1000 MG: 20 INJECTION, SOLUTION INTRAVENOUS at 12:17

## 2025-04-13 RX ADMIN — SODIUM BICARBONATE: 84 INJECTION, SOLUTION INTRAVENOUS at 16:01

## 2025-04-13 RX ADMIN — SODIUM ZIRCONIUM CYCLOSILICATE 10 G: 10 POWDER, FOR SUSPENSION ORAL at 19:06

## 2025-04-13 RX ADMIN — HYDROCODONE BITARTRATE AND ACETAMINOPHEN 1 TABLET: 7.5; 325 TABLET ORAL at 20:31

## 2025-04-13 RX ADMIN — HEPARIN SODIUM 5000 UNITS: 5000 INJECTION INTRAVENOUS; SUBCUTANEOUS at 21:13

## 2025-04-13 RX ADMIN — DEXTROSE MONOHYDRATE: 100 INJECTION, SOLUTION INTRAVENOUS at 12:29

## 2025-04-13 RX ADMIN — HEPARIN SODIUM 5000 UNITS: 5000 INJECTION INTRAVENOUS; SUBCUTANEOUS at 16:04

## 2025-04-13 RX ADMIN — SODIUM CHLORIDE 1000 ML: 0.9 INJECTION, SOLUTION INTRAVENOUS at 11:02

## 2025-04-13 RX ADMIN — SODIUM ZIRCONIUM CYCLOSILICATE 10 G: 10 POWDER, FOR SUSPENSION ORAL at 14:00

## 2025-04-13 RX ADMIN — DEXTROSE MONOHYDRATE: 100 INJECTION, SOLUTION INTRAVENOUS at 15:29

## 2025-04-13 RX ADMIN — ALBUTEROL SULFATE 10 MG: 2.5 SOLUTION RESPIRATORY (INHALATION) at 14:06

## 2025-04-13 RX ADMIN — SODIUM CHLORIDE, PRESERVATIVE FREE 10 ML: 5 INJECTION INTRAVENOUS at 21:13

## 2025-04-13 ASSESSMENT — PAIN SCALES - GENERAL: PAINLEVEL_OUTOF10: 4

## 2025-04-13 NOTE — H&P
[facility] Medicine H&P    Patient:  Lyudmila Go  MRN: [id number]    Consulting Physician: Omero Cassidy DO  Reason for Consult: Medical Management  [facility] Care Physician: Belkys Lopez MD    HISTORY OF PRESENT ILLNESS:   The patient is a 74 y.o. female presents with worsening fatigue and shortness of breath.  She has a history of stage III CKD.  Her creatinine is 4 on intake.  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  She is DNR and would like medical management and if no significant clinical improvement, she would like to pursue hospice therapy.    Past Medical History:        Diagnosis Date    Acute sinusitis     CHAPARRO (acute kidney injury) 05/18/2018    CHAPARRO (acute kidney injury) 05/18/2018    Anemia     Anemia due to stage 4 chronic kidney disease 11/17/2021    Bleeding ulcer     hx of; cauterized by dr. resendiz    Breast cancer     Breast lump     CAD (coronary artery disease)     sees dr. shoemaker    Callous ulcer (HCC)     Cataract     Chronic kidney disease, stage 3b (HCC) 06/21/2021    COVID-19 01/2021    fatigue    Dermatitis     Diabetes mellitus (HCC)     Diabetic nephropathy (HCC)     Diabetic retinopathy (HCC)     Glaucoma     Gout     Hemorrhoids     Hx of TB skin testing     positive    Hyperlipidemia     Hypertension     Long term current use of aromatase inhibitor 05/27/2022    Osteopenia of left hip 05/27/2022    Palliative care patient 06/24/2021    Type 2 diabetes mellitus 05/18/2018    Vitamin D deficiency        Past Surgical History:        Procedure Laterality Date    BREAST LUMPECTOMY Right 10/29/2021    RIGHT LUMPECTOMY, SNB, PREOP ULTRASOUND, INTRAOP ULTRASOUND GUIDED NL, BIOSORB, PEC BLOCK, MARGIN PROBE, FLAPS, IORT performed by Jose Angel Flores MD at [facility] OR    CARDIAC CATHETERIZATION  02/15/2022    CARDIAC SURGERY  2001    CARPAL TUNNEL RELEASE Right 9/3/2020    RIGHT CARPAL TUNNEL RELEASE performed by Esa Ovalle MD at [facility] OR    CATARACT REMOVAL  04/13/25  1046 04/13/25  1300     --    K 6.3*  --      --    CO2 19*  --    BUN 65*  --    CREATININE 4.0*  --    GLUCOSE 30* 70     Hepatic:   Recent Labs     04/13/25  1046   AST 19   ALT <5*   BILITOT 0.5   ALKPHOS 61     Troponin: No results for input(s): \"TROPONINI\" in the last 72 hours.  BNP: No results for input(s): \"BNP\" in the last 72 hours.  Lipids: No results for input(s): \"CHOL\", \"HDL\" in the last 72 hours.    Invalid input(s): \"LDLCALCU\"  ABGs: No results found for: \"PHART\", \"PO2ART\", \"VDL0LTA\"  INR: No results for input(s): \"INR\" in the last 72 hours.  -----------------------------------------------------------------  XR CHEST 1 VIEW  Result Date: 4/13/2025  EXAM: CHEST RADIOGRAPH  TECHNIQUE: Single frontal chest radiograph.  HISTORY: Unresponsive, hypoglycemia.  COMPARISON: 10/10/2022.  FINDINGS:  Increased cardiomegaly with normal mediastinal contours and decreased prominence of central pulmonary vessels. Lungs appear clear without consolidation or significant pleural fluid. Stable postoperative changes.       1.  Mild increased cardiomegaly without overt congestive failure or additional acute intrathoracic process.   ______________________________________ Electronically signed by: RANJAN BELTRAN M.D. Date:     04/13/2025 Time:    12:00       EKG: paced rhythm    Assessment and Plan     CHAPARRO (acute kidney injury).  Hopefully just pre renal in nature.  IVF resuscitation with isotonic bicarb solution.    Hyperkalemia.  Continue medical management.    Hypoglycemia.  Hourly accu checks until glucose stabilizes.    Further recommendations to follow.    Please document 40 minutes of critical care time excluding procedures.      Omero Cassidy, DO

## 2025-04-13 NOTE — ED NOTES
Called Dr Weaver with Nephrology @ [phone] @ [phone]. The answering service said that they would page Jh Serrano.

## 2025-04-13 NOTE — CONSULTS
Nephrology ([facility]) Consult Note      Patient:  Lyudmila Go  YOB: 1950  Date of Service: 4/13/2025  MRN: [id number]   Acct: [id number]   Primary Care Physician: Belkys Lopez MD  Advance Directive: Prior  Admit Date: 4/13/2025       Hospital Day: 0  Referring Provider: Omero Cassidy DO    Patient independently seen and examined, Chart, Consults, Notes, Operative notes, Labs, Cardiology, and Radiology studies reviewed as available.        Subjective:  Lyudmila Go is a 74 y.o. female for whom we were consulted for evaluation and treatment of acute kidney injury with a history of chronic kidney disease stage IV.  She has never previously seen nephrology.  She has quite adamant that she did not want to consider dialysis under any circumstances.  She also did not want CT scans and was a DNR.  She presented with symptomatic hypoglycemia was on a D10 infusion at time of admission.  She denied current chest pain, shortness of air at rest, dysuria, hematuria, hemoptysis, rash.  She noted recent UTI treated with cefdinir.  Recalled no NSAID usage.  She notes \"I do not get out of the house.\"  Had EKG checked which appeared represent atrial fibrillation with a left bundle branch block and incomplete pacing per personal interpretation and was difficult to compare given prior paced rhythms.    Allergies:  Medrol [methylprednisolone]    Medicines:  Current Facility-Administered Medications   Medication Dose Route Frequency Provider Last Rate Last Admin    dextrose 10 % infusion   IntraVENous Continuous Mamadou Wyatt  mL/hr at 04/13/25 1229 New Bag at 04/13/25 1229    glucose chewable tablet 16 g  4 tablet Oral PRN Mamadou Wyatt MD        dextrose bolus 10% 125 mL  125 mL IntraVENous PRN Mamadou Wyatt MD        Or    dextrose bolus 10% 250 mL  250 mL IntraVENous PRN Mamadou Wyatt MD        glucagon injection 1 mg  1 mg SubCUTAneous  105/76 -- -- (!) 109 20 -- --   04/13/25 1401 -- -- -- (!) 108 17 95 % --   04/13/25 1240 (!) 112/59 -- -- (!) 122 12 -- --   04/13/25 1231 106/74 -- -- (!) 120 13 92 % --   04/13/25 1132 118/63 -- -- (!) 101 15 93 % --   04/13/25 1100 -- 98.2 °F (36.8 °C) Rectal -- -- -- --   04/13/25 1045 (!) 117/96 98.2 °F (36.8 °C) Rectal (!) 107 18 97 % 90.7 kg (200 lb)     No intake or output data in the 24 hours ending 04/13/25 1501  General: awake/alert   Chest:  clear to auscultation bilaterally  CVS: Tachycardic  Abdominal: soft, nontender, normal bowel sounds  Extremities: no cyanosis, tr ble edema  Skin: warm and dry with some chronic skin changes      Labs:  BMP:   Recent Labs     04/13/25  1046      K 6.3*      CO2 19*   BUN 65*   CREATININE 4.0*   CALCIUM 8.5*     CBC:   Recent Labs     04/13/25  1046   WBC 6.6   HGB 13.4   HCT 42.3   .2*        LIVER PROFILE:   Recent Labs     04/13/25  1046   AST 19   ALT <5*   LIPASE 9*   BILITOT 0.5   ALKPHOS 61     PT/INR: No results for input(s): \"PROTIME\", \"INR\" in the last 72 hours.  APTT: No results for input(s): \"APTT\" in the last 72 hours.  BNP:  No results for input(s): \"BNP\" in the last 72 hours.  Ionized Calcium:Invalid input(s): \"IONCA\"  Magnesium:  Recent Labs     04/13/25  1046   MG 1.9     Phosphorus:No results for input(s): \"PHOS\" in the last 72 hours.  HgbA1C: No results for input(s): \"LABA1C\" in the last 72 hours.  Hepatic:   Recent Labs     04/13/25  1046   ALKPHOS 61   ALT <5*   AST 19   BILITOT 0.5     Lactic Acid:   Recent Labs     04/13/25  1046   LACTA 1.9     Troponin: No results for input(s): \"CKTOTAL\", \"CKMB\", \"TROPONINT\" in the last 72 hours.  ABGs: No results for input(s): \"PH\", \"PCO2\", \"PO2\", \"HCO3\", \"O2SAT\" in the last 72 hours.  CRP:  No results for input(s): \"CRP\" in the last 72 hours.  Sed Rate:  No results for input(s): \"SEDRATE\" in the last 72 hours.      Cultures:   No results for input(s): \"CULTURE\" in the last 72

## 2025-04-13 NOTE — ED PROVIDER NOTES
Hx of TB skin testing     positive    Hyperlipidemia     Hypertension     Long term current use of aromatase inhibitor 05/27/2022    Osteopenia of left hip 05/27/2022    Palliative care patient 06/24/2021    Type 2 diabetes mellitus 05/18/2018    Vitamin D deficiency        Past Surgical History:   Procedure Laterality Date    BREAST LUMPECTOMY Right 10/29/2021    RIGHT LUMPECTOMY, SNB, PREOP ULTRASOUND, INTRAOP ULTRASOUND GUIDED NL, BIOSORB, PEC BLOCK, MARGIN PROBE, FLAPS, IORT performed by Jose Angel Flores MD at [facility] OR    CARDIAC CATHETERIZATION  02/15/2022    CARDIAC SURGERY  2001    CARPAL TUNNEL RELEASE Right 9/3/2020    RIGHT CARPAL TUNNEL RELEASE performed by Esa Ovalle MD at [facility] OR    CATARACT REMOVAL      CHOLECYSTECTOMY      COLONOSCOPY N/A 10/11/2019    Dr Baeza-w/placement of a hemostatic clip-Suboptimal prep, diverticulosis, internal hemorrhoids-Grade 2, AP x 3, 1 yr recall     COLONOSCOPY N/A 10/12/2020    Dr Baeza-Internal hemorrhoids-Grade 1-2, AP, 3 yr recall    CORONARY ARTERY BYPASS GRAFT  2001    ENDOSCOPY, COLON, DIAGNOSTIC      EYE SURGERY      MOUTH SURGERY      OTHER SURGICAL HISTORY      sternal resection    PACEMAKER PLACEMENT  02/23/2022    SKIN BIOPSY      UPPER GASTROINTESTINAL ENDOSCOPY N/A 10/11/2019    Dr Baeza (+) H Pylori    US BREAST BIOPSY W LOC DEVICE 1ST LESION LEFT Left 9/1/2021    US BREAST NEEDLE BIOPSY LEFT 9/1/2021 LPS GENERAL SURGERY    VASCULAR SURGERY         Previous Medications    ACCU-CHEK GUIDE STRIP    TEST BLOOD SUGAR FOUR TIMES DAILY    ACCU-CHEK SOFTCLIX LANCETS MISC    TEST FOUR TIMES DAILY    ALENDRONATE (FOSAMAX) 70 MG TABLET    TAKE 1 TABLET WEEKLY AS DIRECTED. SEE PACKAGE FOR ADDITIONAL INSTRUCTIONS.    ASPIRIN 81 MG TABLET    Take 1 tablet by mouth daily    BLOOD GLUCOSE CALIBRATION (ACCU-CHEK ROSA) SOLN    Calibration done daily.    CALCIUM CARBONATE-VITAMIN D (CALCIUM 500/D PO)    Take 1 tablet by mouth 2 times daily     MCH 32.1 (*)     MCHC 31.7 (*)     RDW 16.1 (*)     Neutrophils % 83.3 (*)     Lymphocytes % 11.5 (*)     Lymphocytes Absolute 0.8 (*)     All other components within normal limits   COMPREHENSIVE METABOLIC PANEL - Abnormal; Notable for the following components:    Potassium 6.3 (*)     CO2 19 (*)     Glucose 30 (*)     BUN 65 (*)     Creatinine 4.0 (*)     Est, Glom Filt Rate 11 (*)     Calcium 8.5 (*)     Albumin 3.4 (*)     ALT <5 (*)     All other components within normal limits    Narrative:     CALL  [facility] tel. ,  Chemistry results called to and read back by JURGEN LEVY,  04/13/2025 11:51, by SUSU   LIPASE - Abnormal; Notable for the following components:    Lipase 9 (*)     All other components within normal limits    Narrative:     CALL  [facility] tel. ,  Chemistry results called to and read back by JURGEN LEVY,  04/13/2025 11:51, by SUSU   BLOOD GAS, VENOUS - Abnormal; Notable for the following components:    pH, Ran 7.25 (*)     HCO3, Venous 22 (*)     All other components within normal limits    Narrative:     CALL  [facility] tel. ,  Dr. Wyatt, , 04/13/2025 11:11, by OSMANY   POCT GLUCOSE - Abnormal; Notable for the following components:    POC Glucose 161 (*)     All other components within normal limits   POCT GLUCOSE - Abnormal; Notable for the following components:    POC Glucose 56 (*)     All other components within normal limits   POCT GLUCOSE - Normal   MAGNESIUM    Narrative:     CALL  [facility] tel. ,  Chemistry results called to and read back by JURGEN LEVY,  04/13/2025 11:51, by SUSU   LACTIC ACID   URINALYSIS WITH REFLEX TO CULTURE   POCT GLUCOSE   POCT GLUCOSE   POCT GLUCOSE   POCT GLUCOSE   POCT GLUCOSE   POCT GLUCOSE   POCT GLUCOSE   POCT GLUCOSE   POCT GLUCOSE   POCT GLUCOSE   POCT GLUCOSE       MDM:  MDM     Amount and/or Complexity of Data Reviewed  Decide to obtain previous medical records or to obtain history from someone other than the

## 2025-04-14 PROBLEM — Z51.5 PALLIATIVE CARE PATIENT: Status: ACTIVE | Noted: 2025-04-14

## 2025-04-14 LAB
25(OH)D3 SERPL-MCNC: 26.6 NG/ML
ALBUMIN SERPL-MCNC: 2.7 G/DL (ref 3.5–5.2)
ALP SERPL-CCNC: 44 U/L (ref 35–104)
ALT SERPL-CCNC: 5 U/L (ref 10–35)
ANION GAP SERPL CALCULATED.3IONS-SCNC: 13 MMOL/L (ref 8–16)
AST SERPL-CCNC: 14 U/L (ref 10–35)
BACTERIA #/AREA URNS HPF: ABNORMAL /HPF
BASOPHILS # BLD: 0 K/UL (ref 0–0.2)
BASOPHILS NFR BLD: 0.6 % (ref 0–1)
BILIRUB SERPL-MCNC: 0.3 MG/DL (ref 0.2–1.2)
BILIRUB UR QL STRIP: NEGATIVE
BUN SERPL-MCNC: 63 MG/DL (ref 8–23)
CALCIUM SERPL-MCNC: 7.9 MG/DL (ref 8.8–10.2)
CHLORIDE SERPL-SCNC: 101 MMOL/L (ref 98–107)
CLARITY UR: ABNORMAL
CO2 SERPL-SCNC: 23 MMOL/L (ref 22–29)
COLOR UR: YELLOW
CREAT SERPL-MCNC: 4 MG/DL (ref 0.5–0.9)
CREAT UR-MCNC: 125 MG/DL (ref 28–217)
EKG P AXIS: NORMAL DEGREES
EKG P AXIS: NORMAL DEGREES
EKG P-R INTERVAL: NORMAL MS
EKG P-R INTERVAL: NORMAL MS
EKG Q-T INTERVAL: 344 MS
EKG Q-T INTERVAL: 344 MS
EKG QRS DURATION: 142 MS
EKG QRS DURATION: 142 MS
EKG QTC CALCULATION (BAZETT): 491 MS
EKG QTC CALCULATION (BAZETT): 491 MS
EKG T AXIS: 127 DEGREES
EKG T AXIS: 127 DEGREES
EOSINOPHIL # BLD: 0.1 K/UL (ref 0–0.6)
EOSINOPHIL NFR BLD: 1.4 % (ref 0–5)
ERYTHROCYTE [DISTWIDTH] IN BLOOD BY AUTOMATED COUNT: 16 % (ref 11.5–14.5)
GLUCOSE BLD-MCNC: 166 MG/DL (ref 70–99)
GLUCOSE BLD-MCNC: 169 MG/DL (ref 70–99)
GLUCOSE BLD-MCNC: 170 MG/DL (ref 70–99)
GLUCOSE BLD-MCNC: 173 MG/DL (ref 70–99)
GLUCOSE BLD-MCNC: 182 MG/DL (ref 70–99)
GLUCOSE BLD-MCNC: 187 MG/DL (ref 70–99)
GLUCOSE BLD-MCNC: 199 MG/DL (ref 70–99)
GLUCOSE BLD-MCNC: 87 MG/DL (ref 70–99)
GLUCOSE SERPL-MCNC: 190 MG/DL (ref 70–99)
GLUCOSE UR STRIP.AUTO-MCNC: NEGATIVE MG/DL
HCT VFR BLD AUTO: 33.5 % (ref 37–47)
HGB BLD-MCNC: 10.4 G/DL (ref 12–16)
HGB UR STRIP.AUTO-MCNC: ABNORMAL MG/L
HYALINE CASTS #/AREA URNS LPF: ABNORMAL /LPF (ref 0–5)
IMM GRANULOCYTES # BLD: 0 K/UL
KETONES UR STRIP.AUTO-MCNC: ABNORMAL MG/DL
LEUKOCYTE ESTERASE UR QL STRIP.AUTO: ABNORMAL
LYMPHOCYTES # BLD: 1.1 K/UL (ref 1.1–4.5)
LYMPHOCYTES NFR BLD: 20.9 % (ref 20–40)
MAGNESIUM SERPL-MCNC: 1.5 MG/DL (ref 1.6–2.4)
MCH RBC QN AUTO: 31.6 PG (ref 27–31)
MCHC RBC AUTO-ENTMCNC: 31 G/DL (ref 33–37)
MCV RBC AUTO: 101.8 FL (ref 81–99)
MONOCYTES # BLD: 0.3 K/UL (ref 0–0.9)
MONOCYTES NFR BLD: 6.4 % (ref 0–10)
MUCOUS THREADS URNS QL MICRO: ABNORMAL /LPF
NEUTROPHILS # BLD: 3.7 K/UL (ref 1.5–7.5)
NEUTS SEG NFR BLD: 70.5 % (ref 50–65)
NITRITE UR QL STRIP.AUTO: NEGATIVE
PERFORMED ON: ABNORMAL
PERFORMED ON: NORMAL
PH UR STRIP.AUTO: 5 [PH] (ref 5–8)
PHOSPHATE SERPL-MCNC: 5.1 MG/DL (ref 2.5–4.5)
PLATELET # BLD AUTO: 182 K/UL (ref 130–400)
PMV BLD AUTO: 10.6 FL (ref 9.4–12.3)
POTASSIUM SERPL-SCNC: 4.9 MMOL/L (ref 3.5–5)
PROT SERPL-MCNC: 5.1 G/DL (ref 6.4–8.3)
PROT UR STRIP.AUTO-MCNC: 100 MG/DL
PROT UR-MCNC: 76 MG/DL (ref 0–12)
PTH-INTACT SERPL-MCNC: 342 PG/ML (ref 15–65)
RBC # BLD AUTO: 3.29 M/UL (ref 4.2–5.4)
RBC #/AREA URNS HPF: ABNORMAL /HPF (ref 0–2)
SODIUM SERPL-SCNC: 137 MMOL/L (ref 136–145)
SODIUM UR-SCNC: 65 MMOL/L
SP GR UR STRIP.AUTO: 1.01 (ref 1–1.03)
SQUAMOUS #/AREA URNS HPF: ABNORMAL /HPF
UROBILINOGEN UR STRIP.AUTO-MCNC: 0.2 E.U./DL
UUN UR-MCNC: 162 MG/DL
WBC # BLD AUTO: 5.2 K/UL (ref 4.8–10.8)
WBC #/AREA URNS HPF: ABNORMAL /HPF (ref 0–5)
YEAST #/AREA URNS HPF: PRESENT /HPF

## 2025-04-14 PROCEDURE — 83970 ASSAY OF PARATHORMONE: CPT

## 2025-04-14 PROCEDURE — 2500000003 HC RX 250 WO HCPCS: Performed by: INTERNAL MEDICINE

## 2025-04-14 PROCEDURE — 80053 COMPREHEN METABOLIC PANEL: CPT

## 2025-04-14 PROCEDURE — 2580000003 HC RX 258: Performed by: INTERNAL MEDICINE

## 2025-04-14 PROCEDURE — 82570 ASSAY OF URINE CREATININE: CPT

## 2025-04-14 PROCEDURE — 2580000003 HC RX 258: Performed by: STUDENT IN AN ORGANIZED HEALTH CARE EDUCATION/TRAINING PROGRAM

## 2025-04-14 PROCEDURE — 2700000000 HC OXYGEN THERAPY PER DAY

## 2025-04-14 PROCEDURE — 84300 ASSAY OF URINE SODIUM: CPT

## 2025-04-14 PROCEDURE — 6360000002 HC RX W HCPCS: Performed by: INTERNAL MEDICINE

## 2025-04-14 PROCEDURE — 82962 GLUCOSE BLOOD TEST: CPT

## 2025-04-14 PROCEDURE — 6370000000 HC RX 637 (ALT 250 FOR IP): Performed by: INTERNAL MEDICINE

## 2025-04-14 PROCEDURE — 93010 ELECTROCARDIOGRAM REPORT: CPT | Performed by: INTERNAL MEDICINE

## 2025-04-14 PROCEDURE — 1200000000 HC SEMI PRIVATE

## 2025-04-14 PROCEDURE — 36415 COLL VENOUS BLD VENIPUNCTURE: CPT

## 2025-04-14 PROCEDURE — 84100 ASSAY OF PHOSPHORUS: CPT

## 2025-04-14 PROCEDURE — 81001 URINALYSIS AUTO W/SCOPE: CPT

## 2025-04-14 PROCEDURE — 84156 ASSAY OF PROTEIN URINE: CPT

## 2025-04-14 PROCEDURE — 6370000000 HC RX 637 (ALT 250 FOR IP)

## 2025-04-14 PROCEDURE — 99223 1ST HOSP IP/OBS HIGH 75: CPT

## 2025-04-14 PROCEDURE — 94760 N-INVAS EAR/PLS OXIMETRY 1: CPT

## 2025-04-14 PROCEDURE — 83735 ASSAY OF MAGNESIUM: CPT

## 2025-04-14 PROCEDURE — 84540 ASSAY OF URINE/UREA-N: CPT

## 2025-04-14 PROCEDURE — 85025 COMPLETE CBC W/AUTO DIFF WBC: CPT

## 2025-04-14 PROCEDURE — 82306 VITAMIN D 25 HYDROXY: CPT

## 2025-04-14 PROCEDURE — 87086 URINE CULTURE/COLONY COUNT: CPT

## 2025-04-14 RX ORDER — GLYCOPYRROLATE 0.2 MG/ML
0.2 INJECTION INTRAMUSCULAR; INTRAVENOUS EVERY 6 HOURS PRN
Status: DISCONTINUED | OUTPATIENT
Start: 2025-04-14 | End: 2025-04-15 | Stop reason: HOSPADM

## 2025-04-14 RX ORDER — PROCHLORPERAZINE MALEATE 10 MG
10 TABLET ORAL EVERY 6 HOURS PRN
Status: DISCONTINUED | OUTPATIENT
Start: 2025-04-14 | End: 2025-04-15 | Stop reason: HOSPADM

## 2025-04-14 RX ORDER — DEXTROSE MONOHYDRATE 100 MG/ML
INJECTION, SOLUTION INTRAVENOUS CONTINUOUS
Status: DISCONTINUED | OUTPATIENT
Start: 2025-04-14 | End: 2025-04-14

## 2025-04-14 RX ORDER — ERGOCALCIFEROL 1.25 MG/1
50000 CAPSULE, LIQUID FILLED ORAL WEEKLY
Status: DISCONTINUED | OUTPATIENT
Start: 2025-04-14 | End: 2025-04-15 | Stop reason: HOSPADM

## 2025-04-14 RX ORDER — MORPHINE SULFATE 20 MG/ML
5 SOLUTION ORAL EVERY 4 HOURS PRN
Refills: 0 | Status: DISCONTINUED | OUTPATIENT
Start: 2025-04-14 | End: 2025-04-15 | Stop reason: HOSPADM

## 2025-04-14 RX ORDER — SODIUM CHLORIDE, SODIUM LACTATE, POTASSIUM CHLORIDE, CALCIUM CHLORIDE 600; 310; 30; 20 MG/100ML; MG/100ML; MG/100ML; MG/100ML
INJECTION, SOLUTION INTRAVENOUS CONTINUOUS
Status: DISCONTINUED | OUTPATIENT
Start: 2025-04-14 | End: 2025-04-15

## 2025-04-14 RX ORDER — LORAZEPAM 2 MG/ML
0.5 CONCENTRATE ORAL EVERY 6 HOURS PRN
Status: DISCONTINUED | OUTPATIENT
Start: 2025-04-14 | End: 2025-04-15 | Stop reason: HOSPADM

## 2025-04-14 RX ORDER — SEVELAMER CARBONATE 800 MG/1
800 TABLET, FILM COATED ORAL
Status: DISCONTINUED | OUTPATIENT
Start: 2025-04-14 | End: 2025-04-15 | Stop reason: HOSPADM

## 2025-04-14 RX ORDER — HYDROCODONE BITARTRATE AND ACETAMINOPHEN 7.5; 325 MG/1; MG/1
1 TABLET ORAL EVERY 4 HOURS PRN
Refills: 0 | Status: DISCONTINUED | OUTPATIENT
Start: 2025-04-14 | End: 2025-04-15 | Stop reason: HOSPADM

## 2025-04-14 RX ORDER — CALCITRIOL 0.25 UG/1
0.25 CAPSULE, LIQUID FILLED ORAL DAILY
Status: DISCONTINUED | OUTPATIENT
Start: 2025-04-14 | End: 2025-04-15 | Stop reason: HOSPADM

## 2025-04-14 RX ADMIN — HEPARIN SODIUM 5000 UNITS: 5000 INJECTION INTRAVENOUS; SUBCUTANEOUS at 05:19

## 2025-04-14 RX ADMIN — HEPARIN SODIUM 5000 UNITS: 5000 INJECTION INTRAVENOUS; SUBCUTANEOUS at 20:07

## 2025-04-14 RX ADMIN — SEVELAMER CARBONATE 800 MG: 800 TABLET, FILM COATED ORAL at 17:31

## 2025-04-14 RX ADMIN — MICONAZOLE NITRATE: 2 POWDER TOPICAL at 20:09

## 2025-04-14 RX ADMIN — SODIUM CHLORIDE, PRESERVATIVE FREE 10 ML: 5 INJECTION INTRAVENOUS at 23:25

## 2025-04-14 RX ADMIN — DEXTROSE MONOHYDRATE: 100 INJECTION, SOLUTION INTRAVENOUS at 07:12

## 2025-04-14 RX ADMIN — SODIUM CHLORIDE, SODIUM LACTATE, POTASSIUM CHLORIDE, AND CALCIUM CHLORIDE: .6; .31; .03; .02 INJECTION, SOLUTION INTRAVENOUS at 14:36

## 2025-04-14 RX ADMIN — HYDROCODONE BITARTRATE AND ACETAMINOPHEN 1 TABLET: 7.5; 325 TABLET ORAL at 10:10

## 2025-04-14 RX ADMIN — SODIUM CHLORIDE, PRESERVATIVE FREE 10 ML: 5 INJECTION INTRAVENOUS at 13:41

## 2025-04-14 RX ADMIN — HEPARIN SODIUM 5000 UNITS: 5000 INJECTION INTRAVENOUS; SUBCUTANEOUS at 13:40

## 2025-04-14 RX ADMIN — HYDROCODONE BITARTRATE AND ACETAMINOPHEN 1 TABLET: 7.5; 325 TABLET ORAL at 20:08

## 2025-04-14 RX ADMIN — SEVELAMER CARBONATE 800 MG: 800 TABLET, FILM COATED ORAL at 13:40

## 2025-04-14 RX ADMIN — SODIUM CHLORIDE, SODIUM LACTATE, POTASSIUM CHLORIDE, AND CALCIUM CHLORIDE: .6; .31; .03; .02 INJECTION, SOLUTION INTRAVENOUS at 08:20

## 2025-04-14 RX ADMIN — ONDANSETRON 4 MG: 2 INJECTION, SOLUTION INTRAMUSCULAR; INTRAVENOUS at 08:31

## 2025-04-14 RX ADMIN — SODIUM BICARBONATE: 84 INJECTION, SOLUTION INTRAVENOUS at 07:21

## 2025-04-14 ASSESSMENT — ENCOUNTER SYMPTOMS
COLOR CHANGE: 0
DIARRHEA: 0
COUGH: 0
NAUSEA: 0
SHORTNESS OF BREATH: 0
CONSTIPATION: 0
RHINORRHEA: 0
VOMITING: 0
BACK PAIN: 0
VOICE CHANGE: 0

## 2025-04-14 ASSESSMENT — PAIN DESCRIPTION - ORIENTATION: ORIENTATION: RIGHT;LEFT;MID

## 2025-04-14 ASSESSMENT — PAIN DESCRIPTION - DESCRIPTORS: DESCRIPTORS: ACHING;THROBBING;DULL

## 2025-04-14 ASSESSMENT — PAIN SCALES - GENERAL: PAINLEVEL_OUTOF10: 6

## 2025-04-14 NOTE — PROGRESS NOTES
Nephrology ([facility]) Progress Note    Patient:  Lyudmila Go  YOB: 1950  Date of Service: 4/14/2025  MRN: [id number]   Acct: [id number]   Primary Care Physician: Belkys Lopez MD  Advance Directive: DNR  Admit Date: 4/13/2025       Hospital Day: 1  Referring Provider: Omero Cassidy DO    Patient independently seen and examined, Chart, Consults, Notes, Operative notes, Labs, Cardiology, and Radiology studies reviewed as available.    Chief complaint: Abnormal labs.    Subjective:  Lyudmila Go is a 74 y.o. female for whom we were consulted for evaluation and treatment of acute kidney injury/chronic kidney disease.  She has stage IV chronic kidney disease and has not seen a nephrologist despite recommendation from her primary care doctor.  She follows Dr. Belkys Lopez on a regular basis.  Presented to the emergency room with decline in mental status related to severe hypoglycemia.  Patient was treated with D50 percent and has significant improvement of mental status.  Patient also has history of type 2 diabetes, hypertension coronary artery disease and morbid abdominal obesity.  On arrival her serum creatinine was 4.0 mg with estimated GFR 11 mL.  She was given IV fluid and has no improvement of renal function.    This morning she was seen in the emergency room as she is waiting to go to the acute care bed.  She is fully alert and awake.  She denies any nausea vomiting.  She denies any use of nonsteroidal agents.    Allergies:  Medrol [methylprednisolone]    Medicines:  Current Facility-Administered Medications   Medication Dose Route Frequency Provider Last Rate Last Admin    lactated ringers infusion   IntraVENous Continuous Omero Cassidy DO 75 mL/hr at 04/14/25 0820 New Bag at 04/14/25 0820    glucose chewable tablet 16 g  4 tablet Oral PRN Mamadou Wyatt MD        dextrose bolus 10% 125 mL  125 mL IntraVENous PRN Mamadou Wyatt MD   HISTORY      sternal resection    PACEMAKER PLACEMENT  02/23/2022    SKIN BIOPSY      UPPER GASTROINTESTINAL ENDOSCOPY N/A 10/11/2019    Dr Baeza (+) H Pylori    US BREAST BIOPSY W LOC DEVICE 1ST LESION LEFT Left 9/1/2021    US BREAST NEEDLE BIOPSY LEFT 9/1/2021 LPS GENERAL SURGERY    VASCULAR SURGERY         Family History  Family History   Problem Relation Age of Onset    Cancer Mother     Diabetes Father     COPD Father     Diabetes Sister     Heart Disease Sister     Colon Polyps Sister     No Known Problems Maternal Grandmother     No Known Problems Maternal Grandfather     No Known Problems Paternal Grandmother     No Known Problems Paternal Grandfather     Coronary Art Dis Other         grandfather    Diabetes Other         aunt    Colon Cancer Neg Hx     Liver Cancer Neg Hx     Rectal Cancer Neg Hx        Social History  Social History     Socioeconomic History    Marital status: Single     Spouse name: Not on file    Number of children: Not on file    Years of education: Not on file    Highest education level: Not on file   Occupational History     Employer: [employer]   Tobacco Use    Smoking status: Never    Smokeless tobacco: Never   Vaping Use    Vaping status: Never Used   Substance and Sexual Activity    Alcohol use: Not Currently     Alcohol/week: 1.0 standard drink of alcohol     Types: 1 Shots of liquor per week     Comment: rare    Drug use: No    Sexual activity: Defer   Other Topics Concern    Not on file   Social History Narrative    Not on file     Social Drivers of Health     Financial Resource Strain: Low Risk  (3/8/2024)    Overall Financial Resource Strain (CARDIA)     Difficulty of Paying Living Expenses: Not hard at all   Food Insecurity: No Food Insecurity (2/27/2025)    Hunger Vital Sign     Worried About Running Out of Food in the Last Year: Never true     Ran Out of Food in the Last Year: Never true   Transportation Needs: No Transportation Needs (2/27/2025)    PRAPARE -

## 2025-04-14 NOTE — CARE COORDINATION
Case Management Assessment  Initial Evaluation    Date/Time of Evaluation: 2025 8:55 AM  Assessment Completed by: Humaira Salas    If patient is discharged prior to next notation, then this note serves as note for discharge by case management.    Patient Name: Lyudmila Go                   YOB: 1950  Diagnosis: CHAPARRO (acute kidney injury) [N17.9]                   Date / Time: 2025 10:42 AM    Patient Admission Status: Inpatient   Readmission Risk (Low < 19, Mod (19-27), High > 27): Readmission Risk Score: 19    Current PCP: Belkys Lopez MD  PCP verified by CM? Yes    Chart Reviewed: Yes      History Provided by: Patient  Patient Orientation: Alert and Oriented    Patient Cognition: Alert    Hospitalization in the last 30 days (Readmission):  No    If yes, Readmission Assessment in  Navigator will be completed.    Advance Directives:      Code Status: DNR   Patient's Primary Decision Maker is: Legal Next of Kin    Primary Decision Maker: () [place] - Brother/Sister - [phone]    Supplemental (Other) Decision Maker: Melyssa Aponte - Brother/Sister - [phone]    Discharge Planning:    Patient lives with: Children Type of Home: House  Primary Care Giver: Self  Patient Support Systems include: Family Members   Current Financial resources: Medicare  Current community resources: None  Current services prior to admission: None            Current DME:              Type of Home Care services:  None    ADLS  Prior functional level: Assistance with the following:, Bathing, Dressing, Shopping, Housework, Mobility  Current functional level: Assistance with the following:, Bathing, Dressing, Shopping, Housework, Mobility    PT AM-PAC:   /  OT AM-PAC:       Family can provide assistance at DC: Yes  Would you like Case Management to discuss the discharge plan with any other family members/significant others, and if so, who? Yes  Plans to Return to Present Housing:

## 2025-04-14 NOTE — CONSULTS
Palliative Care Consult Note    4/14/2025 8:31 AM  Subjective:  Admit Date: 4/13/2025  PCP: Belkys Lopez MD    Date of Service: 4/14/2025    Reason for Consultation:  Goals of Care, Code Status, Family Support     History Obtained From: EMR/Patient and their Family    History Of Present Illness:   The patient is a 74 y.o. female with PMH HTN, HLD, DM, diabetic neuropathy, CAD, breast cancer, anemia, CKD IV, recurrent UTI, and other comorbidities who presented to [facility] ED 4/13/2025 complaining of hypoglycemia and AMS.  On chart review it appears that EMS arrived at home and patient's blood sugar was noted to be in the 30s.  This did improve with D50 administration.  She reportedly has had decreased activity and n/v for a few days prior to presentation. She was agreeable to only limited workup on arrival reporting she is a DNR and would like only medical management. On arrival her serum creatinine was 4.0 mg with estimated GFR 11 mL. She was given IV fluid and has no improvement of renal function.  She has adamantly declined dialysis and a hospice consult has been ordered.  Palliative care is consulted for symptom management.    Past Medical History:        Diagnosis Date    Acute sinusitis     CHAPARRO (acute kidney injury) 05/18/2018    CHAPARRO (acute kidney injury) 05/18/2018    Anemia     Anemia due to stage 4 chronic kidney disease 11/17/2021    Bleeding ulcer     hx of; cauterized by dr. resendiz    Breast cancer     Breast lump     CAD (coronary artery disease)     sees dr. shoemaker    Callous ulcer (HCC)     Cataract     Chronic kidney disease, stage 3b (HCC) 06/21/2021    COVID-19 01/2021    fatigue    Dermatitis     Diabetes mellitus (HCC)     Diabetic nephropathy (HCC)     Diabetic retinopathy (HCC)     Glaucoma     Gout     Hemorrhoids     Hx of TB skin testing     positive    Hyperlipidemia     Hypertension     Long term current use of aromatase inhibitor 05/27/2022    Osteopenia of left hip 05/27/2022     UTI- follow cultures. Monitoring off abx at this time  Hyperkalemia- improved s/p Lokelma on admission  Hypoglycemia- improved with dextrose infusion, now transition to continuous LR.  Continue to monitor labs  N/v- continue zofran prn. Also added compazine for persistent symptoms.     Thank you for consulting palliative care and allowing us to participate in the care of the patient.      CounselingTopics: Goals of care, Code Status, Disease process education, pt/family support                                     Total Time Spent with patient assessment, interview of independent historian/HCS, workup/treatment review, discussion with medical team, review of current and home medications, review of care everywhere, education on hospice services, assistance with coordination of discharge with outpatient hospice, medication initiation and monitoring for symptom management, and placement of orders/preparation of this note: 77 minutes    Electronically signed by SARA Arroyo CNP on 4/14/2025 at 8:31 AM    (Please note that portions of this note were completed with a voice recognition program.  Efforts were made to edit the dictations but occasionally words are mis-transcribed.)

## 2025-04-14 NOTE — ACP (ADVANCE CARE PLANNING)
Advance Care Planning      Palliative Medicine Provider (MD/NP)  Advance Care Planning (ACP) Conversation      Date of Conversation: 04/14/25  The patient and/or authorized decision maker consented to a voluntary Advance Care Planning conversation.   Individuals present for the conversation:   Patient with decision making capacity and other family at bedside    Legal Healthcare Agent(s):    Primary Decision Maker: IVIS MALDONADO - Child - [phone]    ACP documents available in EMR prior to discussion:  - EMS DNR on file    Primary Palliative Diagnosis(es):  ESRD  Metabolic acidosis     Conversation Summary: Met with patient and two family members at bedside. We discussed current status and plan of care. Patient remains oriented at this time and does not wish to pursue dialysis. She has requested hospice consult and to return home with outpatient services. Family will be providing 24 hr care. Pt has an EMS DNR form on file and does confirm DNR code status in the event of cardiac or respiratory arrest. She otherwise denies ACP/POA documents. Reviewed legal NOK in the state of [place] and that without documents otherwise naming HCS that pts daughter, Ivis, would remain medical decision maker by law. Patient verbalized understanding and is agreeable to this. Ivis is working today and they declined I called her but are keeping her updated on status.    Resuscitation Status:  DNR     I spent 13 minutes providing ACP services with the patient and/or surrogate decision maker in a voluntary, in-person conversation discussing the patient's wishes and goals as detailed in the above note during consult visit.       Brandon Siegel, SARA - CNP

## 2025-04-14 NOTE — PROGRESS NOTES
4 Eyes Skin Assessment     NAME:  Lyudmila Go  YOB: 1950  MEDICAL RECORD NUMBER:  [id number]    The patient is being assessed for  Admission    I agree that at least one RN has performed a thorough Head to Toe Skin Assessment on the patient. ALL assessment sites listed below have been assessed.      Areas assessed by both nurses:    Head, Face, Ears, Shoulders, Back, Chest, Arms, Elbows, Hands, Sacrum. Buttock, Coccyx, Ischium, Legs. Feet and Heels, and Under Medical Devices         Does the Patient have a Wound? Yes wound(s) were present on assessment. LDA wound assessment was Initiated and completed by RN       Scout Prevention initiated by RN: Yes  Wound Care Orders initiated by RN: Yes    Pressure Injury (Stage 3,4, Unstageable, DTI, NWPT, and Complex wounds) if present, place Wound referral order by RN under : Yes    New Ostomies, if present place, Ostomy referral order under : No     Nurse 1 eSignature: Electronically signed by Marly Orozco RN on 4/14/25 at 2:40 PM CDT    **SHARE this note so that the co-signing nurse can place an eSignature**    Nurse 2 eSignature: Electronically signed by Shandra Meehan LPN on 4/14/25 at 2:41 PM CDT

## 2025-04-14 NOTE — ED NOTES
ED TO INPATIENT SBAR HANDOFF    Patient Name: Lyudmila Go   : 1950  74 y.o.   Family/Caregiver Present: Yes  Code Status Order: DNR    C-SSRS: Risk of Suicide: No Risk  Sitter No  Restraints:         Situation  Chief Complaint:   Chief Complaint   Patient presents with    Hypoglycemia     Patient Diagnosis: CHAPARRO (acute kidney injury) [N17.9]     Brief Description of Patient's Condition:  74 y.o. female who presents to the emergency department with hypoglycemia.  History obtained from patient and family given her altered mental status upon arrival.  She was hypoglycemic with EMS in the 30s.  She is receiving D50 as I entered the room with significant improvement in her mental status.  Her family states that she has been laying in bed not herself for the past 2 days.  She has had emesis, has not complained of abdominal pain.  She endorses having occasional bowel movements.  Family states that she has a current UTI and she is being treated for this.  Patient denies abdominal pain.  No fever.  No chest pain or shortness of breath.  No syncopal events.  Does take antihyperglycemic's including metformin and glyburide   Mental Status: oriented, alert, coherent, logical, thought processes intact, and able to concentrate and follow conversation  Arrived from: home    Imaging:   XR CHEST 1 VIEW   Final Result   1.  Mild increased cardiomegaly without overt congestive failure or additional acute intrathoracic process.           ______________________________________    Electronically signed by: RANJAN BELTRAN M.D.   Date:     2025   Time:    12:00         COVID-19 Results:   Internal Administration   First Dose      Second Dose           Last COVID Lab SARS-CoV-2 (no units)   Date Value   2020 NOT DETECTED     SARS-CoV-2, PCR (no units)   Date Value   2022 Not Detected     SARS-CoV-2, OUSMANE (no units)   Date Value   10/08/2020 NOT DETECTED     SARS-CoV-2, NAAT (no units)   Date Value   10/28/2021  components within normal limits   POCT GLUCOSE - Abnormal; Notable for the following components:    POC Glucose 168 (*)     All other components within normal limits   POCT GLUCOSE - Abnormal; Notable for the following components:    POC Glucose 175 (*)     All other components within normal limits   POCT GLUCOSE - Abnormal; Notable for the following components:    POC Glucose 199 (*)     All other components within normal limits   POCT GLUCOSE - Abnormal; Notable for the following components:    POC Glucose 165 (*)     All other components within normal limits   POCT GLUCOSE - Abnormal; Notable for the following components:    POC Glucose 183 (*)     All other components within normal limits   POCT GLUCOSE - Abnormal; Notable for the following components:    POC Glucose 170 (*)     All other components within normal limits   POCT GLUCOSE - Abnormal; Notable for the following components:    POC Glucose 199 (*)     All other components within normal limits   POCT GLUCOSE - Abnormal; Notable for the following components:    POC Glucose 187 (*)     All other components within normal limits   POCT GLUCOSE - Abnormal; Notable for the following components:    POC Glucose 182 (*)     All other components within normal limits   POCT GLUCOSE - Abnormal; Notable for the following components:    POC Glucose 169 (*)     All other components within normal limits   POCT GLUCOSE - Abnormal; Notable for the following components:    POC Glucose 166 (*)     All other components within normal limits   POCT GLUCOSE - Abnormal; Notable for the following components:    POC Glucose 173 (*)     All other components within normal limits     Background  Allergies:   Allergies   Allergen Reactions    Medrol [Methylprednisolone] Other (See Comments)     Affects her eyes     Current Medications:   Medications Administered         albuterol (PROVENTIL) (2.5 MG/3ML) 0.083% nebulizer solution 10 mg Admin Date  04/13/2025 Action  Given Dose  10 mg

## 2025-04-14 NOTE — PROGRESS NOTES
Hospitalist Progress Note    Patient:  Lyudmila Go  YOB: 1950  Date of Service: 4/14/2025  MRN: [id number]   Acct: [id number]   Primary Care Physician: Belkys Lopez MD  Advance Directive: DNR  Admit Date: 4/13/2025       Hospital Day: 1  Referring Provider: Omero Cassiyd DO    Patient Seen, Chart, Consults, Notes, Labs, Radiology studies reviewed.    Subjective:  Lyudmila Go is a 74 y.o. female  whom we are following for acute kidney injury, hyperkalemia, metabolic acidosis.  She did well overnight.  Her acid-base status and potassium have normalized.  Her GFR has not improved nor has it worsened.  She is interested in pursuing home hospice.  We will downgrade to [facility].  Blood glucose has also stabilized.    Allergies:  Medrol [methylprednisolone]    Medicines:  Current Facility-Administered Medications   Medication Dose Route Frequency Provider Last Rate Last Admin    lactated ringers infusion   IntraVENous Continuous Omero Cassidy DO 75 mL/hr at 04/14/25 0820 New Bag at 04/14/25 0820    glucose chewable tablet 16 g  4 tablet Oral PRN Mamadou Wyatt MD        dextrose bolus 10% 125 mL  125 mL IntraVENous PRN Mamadou Wyatt MD        Or    dextrose bolus 10% 250 mL  250 mL IntraVENous PRN Mamadou Wyatt MD        glucagon injection 1 mg  1 mg SubCUTAneous PRN Mamadou Wyatt MD        dextrose 10 % infusion   IntraVENous Continuous PRN Mamadou Wyatt MD        sodium chloride flush 0.9 % injection 10 mL  10 mL IntraVENous 2 times per day Omero Cassidy DO   10 mL at 04/13/25 2113    sodium chloride flush 0.9 % injection 10 mL  10 mL IntraVENous PRN Omero Cassidy DO        0.9 % sodium chloride infusion   IntraVENous PRN Omero Cassidy DO        heparin (porcine) injection 5,000 Units  5,000 Units SubCUTAneous 3 times per day Omero Cassidy DO   5,000 Units at 04/14/25 0519    ondansetron (ZOFRAN-ODT)

## 2025-04-14 NOTE — ED NOTES
Pt moved to inpatient bed, purewick applied, wound photos taken, mepilex applied to buttocks. Vaseline gauze and curlex applied to wounds to RT leg.

## 2025-04-15 VITALS
RESPIRATION RATE: 16 BRPM | HEIGHT: 68 IN | SYSTOLIC BLOOD PRESSURE: 87 MMHG | HEART RATE: 105 BPM | OXYGEN SATURATION: 94 % | WEIGHT: 200 LBS | TEMPERATURE: 97.5 F | BODY MASS INDEX: 30.31 KG/M2 | DIASTOLIC BLOOD PRESSURE: 59 MMHG

## 2025-04-15 PROBLEM — N17.9 AKI (ACUTE KIDNEY INJURY): Status: RESOLVED | Noted: 2025-04-13 | Resolved: 2025-04-15

## 2025-04-15 PROBLEM — Z51.5 PALLIATIVE CARE PATIENT: Status: RESOLVED | Noted: 2025-04-14 | Resolved: 2025-04-15

## 2025-04-15 LAB
ALBUMIN SERPL-MCNC: 2.8 G/DL (ref 3.5–5.2)
ALP SERPL-CCNC: 49 U/L (ref 35–104)
ALT SERPL-CCNC: <5 U/L (ref 10–35)
ANION GAP SERPL CALCULATED.3IONS-SCNC: 13 MMOL/L (ref 8–16)
AST SERPL-CCNC: 19 U/L (ref 10–35)
BASOPHILS # BLD: 0 K/UL (ref 0–0.2)
BASOPHILS NFR BLD: 0.8 % (ref 0–1)
BILIRUB SERPL-MCNC: 0.4 MG/DL (ref 0.2–1.2)
BUN SERPL-MCNC: 65 MG/DL (ref 8–23)
CALCIUM SERPL-MCNC: 7.8 MG/DL (ref 8.8–10.2)
CHLORIDE SERPL-SCNC: 98 MMOL/L (ref 98–107)
CO2 SERPL-SCNC: 21 MMOL/L (ref 22–29)
CREAT SERPL-MCNC: 4.2 MG/DL (ref 0.5–0.9)
EOSINOPHIL # BLD: 0.1 K/UL (ref 0–0.6)
EOSINOPHIL NFR BLD: 2.9 % (ref 0–5)
ERYTHROCYTE [DISTWIDTH] IN BLOOD BY AUTOMATED COUNT: 16 % (ref 11.5–14.5)
GLUCOSE BLD-MCNC: 67 MG/DL (ref 70–99)
GLUCOSE BLD-MCNC: 86 MG/DL (ref 70–99)
GLUCOSE BLD-MCNC: 89 MG/DL (ref 70–99)
GLUCOSE SERPL-MCNC: 79 MG/DL (ref 70–99)
HCT VFR BLD AUTO: 35.4 % (ref 37–47)
HGB BLD-MCNC: 11 G/DL (ref 12–16)
IMM GRANULOCYTES # BLD: 0 K/UL
LYMPHOCYTES # BLD: 1.4 K/UL (ref 1.1–4.5)
LYMPHOCYTES NFR BLD: 27.8 % (ref 20–40)
MAGNESIUM SERPL-MCNC: 1.6 MG/DL (ref 1.6–2.4)
MCH RBC QN AUTO: 31.8 PG (ref 27–31)
MCHC RBC AUTO-ENTMCNC: 31.1 G/DL (ref 33–37)
MCV RBC AUTO: 102.3 FL (ref 81–99)
MONOCYTES # BLD: 0.4 K/UL (ref 0–0.9)
MONOCYTES NFR BLD: 8.4 % (ref 0–10)
NEUTROPHILS # BLD: 2.9 K/UL (ref 1.5–7.5)
NEUTS SEG NFR BLD: 59.9 % (ref 50–65)
PERFORMED ON: ABNORMAL
PERFORMED ON: NORMAL
PERFORMED ON: NORMAL
PHOSPHATE SERPL-MCNC: 4.6 MG/DL (ref 2.5–4.5)
PLATELET # BLD AUTO: 179 K/UL (ref 130–400)
PMV BLD AUTO: 11.3 FL (ref 9.4–12.3)
POTASSIUM SERPL-SCNC: 4.5 MMOL/L (ref 3.5–5.1)
PROT SERPL-MCNC: 5.6 G/DL (ref 6.4–8.3)
RBC # BLD AUTO: 3.46 M/UL (ref 4.2–5.4)
SODIUM SERPL-SCNC: 132 MMOL/L (ref 136–145)
WBC # BLD AUTO: 4.9 K/UL (ref 4.8–10.8)

## 2025-04-15 PROCEDURE — 85025 COMPLETE CBC W/AUTO DIFF WBC: CPT

## 2025-04-15 PROCEDURE — 2580000003 HC RX 258: Performed by: INTERNAL MEDICINE

## 2025-04-15 PROCEDURE — 2500000003 HC RX 250 WO HCPCS: Performed by: INTERNAL MEDICINE

## 2025-04-15 PROCEDURE — 80053 COMPREHEN METABOLIC PANEL: CPT

## 2025-04-15 PROCEDURE — 94760 N-INVAS EAR/PLS OXIMETRY 1: CPT

## 2025-04-15 PROCEDURE — 83735 ASSAY OF MAGNESIUM: CPT

## 2025-04-15 PROCEDURE — 99231 SBSQ HOSP IP/OBS SF/LOW 25: CPT

## 2025-04-15 PROCEDURE — 6360000002 HC RX W HCPCS: Performed by: INTERNAL MEDICINE

## 2025-04-15 PROCEDURE — 84100 ASSAY OF PHOSPHORUS: CPT

## 2025-04-15 PROCEDURE — 82962 GLUCOSE BLOOD TEST: CPT

## 2025-04-15 PROCEDURE — 36415 COLL VENOUS BLD VENIPUNCTURE: CPT

## 2025-04-15 PROCEDURE — 6370000000 HC RX 637 (ALT 250 FOR IP): Performed by: INTERNAL MEDICINE

## 2025-04-15 PROCEDURE — 6370000000 HC RX 637 (ALT 250 FOR IP)

## 2025-04-15 RX ORDER — MORPHINE SULFATE 20 MG/ML
5 SOLUTION ORAL EVERY 4 HOURS PRN
Qty: 15 ML | Refills: 0 | Status: SHIPPED | OUTPATIENT
Start: 2025-04-15 | End: 2025-04-25

## 2025-04-15 RX ORDER — ONDANSETRON 2 MG/ML
8 INJECTION INTRAMUSCULAR; INTRAVENOUS EVERY 6 HOURS PRN
Status: DISCONTINUED | OUTPATIENT
Start: 2025-04-15 | End: 2025-04-15 | Stop reason: HOSPADM

## 2025-04-15 RX ORDER — LORAZEPAM 2 MG/ML
0.5 CONCENTRATE ORAL EVERY 6 HOURS PRN
Qty: 30 ML | Refills: 0 | Status: SHIPPED | OUTPATIENT
Start: 2025-04-15 | End: 2025-04-18

## 2025-04-15 RX ORDER — BISMUTH TRIBROMOPH/PETROLATUM 5"X9"
1 BANDAGE TOPICAL DAILY
Status: DISCONTINUED | OUTPATIENT
Start: 2025-04-15 | End: 2025-04-15 | Stop reason: HOSPADM

## 2025-04-15 RX ORDER — METOCLOPRAMIDE HYDROCHLORIDE 5 MG/ML
10 INJECTION INTRAMUSCULAR; INTRAVENOUS EVERY 4 HOURS PRN
Status: DISCONTINUED | OUTPATIENT
Start: 2025-04-15 | End: 2025-04-15 | Stop reason: HOSPADM

## 2025-04-15 RX ORDER — ONDANSETRON 4 MG/1
8 TABLET, ORALLY DISINTEGRATING ORAL EVERY 8 HOURS PRN
Status: DISCONTINUED | OUTPATIENT
Start: 2025-04-15 | End: 2025-04-15 | Stop reason: HOSPADM

## 2025-04-15 RX ORDER — BISMUTH TRIBROMOPH/PETROLATUM 5"X9"
1 BANDAGE TOPICAL DAILY
Qty: 50 EACH | Refills: 0 | Status: SHIPPED | OUTPATIENT
Start: 2025-04-16

## 2025-04-15 RX ADMIN — MICONAZOLE NITRATE: 2 POWDER TOPICAL at 09:27

## 2025-04-15 RX ADMIN — SODIUM CHLORIDE, SODIUM LACTATE, POTASSIUM CHLORIDE, AND CALCIUM CHLORIDE: .6; .31; .03; .02 INJECTION, SOLUTION INTRAVENOUS at 04:22

## 2025-04-15 RX ADMIN — HEPARIN SODIUM 5000 UNITS: 5000 INJECTION INTRAVENOUS; SUBCUTANEOUS at 15:08

## 2025-04-15 RX ADMIN — HYDROCODONE BITARTRATE AND ACETAMINOPHEN 1 TABLET: 7.5; 325 TABLET ORAL at 15:24

## 2025-04-15 RX ADMIN — Medication 1 EACH: at 15:08

## 2025-04-15 RX ADMIN — SEVELAMER CARBONATE 800 MG: 800 TABLET, FILM COATED ORAL at 12:51

## 2025-04-15 RX ADMIN — SEVELAMER CARBONATE 800 MG: 800 TABLET, FILM COATED ORAL at 09:27

## 2025-04-15 RX ADMIN — SODIUM CHLORIDE, PRESERVATIVE FREE 10 ML: 5 INJECTION INTRAVENOUS at 09:29

## 2025-04-15 RX ADMIN — HEPARIN SODIUM 5000 UNITS: 5000 INJECTION INTRAVENOUS; SUBCUTANEOUS at 06:27

## 2025-04-15 RX ADMIN — CALCITRIOL CAPSULES 0.25 MCG 0.25 MCG: 0.25 CAPSULE ORAL at 09:27

## 2025-04-15 ASSESSMENT — PAIN SCALES - GENERAL
PAINLEVEL_OUTOF10: 6
PAINLEVEL_OUTOF10: 3

## 2025-04-15 ASSESSMENT — PAIN DESCRIPTION - DESCRIPTORS: DESCRIPTORS: THROBBING;SHARP

## 2025-04-15 ASSESSMENT — PAIN DESCRIPTION - LOCATION: LOCATION: LEG

## 2025-04-15 ASSESSMENT — PAIN - FUNCTIONAL ASSESSMENT: PAIN_FUNCTIONAL_ASSESSMENT: PREVENTS OR INTERFERES SOME ACTIVE ACTIVITIES AND ADLS

## 2025-04-15 ASSESSMENT — PAIN DESCRIPTION - ORIENTATION: ORIENTATION: RIGHT;LOWER

## 2025-04-15 NOTE — PLAN OF CARE
Problem: Safety - Adult  Goal: Free from fall injury  Outcome: Progressing     Problem: Chronic Conditions and Co-morbidities  Goal: Patient's chronic conditions and co-morbidity symptoms are monitored and maintained or improved  Outcome: Progressing     Problem: Discharge Planning  Goal: Discharge to home or other facility with appropriate resources  Outcome: Progressing     Problem: Skin/Tissue Integrity  Goal: Skin integrity remains intact  Description: 1.  Monitor for areas of redness and/or skin breakdown2.  Assess vascular access sites hourly3.  Every 4-6 hours minimum:  Change oxygen saturation probe site4.  Every 4-6 hours:  If on nasal continuous positive airway pressure, respiratory therapy assess nares and determine need for appliance change or resting period  Outcome: Progressing  Flowsheets (Taken 4/15/2025 1149)  Skin Integrity Remains Intact:   Monitor for areas of redness and/or skin breakdown   Assess vascular access sites hourly   Every 4-6 hours minimum:  Change oxygen saturation probe site   Turn and reposition as indicated     Problem: ABCDS Injury Assessment  Goal: Absence of physical injury  Outcome: Progressing

## 2025-04-15 NOTE — CARE COORDINATION
Pt to d/c home with  hospice as soon as the hospital bed is delivered from [facility]; Pt's daughter to let RN or this writer know when it is there. Please notify hospice when she leaves;  [facility]  [phone]  [phone]  Electronically signed by JUDY Castañeda on 4/15/2025 at 11:54 AM      SW visited with Pt and sister, at bedside, re: continued d/c planning; Pt is very ready to d/c home with dtr and family and hospice; -Sathish here visiting with Pt as well; a hospital bed has been ordered as well; waiting on that to be delivered, then per palliative, hospice, and attending, Pt can be d/c'd home today with hospice. Pt's daughter also arrived during conversation and is agreeable to plan.     Electronically signed by JUDY Castañeda on 4/15/2025 at 11:33 AM

## 2025-04-15 NOTE — PROGRESS NOTES
Comprehensive Nutrition Assessment    Type and Reason for Visit:  Positive nutrition screen    Nutrition Recommendations/Plan:   Gelacio w/ lunch and dinner     Malnutrition Assessment:  Malnutrition Status:  At risk for malnutrition (04/15/25 1318)    Context:  Acute Illness     Findings of the 6 clinical characteristics of malnutrition:  Energy Intake:  Mild decrease in energy intake  Weight Loss:  Mild weight loss     Body Fat Loss:  Unable to assess     Muscle Mass Loss:  Unable to assess    Fluid Accumulation:  Moderate to Severe Extremities   Strength:  Not Performed    Nutrition Assessment:    +NS for wounds. Pt note to have weight loss from previous weights however, not significant at this time. Pt has some decreased intake/appetite. Intake ranging from 25-75%. Pt being treated for mulltiple wounds, will add Gelacio to lunch and dinner to aid in wound healing. [facility], pt has refused starting dialysis.    Nutrition Related Findings:    Na 132, Glu , BUN 65, Cr 4.2 +2 BLE edema Wound Type: Multiple, Pressure Injury (Trauma wounds)       Current Nutrition Intake & Therapies:    Average Meal Intake: 26-50%, 51-75%  Average Supplements Intake: None Ordered  ADULT DIET; Regular; Low Potassium (Less than 3000 mg/day)    Anthropometric Measures:  Height: 172.7 cm (5' 7.99\")  Ideal Body Weight (IBW): 140 lbs (64 kg)     Current Body Weight: 90.7 kg (199 lb 15.3 oz), 142.8 % IBW. Weight Source: Not specified  Current BMI (kg/m2): 30.4  Usual Body Weight: 95.7 kg (211 lb) (4-25-24)   % Weight Change (Calculated): -5.2  Weight Adjustment For: No Adjustment   BMI Categories: Obese Class 1 (BMI 30.0-34.9)    Estimated Daily Nutrient Needs:  Energy Requirements Based On: Kcal/kg  Weight Used for Energy Requirements: Current  Energy (kcal/day): 8315-7167 (15-18/kg)  Weight Used for Protein Requirements: Ideal  Protein (g/day):  (1.2-2/kg)  Method Used for Fluid

## 2025-04-15 NOTE — DISCHARGE SUMMARY
Discharge Summary    Lyudmila Go  :  1950  MRN:  [id number]    Admit date:  2025  Discharge date: 4/15/2025     Admitting Physician:  No admitting provider for patient encounter.    Advance Directive: DNR    Consults: nephrology, palliative care, and hospice    Primary Care Physician:  Belkys Lopez MD    Discharge Diagnoses:  Principal Problem (Resolved):    CHAPARRO (acute kidney injury)  Active Problems:    * No active hospital problems. *  Resolved Problems:    Palliative care patient      Significant Diagnostic Studies:   XR CHEST 1 VIEW  Result Date: 2025  EXAM: CHEST RADIOGRAPH  TECHNIQUE: Single frontal chest radiograph.  HISTORY: Unresponsive, hypoglycemia.  COMPARISON: 10/10/2022.  FINDINGS:  Increased cardiomegaly with normal mediastinal contours and decreased prominence of central pulmonary vessels. Lungs appear clear without consolidation or significant pleural fluid. Stable postoperative changes.       1.  Mild increased cardiomegaly without overt congestive failure or additional acute intrathoracic process.   ______________________________________ Electronically signed by: RANJAN BELTRAN M.D. Date:     2025 Time:    12:00       CBC:   Recent Labs     25  1046 25  0524 04/15/25  0345   WBC 6.6 5.2 4.9   HGB 13.4 10.4* 11.0*    182 179     BMP:    Recent Labs     25  1046 25  1300 25  0524 04/15/25  0345     --   --  137 132*   K 6.3*  --   --  4.9 4.5     --   --  101 98   CO2 19*  --   --  23 21*   BUN 65*  --   --  63* 65*   CREATININE 4.0*  --   --  4.0* 4.2*   GLUCOSE 30*   < > 175 190* 79    < > = values in this interval not displayed.     INR: No results for input(s): \"INR\" in the last 72 hours.  Lipids: No results for input(s): \"CHOL\", \"HDL\" in the last 72 hours.    Invalid input(s): \"LDLCALCU\"  ABGs:  Recent Labs     25  1108   02THERAPY Unknown     HgBA1c:  No results for input(s): \"LABA1C\" in the  last 72 hours.    Procedures: None  Hospital Course: Ms. Go was admitted on 4/13 with worsening renal failure and shortness of breath and hypoglycemia.  She was initially admitted to ICU and monitored on an hourly basis for her blood glucose.  She had worsening renal failure and did not wish to pursue chronic dialysis therapy.  She has a history of chronic kidney disease.  She was stabilized and moved to the floor.  On the afternoon of 4/15 she made the decision to transition to home hospice.   assisted with arrangements.  She will be transported home by ambulance today.    Physical Exam:  Vital Signs: BP (!) 87/59   Pulse (!) 105   Temp 97.5 °F (36.4 °C) (Oral)   Resp 16   Ht 1.727 m (5' 7.99\")   Wt 90.7 kg (200 lb)   SpO2 94%   BMI 30.42 kg/m²   General appearance:.Alert and Cooperative   HEENT: Normocephalic.  Chest: clear to auscultation bilaterally without wheezes or rhonchi.  Cardiac: Normal heart tones with regular rate and rhythm, S1, S2 normal. No murmurs, gallops, or rubs auscultated.   Abdomen:soft, non-tender; normal bowel sounds, no masses, no organomegaly.  Extremities: No clubbing or cyanosis. No peripheral edema. Peripheral pulses palpable.  Neurologic: Grossly intact.    Discharge Medications:         Medication List        START taking these medications      LORazepam 2 MG/ML concentrated solution  Commonly known as: ATIVAN  Place 0.25 mLs under the tongue every 6 hours as needed for Anxiety for up to 3 days. Max Daily Amount: 2 mg     morphine 20MG/ML Soln concentrated solution  Place 0.25 mLs under the tongue every 4 hours as needed for Pain for up to 10 days. Max Daily Amount: 30 mg     xeroform petrolatum gauze 5\"X9\" Misc external pads  Apply 1 each topically daily  Start taking on: April 16, 2025            CHANGE how you take these medications      nystatin [id number] UNIT/GM cream  Commonly known as: MYCOSTATIN  Apply topically 2 times daily.  What changed:   how to

## 2025-04-15 NOTE — PROGRESS NOTES
This  visited with pt and family member. Family member says pt is going home with Hospice. Pt was awake and able to converse. Pt  says she is a Advent and her aurora is important to her, and shared a aurora story. This  provided spiritual care with sustaining presence, support, and prayer. Pt expressed gratitude for spiritual care.         Spiritual Health History and Assessment/Progress Note  [facility]    Spiritual/Emotional Needs,  , End of Life,      Name: Lyudmila Go MRN: [id number]    Age: 74 y.o.     Sex: female   Language: English   Mormonism: Orthodoxy   CHAPARRO (acute kidney injury)     Date: 4/15/2025            Total Time Calculated: 10 min              Spiritual Assessment continued in [facility] 3 ANA/VAS/MED        Referral/Consult From: Palliative Care   Encounter Overview/Reason: Spiritual/Emotional Needs  Service Provided For: Patient, Family    Aurora, Belief, Meaning:   Patient identifies as spiritual and is connected with a aurora tradition or spiritual practice  Family/Friends Other: Pt's family did not discuss their aurora.      Importance and Influence:  Patient has spiritual/personal beliefs that influence decisions regarding their health  Family/Friends Other: Did not discuss    Community:  Patient is connected with a spiritual community  Family/Friends Other: Did not discuss    Assessment and Plan of Care:     Patient Interventions include: Affirmed coping skills/support systems and Provided sacramental/Congregational ritual  Family/Friends Interventions include: Other: Pt's family member was present during visit and prayer.     Patient Plan of Care: Spiritual Care available upon further referral  Family/Friends Plan of Care: Spiritual Care available upon further referral    Electronically signed by Mary Behrens, Chaplain on 4/15/2025 at 2:39 PM

## 2025-04-15 NOTE — PROGRESS NOTES
Nephrology ([facility]) Progress Note    Patient:  Lyudmila Go  YOB: 1950  Date of Service: 4/15/2025  MRN: [id number]   Acct: [id number]   Primary Care Physician: Belkys Lopez MD  Advance Directive: DNR  Admit Date: 4/13/2025       Hospital Day: 2  Referring Provider: Omero Cassidy DO    Patient independently seen and examined, Chart, Consults, Notes, Operative notes, Labs, Cardiology, and Radiology studies reviewed as available.    Chief complaint: Abnormal labs.    Subjective:  Lyudmila Go is a 74 y.o. female for whom we were consulted for evaluation and treatment of acute kidney injury/chronic kidney disease.  She has stage IV chronic kidney disease and has not seen a nephrologist despite recommendation from her primary care doctor.  She follows Dr. Belkys Lopez on a regular basis.  Presented to the emergency room with decline in mental status related to severe hypoglycemia.  Patient was treated with D50 percent and has significant improvement of mental status.  Patient also has history of type 2 diabetes, hypertension coronary artery disease and morbid abdominal obesity.  On arrival her serum creatinine was 4.0 mg with estimated GFR 11 mL.  She was given IV fluid and has no improvement of renal function.    This morning patient is complaining of fatigue, lack of energy, early morning nausea and vomiting.  She has generalized bodyaches    Allergies:  Medrol [methylprednisolone]    Medicines:  Current Facility-Administered Medications   Medication Dose Route Frequency Provider Last Rate Last Admin    xeroform petrolatum gauze 5\"X9\" external pads 1 each  1 each Topical Daily Omero Cassidy DO        ondansetron (ZOFRAN-ODT) disintegrating tablet 8 mg  8 mg Oral Q8H PRN Omero Cassidy DO        Or    ondansetron (ZOFRAN) injection 8 mg  8 mg IntraVENous Q6H PRN Omero Casisdy DO        metoclopramide (REGLAN) injection 10 mg  10 mg  Date: 4/13/2025  EXAM: CHEST RADIOGRAPH  TECHNIQUE: Single frontal chest radiograph.  HISTORY: Unresponsive, hypoglycemia.  COMPARISON: 10/10/2022.  FINDINGS:  Increased cardiomegaly with normal mediastinal contours and decreased prominence of central pulmonary vessels. Lungs appear clear without consolidation or significant pleural fluid. Stable postoperative changes.       1.  Mild increased cardiomegaly without overt congestive failure or additional acute intrathoracic process.   ______________________________________ Electronically signed by: RANJAN BELTRAN M.D. Date:     04/13/2025 Time:    12:00        Assessment   1.  Acute kidney injury versus progression of CKD.  2.  Stage IV chronic kidney disease baseline.  3.  Type II diabetic nephropathy.  4.  Severe secondary hyperparathyroidism.  5.  Hypoglycemia resolving.  6.  Hypocalcemia.  7.  Hyperphosphatemia.  8.  Secondary hyperparathyroidism.  9.  Anemia of chronic kidney disease.  10.  Deficiency of vitamin D.  11.  Uremic manifestation.      Plan:  1.  No dialysis as per patient wishes.  2.  P.o. Calcitrol.  3.  Continue phosphorus binder  4.  Vitamin D replacement  5.  Consider palliative/hospice    Immanuel Condon MD  04/15/25  11:57 AM

## 2025-04-15 NOTE — PROGRESS NOTES
The needed equipment has been ordered and will be set up today.  It will then be ok to dc the pt when medically ready.  Please call Hospice a t  when the pt leaves the hospital. TY!

## 2025-04-15 NOTE — PROGRESS NOTES
Palliative Care Progress Note  4/15/2025 9:02 AM    Patient:  Lyudmila Go  YOB: 1950  Primary Care Physician: Belkys Lopez MD  Advance Directive: DNR  Admit Date: 4/13/2025       Hospital Day: 2  Portions of this note have been copied forward, however, changed to reflect the most current clinical status of this patient.    CHIEF COMPLAINT/REASON FOR CONSULTATION Goals of care, family support, Code status, symptom management     SUBJECTIVE:  Ms. Go is alert and resting comfortably in bed. Denies new complaints. She is anxious for discharge home today. Hospice is following and assisting with DME.     HPI: The patient is a 74 y.o. female with PMH HTN, HLD, DM, diabetic neuropathy, CAD, breast cancer, anemia, CKD IV, recurrent UTI, and other comorbidities who presented to [facility] ED 4/13/2025 complaining of hypoglycemia and AMS.  On chart review it appears that EMS arrived at home and patient's blood sugar was noted to be in the 30s.  This did improve with D50 administration.  She reportedly has had decreased activity and n/v for a few days prior to presentation. She was agreeable to only limited workup on arrival reporting she is a DNR and would like only medical management. On arrival her serum creatinine was 4.0 mg with estimated GFR 11 mL. She was given IV fluid and has no improvement of renal function.  She has adamantly declined dialysis and a hospice consult has been ordered.  Palliative care is consulted for symptom management.     Review of Systems:   14 point review of systems is negative except as specifically addressed above.    Objective:   VITALS:  BP (!) 87/59   Pulse (!) 105   Temp 97.5 °F (36.4 °C) (Oral)   Resp 18   Ht 1.727 m (5' 8\")   Wt 90.7 kg (200 lb)   SpO2 94%   BMI 30.41 kg/m²   24HR INTAKE/OUTPUT:  No intake or output data in the 24 hours ending 04/15/25 0902    General appearance: 75 yo female, ill appearing, no acute distress  Head: Normocephalic,  and treatment plan as well as factors that lead to hospitalization. Ms. Go is alert and resting comfortably in bed. She has no new complaints. Remains about the same clinically. Plans to return home with hospice, potentially today. SW following up with daughter today regarding this. Palliative team will follow as needed.    Recommendations:      Palliative Care- [facility] patient would like to proceed with comfort focused care through hospice services.  Has declined dialysis and other aggressive medical management. D/c home with outpatient hospice, potentially tomorrow. Medications available for symptom management if required. Code status- DNR  CHAPARRO on CKD- nephrology following. Patient has declined dialysis. Little changes with conservative management.  Pursuing comfort focused care with hospice  Recurrent UTI- follow cultures. Monitoring off abx at this time  Hyperkalemia- improved s/p Lokelma on admission  Hypoglycemia- improved with dextrose infusion, now transition to continuous LR.  Continue to monitor labs  N/v- continue zofran prn. Also added compazine for persistent symptoms.     Thank you for consulting Palliative Care and allowing us to participate in the care of this patient.     Total Time Spent with patient assessment, interview of independent historian/HCS, workup/treatment review, discussion with medical team, review of current and home medications, and placement of orders/preparation of this note: 28 minutes                               Electronically signed by SARA Arroyo CNP on 4/15/2025 at 9:02 AM    (Please note that portions of this note were completed with a voice recognition program.  Effortswere made to edit the dictations but occasionally words are mis-transcribed.)

## 2025-04-15 NOTE — CARE COORDINATION
Pt's physical address is: [address] [place] [address]  Electronically signed by JUDY Castañeda on 4/15/2025 at 1:33 PM

## 2025-04-15 NOTE — PLAN OF CARE
Problem: Safety - Adult  Goal: Free from fall injury  Outcome: Progressing     Problem: Chronic Conditions and Co-morbidities  Goal: Patient's chronic conditions and co-morbidity symptoms are monitored and maintained or improved  Outcome: Not Progressing  Flowsheets (Taken 4/14/2025 2333)  Care Plan - Patient's Chronic Conditions and Co-Morbidity Symptoms are Monitored and Maintained or Improved:   Monitor and assess patient's chronic conditions and comorbid symptoms for stability, deterioration, or improvement   Collaborate with multidisciplinary team to address chronic and comorbid conditions and prevent exacerbation or deterioration   Update acute care plan with appropriate goals if chronic or comorbid symptoms are exacerbated and prevent overall improvement and discharge     Problem: Discharge Planning  Goal: Discharge to home or other facility with appropriate resources  Outcome: Progressing  Flowsheets (Taken 4/14/2025 2333)  Discharge to home or other facility with appropriate resources:   Identify barriers to discharge with patient and caregiver   Arrange for needed discharge resources and transportation as appropriate   Identify discharge learning needs (meds, wound care, etc)   Refer to discharge planning if patient needs post-hospital services based on physician order or complex needs related to functional status, cognitive ability or social support system     Problem: Skin/Tissue Integrity  Goal: Skin integrity remains intact  Description: 1.  Monitor for areas of redness and/or skin breakdown2.  Assess vascular access sites hourly3.  Every 4-6 hours minimum:  Change oxygen saturation probe site4.  Every 4-6 hours:  If on nasal continuous positive airway pressure, respiratory therapy assess nares and determine need for appliance change or resting period  Outcome: Progressing  Flowsheets (Taken 4/14/2025 2333)  Skin Integrity Remains Intact:   Monitor for areas of redness and/or skin breakdown   Turn

## 2025-04-15 NOTE — PROGRESS NOTES
[facility]  Nurse  Consult Note       NAME:  Lyudmila Go  MEDICAL RECORD NUMBER:  [id number]  AGE: 74 y.o.   GENDER: female  : 1950  TODAY'S DATE:  4/15/2025    Subjective   Reason for Wound Nurse Evaluation and Assessment: pressure injuries to right buttock, DTI to sacrum, and multiple skin tears      Lyudmila Go is a 74 y.o. female referred by:   [] Physician  [x] Nursing  [] Other:     Wound Identification:  Wound Type: pressure and skin tear  Contributing Factors: chronic pressure and decreased mobility    Wound History: Patient presented to the hospital with (2) stage 2 pressure injuries to her right buttock and a DTI to her sacrum. She also has multiple skin tears to her BLE and BUE.     The sacral DTI is purple in color and the skin is intact. The stage 2 pressure injuries have a pink/red wound bed with no drainage or odor noted.       Current Wound Care Treatment:      RLE: Cleanse wounds gently with soap and water. Apply Xeroform cut slightly larger than the wound and doubled. Cover with dry 4x4 fluff and secure with roll gauze. Change daily and PRN    BILATERAL UPPER EXTREMITIES: Cleanse wounds with soap and water. Apply adaptic over wounds. Cover with 4x4 gauze and secure with roll guaze or silicone border. Change daily and PRN    BUTTOCKS/SACRUM: Cleanse wounds with soap and water. Apply Triad Hydrophilic Wound Dressing cream to wounds and surrounding skin. Re-apply twice daily and PRN    LEFT HEEL: Cover with silicone border. Change every 3 days. Peel back daily to assess skin.  Utilize heel lift boots while in bed        Turn patient at least every 2 hours  Use only paper pads under the patient  NO briefs       Patient Goal of Care:  [x] Wound Healing  [] Odor Control  [] Palliative Care  [] Pain Control   [] Other:         PAST MEDICAL HISTORY        Diagnosis Date    Acute sinusitis     CHAPARRO (acute kidney injury) 2018    CHAPARRO (acute kidney injury) 2018    Anemia

## 2025-04-15 NOTE — PLAN OF CARE
Problem: Safety - Adult  Goal: Free from fall injury  4/15/2025 1239 by Selin Smith LPN  Outcome: Progressing  4/15/2025 0104 by Cate Prieto RN  Outcome: Progressing     Problem: Chronic Conditions and Co-morbidities  Goal: Patient's chronic conditions and co-morbidity symptoms are monitored and maintained or improved  4/15/2025 1239 by Selin Smith LPN  Outcome: Progressing  4/15/2025 0104 by Cate Prieto RN  Outcome: Not Progressing  Flowsheets (Taken 4/14/2025 2333)  Care Plan - Patient's Chronic Conditions and Co-Morbidity Symptoms are Monitored and Maintained or Improved:   Monitor and assess patient's chronic conditions and comorbid symptoms for stability, deterioration, or improvement   Collaborate with multidisciplinary team to address chronic and comorbid conditions and prevent exacerbation or deterioration   Update acute care plan with appropriate goals if chronic or comorbid symptoms are exacerbated and prevent overall improvement and discharge     Problem: Discharge Planning  Goal: Discharge to home or other facility with appropriate resources  4/15/2025 1239 by Selin Smith LPN  Outcome: Progressing  4/15/2025 0104 by Cate Prieto RN  Outcome: Progressing  Flowsheets (Taken 4/14/2025 2333)  Discharge to home or other facility with appropriate resources:   Identify barriers to discharge with patient and caregiver   Arrange for needed discharge resources and transportation as appropriate   Identify discharge learning needs (meds, wound care, etc)   Refer to discharge planning if patient needs post-hospital services based on physician order or complex needs related to functional status, cognitive ability or social support system     Problem: Skin/Tissue Integrity  Goal: Skin integrity remains intact  Description: 1.  Monitor for areas of redness and/or skin breakdown2.  Assess vascular access sites hourly3.  Every 4-6 hours minimum:  Change oxygen saturation probe site4.  Every 4-6

## 2025-04-16 LAB — BACTERIA UR CULT: NORMAL

## 2025-04-23 NOTE — PROGRESS NOTES
Physician Progress Note      PATIENT:               COCO CONDON  [facility] #:                  [id number]  :                       1950  ADMIT DATE:       2025 10:42 AM  DISCH DATE:        4/15/2025 4:25 PM  RESPONDING  PROVIDER #:        Omero Cassidy DO          QUERY TEXT:    Acute Kidney Injury is documented in the medical record H&P . Creatinine-   4.0, 4.0, 4.2, no baseline. ?Please provide additional clinical indicators   supportive of your documentation. Or please document if the diagnosis of CHAPARRO   has been ruled out after study.    The clinical indicators include:  - H&P - CHAPARRO (acute kidney injury).  - palliative PN 04/15- CHAPARRO on CKD- nephrology following. Patient has declined   dialysis. Little changes with conservative management.  -04/15:  Creatinine- 4.0, 4.0, 4.2  BUN- 65, 63, 65  GFR- 11, 11, 10  - IVF resuscitation with isotonic bicarb solution, serial labs    Thank you,  Shahab Smith [facility] CDS  Options provided:  -- Acute kidney injury evidenced by, Please document evidence as well as a   numerical baseline creatinine, if known.  -- chronic kidney disease Stage IV without CHAPARRO  -- Other - I will add my own diagnosis  -- Disagree - Not applicable / Not valid  -- Disagree - Clinically unable to determine / Unknown  -- Refer to Clinical Documentation Reviewer    PROVIDER RESPONSE TEXT:    This patient has an acute kidney injury as evidenced by elevated creatinine    Query created by: Shahab Smith on 2025 4:35 AM      Electronically signed by:  Omero Cassidy DO 2025 11:29 AM

## 2025-05-12 DIAGNOSIS — Z95.810 BIVENTRICULAR ICD (IMPLANTABLE CARDIOVERTER-DEFIBRILLATOR) IN PLACE: Primary | ICD-10-CM

## 2025-05-12 DIAGNOSIS — I50.42 CHRONIC COMBINED SYSTOLIC (CONGESTIVE) AND DIASTOLIC (CONGESTIVE) HEART FAILURE (HCC): ICD-10-CM

## 2025-05-12 DIAGNOSIS — I25.5 ISCHEMIC CARDIOMYOPATHY: ICD-10-CM

## 2025-05-13 ENCOUNTER — TELEPHONE (OUTPATIENT)
Dept: HEMATOLOGY | Age: 75
End: 2025-05-13

## 2025-05-13 NOTE — TELEPHONE ENCOUNTER
I called patient and left detailed voicemail about their appointment on 05/15/2025. I made patient aware not to arrive any earlier than the appointment time and to come at the time of the follow up not the time of the lab appointment if it is different than the follow up appt time. I also made patient aware to eat and drink plenty of water to hydrate properly before coming to these appointments because this will make their lab draw much easier. Made patient aware that we are now located at the [facility] at [address] [place] [address]. Located between [facility] and the [facility].

## 2025-05-16 NOTE — TELEPHONE ENCOUNTER
Received a call this morning from hospice. Patient's ICD is needing to be turned off. I reached back out to the number provided but had to leave a detailed message for the hospice nurse regarding protocol for turning off ICD therapies.

## 2025-05-21 ENCOUNTER — TELEPHONE (OUTPATIENT)
Dept: CARDIOLOGY CLINIC | Age: 75
End: 2025-05-21

## 2025-07-08 NOTE — DISCHARGE INSTR - DIET
Good nutrition is important when healing from an illness, injury, or surgery. Follow any nutrition recommendations given to you during your hospital stay. If you were given an oral nutrition supplement while in the hospital, continue to take this supplement at home. You can take it with meals, in-between meals, and/or before bedtime. These supplements can be purchased at most local grocery stores, pharmacies, and chain [employer]-stores. If you have any questions about your diet or nutrition, call the hospital and ask for the dietitian.  delivery with fetal abnormalities

## (undated) DEVICE — SUTURE VCRL SZ 3-0 L36IN ABSRB UD L36MM CT-1 1/2 CIR J944H

## (undated) DEVICE — ENDO KIT,LOURDES HOSPITAL: Brand: MEDLINE INDUSTRIES, INC.

## (undated) DEVICE — GOWN,PREVENTION PLUS,XL,ST,24/CS: Brand: MEDLINE

## (undated) DEVICE — SUTURE VCRL SZ 2-0 L36IN ABSRB UD L36MM CT-1 1/2 CIR J945H

## (undated) DEVICE — SNARE ENDOSCP L240CM LOOP W13MM SHTH DIA2.4MM SM OVL FLX

## (undated) DEVICE — GLOVE SURG SZ 8 CRM LTX FREE POLYISOPRENE POLYMER BEAD ANTI

## (undated) DEVICE — ZIMMER® STERILE DISPOSABLE TOURNIQUET CUFF WITH PLC, DUAL PORT, SINGLE BLADDER, 18 IN. (46 CM)

## (undated) DEVICE — AMBU AURA-I U SIZE 4, DISPOSABLE LARYNGEAL MASK: Brand: AURA-I

## (undated) DEVICE — BLADE OPHTH 180DEG CUT SURF BLU STR SHRP DBL BVL GRINDLESS

## (undated) DEVICE — GLOVE SURG SZ 75 CRM LTX FREE POLYISOPRENE POLYMER BEAD ANTI

## (undated) DEVICE — STANDARD HYPODERMIC NEEDLE,POLYPROPYLENE HUB: Brand: MONOJECT

## (undated) DEVICE — PEN: MARKING STD 100/CS: Brand: MEDICAL ACTION INDUSTRIES

## (undated) DEVICE — GAUZE,SPONGE,FLUFF,6"X6.75",STRL,10/TRAY: Brand: MEDLINE

## (undated) DEVICE — BANDAGE COMPR W4INXL15FT BGE E SGL LAYERED CLP CLSR

## (undated) DEVICE — GOWN,PRECEPT,XLNG/XXLARGE,STRL: Brand: MEDLINE

## (undated) DEVICE — COVER US PRB W5XL96IN LTX W/ GEL

## (undated) DEVICE — BANDAGE COMPR W6INXL12FT SMOOTH FOR LIMB EXSANG ESMARCH

## (undated) DEVICE — DRAPE,U/ SHT,SPLIT,PLAS,STERIL: Brand: MEDLINE

## (undated) DEVICE — 3 ML SYRINGE WITH HYPODERMIC SAFETY NEEDLE: Brand: MAGELLAN

## (undated) DEVICE — SUTURE VCRL SZ 3-0 L27IN ABSRB UD L26MM SH 1/2 CIR J416H

## (undated) DEVICE — FORCEPS BX L240CM JAW DIA2.4MM ORNG L CAP W/ NDL DISP RAD

## (undated) DEVICE — PROBE DTECT MARGIN F/LUMPECTOMY

## (undated) DEVICE — CLIP INT M L GRN TI TRNSVRS GRV CHEVRON SHP W/ PRECIS TIP

## (undated) DEVICE — CHLORAPREP 26ML ORANGE

## (undated) DEVICE — MINOR CDS: Brand: MEDLINE INDUSTRIES, INC.

## (undated) DEVICE — SUTURE MNCRYL STRATAFIX PS 4-0 30CM

## (undated) DEVICE — PACKAGE ASSY BALLOON POUCH ST 5-6CM

## (undated) DEVICE — PADDING UNDERCAST W4INXL4YD 100% COT CRIMPED FINISH WBRL II

## (undated) DEVICE — DRAPE,EXTREMITY,89X128,STERILE: Brand: MEDLINE

## (undated) DEVICE — SUTURE PERMAHAND SZ 2-0 L18IN NONABSORBABLE BLK L26MM SH C012D

## (undated) DEVICE — STERILE LATEX POWDER FREE SURGICAL GLOVES WITH HYDROGEL COATING: Brand: PROTEXIS

## (undated) DEVICE — BLANKET WRM W40.2XL55.9IN IORT LO BODY + MISTRAL AIR

## (undated) DEVICE — DRESSING GRMCDL 6 12FR D1N CNTR HOLE 4MM ANTMCRBL PRTCTVE DI

## (undated) DEVICE — STERILE POLYISOPRENE POWDER-FREE SURGICAL GLOVES: Brand: PROTEXIS

## (undated) DEVICE — GARMENT,MEDLINE,DVT,INT,CALF,MED, GEN2: Brand: MEDLINE

## (undated) DEVICE — 3M™ IOBAN™ 2 ANTIMICROBIAL INCISE DRAPE 6650EZ: Brand: IOBAN™ 2

## (undated) DEVICE — GOWN,PREVENTION PLUS,2XL,ST,22/CS: Brand: MEDLINE

## (undated) DEVICE — SUTURE ETHLN SZ 3-0 L18IN NONABSORBABLE BLK L24MM FS-1 3/8 663G

## (undated) DEVICE — SUTURE STRATAFIX SPRL MCRYL + SZ 4-0 L12IN ABSRB UD PS-2 SXMP1B117

## (undated) DEVICE — PACK,UNIVERSAL,NO GOWNS: Brand: MEDLINE

## (undated) DEVICE — SPECIMEN ORIENTATION CHARMS, SIX DISTINCTLY SHAPED STERILE 10MM CHARMS: Brand: MARGINMAP

## (undated) DEVICE — PITCHER PT 1200ML W HNDL CSR WRP

## (undated) DEVICE — PAD,NON-ADHERENT,3X8,STERILE,LF,1/PK: Brand: MEDLINE

## (undated) DEVICE — SOLUTION IV IRRIG POUR BRL 0.9% SODIUM CHL 2F7124

## (undated) DEVICE — SYRINGE IRRIG 60ML SFT PLIABLE BLB EZ TO GRP 1 HND USE W/

## (undated) DEVICE — SYRINGE, LUER LOCK, 60ML: Brand: MEDLINE

## (undated) DEVICE — TOWEL,OR,DSP,ST,BLUE,DLX,4/PK,20PK/CS: Brand: MEDLINE

## (undated) DEVICE — SOLUTION IV IRRIG WATER 1000ML POUR BRL 2F7114